# Patient Record
Sex: MALE | Race: OTHER | HISPANIC OR LATINO | Employment: OTHER | URBAN - METROPOLITAN AREA
[De-identification: names, ages, dates, MRNs, and addresses within clinical notes are randomized per-mention and may not be internally consistent; named-entity substitution may affect disease eponyms.]

---

## 2017-11-11 ENCOUNTER — APPOINTMENT (OUTPATIENT)
Dept: GENERAL RADIOLOGY | Age: 72
End: 2017-11-11
Attending: EMERGENCY MEDICINE
Payer: MEDICARE

## 2017-11-11 ENCOUNTER — HOSPITAL ENCOUNTER (EMERGENCY)
Age: 72
Discharge: HOME | End: 2017-11-11
Attending: EMERGENCY MEDICINE
Payer: MEDICARE

## 2017-11-11 VITALS
HEART RATE: 79 BPM | SYSTOLIC BLOOD PRESSURE: 180 MMHG | RESPIRATION RATE: 15 BRPM | TEMPERATURE: 97.9 F | OXYGEN SATURATION: 99 % | WEIGHT: 175 LBS | HEIGHT: 66 IN | BODY MASS INDEX: 28.12 KG/M2 | DIASTOLIC BLOOD PRESSURE: 79 MMHG

## 2017-11-11 DIAGNOSIS — L84 FOOT CALLUS: Primary | ICD-10-CM

## 2017-11-11 DIAGNOSIS — R73.9 HYPERGLYCEMIA: ICD-10-CM

## 2017-11-11 DIAGNOSIS — E11.00 TYPE 2 DIABETES MELLITUS WITH HYPEROSMOLARITY WITHOUT COMA, WITHOUT LONG-TERM CURRENT USE OF INSULIN (HCC): ICD-10-CM

## 2017-11-11 LAB — GLUCOSE BLD STRIP.AUTO-MCNC: 320 MG/DL (ref 70–110)

## 2017-11-11 PROCEDURE — 74011250636 HC RX REV CODE- 250/636: Performed by: EMERGENCY MEDICINE

## 2017-11-11 PROCEDURE — 82962 GLUCOSE BLOOD TEST: CPT

## 2017-11-11 PROCEDURE — 73630 X-RAY EXAM OF FOOT: CPT

## 2017-11-11 PROCEDURE — 99283 EMERGENCY DEPT VISIT LOW MDM: CPT

## 2017-11-11 PROCEDURE — 90715 TDAP VACCINE 7 YRS/> IM: CPT | Performed by: EMERGENCY MEDICINE

## 2017-11-11 PROCEDURE — 90471 IMMUNIZATION ADMIN: CPT

## 2017-11-11 RX ORDER — CEPHALEXIN 500 MG/1
500 CAPSULE ORAL 4 TIMES DAILY
Qty: 28 CAP | Refills: 0 | Status: SHIPPED | OUTPATIENT
Start: 2017-11-11 | End: 2017-11-18

## 2017-11-11 RX ADMIN — TETANUS TOXOID, REDUCED DIPHTHERIA TOXOID AND ACELLULAR PERTUSSIS VACCINE, ADSORBED 0.5 ML: 5; 2.5; 8; 8; 2.5 SUSPENSION INTRAMUSCULAR at 09:16
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
## 2017-11-11 NOTE — ED NOTES
Patient's blood sugar checked at 320. Patient medicated per MAR orders. Verified order, patient identification, and allergies prior to administration. Call bell within reach of patient. Will continue to monitor and assess.
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
## 2017-11-11 NOTE — ED NOTES
I have reviewed discharge instructions with the patient and adult child. The patient and adult child verbalized understanding. Patient discharged ambulatory with family. One prescription given to patient. Patient armband removed and shredded.

## 2017-11-11 NOTE — DISCHARGE INSTRUCTIONS
Dolor de pie: Instrucciones de cuidado - [ Foot Pain: Care Instructions ]  Instrucciones de cuidado  Las lesiones de pie que causan dolor e Ramesh Drones son bastante comunes. Apoorva todos los deportes o trabajos de reparación en el hogar pueden causar un paso en falso que termine con un dolor en el pie. El desgaste normal, sobre todo al [employer], también puede causar R[employer]. La mayoría de las lesiones menores del pie sanarán por sí solas, y el tratamiento en el hogar suele ser todo lo que necesita. Karlos si tiene marck lesión grave, quizás necesite exámenes y [place]. La atención de seguimiento es marck parte clave de foley tratamiento y seguridad. Asegúrese de hacer y acudir a todas las citas, y llame a foley médico si está teniendo problemas. También es marck buena idea saber los resultados de los exámenes y mantener marck lista de los medicamentos que steve. ¿Cómo puede cuidarse en el hogar? · [employer] analgésicos (medicamentos para el dolor) exactamente dieudonne le fueron indicados. ¨ Si el médico le recetó un analgésico, tómelo según las indicaciones. ¨ Si no está tomando un analgésico recetado, pregúntele a foley médico si puede patience un medicamento de [employer]. · Descanse y proteja foley pie. Deje de hacer cualquier actividad que pudiera causarle dolor. · Colóquese hielo o marck compresa fría en el pie emily 10 a 20 minutos cada vez. Póngase un paño carlos entre el hielo y la piel. · Eleve el pie adolorido sobre marck almohada cuando se aplique hielo o en cualquier momento que se siente o acueste emily los 3 días siguientes. Trate de mantenerlo por encima del nivel del corazón. Quinn ayudará a reducir la hinchazón. · Foley médico podría recomendar que se envuelva el pie con un vendaje elástico. Mantenga el pie envuelto tanto tiempo dieudonne foley médico lo recomiende. · Si foley médico le recomendó usar muletas, úselas según las indicaciones. · Use zapatos amplios.   · Tan pronto dieudonne el dolor y la hinchazón terminen, comience a hacer ejercicios suaves con el pie. Foley médico le puede decir qué ejercicios ayudarán. ¿Cuándo debe pedir ayuda? Llame al 911 en cualquier momento que considere que necesita atención de emergencia. Por ejemplo, llame si:  ? · Foley pie se pone pálido, blancuzco, azulado o frío. ?Llame a foley médico ahora mismo o busque atención médica inmediata si:  ? · No puede  o pararse en el pie. ? · Foley pie se ve torcido o fuera de foley posición normal.   ? · Foley pie no es estable cuando pisa. ? · Tiene señales de infección, tales dieudonne:  ¨ Aumento del dolor, la hinchazón, el enrojecimiento o la temperatura. ¨ Vetas rojizas que comienzan en la richie adolorida. ¨ Pus que supura de marck richie del pie. Leatha Lights. ? · Siente foley pie entumecido o con hormigueo. ?Preste especial atención a los cambios en foley franky y asegúrese de comunicarse con foley médico si:  ? · No mejora dieudonne se esperaba. ? · Tiene moretones por marck lesión que rodriguez más de 2 semanas. ¿Dónde puede encontrar más información en inglés? Franky Nicole a http://nancy-pan.info/. Mame Ortiz YYissel en la búsqueda para aprender más acerca de \"Dolor de pie: Instrucciones de cuidado - [ Foot Pain: Care Instructions ]. \"  Revisado: 21 marzo, 2017  Versión del contenido: 11.4  © 4946-7292 Healthwise, Incorporated. Las instrucciones de cuidado fueron adaptadas bajo licencia por Good Help Connections (which disclaims liability or warranty for this information). Si usted tiene [place] afección médica o sobre estas instrucciones, siempre pregunte a foley profesional de franky. [facility] niega toda garantía o responsabilidad por foley uso de esta información.

## 2019-12-10 ENCOUNTER — APPOINTMENT (OUTPATIENT)
Dept: CT IMAGING | Age: 74
End: 2019-12-10
Attending: EMERGENCY MEDICINE
Payer: MEDICARE

## 2019-12-10 ENCOUNTER — HOSPITAL ENCOUNTER (EMERGENCY)
Age: 74
Discharge: HOME | End: 2019-12-10
Attending: EMERGENCY MEDICINE
Payer: MEDICARE

## 2019-12-10 VITALS
HEART RATE: 96 BPM | WEIGHT: 160 LBS | BODY MASS INDEX: 25.71 KG/M2 | HEIGHT: 66 IN | RESPIRATION RATE: 16 BRPM | TEMPERATURE: 98.7 F | DIASTOLIC BLOOD PRESSURE: 72 MMHG | SYSTOLIC BLOOD PRESSURE: 175 MMHG | OXYGEN SATURATION: 100 %

## 2019-12-10 DIAGNOSIS — K59.00 CONSTIPATION, UNSPECIFIED CONSTIPATION TYPE: ICD-10-CM

## 2019-12-10 DIAGNOSIS — R39.198 DIFFICULTY IN URINATION: Primary | ICD-10-CM

## 2019-12-10 LAB
ALBUMIN SERPL-MCNC: 3.5 G/DL (ref 3.4–5)
ALBUMIN/GLOB SERPL: 0.9 {RATIO} (ref 0.8–1.7)
ALP SERPL-CCNC: 66 U/L (ref 45–117)
ALT SERPL-CCNC: 30 U/L (ref 16–61)
ANION GAP SERPL CALC-SCNC: 7 MMOL/L (ref 3–18)
APPEARANCE UR: CLEAR
AST SERPL-CCNC: 12 U/L (ref 10–38)
BACTERIA URNS QL MICRO: NEGATIVE /HPF
BASOPHILS # BLD: 0 K/UL (ref 0–0.1)
BASOPHILS NFR BLD: 1 % (ref 0–2)
BILIRUB SERPL-MCNC: 0.6 MG/DL (ref 0.2–1)
BILIRUB UR QL: NEGATIVE
BUN SERPL-MCNC: 30 MG/DL (ref 7–18)
BUN/CREAT SERPL: 21 (ref 12–20)
CALCIUM SERPL-MCNC: 9.3 MG/DL (ref 8.5–10.1)
CHLORIDE SERPL-SCNC: 105 MMOL/L (ref 100–111)
CO2 SERPL-SCNC: 27 MMOL/L (ref 21–32)
COLOR UR: YELLOW
CREAT SERPL-MCNC: 1.42 MG/DL (ref 0.6–1.3)
DIFFERENTIAL METHOD BLD: ABNORMAL
EOSINOPHIL # BLD: 0.6 K/UL (ref 0–0.4)
EOSINOPHIL NFR BLD: 9 % (ref 0–5)
EPITH CASTS URNS QL MICRO: NORMAL /LPF (ref 0–5)
ERYTHROCYTE [DISTWIDTH] IN BLOOD BY AUTOMATED COUNT: 13.1 % (ref 11.6–14.5)
GLOBULIN SER CALC-MCNC: 4 G/DL (ref 2–4)
GLUCOSE SERPL-MCNC: 250 MG/DL (ref 74–99)
GLUCOSE UR STRIP.AUTO-MCNC: 500 MG/DL
HCT VFR BLD AUTO: 31.7 % (ref 36–48)
HGB BLD-MCNC: 10.6 G/DL (ref 13–16)
HGB UR QL STRIP: NEGATIVE
KETONES UR QL STRIP.AUTO: NEGATIVE MG/DL
LEUKOCYTE ESTERASE UR QL STRIP.AUTO: NEGATIVE
LIPASE SERPL-CCNC: 275 U/L (ref 73–393)
LYMPHOCYTES # BLD: 1.6 K/UL (ref 0.9–3.6)
LYMPHOCYTES NFR BLD: 25 % (ref 21–52)
MCH RBC QN AUTO: 29.7 PG (ref 24–34)
MCHC RBC AUTO-ENTMCNC: 33.4 G/DL (ref 31–37)
MCV RBC AUTO: 88.8 FL (ref 74–97)
MONOCYTES # BLD: 0.6 K/UL (ref 0.05–1.2)
MONOCYTES NFR BLD: 10 % (ref 3–10)
NEUTS SEG # BLD: 3.6 K/UL (ref 1.8–8)
NEUTS SEG NFR BLD: 55 % (ref 40–73)
NITRITE UR QL STRIP.AUTO: NEGATIVE
PH UR STRIP: 5 [PH] (ref 5–8)
PLATELET # BLD AUTO: 208 K/UL (ref 135–420)
PMV BLD AUTO: 9.8 FL (ref 9.2–11.8)
POTASSIUM SERPL-SCNC: 4.1 MMOL/L (ref 3.5–5.5)
PROT SERPL-MCNC: 7.5 G/DL (ref 6.4–8.2)
PROT UR STRIP-MCNC: 30 MG/DL
RBC # BLD AUTO: 3.57 M/UL (ref 4.7–5.5)
RBC #/AREA URNS HPF: NEGATIVE /HPF (ref 0–5)
SODIUM SERPL-SCNC: 139 MMOL/L (ref 136–145)
SP GR UR REFRACTOMETRY: 1.02 (ref 1–1.03)
UROBILINOGEN UR QL STRIP.AUTO: 0.2 EU/DL (ref 0.2–1)
WBC # BLD AUTO: 6.5 K/UL (ref 4.6–13.2)
WBC URNS QL MICRO: NEGATIVE /HPF (ref 0–5)

## 2019-12-10 PROCEDURE — 80053 COMPREHEN METABOLIC PANEL: CPT

## 2019-12-10 PROCEDURE — 74177 CT ABD & PELVIS W/CONTRAST: CPT

## 2019-12-10 PROCEDURE — 83690 ASSAY OF LIPASE: CPT

## 2019-12-10 PROCEDURE — 99283 EMERGENCY DEPT VISIT LOW MDM: CPT

## 2019-12-10 PROCEDURE — 96374 THER/PROPH/DIAG INJ IV PUSH: CPT

## 2019-12-10 PROCEDURE — 74011250636 HC RX REV CODE- 250/636: Performed by: EMERGENCY MEDICINE

## 2019-12-10 PROCEDURE — 85025 COMPLETE CBC W/AUTO DIFF WBC: CPT

## 2019-12-10 PROCEDURE — 81001 URINALYSIS AUTO W/SCOPE: CPT

## 2019-12-10 PROCEDURE — 74011636320 HC RX REV CODE- 636/320: Performed by: EMERGENCY MEDICINE

## 2019-12-10 RX ORDER — POLYETHYLENE GLYCOL 3350 17 G/17G
17 POWDER, FOR SOLUTION ORAL DAILY
Qty: 507 G | Refills: 0 | Status: SHIPPED | OUTPATIENT
Start: 2019-12-10

## 2019-12-10 RX ORDER — METFORMIN HYDROCHLORIDE 750 MG/1
750 TABLET, EXTENDED RELEASE ORAL DAILY
COMMUNITY
End: 2020-01-29

## 2019-12-10 RX ORDER — GUAIFENESIN 100 MG/5ML
81 LIQUID (ML) ORAL DAILY
COMMUNITY
End: 2020-04-24

## 2019-12-10 RX ORDER — ATORVASTATIN CALCIUM 20 MG/1
20 TABLET, FILM COATED ORAL DAILY
COMMUNITY

## 2019-12-10 RX ORDER — ACETAMINOPHEN 10 MG/ML
1000 INJECTION, SOLUTION INTRAVENOUS ONCE
Status: COMPLETED | OUTPATIENT
Start: 2019-12-10 | End: 2019-12-10

## 2019-12-10 RX ADMIN — IOPAMIDOL 70 ML: 612 INJECTION, SOLUTION INTRAVENOUS at 04:43

## 2019-12-10 RX ADMIN — SODIUM CHLORIDE 500 ML: 900 INJECTION, SOLUTION INTRAVENOUS at 03:18

## 2019-12-10 RX ADMIN — ACETAMINOPHEN 1000 MG: 10 INJECTION, SOLUTION INTRAVENOUS at 03:18

## 2019-12-10 NOTE — DISCHARGE INSTRUCTIONS
Patient Education        Estreñimiento: Instrucciones de cuidado - [ Constipation: Care Instructions ]  Instrucciones de cuidado    Tener estreñimiento significa que usted tiene dificultades para eliminar las heces (evacuaciones del intestino). Las personas eliminan heces entre 3 veces al día y Ceil Faster vez cada 3 días. Lo que es normal para usted puede ser [employer]na Products. El estreñimiento puede ocurrir con dolor en el recto y cólicos. El dolor podría empeorar cuando trata de eliminar las heces. A veces hay pequeñas cantidades de amrit caitlyn viva en el papel higiénico o en la superficie de las heces. Brutus se debe a las venas dilatadas cerca del recto (hemorroides). Algunos cambios en santos Rutland Greener y estilo de fransisca podrían ayudarle a evitar el estreñimiento continuo. Es posible que el médico además le recete medicamentos para ayudar a aflojar las heces. Algunos medicamentos pueden causar estreñimiento. Brutus incluye los analgésicos (medicamentos para el dolor) y los antidepresivos. Infórmele a santos médico sobre Art Forrest que usted steve. Es posible que santos médico quiera cambiar un medicamento para aliviar morro síntomas. La atención de seguimiento es marck parte clave de santos tratamiento y seguridad. Asegúrese de hacer y acudir a todas las citas, y llame a santos médico si está teniendo problemas. También es marck buena idea saber los resultados de morro exámenes y mantener marck lista de los medicamentos que steve. ¿Cómo puede cuidarse en el hogar? · Mallorie abundantes líquidos, los suficientes dieudonne para que santos orina sea de color amarillo jerad o transparente dieudonne el agua. Si tiene [employer], del corazón o del hígado y tiene que T[employer]in's líquidos, hable con santos médico antes de aumentar santos consumo. · Incluya en santos dieta diaria alimentos ricos en fibra. Estos incluyen frutas, verduras, frijoles (habichuelas) y granos integrales. · Kolby por lo menos 30 minutos de ejercicio la mayoría de los días de la [place].  Caminar es marck buena opción. Es posible que también quiera hacer otras actividades, dieudonne correr, nadar, [employer], o jugar al tenis u otros deportes de equipo. · [place] un suplemento de [place], dieudonne Citrucel o Metamucil, todos los GRASSE. Faith y siga todas las indicaciones de la [place]. · Programe tiempo todos los días para evacuar el intestino. Isidor Kim rutina diaria podría ayudar. Tómese santos tiempo para evacuar el intestino. · Apoye los pies sobre un banco o taburete pequeño cuando se siente en el inodoro. [place] ayuda a flexionar las caderas y coloca la pelvis en posición de cuclillas. · Santos médico podría recomendarle un laxante de venta ann marie para aliviar el estreñimiento. Jock Naila son Huson Franco de Magnesia (Milk of Magnesia) y [place]. Faith y siga todas las instrucciones de la [place]. No use laxantes de Best Buy. ¿Cuándo debe pedir ayuda? Llame a santos médico ahora mismo o busque atención médica inmediata si:    · Tiene dolor abdominal nuevo o peor.     · Tiene náuseas o vómito nuevos o peores.     · Tiene amrit en las heces.    Preste especial atención a los cambios en santos franky y asegúrese de comunicarse con santos médico si:    · Santos estreñimiento empeora.     · No mejora dieudonne se esperaba. ¿Dónde puede encontrar más información en inglés? Jeanine Spurling a http://nancy-pan.info/. Escriba P343 en la búsqueda para aprender Betty Power de \"Estreñimiento: Instrucciones de cuidado - [ Constipation: Care Instructions ]. \"  Revisado: 26 eduardo, 2019  Versión del contenido: 12.2  © 2843-1475 Healthwise, Incorporated. Las instrucciones de cuidado fueron adaptadas bajo licencia por Good Help Connections (which disclaims liability or warranty for this information). Si usted tiene [place] afección médica o sobre estas instrucciones, siempre pregunte a santos profesional de franky. Healthwise, Incorporated niega toda garantía o responsabilidad por santos uso de esta información.          Patient Education Dolor al [place] (disuria): Instrucciones de cuidado - [ Painful Urination (Dysuria): Care Instructions ]  Instrucciones de cuidado  Sentir ardor al orinar (disuria) es un síntoma común de marck infección de las vías urinarias u otros problemas urinarios. La vejiga puede inflamarse. [place] puede causar dolor al llenarse o vaciarse la vejiga. Usted también puede sentir dolor si el conducto que transporta la orina desde la vejiga hacia afuera del organismo (uretra) se irrita o se infecta. Las infecciones de transmisión sexual (STI, por morro siglas en inglés) también pueden causar dolor al orinar. A veces, el dolor puede ser causado por otras cosas además de marck infección. La uretra puede irritarse a causa de jabones, perfumes u objetos extraños en la uretra. Los cálculos renales pueden causar dolor cuando pasan por la uretra. Puede ser difícil encontrar la causa. Es posible que usted deba hacerse pruebas. El tratamiento para el dolor al orinar depende de la causa. La atención de seguimiento es marck parte clave de santos tratamiento y seguridad. Asegúrese de hacer y acudir a todas las citas, y llame a santos médico si está teniendo problemas. También es marck buena idea saber los resultados de morro exámenes y mantener marck lista de los medicamentos que steve. ¿Cómo puede cuidarse en el hogar? · [employer] Materials agua emily los siguientes fitz o [address] [place] ayudará a que la orina sea menos concentrada. (Si tiene enfermedad de los riñones, el corazón o el hígado y tiene que [place]'s líquidos, hable con santos médico antes de aumentar la cantidad de líquidos que austyn). · Evite las bebidas gaseosas o con cafeína. Pueden irritar la vejiga. · Orine con frecuencia. Trate de vaciar la vejiga cada vez que orine. Para las mujeres:  · Orine inmediatamente después de lance tenido relaciones sexuales. · Después de ir al baño, límpiese de adelante hacia atrás.   · Evite el uso de duchas vaginales, los zachariah de burbujas y los [place] de higiene femenina. Y evite otros productos para la higiene femenina que contengan desodorantes. ¿Cuándo debe pedir ayuda? Llame a santos médico ahora mismo o busque atención médica inmediata si:    · Tiene síntomas nuevos dieudonne fiebre, náuseas o vómito.     · Tiene síntomas nuevos o peores de un problema urinario. Por ejemplo:  ? Tiene amrit o pus en la orina. ? Tiene escalofríos o le duele el cuerpo. ? Siente más dolor al [place]. ? Tiene dolor en la celine o el abdomen. ? Tiene dolor de espalda ana luisa debajo de la caja torácica (el flanco).    Vigile muy de cerca los cambios en santos franky, y asegúrese de comunicarse con santos médico si tiene algún problema. ¿Dónde puede encontrar más información en inglés? Amalia Cuellar a http://nancy-pan.info/. Silvia Aviles [id number] en la búsqueda para aprender más acerca de \"Dolor al [place] (disuria): Instrucciones de cuidado - [ Painful Urination (Dysuria): Care Instructions ]. \"  Revisado: 19 diciembre, 2018  Versión del contenido: 12.2  © 7203-4631 Healthwise, Incorporated. Las instrucciones de cuidado fueron adaptadas bajo licencia por Good Help Connections (which disclaims liability or warranty for this information). Si usted tiene [place] afección médica o sobre estas instrucciones, siempre pregunte a santos profesional de franky. Healthwise, Incorporated niega toda garantía o responsabilidad por santos uso de esta información.

## 2019-12-10 NOTE — ED NOTES
I have reviewed discharge instructions with the patient and pt daughter. The patient and pt daughter verbalized understanding. Pt discharged, ambulatory with daughter. NAD noted.

## 2019-12-10 NOTE — ED PROVIDER NOTES
EMERGENCY DEPARTMENT HISTORY AND PHYSICAL EXAM    Date: 12/10/2019  Patient Name: Abbi Vincent    History of Presenting Illness     Chief Complaint   Patient presents with    Constipation    Urinary Retention         History Provided By: Patient      Abbi Vincent is a 76 y.o. male with PMHX of hypertension and diabetes who presents to the emergency department C/O urinary retention x3 days and constipation x3 days. Patient history obtained through daughter. Offered family  they would prefer to translate themselves. Patient notes that over the last 3 days it has been harder and harder to him to urinate. It had slowed down to a trickle but now he is unable to urinate. He is also noticed increasing pelvic pain with this. During the same time. Patient also felt constipated. Having smaller hard stools. He denies headache, changes in vision, shortness of breath or chest pain, nausea or vomiting, fever, numbness or tingling in hands or feet. PCP: None    Current Outpatient Medications   Medication Sig Dispense Refill    metFORMIN ER (GLUCOPHAGE XR) 750 mg tablet Take 750 mg by mouth daily.  atorvastatin (LIPITOR) 20 mg tablet Take 20 mg by mouth daily.  aspirin 81 mg chewable tablet Take 81 mg by mouth daily.  polyethylene glycol (MIRALAX) 17 gram/dose powder Take 17 g by mouth daily. 1 tablespoon with 8 oz of water daily 507 g 0    LOSARTAN PO Take 100 mg by mouth. Past History     Past Medical History:  Past Medical History:   Diagnosis Date    Diabetes ([address]     Hypertension        Past Surgical History:  No past surgical history on file. Family History:  No family history on file.     Social History:  Social History     Tobacco Use    Smoking status: Never Smoker    Smokeless tobacco: Never Used   Substance Use Topics    Alcohol use: No    Drug use: No       Allergies:  No Known Allergies      Review of Systems   Review of Systems   All other systems reviewed and are negative. Physical Exam     Vitals:    12/10/19 0224 12/10/19 0250 12/10/19 0300 12/10/19 0600   BP:  143/69 156/64 175/72   Pulse:  96     Resp:  18 18 16   Temp:  98.7 °F (37.1 °C)     SpO2:  99% 98% 100%   Weight: 72.6 kg (160 lb)      Height: 5' 6\" (1.676 m)        Physical Exam    Nursing notes and vital signs reviewed    Constitutional: Non toxic appearing,mild acute distress  Head: Normocephalic, Atraumatic  Eyes: Pupils are equal, round, and reactive to light, EOMI  Neck: Supple  Cardiovascular: Regular rate and rhythm, no murmurs, rubs, or gallops  Chest: Normal work of breathing and chest excursion bilaterally  Lungs: Clear to ausculation bilaterally  Abdomen: Soft, left and right lower quadrants tender, non distended, normoactive bowel sounds  Back: No evidence of trauma or deformity  Extremities: No evidence of trauma or deformity, no LE edema  Skin: Warm and dry, normal cap refill  Neuro: Alert and appropriate,  Psychiatric: Normal mood and affect      Diagnostic Study Results     Labs -     Recent Results (from the past 12 hour(s))   CBC WITH AUTOMATED DIFF    Collection Time: 12/10/19  3:00 AM   Result Value Ref Range    WBC 6.5 4.6 - 13.2 K/uL    RBC 3.57 (L) 4.70 - 5.50 M/uL    HGB 10.6 (L) 13.0 - 16.0 g/dL    HCT 31.7 (L) 36.0 - 48.0 %    MCV 88.8 74.0 - 97.0 FL    MCH 29.7 24.0 - 34.0 PG    MCHC 33.4 31.0 - 37.0 g/dL    RDW 13.1 11.6 - 14.5 %    PLATELET 181 752 - 454 K/uL    MPV 9.8 9.2 - 11.8 FL    NEUTROPHILS 55 40 - 73 %    LYMPHOCYTES 25 21 - 52 %    MONOCYTES 10 3 - 10 %    EOSINOPHILS 9 (H) 0 - 5 %    BASOPHILS 1 0 - 2 %    ABS. NEUTROPHILS 3.6 1.8 - 8.0 K/UL    ABS. LYMPHOCYTES 1.6 0.9 - 3.6 K/UL    ABS. MONOCYTES 0.6 0.05 - 1.2 K/UL    ABS. EOSINOPHILS 0.6 (H) 0.0 - 0.4 K/UL    ABS.  BASOPHILS 0.0 0.0 - 0.1 K/UL    DF AUTOMATED     METABOLIC PANEL, COMPREHENSIVE    Collection Time: 12/10/19  3:00 AM   Result Value Ref Range    Sodium 139 136 - 145 mmol/L    Potassium 4.1 3.5 - 5.5 mmol/L    Chloride 105 100 - 111 mmol/L    CO2 27 21 - 32 mmol/L    Anion gap 7 3.0 - 18 mmol/L    Glucose 250 (H) 74 - 99 mg/dL    BUN 30 (H) 7.0 - 18 MG/DL    Creatinine 1.42 (H) 0.6 - 1.3 MG/DL    BUN/Creatinine ratio 21 (H) 12 - 20      GFR est AA 59 (L) >60 ml/min/1.73m2    GFR est non-AA 49 (L) >60 ml/min/1.73m2    Calcium 9.3 8.5 - 10.1 MG/DL    Bilirubin, total 0.6 0.2 - 1.0 MG/DL    ALT (SGPT) 30 16 - 61 U/L    AST (SGOT) 12 10 - 38 U/L    Alk. phosphatase 66 45 - 117 U/L    Protein, total 7.5 6.4 - 8.2 g/dL    Albumin 3.5 3.4 - 5.0 g/dL    Globulin 4.0 2.0 - 4.0 g/dL    A-G Ratio 0.9 0.8 - 1.7     LIPASE    Collection Time: 12/10/19  3:00 AM   Result Value Ref Range    Lipase 275 73 - 393 U/L   URINALYSIS W/ RFLX MICROSCOPIC    Collection Time: 12/10/19  3:23 AM   Result Value Ref Range    Color YELLOW      Appearance CLEAR      Specific gravity 1.018 1.005 - 1.030      pH (UA) 5.0 5.0 - 8.0      Protein 30 (A) NEG mg/dL    Glucose 500 (A) NEG mg/dL    Ketone NEGATIVE  NEG mg/dL    Bilirubin NEGATIVE  NEG      Blood NEGATIVE  NEG      Urobilinogen 0.2 0.2 - 1.0 EU/dL    Nitrites NEGATIVE  NEG      Leukocyte Esterase NEGATIVE  NEG     URINE MICROSCOPIC ONLY    Collection Time: 12/10/19  3:23 AM   Result Value Ref Range    WBC NEGATIVE  0 - 5 /hpf    RBC NEGATIVE  0 - 5 /hpf    Epithelial cells RARE 0 - 5 /lpf    Bacteria NEGATIVE  NEG /hpf       Radiologic Studies -   CT ABD PELV W CONT   Final Result   IMPRESSION:      The appendix is borderline in size but otherwise unremarkable. Overall low   suspicion for appendicitis. No definitive acute intra-abdominal process identified. CT Results  (Last 48 hours)               12/10/19 0455  CT ABD PELV W CONT Final result    Impression:  IMPRESSION:       The appendix is borderline in size but otherwise unremarkable. Overall low   suspicion for appendicitis. No definitive acute intra-abdominal process identified.        Narrative:  EXAM: CT of the abdomen and pelvis       INDICATION: Pain. COMPARISON: None. TECHNIQUE: Axial CT imaging of the abdomen and pelvis was performed with   intravenous contrast. Multiplanar reformats were generated. Dose reduction   techniques used: automated exposure control, adjustment of the mAs and/or kVp   according to patient size, and iterative reconstruction techniques. _______________       FINDINGS:       LOWER CHEST: Unremarkable. LIVER, BILIARY: Liver is normal. No biliary dilation. Gallbladder is   unremarkable. PANCREAS: Normal.       SPLEEN: Normal.       ADRENALS: Normal.       KIDNEYS: There is a small hypodensity lower pole left kidney likely a small   cyst. There is no hydronephrosis. LYMPH NODES: No enlarged lymph nodes. GASTROINTESTINAL TRACT: No bowel dilation or wall thickening. There are a few   diverticula of the descending colon without diverticulitis. The appendix is   prominent measuring up to 7 mm. There is no periappendiceal infiltrative change. PELVIC ORGANS: Unremarkable. VASCULATURE: Unremarkable. BONES: No acute or aggressive osseous abnormalities identified. OTHER: None.       _______________               CXR Results  (Last 48 hours)    None          Medications given in the ED-  Medications   sodium chloride 0.9 % bolus infusion 500 mL (500 mL IntraVENous New Bag 12/10/19 0318)   acetaminophen (OFIRMEV) infusion 1,000 mg (1,000 mg IntraVENous New Bag 12/10/19 0318)   iopamidol (ISOVUE 300) 61 % contrast injection  mL (70 mL IntraVENous Given 12/10/19 8047)         Medical Decision Making   I am the first provider for this patient. I reviewed the vital signs, available nursing notes, past medical history, past surgical history, family history and social history. Vital Signs-Reviewed the patient's vital signs.     Records Reviewed: Nursing Notes    Provider Notes (Medical Decision Making): Yeni Alvarez is a 76 y.o. male presented today with abdominal pain, hard bowel movements and decreased urination. Patient was brought back and evaluated after bladder scan patient was able to void with decrease in abdominal symptoms. He was also able to have a bowel movement which again improved his abdominal symptoms. Laboratory work as above UA notable for no evidence of infection. Patient underwent a CT scan of the abdomen pelvis which had no acute finding including no obstruction. I spoke to patient and his wife at length we will start him on MiraLAX in addition he will follow-up with both GI and urology as he has not had a colonoscopy at this point and he may be suffering from an enlarged prostate given his frequent urinations and incomplete emptying. Patient was discharged home hemodynamically stable. Procedures:  Procedures        Diagnosis and Disposition       DISCHARGE NOTE:    Momo uGardado  results have been reviewed with him. He has been counseled regarding his diagnosis, treatment, and plan. He verbally conveys understanding and agreement of the signs, symptoms, diagnosis, treatment and prognosis and additionally agrees to follow up as discussed. He also agrees with the care-plan and conveys that all of his questions have been answered. I have also provided discharge instructions for him that include: educational information regarding their diagnosis and treatment, and list of reasons why they would want to return to the ED prior to their follow-up appointment, should his condition change. He has been provided with education for proper emergency department utilization. CLINICAL IMPRESSION:    1. Difficulty in urination    2. Constipation, unspecified constipation type        PLAN:  1. D/C Home  2. Current Discharge Medication List      START taking these medications    Details   polyethylene glycol (MIRALAX) 17 gram/dose powder Take 17 g by mouth daily.  1 tablespoon with 8 oz of water daily  Qty: 507 g, Refills: 0           3.   Follow-up Information     Follow up With Specialties Details Why Contact Info    Melissa Dixon MD Gastroenterology Schedule an appointment as soon as possible for a visit in 1 week  [address] [address] [facility]      Sheila Jimenez MD Urology Schedule an appointment as soon as possible for a visit in 1 week  [address] [facility]  [phone]          _______________________________      Please note that this dictation was completed with [employer]ker, the computer voice recognition software. Quite often unanticipated grammatical, syntax, homophones, and other interpretive errors are inadvertently transcribed by the computer software. Please disregard these errors. Please excuse any errors that have escaped final proofreading.

## 2019-12-10 NOTE — ED NOTES
Pt has voided and had a bowel movement since arrival to ER. Pt reports he is feeling a little better but stomach still just don't feel right. >200ml noted on bladder scan. Dr. Kenn Villavicencio notified.

## 2020-01-01 ENCOUNTER — HOSPITAL ENCOUNTER (INPATIENT)
Age: 75
LOS: 12 days | Discharge: HOME | DRG: 854 | End: 2020-01-14
Attending: EMERGENCY MEDICINE | Admitting: FAMILY MEDICINE
Payer: MEDICARE

## 2020-01-01 ENCOUNTER — APPOINTMENT (OUTPATIENT)
Dept: GENERAL RADIOLOGY | Age: 75
DRG: 854 | End: 2020-01-01
Attending: EMERGENCY MEDICINE
Payer: MEDICARE

## 2020-01-01 DIAGNOSIS — A41.9 SEPSIS, DUE TO UNSPECIFIED ORGANISM, UNSPECIFIED WHETHER ACUTE ORGAN DYSFUNCTION PRESENT (HCC): ICD-10-CM

## 2020-01-01 DIAGNOSIS — R50.9 FEVER IN ADULT: Primary | ICD-10-CM

## 2020-01-01 DIAGNOSIS — E87.20 LACTIC ACIDOSIS: ICD-10-CM

## 2020-01-01 LAB
ALBUMIN SERPL-MCNC: 3.1 G/DL (ref 3.4–5)
ALBUMIN/GLOB SERPL: 0.8 {RATIO} (ref 0.8–1.7)
ALP SERPL-CCNC: 67 U/L (ref 45–117)
ALT SERPL-CCNC: 18 U/L (ref 16–61)
ANION GAP SERPL CALC-SCNC: 9 MMOL/L (ref 3–18)
APPEARANCE UR: CLEAR
AST SERPL-CCNC: 10 U/L (ref 10–38)
BACTERIA URNS QL MICRO: ABNORMAL /HPF
BASOPHILS # BLD: 0 K/UL (ref 0–0.1)
BASOPHILS NFR BLD: 1 % (ref 0–2)
BILIRUB SERPL-MCNC: 1.4 MG/DL (ref 0.2–1)
BILIRUB UR QL: NEGATIVE
BNP SERPL-MCNC: 472 PG/ML (ref 0–900)
BUN SERPL-MCNC: 24 MG/DL (ref 7–18)
BUN/CREAT SERPL: 18 (ref 12–20)
CALCIUM SERPL-MCNC: 8.5 MG/DL (ref 8.5–10.1)
CHLORIDE SERPL-SCNC: 104 MMOL/L (ref 100–111)
CK MB CFR SERPL CALC: ABNORMAL % (ref 0–4)
CK MB SERPL-MCNC: <1 NG/ML (ref 5–25)
CK SERPL-CCNC: 38 U/L (ref 39–308)
CO2 SERPL-SCNC: 26 MMOL/L (ref 21–32)
COLOR UR: ABNORMAL
CREAT SERPL-MCNC: 1.37 MG/DL (ref 0.6–1.3)
DIFFERENTIAL METHOD BLD: ABNORMAL
EOSINOPHIL # BLD: 0.1 K/UL (ref 0–0.4)
EOSINOPHIL NFR BLD: 2 % (ref 0–5)
EPITH CASTS URNS QL MICRO: ABNORMAL /LPF (ref 0–5)
ERYTHROCYTE [DISTWIDTH] IN BLOOD BY AUTOMATED COUNT: 12.8 % (ref 11.6–14.5)
FLUAV AG NPH QL IA: NEGATIVE
FLUBV AG NOSE QL IA: NEGATIVE
GLOBULIN SER CALC-MCNC: 3.9 G/DL (ref 2–4)
GLUCOSE BLD STRIP.AUTO-MCNC: 320 MG/DL (ref 70–110)
GLUCOSE SERPL-MCNC: 307 MG/DL (ref 74–99)
GLUCOSE UR STRIP.AUTO-MCNC: 500 MG/DL
GRAN CASTS URNS QL MICRO: ABNORMAL /LPF
HCT VFR BLD AUTO: 24.9 % (ref 36–48)
HGB BLD-MCNC: 8.4 G/DL (ref 13–16)
HGB UR QL STRIP: ABNORMAL
HYALINE CASTS URNS QL MICRO: ABNORMAL /LPF (ref 0–2)
KETONES UR QL STRIP.AUTO: ABNORMAL MG/DL
LACTATE BLD-SCNC: 2.48 MMOL/L (ref 0.4–2)
LEUKOCYTE ESTERASE UR QL STRIP.AUTO: NEGATIVE
LYMPHOCYTES # BLD: 0.5 K/UL (ref 0.9–3.6)
LYMPHOCYTES NFR BLD: 8 % (ref 21–52)
MCH RBC QN AUTO: 29.9 PG (ref 24–34)
MCHC RBC AUTO-ENTMCNC: 33.7 G/DL (ref 31–37)
MCV RBC AUTO: 88.6 FL (ref 74–97)
MONOCYTES # BLD: 0.2 K/UL (ref 0.05–1.2)
MONOCYTES NFR BLD: 3 % (ref 3–10)
MUCOUS THREADS URNS QL MICRO: ABNORMAL /LPF
NEUTS SEG # BLD: 4.9 K/UL (ref 1.8–8)
NEUTS SEG NFR BLD: 86 % (ref 40–73)
NITRITE UR QL STRIP.AUTO: NEGATIVE
PH UR STRIP: 5 [PH] (ref 5–8)
PLATELET # BLD AUTO: 177 K/UL (ref 135–420)
PMV BLD AUTO: 9.9 FL (ref 9.2–11.8)
POTASSIUM SERPL-SCNC: 3.6 MMOL/L (ref 3.5–5.5)
PROT SERPL-MCNC: 7 G/DL (ref 6.4–8.2)
PROT UR STRIP-MCNC: 300 MG/DL
RBC # BLD AUTO: 2.81 M/UL (ref 4.7–5.5)
RBC #/AREA URNS HPF: ABNORMAL /HPF (ref 0–5)
SODIUM SERPL-SCNC: 139 MMOL/L (ref 136–145)
SP GR UR REFRACTOMETRY: 1.02 (ref 1–1.03)
SPERM URNS QL MICRO: ABNORMAL
TROPONIN I SERPL-MCNC: <0.02 NG/ML (ref 0–0.04)
UROBILINOGEN UR QL STRIP.AUTO: 1 EU/DL (ref 0.2–1)
WBC # BLD AUTO: 5.7 K/UL (ref 4.6–13.2)
WBC URNS QL MICRO: ABNORMAL /HPF (ref 0–5)

## 2020-01-01 PROCEDURE — 93005 ELECTROCARDIOGRAM TRACING: CPT

## 2020-01-01 PROCEDURE — 96375 TX/PRO/DX INJ NEW DRUG ADDON: CPT

## 2020-01-01 PROCEDURE — 87040 BLOOD CULTURE FOR BACTERIA: CPT

## 2020-01-01 PROCEDURE — 83605 ASSAY OF LACTIC ACID: CPT

## 2020-01-01 PROCEDURE — 82550 ASSAY OF CK (CPK): CPT

## 2020-01-01 PROCEDURE — 87086 URINE CULTURE/COLONY COUNT: CPT

## 2020-01-01 PROCEDURE — 74011000258 HC RX REV CODE- 258: Performed by: EMERGENCY MEDICINE

## 2020-01-01 PROCEDURE — 82962 GLUCOSE BLOOD TEST: CPT

## 2020-01-01 PROCEDURE — 87077 CULTURE AEROBIC IDENTIFY: CPT

## 2020-01-01 PROCEDURE — 71045 X-RAY EXAM CHEST 1 VIEW: CPT

## 2020-01-01 PROCEDURE — 87186 SC STD MICRODIL/AGAR DIL: CPT

## 2020-01-01 PROCEDURE — 99285 EMERGENCY DEPT VISIT HI MDM: CPT

## 2020-01-01 PROCEDURE — 83880 ASSAY OF NATRIURETIC PEPTIDE: CPT

## 2020-01-01 PROCEDURE — 74011250636 HC RX REV CODE- 250/636: Performed by: EMERGENCY MEDICINE

## 2020-01-01 PROCEDURE — 80053 COMPREHEN METABOLIC PANEL: CPT

## 2020-01-01 PROCEDURE — 87804 INFLUENZA ASSAY W/OPTIC: CPT

## 2020-01-01 PROCEDURE — 81001 URINALYSIS AUTO W/SCOPE: CPT

## 2020-01-01 PROCEDURE — 85025 COMPLETE CBC W/AUTO DIFF WBC: CPT

## 2020-01-01 PROCEDURE — 74011250637 HC RX REV CODE- 250/637: Performed by: EMERGENCY MEDICINE

## 2020-01-01 PROCEDURE — 96365 THER/PROPH/DIAG IV INF INIT: CPT

## 2020-01-01 RX ORDER — SODIUM CHLORIDE 0.9 % (FLUSH) 0.9 %
5-10 SYRINGE (ML) INJECTION AS NEEDED
Status: DISCONTINUED | OUTPATIENT
Start: 2020-01-01 | End: 2020-01-14 | Stop reason: HOSPADM

## 2020-01-01 RX ORDER — LEVOFLOXACIN 5 MG/ML
750 INJECTION, SOLUTION INTRAVENOUS EVERY 24 HOURS
Status: DISCONTINUED | OUTPATIENT
Start: 2020-01-01 | End: 2020-01-02

## 2020-01-01 RX ORDER — VANCOMYCIN 2 GRAM/500 ML IN 0.9 % SODIUM CHLORIDE INTRAVENOUS
2000 ONCE
Status: COMPLETED | OUTPATIENT
Start: 2020-01-01 | End: 2020-01-02

## 2020-01-01 RX ORDER — ACETAMINOPHEN 325 MG/1
975 TABLET ORAL
Status: COMPLETED | OUTPATIENT
Start: 2020-01-01 | End: 2020-01-01

## 2020-01-01 RX ORDER — INSULIN GLARGINE 100 [IU]/ML
25 INJECTION, SOLUTION SUBCUTANEOUS
COMMUNITY
End: 2020-01-29

## 2020-01-01 RX ADMIN — SODIUM CHLORIDE 914 ML: 900 INJECTION, SOLUTION INTRAVENOUS at 22:42

## 2020-01-01 RX ADMIN — LEVOFLOXACIN 750 MG: 5 INJECTION, SOLUTION INTRAVENOUS at 23:36

## 2020-01-01 RX ADMIN — SODIUM CHLORIDE 1000 ML: 900 INJECTION, SOLUTION INTRAVENOUS at 22:41

## 2020-01-01 RX ADMIN — ACETAMINOPHEN 975 MG: 325 TABLET ORAL at 22:43

## 2020-01-01 RX ADMIN — Medication 10 ML: at 23:36

## 2020-01-01 RX ADMIN — PIPERACILLIN AND TAZOBACTAM 4.5 G: 4; .5 INJECTION, POWDER, FOR SOLUTION INTRAVENOUS at 22:42

## 2020-01-02 ENCOUNTER — APPOINTMENT (OUTPATIENT)
Dept: ULTRASOUND IMAGING | Age: 75
DRG: 854 | End: 2020-01-02
Attending: FAMILY MEDICINE
Payer: MEDICARE

## 2020-01-02 ENCOUNTER — APPOINTMENT (OUTPATIENT)
Dept: NUCLEAR MEDICINE | Age: 75
DRG: 854 | End: 2020-01-02
Attending: HOSPITALIST
Payer: MEDICARE

## 2020-01-02 ENCOUNTER — APPOINTMENT (OUTPATIENT)
Dept: CT IMAGING | Age: 75
DRG: 854 | End: 2020-01-02
Attending: EMERGENCY MEDICINE
Payer: MEDICARE

## 2020-01-02 PROBLEM — A41.9 SEPSIS (HCC): Status: ACTIVE | Noted: 2020-01-02

## 2020-01-02 PROBLEM — R50.9 FEVER IN ADULT: Status: ACTIVE | Noted: 2020-01-02

## 2020-01-02 PROBLEM — R10.9 ABDOMINAL PAIN: Status: ACTIVE | Noted: 2020-01-02

## 2020-01-02 PROBLEM — N18.9 CKD (CHRONIC KIDNEY DISEASE): Status: ACTIVE | Noted: 2020-01-02

## 2020-01-02 PROBLEM — E87.20 LACTIC ACIDOSIS: Status: ACTIVE | Noted: 2020-01-02

## 2020-01-02 LAB
ANION GAP SERPL CALC-SCNC: 7 MMOL/L (ref 3–18)
BUN SERPL-MCNC: 21 MG/DL (ref 7–18)
BUN/CREAT SERPL: 15 (ref 12–20)
CALCIUM SERPL-MCNC: 7.9 MG/DL (ref 8.5–10.1)
CHLORIDE SERPL-SCNC: 109 MMOL/L (ref 100–111)
CK MB CFR SERPL CALC: ABNORMAL % (ref 0–4)
CK MB CFR SERPL CALC: ABNORMAL % (ref 0–4)
CK MB CFR SERPL CALC: NORMAL % (ref 0–4)
CK MB SERPL-MCNC: <1 NG/ML (ref 5–25)
CK SERPL-CCNC: 40 U/L (ref 39–308)
CK SERPL-CCNC: 47 U/L (ref 39–308)
CK SERPL-CCNC: 53 U/L (ref 39–308)
CO2 SERPL-SCNC: 26 MMOL/L (ref 21–32)
CREAT SERPL-MCNC: 1.37 MG/DL (ref 0.6–1.3)
GLUCOSE BLD STRIP.AUTO-MCNC: 122 MG/DL (ref 70–110)
GLUCOSE BLD STRIP.AUTO-MCNC: 132 MG/DL (ref 70–110)
GLUCOSE BLD STRIP.AUTO-MCNC: 174 MG/DL (ref 70–110)
GLUCOSE BLD STRIP.AUTO-MCNC: 89 MG/DL (ref 70–110)
GLUCOSE SERPL-MCNC: 204 MG/DL (ref 74–99)
HBA1C MFR BLD: 9.7 % (ref 4.2–5.6)
LACTATE BLD-SCNC: 1.09 MMOL/L (ref 0.4–2)
LACTATE SERPL-SCNC: 1.3 MMOL/L (ref 0.4–2)
POTASSIUM SERPL-SCNC: 3.7 MMOL/L (ref 3.5–5.5)
SODIUM SERPL-SCNC: 142 MMOL/L (ref 136–145)
TROPONIN I SERPL-MCNC: 0.04 NG/ML (ref 0–0.04)
TROPONIN I SERPL-MCNC: 0.06 NG/ML (ref 0–0.04)
TROPONIN I SERPL-MCNC: 0.09 NG/ML (ref 0–0.04)

## 2020-01-02 PROCEDURE — 83605 ASSAY OF LACTIC ACID: CPT

## 2020-01-02 PROCEDURE — 74011250637 HC RX REV CODE- 250/637: Performed by: INTERNAL MEDICINE

## 2020-01-02 PROCEDURE — 82962 GLUCOSE BLOOD TEST: CPT

## 2020-01-02 PROCEDURE — 74011000250 HC RX REV CODE- 250: Performed by: FAMILY MEDICINE

## 2020-01-02 PROCEDURE — A9537 TC99M MEBROFENIN: HCPCS

## 2020-01-02 PROCEDURE — 36415 COLL VENOUS BLD VENIPUNCTURE: CPT

## 2020-01-02 PROCEDURE — 74011250636 HC RX REV CODE- 250/636: Performed by: FAMILY MEDICINE

## 2020-01-02 PROCEDURE — 74011000258 HC RX REV CODE- 258: Performed by: EMERGENCY MEDICINE

## 2020-01-02 PROCEDURE — 74011636320 HC RX REV CODE- 636/320: Performed by: FAMILY MEDICINE

## 2020-01-02 PROCEDURE — 74177 CT ABD & PELVIS W/CONTRAST: CPT

## 2020-01-02 PROCEDURE — 82550 ASSAY OF CK (CPK): CPT

## 2020-01-02 PROCEDURE — 65660000000 HC RM CCU STEPDOWN

## 2020-01-02 PROCEDURE — 74011250637 HC RX REV CODE- 250/637: Performed by: FAMILY MEDICINE

## 2020-01-02 PROCEDURE — 83036 HEMOGLOBIN GLYCOSYLATED A1C: CPT

## 2020-01-02 PROCEDURE — 76705 ECHO EXAM OF ABDOMEN: CPT

## 2020-01-02 PROCEDURE — 80048 BASIC METABOLIC PNL TOTAL CA: CPT

## 2020-01-02 PROCEDURE — 74011250636 HC RX REV CODE- 250/636: Performed by: EMERGENCY MEDICINE

## 2020-01-02 PROCEDURE — 87040 BLOOD CULTURE FOR BACTERIA: CPT

## 2020-01-02 RX ORDER — ONDANSETRON 2 MG/ML
4 INJECTION INTRAMUSCULAR; INTRAVENOUS
Status: DISCONTINUED | OUTPATIENT
Start: 2020-01-02 | End: 2020-01-14 | Stop reason: HOSPADM

## 2020-01-02 RX ORDER — ATORVASTATIN CALCIUM 20 MG/1
20 TABLET, FILM COATED ORAL
Status: DISCONTINUED | OUTPATIENT
Start: 2020-01-02 | End: 2020-01-14 | Stop reason: HOSPADM

## 2020-01-02 RX ORDER — INSULIN LISPRO 100 [IU]/ML
INJECTION, SOLUTION INTRAVENOUS; SUBCUTANEOUS
Status: DISCONTINUED | OUTPATIENT
Start: 2020-01-02 | End: 2020-01-05

## 2020-01-02 RX ORDER — DEXTROSE MONOHYDRATE 100 MG/ML
125-250 INJECTION, SOLUTION INTRAVENOUS AS NEEDED
Status: DISCONTINUED | OUTPATIENT
Start: 2020-01-02 | End: 2020-01-14 | Stop reason: HOSPADM

## 2020-01-02 RX ORDER — HEPARIN SODIUM 5000 [USP'U]/ML
5000 INJECTION, SOLUTION INTRAVENOUS; SUBCUTANEOUS EVERY 8 HOURS
Status: DISCONTINUED | OUTPATIENT
Start: 2020-01-02 | End: 2020-01-08

## 2020-01-02 RX ORDER — ACETAMINOPHEN 325 MG/1
650 TABLET ORAL
Status: DISCONTINUED | OUTPATIENT
Start: 2020-01-02 | End: 2020-01-14 | Stop reason: HOSPADM

## 2020-01-02 RX ORDER — WATER FOR INJECTION,STERILE
VIAL (ML) INJECTION
Status: COMPLETED | OUTPATIENT
Start: 2020-01-02 | End: 2020-01-02

## 2020-01-02 RX ORDER — GUAIFENESIN 100 MG/5ML
81 LIQUID (ML) ORAL DAILY
Status: DISCONTINUED | OUTPATIENT
Start: 2020-01-02 | End: 2020-01-14 | Stop reason: HOSPADM

## 2020-01-02 RX ORDER — LEVOFLOXACIN 5 MG/ML
750 INJECTION, SOLUTION INTRAVENOUS
Status: DISCONTINUED | OUTPATIENT
Start: 2020-01-03 | End: 2020-01-03

## 2020-01-02 RX ORDER — MAGNESIUM SULFATE 100 %
16 CRYSTALS MISCELLANEOUS AS NEEDED
Status: DISCONTINUED | OUTPATIENT
Start: 2020-01-02 | End: 2020-01-14 | Stop reason: HOSPADM

## 2020-01-02 RX ORDER — SODIUM CHLORIDE 9 MG/ML
125 INJECTION, SOLUTION INTRAVENOUS CONTINUOUS
Status: DISCONTINUED | OUTPATIENT
Start: 2020-01-02 | End: 2020-01-03

## 2020-01-02 RX ADMIN — PIPERACILLIN AND TAZOBACTAM 4.5 G: 4; .5 INJECTION, POWDER, FOR SOLUTION INTRAVENOUS at 05:17

## 2020-01-02 RX ADMIN — HEPARIN SODIUM 5000 UNITS: 5000 INJECTION INTRAVENOUS; SUBCUTANEOUS at 17:52

## 2020-01-02 RX ADMIN — ACETAMINOPHEN 650 MG: 325 TABLET ORAL at 20:28

## 2020-01-02 RX ADMIN — PIPERACILLIN AND TAZOBACTAM 4.5 G: 4; .5 INJECTION, POWDER, FOR SOLUTION INTRAVENOUS at 17:51

## 2020-01-02 RX ADMIN — IOPAMIDOL 100 ML: 612 INJECTION, SOLUTION INTRAVENOUS at 02:03

## 2020-01-02 RX ADMIN — SODIUM CHLORIDE 125 ML/HR: 900 INJECTION, SOLUTION INTRAVENOUS at 03:53

## 2020-01-02 RX ADMIN — SINCALIDE 1.45 MCG: 5 INJECTION, POWDER, LYOPHILIZED, FOR SOLUTION INTRAVENOUS at 13:45

## 2020-01-02 RX ADMIN — ATORVASTATIN CALCIUM 20 MG: 20 TABLET, FILM COATED ORAL at 03:53

## 2020-01-02 RX ADMIN — VANCOMYCIN HYDROCHLORIDE 2000 MG: 10 INJECTION, POWDER, LYOPHILIZED, FOR SOLUTION INTRAVENOUS at 01:07

## 2020-01-02 RX ADMIN — PIPERACILLIN AND TAZOBACTAM 4.5 G: 4; .5 INJECTION, POWDER, FOR SOLUTION INTRAVENOUS at 22:24

## 2020-01-02 RX ADMIN — WATER 5 ML: 1 INJECTION INTRAMUSCULAR; INTRAVENOUS; SUBCUTANEOUS at 13:45

## 2020-01-02 RX ADMIN — VANCOMYCIN HYDROCHLORIDE 1250 MG: 1.25 INJECTION, POWDER, LYOPHILIZED, FOR SOLUTION INTRAVENOUS at 23:57

## 2020-01-02 RX ADMIN — SODIUM CHLORIDE 1000 ML: 900 INJECTION, SOLUTION INTRAVENOUS at 06:41

## 2020-01-02 RX ADMIN — ATORVASTATIN CALCIUM 20 MG: 20 TABLET, FILM COATED ORAL at 22:24

## 2020-01-02 RX ADMIN — SODIUM CHLORIDE 125 ML/HR: 900 INJECTION, SOLUTION INTRAVENOUS at 09:45

## 2020-01-02 RX ADMIN — HEPARIN SODIUM 5000 UNITS: 5000 INJECTION INTRAVENOUS; SUBCUTANEOUS at 03:52

## 2020-01-02 RX ADMIN — HEPARIN SODIUM 5000 UNITS: 5000 INJECTION INTRAVENOUS; SUBCUTANEOUS at 09:40

## 2020-01-02 RX ADMIN — ASPIRIN 81 MG 81 MG: 81 TABLET ORAL at 09:36

## 2020-01-02 RX ADMIN — PIPERACILLIN AND TAZOBACTAM 4.5 G: 4; .5 INJECTION, POWDER, FOR SOLUTION INTRAVENOUS at 13:17

## 2020-01-02 NOTE — ED TRIAGE NOTES
Patient brought to ED via EMS with c/o hypertension, hyperglycemia and cough. Patient also endorses vomiting. Patient is febrile in triage. Patient does not speak [place] and declines . Daughter at bedside.

## 2020-01-02 NOTE — H&P
History & Physical    Patient: Anna Marie Faulkner MRN: [id number]  CSN: [id number]    YOB: 1945  Age: 76 y.o. Sex: male      DOA: 1/1/2020  Primary Care Provider:  Kim Garcia MD      Assessment/Plan     Patient Active Problem List   Diagnosis Code    Sepsis ([facility] A41.9    Abdominal pain R10.9    CKD (chronic kidney disease) N18.9     75 y/o male with history of DMT2 and HTN admitted for abdominal pain and sepsis. Based on his CT scan, he has inflammation in the bladder/kidneys although his UA is negative. There is mention of possible cholecystitis but he has no pain in his RUQ. He also has an diabetic foot wound that is not infected. -NPO  -monitor UOP closely  -liberal IVF  -RUQ U/S to eval for gallstones, consider surg consult depending on result  -f/u bld and ur cx  -trend lactate  -continue broad spectrum abx zosyn/vanc/levaquin until cx result  -wound care c/s for foot ulcer    Estimated length of stay : 2 nights    Peter Asif MD  Nocturnist  -------------------------------------------------------------------------------------------------------------------------------------------------------------------------------------------  CC: fever, abdominal pain, N/V       HPI:     Anna Marie Faulkner is a 76 y.o. male who has a hx of HTN and DMT2 who has had N/V, abdominal pain, and poor appetite for 2-3 days. He had high fever tonight and the chills for the past 2 days. No CP or SOB. No leg swelling. Denies difficulty urinating or pain with urination. *The patient is [place] speaking and I conducted his H&P in [facility] [place] Family also present at the bedside. Past Medical History:   Diagnosis Date    Diabetes ([facility]     Hypertension        History reviewed. No pertinent surgical history. History reviewed. No pertinent family history.     Social History     Socioeconomic History    Marital status: SINGLE     Spouse name: Not on file    Number of children: Not on file    Years of education: Not on file    Highest education level: Not on file   Tobacco Use    Smoking status: Never Smoker    Smokeless tobacco: Never Used   Substance and Sexual Activity    Alcohol use: No    Drug use: No       Prior to Admission medications    Medication Sig Start Date End Date Taking? Authorizing Provider   insulin glargine (LANTUS SOLOSTAR U-100 INSULIN) 100 unit/mL (3 mL) inpn 25 Units by SubCUTAneous route. Yes Cain, MD Mishel   metFORMIN ER (GLUCOPHAGE XR) 750 mg tablet Take 750 mg by mouth daily. Yes Cain, MD Mishel   atorvastatin (LIPITOR) 20 mg tablet Take 20 mg by mouth daily. Yes Other, MD Mishel   aspirin 81 mg chewable tablet Take 81 mg by mouth daily. Yes Mishel Barlow MD   LOSARTAN PO Take 100 mg by mouth. Yes Cain, MD Mishel   polyethylene glycol (MIRALAX) 17 gram/dose powder Take 17 g by mouth daily. 1 tablespoon with 8 oz of water daily 12/10/19   Tk Mayers MD       No Known Allergies    Review of Systems  Gen: +fever/chills, no malaise, weight loss/gain. Heent: No headache, rhinorrhea, epistaxis, ear pain, hearing loss, sinus pain, neck pain/stiffness, sore throat. Heart: No chest pain, palpitations, THOMPSON, pnd, or orthopnea. Resp: No cough, hemoptysis, wheezing and shortness of breath. GI: +nausea, vomiting, no diarrhea, constipation, melena or hematochezia. : No urinary obstruction, dysuria or hematuria. Derm: No rash, new skin lesion or pruritis. Musc/skeletal: no bone or joint complaints. Vasc: No edema, cyanosis or claudication. Endo: No heat/cold intolerance, no polyuria,polydipsia or polyphagia. Neuro: No unilateral weakness, numbness, tingling. No seizures. Heme: No easy bruising or bleeding.           Physical Exam:     Physical Exam:  Visit Vitals  /55 (BP 1 Location: Right arm, BP Patient Position: At rest;Sitting)   Pulse 93   Temp 99.2 °F (37.3 °C)   Resp 17   Ht 5' 6\" (1.676 m)   Wt 72.5 kg (159 lb 13.3 oz)   SpO2 100%   BMI 25.80 kg/m²      O2 Device: (P) Room air    Temp (24hrs), Av.6 °F (38.1 °C), Min:99.2 °F (37.3 °C), Max:104.2 °F (40.1 °C)     190 -  0700  In:  [I.V.:]  Out: -    No intake/output data recorded. General:  Awake, cooperative, ill appearing, no distress. Head:  Normocephalic, without obvious abnormality, atraumatic. Eyes:  Conjunctivae/corneas clear, sclera anicteric. Neck: Supple, symmetrical, trachea midline, no adenopathy. Lungs:   Clear to auscultation bilaterally. Heart:  Regular rate and rhythm, S1, S2 normal, no murmur, click, rub or gallop. Abdomen: Soft, ttp in the lower abdomen overlying the bladder. No ttp in the RUQ. Neg Mccarthy's sign. Bowel sounds normal. No masses,  No organomegaly. Extremities: Extremities normal, atraumatic, no cyanosis or edema. Capillary refill normal.   Pulses: 2+ and symmetric all extremities. Skin: Skin color pink, turgor increased. No rashes or lesions   Neurologic: No focal motor or sensory deficit. Labs Reviewed: All lab results for the last 24 hours reviewed. Recent Results (from the past 24 hour(s))   GLUCOSE, POC    Collection Time: 20  9:36 PM   Result Value Ref Range    Glucose (POC) 320 (H) 70 - 110 mg/dL   CBC WITH AUTOMATED DIFF    Collection Time: 20  9:40 PM   Result Value Ref Range    WBC 5.7 4.6 - 13.2 K/uL    RBC 2.81 (L) 4.70 - 5.50 M/uL    HGB 8.4 (L) 13.0 - 16.0 g/dL    HCT 24.9 (L) 36.0 - 48.0 %    MCV 88.6 74.0 - 97.0 FL    MCH 29.9 24.0 - 34.0 PG    MCHC 33.7 31.0 - 37.0 g/dL    RDW 12.8 11.6 - 14.5 %    PLATELET 462 816 - 694 K/uL    MPV 9.9 9.2 - 11.8 FL    NEUTROPHILS 86 (H) 40 - 73 %    LYMPHOCYTES 8 (L) 21 - 52 %    MONOCYTES 3 3 - 10 %    EOSINOPHILS 2 0 - 5 %    BASOPHILS 1 0 - 2 %    ABS. NEUTROPHILS 4.9 1.8 - 8.0 K/UL    ABS. LYMPHOCYTES 0.5 (L) 0.9 - 3.6 K/UL    ABS. MONOCYTES 0.2 0.05 - 1.2 K/UL    ABS. EOSINOPHILS 0.1 0.0 - 0.4 K/UL    ABS.  BASOPHILS 0.0 0.0 - 0.1 K/UL    DF AUTOMATED METABOLIC PANEL, COMPREHENSIVE    Collection Time: 01/01/20  9:40 PM   Result Value Ref Range    Sodium 139 136 - 145 mmol/L    Potassium 3.6 3.5 - 5.5 mmol/L    Chloride 104 100 - 111 mmol/L    CO2 26 21 - 32 mmol/L    Anion gap 9 3.0 - 18 mmol/L    Glucose 307 (H) 74 - 99 mg/dL    BUN 24 (H) 7.0 - 18 MG/DL    Creatinine 1.37 (H) 0.6 - 1.3 MG/DL    BUN/Creatinine ratio 18 12 - 20      GFR est AA >60 >60 ml/min/1.73m2    GFR est non-AA 51 (L) >60 ml/min/1.73m2    Calcium 8.5 8.5 - 10.1 MG/DL    Bilirubin, total 1.4 (H) 0.2 - 1.0 MG/DL    ALT (SGPT) 18 16 - 61 U/L    AST (SGOT) 10 10 - 38 U/L    Alk.  phosphatase 67 45 - 117 U/L    Protein, total 7.0 6.4 - 8.2 g/dL    Albumin 3.1 (L) 3.4 - 5.0 g/dL    Globulin 3.9 2.0 - 4.0 g/dL    A-G Ratio 0.8 0.8 - 1.7     CARDIAC PANEL,(CK, CKMB & TROPONIN)    Collection Time: 01/01/20  9:40 PM   Result Value Ref Range    CK 38 (L) 39 - 308 U/L    CK - MB <1.0 <3.6 ng/ml    CK-MB Index  0.0 - 4.0 %     CALCULATION NOT PERFORMED WHEN RESULT IS BELOW LINEAR LIMIT    Troponin-I, QT <0.02 0.0 - 0.045 NG/ML   NT-PRO BNP    Collection Time: 01/01/20  9:40 PM   Result Value Ref Range    NT pro- 0 - 900 PG/ML   POC LACTIC ACID    Collection Time: 01/01/20  9:45 PM   Result Value Ref Range    Lactic Acid (POC) 2.48 (HH) 0.40 - 2.00 mmol/L   EKG, 12 LEAD, INITIAL    Collection Time: 01/01/20  9:51 PM   Result Value Ref Range    Ventricular Rate 111 BPM    Atrial Rate 111 BPM    P-R Interval 154 ms    QRS Duration 90 ms    Q-T Interval 310 ms    QTC Calculation (Bezet) 421 ms    Calculated P Axis 53 degrees    Calculated R Axis 18 degrees    Calculated T Axis 56 degrees    Diagnosis       Sinus tachycardia  Possible Left atrial enlargement  Nonspecific T wave abnormality  Abnormal ECG  No previous ECGs available     INFLUENZA A & B AG (RAPID TEST)    Collection Time: 01/01/20  9:57 PM   Result Value Ref Range    Influenza A Antigen NEGATIVE  NEG      Influenza B Antigen NEGATIVE NEG     URINALYSIS W/ RFLX MICROSCOPIC    Collection Time: 01/01/20 10:30 PM   Result Value Ref Range    Color DARK YELLOW      Appearance CLEAR      Specific gravity 1.024 1.005 - 1.030      pH (UA) 5.0 5.0 - 8.0      Protein 300 (A) NEG mg/dL    Glucose 500 (A) NEG mg/dL    Ketone TRACE (A) NEG mg/dL    Bilirubin NEGATIVE  NEG      Blood MODERATE (A) NEG      Urobilinogen 1.0 0.2 - 1.0 EU/dL    Nitrites NEGATIVE  NEG      Leukocyte Esterase NEGATIVE  NEG     URINE MICROSCOPIC ONLY    Collection Time: 01/01/20 10:30 PM   Result Value Ref Range    WBC 0 to 1 0 - 5 /hpf    RBC 3 to 5 0 - 5 /hpf    Epithelial cells FEW 0 - 5 /lpf    Bacteria RARE NEG /hpf    Mucus 2+ (A) NEG /lpf    Hyaline cast 0 to 1 0 - 2 /lpf    Granular cast 0 to 1 NEG /lpf    Spermatozoa 2+      Results   CT ABD PELV W CONT (Accession [id number]) (Order [id number])   Allergies      No Known Allergies   Exam Information     Status Exam Begun  Exam Ended    Final [99] 1/02/2020 01:59 1/02/2020 02:18   Result Information     Status: Final result (Exam End: 1/2/2020 02:18) Provider Status: Open   Study Result     EXAM: CT ABD PELV W CONT     CLINICAL INDICATION/HISTORY: Abdominal pain and fever, sepsis     COMPARISON: 12/10/2019     TECHNIQUE:   CT of the abdomen and pelvis following intravenous contrast  administration. Coronal and sagittal reformats were generated and reviewed. One or more dose reduction techniques were used on this CT: automated exposure  control, adjustment of the mAs and/or kVp according to patient size, and  iterative reconstruction techniques. The specific techniques used on this CT  exam have been documented in the patient's electronic medical record.   Digital  Imaging and Communications in Medicine (DICOM) format image data are available  to nonaffiliated external healthcare facilities or entities on a secure, media  free, reciprocally searchable basis with patient authorization for at least a  12-month period after this study.        _______________     FINDINGS:     LOWER THORAX: Mild left and right basilar dependent atelectasis. No acute  pulmonary infiltrate. No pleural effusion. No global cardiomegaly or  pericardial effusion.       LIVER AND BILIARY:  There is hydropic distention of the gallbladder. Mild  periportal edema. Common bile duct appears normal in caliber. No suspicious  liver lesions.     SPLEEN:  Normal.     PANCREAS:  Peripancreatic fatty atrophy.     ADRENALS:  Normal.     KIDNEYS:  Normal.     LYMPH NODES:  No mesenteric or retroperitoneal lymphadenopathy.     GI TRACT:  Small hiatal hernia. Sigmoid colon diverticulosis without evidence of  acute inflammation. Normal caliber small and large bowel loops. No morphology  of bowel obstruction. No bowel wall thickening. Normal appendix.     PELVIC ORGANS:  Mild circumferential bladder wall thickening despite mild  bladder distention. .  No acute abnormality.     VASCULATURE: Normal caliber lower thoracic and abdominal aorta with mild  atherosclerotic vascular calcification.     OTHER:   No ascites or free intraperitoneal air.     OSSEOUS STRUCTURES:  No acute osseous abnormality. Mild multilevel degenerative  disk disease and facet arthropathy.     _______________     IMPRESSION  IMPRESSION:     1. Nonspecific hydropic gallbladder distention with no additional secondary CT  findings of cholecystitis. Right upper quadrant ultrasound could better evaluate  these findings if there is clinical concern for cholecystitis. 2. Mild circumferential bladder wall thickening, secondary findings of chronic  bladder outlet obstruction versus cystitis. 3. Mild left and right perinephric inflammatory stranding is nonspecific since  are no additional findings to suggest pyelonephritis. Although this frequently  can represent senescent changes and was not present on the prior CT of  12/10/2019.  Correlation with urinalysis could more accurately evaluate for  potential ascending urinary tract infection.           Results   XR CHEST PORT (Accession [id number]) (Order [id number])   Allergies      No Known Allergies   Exam Information     Status Exam Begun  Exam Ended    Final [99] 1/01/2020 22:31 1/01/2020 22:42   Result Information     Status: Final result (Exam End: 1/1/2020 22:42) Provider Status: Open   Study Result     EXAM: XR CHEST PORT.     CLINICAL INDICATION/HISTORY: sepsis. -Additional: None.     TECHNIQUE: A portable erect AP radiographic view of the chest is reviewed  without comparison. _______________     FINDINGS:     The lungs and pleural spaces are clear. The cardiac silhouette, naida, and  mediastinal contours are normal for age. No acute osseous abnormality is  revealed.   _______________     IMPRESSION  IMPRESSION:     No acute cardiopulmonary disease.

## 2020-01-02 NOTE — DIABETES MGMT
Diabetes Patient/Family Education Record  Factors That  May Influence Patients Ability  to Learn or  Comply with Recommendations   [x]   Language barrier    []   Cultural needs   [x]   Motivation    []   Cognitive limitation    []   Physical   []   Education    []   Physiological factors   []   Hearing/vision/speaking impairment   []   Sikhism beliefs    []   Financial factors   []  Other:   []  No factors identified at this time.      Person Instructed:   [x]   Patient   [x]   Family   []  Other     Preference for Learning:   [x]   Verbal   []   Written   []  Demonstration     Level of Comprehension & Competence:    [x]  Good      (family)                                [] Fair                                     []  Poor                             []  Needs Reinforcement   [x]  Teachback completed    Education Component:   [x]  Medication management, including how to administer insulin (if appropriate) and potential medication interactions    [x]  Nutritional management -obtain usual meal pattern   []  Exercise   []  Signs, symptoms, and treatment of hyperglycemia and hypoglycemia   [] Prevention, recognition and treatment of hyperglycemia and hypoglycemia   [x]  Importance of blood glucose monitoring   []  Instruction on use of the blood glucose meter   [x]  Discuss the importance of HbA1C monitoring    []  Sick day guidelines   []  Proper use and disposal of lancets, needles, syringes or insulin pens (if appropriate)   []  Potential long-term complications (retinopathy, kidney disease, neuropathy, foot care)   [] Information about whom to contact in case of emergency or for more information    [x]  Goal:  Patient/family will demonstrate understanding of Diabetes Self Management Skills by: (date) ___3/31____  Plan for post-discharge education or self-management support:    [] Outpatient class schedule provided            [] Patient Declined    [] Scheduled for outpatient classes (date) _______  Verify:  Does patient understand how diabetes medications work? ______yes (family)______________________  Does patient know what their most recent A1c is? _________yes___(family)_______________________  Does patient monitor glucose at home? ________________sporadic___________________________  Does patient have difficulty obtaining diabetes medications or testing supplies? _________________         Kumar Sheridan MS, RN, CDE  Glycemic Control Team  [phone]  Pager [phone] (- 8:00-4:30P)  *After Hours pager [phone]

## 2020-01-02 NOTE — PROGRESS NOTES
0900 Bedside and Verbal shift change report given to Dionisio Hurst RN  (oncoming nurse) by Any Schreiber RN (offgoing nurse). Report included the following information SBAR, Kardex, Intake/Output, MAR and Recent Results. Insulin held, pt NPO for procedure. 1141 Lab called with critical on blood cultures. [address] Dr. Lexi Espana gave verbal orders to draw 2 blood cultures stat. 1247 Pt returned from ultrasound, spoke to Dr. Lexi Espana regarding a diet order. Glucose 122, insulin held    1731 Paged Dr. Conrad Rudd to see if pt could have diet placed. [id number] [facility] Dr. Flores Linda verbal order for clear liquid diet. Glucose 89, insulin held. 1905 Bedside and Verbal shift change report given to NIR Stewart RN (oncoming nurse) by Kayla Matson. Tania Connell RN (offgoing nurse). Report included the following information SBAR, Kardex, Intake/Output, MAR, and Recent Results. Pt in bed, call light in reach.

## 2020-01-02 NOTE — ROUTINE PROCESS
TRANSFER - IN REPORT:    Verbal report received from Jay Ware RN(name) on Sang Vasquez  being received from ED(unit) for routine progression of care      Report consisted of patients Situation, Background, Assessment and   Recommendations(SBAR). Information from the following report(s) SBAR, ED Summary, [facility] and Recent Results was reviewed with the receiving nurse. Opportunity for questions and clarification was provided. Assessment completed upon patients arrival to unit and care assumed.

## 2020-01-02 NOTE — ED PROVIDER NOTES
EMERGENCY DEPARTMENT HISTORY AND PHYSICAL EXAM    Date: 1/1/2020  Patient Name: Sang Vasquez    History of Presenting Illness     Chief Complaint   Patient presents with    High Blood Sugar    Hypertension    Vomiting         History Provided By: Patient    Additional History (Context):     9:46 PM    Sang Vasquez is a 76 y.o. male with pertinent PMHx of DM and HTN presenting via EMS to the ED c/o productive cough without hemoptysis x 2-3 days. Pt notes associated symptoms of chest pain with cough, nausea/vomiting, suprapubic/epigastric abdominal pain, fever (here in the ED, but not at home), appetite decrease, and frontal headache; but denies sore throat, dysuria, or diarrhea. Pt has no allergies to medications. Pt is compliant with regular medications and he has not run out of any of them. Pt did receive the flu shot this year. PCP: Vu Ron MD  Pt does not smoke tobacco or do illicit drugs. There are no other complaints, changes, or physical findings at this time.      Current Facility-Administered Medications   Medication Dose Route Frequency Provider Last Rate Last Dose    0.9% sodium chloride infusion  125 mL/hr IntraVENous CONTINUOUS Jake Obrien MD        ondansetron [facility]) injection 4 mg  4 mg IntraVENous Q6H PRN Jake Obrien MD        heparin (porcine) injection 5,000 Units  5,000 Units SubCUTAneous Q8H Jake Obrien MD        aspirin chewable tablet 81 mg  81 mg Oral DAILY Jake Obrien MD        atorvastatin (LIPITOR) tablet 20 mg  20 mg Oral QHS Jake Obrien MD        insulin lispro (HUMALOG) injection   SubCUTAneous AC&HS Jake Obrien MD        glucose chewable tablet 16 g  16 g Oral PRN Jake Obrien MD        glucagon (GLUCAGEN) injection 1 mg  1 mg IntraMUSCular PRN Jake Obrien MD        dextrose 10% infusion 125-250 mL  125-250 mL IntraVENous PRN Jake Obrien MD        sodium chloride (NS) flush 5-10 mL  5-10 mL IntraVENous PRN Adalgisa Sampson MD   10 mL at 01/01/20 2336    piperacillin-tazobactam (ZOSYN) 4.5 g in 0.9% sodium chloride (MBP/ADV) 100 mL MBP  4.5 g IntraVENous Q6H Adalgisa Sampson MD   Stopped at 01/01/20 2337    levoFLOXacin (LEVAQUIN) 750 mg in D5W IVPB  750 mg IntraVENous Q24H Adalgisa Sampson  mL/hr at 01/01/20 2336 750 mg at 01/01/20 2336    vancomycin (VANCOCIN) 2000 mg in  ml infusion  2,000 mg IntraVENous Basilshahla Dumont  mL/hr at 01/02/20 0107 2,000 mg at 01/02/20 0107     Current Outpatient Medications   Medication Sig Dispense Refill    insulin glargine (LANTUS SOLOSTAR U-100 INSULIN) 100 unit/mL (3 mL) inpn 25 Units by SubCUTAneous route.  metFORMIN ER (GLUCOPHAGE XR) 750 mg tablet Take 750 mg by mouth daily.  atorvastatin (LIPITOR) 20 mg tablet Take 20 mg by mouth daily.  aspirin 81 mg chewable tablet Take 81 mg by mouth daily.  LOSARTAN PO Take 100 mg by mouth.  polyethylene glycol (MIRALAX) 17 gram/dose powder Take 17 g by mouth daily. 1 tablespoon with 8 oz of water daily 507 g 0       Past History     Past Medical History:  Past Medical History:   Diagnosis Date    Diabetes ([address]     Hypertension        Past Surgical History:  History reviewed. No pertinent surgical history. Family History:  History reviewed. No pertinent family history. Social History:  Social History     Tobacco Use    Smoking status: Never Smoker    Smokeless tobacco: Never Used   Substance Use Topics    Alcohol use: No    Drug use: No       Allergies:  No Known Allergies      Review of Systems   Review of Systems   Constitutional: Positive for appetite change (decrease) and fever. HENT: Negative for sore throat. Respiratory: Positive for cough. Cardiovascular: Positive for chest pain. Gastrointestinal: Positive for abdominal pain, nausea and vomiting. Negative for diarrhea. Genitourinary: Negative for dysuria. Neurological: Positive for headaches. All other systems reviewed and are negative. Physical Exam     Vitals:    01/02/20 0028 01/02/20 0100 01/02/20 0130 01/02/20 0156   BP: 111/52 113/52 111/52    Pulse: 100 96 90    Resp: 25 20 16    Temp: 99.9 °F (37.7 °C)   99.5 °F (37.5 °C)   SpO2: 100% 99% 99%    Weight:       Height:         Physical Exam  Vitals signs and nursing note reviewed. Constitutional:       General: He is not in acute distress. Appearance: He is well-developed. He is not diaphoretic. Comments: Elderly male, nontoxic  Well appearing   HENT:      Head: Normocephalic and atraumatic. Right Ear: External ear normal.      Left Ear: External ear normal.      Mouth/Throat:      Pharynx: No oropharyngeal exudate. Eyes:      General: No scleral icterus. Conjunctiva/sclera: Conjunctivae normal.      Pupils: Pupils are equal, round, and reactive to light. Comments: No pallor   Neck:      Musculoskeletal: Normal range of motion and neck supple. Thyroid: No thyromegaly. Vascular: No JVD. Trachea: No tracheal deviation. Cardiovascular:      Rate and Rhythm: Normal rate and regular rhythm. Heart sounds: Normal heart sounds. Pulmonary:      Effort: Pulmonary effort is normal. No respiratory distress. Breath sounds: Normal breath sounds. No stridor. Abdominal:      General: Bowel sounds are decreased. There is no distension. Palpations: Abdomen is soft. Tenderness: There is tenderness. There is no guarding or rebound. Comments: Diffuse TTP to his abdomen   Musculoskeletal: Normal range of motion. General: No tenderness. Comments: No soft tissue injuries   Lymphadenopathy:      Cervical: No cervical adenopathy. Skin:     General: Skin is warm. Comments: Decreased skin turgor   Neurological:      Mental Status: He is alert and oriented to person, place, and time. Cranial Nerves: No cranial nerve deficit. Coordination: Coordination normal.      Deep Tendon Reflexes: Reflexes are normal and symmetric. Reflexes normal.   Psychiatric:         Behavior: Behavior normal.         Thought Content: Thought content normal.         Judgment: Judgment normal.         Diagnostic Study Results     Labs -     Recent Results (from the past 12 hour(s))   GLUCOSE, POC    Collection Time: 01/01/20  9:36 PM   Result Value Ref Range    Glucose (POC) 320 (H) 70 - 110 mg/dL   CBC WITH AUTOMATED DIFF    Collection Time: 01/01/20  9:40 PM   Result Value Ref Range    WBC 5.7 4.6 - 13.2 K/uL    RBC 2.81 (L) 4.70 - 5.50 M/uL    HGB 8.4 (L) 13.0 - 16.0 g/dL    HCT 24.9 (L) 36.0 - 48.0 %    MCV 88.6 74.0 - 97.0 FL    MCH 29.9 24.0 - 34.0 PG    MCHC 33.7 31.0 - 37.0 g/dL    RDW 12.8 11.6 - 14.5 %    PLATELET 332 996 - 182 K/uL    MPV 9.9 9.2 - 11.8 FL    NEUTROPHILS 86 (H) 40 - 73 %    LYMPHOCYTES 8 (L) 21 - 52 %    MONOCYTES 3 3 - 10 %    EOSINOPHILS 2 0 - 5 %    BASOPHILS 1 0 - 2 %    ABS. NEUTROPHILS 4.9 1.8 - 8.0 K/UL    ABS. LYMPHOCYTES 0.5 (L) 0.9 - 3.6 K/UL    ABS. MONOCYTES 0.2 0.05 - 1.2 K/UL    ABS. EOSINOPHILS 0.1 0.0 - 0.4 K/UL    ABS. BASOPHILS 0.0 0.0 - 0.1 K/UL    DF AUTOMATED     METABOLIC PANEL, COMPREHENSIVE    Collection Time: 01/01/20  9:40 PM   Result Value Ref Range    Sodium 139 136 - 145 mmol/L    Potassium 3.6 3.5 - 5.5 mmol/L    Chloride 104 100 - 111 mmol/L    CO2 26 21 - 32 mmol/L    Anion gap 9 3.0 - 18 mmol/L    Glucose 307 (H) 74 - 99 mg/dL    BUN 24 (H) 7.0 - 18 MG/DL    Creatinine 1.37 (H) 0.6 - 1.3 MG/DL    BUN/Creatinine ratio 18 12 - 20      GFR est AA >60 >60 ml/min/1.73m2    GFR est non-AA 51 (L) >60 ml/min/1.73m2    Calcium 8.5 8.5 - 10.1 MG/DL    Bilirubin, total 1.4 (H) 0.2 - 1.0 MG/DL    ALT (SGPT) 18 16 - 61 U/L    AST (SGOT) 10 10 - 38 U/L    Alk.  phosphatase 67 45 - 117 U/L    Protein, total 7.0 6.4 - 8.2 g/dL    Albumin 3.1 (L) 3.4 - 5.0 g/dL    Globulin 3.9 2.0 - 4.0 g/dL    A-G Ratio 0.8 0.8 - 1.7 CARDIAC PANEL,(CK, CKMB & TROPONIN)    Collection Time: 01/01/20  9:40 PM   Result Value Ref Range    CK 38 (L) 39 - 308 U/L    CK - MB <1.0 <3.6 ng/ml    CK-MB Index  0.0 - 4.0 %     CALCULATION NOT PERFORMED WHEN RESULT IS BELOW LINEAR LIMIT    Troponin-I, QT <0.02 0.0 - 0.045 NG/ML   NT-PRO BNP    Collection Time: 01/01/20  9:40 PM   Result Value Ref Range    NT pro- 0 - 900 PG/ML   POC LACTIC ACID    Collection Time: 01/01/20  9:45 PM   Result Value Ref Range    Lactic Acid (POC) 2.48 (HH) 0.40 - 2.00 mmol/L   EKG, 12 LEAD, INITIAL    Collection Time: 01/01/20  9:51 PM   Result Value Ref Range    Ventricular Rate 111 BPM    Atrial Rate 111 BPM    P-R Interval 154 ms    QRS Duration 90 ms    Q-T Interval 310 ms    QTC Calculation (Bezet) 421 ms    Calculated P Axis 53 degrees    Calculated R Axis 18 degrees    Calculated T Axis 56 degrees    Diagnosis       Sinus tachycardia  Possible Left atrial enlargement  Nonspecific T wave abnormality  Abnormal ECG  No previous ECGs available     INFLUENZA A & B AG (RAPID TEST)    Collection Time: 01/01/20  9:57 PM   Result Value Ref Range    Influenza A Antigen NEGATIVE  NEG      Influenza B Antigen NEGATIVE  NEG     URINALYSIS W/ RFLX MICROSCOPIC    Collection Time: 01/01/20 10:30 PM   Result Value Ref Range    Color DARK YELLOW      Appearance CLEAR      Specific gravity 1.024 1.005 - 1.030      pH (UA) 5.0 5.0 - 8.0      Protein 300 (A) NEG mg/dL    Glucose 500 (A) NEG mg/dL    Ketone TRACE (A) NEG mg/dL    Bilirubin NEGATIVE  NEG      Blood MODERATE (A) NEG      Urobilinogen 1.0 0.2 - 1.0 EU/dL    Nitrites NEGATIVE  NEG      Leukocyte Esterase NEGATIVE  NEG     URINE MICROSCOPIC ONLY    Collection Time: 01/01/20 10:30 PM   Result Value Ref Range    WBC 0 to 1 0 - 5 /hpf    RBC 3 to 5 0 - 5 /hpf    Epithelial cells FEW 0 - 5 /lpf    Bacteria RARE NEG /hpf    Mucus 2+ (A) NEG /lpf    Hyaline cast 0 to 1 0 - 2 /lpf    Granular cast 0 to 1 NEG /lpf    Spermatozoa 2+        Radiologic Studies -   XR CHEST PORT   Final Result   IMPRESSION:      No acute cardiopulmonary disease.      _______________         CT ABD PELV W CONT    (Results Pending)     CT Results  (Last 48 hours)    None        CXR Results  (Last 48 hours)               01/01/20 6432  XR CHEST PORT Final result    Impression:  IMPRESSION:       No acute cardiopulmonary disease.       _______________           Narrative:  EXAM: XR CHEST PORT. CLINICAL INDICATION/HISTORY: sepsis. -Additional: None. TECHNIQUE: A portable erect AP radiographic view of the chest is reviewed   without comparison. _______________       FINDINGS:       The lungs and pleural spaces are clear. The cardiac silhouette, naida, and   mediastinal contours are normal for age. No acute osseous abnormality is   revealed. _______________                     Medical Decision Making   I am the first provider for this patient. I reviewed the vital signs, available nursing notes, past medical history, past surgical history, family history and social history. Vital Signs-Reviewed the patient's vital signs. Pulse Oximetry Analysis - 96% on RA     Cardiac Monitor:  Rate: 111 bpm  Rhythm: sinus tachycardia    EKG interpretation: (Preliminary)  Sinus tachycardia at 111 bpm. Nonspecific ST changes with mild motion artifact. EKG read by [employer]. Davin Adrian MD at 2152     Records Reviewed: Nursing Notes and Old Medical Records    Provider Notes (Medical Decision Making): Signs and sxs of sepsis. He is anemic, which is likely chronic. There is no h/o blood per rectum, but this will need to be further investigated. He will get broad spectrum abx, though WBC and exam would strongly suggest viral illness. If kidney function is tolerant, we will likely CT scan with contrast. Otherwise, will scan him dry for abdominal source.  Prior CT in December 2019 showed normal abdomen with borderline appendix. PROCEDURES:  Procedures    MEDICATIONS GIVEN IN THE ED:  Medications   sodium chloride (NS) flush 5-10 mL (10 mL IntraVENous Given 1/1/20 2336)   piperacillin-tazobactam (ZOSYN) 4.5 g in 0.9% sodium chloride (MBP/ADV) 100 mL MBP (0 g IntraVENous IV Completed 1/1/20 2337)   levoFLOXacin (LEVAQUIN) 750 mg in D5W IVPB (750 mg IntraVENous New Bag 1/1/20 2336)   vancomycin (VANCOCIN) 2000 mg in  ml infusion (2,000 mg IntraVENous New Bag 1/2/20 0107)   0.9% sodium chloride infusion (has no administration in time range)   ondansetron (ZOFRAN) injection 4 mg (has no administration in time range)   heparin (porcine) injection 5,000 Units (has no administration in time range)   aspirin chewable tablet 81 mg (has no administration in time range)   atorvastatin (LIPITOR) tablet 20 mg (has no administration in time range)   insulin lispro (HUMALOG) injection (has no administration in time range)   glucose chewable tablet 16 g (has no administration in time range)   glucagon (GLUCAGEN) injection 1 mg (has no administration in time range)   dextrose 10% infusion 125-250 mL (has no administration in time range)   sodium chloride 0.9 % bolus infusion 1,000 mL (0 mL IntraVENous IV Completed 1/1/20 2337)     Followed by   sodium chloride 0.9 % bolus infusion 914 mL (0 mL IntraVENous IV Completed 1/1/20 2337)   acetaminophen (TYLENOL) tablet 975 mg (975 mg Oral Given 1/1/20 2243)   iopamidol (ISOVUE 300) 61 % contrast injection  mL (100 mL IntraVENous Given 1/2/20 0203)        ED COURSE:   9:46 PM   Initial assessment performed. CONSULT NOTE:   1:36 AM  [employer]. Travis Deshpande MD spoke with Amy Hansen MD,   Specialty: Hospitalist  Discussed pt's hx, disposition, and available diagnostic and imaging results. Reviewed care plans. Consultant will admit the pt to [address] [place] [address]. Written by Matt Edwards ED Scribe, as dictated by Kristen Deshpande MD.      Diagnosis and Disposition     Critical Care Time: 1:36 AM  I have spent 75 minutes of critical care time involved in lab review, consultations with specialist, family decision-making, and documentation. During this entire length of time I was immediately available to the patient. Critical Care: The reason for providing this level of medical care for this critically ill patient was due a critical illness that impaired one or more vital organ systems such that there was a high probability of imminent or life threatening deterioration in the patients condition. This care involved high complexity decision making to assess, manipulate, and support vital system functions, to treat this degree vital organ system failure and to prevent further life threatening deterioration of the patients condition. Core Measures:  For Hospitalized Patients:    1. Hospitalization Decision Time:  The decision to hospitalize the patient was made by Tierney Oneil. Fabian Faustin MD at 1:37 AM on 1/1/2020    2. Aspirin: Aspirin was not given because the patient did not present with a stroke at the time of their Emergency Department evaluation    1:36 AM  Patient is being admitted to the hospital by Jorge A Harden MD. The results of their tests and reasons for their admission have been discussed with them and/or available family. They convey agreement and understanding for the need to be admitted and for their admission diagnosis. CONDITIONS ON ADMISSION:  Sepsis is present at the time of admission. Deep Vein Thrombosis is not present at the time of admission. Thrombosis is not present at the time of admission. Urinary Tract Infection is not present at the time of admission. Pneumonia is not present at the time of admission. MRSA is not present at the time of admission. Wound infection is not present at the time of admission. Pressure Ulcer is not present at the time of admission. CLINICAL IMPRESSION:  1. Fever in adult    2.  Sepsis, due to unspecified organism, unspecified whether acute organ dysfunction present ([facility]    3. Lactic acidosis    4. Insulin dependent diabetes mellitus ([facility]       PLAN:  1. PLAN TO ADMIT TO TELE  _______________________________    Attestations: This note is prepared by Dionte Royal, acting as Scribe for [employer]. Bennie Webb MD.    [employer]. Bennie Webb MD:  The scribe's documentation has been prepared under my direction and personally reviewed by me in its entirety.  I confirm that the note above accurately reflects all work, treatment, procedures, and medical decision making performed by me.  _______________________________

## 2020-01-02 NOTE — PROGRESS NOTES
Pharmacy Dosing Services: Vancomycin    Consult for Vancomycin Dosing by Pharmacy by Dr. Jose Hall provided for this 76y.o. year old male , for indication of Sepsis. Day of Therapy 1    Ht Readings from Last 1 Encounters:   01/01/20 167.6 cm (66\")        Wt Readings from Last 1 Encounters:   01/01/20 72.6 kg (160 lb)        Other Current Antibiotics Levaquin 750mg IV; Zosyn 4.5g IV   Significant Cultures pending   Serum Creatinine Lab Results   Component Value Date/Time    Creatinine 1.37 (H) 01/01/2020 09:40 PM      Creatinine Clearance Estimated Creatinine Clearance: 42.7 mL/min (A) (based on SCr of 1.37 mg/dL (H)). BUN Lab Results   Component Value Date/Time    BUN 24 (H) 01/01/2020 09:40 PM      WBC Lab Results   Component Value Date/Time    WBC 5.7 01/01/2020 09:40 PM      H/H Lab Results   Component Value Date/Time    HGB 8.4 (L) 01/01/2020 09:40 PM      Platelets Lab Results   Component Value Date/Time    PLATELET 234 84/31/6889 09:40 PM      Temp 99.2 °F (37.3 °C)     Start Vancomycin therapy, with loading dose of 2000mg IV for one dose   Follow with maintenance dose of vancomycin 1250mg IV q24h    Dose calculated to approximate a therapeutic trough of 15-20 mcg/mL. Pharmacy to follow daily and will make changes to dose and/or frequency based on clinical status.     Pharmacist Daija Renee, [phone]

## 2020-01-02 NOTE — CONSULTS
Consult Note    Patient: Savannah Slade MRN: [id number]  CSN: [id number]    YOB: 1945  Age: 76 y.o. Sex: male    DOA: 1/1/2020 LOS:  LOS: 0 days        Requesting Physician: Fermín Crandall  Reason for Consultation: gallstones               HPI:     Savannah Slade is a 76 y.o. male who has been seen for possible cholecystitis, with bacteremia. U/S shows sludge, HIDA normal.    Past Medical History:   Diagnosis Date    Diabetes ([address]     Hypertension        History reviewed. No pertinent surgical history. History reviewed. No pertinent family history. Social History     Socioeconomic History    Marital status: SINGLE     Spouse name: Not on file    Number of children: Not on file    Years of education: Not on file    Highest education level: Not on file   Tobacco Use    Smoking status: Never Smoker    Smokeless tobacco: Never Used   Substance and Sexual Activity    Alcohol use: No    Drug use: No       Prior to Admission medications    Medication Sig Start Date End Date Taking? Authorizing Provider   insulin glargine (LANTUS SOLOSTAR U-100 INSULIN) 100 unit/mL (3 mL) inpn 25 Units by SubCUTAneous route. Yes Other, MD Mishel   metFORMIN ER (GLUCOPHAGE XR) 750 mg tablet Take 750 mg by mouth daily. Yes Mishel Barlow MD   atorvastatin (LIPITOR) 20 mg tablet Take 20 mg by mouth daily. Yes Cain, MD Mishel   aspirin 81 mg chewable tablet Take 81 mg by mouth daily. Yes Cain, MD Mishel   LOSARTAN PO Take 100 mg by mouth. Yes Cain, MD Mishel   polyethylene glycol (MIRALAX) 17 gram/dose powder Take 17 g by mouth daily. 1 tablespoon with 8 oz of water daily 12/10/19   Sudheer Chávez MD       No Known Allergies    Review of Systems  A comprehensive review of systems was negative except for that written in the History of Present Illness.       Physical Exam:      Visit Vitals  /67   Pulse 88   Temp 98.9 °F (37.2 °C)   Resp 16   Ht 5' 6\" (1.676 m)   Wt 72.5 kg (159 lb 13.3 oz)   SpO2 100% BMI 25.80 kg/m²       Physical Exam:  Pertinent items are noted in HPI. Abd - benign with some pain lower abd. Labs Reviewed: All lab results for the last 24 hours reviewed. Assessment/Plan     Principal Problem:    Sepsis ([address] (1/2/2020)    Active Problems:    Abdominal pain (1/2/2020)      CKD (chronic kidney disease) (1/2/2020)        No evidence of acute cholecystitis on HIDA,  No surgery needed.

## 2020-01-02 NOTE — PROGRESS NOTES
[facility] Medic Code Sepsis  -     - Time of Code Sepsis: 4387  - Team:  Physician Lynette Mcdonough  Bedside Nurse [address]    - SIRS # 1 Temp  SIRS # 2 HR  - Possible source of infection:   - Organ dysfunction related to sepsis: latic > 2   Labs:    Recent Results (from the past 12 hour(s))   GLUCOSE, POC    Collection Time: 01/01/20  9:36 PM   Result Value Ref Range    Glucose (POC) 320 (H) 70 - 110 mg/dL   CBC WITH AUTOMATED DIFF    Collection Time: 01/01/20  9:40 PM   Result Value Ref Range    WBC 5.7 4.6 - 13.2 K/uL    RBC 2.81 (L) 4.70 - 5.50 M/uL    HGB 8.4 (L) 13.0 - 16.0 g/dL    HCT 24.9 (L) 36.0 - 48.0 %    MCV 88.6 74.0 - 97.0 FL    MCH 29.9 24.0 - 34.0 PG    MCHC 33.7 31.0 - 37.0 g/dL    RDW 12.8 11.6 - 14.5 %    PLATELET 925 286 - 088 K/uL    MPV 9.9 9.2 - 11.8 FL    NEUTROPHILS 86 (H) 40 - 73 %    LYMPHOCYTES 8 (L) 21 - 52 %    MONOCYTES 3 3 - 10 %    EOSINOPHILS 2 0 - 5 %    BASOPHILS 1 0 - 2 %    ABS. NEUTROPHILS 4.9 1.8 - 8.0 K/UL    ABS. LYMPHOCYTES 0.5 (L) 0.9 - 3.6 K/UL    ABS. MONOCYTES 0.2 0.05 - 1.2 K/UL    ABS. EOSINOPHILS 0.1 0.0 - 0.4 K/UL    ABS. BASOPHILS 0.0 0.0 - 0.1 K/UL    DF AUTOMATED     METABOLIC PANEL, COMPREHENSIVE    Collection Time: 01/01/20  9:40 PM   Result Value Ref Range    Sodium 139 136 - 145 mmol/L    Potassium 3.6 3.5 - 5.5 mmol/L    Chloride 104 100 - 111 mmol/L    CO2 26 21 - 32 mmol/L    Anion gap 9 3.0 - 18 mmol/L    Glucose 307 (H) 74 - 99 mg/dL    BUN 24 (H) 7.0 - 18 MG/DL    Creatinine 1.37 (H) 0.6 - 1.3 MG/DL    BUN/Creatinine ratio 18 12 - 20      GFR est AA >60 >60 ml/min/1.73m2    GFR est non-AA 51 (L) >60 ml/min/1.73m2    Calcium 8.5 8.5 - 10.1 MG/DL    Bilirubin, total 1.4 (H) 0.2 - 1.0 MG/DL    ALT (SGPT) 18 16 - 61 U/L    AST (SGOT) 10 10 - 38 U/L    Alk.  phosphatase 67 45 - 117 U/L    Protein, total 7.0 6.4 - 8.2 g/dL    Albumin 3.1 (L) 3.4 - 5.0 g/dL    Globulin 3.9 2.0 - 4.0 g/dL    A-G Ratio 0.8 0.8 - 1.7     CARDIAC PANEL,(CK, CKMB & TROPONIN) Collection Time: 01/01/20  9:40 PM   Result Value Ref Range    CK 38 (L) 39 - 308 U/L    CK - MB <1.0 <3.6 ng/ml    CK-MB Index  0.0 - 4.0 %     CALCULATION NOT PERFORMED WHEN RESULT IS BELOW LINEAR LIMIT    Troponin-I, QT <0.02 0.0 - 0.045 NG/ML   NT-PRO BNP    Collection Time: 01/01/20  9:40 PM   Result Value Ref Range    NT pro- 0 - 900 PG/ML   POC LACTIC ACID    Collection Time: 01/01/20  9:45 PM   Result Value Ref Range    Lactic Acid (POC) 2.48 (HH) 0.40 - 2.00 mmol/L   EKG, 12 LEAD, INITIAL    Collection Time: 01/01/20  9:51 PM   Result Value Ref Range    Ventricular Rate 111 BPM    Atrial Rate 111 BPM    P-R Interval 154 ms    QRS Duration 90 ms    Q-T Interval 310 ms    QTC Calculation (Bezet) 421 ms    Calculated P Axis 53 degrees    Calculated R Axis 18 degrees    Calculated T Axis 56 degrees    Diagnosis       Sinus tachycardia  Possible Left atrial enlargement  Nonspecific T wave abnormality  Abnormal ECG  No previous ECGs available     INFLUENZA A & B AG (RAPID TEST)    Collection Time: 01/01/20  9:57 PM   Result Value Ref Range    Influenza A Antigen NEGATIVE  NEG      Influenza B Antigen NEGATIVE  NEG     URINALYSIS W/ RFLX MICROSCOPIC    Collection Time: 01/01/20 10:30 PM   Result Value Ref Range    Color DARK YELLOW      Appearance CLEAR      Specific gravity 1.024 1.005 - 1.030      pH (UA) 5.0 5.0 - 8.0      Protein 300 (A) NEG mg/dL    Glucose 500 (A) NEG mg/dL    Ketone TRACE (A) NEG mg/dL    Bilirubin NEGATIVE  NEG      Blood MODERATE (A) NEG      Urobilinogen 1.0 0.2 - 1.0 EU/dL    Nitrites NEGATIVE  NEG      Leukocyte Esterase NEGATIVE  NEG     URINE MICROSCOPIC ONLY    Collection Time: 01/01/20 10:30 PM   Result Value Ref Range    WBC 0 to 1 0 - 5 /hpf    RBC 3 to 5 0 - 5 /hpf    Epithelial cells FEW 0 - 5 /lpf    Bacteria RARE NEG /hpf    Mucus 2+ (A) NEG /lpf    Hyaline cast 0 to 1 0 - 2 /lpf    Granular cast 0 to 1 NEG /lpf    Spermatozoa 2+    -   Initial Vitals:   Patient Vitals for the past 8 hrs:   Temp Pulse Resp BP SpO2   01/01/20 2132 (!) 104.2 °F (40.1 °C) (!) 120 29 153/65 96 %   -     - Blood Cultures collected times 2 OR collected within last 24 hours:  2144/ 2150  - Lactic Acid Collection time OR within last 6 hours: 2145  - Antibiotic Start time:   - Lactic Acid Result: 2.48  - Target Fluid Bolus Amount: 2160  - Time of Fluid Bolus initiated: 2242  - Reason for no target fluid bolus: n/a  - New PIV / or line: PIV x 2    -  Time of Fluid Bolus Completion: 2337  - Cardiopulmonary response after fluid completion: Improved  - Time of Repeat Lactic Acid: 0026  - Repeat Lactic Acid Result: 1.09  Repeat Vitals:   Patient Vitals for the past 4 hrs:   Temp Pulse Resp BP SpO2   01/02/20 0028 99.9 °F (37.7 °C) 100 25 111/52 100 %   01/02/20 0025  100 27 111/52 97 %   01/02/20 0022  (!) 102 26 122/56 99 %   01/02/20 0000    125/55    01/01/20 2355     100 %   01/01/20 2330 (!) 101.2 °F (38.4 °C) (!) 112 21 149/65 99 %   01/01/20 2300  (!) 119 18 153/66 100 %   01/01/20 2200     96 %   01/01/20 2132 (!) 104.2 °F (40.1 °C) (!) 120 29 153/65 96 %   -     - Vasopressors Needed? none  - Debriefed with RN: Debrief completed with Rachel Davenport    - Additional Notes:   - Transfer to higher level of care?  Plan to admit

## 2020-01-02 NOTE — PROGRESS NOTES
0330 -NPO. Patient arrives to unit at this time. Language barrier. Pt speaks Guamanian. Pt refused /blue phone in ED. Pt family to translate. Dual skin assessment completed with Brissa Mejia RN. Wound to bottom of R foot below pinky toe. Pt family member states pt being seen by Podiatrist for wound. Pt has and upcoming appointment w/ Podiatrist in February. Admission completed at this time. Patient is A/O x 4. IV to RAC intact and patent. IV to LAC removed d/t infiltration. SCDs applied to BLE. Pt complains of tenderness, upon palpation, to lower mid abdomen. Pulses positive, palpable. Pain 0/10. Patient and family was oriented to the room to include use of call bell, meal ordering. Bed in lowest position, wheels locked. Phone, personal items, and call bell left within reach. Patient and family educated on need to call for assistance before getting OOB. Plan of care for the day addressed with patient and family. Educated on pain medication availability and possible side effects. Will continue to monitor. Kwan stated pt will be transported by wheelchair in @ 1 hr. Guru Lopez RN updated. Bedside and Verbal shift change report given to Boyd Augustine RN by Freda Elias RN. Report included the following information SBAR, Kardex, OR Summary, Intake/Output and MAR.

## 2020-01-02 NOTE — DIABETES MGMT
GLYCEMIC CONTROL PROGRESS NOTE:    - known h/o T2DM, HbA1C not within recommended range for age + comorbids on basal/oral home regimen  - BG out of target range non-ICU: < 180 mg/dL  - NPO for procedure  - recommend monitor BG trends pt may benefit from scheduled insulin dose once PO intake resumes  - consistent carb diet in place, confirmed home regimen with daughter  - per daughter, pt refuses to make changes to diet and likes to eat sweets  - pt may benefit from adjustment to home regimen for improved blood glucose control    Recent Glucose Results:   Lab Results   Component Value Date/Time     (H) 01/02/2020 05:50 AM     (H) 01/01/2020 09:40 PM    GLUCPOC 122 (H) 01/02/2020 01:03 PM    GLUCPOC 174 (H) 01/02/2020 08:07 AM    GLUCPOC 320 (H) 01/01/2020 09:36 PM     Niya Garcia MS, RN, CDE  Glycemic Control Team  [phone]  Pager [phone] (M-TH 8:00-4:30P)  *After Hours pager [phone]

## 2020-01-02 NOTE — PROGRESS NOTES
Reason for Admission:   Shreya Orozco is a 76 y.o. male who has a hx of HTN and DMT2 who has had N/V, abdominal pain, and poor appetite for 2-3 days. He had high fever tonight and the chills for the past 2 days. No CP or SOB. No leg swelling. Denies difficulty urinating or pain with urination.     *The patient is Moh[place] speaking and I conducted his H&P [place] [facility] [place] Family also present at the bedside.                      RRAT Score:    13              Do you (patient/family) have any concerns for transition/discharge? no              Plan for utilizing home health:   tbd    Current Advanced Directive/Advance Care Plan:            Not on file please consider placing a consult to palliative care or pastoral care address acp  Transition of Care Plan:      Met with patient and daughter at bedside. Patient asked that daughter be his . Patient lives with his daughter. He has medicare for insurance. He has Dr. Magan Vega. For pcp. Dr. Justin Adams at bedside. Plans for d/c home when medically cleared. Daughter repiorts that he does not use any dme. Care Management Interventions  PCP Verified by CM:  Yes  Transition of Care Consult (CM Consult): Discharge Planning  Current Support Network: Relative's Home  Confirm Follow Up Transport: Family  The Plan for Transition of Care is Related to the Following Treatment Goals : home with daughter when medically cleared  Freedom of Choice List was Provided with Basic Dialogue that Supports the Patient's Individualized Plan of Care/Goals, Treatment Preferences and Shares the Quality Data Associated with the Providers?: Yes  Discharge Location  Discharge Placement: Home with family assistance

## 2020-01-02 NOTE — PROGRESS NOTES
Pharmacy Dosing Services: Renal  Pharmacist Renal Dosing Progress Note for Levaquin   Physician Dr. Karlo Odonnell    The following medication: Levaquin was automatically dose-adjusted per [facility] P&T Committee Protocol, with respect to renal function. Consult provided for this   76 y.o. , male , for the indication of sepsis. Pt Weight:   Wt Readings from Last 1 Encounters:   01/02/20 72.5 kg (159 lb 13.3 oz)         Previous Regimen Levaquin 750 mg IV Q 24 hr   Serum Creatinine Lab Results   Component Value Date/Time    Creatinine 1.37 (H) 01/02/2020 05:50 AM       Creatinine Clearance Estimated Creatinine Clearance: 42.7 mL/min (A) (based on SCr of 1.37 mg/dL (H)). BUN Lab Results   Component Value Date/Time    BUN 21 (H) 01/02/2020 05:50 AM       Dosage changed to:  Levaquin 750 mg IV Q 48 hr    Pharmacy to continue to monitor patient daily. Will make dosage adjustments based upon changing renal function.   Signed Nagi Walker information: [phone]

## 2020-01-02 NOTE — ED NOTES
Verbal shift change report given to Ama Kearney RN (oncoming nurse) by eYni Collins RN (offgoing nurse). Report included the following information SBAR, ED Summary and MAR.

## 2020-01-02 NOTE — PROGRESS NOTES
bcx positive, us :choleycystitis , repeat bcx, donte jensen consult -case discussed with Dr. Orville Mcgarry

## 2020-01-02 NOTE — ROUTINE PROCESS
TRANSFER - OUT REPORT:    Verbal report given to LINH Krause (name) on Raymond Merritt  being transferred to [address] [place] [address] (telemetry unit) for routine progression of care       Report consisted of patients Situation, Background, Assessment and   Recommendations(SBAR). Information from the following report(s) SBAR, ED Summary, [facility] and Recent Results was reviewed with the receiving nurse. Lines:   Peripheral IV 01/01/20 Left Antecubital (Active)   Site Assessment Clean, dry, & intact 1/1/2020  9:44 PM   Phlebitis Assessment 0 1/1/2020  9:44 PM   Infiltration Assessment 0 1/1/2020  9:44 PM   Dressing Status Clean, dry, & intact 1/1/2020  9:44 PM       Peripheral IV 01/01/20 Right Antecubital (Active)   Site Assessment Clean, dry, & intact 1/1/2020  9:56 PM   Phlebitis Assessment 0 1/1/2020  9:56 PM   Infiltration Assessment 0 1/1/2020  9:56 PM   Dressing Status Clean, dry, & intact; Occlusive 1/1/2020  9:56 PM   Dressing Type Tape;Transparent 1/1/2020  9:56 PM   Hub Color/Line Status Pink;Flushed 1/1/2020  9:56 PM   Action Taken Blood drawn 1/1/2020  9:56 PM        Opportunity for questions and clarification was provided.       Patient transported with:   Monitor  Registered Nurse

## 2020-01-03 PROBLEM — R78.81 POSITIVE BLOOD CULTURE: Status: ACTIVE | Noted: 2020-01-03

## 2020-01-03 PROBLEM — I10 HYPERTENSION: Status: ACTIVE | Noted: 2020-01-03

## 2020-01-03 PROBLEM — Z79.4 TYPE 2 DIABETES MELLITUS, WITH LONG-TERM CURRENT USE OF INSULIN (HCC): Status: ACTIVE | Noted: 2020-01-03

## 2020-01-03 PROBLEM — E87.6 HYPOKALEMIA: Status: ACTIVE | Noted: 2020-01-03

## 2020-01-03 PROBLEM — N39.0 UTI (URINARY TRACT INFECTION): Status: ACTIVE | Noted: 2020-01-03

## 2020-01-03 PROBLEM — E11.9 DIABETES (HCC): Status: ACTIVE | Noted: 2020-01-03

## 2020-01-03 LAB
ALBUMIN SERPL-MCNC: 2.5 G/DL (ref 3.4–5)
ALBUMIN/GLOB SERPL: 0.7 {RATIO} (ref 0.8–1.7)
ALP SERPL-CCNC: 50 U/L (ref 45–117)
ALT SERPL-CCNC: 16 U/L (ref 16–61)
ANION GAP SERPL CALC-SCNC: 8 MMOL/L (ref 3–18)
AST SERPL-CCNC: 13 U/L (ref 10–38)
BACTERIA SPEC CULT: ABNORMAL
BACTERIA SPEC CULT: ABNORMAL
BILIRUB SERPL-MCNC: 1.2 MG/DL (ref 0.2–1)
BUN SERPL-MCNC: 17 MG/DL (ref 7–18)
BUN/CREAT SERPL: 13 (ref 12–20)
CALCIUM SERPL-MCNC: 7.6 MG/DL (ref 8.5–10.1)
CHLORIDE SERPL-SCNC: 109 MMOL/L (ref 100–111)
CO2 SERPL-SCNC: 25 MMOL/L (ref 21–32)
CREAT SERPL-MCNC: 1.36 MG/DL (ref 0.6–1.3)
ERYTHROCYTE [DISTWIDTH] IN BLOOD BY AUTOMATED COUNT: 13.1 % (ref 11.6–14.5)
GLOBULIN SER CALC-MCNC: 3.4 G/DL (ref 2–4)
GLUCOSE BLD STRIP.AUTO-MCNC: 113 MG/DL (ref 70–110)
GLUCOSE BLD STRIP.AUTO-MCNC: 233 MG/DL (ref 70–110)
GLUCOSE BLD STRIP.AUTO-MCNC: 74 MG/DL (ref 70–110)
GLUCOSE BLD STRIP.AUTO-MCNC: 91 MG/DL (ref 70–110)
GLUCOSE SERPL-MCNC: 74 MG/DL (ref 74–99)
HCT VFR BLD AUTO: 24 % (ref 36–48)
HGB BLD-MCNC: 8.1 G/DL (ref 13–16)
MCH RBC QN AUTO: 30 PG (ref 24–34)
MCHC RBC AUTO-ENTMCNC: 33.8 G/DL (ref 31–37)
MCV RBC AUTO: 88.9 FL (ref 74–97)
PLATELET # BLD AUTO: 151 K/UL (ref 135–420)
PMV BLD AUTO: 10 FL (ref 9.2–11.8)
POTASSIUM SERPL-SCNC: 3.3 MMOL/L (ref 3.5–5.5)
PROT SERPL-MCNC: 5.9 G/DL (ref 6.4–8.2)
RBC # BLD AUTO: 2.7 M/UL (ref 4.7–5.5)
SERVICE CMNT-IMP: ABNORMAL
SODIUM SERPL-SCNC: 142 MMOL/L (ref 136–145)
WBC # BLD AUTO: 7.8 K/UL (ref 4.6–13.2)

## 2020-01-03 PROCEDURE — 74011250637 HC RX REV CODE- 250/637: Performed by: FAMILY MEDICINE

## 2020-01-03 PROCEDURE — 74011636637 HC RX REV CODE- 636/637: Performed by: FAMILY MEDICINE

## 2020-01-03 PROCEDURE — 74011250636 HC RX REV CODE- 250/636: Performed by: FAMILY MEDICINE

## 2020-01-03 PROCEDURE — 74011250637 HC RX REV CODE- 250/637: Performed by: INTERNAL MEDICINE

## 2020-01-03 PROCEDURE — 74011250636 HC RX REV CODE- 250/636: Performed by: EMERGENCY MEDICINE

## 2020-01-03 PROCEDURE — 36415 COLL VENOUS BLD VENIPUNCTURE: CPT

## 2020-01-03 PROCEDURE — 65660000000 HC RM CCU STEPDOWN

## 2020-01-03 PROCEDURE — 80053 COMPREHEN METABOLIC PANEL: CPT

## 2020-01-03 PROCEDURE — 85027 COMPLETE CBC AUTOMATED: CPT

## 2020-01-03 PROCEDURE — 74011000258 HC RX REV CODE- 258: Performed by: EMERGENCY MEDICINE

## 2020-01-03 PROCEDURE — 82962 GLUCOSE BLOOD TEST: CPT

## 2020-01-03 RX ORDER — AMLODIPINE BESYLATE 2.5 MG/1
2.5 TABLET ORAL DAILY
Status: DISCONTINUED | OUTPATIENT
Start: 2020-01-03 | End: 2020-01-14 | Stop reason: HOSPADM

## 2020-01-03 RX ADMIN — ACETAMINOPHEN 650 MG: 325 TABLET ORAL at 04:07

## 2020-01-03 RX ADMIN — PIPERACILLIN AND TAZOBACTAM 4.5 G: 4; .5 INJECTION, POWDER, FOR SOLUTION INTRAVENOUS at 05:55

## 2020-01-03 RX ADMIN — HEPARIN SODIUM 5000 UNITS: 5000 INJECTION INTRAVENOUS; SUBCUTANEOUS at 03:39

## 2020-01-03 RX ADMIN — ASPIRIN 81 MG 81 MG: 81 TABLET ORAL at 09:41

## 2020-01-03 RX ADMIN — PIPERACILLIN AND TAZOBACTAM 4.5 G: 4; .5 INJECTION, POWDER, FOR SOLUTION INTRAVENOUS at 22:52

## 2020-01-03 RX ADMIN — PIPERACILLIN AND TAZOBACTAM 4.5 G: 4; .5 INJECTION, POWDER, FOR SOLUTION INTRAVENOUS at 12:09

## 2020-01-03 RX ADMIN — PIPERACILLIN AND TAZOBACTAM 4.5 G: 4; .5 INJECTION, POWDER, FOR SOLUTION INTRAVENOUS at 16:39

## 2020-01-03 RX ADMIN — ATORVASTATIN CALCIUM 20 MG: 20 TABLET, FILM COATED ORAL at 22:53

## 2020-01-03 RX ADMIN — VANCOMYCIN HYDROCHLORIDE 1250 MG: 1.25 INJECTION, POWDER, LYOPHILIZED, FOR SOLUTION INTRAVENOUS at 22:52

## 2020-01-03 RX ADMIN — SODIUM CHLORIDE 125 ML/HR: 900 INJECTION, SOLUTION INTRAVENOUS at 12:08

## 2020-01-03 RX ADMIN — HEPARIN SODIUM 5000 UNITS: 5000 INJECTION INTRAVENOUS; SUBCUTANEOUS at 09:41

## 2020-01-03 RX ADMIN — INSULIN LISPRO 4 UNITS: 100 INJECTION, SOLUTION INTRAVENOUS; SUBCUTANEOUS at 12:08

## 2020-01-03 NOTE — PROGRESS NOTES
Hospitalist Progress Note-critical care note     Patient: Terry Zamora MRN: [id number]  CSN: [id number]    YOB: 1945  Age: 76 y.o. Sex: male    DOA: 1/1/2020 LOS:  LOS: 1 day            Chief complaint: DM, sepsis , positive bcx, uti m     Assessment/Plan         Hospital Problems  Date Reviewed: 1/2/2020          Codes Class Noted POA    Type 2 diabetes mellitus, with long-term current use of insulin ([facility] ICD-10-CM: E11.9, Z79.4  ICD-9-CM: 250.00, V58.67  1/3/2020         Hypertension ICD-10-CM: I10  ICD-9-CM: 401.9  1/3/2020 Unknown        Hypokalemia ICD-10-CM: E87.6  ICD-9-CM: 276.8  1/3/2020 Unknown        UTI (urinary tract infection) ICD-10-CM: N39.0  ICD-9-CM: 599.0  1/3/2020 Unknown        Positive blood culture ICD-10-CM: R78.81  ICD-9-CM: 790.7  1/3/2020 Unknown        * (Principal) Sepsis ([facility] ICD-10-CM: A41.9  ICD-9-CM: 038.9, 995.91  1/2/2020 Yes        Abdominal pain ICD-10-CM: R10.9  ICD-9-CM: 789.00  1/2/2020 Yes        CKD (chronic kidney disease) ICD-10-CM: N18.9  ICD-9-CM: 585.9  1/2/2020 Yes            Positive blood  Culture , uti   bcx positive cocci,   Urine cx: staph   Continue current ibx till final cx back     Sepsis   See above   Ct abdomen reviewed   Ultrasound abdomen-possible cholecystitis-HIDA negative-jem wells does not think cholecystitis   D/c levaquin      Abdomen pain   Improving     DM type II    ssi     Hypokalemia   K replacement, will check mg     ckd 2-3 stable       Subjective: abdomen pain better     Daughter was at the bedside. Will d,c fluid/levaquin     Disposition :tbd,   Review of systems:    General: No fevers or chills. Cardiovascular: No chest pain or pressure. No palpitations. Pulmonary: No shortness of breath. Gastrointestinal: No nausea, vomiting.  Abdomen pain better     Vital signs/Intake and Output:  Visit Vitals  /63   Pulse 100   Temp 99.8 °F (37.7 °C)   Resp 14   Ht 5' 6\" (1.676 m)   Wt 79.4 kg (175 lb 1.6 oz)   SpO2 100% BMI 28.26 kg/m²     Current Shift:  01/03 0701 - 01/03 1900  In: 0814 [I.V.:3461]  Out: 1050 [QRITC:5337]  Last three shifts:  01/01 1901 - 01/03 0700  In: 2814 [P.O.:800; I.V.:2014]  Out: 2120 [Urine:2120]    Physical Exam:  General: WD, WN. Alert, cooperative, no acute distress    HEENT: NC, Atraumatic. PERRLA, anicteric sclerae. Lungs: CTA Bilaterally. No Wheezing/Rhonchi/Rales. Heart:  Regular  rhythm,  No murmur, No Rubs, No Gallops  Abdomen: Soft, Non distended, mild tenderness of LLQ.  +Bowel sounds,   Extremities: No c/c/e  Psych:   Not anxious or agitated. Neurologic:  No acute neurological deficit. Labs: Results:       Chemistry Recent Labs     01/03/20 0215 01/02/20  0550 01/01/20  2140   GLU 74 204* 307*    142 139   K 3.3* 3.7 3.6    109 104   CO2 25 26 26   BUN 17 21* 24*   CREA 1.36* 1.37* 1.37*   CA 7.6* 7.9* 8.5   AGAP 8 7 9   BUCR 13 15 18   AP 50  --  67   TP 5.9*  --  7.0   ALB 2.5*  --  3.1*   GLOB 3.4  --  3.9   AGRAT 0.7*  --  0.8      CBC w/Diff Recent Labs     01/03/20 0215 01/01/20  2140   WBC 7.8 5.7   RBC 2.70* 2.81*   HGB 8.1* 8.4*   HCT 24.0* 24.9*    177   GRANS  --  86*   LYMPH  --  8*   EOS  --  2      Cardiac Enzymes Recent Labs     01/02/20  1830 01/02/20  1146   CPK 53 47   CKND1 CALCULATION NOT PERFORMED WHEN RESULT IS BELOW LINEAR LIMIT CALCULATION NOT PERFORMED WHEN RESULT IS BELOW LINEAR LIMIT      Coagulation No results for input(s): PTP, INR, APTT, INREXT, INREXT in the last 72 hours. Lipid Panel No results found for: CHOL, CHOLPOCT, CHOLX, CHLST, CHOLV, 544045, HDL, HDLP, LDL, LDLC, DLDLP, 801759, VLDLC, VLDL, TGLX, TRIGL, TRIGP, TGLPOCT, CHHD, CHHDX   BNP No results for input(s): BNPP in the last 72 hours.    Liver Enzymes Recent Labs     01/03/20  0215   TP 5.9*   ALB 2.5*   AP 50   SGOT 13      Thyroid Studies No results found for: T4, T3U, TSH, TSHEXT, TSHEXT     Procedures/imaging: see electronic medical records for all procedures/Xrays and details which were not copied into this note but were reviewed prior to creation of Anuj Doe MD

## 2020-01-03 NOTE — PROGRESS NOTES
Transition of care: home when medially cleared  Met with patient and Dr. Lauren Campos and pts daughter at bedside. Patient does not speak Kristal Cedrick he has asked cm to use his daughter to interpret does not want to use the [employer] phone. patient lives with his daughter. He has medicare for insurance. Daughter reports he is IADL's. He has Dr. Barrington Lockett for pcp. Patient waiting on blood cultures per Dr Lauren Campos. All questions and concerns addressed. Cm will continue to follow. He does not us DME  Care Management Interventions  PCP Verified by CM:  Yes  Transition of Care Consult (CM Consult): Discharge Planning  Current Support Network: Relative's Home  Confirm Follow Up Transport: Family  The Plan for Transition of Care is Related to the Following Treatment Goals : home with daughter when medically cleared  Freedom of Choice List was Provided with Basic Dialogue that Supports the Patient's Individualized Plan of Care/Goals, Treatment Preferences and Shares the Quality Data Associated with the Providers?: Yes  Discharge Location  Discharge Placement: Home with family assistance

## 2020-01-03 NOTE — PROGRESS NOTES
1600 Assumed care of patient from Gissel Paige RN    1841 Assessment completed, patient is alert and oriented x's 4, no c/o pain. NSR on the monitor with a HR in the 90's. Lung fields are clear, but diminished throughout on RA, 02 sat sustained at 100% with no cough noted. Patient is tolerating a clear diet without any N/V or gastric distention. Scheduled IV antx administered as ordered without any complications. Patient is currently resting quietly  In bed, no s/s of any pain or distress, VSS, call bell and personal belongings within reach, will continue to monitor. 1800 NAD, will continue to monitor. 1920 Bedside and Verbal shift change report given to LINH rothman (oncoming nurse) by Marivel Nicole RN (offgoing nurse). Report included the following information SBAR, MAR, Accordion and Cardiac Rhythm NSR.

## 2020-01-03 NOTE — PROGRESS NOTES
1910 Assumed patient care at this time. Report received from BODØ, 24[facility]. Patient in bed in lowest position with wheels locked. Family at bedside. Call light within reach. 2000 Paged on call hospitalist at this time regarding a Tylenol order for patient's fever of 102.5.  2010 Spoke with Dr. Ralph Valdez and her student at this time. Telephone order with read back for Tylenol. 2029 Shift assessment completed and documented in flow sheets at this time. 0407 PRN Tylenol administered for fever and MEWS score of 3.   0740 Bedside and verbal shift change report given to Thomas Kim RN (oncoming nurse) by Juvenal Devlin RN (offgoing nurse). Report included the following information: SBAR, Kardex, MAR, and recent results.

## 2020-01-03 NOTE — PROGRESS NOTES
0800  Pt resting in bed. No complaints. Temp this morning is 99.3  0946    Pt is awake, alert,oriented x4. Pt has minimal pain to right shoulder. Lungs are clear. AAbdomen soft with active BS. Pt has a brownish calloused  area on bottom of right foot near small toe. No drainage noted. As per daughter, pt has pain on  that area. 1025  Pt ambulated to BR. Gait unsteady and has to use walker. Pt had BM 2x, loose  And soft formed BMss of moderate amt.   1216   Pt resting in bed with family at bedside.

## 2020-01-03 NOTE — PROGRESS NOTES
Problem: Falls - Risk of  Goal: *Absence of Falls  Description  Document Easter Getting Fall Risk and appropriate interventions in the flowsheet.   1/3/2020 1545 by Tasha Schuler RN  Outcome: Progressing Towards Goal  Note: Fall Risk Interventions:  Mobility Interventions: Communicate number of staff needed for ambulation/transfer, PT Consult for mobility concerns, Patient to call before getting OOB, PT Consult for assist device competence, Strengthening exercises (ROM-active/passive), Utilize walker, cane, or other assistive device         Medication Interventions: Evaluate medications/consider consulting pharmacy, Teach patient to arise slowly, Patient to call before getting OOB    Elimination Interventions: Call light in reach, Urinal in reach           1/3/2020 1535 by Tasha Schuler RN  Outcome: Progressing Towards Goal  Note: Fall Risk Interventions:  Mobility Interventions: Communicate number of staff needed for ambulation/transfer, PT Consult for mobility concerns, Patient to call before getting OOB, PT Consult for assist device competence, Strengthening exercises (ROM-active/passive), Utilize walker, cane, or other assistive device         Medication Interventions: Evaluate medications/consider consulting pharmacy, Teach patient to arise slowly, Patient to call before getting OOB    Elimination Interventions: Call light in reach, Urinal in reach

## 2020-01-03 NOTE — ROUTINE PROCESS
1554  Bedside and Verbal shift change report given to Cuco Coronel RN (oncoming nurse) by Brannon England RN (offgoing nurse). Report included the following information SBAR, Kardex and MAR.

## 2020-01-04 ENCOUNTER — APPOINTMENT (OUTPATIENT)
Dept: CT IMAGING | Age: 75
DRG: 854 | End: 2020-01-04
Attending: HOSPITALIST
Payer: MEDICARE

## 2020-01-04 LAB
ALBUMIN SERPL-MCNC: 2.4 G/DL (ref 3.4–5)
ALBUMIN/GLOB SERPL: 0.7 {RATIO} (ref 0.8–1.7)
ALP SERPL-CCNC: 47 U/L (ref 45–117)
ALT SERPL-CCNC: 15 U/L (ref 16–61)
ANION GAP SERPL CALC-SCNC: 9 MMOL/L (ref 3–18)
AST SERPL-CCNC: 16 U/L (ref 10–38)
BACTERIA SPEC CULT: ABNORMAL
BILIRUB SERPL-MCNC: 1 MG/DL (ref 0.2–1)
BUN SERPL-MCNC: 12 MG/DL (ref 7–18)
BUN/CREAT SERPL: 9 (ref 12–20)
CALCIUM SERPL-MCNC: 7.6 MG/DL (ref 8.5–10.1)
CHLORIDE SERPL-SCNC: 109 MMOL/L (ref 100–111)
CO2 SERPL-SCNC: 23 MMOL/L (ref 21–32)
CREAT SERPL-MCNC: 1.27 MG/DL (ref 0.6–1.3)
DATE LAST DOSE: NORMAL
ERYTHROCYTE [DISTWIDTH] IN BLOOD BY AUTOMATED COUNT: 12.9 % (ref 11.6–14.5)
GLOBULIN SER CALC-MCNC: 3.3 G/DL (ref 2–4)
GLUCOSE BLD STRIP.AUTO-MCNC: 103 MG/DL (ref 70–110)
GLUCOSE BLD STRIP.AUTO-MCNC: 151 MG/DL (ref 70–110)
GLUCOSE BLD STRIP.AUTO-MCNC: 180 MG/DL (ref 70–110)
GLUCOSE BLD STRIP.AUTO-MCNC: 99 MG/DL (ref 70–110)
GLUCOSE SERPL-MCNC: 88 MG/DL (ref 74–99)
GRAM STN SPEC: ABNORMAL
HCT VFR BLD AUTO: 22.3 % (ref 36–48)
HGB BLD-MCNC: 7.7 G/DL (ref 13–16)
MAGNESIUM SERPL-MCNC: 1.4 MG/DL (ref 1.6–2.6)
MCH RBC QN AUTO: 30.3 PG (ref 24–34)
MCHC RBC AUTO-ENTMCNC: 34.5 G/DL (ref 31–37)
MCV RBC AUTO: 87.8 FL (ref 74–97)
PLATELET # BLD AUTO: 154 K/UL (ref 135–420)
PMV BLD AUTO: 9.6 FL (ref 9.2–11.8)
POTASSIUM SERPL-SCNC: 3.3 MMOL/L (ref 3.5–5.5)
PROT SERPL-MCNC: 5.7 G/DL (ref 6.4–8.2)
RBC # BLD AUTO: 2.54 M/UL (ref 4.7–5.5)
REPORTED DOSE,DOSE: NORMAL UNITS
REPORTED DOSE/TIME,TMG: 2252
SERVICE CMNT-IMP: ABNORMAL
SERVICE CMNT-IMP: ABNORMAL
SODIUM SERPL-SCNC: 141 MMOL/L (ref 136–145)
VANCOMYCIN TROUGH SERPL-MCNC: 11.8 UG/ML (ref 10–20)
WBC # BLD AUTO: 8.5 K/UL (ref 4.6–13.2)

## 2020-01-04 PROCEDURE — 74011250637 HC RX REV CODE- 250/637: Performed by: FAMILY MEDICINE

## 2020-01-04 PROCEDURE — 74011636320 HC RX REV CODE- 636/320: Performed by: FAMILY MEDICINE

## 2020-01-04 PROCEDURE — 83735 ASSAY OF MAGNESIUM: CPT

## 2020-01-04 PROCEDURE — 65660000000 HC RM CCU STEPDOWN

## 2020-01-04 PROCEDURE — 74011250637 HC RX REV CODE- 250/637: Performed by: INTERNAL MEDICINE

## 2020-01-04 PROCEDURE — 74011000258 HC RX REV CODE- 258: Performed by: EMERGENCY MEDICINE

## 2020-01-04 PROCEDURE — 80202 ASSAY OF VANCOMYCIN: CPT

## 2020-01-04 PROCEDURE — 74011250636 HC RX REV CODE- 250/636: Performed by: EMERGENCY MEDICINE

## 2020-01-04 PROCEDURE — 74177 CT ABD & PELVIS W/CONTRAST: CPT

## 2020-01-04 PROCEDURE — 36415 COLL VENOUS BLD VENIPUNCTURE: CPT

## 2020-01-04 PROCEDURE — 74011250637 HC RX REV CODE- 250/637: Performed by: HOSPITALIST

## 2020-01-04 PROCEDURE — 97165 OT EVAL LOW COMPLEX 30 MIN: CPT

## 2020-01-04 PROCEDURE — 74011636637 HC RX REV CODE- 636/637: Performed by: FAMILY MEDICINE

## 2020-01-04 PROCEDURE — 85027 COMPLETE CBC AUTOMATED: CPT

## 2020-01-04 PROCEDURE — 97116 GAIT TRAINING THERAPY: CPT

## 2020-01-04 PROCEDURE — 97161 PT EVAL LOW COMPLEX 20 MIN: CPT

## 2020-01-04 PROCEDURE — 97535 SELF CARE MNGMENT TRAINING: CPT

## 2020-01-04 PROCEDURE — 74011250636 HC RX REV CODE- 250/636: Performed by: FAMILY MEDICINE

## 2020-01-04 PROCEDURE — 74011250636 HC RX REV CODE- 250/636: Performed by: HOSPITALIST

## 2020-01-04 PROCEDURE — 80053 COMPREHEN METABOLIC PANEL: CPT

## 2020-01-04 PROCEDURE — 82962 GLUCOSE BLOOD TEST: CPT

## 2020-01-04 RX ORDER — LEVOFLOXACIN 5 MG/ML
500 INJECTION, SOLUTION INTRAVENOUS EVERY 24 HOURS
Status: DISCONTINUED | OUTPATIENT
Start: 2020-01-04 | End: 2020-01-06

## 2020-01-04 RX ORDER — SODIUM CHLORIDE 9 MG/ML
75 INJECTION, SOLUTION INTRAVENOUS CONTINUOUS
Status: DISCONTINUED | OUTPATIENT
Start: 2020-01-04 | End: 2020-01-04

## 2020-01-04 RX ORDER — POTASSIUM CHLORIDE 20 MEQ/1
40 TABLET, EXTENDED RELEASE ORAL 2 TIMES DAILY
Status: COMPLETED | OUTPATIENT
Start: 2020-01-04 | End: 2020-01-04

## 2020-01-04 RX ADMIN — INSULIN LISPRO 2 UNITS: 100 INJECTION, SOLUTION INTRAVENOUS; SUBCUTANEOUS at 17:44

## 2020-01-04 RX ADMIN — ASPIRIN 81 MG 81 MG: 81 TABLET ORAL at 09:21

## 2020-01-04 RX ADMIN — PIPERACILLIN AND TAZOBACTAM 4.5 G: 4; .5 INJECTION, POWDER, FOR SOLUTION INTRAVENOUS at 12:42

## 2020-01-04 RX ADMIN — IOPAMIDOL 100 ML: 612 INJECTION, SOLUTION INTRAVENOUS at 13:28

## 2020-01-04 RX ADMIN — HEPARIN SODIUM 5000 UNITS: 5000 INJECTION INTRAVENOUS; SUBCUTANEOUS at 17:44

## 2020-01-04 RX ADMIN — INSULIN LISPRO 2 UNITS: 100 INJECTION, SOLUTION INTRAVENOUS; SUBCUTANEOUS at 21:49

## 2020-01-04 RX ADMIN — POTASSIUM CHLORIDE 40 MEQ: 1500 TABLET, EXTENDED RELEASE ORAL at 20:26

## 2020-01-04 RX ADMIN — PIPERACILLIN AND TAZOBACTAM 4.5 G: 4; .5 INJECTION, POWDER, FOR SOLUTION INTRAVENOUS at 05:31

## 2020-01-04 RX ADMIN — HEPARIN SODIUM 5000 UNITS: 5000 INJECTION INTRAVENOUS; SUBCUTANEOUS at 09:22

## 2020-01-04 RX ADMIN — PIPERACILLIN AND TAZOBACTAM 4.5 G: 4; .5 INJECTION, POWDER, FOR SOLUTION INTRAVENOUS at 17:43

## 2020-01-04 RX ADMIN — HEPARIN SODIUM 5000 UNITS: 5000 INJECTION INTRAVENOUS; SUBCUTANEOUS at 02:33

## 2020-01-04 RX ADMIN — AMLODIPINE BESYLATE 2.5 MG: 2.5 TABLET ORAL at 09:21

## 2020-01-04 RX ADMIN — ACETAMINOPHEN 650 MG: 325 TABLET ORAL at 09:21

## 2020-01-04 RX ADMIN — ATORVASTATIN CALCIUM 20 MG: 20 TABLET, FILM COATED ORAL at 21:49

## 2020-01-04 RX ADMIN — PIPERACILLIN AND TAZOBACTAM 4.5 G: 4; .5 INJECTION, POWDER, FOR SOLUTION INTRAVENOUS at 23:25

## 2020-01-04 RX ADMIN — ACETAMINOPHEN 650 MG: 325 TABLET ORAL at 21:49

## 2020-01-04 RX ADMIN — POTASSIUM CHLORIDE 40 MEQ: 1500 TABLET, EXTENDED RELEASE ORAL at 12:43

## 2020-01-04 RX ADMIN — LEVOFLOXACIN 500 MG: 5 INJECTION, SOLUTION INTRAVENOUS at 18:36

## 2020-01-04 RX ADMIN — SODIUM CHLORIDE 75 ML/HR: 900 INJECTION, SOLUTION INTRAVENOUS at 12:42

## 2020-01-04 NOTE — PROGRESS NOTES
Bedside and Verbal shift change report received from OG Ocasio RN (offcoming nurse) by Matilda áSnchez RN (oncoming nurse).  Report included the following information SBAR, Kardex, MAR and Recent Results.

## 2020-01-04 NOTE — PROGRESS NOTES
Hospitalist Progress Note-critical care note     Patient: Ernestina Oconnell MRN: [id number]  CSN: [id number]    YOB: 1945  Age: 76 y.o. Sex: male    DOA: 1/1/2020 LOS:  LOS: 2 days            Chief complaint: DM, sepsis , positive bcx, uti m     Assessment/Plan         Hospital Problems  Date Reviewed: 1/2/2020          Codes Class Noted POA    Type 2 diabetes mellitus, with long-term current use of insulin ([facility] ICD-10-CM: E11.9, Z79.4  ICD-9-CM: 250.00, V58.67  1/3/2020         Hypertension ICD-10-CM: I10  ICD-9-CM: 401.9  1/3/2020 Unknown        Hypokalemia ICD-10-CM: E87.6  ICD-9-CM: 276.8  1/3/2020 Unknown        UTI (urinary tract infection) ICD-10-CM: N39.0  ICD-9-CM: 599.0  1/3/2020 Unknown        Positive blood culture ICD-10-CM: R78.81  ICD-9-CM: 790.7  1/3/2020 Unknown        * (Principal) Sepsis ([facility] ICD-10-CM: A41.9  ICD-9-CM: 038.9, 995.91  1/2/2020 Yes        Abdominal pain ICD-10-CM: R10.9  ICD-9-CM: 789.00  1/2/2020 Yes        CKD (chronic kidney disease) ICD-10-CM: N18.9  ICD-9-CM: 585.9  1/2/2020 Yes            Bacteremia   [facility] ,repeated no growth   Like from UTI   Still having fever   Repeated ct no new finding except possible fluid overloaded   Need ID on Monday   Will have echo     Sepsis   See above   Ct abdomen reviewed   Ultrasound abdomen-possible cholecystitis-HIDA negative-jem wells does not think cholecystitis   Will add levaquin back due to fever       Abdomen pain   Improving     DM type II    ssi     Hypokalemia   K replacement, will check mg     ckd 2-3 stable       Subjective: feel better     Interpretor was used during the visit   Disposition :tbd,   Review of systems:    General: +fevers, no chills. Cardiovascular: No chest pain or pressure. No palpitations. Pulmonary: No shortness of breath. Gastrointestinal: No nausea, vomiting.  Abdomen pain better     Vital signs/Intake and Output:  Visit Vitals  /71   Pulse 91   Temp 98.8 °F (37.1 °C)   Resp 14   Ht 5' 6\" (1.676 m)   Wt 79.7 kg (175 lb 9.6 oz)   SpO2 100%   BMI 28.34 kg/m²     Current Shift:  No intake/output data recorded. Last three shifts:  01/02 1901 - 01/04 0700  In: 1271 [P.O.:800; I.V.:3461]  Out: 2350 [Urine:2350]    Physical Exam:  General: WD, WN. Alert, cooperative, no acute distress    HEENT: NC, Atraumatic. PERRLA, anicteric sclerae. Lungs: CTA Bilaterally. No Wheezing/Rhonchi/Rales. Heart:  Regular  rhythm,  No murmur, No Rubs, No Gallops  Abdomen: Soft, Non distended, mild tenderness of LLQ.  +Bowel sounds,   Extremities: No c/c/e  Psych:   Not anxious or agitated. Neurologic:  No acute neurological deficit. Labs: Results:       Chemistry Recent Labs     01/04/20  0440 01/03/20  0215 01/02/20  0550 01/01/20  2140   GLU 88 74 204* 307*    142 142 139   K 3.3* 3.3* 3.7 3.6    109 109 104   CO2 23 25 26 26   BUN 12 17 21* 24*   CREA 1.27 1.36* 1.37* 1.37*   CA 7.6* 7.6* 7.9* 8.5   AGAP 9 8 7 9   BUCR 9* 13 15 18   AP 47 50  --  67   TP 5.7* 5.9*  --  7.0   ALB 2.4* 2.5*  --  3.1*   GLOB 3.3 3.4  --  3.9   AGRAT 0.7* 0.7*  --  0.8      CBC w/Diff Recent Labs     01/04/20  0440 01/03/20  0215 01/01/20  2140   WBC 8.5 7.8 5.7   RBC 2.54* 2.70* 2.81*   HGB 7.7* 8.1* 8.4*   HCT 22.3* 24.0* 24.9*    151 177   GRANS  --   --  86*   LYMPH  --   --  8*   EOS  --   --  2      Cardiac Enzymes Recent Labs     01/02/20  1830 01/02/20  1146   CPK 53 47   CKND1 CALCULATION NOT PERFORMED WHEN RESULT IS BELOW LINEAR LIMIT CALCULATION NOT PERFORMED WHEN RESULT IS BELOW LINEAR LIMIT      Coagulation No results for input(s): PTP, INR, APTT, INREXT, INREXT in the last 72 hours. Lipid Panel No results found for: CHOL, CHOLPOCT, CHOLX, CHLST, CHOLV, 781394, HDL, HDLP, LDL, LDLC, DLDLP, 961044, VLDLC, VLDL, TGLX, TRIGL, TRIGP, TGLPOCT, CHHD, CHHDX   BNP No results for input(s): BNPP in the last 72 hours.    Liver Enzymes Recent Labs     01/04/20  0440   TP 5.7* ALB 2.4*   AP 47   SGOT 16      Thyroid Studies No results found for: T4, T3U, TSH, TSHEXT, TSHEXT     Procedures/imaging: see electronic medical records for all procedures/Xrays and details which were not copied into this note but were reviewed prior to creation of Rell Mcclelland MD

## 2020-01-04 NOTE — PROGRESS NOTES
0734:Received verbal bedside report from off going nurse NUNO Jolley R.N. Patient care received. . Patient resting in bed denies pain. Patient stable. Call light with in reach bed in lowest position. 0930: Informed  that patient was administered 650 mg for fever. Made  aware that patient said he had diarrhea last night. Informed her that the patients potassium was 3.3 this morning. She said that the patient would have an abdominal CT today. 1045:Spoke with patient he said the last time he ate was at 9. Informed patient he is now NPO. 1050: Informed CT tech Kelley Mae that the patient last ate at 0900. She said the patient will come down for CT around 1300.

## 2020-01-04 NOTE — PROGRESS NOTES
OCCUPATIONAL THERAPY EVALUATION/DISCHARGE    Patient: Anna Marie Faulkner (10 y.o. male)  Date: 1/4/2020  Primary Diagnosis: Sepsis ([facility] [A41.9]  Fever in adult [R50.9]  Lactic acidosis [E87.2]        Precautions:   Fall  PLOF: independent with ADLs and transfers     ASSESSMENT AND RECOMMENDATIONS:  Based on the objective data described below, the patient presents with requiring S for ADLs and transfers following above mentioned medical diagnosis. Pt participated with bed mobility, LB dressing, STS transfers, toilet transfers, and toileting with S-I during this session. Pt was left seated at EOB at the end of session in NAD. Pt's primary language is English and family present at bedside was able to assist with interpretation. Skilled occupational therapy is not indicated at this time. Discharge Recommendations: None  Further Equipment Recommendations for Discharge: N/A      SUBJECTIVE:   Patient stated  thank you.     OBJECTIVE DATA SUMMARY:     Past Medical History:   Diagnosis Date    Diabetes ([facility]     Hypertension    History reviewed. No pertinent surgical history. Barriers to Learning/Limitations: None  Compensate with: visual, verbal, tactile, kinesthetic cues/model    Home Situation:   Home Situation  Home Environment: Apartment  # Steps to Enter: 15  One/Two Story Residence: One story  Living Alone: No  Support Systems: Child(shahla), Family member(s)  Patient Expects to be Discharged to[de-identified] Private residence  Current DME Used/Available at Home: None  Tub or Shower Type: Tub/Shower combination  []     Right hand dominant   []     Left hand dominant    Cognitive/Behavioral Status:  Neurologic State: Alert  Orientation Level: Oriented X4  Cognition: Appropriate for age attention/concentration; Follows commands  Safety/Judgement: Fall prevention    Skin: intact  Edema: none    Vision/Perceptual:    Tracking: Able to track stimulus in all quadrants w/o difficulty    Coordination: BUE  Coordination: Within functional limits  Fine Motor Skills-Upper: Left Intact; Right Intact    Gross Motor Skills-Upper: Left Intact; Right Intact  Balance:  Sitting: Intact  Standing: Intact; With support  Strength: BUE  Strength: Generally decreased, functional  Tone & Sensation: BUE  Tone: Normal  Sensation: Intact  Range of Motion: BUE  AROM: Generally decreased, functional  Functional Mobility and Transfers for ADLs:  Bed Mobility:  Supine to Sit: Supervision  Scooting: Supervision  Transfers:  Sit to Stand: Supervision  Stand to Sit: Supervision   Toilet Transfer : Supervision  ADL Assessment:  Feeding: Independent    Oral Facial Hygiene/Grooming: Supervision    Upper Body Dressing: Independent    Lower Body Dressing: Supervision    Toileting: Supervision  ADL Intervention:  Lower Body Dressing Assistance  Dressing Assistance: Independent  Socks: Independent  Leg Crossed Method Used: Yes  Position Performed: Seated edge of bed  Cues: Ba Mitchell  Toileting Assistance: Supervision  Bladder Hygiene: Supervision  Clothing Management: Supervision    Cognitive Retraining  Safety/Judgement: Fall prevention    Therapeutic Exercise:    Pain:  Pain level pre-treatment: 0/10   Pain level post-treatment: 0/10   Pain Intervention(s): Medication (see MAR); Rest, Ice, Repositioning   Response to intervention: Nurse notified, See doc flow    Activity Tolerance:   Good   Please refer to the flowsheet for vital signs taken during this treatment. After treatment:   []  Patient left in no apparent distress sitting up in chair  [x]  Patient left in no apparent distress in bed  [x]  Call bell left within reach  []  Nursing notified  [x]  Caregiver present  []  Bed alarm activated    COMMUNICATION/EDUCATION:   [x]      Role of Occupational Therapy in the acute care setting  [x]      Home safety education was provided and the patient/caregiver indicated understanding.   [x]      Patient/family have participated as able and agree with findings and recommendations. []      Patient is unable to participate in plan of care at this time. Thank you for this referral.  Marie Stewart, OTR/L  Time Calculation: 14 mins      Eval Complexity: History: LOW Complexity : Brief history review ; Examination: LOW Complexity : 1-3 performance deficits relating to physical, cognitive , or psychosocial skils that result in activity limitations and / or participation restrictions ;    Decision Making:LOW Complexity : No comorbidities that affect functional and no verbal or physical assistance needed to complete eval tasks

## 2020-01-04 NOTE — PROGRESS NOTES
Bedside and Verbal shift change report given to Rogelio Mitchell RN (oncoming nurse) by Halle Bradley (offgoing nurse). Report included the following information SBAR, Kardex, MAR and Recent Results. SITUATION:  Code Status: Full Code  Reason for Admission: Sepsis ([facility] [A41.9]  Fever in adult [R50.9]  Lactic acidosis [E87.2]  Hospital day: 2  Problem List:       Hospital Problems  Date Reviewed: 1/2/2020          Codes Class Noted POA    Type 2 diabetes mellitus, with long-term current use of insulin ([facility] ICD-10-CM: E11.9, Z79.4  ICD-9-CM: 250.00, V58.67  1/3/2020         Hypertension ICD-10-CM: I10  ICD-9-CM: 401.9  1/3/2020 Unknown        Hypokalemia ICD-10-CM: E87.6  ICD-9-CM: 276.8  1/3/2020 Unknown        UTI (urinary tract infection) ICD-10-CM: N39.0  ICD-9-CM: 599.0  1/3/2020 Unknown        Positive blood culture ICD-10-CM: R78.81  ICD-9-CM: 790.7  1/3/2020 Unknown        * (Principal) Sepsis ([facility] ICD-10-CM: A41.9  ICD-9-CM: 038.9, 995.91  1/2/2020 Yes        Abdominal pain ICD-10-CM: R10.9  ICD-9-CM: 789.00  1/2/2020 Yes        CKD (chronic kidney disease) ICD-10-CM: N18.9  ICD-9-CM: 585.9  1/2/2020 Yes              BACKGROUND:   Past Medical History:   Past Medical History:   Diagnosis Date    Diabetes ([facility]     Hypertension       Patient taking anticoagulants yes    Patient has a defibrillator: no    History of shots NO for example, flu, pneumonia, tetanus   Isolation History NO for example, MRSA, CDiff    ASSESSMENT:  Changes in Assessment Throughout Shift: pt states he was having chills throughout the night, pt currently on IV abx.   Significant Changes in 24 hours (for example, RR/code, fall)  Patient has Central Line: no   Patient has Bee Cath: no  Mobility Issues  PT  IV Patency  OR Checklist  Pending Tests    Last Vitals:  Vitals w/ MEWS Score (last day)     Date/Time MEWS Score Pulse Resp Temp BP Level of Consciousness SpO2    01/04/20 0400  1  (!) 104  14  98.7 °F (37.1 °C)  (!) 160/127  Alert 100 %    01/04/20 0135  0  98  14  99 °F (37.2 °C)  166/56  Alert  97 %    01/03/20 2216  0  97  14  99.4 °F (37.4 °C)  161/59  Alert  97 %    01/03/20 1650            Alert      01/03/20 1644              100 %    01/03/20 1545  0  100  14  99.8 °F (37.7 °C)  164/63  Alert  100 %    01/03/20 1450  0  93  14  100.4 °F (38 °C)  166/62  Alert  99 %    01/03/20 1041  0  88  14  99 °F (37.2 °C)  148/72  Alert  100 %    01/03/20 0707  0  81  14  99.3 °F (37.4 °C)  136/65  Alert  99 %    01/03/20 0555  1  94  15  99.7 °F (37.6 °C)  138/62  Alert  99 %    01/03/20 0401  3  95  16  (!) 102.6 °F (39.2 °C)  142/54  Alert  96 %            PAIN    Pain Assessment    Pain Intensity 1: 0 (01/03/20 1937)    Pain Location 1: Shoulder         Patient Stated Pain Goal: 0  Intervention effective: no  Time of last intervention: N/A Reassessment Completed: no   Other actions taken for pain: None voiced    Last 3 Weights:  Last 3 Recorded Weights in this Encounter    01/02/20 0319 01/03/20 0401 01/04/20 0135   Weight: 72.5 kg (159 lb 13.3 oz) 79.4 kg (175 lb 1.6 oz) 79.7 kg (175 lb 9.6 oz)   Weight change: 0.227 kg (8 oz)    INTAKE/OUPUT    Current Shift: No intake/output data recorded. Last three shifts: 01/02 1901 - 01/04 0700  In: 4705 [P.O.:800; I.V.:3461]  Out: 2350 [Urine:2350]    RECOMMENDATIONS AND DISCHARGE PLANNING  Patient needs and requests: to be called Gene    Pending tests/procedures: None    Discharge plan for patient: TBD    Discharge planning Needs or Barriers: none currently    Estimated Discharge Date: TBD Posted on Whiteboard in Patients Room: yes       \"HEALS\" SAFETY CHECK  A safety check occurred in the patient's room between off going nurse and oncoming nurse listed above.     The safety check included the below items:    H  High Alert Medications Verify all high alert medication drips (heparin, PCA, etc.)  E  Equipment Suction is set up for ALL patients (with robin)  Red plugs utilized for all equipment (IV pumps, etc.)  WOWs wiped down at end of shift. Room stocked with oxygen, suction, and other unit-specific supplies  A  Alarms Bed alarm is set for fall risk patients  Ensure chair alarm is in place and activated if patient is up in a chair  L  Lines Check IV for any infiltration  Bee bag is empty if patient has a Bee   Tubing and IV bags are labeled  S  Safety  Room is clean, patient is clean, and equipment is clean. Hallways are clear from equipment besides carts. Fall bracelet on for fall risk patients  Ensure room is clear and free of clutter  Suction is set up for ALL patients (with robin)  Hallways are clear from equipment besides carts.    Isolation precautions followed, supplies available outside room, sign posted    Johnson Thomson

## 2020-01-04 NOTE — PROGRESS NOTES
Problem: Mobility Impaired (Adult and Pediatric)  Goal: *Acute Goals and Plan of Care (Insert Text)  Description  In 1-7 days pt will be able to perform:  ST.  Bed mobility:  Rolling L to R to L modified independent for positioning. 2.  Supine to sit to supine S with HR for meals. 3.  Sit to stand to sit S with RW in prep for ambulation. LT.  Gait:  Ambulate >150ft S with LRD for home/community mobility. 2.  Stair Negotiation:  Ascend/descend >5 steps CGA with HR for home entry. 3.  Activity tolerance: Tolerate up in chair 1-2 hours for ADLs. 4.  Patient/Family Education:  Patient/family to be independent with HEP for follow-up care and safe discharge. Note:   PHYSICAL THERAPY EVALUATION    Patient: Bharath Nesbitt (89 y.o. male)  Date: 2020  Primary Diagnosis: Sepsis ([facility] [A41.9]  Fever in adult [R50.9]  Lactic acidosis [E87.2]        Precautions:   Fall    ASSESSMENT :  Based on the objective data described below, the patient presents with decreased functional mobility and independence in regard to bed mobility, transfers, gt quality and tolerance, strength, pain, balance, activity tolerance, stair negotiation and safety. Pt sitting EOB upon arrival.  Eritrean is pt first language and family present helping w/ translation. Pt c/o R ankle/foot swelling, noted small area on R lateral border near fifth metatarsal leaking blood and larger callous area w/ redness and erythema. Nurse Rhys Strange made aware and applied sterile 4x4 and kerlix. Pt unable to tell PT when sore presented but reports swelling was here in hospital.  Pt rating pain on numerical pain scale 0/10. Pt required S for supine<>sit and CGA/min A for sit<>stand. Pt required vc for safe techniques. RW improves pt stability for gt. Pt able to participate in gt training w/ RW, GB and CGA in hallway w/ antalgic pattern. Pt sat in w/c for transport tech as pt is going downstairs for test.  Nurse Eun jensen and family present. Recommend [facility] hospital d/c. Patient will benefit from skilled intervention to address the above impairments. Patients rehabilitation potential is considered to be Good  Factors which may influence rehabilitation potential include:   []         None noted  []         Mental ability/status  []         Medical condition  []         Home/family situation and support systems  []         Safety awareness  [x]         Pain tolerance/management  []         Other:      PLAN :  Recommendations and Planned Interventions:  [x]           Bed Mobility Training             []    Neuromuscular Re-Education  [x]           Transfer Training                   []    Orthotic/Prosthetic Training  [x]           Gait Training                          []    Modalities  [x]           Therapeutic Exercises          []    Edema Management/Control  [x]           Therapeutic Activities            [x]    Patient and Family Training/Education  []           Other (comment):    Frequency/Duration: Patient will be followed by physical therapy 1-2 times per day/4-7 days per week to address goals. Discharge Recommendations: Home Health  Further Equipment Recommendations for Discharge: rolling walker     SUBJECTIVE:   Patient stated this foot.     OBJECTIVE DATA SUMMARY:     Past Medical History:   Diagnosis Date    Diabetes ([facility]     Hypertension    History reviewed. No pertinent surgical history. Barriers to Learning/Limitations: None  Compensate with: visual, verbal, tactile, kinesthetic cues/model  Prior Level of Function/Home Situation:   Home Situation  Home Environment: Private residence  # Steps to Enter: 0  One/Two Story Residence: Two story  Living Alone: No  Support Systems: Family member(s)  Patient Expects to be Discharged to[de-identified] Private residence  Current DME Used/Available at Home: None  Critical Behavior:  Neurologic State: Alert; Appropriate for age  Orientation Level: Oriented X4  Cognition: Appropriate decision making; Appropriate for age attention/concentration; Appropriate safety awareness; Follows commands  Psychosocial  Patient Behaviors: Calm; Cooperative  Purposeful Interaction: Yes  Pt Identified Daily Priority: Clinical issues (comment); Communication issues (comment)  [facility] Process: Teaching/learning  Caring Interventions: Reassure; Therapeutic modalities  Reassure: Therapeutic listening; Informing  Therapeutic Modalities: Humor; Intentional therapeutic touch  Skin Condition/Temp: Dry;Warm  Skin Integrity: Cracked; Wound (add Wound LDA)  Skin Integumentary  Skin Color: Appropriate for ethnicity  Skin Condition/Temp: Dry;Warm  Skin Integrity: Cracked; Wound (add Wound LDA)  Turgor: Non-tenting  Hair Growth: Present  Varicosities: Absent  Strength:    Strength: Generally decreased, functional  Tone & Sensation:   Tone: Normal  Sensation: Intact  Range Of Motion:  AROM: Generally decreased, functional  Functional Mobility:  Bed Mobility:  Supine to Sit: Supervision  Scooting: Supervision  Transfers:  Sit to Stand: Minimum assistance;Contact guard assistance(vc)  Stand to Sit: Contact guard assistance(vc)  Balance:   Sitting: Intact  Standing: Intact; With support  Ambulation/Gait Training:  Distance (ft): 65 Feet (ft)  Assistive Device: Walker, rolling;Gait belt  Ambulation - Level of Assistance: Contact guard assistance(vc)  Gait Abnormalities: Antalgic;Decreased step clearance  Base of Support: Widened;Shift to left  Stance: Right decreased  Speed/Johnna: Slow  Step Length: Left shortened;Right shortened  Swing Pattern: Left asymmetrical;Right asymmetrical  Interventions: Safety awareness training; Tactile cues; Verbal cues; Visual/Demos  Therapeutic Exercises:   Pain:  Pain Scale 1: Numeric (0 - 10)  Pain Intensity 1: 0  Activity Tolerance:   Fair   Please refer to the flowsheet for vital signs taken during this treatment.   After treatment:   [x]         Patient left in no apparent distress sitting up in wheelchair-to be transferred for test downstairs  []         Patient left in no apparent distress in bed  [x]         Call bell left within reach  [x]         Nursing notified  []         Caregiver present  []         Bed alarm activated    COMMUNICATION/EDUCATION:   [x]         Fall prevention education was provided and the patient/caregiver indicated understanding. [x]         Patient/family have participated as able in goal setting and plan of care. [x]         Patient/family agree to work toward stated goals and plan of care. []         Patient understands intent and goals of therapy, but is neutral about his/her participation. []         Patient is unable to participate in goal setting and plan of care.     Thank you for this referral.  Mirna Colunga, PT   Time Calculation: 25 mins       Eval Complexity: History: HIGH Complexity :3+ comorbidities / personal factors will impact the outcome/ POC Exam:MEDIUM Complexity : 3 Standardized tests and measures addressing body structure, function, activity limitation and / or participation in recreation  Presentation: LOW Complexity : Stable, uncomplicated  Clinical Decision Making:Low Complexity    Overall Complexity:LOW

## 2020-01-04 NOTE — PROGRESS NOTES
1505- Received verbal report from [address]. Report included SBAR, Kardex and MAR. Assumed care of patient at this time. Patient lying comfortably in bed. Patient appears to be in no distress. White board updated. 1907-Bedside and Verbal shift change report provided to [employer] (oncoming nurse) by Zari Blake RN (oncoming nurse).  Report included the following information SBAR, Kardex, MAR.

## 2020-01-04 NOTE — PROGRESS NOTES
Pt continues to ambulate to bathroom using walker without incident. Pt educated to notify staff for any issues ambulating when getting out of bed. Pt had no complaints of pain throughout my shift and DM remains stable without need for coverage, will continue to monitor.

## 2020-01-05 ENCOUNTER — APPOINTMENT (OUTPATIENT)
Dept: CT IMAGING | Age: 75
DRG: 854 | End: 2020-01-05
Attending: HOSPITALIST
Payer: MEDICARE

## 2020-01-05 ENCOUNTER — APPOINTMENT (OUTPATIENT)
Dept: NON INVASIVE DIAGNOSTICS | Age: 75
DRG: 854 | End: 2020-01-05
Attending: HOSPITALIST
Payer: MEDICARE

## 2020-01-05 PROBLEM — L02.611 FOOT ABSCESS, RIGHT: Status: ACTIVE | Noted: 2020-01-05

## 2020-01-05 PROBLEM — L84 CORN OF FOOT: Status: ACTIVE | Noted: 2020-01-05

## 2020-01-05 LAB
ALBUMIN SERPL-MCNC: 2.4 G/DL (ref 3.4–5)
ALBUMIN/GLOB SERPL: 0.7 {RATIO} (ref 0.8–1.7)
ALP SERPL-CCNC: 56 U/L (ref 45–117)
ALT SERPL-CCNC: 17 U/L (ref 16–61)
ANION GAP SERPL CALC-SCNC: 7 MMOL/L (ref 3–18)
AST SERPL-CCNC: 19 U/L (ref 10–38)
AV VELOCITY RATIO: 0.63
AV VTI RATIO: 0.7
BILIRUB SERPL-MCNC: 1.2 MG/DL (ref 0.2–1)
BUN SERPL-MCNC: 12 MG/DL (ref 7–18)
BUN/CREAT SERPL: 9 (ref 12–20)
CALCIUM SERPL-MCNC: 8 MG/DL (ref 8.5–10.1)
CHLORIDE SERPL-SCNC: 111 MMOL/L (ref 100–111)
CO2 SERPL-SCNC: 23 MMOL/L (ref 21–32)
CREAT SERPL-MCNC: 1.3 MG/DL (ref 0.6–1.3)
ECHO AO ASC DIAM: 3.34 CM
ECHO AO ROOT DIAM: 3.25 CM
ECHO AV AREA PEAK VELOCITY: 2 CM2
ECHO AV AREA VTI: 2.2 CM2
ECHO AV AREA/BSA PEAK VELOCITY: 1 CM2/M2
ECHO AV AREA/BSA VTI: 1.2 CM2/M2
ECHO AV MEAN GRADIENT: 5.9 MMHG
ECHO AV MEAN VELOCITY: 1.14 M/S
ECHO AV PEAK GRADIENT: 10.8 MMHG
ECHO AV PEAK VELOCITY: 164.58 CM/S
ECHO AV VTI: 32.25 CM
ECHO IVC PROX: 2.4 CM
ECHO LA AREA 2C: 22.99 CM2
ECHO LA AREA 4C: 16.6 CM2
ECHO LA MAJOR AXIS: 3.12 CM
ECHO LA VOL 2C: 75.05 ML (ref 18–58)
ECHO LA VOL 4C: 37.65 ML (ref 18–58)
ECHO LA VOL BP: 59.55 ML (ref 18–58)
ECHO LA VOLUME INDEX A2C: 39.71 ML/M2 (ref 16–28)
ECHO LA VOLUME INDEX A4C: 19.92 ML/M2 (ref 16–28)
ECHO LV E' LATERAL VELOCITY: 9 CM/S
ECHO LV E' SEPTAL VELOCITY: 9 CM/S
ECHO LV EDV A2C: 73.6 ML
ECHO LV EDV A4C: 72.8 ML
ECHO LV EDV BP: 75 ML (ref 67–155)
ECHO LV EDV INDEX A4C: 38.5 ML/M2
ECHO LV EDV INDEX BP: 39.7 ML/M2
ECHO LV EDV NDEX A2C: 38.9 ML/M2
ECHO LV EDV TEICHHOLZ: 38.59 ML
ECHO LV EJECTION FRACTION A2C: 61 %
ECHO LV EJECTION FRACTION A4C: 51 %
ECHO LV EJECTION FRACTION BIPLANE: 56.3 % (ref 55–100)
ECHO LV ESV A2C: 29.1 ML
ECHO LV ESV A4C: 35.9 ML
ECHO LV ESV BP: 32.8 ML (ref 22–58)
ECHO LV ESV INDEX A2C: 15.4 ML/M2
ECHO LV ESV INDEX A4C: 19 ML/M2
ECHO LV ESV INDEX BP: 17.4 ML/M2
ECHO LV ESV TEICHHOLZ: 14.75 ML
ECHO LV INTERNAL DIMENSION DIASTOLIC: 4.1 CM (ref 4.2–5.9)
ECHO LV INTERNAL DIMENSION SYSTOLIC: 2.76 CM
ECHO LV IVSD: 1.46 CM (ref 0.6–1)
ECHO LV MASS 2D: 268.8 G (ref 88–224)
ECHO LV MASS INDEX 2D: 142.2 G/M2 (ref 49–115)
ECHO LV POSTERIOR WALL DIASTOLIC: 1.42 CM (ref 0.6–1)
ECHO LV POSTERIOR WALL SYSTOLIC: 0 CM
ECHO LVOT DIAM: 2.02 CM
ECHO LVOT PEAK GRADIENT: 4.3 MMHG
ECHO LVOT PEAK VELOCITY: 103.99 CM/S
ECHO LVOT VTI: 22.7 CM
ECHO MV A VELOCITY: 108.07 CM/S
ECHO MV AREA PHT: 5 CM2
ECHO MV E DECELERATION TIME (DT): 152.7 MS
ECHO MV E VELOCITY: 77.38 CM/S
ECHO MV E/A RATIO: 0.72
ECHO MV E/E' LATERAL: 8.6
ECHO MV E/E' RATIO (AVERAGED): 8.6
ECHO MV E/E' SEPTAL: 8.6
ECHO MV PRESSURE HALF TIME (PHT): 44.3 MS
ECHO PULMONARY ARTERY SYSTOLIC PRESSURE (PASP): 23 MMHG
ECHO PV MAX VELOCITY: 101.06 CM/S
ECHO PV PEAK GRADIENT: 4.1 MMHG
ECHO RA AREA 4C: 14.7 CM2
ECHO RA VOLUME: 37 ML
ECHO RV INTERNAL DIMENSION: 3.49 CM
ECHO RV TAPSE: 2.84 CM (ref 1.5–2)
ECHO RVOT PEAK VELOCITY: 43.8 CM/S
ECHO RVOT VTI: 0 CM
ECHO TV REGURGITANT MAX VELOCITY: 176.71 CM/S
ECHO TV REGURGITANT PEAK GRADIENT: 12.5 MMHG
ERYTHROCYTE [DISTWIDTH] IN BLOOD BY AUTOMATED COUNT: 13.3 % (ref 11.6–14.5)
GLOBULIN SER CALC-MCNC: 3.6 G/DL (ref 2–4)
GLUCOSE BLD STRIP.AUTO-MCNC: 129 MG/DL (ref 70–110)
GLUCOSE BLD STRIP.AUTO-MCNC: 205 MG/DL (ref 70–110)
GLUCOSE BLD STRIP.AUTO-MCNC: 304 MG/DL (ref 70–110)
GLUCOSE BLD STRIP.AUTO-MCNC: 325 MG/DL (ref 70–110)
GLUCOSE SERPL-MCNC: 118 MG/DL (ref 74–99)
HCT VFR BLD AUTO: 23.5 % (ref 36–48)
HGB BLD-MCNC: 8.1 G/DL (ref 13–16)
LVFS 2D: 32.83 %
LVOT MG: 2.16 MMHG
LVOT MV: 0.68 CM/S
LVSV (MOD BI): 21.95 ML
LVSV (MOD SINGLE 4C): 19.19 ML
LVSV (MOD SINGLE): 23.15 ML
LVSV (TEICH): 23.84 ML
MAGNESIUM SERPL-MCNC: 1.8 MG/DL (ref 1.6–2.6)
MCH RBC QN AUTO: 30.3 PG (ref 24–34)
MCHC RBC AUTO-ENTMCNC: 34.5 G/DL (ref 31–37)
MCV RBC AUTO: 88 FL (ref 74–97)
MV DEC SLOPE: 5.07
PLATELET # BLD AUTO: 178 K/UL (ref 135–420)
PMV BLD AUTO: 9.9 FL (ref 9.2–11.8)
POTASSIUM SERPL-SCNC: 4.1 MMOL/L (ref 3.5–5.5)
PROT SERPL-MCNC: 6 G/DL (ref 6.4–8.2)
PV END DIASTOLIC VELOCITY: 1.2 MMHG
RBC # BLD AUTO: 2.67 M/UL (ref 4.7–5.5)
SODIUM SERPL-SCNC: 141 MMOL/L (ref 136–145)
WBC # BLD AUTO: 9 K/UL (ref 4.6–13.2)

## 2020-01-05 PROCEDURE — 74011250637 HC RX REV CODE- 250/637: Performed by: INTERNAL MEDICINE

## 2020-01-05 PROCEDURE — 74011250637 HC RX REV CODE- 250/637: Performed by: HOSPITALIST

## 2020-01-05 PROCEDURE — 74011636637 HC RX REV CODE- 636/637: Performed by: FAMILY MEDICINE

## 2020-01-05 PROCEDURE — 93306 TTE W/DOPPLER COMPLETE: CPT

## 2020-01-05 PROCEDURE — 74011636637 HC RX REV CODE- 636/637: Performed by: HOSPITALIST

## 2020-01-05 PROCEDURE — 80053 COMPREHEN METABOLIC PANEL: CPT

## 2020-01-05 PROCEDURE — 82962 GLUCOSE BLOOD TEST: CPT

## 2020-01-05 PROCEDURE — 74011000258 HC RX REV CODE- 258: Performed by: EMERGENCY MEDICINE

## 2020-01-05 PROCEDURE — 74011250636 HC RX REV CODE- 250/636: Performed by: EMERGENCY MEDICINE

## 2020-01-05 PROCEDURE — 73700 CT LOWER EXTREMITY W/O DYE: CPT

## 2020-01-05 PROCEDURE — 36415 COLL VENOUS BLD VENIPUNCTURE: CPT

## 2020-01-05 PROCEDURE — 74011250636 HC RX REV CODE- 250/636: Performed by: FAMILY MEDICINE

## 2020-01-05 PROCEDURE — 74011250636 HC RX REV CODE- 250/636: Performed by: HOSPITALIST

## 2020-01-05 PROCEDURE — 65660000000 HC RM CCU STEPDOWN

## 2020-01-05 PROCEDURE — 85027 COMPLETE CBC AUTOMATED: CPT

## 2020-01-05 PROCEDURE — 83735 ASSAY OF MAGNESIUM: CPT

## 2020-01-05 PROCEDURE — 74011250637 HC RX REV CODE- 250/637: Performed by: FAMILY MEDICINE

## 2020-01-05 RX ORDER — INSULIN LISPRO 100 [IU]/ML
INJECTION, SOLUTION INTRAVENOUS; SUBCUTANEOUS
Status: DISCONTINUED | OUTPATIENT
Start: 2020-01-05 | End: 2020-01-14 | Stop reason: HOSPADM

## 2020-01-05 RX ADMIN — PIPERACILLIN AND TAZOBACTAM 4.5 G: 4; .5 INJECTION, POWDER, FOR SOLUTION INTRAVENOUS at 19:50

## 2020-01-05 RX ADMIN — INSULIN LISPRO 12 UNITS: 100 INJECTION, SOLUTION INTRAVENOUS; SUBCUTANEOUS at 17:39

## 2020-01-05 RX ADMIN — AMLODIPINE BESYLATE 2.5 MG: 2.5 TABLET ORAL at 08:48

## 2020-01-05 RX ADMIN — PIPERACILLIN AND TAZOBACTAM 4.5 G: 4; .5 INJECTION, POWDER, FOR SOLUTION INTRAVENOUS at 13:20

## 2020-01-05 RX ADMIN — VANCOMYCIN HYDROCHLORIDE 1500 MG: 1.5 INJECTION, POWDER, LYOPHILIZED, FOR SOLUTION INTRAVENOUS at 00:29

## 2020-01-05 RX ADMIN — HEPARIN SODIUM 5000 UNITS: 5000 INJECTION INTRAVENOUS; SUBCUTANEOUS at 13:20

## 2020-01-05 RX ADMIN — LEVOFLOXACIN 500 MG: 5 INJECTION, SOLUTION INTRAVENOUS at 17:28

## 2020-01-05 RX ADMIN — ASPIRIN 81 MG 81 MG: 81 TABLET ORAL at 08:48

## 2020-01-05 RX ADMIN — ATORVASTATIN CALCIUM 20 MG: 20 TABLET, FILM COATED ORAL at 22:48

## 2020-01-05 RX ADMIN — HEPARIN SODIUM 5000 UNITS: 5000 INJECTION INTRAVENOUS; SUBCUTANEOUS at 03:00

## 2020-01-05 RX ADMIN — PIPERACILLIN AND TAZOBACTAM 4.5 G: 4; .5 INJECTION, POWDER, FOR SOLUTION INTRAVENOUS at 06:07

## 2020-01-05 RX ADMIN — INSULIN LISPRO 8 UNITS: 100 INJECTION, SOLUTION INTRAVENOUS; SUBCUTANEOUS at 13:20

## 2020-01-05 RX ADMIN — HEPARIN SODIUM 5000 UNITS: 5000 INJECTION INTRAVENOUS; SUBCUTANEOUS at 22:48

## 2020-01-05 RX ADMIN — INSULIN LISPRO 6 UNITS: 100 INJECTION, SOLUTION INTRAVENOUS; SUBCUTANEOUS at 22:49

## 2020-01-05 RX ADMIN — ACETAMINOPHEN 650 MG: 325 TABLET ORAL at 08:48

## 2020-01-05 NOTE — PROGRESS NOTES
Problem: Mobility Impaired (Adult and Pediatric)  Goal: *Acute Goals and Plan of Care (Insert Text)  Description  In 1-7 days pt will be able to perform:  ST.  Bed mobility:  Rolling L to R to L modified independent for positioning. 2.  Supine to sit to supine S with HR for meals. 3.  Sit to stand to sit S with RW in prep for ambulation. LT.  Gait:  Ambulate >150ft S with LRD for home/community mobility. 2.  Stair Negotiation:  Ascend/descend >5 steps CGA with HR for home entry. 3.  Activity tolerance: Tolerate up in chair 1-2 hours for ADLs. 4.  Patient/Family Education:  Patient/family to be independent with HEP for follow-up care and safe discharge. Note:   Holding PT today due to order of CT for R foot abscess to possible r/o OM. Spoke w/ Nurse Sharon Cerda who reports pt is continuing to ambulate self to/from bathroom. Voiced concerns regarding ambulating pt further distances and status of R foot. In agreement to hold PT for today until results of CT obtained.

## 2020-01-05 NOTE — PROGRESS NOTES
Hospitalist Progress Note-critical care note     Patient: Raymon Naidu MRN: [id number]  CSN: [id number]    YOB: 1945  Age: 76 y.o. Sex: male    DOA: 1/1/2020 LOS:  LOS: 3 days            Chief complaint: DM, sepsis , positive bcx, uti m     Assessment/Plan         Hospital Problems  Date Reviewed: 1/2/2020          Codes Class Noted POA    Foot abscess, right ICD-10-CM: L02.611  ICD-9-CM: 682.7  1/5/2020 Unknown        Corn of foot ICD-10-CM: L84  ICD-9-CM: 700  1/5/2020 Unknown        Type 2 diabetes mellitus, with long-term current use of insulin ([facility] ICD-10-CM: E11.9, Z79.4  ICD-9-CM: 250.00, V58.67  1/3/2020         Hypertension ICD-10-CM: I10  ICD-9-CM: 401.9  1/3/2020 Unknown        Hypokalemia ICD-10-CM: E87.6  ICD-9-CM: 276.8  1/3/2020 Unknown        UTI (urinary tract infection) ICD-10-CM: N39.0  ICD-9-CM: 599.0  1/3/2020 Unknown        Positive blood culture ICD-10-CM: R78.81  ICD-9-CM: 790.7  1/3/2020 Unknown        * (Principal) Sepsis ([facility] ICD-10-CM: A41.9  ICD-9-CM: 038.9, 995.91  1/2/2020 Yes        Abdominal pain ICD-10-CM: R10.9  ICD-9-CM: 789.00  1/2/2020 Yes        CKD (chronic kidney disease) ICD-10-CM: N18.9  ICD-9-CM: 585.9  1/2/2020 Yes              Admitted for sepsis, bacteremia, cholecystitis r/o.  Working on foot abscess-no exist on admission       Rt foot abscess-\"corn\" on rt bottom of rt foot-turn into abscess now-like another source of infection-recent treated per Dr. Adry Kan   Case discussed with Dr. Viktoriya Sandy will see pt tomorrow   Will have ct foot -not a candidate for mri     Bacteremia   [facility] ,repeated no growth   Like from UTI and foot abscess   Fever overnight   Repeated ct no new finding except possible fluid overloaded   Need ID on Monday   Echo scheduled     Sepsis   See above   Ct abdomen reviewed   Ultrasound abdomen-possible cholecystitis-HIDA negative-jem wells does not think cholecystitis   Due to uti/foot infection Abdomen pain   Resolved     DM type II    ssi     Hypokalemia   K replacement, will check mg     ckd 2-3 stable       Subjective: feel better, no fever     Son in law was at the bedside   Disposition :tbd,   Review of systems:    General: +fevers, no chills. Cardiovascular: No chest pain or pressure. No palpitations. Pulmonary: No shortness of breath. Gastrointestinal: No nausea, vomiting. No abdomen pain     Vital signs/Intake and Output:  Visit Vitals  /50   Pulse 81   Temp 98.4 °F (36.9 °C)   Resp 16   Ht 5' 6\" (1.676 m)   Wt 79.4 kg (175 lb)   SpO2 99%   BMI 28.25 kg/m²     Current Shift:  No intake/output data recorded. Last three shifts:  01/03 1901 - 01/05 0700  In: 400 [P.O.:400]  Out: 200 [Urine:200]    Physical Exam:  General: WD, WN. Alert, cooperative, no acute distress    HEENT: NC, Atraumatic. PERRLA, anicteric sclerae. Lungs: CTA Bilaterally. No Wheezing/Rhonchi/Rales. Heart:  Regular  rhythm,  No murmur, No Rubs, No Gallops  Abdomen: Soft, Non distended, mild tenderness of LLQ.  +Bowel sounds,   Extremities: No c/c. Latera of Rt foot corn infected- some discharge noted , erythema noted tenderness on touch   Psych:   Not anxious or agitated. Neurologic:  No acute neurological deficit.              Labs: Results:       Chemistry Recent Labs     01/05/20 0411 01/04/20 0440 01/03/20  0215   * 88 74    141 142   K 4.1 3.3* 3.3*    109 109   CO2 23 23 25   BUN 12 12 17   CREA 1.30 1.27 1.36*   CA 8.0* 7.6* 7.6*   AGAP 7 9 8   BUCR 9* 9* 13   AP 56 47 50   TP 6.0* 5.7* 5.9*   ALB 2.4* 2.4* 2.5*   GLOB 3.6 3.3 3.4   AGRAT 0.7* 0.7* 0.7*      CBC w/Diff Recent Labs     01/05/20 0411 01/04/20  0440 01/03/20  0215   WBC 9.0 8.5 7.8   RBC 2.67* 2.54* 2.70*   HGB 8.1* 7.7* 8.1*   HCT 23.5* 22.3* 24.0*    154 151      Cardiac Enzymes Recent Labs     01/02/20  1830   CPK 53   CKND1 CALCULATION NOT PERFORMED WHEN RESULT IS BELOW LINEAR LIMIT      Coagulation No results for input(s): PTP, INR, APTT, INREXT, INREXT in the last 72 hours. Lipid Panel No results found for: CHOL, CHOLPOCT, CHOLX, CHLST, CHOLV, 250152, HDL, HDLP, LDL, LDLC, DLDLP, 642739, VLDLC, VLDL, TGLX, TRIGL, TRIGP, TGLPOCT, CHHD, CHHDX   BNP No results for input(s): BNPP in the last 72 hours.    Liver Enzymes Recent Labs     01/05/20  0411   TP 6.0*   ALB 2.4*   AP 56   SGOT 19      Thyroid Studies No results found for: T4, T3U, TSH, TSHEXT, TSHEXT     Procedures/imaging: see electronic medical records for all procedures/Xrays and details which were not copied into this note but were reviewed prior to creation of Jose Warner MD

## 2020-01-05 NOTE — PROGRESS NOTES
Problem: Falls - Risk of  Goal: *Absence of Falls  Description  Document Yamileth Quiroz Fall Risk and appropriate interventions in the flowsheet.   1/5/2020 1130 by Anali Crow  Outcome: Progressing Towards Goal  Note: Fall Risk Interventions:  Mobility Interventions: Patient to call before getting OOB, PT Consult for mobility concerns, PT Consult for assist device competence         Medication Interventions: Patient to call before getting OOB, Teach patient to arise slowly    Elimination Interventions: Call light in reach, Patient to call for help with toileting needs           1/4/2020 2146 by Anali Crow  Outcome: Progressing Towards Goal  Note: Fall Risk Interventions:  Mobility Interventions: Bed/chair exit alarm, Patient to call before getting OOB, PT Consult for mobility concerns, PT Consult for assist device competence, OT consult for ADLs         Medication Interventions: Patient to call before getting OOB, Teach patient to arise slowly    Elimination Interventions: Call light in reach, Patient to call for help with toileting needs              Problem: Patient Education: Go to Patient Education Activity  Goal: Patient/Family Education  1/5/2020 1130 by Jason RICO  Outcome: Progressing Towards Goal  1/4/2020 2146 by Jason RICO  Outcome: Progressing Towards Goal     Problem: Pain  Goal: *Control of Pain  Outcome: Progressing Towards Goal     Problem: Patient Education: Go to Patient Education Activity  Goal: Patient/Family Education  Outcome: Progressing Towards Goal

## 2020-01-05 NOTE — PROGRESS NOTES
0730:Received verbal bedside report from off going nurse NIR Stewart R.N. Patient care received. Patient alert and oriented x 4. Patient resting in bed denies pain. Patient stable. Call light with in reach bed in lowest position. 1814:Unable to obtain wound culture because wound is not open and no longer draining.

## 2020-01-05 NOTE — PROGRESS NOTES
Patient Name: Eugene Wei   Age: 76 y.o. Sex:  male  YOB: 1945   Medical Record Number: [id number]      Antimicrobial  Vancomycin   Indication Sepsis   Dose / Interval           Current Antimicrobial Therapy (168h ago, onward)      Ordered     Start Stop    01/04/20 2330  vancomycin (VANCOCIN) 1,500 mg in 0.9% sodium chloride 500 mL (VIAL-MATE)_  1,500 mg,   IntraVENous,   EVERY 24 HOURS      01/05/20 0000 --    01/04/20 1648  levoFLOXacin (LEVAQUIN) 500 mg in D5W IVPB  500 mg,   IntraVENous,   EVERY 24 HOURS      01/04/20 1700 --    01/01/20 2225  piperacillin-tazobactam (ZOSYN) 4.5 g in 0.9% sodium chloride (MBP/ADV) 100 mL MBP  4.5 g,   IntraVENous,   EVERY 6 HOURS      01/01/20 2226 --               Last Level (if applicable) Lab Results   Component Value Date/Time    Reported dose: 1250 MG 01/04/2020 10:30 PM    Reported dose time: 2252 01/04/2020 10:30 PM    Reported dose date: 20200103 01/04/2020 10:30 PM     Vancomycin   Lab Results   Component Value Date/Time    Vancomycin,trough 11.8 01/04/2020 10:30 PM      Gentamicin   No results found for: GENP, GENT, GENR]  Tobramycin   No results found for: TOBP, TOBT, TOBR   Amikacin   No results found for: AMIKP, DAMIKP, AMIKT, DAMIKT, DAMIKR     Cultures (7 most recent)   GRAM STAIN   Date Value Ref Range Status   01/01/2020 ANAEROBIC BOTTLE GRAM POSITIVE COCCI IN CHAINS   Final   01/01/2020    Final    SMEAR CALLED TO AND CORRECTLY REPEATED BY: WAYNE BABCOCK RN 3N ON 1/2/2020 AT 8390 TO TMB.      Culture result:   Date Value Ref Range Status   01/02/2020 NO GROWTH 2 DAYS   Preliminary   01/02/2020 NO GROWTH 2 DAYS   Preliminary   01/01/2020 (A)   Final    90238 COLONIES/mL STREPTOCOCCI, [facility] B   01/01/2020 <10,000 COLONIES/mL STAPHYLOCOCCUS SPECIES (A)   Final   01/01/2020 (A)   Final    ANAEROBIC BOTTLE STREPTOCOCCI, [facility] B   01/01/2020 (A)   Final    ANAEROBIC BOTTLE STREPTOCOCCI, [facility] B 2020 For Susceptibility Refer to Culture  [id number]     Final      Renal Overview (72 hr)         Recent Labs     20  0440 20  0215 20  0550   BUN 12 17 21*   CREA 1.27 1.36* 1.37*       CrCl (Current): Estimated Creatinine Clearance: 50.7 mL/min (based on SCr of 1.27 mg/dL). Temp (24-hr Max) Temp (24hrs), Av.7 °F (37.6 °C), Min:98.7 °F (37.1 °C), Max:102.8 °F (39.3 °C)       Hematology Recent Labs     20  0440 20  0215 20  0550   WBC 8.5 7.8  --    HGB 7.7* 8.1*  --    HCT 22.3* 24.0*  --     151  --    LAC  --   --  1.3        DOT  5     Notes  Vancomycin trough drawn on 20 at 22:30 = 11.8mcg/ml. Patient is subtherapeutic. Adjusted dose to 1500mg IV 24h. Will schedule new trough prior to 4th dose.            Anita Lao [id number] [phone]

## 2020-01-05 NOTE — ROUTINE PROCESS
Bedside and Verbal shift change report given to LAURO Muñoz R.N. (oncoming nurse) by LAURO Groves R.N. (offgoing nurse). Report included the following information SBAR, Kardex, Intake/Output, MAR, Accordion, Recent Results and Med Rec Status.

## 2020-01-05 NOTE — ROUTINE PROCESS
Bedside and Verbal shift change report given to NESTOR Huff (oncoming nurse) by LAURO Groves R.N. (offgoing nurse). Report included the following information SBAR, Kardex, Intake/Output, MAR, Accordion, Recent Results and Med Rec Status.

## 2020-01-05 NOTE — PROGRESS NOTES
1907 Assumed patient care at this time. Report received from [facility]. Patient in bed in lowest position with wheels locked. Call light within reach. 1935 Shift assessment completed and documented in flow sheets at this time. 2149 PRN Tylenol administered at this time for patient's fever. Bedside and verbal shift change report given to Deniz Adames RN (oncoming nurse) by Cesar Hair RN (offgoing nurse). Report included the following information: SBAR, Kardex, MAR, and recent results.

## 2020-01-05 NOTE — PROGRESS NOTES
Problem: Falls - Risk of  Goal: *Absence of Falls  Description  Document Maia Turpin Fall Risk and appropriate interventions in the flowsheet.   Outcome: Progressing Towards Goal  Note: Fall Risk Interventions:  Mobility Interventions: Bed/chair exit alarm, Patient to call before getting OOB, PT Consult for mobility concerns, PT Consult for assist device competence, OT consult for ADLs         Medication Interventions: Patient to call before getting OOB, Teach patient to arise slowly    Elimination Interventions: Call light in reach, Patient to call for help with toileting needs              Problem: Patient Education: Go to Patient Education Activity  Goal: Patient/Family Education  Outcome: Progressing Towards Goal

## 2020-01-06 ENCOUNTER — APPOINTMENT (OUTPATIENT)
Dept: VASCULAR SURGERY | Age: 75
DRG: 854 | End: 2020-01-06
Attending: PODIATRIST
Payer: MEDICARE

## 2020-01-06 PROBLEM — E11.621 DIABETIC ULCER OF RIGHT MIDFOOT ASSOCIATED WITH TYPE 2 DIABETES MELLITUS, WITH FAT LAYER EXPOSED (HCC): Status: ACTIVE | Noted: 2020-01-06

## 2020-01-06 PROBLEM — A40.1 SEPSIS DUE TO STREPTOCOCCUS AGALACTIAE (HCC): Status: ACTIVE | Noted: 2020-01-06

## 2020-01-06 PROBLEM — L97.412 DIABETIC ULCER OF RIGHT MIDFOOT ASSOCIATED WITH TYPE 2 DIABETES MELLITUS, WITH FAT LAYER EXPOSED (HCC): Status: ACTIVE | Noted: 2020-01-06

## 2020-01-06 PROBLEM — K81.9 CHOLECYSTITIS, UNSPECIFIED: Status: ACTIVE | Noted: 2020-01-06

## 2020-01-06 LAB
ANION GAP SERPL CALC-SCNC: 5 MMOL/L (ref 3–18)
ATRIAL RATE: 111 BPM
BUN SERPL-MCNC: 12 MG/DL (ref 7–18)
BUN/CREAT SERPL: 8 (ref 12–20)
CALCIUM SERPL-MCNC: 8.1 MG/DL (ref 8.5–10.1)
CALCULATED P AXIS, ECG09: 53 DEGREES
CALCULATED R AXIS, ECG10: 18 DEGREES
CALCULATED T AXIS, ECG11: 56 DEGREES
CHLORIDE SERPL-SCNC: 112 MMOL/L (ref 100–111)
CO2 SERPL-SCNC: 23 MMOL/L (ref 21–32)
CREAT SERPL-MCNC: 1.5 MG/DL (ref 0.6–1.3)
DIAGNOSIS, 93000: NORMAL
GLUCOSE BLD STRIP.AUTO-MCNC: 132 MG/DL (ref 70–110)
GLUCOSE BLD STRIP.AUTO-MCNC: 146 MG/DL (ref 70–110)
GLUCOSE BLD STRIP.AUTO-MCNC: 251 MG/DL (ref 70–110)
GLUCOSE BLD STRIP.AUTO-MCNC: 270 MG/DL (ref 70–110)
GLUCOSE SERPL-MCNC: 121 MG/DL (ref 74–99)
MAGNESIUM SERPL-MCNC: 1.8 MG/DL (ref 1.6–2.6)
P-R INTERVAL, ECG05: 154 MS
POTASSIUM SERPL-SCNC: 3.7 MMOL/L (ref 3.5–5.5)
Q-T INTERVAL, ECG07: 310 MS
QRS DURATION, ECG06: 90 MS
QTC CALCULATION (BEZET), ECG08: 421 MS
SODIUM SERPL-SCNC: 140 MMOL/L (ref 136–145)
VENTRICULAR RATE, ECG03: 111 BPM

## 2020-01-06 PROCEDURE — 87070 CULTURE OTHR SPECIMN AEROBIC: CPT

## 2020-01-06 PROCEDURE — 82962 GLUCOSE BLOOD TEST: CPT

## 2020-01-06 PROCEDURE — 74011250637 HC RX REV CODE- 250/637: Performed by: FAMILY MEDICINE

## 2020-01-06 PROCEDURE — 74011250636 HC RX REV CODE- 250/636: Performed by: EMERGENCY MEDICINE

## 2020-01-06 PROCEDURE — 80048 BASIC METABOLIC PNL TOTAL CA: CPT

## 2020-01-06 PROCEDURE — 83735 ASSAY OF MAGNESIUM: CPT

## 2020-01-06 PROCEDURE — 36415 COLL VENOUS BLD VENIPUNCTURE: CPT

## 2020-01-06 PROCEDURE — 74011636637 HC RX REV CODE- 636/637: Performed by: HOSPITALIST

## 2020-01-06 PROCEDURE — 65660000000 HC RM CCU STEPDOWN

## 2020-01-06 PROCEDURE — 74011000258 HC RX REV CODE- 258: Performed by: EMERGENCY MEDICINE

## 2020-01-06 PROCEDURE — 74011250637 HC RX REV CODE- 250/637: Performed by: INTERNAL MEDICINE

## 2020-01-06 PROCEDURE — 97530 THERAPEUTIC ACTIVITIES: CPT

## 2020-01-06 PROCEDURE — 74011250637 HC RX REV CODE- 250/637: Performed by: HOSPITALIST

## 2020-01-06 PROCEDURE — 97116 GAIT TRAINING THERAPY: CPT

## 2020-01-06 PROCEDURE — 93925 LOWER EXTREMITY STUDY: CPT

## 2020-01-06 PROCEDURE — 74011250636 HC RX REV CODE- 250/636: Performed by: FAMILY MEDICINE

## 2020-01-06 RX ADMIN — AMLODIPINE BESYLATE 2.5 MG: 2.5 TABLET ORAL at 10:53

## 2020-01-06 RX ADMIN — PIPERACILLIN AND TAZOBACTAM 4.5 G: 4; .5 INJECTION, POWDER, FOR SOLUTION INTRAVENOUS at 10:53

## 2020-01-06 RX ADMIN — PIPERACILLIN AND TAZOBACTAM 4.5 G: 4; .5 INJECTION, POWDER, FOR SOLUTION INTRAVENOUS at 03:09

## 2020-01-06 RX ADMIN — INSULIN LISPRO 9 UNITS: 100 INJECTION, SOLUTION INTRAVENOUS; SUBCUTANEOUS at 18:22

## 2020-01-06 RX ADMIN — HEPARIN SODIUM 5000 UNITS: 5000 INJECTION INTRAVENOUS; SUBCUTANEOUS at 06:10

## 2020-01-06 RX ADMIN — HEPARIN SODIUM 5000 UNITS: 5000 INJECTION INTRAVENOUS; SUBCUTANEOUS at 22:13

## 2020-01-06 RX ADMIN — VANCOMYCIN HYDROCHLORIDE 1500 MG: 1.5 INJECTION, POWDER, LYOPHILIZED, FOR SOLUTION INTRAVENOUS at 00:14

## 2020-01-06 RX ADMIN — ASPIRIN 81 MG 81 MG: 81 TABLET ORAL at 10:53

## 2020-01-06 RX ADMIN — INSULIN LISPRO 9 UNITS: 100 INJECTION, SOLUTION INTRAVENOUS; SUBCUTANEOUS at 12:50

## 2020-01-06 RX ADMIN — ATORVASTATIN CALCIUM 20 MG: 20 TABLET, FILM COATED ORAL at 22:13

## 2020-01-06 RX ADMIN — ACETAMINOPHEN 650 MG: 325 TABLET ORAL at 00:22

## 2020-01-06 RX ADMIN — HEPARIN SODIUM 5000 UNITS: 5000 INJECTION INTRAVENOUS; SUBCUTANEOUS at 18:22

## 2020-01-06 RX ADMIN — PIPERACILLIN AND TAZOBACTAM 4.5 G: 4; .5 INJECTION, POWDER, FOR SOLUTION INTRAVENOUS at 18:23

## 2020-01-06 NOTE — PROGRESS NOTES
Problem: Falls - Risk of  Goal: *Absence of Falls  Description  Document Tim Treviño Fall Risk and appropriate interventions in the flowsheet. Outcome: Progressing Towards Goal  Note: Fall Risk Interventions:  Mobility Interventions: Communicate number of staff needed for ambulation/transfer, Patient to call before getting OOB, Utilize walker, cane, or other assistive device         Medication Interventions: Teach patient to arise slowly, Patient to call before getting OOB    Elimination Interventions: Call light in reach, Patient to call for help with toileting needs              Problem: Diabetes Self-Management  Goal: *Disease process and treatment process  Description  Define diabetes and identify own type of diabetes; list 3 options for treating diabetes. Outcome: Progressing Towards Goal  Goal: *Incorporating nutritional management into lifestyle  Description  Describe effect of type, amount and timing of food on blood glucose; list 3 methods for planning meals. Outcome: Progressing Towards Goal  Goal: *Incorporating physical activity into lifestyle  Description  State effect of exercise on blood glucose levels. Outcome: Progressing Towards Goal  Goal: *Developing strategies to promote health/change behavior  Description  Define the ABC's of diabetes; identify appropriate screenings, schedule and personal plan for screenings. Outcome: Progressing Towards Goal  Goal: *Using medications safely  Description  State effect of diabetes medications on diabetes; name diabetes medication taking, action and side effects. Outcome: Progressing Towards Goal  Goal: *Monitoring blood glucose, interpreting and using results  Description  Identify recommended blood glucose targets  and personal targets.   Outcome: Progressing Towards Goal  Goal: *Prevention, detection, treatment of acute complications  Description  List symptoms of hyper- and hypoglycemia; describe how to treat low blood sugar and actions for lowering  high blood glucose level. Outcome: Progressing Towards Goal  Goal: *Prevention, detection and treatment of chronic complications  Description  Define the natural course of diabetes and describe the relationship of blood glucose levels to long term complications of diabetes.   Outcome: Progressing Towards Goal  Goal: *Developing strategies to address psychosocial issues  Description  Describe feelings about living with diabetes; identify support needed and support network  Outcome: Progressing Towards Goal  Goal: *Insulin pump training  Outcome: Progressing Towards Goal  Goal: *Sick day guidelines  Outcome: Progressing Towards Goal  Goal: *Patient Specific Goal (EDIT GOAL, INSERT TEXT)  Outcome: Progressing Towards Goal     Problem: Patient Education: Go to Patient Education Activity  Goal: Patient/Family Education  Outcome: Progressing Towards Goal

## 2020-01-06 NOTE — PROGRESS NOTES
1920: Assumed care of the patient from Chente Gar, RN.    0730: No acute change overnight. Wound on right is dry. Ordered wound culture not collected. 0730: Bedside and Verbal shift change report given to Chantel Cueva RN (oncoming nurse) by Robyn Russell RN (offgoing nurse). Report included the following information SBAR, Kardex, Intake/Output, MAR, Accordion and Recent Results.

## 2020-01-06 NOTE — PROGRESS NOTES
Problem: Mobility Impaired (Adult and Pediatric)  Goal: *Acute Goals and Plan of Care (Insert Text)  Description  In 1-7 days pt will be able to perform:  ST.  Bed mobility:  Rolling L to R to L modified independent for positioning. 2.  Supine to sit to supine S with HR for meals. 3.  Sit to stand to sit S with RW in prep for ambulation. LT.  Gait:  Ambulate >150ft S with LRD for home/community mobility. 2.  Stair Negotiation:  Ascend/descend >5 steps CGA with HR for home entry. 3.  Activity tolerance: Tolerate up in chair 1-2 hours for ADLs. 4.  Patient/Family Education:  Patient/family to be independent with HEP for follow-up care and safe discharge. Outcome: Progressing Towards Goal  PHYSICAL THERAPY TREATMENT    Patient: Silvina Josue (14 y.o. male)  Date: 2020  Diagnosis: Sepsis ([facility] [A41.9]  Fever in adult [R50.9]  Lactic acidosis [E87.2]   Sepsis due to Streptococcus agalactiae [facility])  Precautions: Fall   Chart, physical therapy assessment, plan of care and goals were reviewed. ASSESSMENT:  Pt found supine in bed on PT arrival and reporting no pain. Pt Syriac speaking, but able to communicate with some English during session. Pt R foot with dressing c/d/I, toes and heel exposed. Pt able to demonstrate transfers with supervision, and gt with CGA/S while pushing IV pole. Tending to WBAT on R foot with wt shift to L and decreased foot clearance/step length. Spoke with nurse and care mgr regarding wt bearing status per podiatry and nurse Cherie checking on same (as pt reports amb to/from bathroom on own w/o device). Pt returned to bed and left in NAD with all needs in reach. Will most likely benefit from AD at discharge, pending further surgical intervention. Care mgr aware.    Progression toward goals:  [x]      Improving appropriately and progressing toward goals  []      Improving slowly and progressing toward goals  []      Not making progress toward goals and plan of care will be adjusted     PLAN:  Patient continues to benefit from skilled intervention to address the above impairments. Continue treatment per established plan of care. Discharge Recommendations:  Home Health  Further Equipment Recommendations for Discharge:  straight cane vs rolling walker, pending further procedures     SUBJECTIVE:   Patient stated I been going to the bathroom.     OBJECTIVE DATA SUMMARY:   Critical Behavior:  Neurologic State: Alert  Orientation Level: Oriented X4  Cognition: Appropriate decision making, Appropriate for age attention/concentration, Appropriate safety awareness, Follows commands  Safety/Judgement: Fall prevention  Functional Mobility Training:  Bed Mobility:  Supine to Sit: Supervision  Sit to Supine: Supervision  Transfers:  Sit to Stand: Supervision  Stand to Sit: Supervision  Balance:  Sitting: Intact  Standing: Intact; With support(using IV pole)  Ambulation/Gait Training:  Distance (ft): 30 Feet (ft)  Assistive Device: Gait belt  Ambulation - Level of Assistance: Contact guard assistance;Supervision(pt pushing IV pole)  Gait Abnormalities: Decreased step clearance; Step to gait  Base of Support: Shift to left  Stance: Right decreased  Speed/Johnna: Pace decreased (<100 feet/min)  Step Length: Right shortened;Left shortened  Swing Pattern: Right asymmetrical  Interventions: Safety awareness training;Verbal cues; Tactile cues  Pain:  Pain Scale 1: Visual  Pain Intensity 1: 0  Activity Tolerance:   Good   Please refer to the flowsheet for vital signs taken during this treatment.   After treatment:   [] Patient left in no apparent distress sitting up in chair  [x] Patient left in no apparent distress in bed  [x] Call bell left within reach  [x] Nursing notified-Cherie  [] Caregiver present  [] Bed alarm activated      Rene Zuluaga, DAYANA   Time Calculation: 28 mins

## 2020-01-06 NOTE — CONSULTS
History & Physical      Patient: Roxana Conteh               Sex: male          DOA: 1/1/2020       YOB: 1945      Age:  76 y.o.        LOS:  LOS: 4 days               Subjective:   Roxana Conteh is a 76 y.o. male  who I was consulted to see for abscess of the R foot. Pt states he fell at some point which may have made this worse but he as always had a callus here. He is no pain at the moment. No FCNV. He tried to trim this on his own and saw a physician as an out patient.       Medications:     Current Facility-Administered Medications:     insulin lispro (HUMALOG) injection, , SubCUTAneous, AC&HS, Enrico Chen MD, Stopped at 01/06/20 0730    levoFLOXacin (LEVAQUIN) 500 mg in D5W IVPB, 500 mg, IntraVENous, Q24H, Enrico Chen MD, Last Rate: 100 mL/hr at 01/05/20 1728, 500 mg at 01/05/20 1728    vancomycin (VANCOCIN) 1,500 mg in 0.9% sodium chloride 500 mL (VIAL-MATE)_, 1,500 mg, IntraVENous, Q24H, Adalgisa Sampson MD, 1,500 mg at 01/06/20 0014    amLODIPine (NORVASC) tablet 2.5 mg, 2.5 mg, Oral, DAILY, Enrico Chen MD, 2.5 mg at 01/05/20 0848    ondansetron (ZOFRAN) injection 4 mg, 4 mg, IntraVENous, Q6H PRN, Amy Hansen MD    heparin (porcine) injection 5,000 Units, 5,000 Units, SubCUTAneous, Q8H, Amy Hansen MD, 5,000 Units at 01/06/20 0610    aspirin chewable tablet 81 mg, 81 mg, Oral, DAILY, Isidro RODRIGUEZ MD, 81 mg at 01/05/20 0848    atorvastatin (LIPITOR) tablet 20 mg, 20 mg, Oral, QHS, Isidro RODRIGUEZ MD, 20 mg at 01/05/20 2248    glucose chewable tablet 16 g, 16 g, Oral, PRN, Amy Hansen MD    glucagon (GLUCAGEN) injection 1 mg, 1 mg, IntraMUSCular, PRN, Amy Hansen MD    dextrose 10% infusion 125-250 mL, 125-250 mL, IntraVENous, PRN, Amy Hansen MD    Vancomycin-Pharmacy to dose, 1 Each, Other, Rx Dosing/Monitoring, Adalgisa Sampson MD    acetaminophen (TYLENOL) tablet 650 mg, 650 mg, Oral, Q6H PRN, Malick-Brown, Belksy Sepulveda MD, 650 mg at 01/06/20 0022    sodium chloride (NS) flush 5-10 mL, 5-10 mL, IntraVENous, PRN, Julina Damico MD, 10 mL at 01/01/20 2336    piperacillin-tazobactam (ZOSYN) 4.5 g in 0.9% sodium chloride (MBP/ADV) 100 mL MBP, 4.5 g, IntraVENous, Q6H, Julian Damico MD, Last Rate: 200 mL/hr at 01/06/20 0309, 4.5 g at 01/06/20 0309    Review of Systems  Negative for chest pain and shortness of breath  Review of all other systems is negative        Objective:      Visit Vitals  /51   Pulse 79   Temp 98.3 °F (36.8 °C)   Resp 18   Ht 5' 6\" (1.676 m)   Wt 78.2 kg (172 lb 6.4 oz)   SpO2 100%   BMI 27.83 kg/m²       Physical Exam:  Subfascial abscess noted of the plantar right foot. Foul odor and purulence noted. No pain. Exposed joint capsule but no exposed bone. Wound measures 2cm x 3cm.   Weak pedal pulses bilaterally 1/4 DP and PT  Diminished sensation to light touch and 2 pt discrimination  Good manual muscle testing and good ROM in all 3 cardinal planes of both the forefoot and hindfoot  R foot with splayed foot and prominent  5th MPJ    Labs:  Recent Results (from the past 24 hour(s))   ECHO ADULT COMPLETE    Collection Time: 01/05/20 10:15 AM   Result Value Ref Range    LA Volume 59.55 18 - 58 ml    Right Atrial Area 4C 14.70 cm2    AO ASC D 3.34 cm    Aortic Valve Systolic Peak Velocity 610.50 cm/s    AoV VTI 32.25 cm    Aortic Valve Area by Continuity of Peak Velocity 2.0 cm2    Aortic Valve Area by Continuity of VTI 2.2 cm2    AoV PG 10.8 mmHg    LVIDd 4.10 (A) 4.2 - 5.9 cm    LVPWd 1.42 (A) 0.6 - 1.0 cm    LVIDs 2.76 cm    IVSd 1.46 (A) 0.6 - 1.0 cm    LV ED Vol A2C 73.6 mL    LV ES Vol A4C 35.9 mL    LV ES Vol BP 32.8 22 - 58 mL    LVOT d 2.02 cm    LVOT Peak Velocity 103.99 cm/s    LVOT Peak Gradient 4.3 mmHg    LVOT VTI 22.70 cm    LV E' Septal Velocity 9.00 cm/s    LV E' Lateral Velocity 9.00 cm/s    MVA (PHT) 5.0 cm2    MV A Kevin 108.07 cm/s    MV E Kevin 77.38 cm/s    MV E/A 0.72 RVIDd 3.49 cm    Right Ventricular Outflow Track Peak Velocity 43.80 cm/s    Right Vent Outflow VTI 0.00 cm    Aortic Valve Systolic Mean Gradient 5.9 mmHg    BP EF 56.3 55 - 100 %    LV Ejection Fraction MOD 4C 51 %    LV Ejection Fraction MOD 2C 61 %    LA Vol 4C 37.65 18 - 58 mL    LA Vol 2C 75.05 (A) 18 - 58 mL    LA Area 2C 22.99 cm2    LA Area 4C 16.6 cm2    LV Mass .8 (A) 88 - 224 g    LV Mass AL Index 142.2 (A) 49 - 115 g/m2    LVPWs 0.00 cm    E/E' lateral 8.60     E/E' septal 8.60     IVC proximal 2.40 cm    E/E' ratio (averaged) 8.60     LV ES Vol A2C 29.1 mL    LVES Vol Index BP 17.4 mL/m2    LV ED Vol A4C 72.8 mL    LVED Vol Index BP 39.7 mL/m2    Mitral Valve E Wave Deceleration Time 152.7 ms    Mitral Valve Pressure Half-time 44.3 ms    Left Atrium Major Axis 3.12 cm    Tapse 2.84 1.5 - 2.0 cm    Triscuspid Valve Regurgitation Peak Gradient 12.5 mmHg    Pulmonic Valve Max Velocity 101.06 cm/s    LV ED Vol BP 75.0 67 - 155 ml    TR Max Velocity 176.71 cm/s    Right Atrial Volume 37.0 ml    LA Vol Index 39.71 16 - 28 ml/m2    LA Vol Index 19.92 16 - 28 ml/m2    LVED Vol Index A4C 38.5 mL/m2    LVED Vol Index A2C 38.9 mL/m2    LVES Vol Index A4C 19.0 mL/m2    LVES Vol Index A2C 15.4 mL/m2    Ao Root D 3.25 cm    CLEMENTINE/BSA Pk Kevin 1.0 cm2/m2    CLEMENTINE/BSA VTI 1.2 cm2/m2    Left Ventricular Fractional Shortening by 2D 29.232337244 %    Left Ventricular Outflow Tract Mean Gradient 6.00980705050785 mmHg    Left Ventricular Outflow Tract Mean Velocity 2.27877499897568 cm/s    PV End Diastolic Velocity 1.2 mmHg    Mitral Valve Deceleration [place] 4.45128434238760     AV Velocity Ratio 0.63     AV VTI Ratio 0.7     Aortic valve mean velocity 1.26452998843190 m/s    Left Ventricular End Diastolic Volume by Teichholz Method 74.995538908 mL    Left Ventricular End Systolic Volume by Teichholz Method 49.627642774 mL    Left Ventricular Stroke Volume by 2-D Biplane-MOD 19.056838246 mL    Left Ventricular Stroke Volume by 2-D Single Plane- MOD 64.812799722 mL    Left Ventricular Stroke Volume by Teichholz Method 98.333315376 mL    Left Ventricular Stroke Volume by 2-D Single Plane- MOD 15.688481998 mL    PV peak gradient 4.1 mmHg    PASP 23.0 mmHg   GLUCOSE, POC    Collection Time: 01/05/20 11:28 AM   Result Value Ref Range    Glucose (POC) 325 (H) 70 - 110 mg/dL   GLUCOSE, POC    Collection Time: 01/05/20  4:23 PM   Result Value Ref Range    Glucose (POC) 304 (H) 70 - 110 mg/dL   GLUCOSE, POC    Collection Time: 01/05/20  9:08 PM   Result Value Ref Range    Glucose (POC) 205 (H) 70 - 110 mg/dL   MAGNESIUM    Collection Time: 01/06/20  4:38 AM   Result Value Ref Range    Magnesium 1.8 1.6 - 2.6 mg/dL   METABOLIC PANEL, BASIC    Collection Time: 01/06/20  4:38 AM   Result Value Ref Range    Sodium 140 136 - 145 mmol/L    Potassium 3.7 3.5 - 5.5 mmol/L    Chloride 112 (H) 100 - 111 mmol/L    CO2 23 21 - 32 mmol/L    Anion gap 5 3.0 - 18 mmol/L    Glucose 121 (H) 74 - 99 mg/dL    BUN 12 7.0 - 18 MG/DL    Creatinine 1.50 (H) 0.6 - 1.3 MG/DL    BUN/Creatinine ratio 8 (L) 12 - 20      GFR est AA 55 (L) >60 ml/min/1.73m2    GFR est non-AA 46 (L) >60 ml/min/1.73m2    Calcium 8.1 (L) 8.5 - 10.1 MG/DL   GLUCOSE, POC    Collection Time: 01/06/20  6:19 AM   Result Value Ref Range    Glucose (POC) 132 (H) 70 - 110 mg/dL           Assessment/Plan     Principal Problem:    Sepsis ([address] (1/2/2020)    Active Problems:    Abdominal pain (1/2/2020)      CKD (chronic kidney disease) (1/2/2020)      Type 2 diabetes mellitus, with long-term current use of insulin ([address] (1/3/2020)      Hypertension (1/3/2020)      Hypokalemia (1/3/2020)      UTI (urinary tract infection) (1/3/2020)      Positive blood culture (1/3/2020)      Foot abscess, right (1/5/2020)      Corn of foot (1/5/2020)        Patient seen and evaluated today. Recommended arterial doppler with digits.   Risk, benefits, and alternatives have been discussed at length with the patient and family. I have performed incision and drainage subfascial to level of the capsule. I used a scissor to incise and drain the deep abscess. I cultured the wound. Xeroform dressing applied. Pt will likely require resection of the 5th Metatarsal head given the depth of the wound and the potential for recurrence. I will await vascular results before proceeding.     Richmond Busch, HANH  January 6, 2020

## 2020-01-06 NOTE — PROGRESS NOTES
Problem: Falls - Risk of  Goal: *Absence of Falls  Description  Document Aidee President Fall Risk and appropriate interventions in the flowsheet.   Outcome: Progressing Towards Goal  Note: Fall Risk Interventions:  Mobility Interventions: Patient to call before getting OOB, PT Consult for mobility concerns, PT Consult for assist device competence         Medication Interventions: Patient to call before getting OOB, Teach patient to arise slowly    Elimination Interventions: Call light in reach              Problem: Patient Education: Go to Patient Education Activity  Goal: Patient/Family Education  Outcome: Progressing Towards Goal     Problem: Nausea/Vomiting (Adult)  Goal: *Absence of nausea/vomiting  Outcome: Progressing Towards Goal  Goal: *Palliation of nausea/vomiting (Palliative Care)  Outcome: Progressing Towards Goal     Problem: Patient Education: Go to Patient Education Activity  Goal: Patient/Family Education  Outcome: Progressing Towards Goal     Problem: Pain  Goal: *Control of Pain  Outcome: Progressing Towards Goal     Problem: Patient Education: Go to Patient Education Activity  Goal: Patient/Family Education  Outcome: Progressing Towards Goal

## 2020-01-06 NOTE — PROGRESS NOTES
0730:Received verbal bedside report from off going nurse LAURO Muñoz R.N. Patient care received. Patient alert and oriented x 4. Patient resting in bed denies pain. Patient stable. Call light with in reach bed in lowest position.

## 2020-01-06 NOTE — PROGRESS NOTES
Transition of care: Met with patient at bedside. Patient was seen by Dr. Paul Givens. RN is aware need to address post op shoe and wb actiivity. Patient does not speak english and no family present. Patient used [employer] phone to communicate. He lives with his daughter. He has Dr. Cirilo Thorne for pcp. Cm will continue to follow. Care Management Interventions  PCP Verified by CM:  Yes  Transition of Care Consult (CM Consult): Discharge Planning  Current Support Network: Relative's Home  Confirm Follow Up Transport: Family  The Plan for Transition of Care is Related to the Following Treatment Goals : home with daughter when medically cleared  Freedom of Choice List was Provided with Basic Dialogue that Supports the Patient's Individualized Plan of Care/Goals, Treatment Preferences and Shares the Quality Data Associated with the Providers?: Yes  Discharge Location  Discharge Placement: Home with family assistance

## 2020-01-06 NOTE — H&P (VIEW-ONLY)
History & Physical      Patient: Raymon Naidu               Sex: male          DOA: 1/1/2020       YOB: 1945      Age:  76 y.o.        LOS:  LOS: 4 days               Subjective:   Raymon Naidu is a 76 y.o. male  who I was consulted to see for abscess of the R foot. Pt states he fell at some point which may have made this worse but he as always had a callus here. He is no pain at the moment. No FCNV. He tried to trim this on his own and saw a physician as an out patient.       Medications:     Current Facility-Administered Medications:     insulin lispro (HUMALOG) injection, , SubCUTAneous, AC&HS, Terry Lockett MD, Stopped at 01/06/20 0730    levoFLOXacin (LEVAQUIN) 500 mg in D5W IVPB, 500 mg, IntraVENous, Q24H, Terry Lockett MD, Last Rate: 100 mL/hr at 01/05/20 1728, 500 mg at 01/05/20 1728    vancomycin (VANCOCIN) 1,500 mg in 0.9% sodium chloride 500 mL (VIAL-MATE)_, 1,500 mg, IntraVENous, Q24H, Fercho Asif MD, 1,500 mg at 01/06/20 0014    amLODIPine (NORVASC) tablet 2.5 mg, 2.5 mg, Oral, DAILY, Terry Lockett MD, 2.5 mg at 01/05/20 0848    ondansetron (ZOFRAN) injection 4 mg, 4 mg, IntraVENous, Q6H PRN, Ivelisse Flores MD    heparin (porcine) injection 5,000 Units, 5,000 Units, SubCUTAneous, Q8H, Ivelisse Flores MD, 5,000 Units at 01/06/20 0610    aspirin chewable tablet 81 mg, 81 mg, Oral, DAILY, Daphne RODRIGUEZ MD, 81 mg at 01/05/20 0848    atorvastatin (LIPITOR) tablet 20 mg, 20 mg, Oral, QHS, Daphne RODRIGUEZ MD, 20 mg at 01/05/20 2248    glucose chewable tablet 16 g, 16 g, Oral, PRN, Ivelisse Flores MD    glucagon (GLUCAGEN) injection 1 mg, 1 mg, IntraMUSCular, PRN, Ivelisse Flores MD    dextrose 10% infusion 125-250 mL, 125-250 mL, IntraVENous, PRN, Ivelisse Flores MD    Vancomycin-Pharmacy to dose, 1 Each, Other, Rx Dosing/Monitoring, Fercho Asif MD    acetaminophen (TYLENOL) tablet 650 mg, 650 mg, Oral, Q6H PRN, Malick-Brown, Melida Degroot MD, 650 mg at 01/06/20 0022    sodium chloride (NS) flush 5-10 mL, 5-10 mL, IntraVENous, PRN, Tg Quan MD, 10 mL at 01/01/20 2336    piperacillin-tazobactam (ZOSYN) 4.5 g in 0.9% sodium chloride (MBP/ADV) 100 mL MBP, 4.5 g, IntraVENous, Q6H, Tg Quan MD, Last Rate: 200 mL/hr at 01/06/20 0309, 4.5 g at 01/06/20 0309    Review of Systems  Negative for chest pain and shortness of breath  Review of all other systems is negative        Objective:      Visit Vitals  /51   Pulse 79   Temp 98.3 °F (36.8 °C)   Resp 18   Ht 5' 6\" (1.676 m)   Wt 78.2 kg (172 lb 6.4 oz)   SpO2 100%   BMI 27.83 kg/m²       Physical Exam:  Subfascial abscess noted of the plantar right foot. Foul odor and purulence noted. No pain. Exposed joint capsule but no exposed bone. Wound measures 2cm x 3cm.   Weak pedal pulses bilaterally 1/4 DP and PT  Diminished sensation to light touch and 2 pt discrimination  Good manual muscle testing and good ROM in all 3 cardinal planes of both the forefoot and hindfoot  R foot with splayed foot and prominent  5th MPJ    Labs:  Recent Results (from the past 24 hour(s))   ECHO ADULT COMPLETE    Collection Time: 01/05/20 10:15 AM   Result Value Ref Range    LA Volume 59.55 18 - 58 ml    Right Atrial Area 4C 14.70 cm2    AO ASC D 3.34 cm    Aortic Valve Systolic Peak Velocity 785.15 cm/s    AoV VTI 32.25 cm    Aortic Valve Area by Continuity of Peak Velocity 2.0 cm2    Aortic Valve Area by Continuity of VTI 2.2 cm2    AoV PG 10.8 mmHg    LVIDd 4.10 (A) 4.2 - 5.9 cm    LVPWd 1.42 (A) 0.6 - 1.0 cm    LVIDs 2.76 cm    IVSd 1.46 (A) 0.6 - 1.0 cm    LV ED Vol A2C 73.6 mL    LV ES Vol A4C 35.9 mL    LV ES Vol BP 32.8 22 - 58 mL    LVOT d 2.02 cm    LVOT Peak Velocity 103.99 cm/s    LVOT Peak Gradient 4.3 mmHg    LVOT VTI 22.70 cm    LV E' Septal Velocity 9.00 cm/s    LV E' Lateral Velocity 9.00 cm/s    MVA (PHT) 5.0 cm2    MV A Kevin 108.07 cm/s    MV E Kevin 77.38 cm/s    MV E/A 0.72 RVIDd 3.49 cm    Right Ventricular Outflow Track Peak Velocity 43.80 cm/s    Right Vent Outflow VTI 0.00 cm    Aortic Valve Systolic Mean Gradient 5.9 mmHg    BP EF 56.3 55 - 100 %    LV Ejection Fraction MOD 4C 51 %    LV Ejection Fraction MOD 2C 61 %    LA Vol 4C 37.65 18 - 58 mL    LA Vol 2C 75.05 (A) 18 - 58 mL    LA Area 2C 22.99 cm2    LA Area 4C 16.6 cm2    LV Mass .8 (A) 88 - 224 g    LV Mass AL Index 142.2 (A) 49 - 115 g/m2    LVPWs 0.00 cm    E/E' lateral 8.60     E/E' septal 8.60     IVC proximal 2.40 cm    E/E' ratio (averaged) 8.60     LV ES Vol A2C 29.1 mL    LVES Vol Index BP 17.4 mL/m2    LV ED Vol A4C 72.8 mL    LVED Vol Index BP 39.7 mL/m2    Mitral Valve E Wave Deceleration Time 152.7 ms    Mitral Valve Pressure Half-time 44.3 ms    Left Atrium Major Axis 3.12 cm    Tapse 2.84 1.5 - 2.0 cm    Triscuspid Valve Regurgitation Peak Gradient 12.5 mmHg    Pulmonic Valve Max Velocity 101.06 cm/s    LV ED Vol BP 75.0 67 - 155 ml    TR Max Velocity 176.71 cm/s    Right Atrial Volume 37.0 ml    LA Vol Index 39.71 16 - 28 ml/m2    LA Vol Index 19.92 16 - 28 ml/m2    LVED Vol Index A4C 38.5 mL/m2    LVED Vol Index A2C 38.9 mL/m2    LVES Vol Index A4C 19.0 mL/m2    LVES Vol Index A2C 15.4 mL/m2    Ao Root D 3.25 cm    CLEMENTINE/BSA Pk Kevin 1.0 cm2/m2    CLEMENTINE/BSA VTI 1.2 cm2/m2    Left Ventricular Fractional Shortening by 2D 94.990228180 %    Left Ventricular Outflow Tract Mean Gradient 9.33323212487544 mmHg    Left Ventricular Outflow Tract Mean Velocity 2.58439977896232 cm/s    PV End Diastolic Velocity 1.2 mmHg    Mitral Valve Deceleration [place] 8.27231450101402     AV Velocity Ratio 0.63     AV VTI Ratio 0.7     Aortic valve mean velocity 1.66516718298732 m/s    Left Ventricular End Diastolic Volume by Teichholz Method 91.158313166 mL    Left Ventricular End Systolic Volume by Teichholz Method 51.082670801 mL    Left Ventricular Stroke Volume by 2-D Biplane-MOD 12.878103787 mL    Left Ventricular Stroke Volume by 2-D Single Plane- MOD 36.502523645 mL    Left Ventricular Stroke Volume by Teichholz Method 38.660745374 mL    Left Ventricular Stroke Volume by 2-D Single Plane- MOD 42.177116679 mL    PV peak gradient 4.1 mmHg    PASP 23.0 mmHg   GLUCOSE, POC    Collection Time: 01/05/20 11:28 AM   Result Value Ref Range    Glucose (POC) 325 (H) 70 - 110 mg/dL   GLUCOSE, POC    Collection Time: 01/05/20  4:23 PM   Result Value Ref Range    Glucose (POC) 304 (H) 70 - 110 mg/dL   GLUCOSE, POC    Collection Time: 01/05/20  9:08 PM   Result Value Ref Range    Glucose (POC) 205 (H) 70 - 110 mg/dL   MAGNESIUM    Collection Time: 01/06/20  4:38 AM   Result Value Ref Range    Magnesium 1.8 1.6 - 2.6 mg/dL   METABOLIC PANEL, BASIC    Collection Time: 01/06/20  4:38 AM   Result Value Ref Range    Sodium 140 136 - 145 mmol/L    Potassium 3.7 3.5 - 5.5 mmol/L    Chloride 112 (H) 100 - 111 mmol/L    CO2 23 21 - 32 mmol/L    Anion gap 5 3.0 - 18 mmol/L    Glucose 121 (H) 74 - 99 mg/dL    BUN 12 7.0 - 18 MG/DL    Creatinine 1.50 (H) 0.6 - 1.3 MG/DL    BUN/Creatinine ratio 8 (L) 12 - 20      GFR est AA 55 (L) >60 ml/min/1.73m2    GFR est non-AA 46 (L) >60 ml/min/1.73m2    Calcium 8.1 (L) 8.5 - 10.1 MG/DL   GLUCOSE, POC    Collection Time: 01/06/20  6:19 AM   Result Value Ref Range    Glucose (POC) 132 (H) 70 - 110 mg/dL           Assessment/Plan     Principal Problem:    Sepsis ([address] (1/2/2020)    Active Problems:    Abdominal pain (1/2/2020)      CKD (chronic kidney disease) (1/2/2020)      Type 2 diabetes mellitus, with long-term current use of insulin ([address] (1/3/2020)      Hypertension (1/3/2020)      Hypokalemia (1/3/2020)      UTI (urinary tract infection) (1/3/2020)      Positive blood culture (1/3/2020)      Foot abscess, right (1/5/2020)      Corn of foot (1/5/2020)        Patient seen and evaluated today. Recommended arterial doppler with digits.   Risk, benefits, and alternatives have been discussed at length with the patient and family. I have performed incision and drainage subfascial to level of the capsule. I used a scissor to incise and drain the deep abscess. I cultured the wound. Xeroform dressing applied. Pt will likely require resection of the 5th Metatarsal head given the depth of the wound and the potential for recurrence. I will await vascular results before proceeding.     Wendy Boyd, HANH  January 6, 2020

## 2020-01-06 NOTE — CONSULTS
[facility]                                            (A Division of [address] [place] [address])                                   Consultation Note      Date of Admission: 1/1/2020    Date of Consultation: 1/6/2020    Referred by: George Mccabe MD    Reason for Referral: GBS sepsis    Current Antimicrobials: Prior Antimicrobials   1. Pip/tzb IV (1/1-) #4  2. Vanco IV (1/2-) #3  3. Levo IV (1/1, and 1/4-) #3        Assessment: Plan:   Streptococcus agalactiae (GBS) sepsis, likely eminating from his R foot DFU    Big elephant in the room is the GBS sepsis (the kidneys are a fuel-filter for his bloodstream, I.e. the recovery of GBS from his urine is reflective of his primary bacteremia. No UTI. GBS sepsis can be life-threatening, but I believe his origin is the R foot. Should start breaking fever now that his foot has been whittled. I stopped the vanco and levofloxacin (No GBS activity with FQ); PCN is best drug, but with his malodor would keep it wide for now (pip/tzb). ->Pip/tzb #4    Let's see how he responds over next few days; keep the pip/tzb going for now. I read Dr Manju Campo note; not deep. Will get baseline CRP. IF all improving and not deep, can likely go home later this week on amox-clav for a few weeks. Will look in on Wednesday. Great consult. R DFU    DMT2 - poor control    CKD    HTN      Microbiology:  1/6 - Wound sent     1/2 - BCx (-)     1/1 - BCx 2/2 (+) Streptococcus agalactiae      UA (+) Streptococcus agalactiae      Lines / Catheters: peripheral      HPI:  CC:  Malaise  Mr Ousmane Webster is a elderly, diabetic 69y  who was admitted last Thursday with week-long malaise, f/s/c with a R DFU of long-standing, but worsening. Had ABD pain on admission that was worked up in the ED, resolved now.          Active Hospital Problems    Diagnosis Date Noted    Cholecystitis, unspecified 01/06/2020    Foot abscess, right 01/05/2020    Corn of foot 01/05/2020    Type 2 diabetes mellitus, with long-term current use of insulin ([facility] 01/03/2020    Hypertension 01/03/2020    Hypokalemia 01/03/2020    UTI (urinary tract infection) 01/03/2020    Positive blood culture 01/03/2020    Sepsis ([facility] 01/02/2020    Abdominal pain 01/02/2020    CKD (chronic kidney disease) 01/02/2020     Past Medical History:   Diagnosis Date    Diabetes ([facility]     Hypertension      History reviewed. No pertinent surgical history. History reviewed. No pertinent family history. Social History     Socioeconomic History    Marital status: SINGLE     Spouse name: Not on file    Number of children: Not on file    Years of education: Not on file    Highest education level: Not on file   Occupational History    Not on file   Social Needs    Financial resource strain: Not on file    Food insecurity:     Worry: Not on file     Inability: Not on file    Transportation needs:     Medical: Not on file     Non-medical: Not on file   Tobacco Use    Smoking status: Never Smoker    Smokeless tobacco: Never Used   Substance and Sexual Activity    Alcohol use: No    Drug use: No    Sexual activity: Not on file   Lifestyle    Physical activity:     Days per week: Not on file     Minutes per session: Not on file    Stress: Not on file   Relationships    Social connections:     Talks on phone: Not on file     Gets together: Not on file     Attends [employer] service: Not on file     Active member of club or organization: Not on file     Attends meetings of clubs or organizations: Not on file     Relationship status: Not on file    Intimate partner violence:     Fear of current or ex partner: Not on file     Emotionally abused: Not on file     Physically abused: Not on file     Forced sexual activity: Not on file   Other Topics Concern    Not on file   Social History Narrative    Not on file       Allergies:  Patient has no known allergies.  .     Medications:  Current Facility-Administered Medications   Medication Dose Route Frequency    insulin lispro (HUMALOG) injection   SubCUTAneous AC&HS    amLODIPine (NORVASC) tablet 2.5 mg  2.5 mg Oral DAILY    ondansetron (ZOFRAN) injection 4 mg  4 mg IntraVENous Q6H PRN    heparin (porcine) injection 5,000 Units  5,000 Units SubCUTAneous Q8H    aspirin chewable tablet 81 mg  81 mg Oral DAILY    atorvastatin (LIPITOR) tablet 20 mg  20 mg Oral QHS    glucose chewable tablet 16 g  16 g Oral PRN    glucagon (GLUCAGEN) injection 1 mg  1 mg IntraMUSCular PRN    dextrose 10% infusion 125-250 mL  125-250 mL IntraVENous PRN    acetaminophen (TYLENOL) tablet 650 mg  650 mg Oral Q6H PRN    sodium chloride (NS) flush 5-10 mL  5-10 mL IntraVENous PRN    piperacillin-tazobactam (ZOSYN) 4.5 g in 0.9% sodium chloride (MBP/ADV) 100 mL MBP  4.5 g IntraVENous Q6H        ROS:  Constitutional: positive for fevers, chills, sweats and malaise  Respiratory: negative for cough or sputum  Cardiovascular: negative for chest pain, dyspnea  Gastrointestinal: positive for abdominal pain  Genitourinary:negative for dysuria     Physical Exam:  Temp (24hrs), Av.2 °F (37.3 °C), Min:98.3 °F (36.8 °C), Max:100.3 °F (37.9 °C)    Visit Vitals  /75 (BP 1 Location: Left arm, BP Patient Position: At rest)   Pulse 85   Temp 99 °F (37.2 °C)   Resp 14   Ht 5' 6\" (1.676 m)   Wt 78.2 kg (172 lb 6.4 oz)   SpO2 100%   BMI 27.83 kg/m²       General: Well developed, well nourished 76 y.o.  male in no acute distress.   ENT: ENT exam normal, no neck nodes or sinus tenderness  Head: normocephalic, without obvious abnormality  Mouth:  mucous membranes moist, pharynx normal without lesions  Neck: supple, symmetrical, trachea midline   Cardio:  regular rate and rhythm, S1, S2 normal, no murmur, click, rub or gallop  Chest: inspection normal - no chest wall deformities or tenderness, respiratory effort normal  Lungs: clear to auscultation, no wheezes or rales and unlabored breathing  Abdomen: soft, non-tender. Bowel sounds normal. No masses, no organomegaly. Extremities:  no edema, R foot wrapped. Neuro: Grossly normal     Lab results:    Chemistry  Recent Labs     01/06/20  0438 01/05/20  0411 01/04/20  0440   * 118* 88    141 141   K 3.7 4.1 3.3*   * 111 109   CO2 23 23 23   BUN 12 12 12   CREA 1.50* 1.30 1.27   CA 8.1* 8.0* 7.6*   AGAP 5 7 9   BUCR 8* 9* 9*   AP  --  56 47   TP  --  6.0* 5.7*   ALB  --  2.4* 2.4*   GLOB  --  3.6 3.3   AGRAT  --  0.7* 0.7*       CBC w/ Diff  Recent Labs     01/05/20 0411 01/04/20  0440   WBC 9.0 8.5   RBC 2.67* 2.54*   HGB 8.1* 7.7*   HCT 23.5* 22.3*    154       Microbiology  All Micro Results     Procedure Component Value Units Date/Time    CULTURE, Anh Valentin STAIN [[id number]] Collected:  01/06/20 0809    Order Status:  Completed Specimen:  Wound from Foot Updated:  01/06/20 1047    CULTURE, BLOOD [[id number]] Collected:  01/02/20 1250    Order Status:  Completed Specimen:  Blood Updated:  01/06/20 0759     Special Requests: NO SPECIAL REQUESTS        Culture result: NO GROWTH 4 DAYS       CULTURE, BLOOD [[id number]] Collected:  01/02/20 1309    Order Status:  Completed Specimen:  Blood Updated:  01/06/20 0759     Special Requests: NO SPECIAL REQUESTS        Culture result: NO GROWTH 4 DAYS       CULTURE, WOUND Taylor Ragsdale STAIN [[id number]]     Order Status:  Sent Specimen:  Wound from Drainage     CULTURE, BLOOD [[id number]]  (Abnormal) Collected:  01/01/20 2144    Order Status:  Completed Specimen:  Blood Updated:  01/04/20 0754     Special Requests: NO SPECIAL REQUESTS        GRAM STAIN       ANAEROBIC BOTTLE GRAM POSITIVE COCCI IN CHAINS                  SMEAR CALLED TO AND CORRECTLY REPEATED BY: WAYNE BABCOCK RN 3N ON 1/2/2020 AT 6289 TO [facility].            Culture result:       ANAEROBIC BOTTLE STREPTOCOCCI, BETA HEMOLYTIC GROUP B                  For Susceptibility Refer to Culture  [id number]      CULTURE, BLOOD [[id number]]  (Abnormal)  (Susceptibility) Collected:  01/01/20 2150    Order Status:  Completed Specimen:  Blood Updated:  01/04/20 0726     Special Requests: NO SPECIAL REQUESTS        GRAM STAIN       ANAEROBIC BOTTLE GRAM POSITIVE COCCI IN CHAINS                  SMEAR CALLED TO AND CORRECTLY REPEATED BY: [facility] Martina BABCOCK RN 3N ON 1/2/2020 AT 1143 TO [facility]. Culture result:       ANAEROBIC BOTTLE STREPTOCOCCI, BETA HEMOLYTIC GROUP B          CULTURE, URINE [[id number]]  (Abnormal) Collected:  01/01/20 2230    Order Status:  Completed Specimen:  Urine from Clean catch Updated:  01/03/20 1042     Special Requests: NO SPECIAL REQUESTS        Culture result:       [id number] COLONIES/mL STREPTOCOCCI, BETA HEMOLYTIC GROUP B                  <10,000 COLONIES/mL STAPHYLOCOCCUS SPECIES          INFLUENZA A & B AG (RAPID TEST) [[id number]] Collected:  01/01/20 2157    Order Status:  Completed Specimen:  Nasopharyngeal from Nasal washing Updated:  01/01/20 2215     Influenza A Antigen NEGATIVE         Comment: A negative result does not exclude influenza virus infection, seasonal or H1N1 due to suboptimal sensitivity. If influenza is circulating in your community, a diagnosis of influenza should be considered based on a patients clinical presentation and empiric antiviral treatment should be considered, if indicated.         Influenza B Antigen NEGATIVE               Niko Vernon MD  Cell [phone]  [facility]   1/6/2020   2:08 PM

## 2020-01-06 NOTE — DIABETES MGMT
GLYCEMIC CONTROL PROGRESS NOTE:    - known h/o T2DM, HbA1 not within recommended range for age + comorbids on basal/oral home regimen  - BG out of target range non-ICU: < 180 mg/dL  - TDD = 26 units - Humalog Normal Insulin Sensitivity Corrective Coverage  - PPG out of target range recommend initiate basal/bolus IP regimen   *Lantus 5 units daily   *Humalog 6 units qac    Recent Glucose Results:   Lab Results   Component Value Date/Time     (H) 01/06/2020 04:38 AM    GLUCPOC 270 (H) 01/06/2020 11:32 AM    GLUCPOC 132 (H) 01/06/2020 06:19 AM    GLUCPOC 205 (H) 01/05/2020 09:08 PM     Manju Alcantara MS, RN, CDE  Glycemic Control Team  [phone]  Pager [phone] (M-TH 8:00-4:30P)  *After Hours pager [phone]

## 2020-01-06 NOTE — PROGRESS NOTES
Hospitalist Progress Note-critical care note     Patient: Raymond Merritt MRN: [id number]  CSN: [id number]    YOB: 1945  Age: 76 y.o. Sex: male    DOA: 1/1/2020 LOS:  LOS: 4 days          Chief complaint: DM, sepsis , positive bcx, uti m     Assessment/Plan     Hospital Problems  Date Reviewed: 1/2/2020          Codes Class Noted POA    Cholecystitis, unspecified ICD-10-CM: K81.9  ICD-9-CM: 575.10  1/6/2020 Unknown        Foot abscess, right ICD-10-CM: L02.611  ICD-9-CM: 682.7  1/5/2020 Unknown        Corn of foot ICD-10-CM: L84  ICD-9-CM: 700  1/5/2020 Unknown        Type 2 diabetes mellitus, with long-term current use of insulin ([facility] ICD-10-CM: E11.9, Z79.4  ICD-9-CM: 250.00, V58.67  1/3/2020         Hypertension ICD-10-CM: I10  ICD-9-CM: 401.9  1/3/2020 Unknown        Hypokalemia ICD-10-CM: E87.6  ICD-9-CM: 276.8  1/3/2020 Unknown        UTI (urinary tract infection) ICD-10-CM: N39.0  ICD-9-CM: 599.0  1/3/2020 Unknown        Positive blood culture ICD-10-CM: R78.81  ICD-9-CM: 790.7  1/3/2020 Unknown        * (Principal) Sepsis ([facility] ICD-10-CM: A41.9  ICD-9-CM: 038.9, 995.91  1/2/2020 Yes        Abdominal pain ICD-10-CM: R10.9  ICD-9-CM: 789.00  1/2/2020 Yes        CKD (chronic kidney disease) ICD-10-CM: N18.9  ICD-9-CM: 585.9  1/2/2020 Yes            Admitted for sepsis, bacteremia, cholecystitis r/o. Has foot absess.      Rt foot abscess-\"corn\" on rt bottom of rt foot-turn into abscess now-like another source of infection-recent treated per Dr. Henriquez Hidden   Dr. Elida Luna saw pt today: ordered study and wound culture, s/p bedside I&D, but thinks that he will likely need bone resection, but wants to evaluate results of vascular study first     Bacteremia   [facility] HEM[facility] on 1/1/20 culture, repeated cxs, no growth   Likely from UTI and foot abscess   Fever yesterday AM   ID consult >>Dr. Ana Paula Cervantes consulted  Echo done     Sepsis   See above   Ct abdomen reviewed   Ultrasound abdomen-possible cholecystitis-HIDA negative-jem wells does not think cholecystitis   Due to uti/foot infection      Abdomen pain   Resolved     DM type II   SSI     Hypokalemia   K replacement, will check mg     ckd 2-3 stable     Subjective:  No complaints  I&D at bedside rt foot earlier, no pain complaints at this time     Review of systems:    General: +fevers, no chills. Cardiovascular: No chest pain or pressure. No palpitations. Pulmonary: No shortness of breath. Gastrointestinal: No nausea, vomiting. No abdomen pain     Vital signs/Intake and Output:  Visit Vitals  /51   Pulse 79   Temp 98.3 °F (36.8 °C)   Resp 18   Ht 5' 6\" (1.676 m)   Wt 78.2 kg (172 lb 6.4 oz)   SpO2 100%   BMI 27.83 kg/m²     Current Shift:  01/06 0701 - 01/06 1900  In: 820 [P.O.:820]  Out: -   Last three shifts:  01/04 1901 - 01/06 0700  In: 1100 [P.O.:400; I.V.:700]  Out: -     Physical Exam:  General: WD, WN. Alert, cooperative, no acute distress    HEENT: NC, Atraumatic. PERRLA, anicteric sclerae. Lungs: CTA Bilaterally. No Wheezing/Rhonchi/Rales. Heart:  Regular  rhythm,  No murmur, No Rubs, No Gallops  Abdomen: Soft, Non distended, mild tenderness of LLQ.  +Bowel sounds,   Extremities: No c/c. Latera of Rt foot wrapped in gauze and ace wrap, just had I&D  Psych:   Not anxious or agitated. Neurologic:  No acute neurological deficit. Labs: Results:       Chemistry Recent Labs     01/06/20  0438 01/05/20  0411 01/04/20  0440   * 118* 88    141 141   K 3.7 4.1 3.3*   * 111 109   CO2 23 23 23   BUN 12 12 12   CREA 1.50* 1.30 1.27   CA 8.1* 8.0* 7.6*   AGAP 5 7 9   BUCR 8* 9* 9*   AP  --  56 47   TP  --  6.0* 5.7*   ALB  --  2.4* 2.4*   GLOB  --  3.6 3.3   AGRAT  --  0.7* 0.7*      CBC w/Diff Recent Labs     01/05/20  0411 01/04/20  0440   WBC 9.0 8.5   RBC 2.67* 2.54*   HGB 8.1* 7.7*   HCT 23.5* 22.3*    154      Cardiac Enzymes No results for input(s): CPK, CKND1, KAREN in the last 72 hours.     No lab exists for component: CKRMB, TROIP   Coagulation No results for input(s): PTP, INR, APTT, INREXT, INREXT in the last 72 hours. Lipid Panel No results found for: CHOL, CHOLPOCT, CHOLX, CHLST, CHOLV, 320157, HDL, HDLP, LDL, LDLC, DLDLP, 028930, VLDLC, VLDL, TGLX, TRIGL, TRIGP, TGLPOCT, CHHD, CHHDX   BNP No results for input(s): BNPP in the last 72 hours.    Liver Enzymes Recent Labs     01/05/20  0411   TP 6.0*   ALB 2.4*   AP 56   SGOT 19      Thyroid Studies No results found for: T4, T3U, TSH, TSHEXT, TSHEXT     Procedures/imaging: see electronic medical records for all procedures/Xrays and details which were not copied into this note but were reviewed prior to creation of Rell Mcclelland MD

## 2020-01-06 NOTE — PROGRESS NOTES
NUTRITION UPDATE    -  per glycemic control team pt has hx of DM and needs DM diet added  Will add DM diet       Tin Valenzuela, RD  PAGER:  [phone]

## 2020-01-07 ENCOUNTER — APPOINTMENT (OUTPATIENT)
Dept: VASCULAR SURGERY | Age: 75
DRG: 854 | End: 2020-01-07
Attending: SURGERY
Payer: MEDICARE

## 2020-01-07 PROBLEM — I73.9 PAD (PERIPHERAL ARTERY DISEASE) (HCC): Status: ACTIVE | Noted: 2020-01-07

## 2020-01-07 LAB
ANION GAP SERPL CALC-SCNC: 6 MMOL/L (ref 3–18)
BUN SERPL-MCNC: 9 MG/DL (ref 7–18)
BUN/CREAT SERPL: 7 (ref 12–20)
CALCIUM SERPL-MCNC: 8.1 MG/DL (ref 8.5–10.1)
CHLORIDE SERPL-SCNC: 111 MMOL/L (ref 100–111)
CO2 SERPL-SCNC: 25 MMOL/L (ref 21–32)
CREAT SERPL-MCNC: 1.25 MG/DL (ref 0.6–1.3)
CRP SERPL-MCNC: 11.4 MG/DL (ref 0–0.3)
GLUCOSE BLD STRIP.AUTO-MCNC: 126 MG/DL (ref 70–110)
GLUCOSE BLD STRIP.AUTO-MCNC: 201 MG/DL (ref 70–110)
GLUCOSE BLD STRIP.AUTO-MCNC: 279 MG/DL (ref 70–110)
GLUCOSE BLD STRIP.AUTO-MCNC: 311 MG/DL (ref 70–110)
GLUCOSE SERPL-MCNC: 124 MG/DL (ref 74–99)
LEFT ABI: 1.29
LEFT ANTERIOR TIBIAL: 213 MMHG
LEFT ARM BP: 164 MMHG
LEFT CFA DIST SYS PSV: 114.8 CM/S
LEFT DIST ATA VELOCITY: 14.2 CM/S
LEFT DIST PTA PSV: 27.3 CM/S
LEFT PERONEAL DIST VELOCITY: 165.2 CM/S
LEFT PERONEAL MID SYS PSV: 136.5 CM/S
LEFT PERONEAL PROX SYS PSV: 75 CM/S
LEFT POP A PROX VEL RATIO: 1
LEFT POPLITEAL DIST SYS PSV: 69.9 CM/S
LEFT POPLITEAL PROX SYS PSV: 81.3 CM/S
LEFT POSTERIOR TIBIAL: 211 MMHG
LEFT PROX PFA A PSV: 69.5 CM/S
LEFT PROX PTA PSV: 58.7 CM/S
LEFT PTA MID SYS PSV: 78.3 CM/S
LEFT SFA DIST VEL RATIO: 0.79
LEFT SFA MID VEL RATIO: 0.98
LEFT SFA PROX VEL RATIO: 0.91
LEFT SUPER FEMORAL DIST SYS PSV: 81.3 CM/S
LEFT SUPER FEMORAL MID SYS PSV: 103 CM/S
LEFT SUPER FEMORAL PROX SYS PSV: 105 CM/S
MAGNESIUM SERPL-MCNC: 1.7 MG/DL (ref 1.6–2.6)
POTASSIUM SERPL-SCNC: 3.7 MMOL/L (ref 3.5–5.5)
RIGHT ABI: 1.17
RIGHT ANTERIOR TIBIAL: 185 MMHG
RIGHT ARM BP: 165 MMHG
RIGHT CFA DIST SYS PSV: 111.9 CM/S
RIGHT DIST PTA PSV: 81.2 CM/S
RIGHT MID ATA VELOCITY: 51.9 CM/S
RIGHT PERONEAL DIST VELOCITY: 136.1 CM/S
RIGHT PERONEAL MID SYS PSV: 123.2 CM/S
RIGHT POP A PROX VEL RATIO: 0.94
RIGHT POPLITEAL DIST SYS PSV: 105.2 CM/S
RIGHT POPLITEAL PROX SYS PSV: 109.8 CM/S
RIGHT POSTERIOR TIBIAL: 193 MMHG
RIGHT PROX PFA A PSV: 84.4 CM/S
RIGHT PTA MID SYS PSV: 66.3 CM/S
RIGHT SFA DIST VEL RATIO: 0.83
RIGHT SFA MID VEL RATIO: 1.2
RIGHT SFA PROX VEL RATIO: 1.1
RIGHT SUPER FEMORAL DIST SYS PSV: 116.8 CM/S
RIGHT SUPER FEMORAL MID SYS PSV: 140 CM/S
RIGHT SUPER FEMORAL PROX SYS PSV: 121.6 CM/S
SODIUM SERPL-SCNC: 142 MMOL/L (ref 136–145)

## 2020-01-07 PROCEDURE — 80048 BASIC METABOLIC PNL TOTAL CA: CPT

## 2020-01-07 PROCEDURE — 82962 GLUCOSE BLOOD TEST: CPT

## 2020-01-07 PROCEDURE — 86140 C-REACTIVE PROTEIN: CPT

## 2020-01-07 PROCEDURE — 83735 ASSAY OF MAGNESIUM: CPT

## 2020-01-07 PROCEDURE — 74011250636 HC RX REV CODE- 250/636: Performed by: FAMILY MEDICINE

## 2020-01-07 PROCEDURE — 74011250637 HC RX REV CODE- 250/637: Performed by: HOSPITALIST

## 2020-01-07 PROCEDURE — 74011000258 HC RX REV CODE- 258: Performed by: EMERGENCY MEDICINE

## 2020-01-07 PROCEDURE — 74011636637 HC RX REV CODE- 636/637: Performed by: HOSPITALIST

## 2020-01-07 PROCEDURE — 93922 UPR/L XTREMITY ART 2 LEVELS: CPT

## 2020-01-07 PROCEDURE — 97116 GAIT TRAINING THERAPY: CPT

## 2020-01-07 PROCEDURE — 74011250636 HC RX REV CODE- 250/636: Performed by: EMERGENCY MEDICINE

## 2020-01-07 PROCEDURE — 74011250637 HC RX REV CODE- 250/637: Performed by: FAMILY MEDICINE

## 2020-01-07 PROCEDURE — 36415 COLL VENOUS BLD VENIPUNCTURE: CPT

## 2020-01-07 PROCEDURE — 65660000000 HC RM CCU STEPDOWN

## 2020-01-07 RX ORDER — SAME BUTANEDISULFONATE/BETAINE 400-600 MG
500 POWDER IN PACKET (EA) ORAL 2 TIMES DAILY
Status: DISCONTINUED | OUTPATIENT
Start: 2020-01-07 | End: 2020-01-14 | Stop reason: HOSPADM

## 2020-01-07 RX ADMIN — INSULIN LISPRO 6 UNITS: 100 INJECTION, SOLUTION INTRAVENOUS; SUBCUTANEOUS at 12:06

## 2020-01-07 RX ADMIN — PIPERACILLIN AND TAZOBACTAM 4.5 G: 4; .5 INJECTION, POWDER, FOR SOLUTION INTRAVENOUS at 06:46

## 2020-01-07 RX ADMIN — PIPERACILLIN AND TAZOBACTAM 4.5 G: 4; .5 INJECTION, POWDER, FOR SOLUTION INTRAVENOUS at 18:06

## 2020-01-07 RX ADMIN — AMLODIPINE BESYLATE 2.5 MG: 2.5 TABLET ORAL at 10:20

## 2020-01-07 RX ADMIN — PIPERACILLIN AND TAZOBACTAM 4.5 G: 4; .5 INJECTION, POWDER, FOR SOLUTION INTRAVENOUS at 12:06

## 2020-01-07 RX ADMIN — ATORVASTATIN CALCIUM 20 MG: 20 TABLET, FILM COATED ORAL at 22:29

## 2020-01-07 RX ADMIN — HEPARIN SODIUM 5000 UNITS: 5000 INJECTION INTRAVENOUS; SUBCUTANEOUS at 13:59

## 2020-01-07 RX ADMIN — Medication 500 MG: at 22:29

## 2020-01-07 RX ADMIN — HEPARIN SODIUM 5000 UNITS: 5000 INJECTION INTRAVENOUS; SUBCUTANEOUS at 06:46

## 2020-01-07 RX ADMIN — HEPARIN SODIUM 5000 UNITS: 5000 INJECTION INTRAVENOUS; SUBCUTANEOUS at 22:30

## 2020-01-07 RX ADMIN — Medication 500 MG: at 13:59

## 2020-01-07 RX ADMIN — ASPIRIN 81 MG 81 MG: 81 TABLET ORAL at 10:20

## 2020-01-07 RX ADMIN — INSULIN LISPRO 9 UNITS: 100 INJECTION, SOLUTION INTRAVENOUS; SUBCUTANEOUS at 16:30

## 2020-01-07 RX ADMIN — INSULIN LISPRO 12 UNITS: 100 INJECTION, SOLUTION INTRAVENOUS; SUBCUTANEOUS at 22:30

## 2020-01-07 RX ADMIN — PIPERACILLIN AND TAZOBACTAM 4.5 G: 4; .5 INJECTION, POWDER, FOR SOLUTION INTRAVENOUS at 01:35

## 2020-01-07 NOTE — ROUTINE PROCESS
Bedside and Verbal shift change report given to NESTOR Silva (oncoming nurse) by LAURO Groves R.N. (offgoing nurse). Report included the following information SBAR, Kardex, Intake/Output, MAR, Accordion, Recent Results and Med Rec Status.

## 2020-01-07 NOTE — CONSULTS
VASCULAR CONSULTATION NOTE  Consult performed by Jennifer Banda PA-C, under the supervision of Dr. Irwin Jerry    A vascular consultation has been requested on Jorge L Saldaña, who is a 76 y.o. male admitted to the hospital on 1/1/2020 with an admitting diagnosis of Sepsis ([facility] [A41.9]  Fever in adult [R50.9]  Lactic acidosis [E87.2]. He is being seen for evaluation and possible treatment of  Atherosclerosis of native arteries with ulcer. Attending Physician: Dr. Irwin Jerry             Referring Physician: Dr. Domitila Berry  HPI: 76y.o. year old male new inpatient consultation complains of right diabetic foot wound. Above noted, chart review. Pt is s/p incision and drainage of right lateral foot abscess. Pt denies lower extremity claudication and or rest pain. A right lower extremity arterial duplex shows what appears to be short segment occlusion of the posterior tibial arteries. With biphasic waveforms of the anterior tibial artery and posterior tibial artery. LILY on the right 1.17  ROS: Denies chest pain. EXAM:   Visit Vitals  /57 (BP 1 Location: Left arm, BP Patient Position: Supine)   Pulse 85   Temp 98.5 °F (36.9 °C)   Resp 16   Ht 5' 6\" (1.676 m)   Wt 77.6 kg (171 lb)   SpO2 100%   BMI 27.60 kg/m²     AAO, Conjunctiva pink, oral mucosa pink & moist, no JVD, no thyromegaly, Resiratory effort normal, card rrr. Abdomen with positive bowel sounds. Soft, non-tender, non-distended. No hsm or pulsatile masses. There are no carotid bruits. There are 2+ radial, femoral, popliteal pulses bilateral.    I believe I feel a faint left posterior tibial pulse. I do not appreciate a left dorsalis pedis pulse. There is a faint right dorsal pedis pulse and absent right posterior tibial artery pulse. There is an open wound on there right lateral foot. There is some eschar. No drainage, non-tender.         DATA  REVIEWED:   DX: (self limited or minor problem)  1. 75 yo male with diabetic right foot ulcer. 2.  Arterial duplex and LILY suggest no significant atherosclerotic disease. 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 At this time the feet are warm and viable. PLAN:   Continue current treatment. Will continue to follow with you. Will review duplex and LILY with Dr. Raissa Alvarez. With discuss with him the possibility of right lower extremity angiogram to give the best chance for healing this wound. Soraya Ramos PA-C  [phone]    As above. Seen and examined by me. 75 yo male with right foot ulcer and evidence of tibial disease on non-invasive study. He will require angiography and attempt at revascularization of the right foot for limb salvage/critical limb ischemia.     [address] [facility]  SHANNON Asst Prof Surgery  PG [phone]  Cell [phone]

## 2020-01-07 NOTE — PROGRESS NOTES
[facility]    9582 AM assessment completed, patient is alert and oriented x's 4, no c/o pain. NSR on the monitor with a HR in the 80's. Lung fields are clear throughout on RA, 02 sat sustained in the upper 90's with no cough noted. Patient is tolerating a diabetic diet without any N/V or gastric distention and ambulating to the BR voiding without any complications. Right foot dressing remain C/D/I. Scheduled medications administered as ordered without any complications. Patient is currently in the BR with daughter present in room, no s/s of any distress, VSS, call bell and personal belongings within reach, will continue to monitor    1210 Scheduled medications administered as ordered without any complications. Patient is currently sitting up on side of bed consuming lunch, no s/s of any pain or distress, will continue to monitor    1403 Scheduled medications administered as ordered without any complications. Patient is currently preparing to leave unit via wheelchair for scheduled vascular procedure. 1600 NAD, will continue to monitor    1705 Insulin administered per  protocol without any complications. Vascular PA in to assess patient's right foot  DM wound, no orders received. Patient is currently sitting up in bed consuming dinner, no s/s of any pain or distress, will continue to monitor. 1809 Scheduled medications administered as ordered  Without any complications. Patient is currently resting quietly in bed, no s/s of any pain or distress, will continue to monitor    2033 Bedside and Verbal shift change report given to Crystal Moss RN (oncoming nurse) by Jody Quinn RN (offgoing nurse). Report included the following information SBAR, MAR, Accordion and Cardiac Rhythm NSR.

## 2020-01-07 NOTE — PROGRESS NOTES
Progress Note      Patient: Radha Kimble               Sex: male          DOA: 1/1/2020       YOB: 1945      Age:  76 y.o.        LOS:  LOS: 5 days               Subjective:   Patient seen today for follow up. No new complaints.     Medications:     Current Facility-Administered Medications:     Saccharomyces boulardii (FLORASTOR) capsule 500 mg, 500 mg, Oral, BID, Haydee Bhat MD, 500 mg at 01/07/20 1359    insulin lispro (HUMALOG) injection, , SubCUTAneous, AC&HS, Haydee Bhat MD, 6 Units at 01/07/20 1206    amLODIPine (NORVASC) tablet 2.5 mg, 2.5 mg, Oral, DAILY, Haydee Bhat MD, 2.5 mg at 01/07/20 1020    ondansetron (ZOFRAN) injection 4 mg, 4 mg, IntraVENous, Q6H PRN, Shiloh Pimentel MD    heparin (porcine) injection 5,000 Units, 5,000 Units, SubCUTAneous, Q8H, Shiloh Pimentel MD, 5,000 Units at 01/07/20 1359    aspirin chewable tablet 81 mg, 81 mg, Oral, DAILY, Shiloh Piemntel MD, 81 mg at 01/07/20 1020    atorvastatin (LIPITOR) tablet 20 mg, 20 mg, Oral, QHS, Chasity RODRIGUEZ MD, 20 mg at 01/06/20 2213    glucose chewable tablet 16 g, 16 g, Oral, PRN, Shiloh Pimentel MD    glucagon (GLUCAGEN) injection 1 mg, 1 mg, IntraMUSCular, PRN, Shiloh Pimentel MD    dextrose 10% infusion 125-250 mL, 125-250 mL, IntraVENous, PRN, Shiloh Pimentel MD    acetaminophen (TYLENOL) tablet 650 mg, 650 mg, Oral, Q6H PRN, Kirby Butler MD, 650 mg at 01/06/20 0022    sodium chloride (NS) flush 5-10 mL, 5-10 mL, IntraVENous, PRN, Toma Hashimoto, MD, 10 mL at 01/01/20 2336    piperacillin-tazobactam (ZOSYN) 4.5 g in 0.9% sodium chloride (MBP/ADV) 100 mL MBP, 4.5 g, IntraVENous, Q6H, Toma Hashimoto, MD, Last Rate: 200 mL/hr at 01/07/20 1206, 4.5 g at 01/07/20 1206    Review of Systems        Objective:      Visit Vitals  /57 (BP 1 Location: Left arm, BP Patient Position: Supine)   Pulse 85   Temp 98.5 °F (36.9 °C)   Resp 16   Ht 5' 6\" (1.676 m)   Wt 77.6 kg (171 lb)   SpO2 100%   BMI 27.60 kg/m²       Physical Exam:  R foot with necrotic ulcer and exposed capsule.     No interval changes    Labs:  Recent Results (from the past 24 hour(s))   DUPLEX LOWER EXT ARTERY BILAT    Collection Time: 01/06/20  4:23 PM   Result Value Ref Range    Right CFA dist sys .9 cm/s    Right Prox PFA A PSV 84.4 cm/s    Right super femoral dist sys .8 cm/s    Right super femoral mid sys .0 cm/s    Right super femoral prox sys .6 cm/s    Right popliteal dist sys .2 cm/s    Right popliteal prox sys .8 cm/s    Right Dist PTA PSV 81.2 cm/s    Right mid PTA sys PSV 66.3 cm/s    Right Dist Peroneal Velocity 136.1 cm/s    Right mid peroneal sys .2 cm/s    Right Mid AIRAM Velocity 51.9 cm/s    Left CFA dist sys .8 cm/s    Left Prox PFA A PSV 69.5 cm/s    Left super femoral dist sys PSV 81.3 cm/s    Left super femoral mid sys .0 cm/s    Left super femoral prox sys .0 cm/s    Left popliteal dist sys PSV 69.9 cm/s    Left popliteal prox sys PSV 81.3 cm/s    Left Dist PTA PSV 27.3 cm/s    Left mid PTA sys PSV 78.3 cm/s    Left Prox PTA PSV 58.7 cm/s    Left Dist Peroneal Velocity 165.2 cm/s    Left mid peroneal sys .5 cm/s    Left prox peroneal sys PSV 75.0 cm/s    Left Dist AIRAM Velocity 14.2 cm/s    Right SFA Prox Kevin Ratio 1.1     Right SFA Mid Kevin Ratio 1.2     Right SFA Dist Kevin Ratio 0.83     Right Pop A Prox Kevin Ratio 0.94     Left SFA Prox Kevin Ratio 0.91     Left SFA Mid Kevin Ratio 0.98     Left SFA Dist Kevin Ratio 0.79     Left Pop A Prox Kevin Ratio 1.00    GLUCOSE, POC    Collection Time: 01/06/20  4:54 PM   Result Value Ref Range    Glucose (POC) 251 (H) 70 - 110 mg/dL   GLUCOSE, POC    Collection Time: 01/06/20  8:57 PM   Result Value Ref Range    Glucose (POC) 146 (H) 70 - 110 mg/dL   MAGNESIUM    Collection Time: 01/07/20  4:21 AM   Result Value Ref Range    Magnesium 1.7 1.6 - 2.6 mg/dL   METABOLIC PANEL, BASIC Collection Time: 01/07/20  4:21 AM   Result Value Ref Range    Sodium 142 136 - 145 mmol/L    Potassium 3.7 3.5 - 5.5 mmol/L    Chloride 111 100 - 111 mmol/L    CO2 25 21 - 32 mmol/L    Anion gap 6 3.0 - 18 mmol/L    Glucose 124 (H) 74 - 99 mg/dL    BUN 9 7.0 - 18 MG/DL    Creatinine 1.25 0.6 - 1.3 MG/DL    BUN/Creatinine ratio 7 (L) 12 - 20      GFR est AA >60 >60 ml/min/1.73m2    GFR est non-AA 56 (L) >60 ml/min/1.73m2    Calcium 8.1 (L) 8.5 - 10.1 MG/DL   C REACTIVE PROTEIN, QT    Collection Time: 01/07/20  4:21 AM   Result Value Ref Range    C-Reactive protein 11.4 (H) 0 - 0.3 mg/dL   GLUCOSE, POC    Collection Time: 01/07/20  6:45 AM   Result Value Ref Range    Glucose (POC) 126 (H) 70 - 110 mg/dL   GLUCOSE, POC    Collection Time: 01/07/20 11:19 AM   Result Value Ref Range    Glucose (POC) 201 (H) 70 - 110 mg/dL   ANKLE BRACHIAL INDEX    Collection Time: 01/07/20  2:25 PM   Result Value Ref Range    Left arm  mmHg    Right arm  mmHg    Left posterior tibial 211 mmHg    Right posterior tibial 193 mmHg    Left anterior tibial 213 mmHg    Right anterior tibial 185 mmHg    Left LILY 1.29     Right LILY 1.17            Assessment/Plan     Principal Problem:    Sepsis due to Streptococcus agalactiae ([facility]) (1/6/2020)    Active Problems:    Sepsis ([address] (1/2/2020)      Abdominal pain (1/2/2020)      CKD (chronic kidney disease) (1/2/2020)      Type 2 diabetes mellitus, with long-term current use of insulin ([address] (1/3/2020)      Hypertension (1/3/2020)      Hypokalemia (1/3/2020)      UTI (urinary tract infection) (1/3/2020)      Positive blood culture (1/3/2020)      Foot abscess, right (1/5/2020)      Corn of foot (1/5/2020)      Cholecystitis, unspecified (1/6/2020)      Diabetic ulcer of right midfoot associated with type 2 diabetes mellitus, with fat layer exposed ([address] (1/6/2020)      PAD (peripheral artery disease) ([address] (1/7/2020)        Patient seen and evaluated today.   Recommended surgical debridement and resection of the 5th MTH R foot.  For surgery tomorrow PM.

## 2020-01-07 NOTE — DIABETES MGMT
GLYCEMIC CONTROL PROGRESS NOTE:    - attempted to see pt for DM ed  - off unit for vascular procedure    Recent Glucose Results:   Lab Results   Component Value Date/Time     (H) 01/07/2020 04:21 AM    GLUCPOC 201 (H) 01/07/2020 11:19 AM    GLUCPOC 126 (H) 01/07/2020 06:45 AM    GLUCPOC 146 (H) 01/06/2020 08:57 PM     Gabi Park MS, RN, CDE  Glycemic Control Team  [phone]  Pager [phone] (M-TH 8:00-4:30P)  *After Hours pager [phone]

## 2020-01-07 NOTE — PROGRESS NOTES
Transition of care: anticiapte home when medically cleared  Met with patient and Dr. Daphne Aguilar at bedside along with daughter. Patient to have vascular see patient as for recommendations. Patient lives with his daughter. He has Dr. Sue Botello for pcp and medicare for insurance. Cm will continue to follow. May need HHC will continue to follow  Care Management Interventions  PCP Verified by CM:  Yes  Transition of Care Consult (CM Consult): Discharge Planning  Current Support Network: Relative's Home  Confirm Follow Up Transport: Family  The Plan for Transition of Care is Related to the Following Treatment Goals : home with daughter when medically cleared  Freedom of Choice List was Provided with Basic Dialogue that Supports the Patient's Individualized Plan of Care/Goals, Treatment Preferences and Shares the Quality Data Associated with the Providers?: Yes  Discharge Location  Discharge Placement: Home with family assistance

## 2020-01-07 NOTE — PROGRESS NOTES
Problem: Mobility Impaired (Adult and Pediatric)  Goal: *Acute Goals and Plan of Care (Insert Text)  Description  In 1-7 days pt will be able to perform:  ST.  Bed mobility:  Rolling L to R to L modified independent for positioning. 2.  Supine to sit to supine S with HR for meals. 3.  Sit to stand to sit S with RW in prep for ambulation. LT.  Gait:  Ambulate >150ft S with LRD for home/community mobility. 2.  Stair Negotiation:  Ascend/descend >5 steps CGA with HR for home entry. 3.  Activity tolerance: Tolerate up in chair 1-2 hours for ADLs. 4.  Patient/Family Education:  Patient/family to be independent with HEP for follow-up care and safe discharge. Outcome: Progressing Towards Goal   PHYSICAL THERAPY TREATMENT    Patient: Delia Puri (21 y.o. male)  Date: 2020  Diagnosis: Sepsis ([facility] [A41.9]  Fever in adult [R50.9]  Lactic acidosis [E87.2]   Sepsis due to Streptococcus agalactiae ([facility]    Precautions: Fall  PLOF: independent, ambulatory without AD    ASSESSMENT:  Pt sitting EOB upon entering room. No assistance or difficulty performing sit to stand. Ambulated 120ft pushing IV pole, mild unsteadiness 2x, may benefit from a cane. R ankle inversion noted, likely due to pain. Returned to room and left sitting EOB, family present. Progression toward goals:   [x]      Improving appropriately and progressing toward goals  []      Improving slowly and progressing toward goals  []      Not making progress toward goals and plan of care will be adjusted     PLAN: Stair nego needed before d/c  Patient continues to benefit from skilled intervention to address the above impairments. Continue treatment per established plan of care. Discharge Recommendations:  Home Health  Further Equipment Recommendations for Discharge:  possible cane     SUBJECTIVE:   Patient stated Fredrich Alert here.  (pt pointing to lateral aspect of R foot)    OBJECTIVE DATA SUMMARY:   Critical Behavior:  Neurologic State: Alert  Orientation Level: Oriented X4  Cognition: Follows commands  Safety/Judgement: Fall prevention  Functional Mobility Training:  Transfers:  Sit to Stand: Independent  Stand to Sit: Independent  Balance:  Sitting: Intact  Standing: Intact; Without support   Ambulation/Gait Training:  Distance (ft): 120 Feet (ft)  Assistive Device: Gait belt(IV pole)  Ambulation - Level of Assistance: Contact guard assistance    Gait Abnormalities: Ataxic;Decreased step clearance; Step to gait; Other(ankle inversion)    Pain:  Pain level pre-treatment: 4/10  Pain level post-treatment: 4/10   Pain Intervention(s): Medication (see MAR); Rest, Ice, Repositioning   Response to intervention: Nurse notified, See doc flow    Activity Tolerance:   Fair+  Please refer to the flowsheet for vital signs taken during this treatment. After treatment:   [] Patient left in no apparent distress sitting up in chair  [x] Patient left in no apparent distress in bed  [x] Call bell left within reach  [] Nursing notified  [x] Caregiver present  [] Bed alarm activated  [] SCDs applied      COMMUNICATION/EDUCATION:   [x]         Role of Physical Therapy in the acute care setting. [x]         Fall prevention education was provided and the patient/caregiver indicated understanding. [x]         Patient/family have participated as able in working toward goals and plan of care. [x]         Patient/family agree to work toward stated goals and plan of care. []         Patient understands intent and goals of therapy, but is neutral about his/her participation.   []         Patient is unable to participate in stated goals/plan of care: ongoing with therapy staff.  []         Other:        Car Martinez PTA   Time Calculation: 11 mins

## 2020-01-07 NOTE — PROGRESS NOTES
Hospitalist Progress Note-critical care note     Patient: Hector Mendez MRN: [id number]  [facility]: [id number]    YOB: 1945  Age: 76 y.o. Sex: male    DOA: 1/1/2020 LOS:  LOS: 5 days            Chief complaint: DM, sepsis , positive bcx, uti m     Assessment/Plan         Hospital Problems  Date Reviewed: 1/2/2020          Codes Class Noted POA    PAD (peripheral artery disease) ([facility] ICD-10-CM: I73.9  ICD-9-CM: 443.9  1/7/2020 Unknown        Cholecystitis, unspecified ICD-10-CM: K81.9  ICD-9-CM: 575.10  1/6/2020 Unknown        * (Principal) Sepsis due to Streptococcus agalactiae ([facility] ICD-10-CM: A40.1  ICD-9-CM: 038.0, 995.91  1/6/2020 Unknown        Diabetic ulcer of right midfoot associated with type 2 diabetes mellitus, with fat layer exposed ([facility] ICD-10-CM: E11.621, E99.183  ICD-9-CM: 250.80, 707.14  1/6/2020 Unknown        Foot abscess, right ICD-10-CM: L02.611  ICD-9-CM: 682.7  1/5/2020 Unknown        Corn of foot ICD-10-CM: L84  ICD-9-CM: 700  1/5/2020 Unknown        Type 2 diabetes mellitus, with long-term current use of insulin ([facility] ICD-10-CM: E11.9, Z79.4  ICD-9-CM: 250.00, V58.67  1/3/2020         Hypertension ICD-10-CM: I10  ICD-9-CM: 401.9  1/3/2020 Unknown        Hypokalemia ICD-10-CM: E87.6  ICD-9-CM: 276.8  1/3/2020 Unknown        UTI (urinary tract infection) ICD-10-CM: N39.0  ICD-9-CM: 599.0  1/3/2020 Unknown        Positive blood culture ICD-10-CM: R78.81  ICD-9-CM: 790.7  1/3/2020 Unknown        Sepsis ([facility] ICD-10-CM: A41.9  ICD-9-CM: 038.9, 995.91  1/2/2020 Yes        Abdominal pain ICD-10-CM: R10.9  ICD-9-CM: 789.00  1/2/2020 Yes        CKD (chronic kidney disease) ICD-10-CM: N18.9  ICD-9-CM: 585.9  1/2/2020 Yes              Admitted for sepsis, bacteremia, cholecystitis r/o.  Foot abscess       Rt foot abscess and PAD   I&D done per dr. Son Mccall done -left anterior tibial artery no blood flow-case discussed with vascular    Ct foot -fluid collection/abscess, no om noted Bacteremia   [facility] ,repeated no growth   Like from UTI and foot abscess   Afebrile overnight   Appreciated ID on board. Sepsis   See above   Ct abdomen reviewed   Ultrasound abdomen-possible cholecystitis-HIDA negative-jem wells does not think cholecystitis   Due to uti/foot infection        Abdomen pain   Resolved     DM type II    ssi     Hypokalemia   K replacement, will check mg     ckd 2-3 stable       Subjective: diarrhea     Daughter was at the bedside   Disposition :tbd,   Review of systems:    General: no fevers, no chills. Cardiovascular: No chest pain or pressure. No palpitations. Pulmonary: No shortness of breath. Gastrointestinal: No nausea, vomiting. No abdomen pain , diarrhea     Vital signs/Intake and Output:  Visit Vitals  /68 (BP 1 Location: Left arm, BP Patient Position: At rest;Sitting)   Pulse 83   Temp 98.3 °F (36.8 °C)   Resp 16   Ht 5' 6\" (1.676 m)   Wt 77.6 kg (171 lb)   SpO2 100%   BMI 27.60 kg/m²     Current Shift:  01/07 0701 - 01/07 1900  In: 240 [P.O.:240]  Out: -   Last three shifts:  01/05 1901 - 01/07 0700  In: 1880 [P.O.:1180; I.V.:700]  Out: -     Physical Exam:  General: WD, WN. Alert, cooperative, no acute distress    HEENT: NC, Atraumatic. PERRLA, anicteric sclerae. Lungs: CTA Bilaterally. No Wheezing/Rhonchi/Rales. Heart:  Regular  rhythm,  No murmur, No Rubs, No Gallops  Abdomen: Soft, Non distended, mild tenderness of LLQ.  +Bowel sounds,   Extremities: No c/c. Latera of Rt foot wrapped   Psych:   Not anxious or agitated. Neurologic:  No acute neurological deficit.              Labs: Results:       Chemistry Recent Labs     01/07/20  0421 01/06/20  0438 01/05/20  0411   * 121* 118*    140 141   K 3.7 3.7 4.1    112* 111   CO2 25 23 23   BUN 9 12 12   CREA 1.25 1.50* 1.30   CA 8.1* 8.1* 8.0*   AGAP 6 5 7   BUCR 7* 8* 9*   AP  --   --  56   TP  --   --  6.0*   ALB  --   --  2.4*   GLOB  --   --  3.6   AGRAT  --   -- 0.7*      CBC w/Diff Recent Labs     01/05/20 0411   WBC 9.0   RBC 2.67*   HGB 8.1*   HCT 23.5*         Cardiac Enzymes No results for input(s): CPK, CKND1, KAREN in the last 72 hours. No lab exists for component: CKRMB, TROIP   Coagulation No results for input(s): PTP, INR, APTT, INREXT, INREXT in the last 72 hours. Lipid Panel No results found for: CHOL, CHOLPOCT, CHOLX, CHLST, CHOLV, 316843, HDL, HDLP, LDL, LDLC, DLDLP, 982166, VLDLC, VLDL, TGLX, TRIGL, TRIGP, TGLPOCT, CHHD, CHHDX   BNP No results for input(s): BNPP in the last 72 hours.    Liver Enzymes Recent Labs     01/05/20 0411   TP 6.0*   ALB 2.4*   AP 56   SGOT 19      Thyroid Studies No results found for: T4, T3U, TSH, TSHEXT, TSHEXT     Procedures/imaging: see electronic medical records for all procedures/Xrays and details which were not copied into this note but were reviewed prior to creation of Paul Damico MD

## 2020-01-07 NOTE — DIABETES MGMT
GLYCEMIC CONTROL PROGRESS NOTE:    - known h/o T2DM, HbA1 not within recommended range for age + comorbids on basal/oral home regimen  - BG out of target range non-ICU: < 180 mg/dL  - TDD = 18 units - Humalog Very Insulin Resistant Corrective Coverage  - PPG out of target range recommend initiate mealtime   *Humalog 5 units qac    Recent Glucose Results:   Lab Results   Component Value Date/Time     (H) 01/07/2020 04:21 AM    GLUCPOC 126 (H) 01/07/2020 06:45 AM    GLUCPOC 146 (H) 01/06/2020 08:57 PM    GLUCPOC 251 (H) 01/06/2020 04:54 PM     Jr Vu MS, RN, CDE  Glycemic Control Team  [phone]  Pager [phone] (M-TH 8:00-4:30P)  *After Hours pager [phone]

## 2020-01-08 ENCOUNTER — APPOINTMENT (OUTPATIENT)
Dept: INTERVENTIONAL RADIOLOGY/VASCULAR | Age: 75
DRG: 854 | End: 2020-01-08
Attending: SURGERY
Payer: MEDICARE

## 2020-01-08 ENCOUNTER — ANESTHESIA EVENT (OUTPATIENT)
Dept: SURGERY | Age: 75
DRG: 854 | End: 2020-01-08
Payer: MEDICARE

## 2020-01-08 ENCOUNTER — ANESTHESIA (OUTPATIENT)
Dept: SURGERY | Age: 75
DRG: 854 | End: 2020-01-08
Payer: MEDICARE

## 2020-01-08 LAB
ANION GAP SERPL CALC-SCNC: 5 MMOL/L (ref 3–18)
BACTERIA SPEC CULT: ABNORMAL
BACTERIA SPEC CULT: ABNORMAL
BACTERIA SPEC CULT: NORMAL
BACTERIA SPEC CULT: NORMAL
BUN SERPL-MCNC: 10 MG/DL (ref 7–18)
BUN/CREAT SERPL: 7 (ref 12–20)
CALCIUM SERPL-MCNC: 7.9 MG/DL (ref 8.5–10.1)
CHLORIDE SERPL-SCNC: 111 MMOL/L (ref 100–111)
CO2 SERPL-SCNC: 27 MMOL/L (ref 21–32)
CREAT SERPL-MCNC: 1.38 MG/DL (ref 0.6–1.3)
GLUCOSE BLD STRIP.AUTO-MCNC: 145 MG/DL (ref 70–110)
GLUCOSE BLD STRIP.AUTO-MCNC: 255 MG/DL (ref 70–110)
GLUCOSE BLD STRIP.AUTO-MCNC: 333 MG/DL (ref 70–110)
GLUCOSE SERPL-MCNC: 130 MG/DL (ref 74–99)
GRAM STN SPEC: ABNORMAL
GRAM STN SPEC: ABNORMAL
MAGNESIUM SERPL-MCNC: 1.8 MG/DL (ref 1.6–2.6)
POTASSIUM SERPL-SCNC: 3.9 MMOL/L (ref 3.5–5.5)
SERVICE CMNT-IMP: ABNORMAL
SERVICE CMNT-IMP: NORMAL
SERVICE CMNT-IMP: NORMAL
SODIUM SERPL-SCNC: 143 MMOL/L (ref 136–145)

## 2020-01-08 PROCEDURE — 99153 MOD SED SAME PHYS/QHP EA: CPT

## 2020-01-08 PROCEDURE — 74011636320 HC RX REV CODE- 636/320: Performed by: FAMILY MEDICINE

## 2020-01-08 PROCEDURE — 74011250636 HC RX REV CODE- 250/636: Performed by: SURGERY

## 2020-01-08 PROCEDURE — 77030013687 IR ANGIO EXT LOWER RT

## 2020-01-08 PROCEDURE — 99152 MOD SED SAME PHYS/QHP 5/>YRS: CPT

## 2020-01-08 PROCEDURE — 74011636637 HC RX REV CODE- 636/637: Performed by: HOSPITALIST

## 2020-01-08 PROCEDURE — 74011250637 HC RX REV CODE- 250/637: Performed by: FAMILY MEDICINE

## 2020-01-08 PROCEDURE — 74011000250 HC RX REV CODE- 250: Performed by: FAMILY MEDICINE

## 2020-01-08 PROCEDURE — 80048 BASIC METABOLIC PNL TOTAL CA: CPT

## 2020-01-08 PROCEDURE — 36415 COLL VENOUS BLD VENIPUNCTURE: CPT

## 2020-01-08 PROCEDURE — 74011250636 HC RX REV CODE- 250/636: Performed by: FAMILY MEDICINE

## 2020-01-08 PROCEDURE — 99157 MOD SED OTHER PHYS/QHP EA: CPT

## 2020-01-08 PROCEDURE — 74011250637 HC RX REV CODE- 250/637: Performed by: HOSPITALIST

## 2020-01-08 PROCEDURE — 74011250636 HC RX REV CODE- 250/636: Performed by: EMERGENCY MEDICINE

## 2020-01-08 PROCEDURE — 82962 GLUCOSE BLOOD TEST: CPT

## 2020-01-08 PROCEDURE — C1725 CATH, TRANSLUMIN NON-LASER: HCPCS

## 2020-01-08 PROCEDURE — 65660000000 HC RM CCU STEPDOWN

## 2020-01-08 PROCEDURE — 83735 ASSAY OF MAGNESIUM: CPT

## 2020-01-08 PROCEDURE — 74011250637 HC RX REV CODE- 250/637: Performed by: INTERNAL MEDICINE

## 2020-01-08 PROCEDURE — 74011000258 HC RX REV CODE- 258: Performed by: EMERGENCY MEDICINE

## 2020-01-08 PROCEDURE — 74011250636 HC RX REV CODE- 250/636

## 2020-01-08 RX ORDER — HEPARIN SODIUM 1000 [USP'U]/ML
10000 INJECTION, SOLUTION INTRAVENOUS; SUBCUTANEOUS
Status: DISCONTINUED | OUTPATIENT
Start: 2020-01-08 | End: 2020-01-14 | Stop reason: HOSPADM

## 2020-01-08 RX ORDER — SODIUM CHLORIDE 9 MG/ML
25 INJECTION, SOLUTION INTRAVENOUS CONTINUOUS
Status: DISCONTINUED | OUTPATIENT
Start: 2020-01-08 | End: 2020-01-14 | Stop reason: HOSPADM

## 2020-01-08 RX ORDER — LIDOCAINE HYDROCHLORIDE 10 MG/ML
1-10 INJECTION, SOLUTION EPIDURAL; INFILTRATION; INTRACAUDAL; PERINEURAL
Status: COMPLETED | OUTPATIENT
Start: 2020-01-08 | End: 2020-01-08

## 2020-01-08 RX ORDER — MIDAZOLAM HYDROCHLORIDE 1 MG/ML
INJECTION, SOLUTION INTRAMUSCULAR; INTRAVENOUS
Status: COMPLETED
Start: 2020-01-08 | End: 2020-01-08

## 2020-01-08 RX ORDER — NALOXONE HYDROCHLORIDE 0.4 MG/ML
0.4 INJECTION, SOLUTION INTRAMUSCULAR; INTRAVENOUS; SUBCUTANEOUS AS NEEDED
Status: DISCONTINUED | OUTPATIENT
Start: 2020-01-08 | End: 2020-01-14 | Stop reason: HOSPADM

## 2020-01-08 RX ORDER — MIDAZOLAM HYDROCHLORIDE 1 MG/ML
.5-4 INJECTION, SOLUTION INTRAMUSCULAR; INTRAVENOUS
Status: DISCONTINUED | OUTPATIENT
Start: 2020-01-08 | End: 2020-01-14 | Stop reason: HOSPADM

## 2020-01-08 RX ORDER — HEPARIN SODIUM 1000 [USP'U]/ML
INJECTION, SOLUTION INTRAVENOUS; SUBCUTANEOUS
Status: COMPLETED
Start: 2020-01-08 | End: 2020-01-08

## 2020-01-08 RX ORDER — NALOXONE HYDROCHLORIDE 0.4 MG/ML
INJECTION, SOLUTION INTRAMUSCULAR; INTRAVENOUS; SUBCUTANEOUS
Status: DISCONTINUED
Start: 2020-01-08 | End: 2020-01-08 | Stop reason: WASHOUT

## 2020-01-08 RX ORDER — NITROGLYCERIN 10 MG/100ML
INJECTION INTRAVENOUS
Status: DISCONTINUED
Start: 2020-01-08 | End: 2020-01-08 | Stop reason: WASHOUT

## 2020-01-08 RX ORDER — PROTAMINE SULFATE 10 MG/ML
INJECTION, SOLUTION INTRAVENOUS
Status: COMPLETED
Start: 2020-01-08 | End: 2020-01-08

## 2020-01-08 RX ORDER — HEPARIN SODIUM 200 [USP'U]/100ML
1000 INJECTION, SOLUTION INTRAVENOUS
Status: ACTIVE | OUTPATIENT
Start: 2020-01-08 | End: 2020-01-09

## 2020-01-08 RX ORDER — WATER FOR INJECTION,STERILE
VIAL (ML) INJECTION
Status: DISCONTINUED
Start: 2020-01-08 | End: 2020-01-08 | Stop reason: WASHOUT

## 2020-01-08 RX ORDER — FLUMAZENIL 0.1 MG/ML
0.2 INJECTION INTRAVENOUS
Status: DISCONTINUED | OUTPATIENT
Start: 2020-01-08 | End: 2020-01-14 | Stop reason: HOSPADM

## 2020-01-08 RX ORDER — FENTANYL CITRATE 50 UG/ML
25-200 INJECTION, SOLUTION INTRAMUSCULAR; INTRAVENOUS
Status: DISCONTINUED | OUTPATIENT
Start: 2020-01-08 | End: 2020-01-14 | Stop reason: HOSPADM

## 2020-01-08 RX ORDER — PROTAMINE SULFATE 10 MG/ML
25 INJECTION, SOLUTION INTRAVENOUS
Status: DISPENSED | OUTPATIENT
Start: 2020-01-08 | End: 2020-01-09

## 2020-01-08 RX ORDER — FENTANYL CITRATE 50 UG/ML
INJECTION, SOLUTION INTRAMUSCULAR; INTRAVENOUS
Status: COMPLETED
Start: 2020-01-08 | End: 2020-01-08

## 2020-01-08 RX ORDER — FLUMAZENIL 0.1 MG/ML
INJECTION INTRAVENOUS
Status: DISCONTINUED
Start: 2020-01-08 | End: 2020-01-08 | Stop reason: WASHOUT

## 2020-01-08 RX ADMIN — LIDOCAINE HYDROCHLORIDE ANHYDROUS 6 ML: 10 INJECTION, SOLUTION INFILTRATION at 16:03

## 2020-01-08 RX ADMIN — ASPIRIN 81 MG 81 MG: 81 TABLET ORAL at 09:00

## 2020-01-08 RX ADMIN — FENTANYL CITRATE 50 MCG: 50 INJECTION, SOLUTION INTRAMUSCULAR; INTRAVENOUS at 16:04

## 2020-01-08 RX ADMIN — FENTANYL CITRATE 25 MCG: 50 INJECTION, SOLUTION INTRAMUSCULAR; INTRAVENOUS at 16:26

## 2020-01-08 RX ADMIN — INSULIN LISPRO 9 UNITS: 100 INJECTION, SOLUTION INTRAVENOUS; SUBCUTANEOUS at 12:30

## 2020-01-08 RX ADMIN — SODIUM CHLORIDE 25 ML/HR: 900 INJECTION, SOLUTION INTRAVENOUS at 15:30

## 2020-01-08 RX ADMIN — HEPARIN SODIUM 3000 UNITS: 1000 INJECTION, SOLUTION INTRAVENOUS; SUBCUTANEOUS at 16:25

## 2020-01-08 RX ADMIN — Medication 500 MG: at 09:01

## 2020-01-08 RX ADMIN — AMLODIPINE BESYLATE 2.5 MG: 2.5 TABLET ORAL at 09:01

## 2020-01-08 RX ADMIN — MIDAZOLAM HYDROCHLORIDE 1 MG: 1 INJECTION, SOLUTION INTRAMUSCULAR; INTRAVENOUS at 16:04

## 2020-01-08 RX ADMIN — PIPERACILLIN AND TAZOBACTAM 4.5 G: 4; .5 INJECTION, POWDER, FOR SOLUTION INTRAVENOUS at 00:18

## 2020-01-08 RX ADMIN — MIDAZOLAM HYDROCHLORIDE 1 MG: 1 INJECTION, SOLUTION INTRAMUSCULAR; INTRAVENOUS at 15:59

## 2020-01-08 RX ADMIN — MIDAZOLAM HYDROCHLORIDE 1 MG: 1 INJECTION, SOLUTION INTRAMUSCULAR; INTRAVENOUS at 16:26

## 2020-01-08 RX ADMIN — PIPERACILLIN AND TAZOBACTAM 4.5 G: 4; .5 INJECTION, POWDER, FOR SOLUTION INTRAVENOUS at 05:29

## 2020-01-08 RX ADMIN — PIPERACILLIN AND TAZOBACTAM 4.5 G: 4; .5 INJECTION, POWDER, FOR SOLUTION INTRAVENOUS at 12:28

## 2020-01-08 RX ADMIN — ACETAMINOPHEN 650 MG: 325 TABLET ORAL at 20:21

## 2020-01-08 RX ADMIN — PIPERACILLIN AND TAZOBACTAM 4.5 G: 4; .5 INJECTION, POWDER, FOR SOLUTION INTRAVENOUS at 19:01

## 2020-01-08 RX ADMIN — PIPERACILLIN AND TAZOBACTAM 4.5 G: 4; .5 INJECTION, POWDER, FOR SOLUTION INTRAVENOUS at 23:44

## 2020-01-08 RX ADMIN — HEPARIN SODIUM 5000 UNITS: 1000 INJECTION, SOLUTION INTRAVENOUS; SUBCUTANEOUS at 16:20

## 2020-01-08 RX ADMIN — Medication 500 MG: at 20:20

## 2020-01-08 RX ADMIN — FENTANYL CITRATE 25 MCG: 50 INJECTION, SOLUTION INTRAMUSCULAR; INTRAVENOUS at 16:45

## 2020-01-08 RX ADMIN — FENTANYL CITRATE 50 MCG: 50 INJECTION, SOLUTION INTRAMUSCULAR; INTRAVENOUS at 15:59

## 2020-01-08 RX ADMIN — ATORVASTATIN CALCIUM 20 MG: 20 TABLET, FILM COATED ORAL at 23:40

## 2020-01-08 RX ADMIN — IOPAMIDOL 40 ML: 510 INJECTION, SOLUTION INTRAVASCULAR at 16:15

## 2020-01-08 RX ADMIN — PROTAMINE SULFATE 25 MG: 10 INJECTION, SOLUTION INTRAVENOUS at 17:01

## 2020-01-08 RX ADMIN — INSULIN LISPRO 12 UNITS: 100 INJECTION, SOLUTION INTRAVENOUS; SUBCUTANEOUS at 23:40

## 2020-01-08 RX ADMIN — HEPARIN SODIUM 5000 UNITS: 5000 INJECTION INTRAVENOUS; SUBCUTANEOUS at 05:28

## 2020-01-08 NOTE — PROGRESS NOTES
2115. Assumed care of patient. Pt is resting in bed, No acute distress noted  White board updated, bed wheels locked, call bell in reach. 2300. Bedside and Verbal shift change report given to Eneida MELTON (oncoming nurse) by Lawrence Lawrence RN (offgoing nurse). Report included the following information SBAR, Intake/Output, MAR and Recent Results.

## 2020-01-08 NOTE — PROGRESS NOTES
Received pt post procedure from IR,  Pt resting, no complaints at this time. Dressing to left groin area clean, dry and intact.   Pedal pulses by doppler

## 2020-01-08 NOTE — PROGRESS NOTES
Pt arrived on unit; Pt Alert and Oriented; Consent signed; See MAC_lab for sedation report and/or vital signs. Pt transferred to treatment table for procedure. Daughter Oly Cooley present for consent. Right signal present pre procedure.

## 2020-01-08 NOTE — PROGRESS NOTES
0700: Ass[place] of [place] from [place].   0090: pt down in angio for angiogram. And pt will have I&D tomorrow morning.   1830 Pt back from angio gram tolerated procedure well

## 2020-01-08 NOTE — PROGRESS NOTES
Transition of care anticipate home when medically cleared  Patient does not speak english. He lives with his daughter and she translates for him  Patient has Dr Barrington Lockett for pcp and medicare for insurance. As noted per vascular will have angiogram later today. Cm will continue to follow antiicpate may need [facility] list has been given to daughter but not ready for d/c    Care Management Interventions  PCP Verified by CM:  Yes  Transition of Care Consult (CM Consult): Discharge Planning  Current Support Network: Relative's Home  Confirm Follow Up Transport: Family  The Plan for Transition of Care is Related to the Following Treatment Goals : home with daughter when medically cleared  Freedom of Choice List was Provided with Basic Dialogue that Supports the Patient's Individualized Plan of Care/Goals, Treatment Preferences and Shares the Quality Data Associated with the Providers?: Yes  Discharge Location  Discharge Placement: Home with family assistance

## 2020-01-08 NOTE — PROGRESS NOTES
TRANSFER - OUT REPORT:    Verbal report given to Miguelina/Fabiola RN/Connie/ RN on  on Sang Vasquez  being transferred to [facility]/ [address] [place] [address](unit) for ordered procedure       Report consisted of patients Situation, Background, Assessment and   Recommendations(SBAR). Information from the following report(s) SBAR, Kardex and MAR was reviewed with the receiving nurse. Lines:   Peripheral IV 01/01/20 Right Antecubital (Active)   Site Assessment Clean, dry, & intact 1/8/2020 10:53 AM   Phlebitis Assessment 0 1/8/2020 10:53 AM   Infiltration Assessment 0 1/8/2020 10:53 AM   Dressing Status Clean, dry, & intact 1/8/2020 10:53 AM   Dressing Type Tape;Transparent 1/8/2020 10:53 AM   Hub Color/Line Status Pink 1/8/2020 10:53 AM   Action Taken Open ports on tubing capped 1/8/2020 10:53 AM   Alcohol Cap Used Yes 1/8/2020 10:53 AM       Peripheral IV 01/04/20 Right Forearm (Active)   Site Assessment Clean, dry, & intact 1/8/2020 10:53 AM   Phlebitis Assessment 0 1/8/2020 10:53 AM   Infiltration Assessment 0 1/8/2020 10:53 AM   Dressing Status Clean, dry, & intact 1/8/2020 10:53 AM   Dressing Type Tape;Transparent 1/8/2020 10:53 AM   Hub Color/Line Status Blue 1/8/2020 10:53 AM   Action Taken Open ports on tubing capped 1/8/2020 10:53 AM   Alcohol Cap Used Yes 1/8/2020 10:53 AM        Opportunity for questions and clarification was provided. Pt to recover in Heart Care Unit. Wasted 50 mcq fentanyl and 1 mg versed with Anuradha Ruth. Signal remains strong post procedure on rt leg.      Patient transported with:   Registered Nurse

## 2020-01-08 NOTE — PROGRESS NOTES
18 Pt received from offgoing nurse without any signs or symptoms of distress. Pt vitals are stable and within normal limits. Pt bed in low position with wheels locked and call bell within reach. 0018 Scheduled medications administered as ordered. Purposeful rounding completed. Pt resting quietly. No further needs voiced at this time. 0023 Assessment completed and documented in flow sheet. Pt denies any further needs at this time. Pt in NAD with bed in low position, wheels locked and call bell within reach. 0330 Purposeful rounding completed. Pt resting quietly. No further needs voiced at this time. 2529 Reassessment completed with no changes noted. Bed locked, in lowest position, with call light within reach. Purposeful rounding completed. Pt resting quietly. No further needs voiced at this time. 1618 written report left for oncoming nurse with supervisor.

## 2020-01-08 NOTE — PROGRESS NOTES
Hospitalist Progress Note-critical care note     Patient: Carine Sanon MRN: [id number]  CSN: [id number]    YOB: 1945  Age: 76 y.o. Sex: male    DOA: 1/1/2020 LOS:  LOS: 6 days            Chief complaint: DM, sepsis , positive bcx, uti m     Assessment/Plan         Hospital Problems  Date Reviewed: 1/8/2020          Codes Class Noted POA    PAD (peripheral artery disease) ([facility] ICD-10-CM: I73.9  ICD-9-CM: 443.9  1/7/2020 Unknown        Cholecystitis, unspecified ICD-10-CM: K81.9  ICD-9-CM: 575.10  1/6/2020 Unknown        * (Principal) Sepsis due to Streptococcus agalactiae ([facility] ICD-10-CM: A40.1  ICD-9-CM: 038.0, 995.91  1/6/2020 Unknown        Diabetic ulcer of right midfoot associated with type 2 diabetes mellitus, with fat layer exposed ([facility] ICD-10-CM: E11.621, T68.041  ICD-9-CM: 250.80, 707.14  1/6/2020 Unknown        Foot abscess, right ICD-10-CM: L02.611  ICD-9-CM: 682.7  1/5/2020 Unknown        Corn of foot ICD-10-CM: L84  ICD-9-CM: 700  1/5/2020 Unknown        Type 2 diabetes mellitus, with long-term current use of insulin ([facility] ICD-10-CM: E11.9, Z79.4  ICD-9-CM: 250.00, V58.67  1/3/2020         Hypertension ICD-10-CM: I10  ICD-9-CM: 401.9  1/3/2020 Unknown        Hypokalemia ICD-10-CM: E87.6  ICD-9-CM: 276.8  1/3/2020 Unknown        UTI (urinary tract infection) ICD-10-CM: N39.0  ICD-9-CM: 599.0  1/3/2020 Unknown        Positive blood culture ICD-10-CM: R78.81  ICD-9-CM: 790.7  1/3/2020 Unknown        Sepsis ([facility] ICD-10-CM: A41.9  ICD-9-CM: 038.9, 995.91  1/2/2020 Yes        Abdominal pain ICD-10-CM: R10.9  ICD-9-CM: 789.00  1/2/2020 Yes        CKD (chronic kidney disease) ICD-10-CM: N18.9  ICD-9-CM: 585.9  1/2/2020 Yes              Admitted for sepsis, bacteremia, cholecystitis r/o.  Foot abscess       Rt foot abscess and PAD   I&D done per dr. Osorio Duncan and vascular both on board -planning surgery and angio today   Ct foot -fluid collection/abscess, no om noted     Bacteremia   [facility] ,repeated no growth   Like from UTI and foot abscess   ID on board -on zosyn now       Abdomen pain   Resolved     DM type II    ssi     Hypokalemia   K replacement, will check mg     ckd 2-3 stable       Subjective: I feel fine     Disposition :tbd,   Review of systems:    General: no fevers, no chills. Cardiovascular: No chest pain or pressure. No palpitations. Pulmonary: No shortness of breath. Gastrointestinal: No nausea, vomiting. No abdomen pain , diarrhea     Vital signs/Intake and Output:  Visit Vitals  /74   Pulse 77   Temp 98 °F (36.7 °C)   Resp 13   Ht 5' 6\" (1.676 m)   Wt 77.6 kg (171 lb 1.2 oz)   SpO2 97%   BMI 27.61 kg/m²     Current Shift:  No intake/output data recorded. Last three shifts:  01/06 1901 - 01/08 0700  In: 1020 [P.O.:720; I.V.:300]  Out: -     Physical Exam:  General: WD, WN. Alert, cooperative, no acute distress    HEENT: NC, Atraumatic. PERRLA, anicteric sclerae. Lungs: CTA Bilaterally. No Wheezing/Rhonchi/Rales. Heart:  Regular  rhythm,  No murmur, No Rubs, No Gallops  Abdomen: Soft, Non distended,.  +Bowel sounds,   Extremities: No c/c. Latera of Rt foot wrapped   Psych:   Not anxious or agitated. Neurologic:  No acute neurological deficit. Labs: Results:       Chemistry Recent Labs     01/08/20  0300 01/07/20  0421 01/06/20  0438   * 124* 121*    142 140   K 3.9 3.7 3.7    111 112*   CO2 27 25 23   BUN 10 9 12   CREA 1.38* 1.25 1.50*   CA 7.9* 8.1* 8.1*   AGAP 5 6 5   BUCR 7* 7* 8*      CBC w/Diff No results for input(s): WBC, RBC, HGB, HCT, PLT, GRANS, LYMPH, EOS, HGBEXT, HCTEXT, PLTEXT, HGBEXT, HCTEXT, PLTEXT in the last 72 hours. Cardiac Enzymes No results for input(s): CPK, CKND1, KAREN in the last 72 hours. No lab exists for component: CKRMB, TROIP   Coagulation No results for input(s): PTP, INR, APTT, INREXT, INREXT in the last 72 hours.     Lipid Panel No results found for: CHOL, CHOLPOCT, CHOLX, [address], [place], 430832, HDL, HDLP, LDL, LDLC, DLDLP, 001012, VLDLC, VLDL, TGLX, TRIGL, TRIGP, TGLPOCT, CHHD, CHHDX   BNP No results for input(s): BNPP in the last 72 hours. Liver Enzymes No results for input(s): TP, ALB, TBIL, AP, SGOT, GPT in the last 72 hours.     No lab exists for component: DBIL   Thyroid Studies No results found for: T4, T3U, TSH, TSHEXT, TSHEXT     Procedures/imaging: see electronic medical records for all procedures/Xrays and details which were not copied into this note but were reviewed prior to creation of Ascencion Whitlock MD

## 2020-01-08 NOTE — PROGRESS NOTES
Progress Note      Patient: Starla Krueger               Sex: male          DOA: 1/1/2020       YOB: 1945      Age:  76 y.o.        LOS:  LOS: 6 days               Subjective:   Patient seen today for follow up. No change.     Medications:     Current Facility-Administered Medications:     Saccharomyces boulardii (FLORASTOR) capsule 500 mg, 500 mg, Oral, BID, Gretchen Dale MD, 500 mg at 01/07/20 2229    insulin lispro (HUMALOG) injection, , SubCUTAneous, AC&HS, Gretchen Dale MD, 12 Units at 01/07/20 2230    amLODIPine (NORVASC) tablet 2.5 mg, 2.5 mg, Oral, DAILY, Gretchen Dale MD, 2.5 mg at 01/07/20 1020    ondansetron (ZOFRAN) injection 4 mg, 4 mg, IntraVENous, Q6H PRN, Rock Rendon MD    heparin (porcine) injection 5,000 Units, 5,000 Units, SubCUTAneous, Q8H, Rock Rendon MD, 5,000 Units at 01/08/20 4976    aspirin chewable tablet 81 mg, 81 mg, Oral, DAILY, Rock Rendon MD, 81 mg at 01/07/20 1020    atorvastatin (LIPITOR) tablet 20 mg, 20 mg, Oral, QHS, Nannette RODRIGUEZ MD, 20 mg at 01/07/20 2229    glucose chewable tablet 16 g, 16 g, Oral, PRN, Rock Rendon MD    glucagon (GLUCAGEN) injection 1 mg, 1 mg, IntraMUSCular, PRN, Rock Rendon MD    dextrose 10% infusion 125-250 mL, 125-250 mL, IntraVENous, PRN, Rock Rendon MD    acetaminophen (TYLENOL) tablet 650 mg, 650 mg, Oral, Q6H PRN, Melida Butler MD, 650 mg at 01/06/20 0022    sodium chloride (NS) flush 5-10 mL, 5-10 mL, IntraVENous, PRN, Tg Quan MD, 10 mL at 01/01/20 2336    piperacillin-tazobactam (ZOSYN) 4.5 g in 0.9% sodium chloride (MBP/ADV) 100 mL MBP, 4.5 g, IntraVENous, Q6H, Tg Quan MD, Last Rate: 200 mL/hr at 01/08/20 0529, 4.5 g at 01/08/20 6111    Review of Systems  Negative for chest pain and shortness of breath          Objective:      Visit Vitals  /64 (BP 1 Location: Right arm, BP Patient Position: At rest)   Pulse 81   Temp 99.7 °F (37.6 °C)   Resp 16   Ht 5' 6\" (1.676 m)   Wt 77.6 kg (171 lb 1.2 oz)   SpO2 100%   BMI 27.61 kg/m²       Physical Exam:  No change in the right foot  Continued ulcer R foot  Exposed capsule    Labs:  Recent Results (from the past 24 hour(s))   GLUCOSE, POC    Collection Time: 01/07/20 11:19 AM   Result Value Ref Range    Glucose (POC) 201 (H) 70 - 110 mg/dL   ANKLE BRACHIAL INDEX    Collection Time: 01/07/20  2:25 PM   Result Value Ref Range    Left arm  mmHg    Right arm  mmHg    Left posterior tibial 211 mmHg    Right posterior tibial 193 mmHg    Left anterior tibial 213 mmHg    Right anterior tibial 185 mmHg    Left LILY 1.29     Right LILY 1.17    GLUCOSE, POC    Collection Time: 01/07/20  4:32 PM   Result Value Ref Range    Glucose (POC) 279 (H) 70 - 110 mg/dL   GLUCOSE, POC    Collection Time: 01/07/20  9:19 PM   Result Value Ref Range    Glucose (POC) 311 (H) 70 - 110 mg/dL   MAGNESIUM    Collection Time: 01/08/20  3:00 AM   Result Value Ref Range    Magnesium 1.8 1.6 - 2.6 mg/dL   METABOLIC PANEL, BASIC    Collection Time: 01/08/20  3:00 AM   Result Value Ref Range    Sodium 143 136 - 145 mmol/L    Potassium 3.9 3.5 - 5.5 mmol/L    Chloride 111 100 - 111 mmol/L    CO2 27 21 - 32 mmol/L    Anion gap 5 3.0 - 18 mmol/L    Glucose 130 (H) 74 - 99 mg/dL    BUN 10 7.0 - 18 MG/DL    Creatinine 1.38 (H) 0.6 - 1.3 MG/DL    BUN/Creatinine ratio 7 (L) 12 - 20      GFR est AA >60 >60 ml/min/1.73m2    GFR est non-AA 50 (L) >60 ml/min/1.73m2    Calcium 7.9 (L) 8.5 - 10.1 MG/DL   GLUCOSE, POC    Collection Time: 01/08/20  6:34 AM   Result Value Ref Range    Glucose (POC) 145 (H) 70 - 110 mg/dL           Assessment/Plan     Principal Problem:    Sepsis due to Streptococcus agalactiae ([facility]) (1/6/2020)    Active Problems:    Sepsis ([address] (1/2/2020)      Abdominal pain (1/2/2020)      CKD (chronic kidney disease) (1/2/2020)      Type 2 diabetes mellitus, with long-term current use of insulin ([facility] (1/3/2020) Hypertension (1/3/2020)      Hypokalemia (1/3/2020)      UTI (urinary tract infection) (1/3/2020)      Positive blood culture (1/3/2020)      Foot abscess, right (1/5/2020)      Corn of foot (1/5/2020)      Cholecystitis, unspecified (1/6/2020)      Diabetic ulcer of right midfoot associated with type 2 diabetes mellitus, with fat layer exposed ([address] (1/6/2020)      PAD (peripheral artery disease) ([address] (1/7/2020)        Patient seen and evaluated today. Recommended resection of bone with roational flap following ulcer excision. I reviewed the procedure at length and potential for future surgery.  NPO after breakfast.

## 2020-01-08 NOTE — DIABETES MGMT
GLYCEMIC CONTROL PROGRESS NOTE:    - known h/o T2DM, HbA1 not within recommended range for age + comorbids on basal/oral home regimen  - BG out of target range non-ICU: < 180 mg/dL  - TDD = 27 units - Humalog Very Insulin Resistant Corrective Coverage  - PPG out of target range recommend initiate mealtime   *Humalog 7 units qac    Recent Glucose Results:   Lab Results   Component Value Date/Time     (H) 01/08/2020 03:00 AM    GLUCPOC 145 (H) 01/08/2020 06:34 AM    GLUCPOC 311 (H) 01/07/2020 09:19 PM    GLUCPOC 279 (H) 01/07/2020 04:32 PM     Kelly Miles MS, RN, CDE  Glycemic Control Team  [phone]  Pager [phone] (M-TH 8:00-4:30P)  *After Hours pager [phone]

## 2020-01-08 NOTE — PROGRESS NOTES
Problem: Falls - Risk of  Goal: *Absence of Falls  Description  Document Bishop Krueger Fall Risk and appropriate interventions in the flowsheet.   Outcome: Progressing Towards Goal  Note: Fall Risk Interventions:  Mobility Interventions: Assess mobility with egress test         Medication Interventions: Patient to call before getting OOB, Teach patient to arise slowly    Elimination Interventions: Call light in reach

## 2020-01-08 NOTE — PROGRESS NOTES
Lunch given, tolerated well.   Report called to Connie MELTON on \"Tele unit,  Dressing to left groin remains clean, dry and intact, pedul pulses present by doppler

## 2020-01-08 NOTE — PROGRESS NOTES
32 61 16: Pt currently off the floor for test/procedure. Will follow up as schedule permits. 1656: Pt still off the floor. Will follow up as schedule permits.

## 2020-01-09 PROBLEM — M86.9 OSTEOMYELITIS OF RIGHT FOOT (HCC): Status: ACTIVE | Noted: 2020-01-09

## 2020-01-09 LAB
GLUCOSE BLD STRIP.AUTO-MCNC: 115 MG/DL (ref 70–110)
GLUCOSE BLD STRIP.AUTO-MCNC: 120 MG/DL (ref 70–110)
GLUCOSE BLD STRIP.AUTO-MCNC: 121 MG/DL (ref 70–110)
GLUCOSE BLD STRIP.AUTO-MCNC: 247 MG/DL (ref 70–110)
GLUCOSE BLD STRIP.AUTO-MCNC: 253 MG/DL (ref 70–110)
MAGNESIUM SERPL-MCNC: 1.7 MG/DL (ref 1.6–2.6)

## 2020-01-09 PROCEDURE — 88304 TISSUE EXAM BY PATHOLOGIST: CPT

## 2020-01-09 PROCEDURE — 88305 TISSUE EXAM BY PATHOLOGIST: CPT

## 2020-01-09 PROCEDURE — 0QBN0ZZ EXCISION OF RIGHT METATARSAL, OPEN APPROACH: ICD-10-PCS | Performed by: PODIATRIST

## 2020-01-09 PROCEDURE — 74011250637 HC RX REV CODE- 250/637: Performed by: INTERNAL MEDICINE

## 2020-01-09 PROCEDURE — 74011250636 HC RX REV CODE- 250/636: Performed by: NURSE ANESTHETIST, CERTIFIED REGISTERED

## 2020-01-09 PROCEDURE — 36415 COLL VENOUS BLD VENIPUNCTURE: CPT

## 2020-01-09 PROCEDURE — 74011250636 HC RX REV CODE- 250/636: Performed by: PODIATRIST

## 2020-01-09 PROCEDURE — 65660000000 HC RM CCU STEPDOWN

## 2020-01-09 PROCEDURE — 74011000250 HC RX REV CODE- 250: Performed by: PODIATRIST

## 2020-01-09 PROCEDURE — 77030040361 HC SLV COMPR DVT MDII -B: Performed by: PODIATRIST

## 2020-01-09 PROCEDURE — 77030006822 HC BLD SAW SAG BRSM -B: Performed by: PODIATRIST

## 2020-01-09 PROCEDURE — 77030013708 HC HNDPC SUC IRR PULS STRY –B: Performed by: PODIATRIST

## 2020-01-09 PROCEDURE — 74011636637 HC RX REV CODE- 636/637: Performed by: HOSPITALIST

## 2020-01-09 PROCEDURE — 88307 TISSUE EXAM BY PATHOLOGIST: CPT

## 2020-01-09 PROCEDURE — 88311 DECALCIFY TISSUE: CPT

## 2020-01-09 PROCEDURE — 76010000138 HC OR TIME 0.5 TO 1 HR: Performed by: PODIATRIST

## 2020-01-09 PROCEDURE — 0JXQ0ZZ TRANSFER RIGHT FOOT SUBCUTANEOUS TISSUE AND FASCIA, OPEN APPROACH: ICD-10-PCS | Performed by: PODIATRIST

## 2020-01-09 PROCEDURE — 74011250636 HC RX REV CODE- 250/636: Performed by: SURGERY

## 2020-01-09 PROCEDURE — 77030018836 HC SOL IRR NACL ICUM -A: Performed by: PODIATRIST

## 2020-01-09 PROCEDURE — 77030002986 HC SUT PROL J&J -A: Performed by: PODIATRIST

## 2020-01-09 PROCEDURE — 74011000258 HC RX REV CODE- 258: Performed by: EMERGENCY MEDICINE

## 2020-01-09 PROCEDURE — 77030006754 HC BLD SAW GIGLI ZIMM -B: Performed by: PODIATRIST

## 2020-01-09 PROCEDURE — 77030012508 HC MSK AIRWY LMA AMBU -A: Performed by: ANESTHESIOLOGY

## 2020-01-09 PROCEDURE — 74011250637 HC RX REV CODE- 250/637: Performed by: HOSPITALIST

## 2020-01-09 PROCEDURE — 83735 ASSAY OF MAGNESIUM: CPT

## 2020-01-09 PROCEDURE — 0JBQ0ZZ EXCISION OF RIGHT FOOT SUBCUTANEOUS TISSUE AND FASCIA, OPEN APPROACH: ICD-10-PCS | Performed by: PODIATRIST

## 2020-01-09 PROCEDURE — 74011250637 HC RX REV CODE- 250/637: Performed by: FAMILY MEDICINE

## 2020-01-09 PROCEDURE — 77030020255 HC SOL INJ LR 1000ML BG: Performed by: PODIATRIST

## 2020-01-09 PROCEDURE — 82962 GLUCOSE BLOOD TEST: CPT

## 2020-01-09 PROCEDURE — 76060000032 HC ANESTHESIA 0.5 TO 1 HR: Performed by: PODIATRIST

## 2020-01-09 PROCEDURE — 74011250636 HC RX REV CODE- 250/636: Performed by: EMERGENCY MEDICINE

## 2020-01-09 PROCEDURE — 74011000250 HC RX REV CODE- 250: Performed by: NURSE ANESTHETIST, CERTIFIED REGISTERED

## 2020-01-09 PROCEDURE — 77030006788 HC BLD SAW OSC STRY -B: Performed by: PODIATRIST

## 2020-01-09 PROCEDURE — 76210000006 HC OR PH I REC 0.5 TO 1 HR: Performed by: PODIATRIST

## 2020-01-09 PROCEDURE — 0J9Q0ZZ DRAINAGE OF RIGHT FOOT SUBCUTANEOUS TISSUE AND FASCIA, OPEN APPROACH: ICD-10-PCS | Performed by: PODIATRIST

## 2020-01-09 RX ORDER — ONDANSETRON 2 MG/ML
INJECTION INTRAMUSCULAR; INTRAVENOUS AS NEEDED
Status: DISCONTINUED | OUTPATIENT
Start: 2020-01-09 | End: 2020-01-09 | Stop reason: HOSPADM

## 2020-01-09 RX ORDER — LIDOCAINE HYDROCHLORIDE 20 MG/ML
INJECTION, SOLUTION EPIDURAL; INFILTRATION; INTRACAUDAL; PERINEURAL AS NEEDED
Status: DISCONTINUED | OUTPATIENT
Start: 2020-01-09 | End: 2020-01-09 | Stop reason: HOSPADM

## 2020-01-09 RX ORDER — FLUMAZENIL 0.1 MG/ML
0.2 INJECTION INTRAVENOUS
Status: DISCONTINUED | OUTPATIENT
Start: 2020-01-09 | End: 2020-01-09 | Stop reason: HOSPADM

## 2020-01-09 RX ORDER — NALOXONE HYDROCHLORIDE 0.4 MG/ML
0.4 INJECTION, SOLUTION INTRAMUSCULAR; INTRAVENOUS; SUBCUTANEOUS AS NEEDED
Status: DISCONTINUED | OUTPATIENT
Start: 2020-01-09 | End: 2020-01-09 | Stop reason: HOSPADM

## 2020-01-09 RX ORDER — OXYCODONE AND ACETAMINOPHEN 5; 325 MG/1; MG/1
1 TABLET ORAL
Status: DISCONTINUED | OUTPATIENT
Start: 2020-01-09 | End: 2020-01-09 | Stop reason: HOSPADM

## 2020-01-09 RX ORDER — SODIUM CHLORIDE, SODIUM LACTATE, POTASSIUM CHLORIDE, CALCIUM CHLORIDE 600; 310; 30; 20 MG/100ML; MG/100ML; MG/100ML; MG/100ML
100 INJECTION, SOLUTION INTRAVENOUS CONTINUOUS
Status: DISCONTINUED | OUTPATIENT
Start: 2020-01-09 | End: 2020-01-09 | Stop reason: HOSPADM

## 2020-01-09 RX ORDER — FENTANYL CITRATE 50 UG/ML
50 INJECTION, SOLUTION INTRAMUSCULAR; INTRAVENOUS
Status: DISCONTINUED | OUTPATIENT
Start: 2020-01-09 | End: 2020-01-09 | Stop reason: HOSPADM

## 2020-01-09 RX ORDER — SODIUM CHLORIDE, SODIUM LACTATE, POTASSIUM CHLORIDE, CALCIUM CHLORIDE 600; 310; 30; 20 MG/100ML; MG/100ML; MG/100ML; MG/100ML
INJECTION, SOLUTION INTRAVENOUS
Status: DISCONTINUED | OUTPATIENT
Start: 2020-01-09 | End: 2020-01-09 | Stop reason: HOSPADM

## 2020-01-09 RX ORDER — MIDAZOLAM HYDROCHLORIDE 1 MG/ML
INJECTION, SOLUTION INTRAMUSCULAR; INTRAVENOUS AS NEEDED
Status: DISCONTINUED | OUTPATIENT
Start: 2020-01-09 | End: 2020-01-09 | Stop reason: HOSPADM

## 2020-01-09 RX ORDER — PROPOFOL 10 MG/ML
INJECTION, EMULSION INTRAVENOUS AS NEEDED
Status: DISCONTINUED | OUTPATIENT
Start: 2020-01-09 | End: 2020-01-09 | Stop reason: HOSPADM

## 2020-01-09 RX ORDER — ONDANSETRON 2 MG/ML
4 INJECTION INTRAMUSCULAR; INTRAVENOUS ONCE
Status: DISCONTINUED | OUTPATIENT
Start: 2020-01-09 | End: 2020-01-09 | Stop reason: HOSPADM

## 2020-01-09 RX ADMIN — AMLODIPINE BESYLATE 2.5 MG: 2.5 TABLET ORAL at 10:30

## 2020-01-09 RX ADMIN — PIPERACILLIN AND TAZOBACTAM 4.5 G: 4; .5 INJECTION, POWDER, FOR SOLUTION INTRAVENOUS at 11:46

## 2020-01-09 RX ADMIN — INSULIN LISPRO 9 UNITS: 100 INJECTION, SOLUTION INTRAVENOUS; SUBCUTANEOUS at 16:45

## 2020-01-09 RX ADMIN — ATORVASTATIN CALCIUM 20 MG: 20 TABLET, FILM COATED ORAL at 22:04

## 2020-01-09 RX ADMIN — PROPOFOL 100 MG: 10 INJECTION, EMULSION INTRAVENOUS at 07:45

## 2020-01-09 RX ADMIN — Medication 500 MG: at 22:04

## 2020-01-09 RX ADMIN — INSULIN LISPRO 6 UNITS: 100 INJECTION, SOLUTION INTRAVENOUS; SUBCUTANEOUS at 22:04

## 2020-01-09 RX ADMIN — ACETAMINOPHEN 650 MG: 325 TABLET ORAL at 23:16

## 2020-01-09 RX ADMIN — PIPERACILLIN AND TAZOBACTAM 4.5 G: 4; .5 INJECTION, POWDER, FOR SOLUTION INTRAVENOUS at 06:34

## 2020-01-09 RX ADMIN — Medication 500 MG: at 10:30

## 2020-01-09 RX ADMIN — ASPIRIN 81 MG 81 MG: 81 TABLET ORAL at 10:30

## 2020-01-09 RX ADMIN — MIDAZOLAM 2 MG: 1 INJECTION INTRAMUSCULAR; INTRAVENOUS at 07:36

## 2020-01-09 RX ADMIN — SODIUM CHLORIDE, SODIUM LACTATE, POTASSIUM CHLORIDE, AND CALCIUM CHLORIDE: 600; 310; 30; 20 INJECTION, SOLUTION INTRAVENOUS at 07:49

## 2020-01-09 RX ADMIN — PIPERACILLIN AND TAZOBACTAM 4.5 G: 4; .5 INJECTION, POWDER, FOR SOLUTION INTRAVENOUS at 18:29

## 2020-01-09 RX ADMIN — LIDOCAINE HYDROCHLORIDE 60 MG: 20 INJECTION, SOLUTION EPIDURAL; INFILTRATION; INTRACAUDAL; PERINEURAL at 07:45

## 2020-01-09 RX ADMIN — ONDANSETRON HYDROCHLORIDE 4 MG: 2 INJECTION INTRAMUSCULAR; INTRAVENOUS at 07:51

## 2020-01-09 RX ADMIN — ACETAMINOPHEN 650 MG: 325 TABLET ORAL at 10:30

## 2020-01-09 RX ADMIN — PIPERACILLIN AND TAZOBACTAM 4.5 G: 4; .5 INJECTION, POWDER, FOR SOLUTION INTRAVENOUS at 23:17

## 2020-01-09 RX ADMIN — SODIUM CHLORIDE 25 ML/HR: 900 INJECTION, SOLUTION INTRAVENOUS at 10:33

## 2020-01-09 NOTE — BRIEF OP NOTE
BRIEF OPERATIVE NOTE    Date of Procedure: 1/9/2020   Preoperative Diagnosis: STREPTOCOCCUS AGALATIAE  Postoperative Diagnosis: STREPTOCOCCUS AGALATIAE    Procedure(s):  RIGHT FOOT INSICION TO BONE CORTEX; EXCISION OF ULCER, POSSIBLE AMPUTATION OF 5TH TOE, SOFT TISSUE REARRAGE  Surgeon(s) and Role:     * Maggie Barba, HANH - Primary         Surgical Assistant: None    Surgical Staff:  Circ-1: Lenin Dumont RN  Circ-2: Ghazal Noel RN  Scrub Tech-1: Simran Antonio  Surg Asst-1: Linda Durant  Event Time In Time Out   Incision Start 01/09/2020 0750    Incision Close 01/09/2020 0810      Anesthesia: General   Estimated Blood Loss: None  Specimens:   ID Type Source Tests Collected by Time Destination   1 : Fifth Metatarsal Preservative Foot, Right  Marianne Andrade DPM 1/9/2020 4319 Pathology   2 : [address] [facility]  Marianne Andrade DPM 1/9/2020 2855 Pathology      Findings: Poor perfusion   Complications: None  Implants: * No implants in log *

## 2020-01-09 NOTE — PERIOP NOTES
Returned patient food, blanket , clothes and monitor back to [facility] floor and gave the items to his day shift nurse.

## 2020-01-09 NOTE — INTERVAL H&P NOTE
H&P Update:  Keyonna Duran was seen and examined. History and physical has been reviewed. The patient has been examined.  There have been no significant clinical changes since the completion of the originally dated History and Physical.

## 2020-01-09 NOTE — ANESTHESIA POSTPROCEDURE EVALUATION
Post-Anesthesia Evaluation & Assessment    Visit Vitals  /66   Pulse 79   Temp 37.2 °C (98.9 °F)   Resp 16   Ht 5' 6\" (1.676 m)   Wt 76.6 kg (168 lb 14.4 oz)   SpO2 100%   BMI 27.26 kg/m²       Nausea/Vomiting: Controlled. Post-operative hydration adequate. Pain Scale 1: Numeric (0 - 10) (01/09/20 0834)  Pain Intensity 1: 0 (01/09/20 0834)   Managed    Pain score at or below stated goal level. Mental status & Level of consciousness: alert and oriented x 3    Neurological status: moves all extremities, sensation grossly intact    Pulmonary status: airway patent, adequate oxygenation. Complications related to anesthesia: none    Patient has met all PACU discharge requirements.       Benjamín Morales DO

## 2020-01-09 NOTE — ROUTINE PROCESS
Bedside and Verbal shift change report given to [address] (oncoming nurse) by Silas Coyle RN (offgoing nurse). Report included the following information SBAR and Kardex.

## 2020-01-09 NOTE — ANESTHESIA PREPROCEDURE EVALUATION
Relevant Problems   No relevant active problems       Anesthetic History   No history of anesthetic complications            Review of Systems / Medical History  Patient summary reviewed, nursing notes reviewed and pertinent labs reviewed    Pulmonary  Within defined limits                 Neuro/Psych   Within defined limits           Cardiovascular    Hypertension: well controlled          PAD  Pertinent negatives: No complex congenital heart defect, past MI, CAD, dysrhythmias, angina and CHF  Exercise tolerance: <4 METS     GI/Hepatic/Renal         Renal disease: CRI    Pertinent negatives: No GERD, hepatitis and liver disease   Endo/Other    Diabetes      Pertinent negatives: No hypothyroidism, hyperthyroidism, obesity and blood dyscrasia   Other Findings              Physical Exam    Airway  Mallampati: III  TM Distance: 4 - 6 cm  Neck ROM: decreased range of motion   Mouth opening: Normal     Cardiovascular  Regular rate and rhythm,  S1 and S2 normal,  no murmur, click, rub, or gallop             Dental    Dentition: Poor dentition  Comments: Missing many upper/lower teeth, remaining teeth in poor condition   Pulmonary  Breath sounds clear to auscultation               Abdominal  GI exam deferred       Other Findings            Anesthetic Plan    ASA: 3  Anesthesia type: general          Induction: Intravenous  Anesthetic plan and risks discussed with: Patient      GA/LMA

## 2020-01-09 NOTE — OP NOTES
[facility]  OPERATIVE REPORT    Name:  Chantelle Russell  MR#:   [id number]  :  1945  ACCOUNT #:  [de-identified]  DATE OF SERVICE:  2020    PREOPERATIVE DIAGNOSIS:  Right foot ulcer. POSTOPERATIVE DIAGNOSIS:  Right foot ulcer. PROCEDURES PERFORMED:  Abdominal aortogram, right lower extremity arteriogram with fourth-order cannulation of both anterior tibial and posterior tibial arteries, percutaneous transluminal angioplasty of right posterior tibial artery, ultrasound guidance for cannulation of left common femoral artery. SURGEON:  Shameka Vo MD.    ANESTHESIA:  Moderate sedation administered by Fabiana Mejia RN. The patient received a total of 3 mg of Versed, 150 mcg of fentanyl. START TIME:  15:59. COMPLETION TIME:  17:08. The patient underwent continuous cardiovascular monitoring and I supervised the entire procedure. COMPLICATIONS:  None. IMPLANTS:  exoseal left CFA    ESTIMATED BLOOD LOSS:  Minimal.    INDICATIONS FOR PROCEDURE:  The patient is a 77-year-old male who presents with right foot ulceration. Arterial duplex was consistent with anterior tibial occlusion without distal reconstitution and distal posterior tibial artery occlusion with distal reconstitution. Ankle-brachial index was 1.17, in all likelihood, falsely elevated, and the patient presents now for an angiography. OPERATIVE FINDINGS:  The abdominal aorta is essentially free of disease. Bilateral renal arteries are widely patent as are both common iliac arteries, external iliac arteries, and common femoral arteries. A dedicated right lower extremity arteriogram was obtained. The common femoral artery, profunda femoris artery, superficial femoral artery, popliteal arteries were all widely patent. The tibioperoneal trunk and peroneal arteries were also widely patent. The posterior tibial artery occludes just beyond its origin and does not reconstitute distally.   Similarly, the anterior tibial artery occludes approximately 10 cm beyond its origin and does not reconstitute distally. There may be a short segment of 1 to 1.5 cm reconstitution of the dorsalis pedis artery in the foot which is then discontinuous. The peroneal artery basically ends just cephalad to the level of the malleoli and collateralizes widely at the ankle. There is no named blood vessel again seen with significant patency on the foot. No distal target for pedal bypass. PROCEDURE:  Having obtained prior consent, the patient was brought to the angiogram suite, was placed in a supine position. He was prepped and draped in a sterile fashion. Appropriate time-out with universal protocol was performed. Lidocaine 1% without epinephrine was used to anesthetize the skin and subcutaneous tissue overlying the left common femoral artery. This was documented to be patent with ultrasound and then with real-time ultrasound, it was cannulated with an 18-gauge needle. Visualizing the needle entering the artery, a permanent image for the patient's record was obtained. The Universal flush catheter was passed into the suprarenal abdominal aorta and fresh abdominal aortogram was obtained. This was then used to direct the floppy-angled Glidewire over the aortic bifurcation into the contralateral left-to-right common iliac artery, external iliac artery, common femoral artery, and then a dedicated right lower extremity arteriogram was obtained, first in an ROSENTHAL plane at the groin and then the Glidewire was advanced through the Universal flush catheter into the SFA. The catheter was advanced into the SFA and then a dedicated right lower extremity arteriogram was obtained, see details above. I did elect to attempt intervention. I advanced a stiff-angled Glidewire into below-the-knee popliteal artery and then placed a 90-cm 5-Saudi Arabian sheath over the bifurcation into the popliteal artery on the right side.     We first selected the posterior tibial artery with an angled Glidewire in Quick-Cross combination and/or CXI combination. I was able to get the wire to pass into the proximal and posterior tibial artery where it surely met significant calcification. We switched to a 0.018 Advantage Reader and got this to pass actually to the distal third of the lower leg, but could not get the catheter to retract over, that is the [facility] catheter track over. I then obtained a 2-mm angioplasty balloon, this was a Juan, 4-cm in length, and angioplastied the proximal PT in an attempt to get the wire to pass distally. I could get it to pass distally, again just to the level of the distal lower leg, but no further. I then exchanged the Advantage Glide 0.018 for an Advantage Glide 0.014 and obtained a coronary balloon. This was a 2-mm x 3 cm coronary balloon and advanced this into, actually the distal lower leg, just proximal to where I could not get the wire to advance any further. I Inflated the balloon at this level and attempted to pass the wire further and the wire just would not pass through this severely calcified vessel. I used both the hydrophilic tip and prolapsed the wire upon itself in an attempt to pass it distally, and this was not successful. No perforation was encountered either. I then selected the anterior tibial artery with the same 0.014 Advantage Glidewire. I was then able to advance through the mid AT where once again we met severe calcification, advanced the 0.018 Quick-Cross catheter to the level between the distal third and mid third of the artery where I could not get this to pass any further. I repeated the angiogram at this level and again there was no distal reconstitution in the anterior tibial artery, and I did not persist then with the anterior tibial artery.     I then withdrew the catheter and the wire, exchanged the 5-Guyanese long sheath for a short 5-Guyanese sheath and deployed an Exoseal in the left groin with excellent hemostasis. The patient did tolerate the procedure well.       MD RAMON Bang/[id number]  D:  01/08/2020 17:29  T:  01/09/2020 4:39  JOB #:  [id number]

## 2020-01-09 NOTE — OP NOTES
[facility]  OPERATIVE REPORT    Name:  Maryjane Valentine  MR#:   [id number]  :  1945  ACCOUNT #:  [de-identified]  DATE OF SERVICE:  2020    PREOPERATIVE DIAGNOSES:  1. Abscess, cellulitis, right foot. 2.  Nonhealing necrotic gangrenous ulcer, right foot. 3.  Osteomyelitis, right foot. 4.  Severe peripheral vascular disease, right lower extremity. POSTOPERATIVE DIAGNOSES:  1. Abscess, cellulitis, right foot. 2.  Nonhealing necrotic gangrenous ulcer, right foot. 3.  Osteomyelitis, right foot. 4.  Severe peripheral vascular disease, right lower extremity. PROCEDURES PERFORMED:  1. Incision and drainage, abscess, cellulitis, right foot. 2.  Ulcer excision 3 cm x 3 cm, right foot. 3.  An incision to bone cortex, right fifth toe. 4.  Amputation of fifth metatarsal, right foot. 5.  Soft tissue rearrangement with full-thickness flap, less than 20 sq cm, right foot. SURGEON:  Chandler Barba DPM    ASSISTANT:  None    ANESTHESIA:  General.    COMPLICATIONS:  None    SPECIMENS REMOVED:  Toe, Ulcer and bone    IMPLANTS:  None    ESTIMATED BLOOD LOSS:  None    INDICATIONS:  This is a very pleasant, but unfortunate 78-year-old male, who has had a chronic nonhealing wound associated with the right fifth metatarsophalangeal joint. After careful consideration of risks, benefits, and alternatives and consideration of the underlying disease process, we have decided to move forward with surgical intervention. Preoperative CT scan indicated no evidence of osteomyelitis, but he has had a necrotic open wound and an exposed joint and joint capsule of the fifth metatarsophalangeal joint. I am hopeful for limb salvage with today's intervention. PROCEDURE:  Preparation and Procedure: The patient was taken to the operating room, placed on the operative table in supine position.   After the induction of IV sedation and administration of a general anesthetic, the right foot was then prepped and draped in the usual sterile manner using a Betadine prep. 1.  Incision and drainage of abscess, cellulitis of right foot. Attention was now directed to the lateral aspect of the right foot, after which point, an incision was created in the fourth interspace and around the fifth metatarsophalangeal joint. I deepened the incision down to the level of the intermuscular compartments plantarly and laterally, and incised and drained any infection present. Some necrotic tissue was noted, which was excised at the same time. This wound was then copiously irrigated with normal sterile saline solution. There did not seem to be any spread of the infection to the adjacent compartments. 2.  Ulcer excision, right foot. Attention was now directed to the necrotic ulceration, where there was exposed bone and joint capsule. I used a 15-blade to excise the ulceration in its entirety, down to the level of the underlying joint capsule. Moderate bleeding was noted at the wound margins. The ulcerative area was very necrotic in nature and there was liquefaction necrosis of the adjacent soft tissue and fat. 3.  Incision to bone cortex, right fifth toe. At this point, I created a separate incision overlying the fifth digit. I deepened the incision down to the level of the underlying bone and incised the bone cortex of the fifth digit and sent to Pathology for gross and microscopic examination. The bone at the base of the fifth toe appeared to be somewhat necrotic in nature. 4.  Resection of the fifth metatarsal, right foot. At this point, I created a separate incision underlying the fifth metatarsal.  I deepened the incision down to the level of the subcutaneous tissue, taking care to appropriately retract or ligate all neurovascular structures as necessary. Blood flow in this area seemed to be more brisk than in the other areas of the foot.   I resected the distal one-third of the metatarsal so as to maintain the anatomic parabola of the fifth metatarsal.  The bone appeared to be healthy in nature without any significant sign of infection at the proximal aspect. I sent to Pathology for gross and microscopic examination. 5.  Soft tissue rearrangement, full-thickness flap, less than 20 sq cm, right foot. At this point, I re-adjusted my attention to the remaining portion of the fifth toe. I splayed the skin away from the fifth toe distally and laterally and created a full-thickness flap, which then I rotated proximally and inferiorly. After copious irrigation with pulse lavage, I re-approximated the flap around the open area of the deficit of the previously excised ulcer. There seemed to be overall good skin apposition, with very little tension on the skin and I excised any dog-ears present. I used 3-0 Prolene to reapproximate the wound. Surgical site was then irrigated once again and dressed with Xeroform, 4 x 4's, Dary, and an Ace bandage dressing. The possibility for limb salvage, I would consider it to be moderate at best.  There was blood flow to the area, but it was not brisk. If this does not work, she will likely require transmetatarsal amputation or possibly lower limb amputation. I have recommended immediate transfer to a SNF or outpatient situation where he can have hyperbaric oxygen treatment.       HANH Ingram/[id number]  D:  01/09/2020 8:26  T:  01/09/2020 12:14  JOB #:  [id number]

## 2020-01-09 NOTE — PROGRESS NOTES
INITIAL NUTRITION ASSESSMENT     RECOMMENDATIONS/PLAN:   Will order MVI+M to aid with healing and ensure pt is meeting 100% DRIs  Encourage PO intakes >75% throughout the next 5 days  Monitor labs/lytes, fluid status, skin integrity, bowel function    REASON FOR ASSESSMENT:     [x] LOS    NUTRITION ASSESSMENT:   Client History: 76 yrs old Female admit with c/o N/V, abdominal pain, poor appetite, fever. Pt with abscess, cellulitis of right foot, non healing gangrenous ulcer, osteomyelitis, severe peripheral vascular disease right lower extremity. Pt s/p amputation of darwin metatarsal on right foot.       PMHx: DM, HTN  Cultural/Anglican Food Preferences: None Identified    FOOD/NUTRITION HISTORY  Diet History: eating well throughout admission  Food Allergies:  [x] NKFA      Pertinent PTA Medications: lantus, miralax    NUTRITION INTAKE   Diet Order:  Consistent carb    Average PO Intake:       Patient Vitals for the past 100 hrs:   % Diet Eaten   01/07/20 1809 100 %   01/07/20 1403 100 %   01/07/20 1025 100 %   01/06/20 1444 50 %   01/06/20 0844 100 %      Pertinent Medications:  [x] Reviewed; lispro, florastor   Electrolyte Replacement Protocol: []K  []Mg  []PO4    Insulin:  [] SSI  [x] Pre-meal   []  Basal   [] Drip  [] None  Pt expected to meet estimated nutrient needs through next review:          [x]  Yes      ANTHROPOMETRICS  Height: 5' 6\" (167.6 cm)       Weight: 76.6 kg (168 lb 14.4 oz)    BMI: 27.3 kg/m^2  -  overweight (25.0%-29.9% BMI)        Weight change: per chart review no wt loss noted                                 Comparison to Reference Standards:  IBW: 142lbs      %IBW: 118%      AdjBW: n/a kg    NUTRITION-FOCUSED PHYSICAL ASSESSMENT  Skin: no pressure area per documentation  GI: BM 1/6/19    BIOCHEMICAL DATA & MEDICAL TESTS  Pertinent Labs:  [x] Reviewed;     NUTRITION PRESCRIPTION  Calories: 2068 kcal/day based on 27kcal/kg  Protein: 99 g/day based on 1.3 g/kg  CHO: 258 g/day based on 50% of total energy  Fluid: 2068 ml/day based on 1 kcal/ml      NUTRITION DIAGNOSES:   1. At risk for inadequate oral intake related to extended LOS as evidenced by LOS of 7 days    NUTRITION INTERVENTIONS:   INTERVENTIONS:        GOALS:  1. Vitamin/mineral supplement 1. Meet 100% DRIs throughout the next 7 days     LEARNING NEEDS (Diet, Supplementation, Food/Nutrient-Drug Interaction):   [x] None Identified  [] Inpatient education provided/documented    [] Identified and patient:  [] Declined     [] Was not appropriate/indicated  NUTRITION MONITORING /EVALUATION:   Follow PO intake  Monitor wt  Monitor renal labs, electrolytes, fluid status  Monitor for additional supplement needs    [] Participated in Interdisciplinary Rounds  [x] Interdisciplinary Care Plan Reviewed/Documented  DISCHARGE NUTRITION RECOMMENDATIONS ADDRESSED:     [x] Yes- recommend diabetic diet    [] To be determined closer to discharge    NUTRITION RISK:     [x]  At risk                     []  Not currently at risk     Will follow-up per policy.   Marcel Carver 1

## 2020-01-09 NOTE — PROGRESS NOTES
Hospitalist Progress Note-critical care note     Patient: Philly Vega MRN: [id number]  CSN: [id number]    YOB: 1945  Age: 76 y.o. Sex: male    DOA: 1/1/2020 LOS:  LOS: 7 days            Chief complaint: DM, sepsis , positive bcx, uti m     Assessment/Plan         Hospital Problems  Date Reviewed: 1/9/2020          Codes Class Noted POA    Osteomyelitis of right foot ([facility] ICD-10-CM: M86.9  ICD-9-CM: 730.27  1/9/2020 Unknown        PAD (peripheral artery disease) ([facility] ICD-10-CM: I73.9  ICD-9-CM: 443.9  1/7/2020 Unknown        Cholecystitis, unspecified ICD-10-CM: K81.9  ICD-9-CM: 575.10  1/6/2020 Unknown        * (Principal) Sepsis due to Streptococcus agalactiae ([facility] ICD-10-CM: A40.1  ICD-9-CM: 038.0, 995.91  1/6/2020 Unknown        Diabetic ulcer of right midfoot associated with type 2 diabetes mellitus, with fat layer exposed ([facility] ICD-10-CM: E11.621, U33.119  ICD-9-CM: 250.80, 707.14  1/6/2020 Unknown        Foot abscess, right ICD-10-CM: L02.611  ICD-9-CM: 682.7  1/5/2020 Unknown        Corn of foot ICD-10-CM: L84  ICD-9-CM: 700  1/5/2020 Unknown        Type 2 diabetes mellitus, with long-term current use of insulin ([facility] ICD-10-CM: E11.9, Z79.4  ICD-9-CM: 250.00, V58.67  1/3/2020         Hypertension ICD-10-CM: I10  ICD-9-CM: 401.9  1/3/2020 Unknown        Hypokalemia ICD-10-CM: E87.6  ICD-9-CM: 276.8  1/3/2020 Unknown        UTI (urinary tract infection) ICD-10-CM: N39.0  ICD-9-CM: 599.0  1/3/2020 Unknown        Positive blood culture ICD-10-CM: R78.81  ICD-9-CM: 790.7  1/3/2020 Unknown        Sepsis ([facility] ICD-10-CM: A41.9  ICD-9-CM: 038.9, 995.91  1/2/2020 Yes        Abdominal pain ICD-10-CM: R10.9  ICD-9-CM: 789.00  1/2/2020 Yes        CKD (chronic kidney disease) ICD-10-CM: N18.9  ICD-9-CM: 585.9  1/2/2020 Yes              Admitted for sepsis, bacteremia, cholecystitis r/o.  Foot abscess       Rt foot abscess and PAD   Angio gram done per vascular yesterday   I&D done per  [facility]-today amputation of the right 5th toe with flap closure and bone resection of the 5th MT.   Surgery done today   . Ct foot -fluid collection/abscess, no om noted     Bacteremia   [facility] ,repeated no growth   Like from UTI and foot abscess   On zosyn-f/u per ID       DM type II    ssi     Hypokalemia   Resolved     ckd 2-3 stable       Subjective: I am ok, no pain     Daughter was at the bedside     Disposition :2-3 days   Review of systems:    General: no fevers, no chills. Cardiovascular: No chest pain or pressure. No palpitations. Pulmonary: No shortness of breath. Gastrointestinal: No nausea, vomiting. No abdomen pain     Vital signs/Intake and Output:  Visit Vitals  /64   Pulse 77   Temp 98.4 °F (36.9 °C)   Resp 16   Ht 5' 6\" (1.676 m)   Wt 76.6 kg (168 lb 14.4 oz)   SpO2 97%   BMI 27.26 kg/m²     Current Shift:  01/09 0701 - 01/09 1900  In: -   Out: 550 [Urine:550]  Last three shifts:  01/07 1901 - 01/09 0700  In: 100 [I.V.:100]  Out: 300 [Urine:300]    Physical Exam:  General: WD, WN. Alert, cooperative, no acute distress    HEENT: NC, Atraumatic. PERRLA, anicteric sclerae. Lungs: CTA Bilaterally. No Wheezing/Rhonchi/Rales. Heart:  Regular  rhythm,  No murmur, No Rubs, No Gallops  Abdomen: Soft, Non distended,.  +Bowel sounds,   Extremities: No c/c. Rt foot wrapped with ace bandage   Psych:   Not anxious or agitated. Neurologic:  No acute neurological deficit. Labs: Results:       Chemistry Recent Labs     01/08/20  0300 01/07/20  0421   * 124*    142   K 3.9 3.7    111   CO2 27 25   BUN 10 9   CREA 1.38* 1.25   CA 7.9* 8.1*   AGAP 5 6   BUCR 7* 7*      CBC w/Diff No results for input(s): WBC, RBC, HGB, HCT, PLT, GRANS, LYMPH, EOS, HGBEXT, HCTEXT, PLTEXT, HGBEXT, HCTEXT, PLTEXT in the last 72 hours. Cardiac Enzymes No results for input(s): CPK, CKND1, KAREN in the last 72 hours.     No lab exists for component: CKRMB, TROIP   Coagulation No results for input(s): PTP, INR, APTT, INREXT, INREXT in the last 72 hours. Lipid Panel No results found for: CHOL, CHOLPOCT, CHOLX, CHLST, CHOLV, 083763, HDL, HDLP, LDL, LDLC, DLDLP, 000746, VLDLC, VLDL, TGLX, TRIGL, TRIGP, TGLPOCT, CHHD, CHHDX   BNP No results for input(s): BNPP in the last 72 hours. Liver Enzymes No results for input(s): TP, ALB, TBIL, AP, SGOT, GPT in the last 72 hours.     No lab exists for component: DBIL   Thyroid Studies No results found for: T4, T3U, TSH, TSHEXT, TSHEXT     Procedures/imaging: see electronic medical records for all procedures/Xrays and details which were not copied into this note but were reviewed prior to creation of Paul Damico MD

## 2020-01-09 NOTE — PROGRESS NOTES
Pt had amputation of the right 5th toe with flap closure and bone resection of the 5th MT. Perfusion was moderate. I am hopeful for limb salvage.   Highly recommend DC to SNF or home with follow up at [facility] for HBO treatment    Sarah Nguyen DPM, Kole Timmons

## 2020-01-09 NOTE — DIABETES MGMT
GLYCEMIC CONTROL PROGRESS NOTE:    - known h/o T2DM, HbA1 not within recommended range for age + comorbids on basal/oral home regimen  - BG out of target range non-ICU: < 180 mg/dL  -TDD = 21 units - Humalog Very Insulin Resistant Corrective Coverage  - PPG out of target range recommend initiate mealtime   *Humalog 5 units qac    Recent Glucose Results:   Lab Results   Component Value Date/Time    GLUCPOC 121 (H) 01/09/2020 11:20 AM    GLUCPOC 120 (H) 01/09/2020 08:25 AM    GLUCPOC 115 (H) 01/09/2020 06:34 AM     Sang Chase MS, RN, CDE  Glycemic Control Team  [phone]  Pager [phone] (M-TH 8:00-4:30P)  *After Hours pager [phone]

## 2020-01-09 NOTE — PROGRESS NOTES
Problem: Mobility Impaired (Adult and Pediatric)  Goal: *Acute Goals and Plan of Care (Insert Text)  Description  In 1-7 days pt will be able to perform:  ST.  Bed mobility:  Rolling L to R to L modified independent for positioning. 2.  Supine to sit to supine S with HR for meals. 3.  Sit to stand to sit S with RW in prep for ambulation. LT.  Gait:  Ambulate >150ft S with LRD for home/community mobility. 2.  Stair Negotiation:  Ascend/descend >5 steps CGA with HR for home entry. 3.  Activity tolerance: Tolerate up in chair 1-2 hours for ADLs. 4.  Patient/Family Education:  Patient/family to be independent with HEP for follow-up care and safe discharge. Outcome: Progressing Towards Goal     2020  PT treatment not completed due to:  [] Off Unit for testing/procedure  [] Pain  [] Eating  [] Patient too lethargic  [] Nausea/vomiting  [] Dialysis treatment in progress   [x] Other: new orders received post surgery R foot. Pt awaiting surgical shoe. Will f/up tomorrow.    Margretta Meigs, PT

## 2020-01-09 NOTE — PROGRESS NOTES
Occupational Therapy Evaluation Attempt    OT orders received. Chart review completed. Occupational Therapy Evaluation deferred this date due to pt being post R foot surgery and was waiting for a surgical shoe to be delivered to the unit. OT evaluation will be attempted at next scheduled date as able.     Thank you for this referral.  Mike Guzman OTR/CALI MOT

## 2020-01-09 NOTE — ROUTINE PROCESS
Bedside shift change report given to [address] [place] [address] (oncoming nurse) by Katherine Rios RN (offgoing nurse). Report included the following information SBAR, Kardex and MAR.

## 2020-01-09 NOTE — PROGRESS NOTES
Transition of care: anticipate home with [facility] or recommendations from PT. Met with patient and his daughter at bedside along with Dr. Kenan Hicks  Patient had surgery yesterday. Pt will work with patient but they will need WB status. Once PT has placed recommendations will follow up  Cm did discuss with daughter d/c plan she would like to take him home with her when he is ready for d/c but she is open to rehab.    Aldair has spoken to Brenton Galvin RN to let her know PT and OT has been ordreed but need WB orders from Dr. Morales Reach

## 2020-01-09 NOTE — ROUTINE PROCESS
0730: received SBAR shift report from Bertha Brambila RN (offgoing nurse) pt was en route to PACU for R foot surgery    0900: received telephone SBAR report from [facility], [facility] (PACU), pt had R pinky toe amputated. Awaiting his arrival back on the floor. 0930: pt back from PACU, vitals stable, pt resting quietly in bed, Dr. Madelaine Brandon has okayed a diabetic diet, will order a tray to the room so he may have something to eat with his morning medications. 1040: pt's family at bedside were able to translate for us, pt stated that he was in a small amount of discomfort at the amputation site, tylenol 650 mg administered. Still trying to get a hold of food services to order pt some food. 1300: discussed weight bearing status with Dr. Tiarra Walls, and placed the order. Spoke with PT they will ambulate as soon as we get the surgical shoe. Will continue to monitor. 1900: Shift summary, shift uneventful, no complaints of chest pain or shortness of breath.      Linda De Santiago RN

## 2020-01-09 NOTE — PROGRESS NOTES
Problem: Falls - Risk of  Goal: *Absence of Falls  Description  Document Carlos Lozada Fall Risk and appropriate interventions in the flowsheet. Outcome: Progressing Towards Goal  Note: Fall Risk Interventions:  Mobility Interventions: Assess mobility with egress test, Bed/chair exit alarm, Patient to call before getting OOB, PT Consult for mobility concerns, PT Consult for assist device competence, Strengthening exercises (ROM-active/passive)         Medication Interventions: Assess postural VS orthostatic hypotension, Bed/chair exit alarm, Patient to call before getting OOB, Teach patient to arise slowly    Elimination Interventions: Bed/chair exit alarm, Call light in reach, Patient to call for help with toileting needs, Stay With Me (per policy), Toilet paper/wipes in reach, Toileting schedule/hourly rounds, Urinal in reach              Problem: Patient Education: Go to Patient Education Activity  Goal: Patient/Family Education  Outcome: Progressing Towards Goal     Problem: Nausea/Vomiting (Adult)  Goal: *Absence of nausea/vomiting  Outcome: Progressing Towards Goal  Goal: *Palliation of nausea/vomiting (Palliative Care)  Outcome: Progressing Towards Goal     Problem: Patient Education: Go to Patient Education Activity  Goal: Patient/Family Education  Outcome: Progressing Towards Goal     Problem: Pain  Goal: *Control of Pain  Outcome: Progressing Towards Goal     Problem: Patient Education: Go to Patient Education Activity  Goal: Patient/Family Education  Outcome: Progressing Towards Goal     Problem: Diabetes Self-Management  Goal: *Disease process and treatment process  Description  Define diabetes and identify own type of diabetes; list 3 options for treating diabetes. Outcome: Progressing Towards Goal  Goal: *Incorporating nutritional management into lifestyle  Description  Describe effect of type, amount and timing of food on blood glucose; list 3 methods for planning meals.   Outcome: Progressing Towards Goal  Goal: *Incorporating physical activity into lifestyle  Description  State effect of exercise on blood glucose levels. Outcome: Progressing Towards Goal  Goal: *Developing strategies to promote health/change behavior  Description  Define the ABC's of diabetes; identify appropriate screenings, schedule and personal plan for screenings. Outcome: Progressing Towards Goal  Goal: *Using medications safely  Description  State effect of diabetes medications on diabetes; name diabetes medication taking, action and side effects. Outcome: Progressing Towards Goal  Goal: *Monitoring blood glucose, interpreting and using results  Description  Identify recommended blood glucose targets  and personal targets. Outcome: Progressing Towards Goal  Goal: *Prevention, detection, treatment of acute complications  Description  List symptoms of hyper- and hypoglycemia; describe how to treat low blood sugar and actions for lowering  high blood glucose level. Outcome: Progressing Towards Goal  Goal: *Prevention, detection and treatment of chronic complications  Description  Define the natural course of diabetes and describe the relationship of blood glucose levels to long term complications of diabetes.   Outcome: Progressing Towards Goal  Goal: *Developing strategies to address psychosocial issues  Description  Describe feelings about living with diabetes; identify support needed and support network  Outcome: Progressing Towards Goal  Goal: *Insulin pump training  Outcome: Progressing Towards Goal  Goal: *Sick day guidelines  Outcome: Progressing Towards Goal  Goal: *Patient Specific Goal (EDIT GOAL, INSERT TEXT)  Outcome: Progressing Towards Goal     Problem: Patient Education: Go to Patient Education Activity  Goal: Patient/Family Education  Outcome: Progressing Towards Goal     Problem: HYPERTENSION  Goal: *Hemodynamically stable  Outcome: Progressing Towards Goal  Goal: *Electrolytes within normal limits  Outcome: Progressing Towards Goal  Goal: *Labs within defined limits  Outcome: Progressing Towards Goal     Problem: Patient Education: Go to Patient Education Activity  Goal: Patient/Family Education  Outcome: Progressing Towards Goal     Problem: Patient Education: Go to Patient Education Activity  Goal: Patient/Family Education  Outcome: Progressing Towards Goal    True Luna RN

## 2020-01-09 NOTE — PROGRESS NOTES
Problem: Falls - Risk of  Goal: *Absence of Falls  Description  Document Fracisco Irwin Fall Risk and appropriate interventions in the flowsheet. Outcome: Progressing Towards Goal  Note: Fall Risk Interventions:  Mobility Interventions: Patient to call before getting OOB         Medication Interventions: Patient to call before getting OOB, Teach patient to arise slowly    Elimination Interventions: Call light in reach              Problem: Patient Education: Go to Patient Education Activity  Goal: Patient/Family Education  Outcome: Progressing Towards Goal     Problem: Nausea/Vomiting (Adult)  Goal: *Absence of nausea/vomiting  Outcome: Progressing Towards Goal  Goal: *Palliation of nausea/vomiting (Palliative Care)  Outcome: Progressing Towards Goal     Problem: Patient Education: Go to Patient Education Activity  Goal: Patient/Family Education  Outcome: Progressing Towards Goal     Problem: Pain  Goal: *Control of Pain  Outcome: Progressing Towards Goal     Problem: Patient Education: Go to Patient Education Activity  Goal: Patient/Family Education  Outcome: Progressing Towards Goal     Problem: Diabetes Self-Management  Goal: *Disease process and treatment process  Description  Define diabetes and identify own type of diabetes; list 3 options for treating diabetes. Outcome: Progressing Towards Goal  Goal: *Incorporating nutritional management into lifestyle  Description  Describe effect of type, amount and timing of food on blood glucose; list 3 methods for planning meals. Outcome: Progressing Towards Goal  Goal: *Incorporating physical activity into lifestyle  Description  State effect of exercise on blood glucose levels. Outcome: Progressing Towards Goal  Goal: *Developing strategies to promote health/change behavior  Description  Define the ABC's of diabetes; identify appropriate screenings, schedule and personal plan for screenings.   Outcome: Progressing Towards Goal  Goal: *Using medications safely  Description  State effect of diabetes medications on diabetes; name diabetes medication taking, action and side effects. Outcome: Progressing Towards Goal  Goal: *Monitoring blood glucose, interpreting and using results  Description  Identify recommended blood glucose targets  and personal targets. Outcome: Progressing Towards Goal  Goal: *Prevention, detection, treatment of acute complications  Description  List symptoms of hyper- and hypoglycemia; describe how to treat low blood sugar and actions for lowering  high blood glucose level. Outcome: Progressing Towards Goal  Goal: *Prevention, detection and treatment of chronic complications  Description  Define the natural course of diabetes and describe the relationship of blood glucose levels to long term complications of diabetes.   Outcome: Progressing Towards Goal  Goal: *Developing strategies to address psychosocial issues  Description  Describe feelings about living with diabetes; identify support needed and support network  Outcome: Progressing Towards Goal  Goal: *Insulin pump training  Outcome: Progressing Towards Goal  Goal: *Sick day guidelines  Outcome: Progressing Towards Goal  Goal: *Patient Specific Goal (EDIT GOAL, INSERT TEXT)  Outcome: Progressing Towards Goal     Problem: Patient Education: Go to Patient Education Activity  Goal: Patient/Family Education  Outcome: Progressing Towards Goal     Problem: HYPERTENSION  Goal: *Hemodynamically stable  Outcome: Progressing Towards Goal  Goal: *Electrolytes within normal limits  Outcome: Progressing Towards Goal  Goal: *Labs within defined limits  Outcome: Progressing Towards Goal     Problem: Patient Education: Go to Patient Education Activity  Goal: Patient/Family Education  Outcome: Progressing Towards Goal     Problem: Patient Education: Go to Patient Education Activity  Goal: Patient/Family Education  Outcome: Progressing Towards Goal

## 2020-01-10 LAB
GLUCOSE BLD STRIP.AUTO-MCNC: 186 MG/DL (ref 70–110)
GLUCOSE BLD STRIP.AUTO-MCNC: 194 MG/DL (ref 70–110)
GLUCOSE BLD STRIP.AUTO-MCNC: 250 MG/DL (ref 70–110)
GLUCOSE BLD STRIP.AUTO-MCNC: 338 MG/DL (ref 70–110)
MAGNESIUM SERPL-MCNC: 1.7 MG/DL (ref 1.6–2.6)

## 2020-01-10 PROCEDURE — 74011250637 HC RX REV CODE- 250/637: Performed by: HOSPITALIST

## 2020-01-10 PROCEDURE — 74011250637 HC RX REV CODE- 250/637: Performed by: INTERNAL MEDICINE

## 2020-01-10 PROCEDURE — 36415 COLL VENOUS BLD VENIPUNCTURE: CPT

## 2020-01-10 PROCEDURE — 74011250636 HC RX REV CODE- 250/636: Performed by: EMERGENCY MEDICINE

## 2020-01-10 PROCEDURE — 83735 ASSAY OF MAGNESIUM: CPT

## 2020-01-10 PROCEDURE — 65660000000 HC RM CCU STEPDOWN

## 2020-01-10 PROCEDURE — 97165 OT EVAL LOW COMPLEX 30 MIN: CPT

## 2020-01-10 PROCEDURE — 97535 SELF CARE MNGMENT TRAINING: CPT

## 2020-01-10 PROCEDURE — 74011250637 HC RX REV CODE- 250/637: Performed by: FAMILY MEDICINE

## 2020-01-10 PROCEDURE — 82962 GLUCOSE BLOOD TEST: CPT

## 2020-01-10 PROCEDURE — 74011000258 HC RX REV CODE- 258: Performed by: EMERGENCY MEDICINE

## 2020-01-10 PROCEDURE — 97162 PT EVAL MOD COMPLEX 30 MIN: CPT

## 2020-01-10 PROCEDURE — 97116 GAIT TRAINING THERAPY: CPT

## 2020-01-10 PROCEDURE — 74011636637 HC RX REV CODE- 636/637: Performed by: HOSPITALIST

## 2020-01-10 RX ADMIN — ATORVASTATIN CALCIUM 20 MG: 20 TABLET, FILM COATED ORAL at 21:56

## 2020-01-10 RX ADMIN — ASPIRIN 81 MG 81 MG: 81 TABLET ORAL at 09:18

## 2020-01-10 RX ADMIN — INSULIN LISPRO 9 UNITS: 100 INJECTION, SOLUTION INTRAVENOUS; SUBCUTANEOUS at 11:53

## 2020-01-10 RX ADMIN — INSULIN LISPRO 3 UNITS: 100 INJECTION, SOLUTION INTRAVENOUS; SUBCUTANEOUS at 21:57

## 2020-01-10 RX ADMIN — MULTIPLE VITAMINS W/ MINERALS TAB 1 TABLET: TAB at 12:19

## 2020-01-10 RX ADMIN — PIPERACILLIN AND TAZOBACTAM 4.5 G: 4; .5 INJECTION, POWDER, FOR SOLUTION INTRAVENOUS at 05:38

## 2020-01-10 RX ADMIN — PIPERACILLIN AND TAZOBACTAM 4.5 G: 4; .5 INJECTION, POWDER, FOR SOLUTION INTRAVENOUS at 12:19

## 2020-01-10 RX ADMIN — INSULIN LISPRO 12 UNITS: 100 INJECTION, SOLUTION INTRAVENOUS; SUBCUTANEOUS at 17:23

## 2020-01-10 RX ADMIN — PIPERACILLIN AND TAZOBACTAM 4.5 G: 4; .5 INJECTION, POWDER, FOR SOLUTION INTRAVENOUS at 17:23

## 2020-01-10 RX ADMIN — AMLODIPINE BESYLATE 2.5 MG: 2.5 TABLET ORAL at 09:18

## 2020-01-10 RX ADMIN — Medication 500 MG: at 09:18

## 2020-01-10 RX ADMIN — INSULIN LISPRO 3 UNITS: 100 INJECTION, SOLUTION INTRAVENOUS; SUBCUTANEOUS at 07:26

## 2020-01-10 RX ADMIN — PIPERACILLIN AND TAZOBACTAM 4.5 G: 4; .5 INJECTION, POWDER, FOR SOLUTION INTRAVENOUS at 23:39

## 2020-01-10 RX ADMIN — ACETAMINOPHEN 650 MG: 325 TABLET ORAL at 21:56

## 2020-01-10 RX ADMIN — Medication 500 MG: at 21:56

## 2020-01-10 NOTE — ROUTINE PROCESS
Bedside and Verbal shift change report given to Tessie Humphrey RN (oncoming nurse) by Kem Armstrong RN (offgoing nurse). Report included the following information SBAR and Kardex.

## 2020-01-10 NOTE — PROGRESS NOTES
Problem: Mobility Impaired (Adult and Pediatric)  Goal: *Acute Goals and Plan of Care (Insert Text)  Description  Physical Therapy Goals   Initiated 1/10/2020 and to be accomplished within 3-5 day(s)  1. Patient will move from supine <> sit with mod I in prep for out of bed activity and change of position. 2.  Patient will perform sit<> stand with S/mod I with RW partial heel wt bearing R in prep for transfers/ambulation. 3.  Patient will ambulate 100 feet with S/mod I with RW partial heel wt bearing R for increased functional mobility/safe discharge. 5.  Patient will ascend/descend 3-5 stairs with handrail(s) with CGA with RW partial heel wt bearing R for home re-entry as needed. Physical Therapy Goals: In 1-7 days pt will be able to perform:  ST.  Bed mobility:  Rolling L to R to L modified independent for positioning. 2.  Supine to sit to supine S with HR for meals. 3.  Sit to stand to sit S with RW in prep for ambulation. LT.  Gait:  Ambulate >150ft S with LRD for home/community mobility. 2.  Stair Negotiation:  Ascend/descend >5 steps CGA with HR for home entry. 3.  Activity tolerance: Tolerate up in chair 1-2 hours for ADL's.  4.  Patient/Family Education:  Patient/family to be independent with HEP for follow-up care and safe discharge. Outcome: Progressing Towards Goal    PHYSICAL THERAPY EVALUATION    Patient: Leyla Anna (29 y.o. male)  Date: 1/10/2020  Primary Diagnosis: Sepsis ([facility] [address] [A41.9]  Fever in adult [R50.9]  Lactic acidosis [E87.2]  Procedure(s) (LRB):  RIGHT FOOT INSICION TO BONE CORTEX; EXCISION OF ULCER, POSSIBLE AMPUTATION OF 5TH TOE, SOFT TISSUE REARRAGE (Right) 1 Day Post-Op   Precautions: Fall, PWB(rle)    ASSESSMENT :  Based on the objective data described below, the patient  is a 77 yo M seen on telemetry unit s/p surgery as noted above. Pt with dressing c/d/I R foot.  Pt presents with decreased function R foot/ankle, and decreased independence in overall functional mobility. Pt able to transition to sit EOB with S/CGA and don L shoe with SBA. R post op shoe donned by PT. Pt demonstrates transfers with CGA/vc/tc for proper wt bearing. Attempted use of blue phone, however, not working at this time and use of [employer] phone, but no answerer after 2-3 min hold. Progressed with gt 50ft, RW/CGA, PWB R heel and vc/tc. Demonstrated proper transfers and gt upon return to room; pt with accurate return demo. Pt left back supine in bed with all needs in reach. Language barrier more evident today. Recommend continued PT for goals as noted above and HHPT at discharge. Will need RW. Patient will benefit from skilled intervention to address the above impairments. Patients rehabilitation potential is considered to be Good  Factors which may influence rehabilitation potential include:   []         None noted  []         Mental ability/status  [x]         Medical condition  []         Home/family situation and support systems  []         Safety awareness  []         Pain tolerance/management  []         Other:      PLAN :  Recommendations and Planned Interventions:  [x]           Bed Mobility Training             []    Neuromuscular Re-Education  [x]           Transfer Training                   []    Orthotic/Prosthetic Training  [x]           Gait Training                          []    Modalities  [x]           Therapeutic Exercises          []    Edema Management/Control  [x]           Therapeutic Activities            [x]    Patient and Family Training/Education  []           Other (comment):    Frequency/Duration: Patient will be followed by physical therapy 1-2 times per day to address goals. Discharge Recommendations: Home Health  Further Equipment Recommendations for Discharge: rolling walker     SUBJECTIVE:   Patient stated I'm very good.     OBJECTIVE DATA SUMMARY:     Past Medical History:   Diagnosis Date    Diabetes ([facility] [address]     Hypertension    History reviewed. No pertinent surgical history. Barriers to Learning/Limitations: yes;  physical  Compensate with: Visual Cues, Verbal Cues, and Tactile Cues  Prior Level of Function/Home Situation: using RW PTA   Home Situation  Home Environment: Private residence  # Steps to Enter: 0  One/Two Story Residence: Two story  Living Alone: No  Support Systems: Child(shahla)  Patient Expects to be Discharged to[de-identified] Private residence  Current DME Used/Available at Home: None  Tub or Shower Type: Tub/Shower combination  Critical Behavior:  Neurologic State: Alert  Orientation Level: Oriented X4  Cognition: Appropriate for age attention/concentration; Follows commands  Safety/Judgement: Fall prevention  Psychosocial  Patient Behaviors: Calm; Cooperative  Purposeful Interaction: Yes  Pt Identified Daily Priority: Clinical issues (comment)  Caritas Process: Nurture loving kindness;Establish trust;Attend basic human needs  Caring Interventions: Reassure  Reassure: Therapeutic listening; Informing;Caring rounds  Skin Condition/Temp: Dry;Warm  Skin Integrity: Intact  Skin Integumentary  Skin Color: Appropriate for ethnicity  Skin Condition/Temp: Dry;Warm  Skin Integrity: Intact  Turgor: Non-tenting  Hair Growth: Present  Varicosities: Absent  Strength:    Strength: Generally decreased, functional  Tone & Sensation:   Tone: Normal  Sensation: Intact  Range Of Motion:  AROM: Generally decreased, functional  Functional Mobility:  Bed Mobility:  Rolling: Supervision  Supine to Sit: Contact guard assistance;Supervision  Sit to Supine: Contact guard assistance;Supervision  Scooting: Contact guard assistance  Transfers:  Sit to Stand: Contact guard assistance; Additional time; Other (comment)(vc/tc for proper wt bearing R LE)  Stand to Sit: Contact guard assistance; Additional time(as above)  Balance:   Sitting: Intact  Standing: Intact; With support(RW)  Ambulation/Gait Training:  Distance (ft): 50 Feet (ft)  Assistive Device: Gait belt;Walker, rolling(IV and surgical shoe R foot; tenis shoe L foot)  Ambulation - Level of Assistance: Contact guard assistance;Minimal assistance  Gait Description (WDL): Exceptions to WDL  Gait Abnormalities: Decreased step clearance; Step to gait  Right Side Weight Bearing: Partial (%)(heel)  Base of Support: Shift to left  Stance: Right decreased  Speed/Johnna: Pace decreased (<100 feet/min)  Step Length: Left shortened;Right shortened  Swing Pattern: Left asymmetrical;Right asymmetrical  Interventions: Safety awareness training; Tactile cues; Verbal cues; Visual/Demos  Pain:  Pain Scale 1: Numeric (0 - 10)  Pain Intensity 1: 0  Activity Tolerance:   Good   Please refer to the flowsheet for vital signs taken during this treatment. After treatment:   []         Patient left in no apparent distress sitting up in chair  [x]         Patient left in no apparent distress in bed  [x]         Call bell left within reach  [x]         Nursing notified  []         Caregiver present  []         Bed alarm activated    COMMUNICATION/EDUCATION:   [x]         Fall prevention education was provided and the patient/caregiver indicated understanding. [x]         Patient/family have participated as able in goal setting and plan of care. [x]         Patient/family agree to work toward stated goals and plan of care. []         Patient understands intent and goals of therapy, but is neutral about his/her participation. []         Patient is unable to participate in goal setting and plan of care.     Thank you for this referral.  Andrés Romero, PT   Time Calculation: 33 mins

## 2020-01-10 NOTE — ROUTINE PROCESS
Verbal shift change report given to Hollis Gonzalez RN (oncoming nurse) by Tessie Humphrey RN (offgoing nurse). Report included the following information SBAR and Kardex.

## 2020-01-10 NOTE — PROGRESS NOTES
Transition of care  awaiting PT recommendations  Met with patient and Dr. Lili Thompson at bedside. Dr Lili Thompson informed cm that patient is not ready for d/c he has a fever. Informed Dr. Lili Thompson that Dr. Tania Cole has asked for CM to set up for HBO (hyper baric oxygen therapy) the earliest appointment would be for    [facility]    Próximos pasos: Realice un seguimiento el 1/20/2020    [id number], [address] [place] [address]   [phone]    Follow-up appointment @ 9:00 a.m     Dr. Lili Thompson informed cm patient has a fever and is not ready for d/c. Used [employer] language line due to patient does not speak english  Patient lives with his daughter his pcp is dr Fallon Coto. He has medicare for insurance. Cm will continue to follow  Care Management Interventions  PCP Verified by CM:  Yes  Transition of Care Consult (CM Consult): Discharge Planning  Current Support Network: Relative's Home  Confirm Follow Up Transport: Family  The Plan for Transition of Care is Related to the Following Treatment Goals : home with daughter when medically cleared  Freedom of Choice List was Provided with Basic Dialogue that Supports the Patient's Individualized Plan of Care/Goals, Treatment Preferences and Shares the Quality Data Associated with the Providers?: Yes  Discharge Location  Discharge Placement: Home with family assistance

## 2020-01-10 NOTE — PROGRESS NOTES
1/10/2020  PT treatment not completed due to:  [] Off Unit for testing/procedure  [] Pain  [] Eating  [] Patient too lethargic  [] Nausea/vomiting  [] Dialysis treatment in progress   [x] Other: new order received post op for PWB R Heel with surgical shoe. Surgical shoe not arrived to room yet. Will f/u at later time once surgical shoe arrives. Thank you.    Charlotte Anaya, PT

## 2020-01-10 NOTE — ROUTINE PROCESS
Bedside shift change report given to Elva Borges RN (oncoming nurse) by Jayesh Mckeon RN (offgoing nurse). Report included the following information SBAR, Kardex, Intake/Output, MAR and Cardiac Rhythm SR.      Visit Vitals  /60   Pulse 81   Temp 98.2 °F (36.8 °C)   Resp 16   Ht 5' 6\" (1.676 m)   Wt 76.6 kg (168 lb 14.4 oz)   SpO2 100%   BMI 27.26 kg/m²     Jayesh Mckeon RN

## 2020-01-10 NOTE — ROUTINE PROCESS
Verbal shift change report given to Rehana Yoon RN (oncoming nurse) by Arben Brown RN (offgoing nurse). Report included the following information SBAR and Kardex. Verbalized Understanding

## 2020-01-10 NOTE — PROGRESS NOTES
Problem: Self Care Deficits Care Plan (Adult)  Goal: *Acute Goals and Plan of Care (Insert Text)  Description  Occupational Therapy Goals  Initiated 1/10/2020 within 7 day(s). 1.  Patient will perform grooming with supervision/set-up standing at sink while maintaining PWB at RLE. 2.  Patient will perform upper body dressing with independence. 3.  Patient will perform lower body dressing with supervision/set-up. 4.  Patient will perform toilet transfers with supervision/set-up while maintaining PWB at RLE. 5.  Patient will perform all aspects of toileting with supervision/set-up while maintaining PWB at RLE. 6.  Patient will participate in upper extremity therapeutic exercise/activities with independence for 10 minutes. 7.  Patient will utilize energy conservation techniques during functional activities with verbal, visual and tactile cues. OCCUPATIONAL THERAPY EVALUATION    Patient: Bharath Nesbitt (32 y.o. male)  Date: 1/10/2020  Primary Diagnosis: Sepsis ([place] [address] [A41.9]  Fever in adult [R50.9]  Lactic acidosis [E87.2]  Procedure(s) (LRB):  RIGHT FOOT INSICION TO BONE CORTEX; EXCISION OF ULCER, POSSIBLE AMPUTATION OF 5TH TOE, SOFT TISSUE REARRAGE (Right) 1 Day Post-Op   Precautions:   Fall, PWB(rle)  PLOF: independent with ADLs and transfers     ASSESSMENT :  Based on the objective data described below, the patient presents with decreased strength, endurance and balance for carryover of ADLs and transfers following above mentioned surgical procedure. Pt primarily Indian speaking and [employer] phone in room was not functioning at the time of assessment. Pt required min-CGA for bd mobility, STS transfers and toileting during this session. Pt able to maintain PWB at RLE during the transfers throughout the session. Pt was left supine in bed at the end of session in NAD, pain 0/10. Patient will benefit from skilled intervention to address the above impairments.   Patient's rehabilitation potential is considered to be Good  Factors which may influence rehabilitation potential include:   [x]             None noted  []             Mental ability/status  []             Medical condition  []             Home/family situation and support systems  []             Safety awareness  []             Pain tolerance/management  []             Other:      PLAN :  Recommendations and Planned Interventions:   [x]               Self Care Training                  [x]      Therapeutic Activities  [x]               Functional Mobility Training   []      Cognitive Retraining  [x]               Therapeutic Exercises           [x]      Endurance Activities  [x]               Balance Training                    []      Neuromuscular Re-Education  []               Visual/Perceptual Training     [x]      Home Safety Training  [x]               Patient Education                   []      Family Training/Education  []               Other (comment):    Frequency/Duration: Patient will be followed by occupational therapy 1-2 times per day/4-7 days per week to address goals. Discharge Recommendations: Home Health and To Be Determined  Further Equipment Recommendations for Discharge: rolling walker     SUBJECTIVE:   Patient stated  I am ok.     OBJECTIVE DATA SUMMARY:     Past Medical History:   Diagnosis Date    Diabetes ([facility] [address]     Hypertension    History reviewed. No pertinent surgical history.   Barriers to Learning/Limitations: yes;  language  Compensate with: visual, verbal, tactile, kinesthetic cues/model    Home Situation:   Home Situation  Home Environment: Private residence  # Steps to Enter: 0  One/Two Story Residence: Two story  Living Alone: No  Support Systems: Child(shahla)  Patient Expects to be Discharged to[de-identified] Private residence  Current DME Used/Available at Home: None  Tub or Shower Type: Tub/Shower combination  []  Right hand dominant   []  Left hand dominant    Cognitive/Behavioral Status:  Neurologic State: Alert  Orientation Level: Oriented X4  Cognition: Appropriate for age attention/concentration; Follows commands  Safety/Judgement: Fall prevention    Skin: intact  Edema: none    Vision/Perceptual:    Tracking: Able to track stimulus in all quadrants w/o difficulty    Coordination: BUE  Coordination: Within functional limits  Fine Motor Skills-Upper: Left Intact; Right Intact    Gross Motor Skills-Upper: Left Intact; Right Intact  Balance:  Sitting: Intact  Standing: Intact; With support  Strength: BUE  Strength: Generally decreased, functional  Tone & Sensation: BUE  Tone: Normal  Sensation: Intact  Range of Motion: BUE  AROM: Generally decreased, functional  Functional Mobility and Transfers for ADLs:  Bed Mobility:  Rolling: Supervision  Supine to Sit: Contact guard assistance  Sit to Supine: Contact guard assistance  Scooting: Contact guard assistance;Stand-by assistance  Transfers:  Sit to Stand: Contact guard assistance;Stand-by assistance  Stand to Sit: Stand-by assistance  ADL Assessment:   Feeding: Independent    Oral Facial Hygiene/Grooming: Supervision    Upper Body Dressing: Independent    Lower Body Dressing: Supervision    Toileting: Supervision  ADL Intervention:  Lower Body Dressing Assistance  Dressing Assistance: Minimum assistance  Shoes with Velcro: Minimum assistance(surgical shoe )  Position Performed: Supine  Cues: Don;Doff    Toileting  Toileting Assistance: Supervision(use urinal standing at EOB)  Bladder Hygiene: Supervision  Clothing Management: Supervision    Cognitive Retraining  Safety/Judgement: Fall prevention    Therapeutic Exercise:      Pain:  Pain level pre-treatment: 0/10   Pain level post-treatment: 0/10   Pain Intervention(s): Medication (see MAR); Rest, Ice, Repositioning   Response to intervention: Nurse notified, See doc flow    Activity Tolerance:   Good   Please refer to the flowsheet for vital signs taken during this treatment.   After treatment:   [] Patient left in no apparent distress sitting up in chair  [x] Patient left in no apparent distress in bed  [x] Call bell left within reach  [] Nursing notified  [] Caregiver present  [] Bed alarm activated    COMMUNICATION/EDUCATION:   [x] Role of Occupational Therapy in the acute care setting  [x] Home safety education was provided and the patient/caregiver indicated understanding. [x] Patient/family have participated as able in goal setting and plan of care. [x] Patient/family agree to work toward stated goals and plan of care. [] Patient understands intent and goals of therapy, but is neutral about his/her participation. [] Patient is unable to participate in goal setting and plan of care. Thank you for this referral.  Vear Sever, OTR/L  Time Calculation: 25 mins    Eval Complexity: History: MEDIUM Complexity : Expanded review of history including physical, cognitive and psychosocial  history ; Examination: MEDIUM Complexity : 3-5 performance deficits relating to physical, cognitive , or psychosocial skils that result in activity limitations and / or participation restrictions; Decision Making:MEDIUM Complexity : Patient may present with comorbidities that affect occupational performnce.  Miniml to moderate modification of tasks or assistance (eg, physical or verbal ) with assesment(s) is necessary to enable patient to complete evaluation

## 2020-01-10 NOTE — PROGRESS NOTES
2347-Resting in bed. Call light and personal items within reach. White board updated. No questions or concerns voiced at this time. 0030-Assessment complete. See shift assessment for details. Stable. Dressing to right foot intact. Some dried bloody drainage noted between 1st and 2nd toe of left foot; no open area noted. 7822-For better accuracy of communication translation phone utilized. Patient resting quietly. No complaints. Denies need for pain medication. Shift Summary:  Stable at shift change. No complaints voiced. Rested quietly throughout shift.

## 2020-01-10 NOTE — PROGRESS NOTES
2336-Resting in bed. White board updated. Stable. No complaints at this time. Call light within reach.

## 2020-01-10 NOTE — CONSULTS
[facility]                                               (A Division of [address] [place] [address])                           Follow-up Note      Date of Admission: 1/1/2020     Date of Note:  1/10/2020    Summary:      Mr Steve Rendon is a elderly, diabetic 69y  who was admitted last Thursday with week-long malaise, f/s/c with a R DFU of long-standing, but worsening. Had ABD pain on admission that was worked up in the ED, resolved now. Interval History:  CC:  Fauzia herrera  No daughter at bedside. He denies any pain. Current Antimicrobials: Prior Antimicrobials   1. Pip/tzb IV (1/1-) #9 1. Vanco IV (1/2-6) #3  2. Levo IV (1/1, and 1/4-6) #3       Assessment Plan:   Streptococcus agalactiae (GBS) sepsis, likely eminating from his R foot DFU - POD#3    Fever yesterday concerning, but still postop. IF he fevers again, grab me some BCx. ->POD#3    If no bacteremia, he can get PICC Monday and go home/HH/SNF.   Plan on 6wks IV Abx   DMT2    CKD    HTN      Microbiology:                1/6 - Wound (+) CoNS and bacillus (surface contaminants)                                         1/2 - BCx (-)                                         1/1 - BCx 2/2 (+) Streptococcus agalactiae                                                      UA (+) Streptococcus agalactiae        Lines / Catheters:         peripheral          Patient Active Problem List   Diagnosis Code    Sepsis ([facility] A41.9    Abdominal pain R10.9    CKD (chronic kidney disease) N18.9    Type 2 diabetes mellitus, with long-term current use of insulin ([address] E11.9, Z79.4    Hypertension I10    Hypokalemia E87.6    UTI (urinary tract infection) N39.0    Positive blood culture R78.81    Foot abscess, right L02.611    Corn of foot L84    Cholecystitis, unspecified K81.9    Sepsis due to Streptococcus agalactiae ([facility]) A40.1    Diabetic ulcer of right midfoot associated with type 2 diabetes mellitus, with fat layer exposed ([facility] E11.621, L97.412    PAD (peripheral artery disease) ([facility]) I73.9    Osteomyelitis of right foot ([facility] M86.9       Current Facility-Administered Medications   Medication Dose Route Frequency    multivitamin, tx-iron-ca-min (THERA-M w/ IRON) tablet 1 Tab  1 Tab Oral DAILY    0.9% sodium chloride infusion  25 mL/hr IntraVENous CONTINUOUS    fentaNYL citrate (PF) injection  mcg   mcg IntraVENous Rad Multiple    flumazenil (ROMAZICON) 0.1 mg/mL injection 0.2 mg  0.2 mg IntraVENous Rad Multiple    heparin (porcine) 1,000 unit/mL injection 10,000 Units  10,000 Units IntraVENous Rad Multiple    midazolam (PF) (VERSED) injection 0.5-4 mg  0.5-4 mg IntraVENous Rad Multiple    naloxone (NARCAN) injection 0.4 mg  0.4 mg IntraVENous PRN    Saccharomyces boulardii (FLORASTOR) capsule 500 mg  500 mg Oral BID    insulin lispro (HUMALOG) injection   SubCUTAneous AC&HS    amLODIPine (NORVASC) tablet 2.5 mg  2.5 mg Oral DAILY    ondansetron (ZOFRAN) injection 4 mg  4 mg IntraVENous Q6H PRN    aspirin chewable tablet 81 mg  81 mg Oral DAILY    atorvastatin (LIPITOR) tablet 20 mg  20 mg Oral QHS    glucose chewable tablet 16 g  16 g Oral PRN    glucagon (GLUCAGEN) injection 1 mg  1 mg IntraMUSCular PRN    dextrose 10% infusion 125-250 mL  125-250 mL IntraVENous PRN    acetaminophen (TYLENOL) tablet 650 mg  650 mg Oral Q6H PRN    sodium chloride (NS) flush 5-10 mL  5-10 mL IntraVENous PRN    piperacillin-tazobactam (ZOSYN) 4.5 g in 0.9% sodium chloride (MBP/ADV) 100 mL MBP  4.5 g IntraVENous Q6H         Review of Systems - General ROS: positive for  - fever  Respiratory ROS: no cough, shortness of breath, or wheezing  Cardiovascular ROS: no chest pain or dyspnea on exertion  Gastrointestinal ROS: no abdominal pain, change in bowel habits, or black or bloody stools       Objective:  Visit Vitals  /68   Pulse 79   Temp 99 °F (37.2 °C)   Resp 18   Ht 5' 6\" (1.676 m)   Wt 77 kg (169 lb 12.1 oz)   SpO2 97%   BMI 27.40 kg/m²       Temp (24hrs), Av.5 °F (37.5 °C), Min:98.2 °F (36.8 °C), Max:101.6 °F (38.7 °C)      GEN: WDWN elderly  in NAD  HEENT: anicteric  CHEST: CTA  CVS:RRR  R foot: Wrapped       Lab results:    Chemistry  Recent Labs     20  0300   *      K 3.9      CO2 27   BUN 10   CREA 1.38*   CA 7.9*   AGAP 5   BUCR 7*       CBC w/ Diff  No results for input(s): WBC, RBC, HGB, HCT, PLT, GRANS, LYMPH, EOS, HGBEXT, HCTEXT, PLTEXT in the last 72 hours. Microbiology  All Micro Results     Procedure Component Value Units Date/Time    CULTURE, Bernida Art STAIN [[id number]]  (Abnormal) Collected:  20 0809    Order Status:  Completed Specimen:  Wound from Foot Updated:  20 1053     Special Requests: NO SPECIAL REQUESTS        GRAM STAIN NO WBC'S SEEN         NO ORGANISMS SEEN        Culture result:       RARE STAPHYLOCOCCUS SPECIES, COAGULASE NEGATIVE                  BACILLUS SPECIES, NOT ANTHRACIS ISOLATED FROM BROTH ONLY          CULTURE, BLOOD [[id number]] Collected:  20 1309    Order Status:  Completed Specimen:  Blood Updated:  2049     Special Requests: NO SPECIAL REQUESTS        Culture result: NO GROWTH 6 DAYS       CULTURE, BLOOD [[id number]] Collected:  20 1250    Order Status:  Completed Specimen:  Blood Updated:  20 0549     Special Requests: NO SPECIAL REQUESTS        Culture result: NO GROWTH 6 DAYS       CULTURE, Bernida Art STAIN [[id number]] Collected:  20 1415    Order Status:  Canceled Specimen:  Wound from Drainage     CULTURE, BLOOD [[id number]]  (Abnormal) Collected:  20 2144    Order Status:  Completed Specimen:  Blood Updated:  20 2212     Special Requests: NO SPECIAL REQUESTS        GRAM STAIN       ANAEROBIC BOTTLE GRAM POSITIVE COCCI IN CHAINS                  SMEAR CALLED TO AND CORRECTLY REPEATED BY: WAYNE BABCOCK RN 3N ON 2020 AT 1143 TO [facility].            Culture result: ANAEROBIC BOTTLE STREPTOCOCCI, [employer] B                  For Susceptibility Refer to Culture  [id number]      CULTURE, BLOOD [[id number]]  (Abnormal)  (Susceptibility) Collected:  01/01/20 2150    Order Status:  Completed Specimen:  Blood Updated:  01/04/20 0726     Special Requests: NO SPECIAL REQUESTS        GRAM STAIN       ANAEROBIC BOTTLE GRAM POSITIVE COCCI IN CHAINS                  SMEAR CALLED TO AND CORRECTLY REPEATED BY: [facility] Martina BABCOCK RN 3N ON 1/2/2020 AT 1143 TO [facility]. Culture result:       ANAEROBIC BOTTLE STREPTOCOCCI, [employer] B          CULTURE, URINE [[id number]]  (Abnormal) Collected:  01/01/20 2230    Order Status:  Completed Specimen:  Urine from Clean catch Updated:  01/03/20 1042     Special Requests: NO SPECIAL REQUESTS        Culture result:       [id number] COLONIES/mL STREPTOCOCCI, [employer] B                  <10,000 COLONIES/mL STAPHYLOCOCCUS SPECIES          INFLUENZA A & B AG (RAPID TEST) [[id number]] Collected:  01/01/20 2157    Order Status:  Completed Specimen:  Nasopharyngeal from Nasal washing Updated:  01/01/20 2215     Influenza A Antigen NEGATIVE         Comment: A negative result does not exclude influenza virus infection, seasonal or H1N1 due to suboptimal sensitivity. If influenza is circulating in your community, a diagnosis of influenza should be considered based on a patients clinical presentation and empiric antiviral treatment should be considered, if indicated.         Influenza B Antigen NEGATIVE               Cheril Hodgkin, MD  Cell [phone]  [facility]   1/10/2020   2:43 PM

## 2020-01-10 NOTE — PROGRESS NOTES
Attempt for OT evalution at 1125. Pt unable to participate with OT due to:  []  Nausea/vomiting  []  Eating  []  Pain  []  Pt lethargic  [x]  Other: new order received post op for PWB R Heel with surgical shoe. Surgical shoe not arrived to room yet. Will f/u at later time once surgical shoe arrives. Thank you.   Sabiha Cole, OTR/L

## 2020-01-10 NOTE — PROGRESS NOTES
Problem: Falls - Risk of  Goal: *Absence of Falls  Description  Document Kristopher Winkler Fall Risk and appropriate interventions in the flowsheet.   Outcome: Progressing Towards Goal  Note: Fall Risk Interventions:  Mobility Interventions: Assess mobility with egress test, Communicate number of staff needed for ambulation/transfer, Patient to call before getting OOB, PT Consult for mobility concerns, PT Consult for assist device competence, Strengthening exercises (ROM-active/passive), Utilize walker, cane, or other assistive device         Medication Interventions: Evaluate medications/consider consulting pharmacy, Patient to call before getting OOB, Teach patient to arise slowly    Elimination Interventions: Call light in reach, Patient to call for help with toileting needs, Toileting schedule/hourly rounds, Urinal in reach

## 2020-01-10 NOTE — PROGRESS NOTES
Hospitalist Progress Note-critical care note     Patient: Franklin Anderson MRN: [id number]  CSN: [id number]    YOB: 1945  Age: 76 y.o. Sex: male    DOA: 1/1/2020 LOS:  LOS: 8 days            Chief complaint: DM, sepsis , positive bcx, uti m     Assessment/Plan         Hospital Problems  Date Reviewed: 1/9/2020          Codes Class Noted POA    Osteomyelitis of right foot ([facility] ICD-10-CM: M86.9  ICD-9-CM: 730.27  1/9/2020 Unknown        PAD (peripheral artery disease) ([facility] ICD-10-CM: I73.9  ICD-9-CM: 443.9  1/7/2020 Unknown        Cholecystitis, unspecified ICD-10-CM: K81.9  ICD-9-CM: 575.10  1/6/2020 Unknown        * (Principal) Sepsis due to Streptococcus agalactiae ([facility] ICD-10-CM: A40.1  ICD-9-CM: 038.0, 995.91  1/6/2020 Unknown        Diabetic ulcer of right midfoot associated with type 2 diabetes mellitus, with fat layer exposed ([facility] ICD-10-CM: E11.621, C66.533  ICD-9-CM: 250.80, 707.14  1/6/2020 Unknown        Foot abscess, right ICD-10-CM: L02.611  ICD-9-CM: 682.7  1/5/2020 Unknown        Corn of foot ICD-10-CM: L84  ICD-9-CM: 700  1/5/2020 Unknown        Type 2 diabetes mellitus, with long-term current use of insulin ([facility] ICD-10-CM: E11.9, Z79.4  ICD-9-CM: 250.00, V58.67  1/3/2020         Hypertension ICD-10-CM: I10  ICD-9-CM: 401.9  1/3/2020 Unknown        Hypokalemia ICD-10-CM: E87.6  ICD-9-CM: 276.8  1/3/2020 Unknown        UTI (urinary tract infection) ICD-10-CM: N39.0  ICD-9-CM: 599.0  1/3/2020 Unknown        Positive blood culture ICD-10-CM: R78.81  ICD-9-CM: 790.7  1/3/2020 Unknown        Sepsis ([facility] ICD-10-CM: A41.9  ICD-9-CM: 038.9, 995.91  1/2/2020 Yes        Abdominal pain ICD-10-CM: R10.9  ICD-9-CM: 789.00  1/2/2020 Yes        CKD (chronic kidney disease) ICD-10-CM: N18.9  ICD-9-CM: 585.9  1/2/2020 Yes              Admitted for sepsis, bacteremia, cholecystitis r/o.  Foot abscess       Rt foot abscess and PAD   Angio gram done per vascular   I&D done per dr. Michael Aggarwal of the right 5th toe with flap closure and bone resection of the 5th MT-need IV antibiotics for 6 weeks  Surgery done today   . Ct foot -fluid collection/abscess, no om noted     Bacteremia   [facility] ,  Like from UTI and foot abscess   On zosyn-f/u per ID   Will have PICC line on Monday       DM type II    ssi     Hypokalemia   Resolved     ckd 2-3 stable       Subjective: Fever, no diarrhea    Daughter was at the bedside     Disposition : Monday  Review of systems:    General: no fevers, no chills. Cardiovascular: No chest pain or pressure. No palpitations. Pulmonary: No shortness of breath. Gastrointestinal: No nausea, vomiting. No abdomen pain     Vital signs/Intake and Output:  Visit Vitals  /68   Pulse 79   Temp 99 °F (37.2 °C)   Resp 18   Ht 5' 6\" (1.676 m)   Wt 77 kg (169 lb 12.1 oz)   SpO2 97%   BMI 27.40 kg/m²     Current Shift:  No intake/output data recorded. Last three shifts:  01/08 1901 - 01/10 0700  In: 926.3 [I.V.:926.3]  Out: 950 [Urine:950]    Physical Exam:  General: WD, WN. Alert, cooperative, no acute distress    HEENT: NC, Atraumatic. PERRLA, anicteric sclerae. Lungs: CTA Bilaterally. No Wheezing/Rhonchi/Rales. Heart:  Regular  rhythm,  No murmur, No Rubs, No Gallops  Abdomen: Soft, Non distended,.  +Bowel sounds,   Extremities: No c/c. Rt foot wrapped with ace bandage   Psych:   Not anxious or agitated. Neurologic:  No acute neurological deficit. Labs: Results:       Chemistry Recent Labs     01/08/20  0300   *      K 3.9      CO2 27   BUN 10   CREA 1.38*   CA 7.9*   AGAP 5   BUCR 7*      CBC w/Diff No results for input(s): WBC, RBC, HGB, HCT, PLT, GRANS, LYMPH, EOS, HGBEXT, HCTEXT, PLTEXT, HGBEXT, HCTEXT, PLTEXT in the last 72 hours. Cardiac Enzymes No results for input(s): CPK, CKND1, KAREN in the last 72 hours.     No lab exists for component: CKRMB, TROIP   Coagulation No results for input(s): PTP, INR, APTT, INREXT, INREXT in the last 72 hours. Lipid Panel No results found for: CHOL, CHOLPOCT, CHOLX, CHLST, CHOLV, 472739, HDL, HDLP, LDL, LDLC, DLDLP, 618834, VLDLC, VLDL, TGLX, TRIGL, TRIGP, TGLPOCT, CHHD, CHHDX   BNP No results for input(s): BNPP in the last 72 hours. Liver Enzymes No results for input(s): TP, ALB, TBIL, AP, SGOT, GPT in the last 72 hours.     No lab exists for component: DBIL   Thyroid Studies No results found for: T4, T3U, TSH, TSHEXT, TSHEXT     Procedures/imaging: see electronic medical records for all procedures/Xrays and details which were not copied into this note but were reviewed prior to creation of Rell Mcclelland MD

## 2020-01-11 LAB
GLUCOSE BLD STRIP.AUTO-MCNC: 153 MG/DL (ref 70–110)
GLUCOSE BLD STRIP.AUTO-MCNC: 244 MG/DL (ref 70–110)
GLUCOSE BLD STRIP.AUTO-MCNC: 259 MG/DL (ref 70–110)
GLUCOSE BLD STRIP.AUTO-MCNC: 272 MG/DL (ref 70–110)
MAGNESIUM SERPL-MCNC: 1.8 MG/DL (ref 1.6–2.6)

## 2020-01-11 PROCEDURE — 74011000258 HC RX REV CODE- 258: Performed by: EMERGENCY MEDICINE

## 2020-01-11 PROCEDURE — 82962 GLUCOSE BLOOD TEST: CPT

## 2020-01-11 PROCEDURE — 74011250637 HC RX REV CODE- 250/637: Performed by: FAMILY MEDICINE

## 2020-01-11 PROCEDURE — 97116 GAIT TRAINING THERAPY: CPT

## 2020-01-11 PROCEDURE — 36415 COLL VENOUS BLD VENIPUNCTURE: CPT

## 2020-01-11 PROCEDURE — 74011636637 HC RX REV CODE- 636/637: Performed by: HOSPITALIST

## 2020-01-11 PROCEDURE — 83735 ASSAY OF MAGNESIUM: CPT

## 2020-01-11 PROCEDURE — 65660000000 HC RM CCU STEPDOWN

## 2020-01-11 PROCEDURE — 74011250637 HC RX REV CODE- 250/637: Performed by: HOSPITALIST

## 2020-01-11 PROCEDURE — 74011250636 HC RX REV CODE- 250/636: Performed by: EMERGENCY MEDICINE

## 2020-01-11 RX ORDER — LOSARTAN POTASSIUM 25 MG/1
TABLET ORAL
Status: DISPENSED
Start: 2020-01-11 | End: 2020-01-12

## 2020-01-11 RX ORDER — LOSARTAN POTASSIUM 25 MG/1
25 TABLET ORAL DAILY
Status: DISCONTINUED | OUTPATIENT
Start: 2020-01-11 | End: 2020-01-14 | Stop reason: HOSPADM

## 2020-01-11 RX ADMIN — INSULIN LISPRO 6 UNITS: 100 INJECTION, SOLUTION INTRAVENOUS; SUBCUTANEOUS at 12:49

## 2020-01-11 RX ADMIN — Medication 500 MG: at 08:22

## 2020-01-11 RX ADMIN — ATORVASTATIN CALCIUM 20 MG: 20 TABLET, FILM COATED ORAL at 22:34

## 2020-01-11 RX ADMIN — INSULIN LISPRO 9 UNITS: 100 INJECTION, SOLUTION INTRAVENOUS; SUBCUTANEOUS at 22:34

## 2020-01-11 RX ADMIN — AMLODIPINE BESYLATE 2.5 MG: 2.5 TABLET ORAL at 08:22

## 2020-01-11 RX ADMIN — MULTIPLE VITAMINS W/ MINERALS TAB 1 TABLET: TAB at 08:22

## 2020-01-11 RX ADMIN — Medication 500 MG: at 22:34

## 2020-01-11 RX ADMIN — ASPIRIN 81 MG 81 MG: 81 TABLET ORAL at 08:22

## 2020-01-11 RX ADMIN — LOSARTAN POTASSIUM 25 MG: 25 TABLET ORAL at 17:28

## 2020-01-11 RX ADMIN — INSULIN LISPRO 9 UNITS: 100 INJECTION, SOLUTION INTRAVENOUS; SUBCUTANEOUS at 17:28

## 2020-01-11 RX ADMIN — INSULIN LISPRO 3 UNITS: 100 INJECTION, SOLUTION INTRAVENOUS; SUBCUTANEOUS at 06:55

## 2020-01-11 RX ADMIN — PIPERACILLIN AND TAZOBACTAM 4.5 G: 4; .5 INJECTION, POWDER, FOR SOLUTION INTRAVENOUS at 06:54

## 2020-01-11 RX ADMIN — PIPERACILLIN AND TAZOBACTAM 4.5 G: 4; .5 INJECTION, POWDER, FOR SOLUTION INTRAVENOUS at 12:49

## 2020-01-11 RX ADMIN — PIPERACILLIN AND TAZOBACTAM 4.5 G: 4; .5 INJECTION, POWDER, FOR SOLUTION INTRAVENOUS at 17:28

## 2020-01-11 NOTE — PROGRESS NOTES
8739 Assumed care of the patient from [address] (offgoing Nurse). Patient is alert and oriented. Pt denies any pain or discomfort at this moment. bed in low position, call bell within reach.'    1900 Patient had an uneventful shift and remained stable. Purposeful hourly rounding completed throughout the shift, NAD noted at this time, and patient is resting quietly in bed. No concerns or requests voiced    1935 Bedside and Verbal shift change report given to [address] (oncoming nurse) by Brandee Rosenthal RN (offgoing nurse). Report included the following information SBAR, Kardex, MAR, Recent Results and Cardiac Rhythm .

## 2020-01-11 NOTE — PROGRESS NOTES
Hospitalist Progress Note    Patient: Raymon Naidu MRN: [id number]  CSN: [id number]    YOB: 1945  Age: 76 y.o. Sex: male    DOA: 1/1/2020 LOS:  LOS: 9 days            Assessment/Plan     Principal Problem:    Sepsis due to Streptococcus agalactiae ([address] (1/6/2020)    Active Problems:    Sepsis ([address] (1/2/2020)      Abdominal pain (1/2/2020)      CKD (chronic kidney disease) (1/2/2020)      Type 2 diabetes mellitus, with long-term current use of insulin ([address] (1/3/2020)      Hypertension (1/3/2020)      Hypokalemia (1/3/2020)      UTI (urinary tract infection) (1/3/2020)      Positive blood culture (1/3/2020)      Foot abscess, right (1/5/2020)      Corn of foot (1/5/2020)      Cholecystitis, unspecified (1/6/2020)      Diabetic ulcer of right midfoot associated with type 2 diabetes mellitus, with fat layer exposed ([address] (1/6/2020)      PAD (peripheral artery disease) ([address] (1/7/2020)      Osteomyelitis of right foot ([address] (1/9/2020)      Rt foot abscess and PAD: Angio gram done per vascular   I&D done per dr. Vaughan Ards of the right 5th toe with flap closure and bone resection of the 5th MT-need IV antibiotics for 6 weeks  Ct foot -fluid collection/abscess, no om noted      Bacteremia : [facility] [facility] ,  Like from UTI and foot abscess   On zosyn-f/u per ID   Will have PICC line on Monday     HTN: Add ARB to norvasc, will f/u BP     DM type II : SSI     Hypokalemia: Resolved      ckd 2-3 stable     Dispo: Home with home health in 2-3 days. CC:    Admitted for sepsis, bacteremia, cholecystitis r/o. Foot abscess         Subjective:     Pt was seen and examined with the nurse in the morning round. No fevers since yesterday. Feeling good. No special concerns. Review of systems  General: No fevers or chills. Cardiovascular: No chest pain or pressure. No palpitations. Pulmonary: No cough, SOB  Gastrointestinal: No nausea, vomiting.      Objective:      Visit Vitals  /69 (BP 1 Location: Right arm, BP Patient Position: At rest;Supine)   Pulse 81   Temp 98.6 °F (37 °C)   Resp 18   Ht 5' 6\" (1.676 m)   Wt 78.2 kg (172 lb 4.8 oz)   SpO2 95%   BMI 27.81 kg/m²       Physical Exam:    Gen: NAD, non-toxic. Heent:  MMM, NC, AT. Cor: s1s2 RRR. No MRG. PMI mid 5th intercostal space. Resp:  CTA b/l. No w/r/r. Nml effort and diaphragmatic excursion. Abd:  NT ND.  BS positive. No rebound or guarding. No masses. Ext: Dressing to R. foot. Intake and Output:  Current Shift:  No intake/output data recorded. Last three shifts:  01/09 1901 - 01/11 0700  In: 1603.8 [I.V.:1603.8]  Out: 1400 [Urine:1400]    Labs: Results:       Chemistry No results for input(s): GLU, NA, K, CL, CO2, BUN, CREA, CA, AGAP, BUCR, TBIL, GPT, AP, TP, ALB, GLOB, AGRAT in the last 72 hours. CBC w/Diff No results for input(s): WBC, RBC, HGB, HCT, PLT, GRANS, LYMPH, EOS, HGBEXT, HCTEXT, PLTEXT in the last 72 hours. Cardiac Enzymes No results for input(s): CPK, CKND1, KAREN in the last 72 hours. No lab exists for component: CKRMB, TROIP   Coagulation No results for input(s): PTP, INR, APTT, INREXT in the last 72 hours. Lipid Panel No results found for: CHOL, CHOLPOCT, CHOLX, CHLST, CHOLV, 024763, HDL, HDLP, LDL, LDLC, DLDLP, 884660, VLDLC, VLDL, TGLX, TRIGL, TRIGP, TGLPOCT, CHHD, CHHDX   BNP No results for input(s): BNPP in the last 72 hours. Liver Enzymes No results for input(s): TP, ALB, TBIL, AP, SGOT, GPT in the last 72 hours.     No lab exists for component: DBIL   Thyroid Studies No results found for: T4, T3U, TSH, TSHEXT     Procedures/imaging: see electronic medical records for all procedures/Xrays and details which were not copied into this note but were reviewed prior to creation of Plan      Medications Reviewed  Bob Samayoa MD

## 2020-01-11 NOTE — PROGRESS NOTES
1910 Pt received from offgoing nurse without any signs or symptoms of distress. Pt vitals are stable and within normal limits. Pt bed in low position with wheels locked and call bell within reach. 1932 Assessment completed and documented in flow sheet. Pt denies any further needs at this time. Pt in NAD with bed in low position, wheels locked and call bell within reach. Purposeful rounding completed. Pt resting quietly. No further needs voiced at this time. 2156 Scheduled medications administered as ordered. Purposeful rounding completed. Pt resting quietly. No further needs voiced at this time. 2339 Scheduled medications administered as ordered. Reassessment completed with no changes noted. Bed locked, in lowest position, with call light within reach. Purposeful rounding completed. Pt resting quietly. No further needs voiced at this time. 8610 Reassessment completed with no changes noted. Bed locked, in lowest position, with call light within reach. 0094 Scheduled medications administered as ordered. 0715 Bedside and Verbal shift change report given to [address] (oncoming nurse) by Suzi Nam RN (offgoing nurse). Report included the following information SBAR, Intake/Output, MAR and Recent Results.

## 2020-01-11 NOTE — PROGRESS NOTES
Problem: Falls - Risk of  Goal: *Absence of Falls  Description  Document Wilfrido Oleary Fall Risk and appropriate interventions in the flowsheet.   Outcome: Progressing Towards Goal  Note: Fall Risk Interventions:  Mobility Interventions: Bed/chair exit alarm, Communicate number of staff needed for ambulation/transfer, Patient to call before getting OOB, PT Consult for mobility concerns, PT Consult for assist device competence, Utilize walker, cane, or other assistive device, Strengthening exercises (ROM-active/passive)         Medication Interventions: Evaluate medications/consider consulting pharmacy, Patient to call before getting OOB, Teach patient to arise slowly    Elimination Interventions: Call light in reach, Patient to call for help with toileting needs, Toileting schedule/hourly rounds, Urinal in reach

## 2020-01-11 NOTE — PROGRESS NOTES
Problem: Falls - Risk of  Goal: *Absence of Falls  Description  Document Angi Alfredo Fall Risk and appropriate interventions in the flowsheet. Outcome: Progressing Towards Goal  Note: Fall Risk Interventions:  Mobility Interventions: Patient to call before getting OOB         Medication Interventions: Evaluate medications/consider consulting pharmacy    Elimination Interventions: Call light in reach              Problem: Patient Education: Go to Patient Education Activity  Goal: Patient/Family Education  Outcome: Progressing Towards Goal     Problem: Nausea/Vomiting (Adult)  Goal: *Absence of nausea/vomiting  Outcome: Progressing Towards Goal  Goal: *Palliation of nausea/vomiting (Palliative Care)  Outcome: Progressing Towards Goal     Problem: Patient Education: Go to Patient Education Activity  Goal: Patient/Family Education  Outcome: Progressing Towards Goal     Problem: Pain  Goal: *Control of Pain  Outcome: Progressing Towards Goal     Problem: Patient Education: Go to Patient Education Activity  Goal: Patient/Family Education  Outcome: Progressing Towards Goal     Problem: Diabetes Self-Management  Goal: *Disease process and treatment process  Description  Define diabetes and identify own type of diabetes; list 3 options for treating diabetes. Outcome: Progressing Towards Goal  Goal: *Incorporating nutritional management into lifestyle  Description  Describe effect of type, amount and timing of food on blood glucose; list 3 methods for planning meals. Outcome: Progressing Towards Goal  Goal: *Incorporating physical activity into lifestyle  Description  State effect of exercise on blood glucose levels. Outcome: Progressing Towards Goal  Goal: *Developing strategies to promote health/change behavior  Description  Define the ABC's of diabetes; identify appropriate screenings, schedule and personal plan for screenings.   Outcome: Progressing Towards Goal  Goal: *Using medications safely  Description  State effect of diabetes medications on diabetes; name diabetes medication taking, action and side effects. Outcome: Progressing Towards Goal  Goal: *Monitoring blood glucose, interpreting and using results  Description  Identify recommended blood glucose targets  and personal targets. Outcome: Progressing Towards Goal  Goal: *Prevention, detection, treatment of acute complications  Description  List symptoms of hyper- and hypoglycemia; describe how to treat low blood sugar and actions for lowering  high blood glucose level. Outcome: Progressing Towards Goal  Goal: *Prevention, detection and treatment of chronic complications  Description  Define the natural course of diabetes and describe the relationship of blood glucose levels to long term complications of diabetes.   Outcome: Progressing Towards Goal  Goal: *Developing strategies to address psychosocial issues  Description  Describe feelings about living with diabetes; identify support needed and support network  Outcome: Progressing Towards Goal  Goal: *Insulin pump training  Outcome: Progressing Towards Goal  Goal: *Sick day guidelines  Outcome: Progressing Towards Goal  Goal: *Patient Specific Goal (EDIT GOAL, INSERT TEXT)  Outcome: Progressing Towards Goal     Problem: Patient Education: Go to Patient Education Activity  Goal: Patient/Family Education  Outcome: Progressing Towards Goal     Problem: HYPERTENSION  Goal: *Hemodynamically stable  Outcome: Progressing Towards Goal  Goal: *Electrolytes within normal limits  Outcome: Progressing Towards Goal  Goal: *Labs within defined limits  Outcome: Progressing Towards Goal     Problem: Patient Education: Go to Patient Education Activity  Goal: Patient/Family Education  Outcome: Progressing Towards Goal     Problem: Patient Education: Go to Patient Education Activity  Goal: Patient/Family Education  Outcome: Progressing Towards Goal     Problem: Patient Education: Go to Patient Education Activity  Goal: Patient/Family Education  Outcome: Progressing Towards Goal

## 2020-01-11 NOTE — PROGRESS NOTES
Problem: Mobility Impaired (Adult and Pediatric)  Goal: *Acute Goals and Plan of Care (Insert Text)  Description  Physical Therapy Goals   Initiated 1/10/2020 and to be accomplished within 3-5 day(s)  1. Patient will move from supine <> sit with mod I in prep for out of bed activity and change of position. 2.  Patient will perform sit<> stand with S/mod I with RW partial heel wt bearing R in prep for transfers/ambulation. 3.  Patient will ambulate 100 feet with S/mod I with RW partial heel wt bearing R for increased functional mobility/safe discharge. 5.  Patient will ascend/descend 3-5 stairs with handrail(s) with CGA with RW partial heel wt bearing R for home re-entry as needed. Physical Therapy Goals: In 1-7 days pt will be able to perform:  ST.  Bed mobility:  Rolling L to R to L modified independent for positioning. 2.  Supine to sit to supine S with HR for meals. 3.  Sit to stand to sit S with RW in prep for ambulation. LT.  Gait:  Ambulate >150ft S with LRD for home/community mobility. 2.  Stair Negotiation:  Ascend/descend >5 steps CGA with HR for home entry. 3.  Activity tolerance: Tolerate up in chair 1-2 hours for ADL's.  4.  Patient/Family Education:  Patient/family to be independent with HEP for follow-up care and safe discharge. Note:   PHYSICAL THERAPY TREATMENT    Patient: Carine Sanon (20 y.o. male)  Date: 2020  Diagnosis: Sepsis ([facility] [address] [A41.9]  Fever in adult [R50.9]  Lactic acidosis [E87.2]   Sepsis due to Streptococcus agalactiae (HCC)  Procedure(s) (LRB):  RIGHT FOOT INSICION TO BONE CORTEX; EXCISION OF ULCER, POSSIBLE AMPUTATION OF 5TH TOE, SOFT TISSUE REARRAGE (Right) 2 Days Post-Op  Precautions: Fall, PWB(rle)   Chart, physical therapy assessment, plan of care and goals were reviewed. ASSESSMENT:  Pt progressing well with mobility. Pt increased ambulation distance and able to maintain weight bearing with initial review of WB restrictions.  Will continue to progress as pt tolerates. Progression toward goals:  [x]      Improving appropriately and progressing toward goals  []      Improving slowly and progressing toward goals  []      Not making progress toward goals and plan of care will be adjusted     PLAN:  Patient continues to benefit from skilled intervention to address the above impairments. Continue treatment per established plan of care. Discharge Recommendations:  Home Health  Further Equipment Recommendations for Discharge:  N/A     SUBJECTIVE:   Patient stated I am good.     OBJECTIVE DATA SUMMARY:   Critical Behavior:  Neurologic State: Alert  Orientation Level: Oriented X4  Cognition: Appropriate decision making, Appropriate for age attention/concentration, Appropriate safety awareness, Follows commands  Safety/Judgement: Fall prevention  Functional Mobility Training:  Transfers:  Sit to Stand: Stand-by assistance  Stand to Sit: Stand-by assistance  Balance:  Sitting: Intact  Standing: Intact; With support  Ambulation/Gait Training:  Distance (ft): 75 Feet (ft)  Assistive Device: Gait belt;Walker, rolling  Ambulation - Level of Assistance: Stand-by assistance;Contact guard assistance  Gait Abnormalities: Decreased step clearance  Right Side Weight Bearing: Partial (%)  Base of Support: Shift to left  Stance: Weight shift;Right decreased; Left increased  Speed/Johnna: Slow  Step Length: Right shortened;Left shortened  Pain:  Pain Scale 1: Numeric (0 - 10)  Pain Intensity 1: 0  Activity Tolerance:   Good  Please refer to the flowsheet for vital signs taken during this treatment.   After treatment:   [] Patient left in no apparent distress sitting up in chair  [x] Patient left in no apparent distress in bed  [x] Call bell left within reach  [x] Nursing notified  [] Caregiver present  [] Bed alarm activated      Armaan Claudio   Time Calculation: 20 mins

## 2020-01-12 LAB
ANION GAP SERPL CALC-SCNC: 6 MMOL/L (ref 3–18)
BASOPHILS # BLD: 0 K/UL (ref 0–0.1)
BASOPHILS NFR BLD: 0 % (ref 0–2)
BUN SERPL-MCNC: 9 MG/DL (ref 7–18)
BUN/CREAT SERPL: 7 (ref 12–20)
CALCIUM SERPL-MCNC: 7.7 MG/DL (ref 8.5–10.1)
CHLORIDE SERPL-SCNC: 109 MMOL/L (ref 100–111)
CO2 SERPL-SCNC: 27 MMOL/L (ref 21–32)
CREAT SERPL-MCNC: 1.33 MG/DL (ref 0.6–1.3)
DIFFERENTIAL METHOD BLD: ABNORMAL
EOSINOPHIL # BLD: 0.2 K/UL (ref 0–0.4)
EOSINOPHIL NFR BLD: 3 % (ref 0–5)
ERYTHROCYTE [DISTWIDTH] IN BLOOD BY AUTOMATED COUNT: 13.7 % (ref 11.6–14.5)
GLUCOSE BLD STRIP.AUTO-MCNC: 155 MG/DL (ref 70–110)
GLUCOSE BLD STRIP.AUTO-MCNC: 172 MG/DL (ref 70–110)
GLUCOSE BLD STRIP.AUTO-MCNC: 197 MG/DL (ref 70–110)
GLUCOSE BLD STRIP.AUTO-MCNC: 267 MG/DL (ref 70–110)
GLUCOSE SERPL-MCNC: 175 MG/DL (ref 74–99)
HCT VFR BLD AUTO: 19.7 % (ref 36–48)
HCT VFR BLD AUTO: 24.4 % (ref 36–48)
HGB BLD-MCNC: 6.6 G/DL (ref 13–16)
HGB BLD-MCNC: 8.1 G/DL (ref 13–16)
LYMPHOCYTES # BLD: 1.5 K/UL (ref 0.9–3.6)
LYMPHOCYTES NFR BLD: 23 % (ref 21–52)
MAGNESIUM SERPL-MCNC: 1.8 MG/DL (ref 1.6–2.6)
MCH RBC QN AUTO: 29.9 PG (ref 24–34)
MCHC RBC AUTO-ENTMCNC: 33.5 G/DL (ref 31–37)
MCV RBC AUTO: 89.1 FL (ref 74–97)
MONOCYTES # BLD: 0.8 K/UL (ref 0.05–1.2)
MONOCYTES NFR BLD: 11 % (ref 3–10)
NEUTS SEG # BLD: 4.2 K/UL (ref 1.8–8)
NEUTS SEG NFR BLD: 63 % (ref 40–73)
PLATELET # BLD AUTO: 322 K/UL (ref 135–420)
PMV BLD AUTO: 8.7 FL (ref 9.2–11.8)
POTASSIUM SERPL-SCNC: 3.3 MMOL/L (ref 3.5–5.5)
RBC # BLD AUTO: 2.21 M/UL (ref 4.7–5.5)
SODIUM SERPL-SCNC: 142 MMOL/L (ref 136–145)
WBC # BLD AUTO: 6.7 K/UL (ref 4.6–13.2)

## 2020-01-12 PROCEDURE — 74011000258 HC RX REV CODE- 258: Performed by: EMERGENCY MEDICINE

## 2020-01-12 PROCEDURE — 85018 HEMOGLOBIN: CPT

## 2020-01-12 PROCEDURE — P9016 RBC LEUKOCYTES REDUCED: HCPCS

## 2020-01-12 PROCEDURE — 97116 GAIT TRAINING THERAPY: CPT

## 2020-01-12 PROCEDURE — 82962 GLUCOSE BLOOD TEST: CPT

## 2020-01-12 PROCEDURE — 74011250637 HC RX REV CODE- 250/637: Performed by: HOSPITALIST

## 2020-01-12 PROCEDURE — 74011636637 HC RX REV CODE- 636/637: Performed by: HOSPITALIST

## 2020-01-12 PROCEDURE — 74011250637 HC RX REV CODE- 250/637: Performed by: INTERNAL MEDICINE

## 2020-01-12 PROCEDURE — 74011250637 HC RX REV CODE- 250/637: Performed by: FAMILY MEDICINE

## 2020-01-12 PROCEDURE — 86900 BLOOD TYPING SEROLOGIC ABO: CPT

## 2020-01-12 PROCEDURE — 86923 COMPATIBILITY TEST ELECTRIC: CPT

## 2020-01-12 PROCEDURE — 87040 BLOOD CULTURE FOR BACTERIA: CPT

## 2020-01-12 PROCEDURE — 74011250636 HC RX REV CODE- 250/636: Performed by: FAMILY MEDICINE

## 2020-01-12 PROCEDURE — 80048 BASIC METABOLIC PNL TOTAL CA: CPT

## 2020-01-12 PROCEDURE — 85025 COMPLETE CBC W/AUTO DIFF WBC: CPT

## 2020-01-12 PROCEDURE — 74011250636 HC RX REV CODE- 250/636: Performed by: EMERGENCY MEDICINE

## 2020-01-12 PROCEDURE — 83735 ASSAY OF MAGNESIUM: CPT

## 2020-01-12 PROCEDURE — 36430 TRANSFUSION BLD/BLD COMPNT: CPT

## 2020-01-12 PROCEDURE — 65660000000 HC RM CCU STEPDOWN

## 2020-01-12 PROCEDURE — 36415 COLL VENOUS BLD VENIPUNCTURE: CPT

## 2020-01-12 RX ORDER — VANCOMYCIN 2 GRAM/500 ML IN 0.9 % SODIUM CHLORIDE INTRAVENOUS
2000 ONCE
Status: COMPLETED | OUTPATIENT
Start: 2020-01-12 | End: 2020-01-12

## 2020-01-12 RX ORDER — SODIUM CHLORIDE 9 MG/ML
250 INJECTION, SOLUTION INTRAVENOUS AS NEEDED
Status: DISCONTINUED | OUTPATIENT
Start: 2020-01-12 | End: 2020-01-14 | Stop reason: HOSPADM

## 2020-01-12 RX ADMIN — MULTIPLE VITAMINS W/ MINERALS TAB 1 TABLET: TAB at 08:20

## 2020-01-12 RX ADMIN — PIPERACILLIN AND TAZOBACTAM 4.5 G: 4; .5 INJECTION, POWDER, FOR SOLUTION INTRAVENOUS at 17:26

## 2020-01-12 RX ADMIN — INSULIN LISPRO 3 UNITS: 100 INJECTION, SOLUTION INTRAVENOUS; SUBCUTANEOUS at 22:56

## 2020-01-12 RX ADMIN — PIPERACILLIN AND TAZOBACTAM 4.5 G: 4; .5 INJECTION, POWDER, FOR SOLUTION INTRAVENOUS at 12:30

## 2020-01-12 RX ADMIN — VANCOMYCIN HYDROCHLORIDE 2000 MG: 10 INJECTION, POWDER, LYOPHILIZED, FOR SOLUTION INTRAVENOUS at 02:42

## 2020-01-12 RX ADMIN — ASPIRIN 81 MG 81 MG: 81 TABLET ORAL at 08:20

## 2020-01-12 RX ADMIN — ATORVASTATIN CALCIUM 20 MG: 20 TABLET, FILM COATED ORAL at 22:56

## 2020-01-12 RX ADMIN — PIPERACILLIN AND TAZOBACTAM 4.5 G: 4; .5 INJECTION, POWDER, FOR SOLUTION INTRAVENOUS at 06:51

## 2020-01-12 RX ADMIN — AMLODIPINE BESYLATE 2.5 MG: 2.5 TABLET ORAL at 08:20

## 2020-01-12 RX ADMIN — ACETAMINOPHEN 650 MG: 325 TABLET ORAL at 23:06

## 2020-01-12 RX ADMIN — INSULIN LISPRO 3 UNITS: 100 INJECTION, SOLUTION INTRAVENOUS; SUBCUTANEOUS at 12:30

## 2020-01-12 RX ADMIN — PIPERACILLIN AND TAZOBACTAM 4.5 G: 4; .5 INJECTION, POWDER, FOR SOLUTION INTRAVENOUS at 00:29

## 2020-01-12 RX ADMIN — LOSARTAN POTASSIUM 25 MG: 25 TABLET ORAL at 08:20

## 2020-01-12 RX ADMIN — POTASSIUM BICARBONATE 40 MEQ: 782 TABLET, EFFERVESCENT ORAL at 06:51

## 2020-01-12 RX ADMIN — ACETAMINOPHEN 650 MG: 325 TABLET ORAL at 02:41

## 2020-01-12 RX ADMIN — INSULIN LISPRO 3 UNITS: 100 INJECTION, SOLUTION INTRAVENOUS; SUBCUTANEOUS at 06:51

## 2020-01-12 RX ADMIN — PIPERACILLIN AND TAZOBACTAM 4.5 G: 4; .5 INJECTION, POWDER, FOR SOLUTION INTRAVENOUS at 23:08

## 2020-01-12 RX ADMIN — Medication 500 MG: at 08:20

## 2020-01-12 RX ADMIN — INSULIN LISPRO 9 UNITS: 100 INJECTION, SOLUTION INTRAVENOUS; SUBCUTANEOUS at 17:26

## 2020-01-12 NOTE — PROGRESS NOTES
[id number]. Assumed care of patient. Patient alert and oriented x 4. Ambulated to BR, No acute distress noted. Dsg to R foot CDI. White board updated, bed wheels locked, call bell in reach. 0030. Notified Dr Willean Libman about patient's elevated temperature. Patient denies complaints, No acute distress noted    [id number]. Patient resting quietly in bed with eyes closed. Respirations regular, no acute distress noted. Call bell within reach. 0700. Bedside and Verbal shift change report given to [address] (oncoming nurse) by Irena Hernandez RN (offgoing nurse). Report included the following information SBAR, Intake/Output, MAR and Recent Results.

## 2020-01-12 NOTE — PROGRESS NOTES
Problem: Falls - Risk of  Goal: *Absence of Falls  Description  Document Fracisco Irwin Fall Risk and appropriate interventions in the flowsheet. Outcome: Progressing Towards Goal  Note: Fall Risk Interventions:  Mobility Interventions: Patient to call before getting OOB         Medication Interventions: Patient to call before getting OOB    Elimination Interventions: Call light in reach              Problem: Patient Education: Go to Patient Education Activity  Goal: Patient/Family Education  Outcome: Progressing Towards Goal     Problem: Nausea/Vomiting (Adult)  Goal: *Absence of nausea/vomiting  Outcome: Progressing Towards Goal  Goal: *Palliation of nausea/vomiting (Palliative Care)  Outcome: Progressing Towards Goal     Problem: Patient Education: Go to Patient Education Activity  Goal: Patient/Family Education  Outcome: Progressing Towards Goal     Problem: Pain  Goal: *Control of Pain  Outcome: Progressing Towards Goal     Problem: Patient Education: Go to Patient Education Activity  Goal: Patient/Family Education  Outcome: Progressing Towards Goal     Problem: Diabetes Self-Management  Goal: *Disease process and treatment process  Description  Define diabetes and identify own type of diabetes; list 3 options for treating diabetes. Outcome: Progressing Towards Goal  Goal: *Incorporating nutritional management into lifestyle  Description  Describe effect of type, amount and timing of food on blood glucose; list 3 methods for planning meals. Outcome: Progressing Towards Goal  Goal: *Incorporating physical activity into lifestyle  Description  State effect of exercise on blood glucose levels. Outcome: Progressing Towards Goal  Goal: *Developing strategies to promote health/change behavior  Description  Define the ABC's of diabetes; identify appropriate screenings, schedule and personal plan for screenings.   Outcome: Progressing Towards Goal  Goal: *Using medications safely  Description  State effect of diabetes medications on diabetes; name diabetes medication taking, action and side effects. Outcome: Progressing Towards Goal  Goal: *Monitoring blood glucose, interpreting and using results  Description  Identify recommended blood glucose targets  and personal targets. Outcome: Progressing Towards Goal  Goal: *Prevention, detection, treatment of acute complications  Description  List symptoms of hyper- and hypoglycemia; describe how to treat low blood sugar and actions for lowering  high blood glucose level. Outcome: Progressing Towards Goal  Goal: *Prevention, detection and treatment of chronic complications  Description  Define the natural course of diabetes and describe the relationship of blood glucose levels to long term complications of diabetes.   Outcome: Progressing Towards Goal  Goal: *Developing strategies to address psychosocial issues  Description  Describe feelings about living with diabetes; identify support needed and support network  Outcome: Progressing Towards Goal  Goal: *Insulin pump training  Outcome: Progressing Towards Goal  Goal: *Sick day guidelines  Outcome: Progressing Towards Goal  Goal: *Patient Specific Goal (EDIT GOAL, INSERT TEXT)  Outcome: Progressing Towards Goal     Problem: Patient Education: Go to Patient Education Activity  Goal: Patient/Family Education  Outcome: Progressing Towards Goal     Problem: HYPERTENSION  Goal: *Hemodynamically stable  Outcome: Progressing Towards Goal  Goal: *Electrolytes within normal limits  Outcome: Progressing Towards Goal  Goal: *Labs within defined limits  Outcome: Progressing Towards Goal     Problem: Patient Education: Go to Patient Education Activity  Goal: Patient/Family Education  Outcome: Progressing Towards Goal     Problem: Patient Education: Go to Patient Education Activity  Goal: Patient/Family Education  Outcome: Progressing Towards Goal     Problem: Patient Education: Go to Patient Education Activity  Goal: Patient/Family Education  Outcome: Progressing Towards Goal

## 2020-01-12 NOTE — PROGRESS NOTES
Antimicrobial Stewardship Team Note    Broad Spectrum Antimicrobial Recommendation    Patient: Raymon Naidu  MRN#: [id number]  Attending: No name on file. Admission Date: 010120    Current Antimicrobial Medications  Current Antimicrobial Therapy (168h ago, onward)      Ordered     Start Stop    01/12/20 0051  vancomycin (VANCOCIN) 1,250 mg in 0.9% sodium chloride 250 mL (VIAL-MATE)  1,250 mg,   IntraVENous,   EVERY 24 HOURS      01/13/20 0100 01/20/20 0059    01/12/20 0049  vancomycin (VANCOCIN) 2000 mg in  ml infusion  2,000 mg,   IntraVENous,   ONCE      01/12/20 0100 01/12/20 1259    01/01/20 2225  piperacillin-tazobactam (ZOSYN) 4.5 g in 0.9% sodium chloride (MBP/ADV) 100 mL MBP  4.5 g,   IntraVENous,   EVERY 6 HOURS      01/01/20 2226 --              Current Indication/Therapy  Vancomycin LD = 2 gm  IV X1   MD = 1250 mg IV q 24 hr    Assessment/Recommendation  Estimated Pharmacokinetic Parameters (based on population kinetics)  Vd: 55 L (0.7 L/kg)  [place]: 0.038 hr-1 (T1/2 = 18.2 hrs)    Dosing Recommendations  Vancomycin dose: 1250 mg IV Q24hrs (infused over 1 hr)  Estimated peak: 37.5 mcg/mL  Estimated trough: 15.6 mcg/mL  Estimated AUC:ADALID: 601 mcg*hr/mL (assumed ADALID 1 mcg/mL)    A/P:  1. Recommend vancomycin 1250 mg IV Q24hrs (16 mg/kg)  2. Consider a vancomycin trough level prior to the 4th dose. 3. Please monitor renal function (urine output, BUN/SCr). Dose adjustments may be necessary with a significant change in renal function. 4.Vancomycin loading dose of 2000 mg to facilitate rapid attainment of target trough serum vancomycin levels.     Submitted by: Darling Lorenzo [facility]

## 2020-01-12 NOTE — PROGRESS NOTES
Problem: Falls - Risk of  Goal: *Absence of Falls  Description  Document Jay Jay Youssef Fall Risk and appropriate interventions in the flowsheet. Outcome: Progressing Towards Goal  Note: Fall Risk Interventions:  Mobility Interventions: Patient to call before getting OOB         Medication Interventions: Patient to call before getting OOB, Teach patient to arise slowly    Elimination Interventions: Call light in reach, Patient to call for help with toileting needs              Problem: Patient Education: Go to Patient Education Activity  Goal: Patient/Family Education  Outcome: Progressing Towards Goal     Problem: Nausea/Vomiting (Adult)  Goal: *Absence of nausea/vomiting  Outcome: Progressing Towards Goal  Goal: *Palliation of nausea/vomiting (Palliative Care)  Outcome: Progressing Towards Goal     Problem: Patient Education: Go to Patient Education Activity  Goal: Patient/Family Education  Outcome: Progressing Towards Goal     Problem: Pain  Goal: *Control of Pain  Outcome: Progressing Towards Goal     Problem: Patient Education: Go to Patient Education Activity  Goal: Patient/Family Education  Outcome: Progressing Towards Goal     Problem: Diabetes Self-Management  Goal: *Disease process and treatment process  Description  Define diabetes and identify own type of diabetes; list 3 options for treating diabetes. Outcome: Progressing Towards Goal  Goal: *Incorporating nutritional management into lifestyle  Description  Describe effect of type, amount and timing of food on blood glucose; list 3 methods for planning meals. Outcome: Progressing Towards Goal  Goal: *Incorporating physical activity into lifestyle  Description  State effect of exercise on blood glucose levels. Outcome: Progressing Towards Goal  Goal: *Developing strategies to promote health/change behavior  Description  Define the ABC's of diabetes; identify appropriate screenings, schedule and personal plan for screenings.   Outcome: Progressing Towards Goal  Goal: *Using medications safely  Description  State effect of diabetes medications on diabetes; name diabetes medication taking, action and side effects. Outcome: Progressing Towards Goal  Goal: *Monitoring blood glucose, interpreting and using results  Description  Identify recommended blood glucose targets  and personal targets. Outcome: Progressing Towards Goal  Goal: *Prevention, detection, treatment of acute complications  Description  List symptoms of hyper- and hypoglycemia; describe how to treat low blood sugar and actions for lowering  high blood glucose level. Outcome: Progressing Towards Goal  Goal: *Prevention, detection and treatment of chronic complications  Description  Define the natural course of diabetes and describe the relationship of blood glucose levels to long term complications of diabetes.   Outcome: Progressing Towards Goal  Goal: *Developing strategies to address psychosocial issues  Description  Describe feelings about living with diabetes; identify support needed and support network  Outcome: Progressing Towards Goal  Goal: *Insulin pump training  Outcome: Progressing Towards Goal  Goal: *Sick day guidelines  Outcome: Progressing Towards Goal  Goal: *Patient Specific Goal (EDIT GOAL, INSERT TEXT)  Outcome: Progressing Towards Goal     Problem: Patient Education: Go to Patient Education Activity  Goal: Patient/Family Education  Outcome: Progressing Towards Goal     Problem: HYPERTENSION  Goal: *Hemodynamically stable  Outcome: Progressing Towards Goal  Goal: *Electrolytes within normal limits  Outcome: Progressing Towards Goal  Goal: *Labs within defined limits  Outcome: Progressing Towards Goal     Problem: Patient Education: Go to Patient Education Activity  Goal: Patient/Family Education  Outcome: Progressing Towards Goal     Problem: Patient Education: Go to Patient Education Activity  Goal: Patient/Family Education  Outcome: Progressing Towards Goal     Problem: Patient Education: Go to Patient Education Activity  Goal: Patient/Family Education  Outcome: Progressing Towards Goal

## 2020-01-12 NOTE — PROGRESS NOTES
Problem: Mobility Impaired (Adult and Pediatric)  Goal: *Acute Goals and Plan of Care (Insert Text)  Description  Physical Therapy Goals   Initiated 1/10/2020 and to be accomplished within 3-5 day(s)  1. Patient will move from supine <> sit with mod I in prep for out of bed activity and change of position. 2.  Patient will perform sit<> stand with S/mod I with RW partial heel wt bearing R in prep for transfers/ambulation. 3.  Patient will ambulate 100 feet with S/mod I with RW partial heel wt bearing R for increased functional mobility/safe discharge. 5.  Patient will ascend/descend 3-5 stairs with handrail(s) with CGA with RW partial heel wt bearing R for home re-entry as needed. Physical Therapy Goals: In 1-7 days pt will be able to perform:  ST.  Bed mobility:  Rolling L to R to L modified independent for positioning. 2.  Supine to sit to supine S with HR for meals. 3.  Sit to stand to sit S with RW in prep for ambulation. LT.  Gait:  Ambulate >150ft S with LRD for home/community mobility. 2.  Stair Negotiation:  Ascend/descend >5 steps CGA with HR for home entry. 3.  Activity tolerance: Tolerate up in chair 1-2 hours for ADL's.  4.  Patient/Family Education:  Patient/family to be independent with HEP for follow-up care and safe discharge. Outcome: Progressing Towards Goal       PHYSICAL THERAPY TREATMENT    Patient: Savannah Slade (15 y.o. male)  Date: 2020  Diagnosis: Sepsis ([facility] [address] [A41.9]  Fever in adult [R50.9]  Lactic acidosis [E87.2]   Sepsis due to Streptococcus agalactiae (HCC)  Procedure(s) (LRB):  RIGHT FOOT INSICION TO BONE CORTEX; EXCISION OF ULCER, POSSIBLE AMPUTATION OF 5TH TOE, SOFT TISSUE REARRAGE (Right) 3 Days Post-Op  Precautions: Fall, PWB(rle)   Chart, physical therapy assessment, plan of care and goals were reviewed. ASSESSMENT:  Pt showing continued progress with mobility and slightly increasing ambulation distance. No LOB. Cont POC.      Progression toward goals:  []      Improving appropriately and progressing toward goals  [x]      Improving slowly and progressing toward goals  []      Not making progress toward goals and plan of care will be adjusted     PLAN:  Patient continues to benefit from skilled intervention to address the above impairments. Continue treatment per established plan of care. Discharge Recommendations:  Home Health  Further Equipment Recommendations for Discharge:  rolling walker     SUBJECTIVE:   Patient stated  Can you get me water     OBJECTIVE DATA SUMMARY:   Critical Behavior:  Neurologic State: Alert  Orientation Level: Oriented X4  Cognition: Appropriate decision making, Appropriate for age attention/concentration, Follows commands, Appropriate safety awareness  Safety/Judgement: Fall prevention  Functional Mobility Training:  Bed Mobility:  Rolling: Supervision  Supine to Sit: Supervision  Scooting: Supervision  Transfers:  Sit to Stand: Stand-by assistance  Stand to Sit: Stand-by assistance    Balance:  Sitting: Intact  Standing: Intact; With support  Ambulation/Gait Training:  Distance (ft): 100 Feet (ft)  Assistive Device: Walker, rolling;Gait belt  Ambulation - Level of Assistance: Stand-by assistance;Contact guard assistance  Gait Abnormalities: Decreased step clearance  Right Side Weight Bearing: Partial (%)  Stance: Weight shift;Right decreased  Speed/Johnna: Slow  Step Length: Right shortened;Left shortened      Pain:  Pain Scale 1: Numeric (0 - 10)  Pain Intensity 1: 0    Activity Tolerance:   Fair     After treatment:   [] Patient left in no apparent distress sitting up in chair  [x] Patient left in no apparent distress in bed  [x] Call bell left within reach  [] Nursing notified  [] Caregiver present  [] Bed alarm activated      Daniel Guerra PTA   Time Calculation: 17 mins

## 2020-01-12 NOTE — PROGRESS NOTES
Hospitalist Progress Note    Patient: Sonia Becerril MRN: [id number]  [facility]: [id number]    YOB: 1945  Age: 76 y.o. Sex: male    DOA: 1/1/2020 LOS:  LOS: 10 days            Assessment/Plan     Principal Problem:    Sepsis due to Streptococcus agalactiae ([address] (1/6/2020)    Active Problems:    Sepsis ([address] (1/2/2020)      Abdominal pain (1/2/2020)      CKD (chronic kidney disease) (1/2/2020)      Type 2 diabetes mellitus, with long-term current use of insulin ([address] (1/3/2020)      Hypertension (1/3/2020)      Hypokalemia (1/3/2020)      UTI (urinary tract infection) (1/3/2020)      Positive blood culture (1/3/2020)      Foot abscess, right (1/5/2020)      Corn of foot (1/5/2020)      Cholecystitis, unspecified (1/6/2020)      Diabetic ulcer of right midfoot associated with type 2 diabetes mellitus, with fat layer exposed ([address] (1/6/2020)      PAD (peripheral artery disease) ([address] (1/7/2020)      Osteomyelitis of right foot ([address] (1/9/2020)      Rt foot abscess and PAD: Angio gram done per vascular   I&D done per dr. Meghan Drummond of the right 5th toe with flap closure and bone resection of the 5th MT-need IV antibiotics for 6 weeks  Ct foot -fluid collection/abscess, no om noted      Bacteremia : [facility] ,  Like from UTI and foot abscess   On zosyn-f/u per ID   Will have PICC line on Monday     Anemia: H/H drop 2nd to post op. PRBC transfusion.     HTN: Add ARB to norvasc, will f/u BP     DM type II : SSI     Hypokalemia: Resolved      ckd 2-3 stable      Dispo: Home with home health in 2-3 days.     CC:    Admitted for sepsis, bacteremia, cholecystitis r/o. Foot abscess         Subjective:      Pt was seen and examined with the nurse in the morning round.      \" I am doing good\" No special concerns. Review of systems  General: No fevers or chills. Cardiovascular: No chest pain or pressure. No palpitations. Pulmonary: No cough, SOB  Gastrointestinal: No nausea, vomiting. Objective:      Visit Vitals  /64 (BP 1 Location: Left arm, BP Patient Position: At rest;Sitting)   Pulse 85   Temp 98.7 °F (37.1 °C)   Resp 18   Ht 5' 6\" (1.676 m)   Wt 79.8 kg (176 lb)   SpO2 99%   BMI 28.41 kg/m²       Physical Exam:    Gen: NAD, non-toxic. Heent:  MMM, NC, AT. Cor: s1s2 RRR. No MRG. PMI mid 5th intercostal space. Resp:  CTA b/l. No w/r/r. Nml effort and diaphragmatic excursion. Abd:  NT ND.  BS positive. No rebound or guarding. No masses. Ext: Dressing to R. Foot. Intake and Output:  Current Shift:  No intake/output data recorded. Last three shifts:  01/10 1901 - 01/12 0700  In: 777.5 [I.V.:777.5]  Out: 1300 [Urine:1300]    Labs: Results:       Chemistry Recent Labs     01/12/20 0220   *      K 3.3*      CO2 27   BUN 9   CREA 1.33*   CA 7.7*   AGAP 6   BUCR 7*      CBC w/Diff Recent Labs     01/12/20 0220   WBC 6.7   RBC 2.21*   HGB 6.6*   HCT 19.7*      GRANS 63   LYMPH 23   EOS 3      Cardiac Enzymes No results for input(s): CPK, CKND1, KAREN in the last 72 hours. No lab exists for component: CKRMB, TROIP   Coagulation No results for input(s): PTP, INR, APTT, INREXT in the last 72 hours. Lipid Panel No results found for: CHOL, CHOLPOCT, CHOLX, CHLST, CHOLV, 287804, HDL, HDLP, LDL, LDLC, DLDLP, 428223, VLDLC, VLDL, TGLX, TRIGL, TRIGP, TGLPOCT, CHHD, CHHDX   BNP No results for input(s): BNPP in the last 72 hours. Liver Enzymes No results for input(s): TP, ALB, TBIL, AP, SGOT, GPT in the last 72 hours.     No lab exists for component: DBIL   Thyroid Studies No results found for: T4, T3U, TSH, TSHEXT     Procedures/imaging: see electronic medical records for all procedures/Xrays and details which were not copied into this note but were reviewed prior to creation of Plan      Medications Reviewed  Margarita Clay MD

## 2020-01-12 NOTE — PROGRESS NOTES
3781 [facility] care of the patient from [address] [place] [address] (offgoing Nurse). Patient is alert and oriented. Pt denies any pain or discomfort at this moment. bed in low position, call bell within reach. 5957 Blood Transfusion started at this moment. 1227 Blood transfusion completed. Tolerated well. 1900 Patient had an uneventful shift and remained stable. Purposeful hourly rounding completed throughout the shift, NAD noted at this time, and patient is resting quietly in bed. No concerns or requests voiced    Bedside and Verbal shift change report given to [facility]  RN(oncoming nurse) by Jerrica Anderson RN (offgoing nurse). Report included the following information SBAR, Kardex, Intake/Output, MAR and Cardiac Rhythm .

## 2020-01-13 ENCOUNTER — APPOINTMENT (OUTPATIENT)
Dept: INTERVENTIONAL RADIOLOGY/VASCULAR | Age: 75
DRG: 854 | End: 2020-01-13
Attending: FAMILY MEDICINE
Payer: MEDICARE

## 2020-01-13 ENCOUNTER — APPOINTMENT (OUTPATIENT)
Dept: VASCULAR SURGERY | Age: 75
DRG: 854 | End: 2020-01-13
Attending: HOSPITALIST
Payer: MEDICARE

## 2020-01-13 PROBLEM — I82.409 ACUTE DEEP VEIN THROMBOSIS (DVT) (HCC): Status: ACTIVE | Noted: 2020-01-13

## 2020-01-13 LAB
ABO + RH BLD: NORMAL
ANION GAP SERPL CALC-SCNC: 7 MMOL/L (ref 3–18)
BASOPHILS # BLD: 0 K/UL (ref 0–0.1)
BASOPHILS NFR BLD: 1 % (ref 0–2)
BLD PROD TYP BPU: NORMAL
BLOOD GROUP ANTIBODIES SERPL: NORMAL
BPU ID: NORMAL
BUN SERPL-MCNC: 11 MG/DL (ref 7–18)
BUN/CREAT SERPL: 8 (ref 12–20)
CALCIUM SERPL-MCNC: 7.8 MG/DL (ref 8.5–10.1)
CALLED TO:,BCALL1: NORMAL
CHLORIDE SERPL-SCNC: 109 MMOL/L (ref 100–111)
CO2 SERPL-SCNC: 27 MMOL/L (ref 21–32)
CREAT SERPL-MCNC: 1.36 MG/DL (ref 0.6–1.3)
CROSSMATCH RESULT,%XM: NORMAL
DIFFERENTIAL METHOD BLD: ABNORMAL
EOSINOPHIL # BLD: 0.2 K/UL (ref 0–0.4)
EOSINOPHIL NFR BLD: 3 % (ref 0–5)
ERYTHROCYTE [DISTWIDTH] IN BLOOD BY AUTOMATED COUNT: 14.4 % (ref 11.6–14.5)
GLUCOSE BLD STRIP.AUTO-MCNC: 117 MG/DL (ref 70–110)
GLUCOSE BLD STRIP.AUTO-MCNC: 237 MG/DL (ref 70–110)
GLUCOSE BLD STRIP.AUTO-MCNC: 245 MG/DL (ref 70–110)
GLUCOSE BLD STRIP.AUTO-MCNC: 266 MG/DL (ref 70–110)
GLUCOSE SERPL-MCNC: 100 MG/DL (ref 74–99)
HCT VFR BLD AUTO: 23.4 % (ref 36–48)
HGB BLD-MCNC: 7.9 G/DL (ref 13–16)
INR PPP: 1.3 (ref 0.8–1.2)
LYMPHOCYTES # BLD: 1.7 K/UL (ref 0.9–3.6)
LYMPHOCYTES NFR BLD: 23 % (ref 21–52)
MAGNESIUM SERPL-MCNC: 1.9 MG/DL (ref 1.6–2.6)
MCH RBC QN AUTO: 29.8 PG (ref 24–34)
MCHC RBC AUTO-ENTMCNC: 33.8 G/DL (ref 31–37)
MCV RBC AUTO: 88.3 FL (ref 74–97)
MONOCYTES # BLD: 0.8 K/UL (ref 0.05–1.2)
MONOCYTES NFR BLD: 10 % (ref 3–10)
NEUTS SEG # BLD: 4.5 K/UL (ref 1.8–8)
NEUTS SEG NFR BLD: 63 % (ref 40–73)
PLATELET # BLD AUTO: 341 K/UL (ref 135–420)
PMV BLD AUTO: 8.8 FL (ref 9.2–11.8)
POTASSIUM SERPL-SCNC: 3.7 MMOL/L (ref 3.5–5.5)
PROTHROMBIN TIME: 16 SEC (ref 11.5–15.2)
RBC # BLD AUTO: 2.65 M/UL (ref 4.7–5.5)
SODIUM SERPL-SCNC: 143 MMOL/L (ref 136–145)
SPECIMEN EXP DATE BLD: NORMAL
STATUS OF UNIT,%ST: NORMAL
UNIT DIVISION, %UDIV: 0
WBC # BLD AUTO: 7.2 K/UL (ref 4.6–13.2)

## 2020-01-13 PROCEDURE — 36415 COLL VENOUS BLD VENIPUNCTURE: CPT

## 2020-01-13 PROCEDURE — 02HV33Z INSERTION OF INFUSION DEVICE INTO SUPERIOR VENA CAVA, PERCUTANEOUS APPROACH: ICD-10-PCS | Performed by: RADIOLOGY

## 2020-01-13 PROCEDURE — 047R3ZZ DILATION OF RIGHT POSTERIOR TIBIAL ARTERY, PERCUTANEOUS APPROACH: ICD-10-PCS | Performed by: SURGERY

## 2020-01-13 PROCEDURE — 83735 ASSAY OF MAGNESIUM: CPT

## 2020-01-13 PROCEDURE — 97116 GAIT TRAINING THERAPY: CPT

## 2020-01-13 PROCEDURE — 74011000250 HC RX REV CODE- 250: Performed by: FAMILY MEDICINE

## 2020-01-13 PROCEDURE — 74011250637 HC RX REV CODE- 250/637: Performed by: FAMILY MEDICINE

## 2020-01-13 PROCEDURE — 74011250637 HC RX REV CODE- 250/637: Performed by: HOSPITALIST

## 2020-01-13 PROCEDURE — 74011250636 HC RX REV CODE- 250/636: Performed by: INTERNAL MEDICINE

## 2020-01-13 PROCEDURE — 74011000250 HC RX REV CODE- 250: Performed by: INTERNAL MEDICINE

## 2020-01-13 PROCEDURE — 74011636637 HC RX REV CODE- 636/637: Performed by: HOSPITALIST

## 2020-01-13 PROCEDURE — 93970 EXTREMITY STUDY: CPT

## 2020-01-13 PROCEDURE — 74011250637 HC RX REV CODE- 250/637: Performed by: INTERNAL MEDICINE

## 2020-01-13 PROCEDURE — 85025 COMPLETE CBC W/AUTO DIFF WBC: CPT

## 2020-01-13 PROCEDURE — 74011250636 HC RX REV CODE- 250/636: Performed by: FAMILY MEDICINE

## 2020-01-13 PROCEDURE — 85610 PROTHROMBIN TIME: CPT

## 2020-01-13 PROCEDURE — B4101ZZ FLUOROSCOPY OF ABDOMINAL AORTA USING LOW OSMOLAR CONTRAST: ICD-10-PCS | Performed by: SURGERY

## 2020-01-13 PROCEDURE — 74011000258 HC RX REV CODE- 258: Performed by: EMERGENCY MEDICINE

## 2020-01-13 PROCEDURE — C1751 CATH, INF, PER/CENT/MIDLINE: HCPCS

## 2020-01-13 PROCEDURE — 74011250636 HC RX REV CODE- 250/636

## 2020-01-13 PROCEDURE — 80048 BASIC METABOLIC PNL TOTAL CA: CPT

## 2020-01-13 PROCEDURE — 65660000000 HC RM CCU STEPDOWN

## 2020-01-13 PROCEDURE — 047P3ZZ DILATION OF RIGHT ANTERIOR TIBIAL ARTERY, PERCUTANEOUS APPROACH: ICD-10-PCS | Performed by: SURGERY

## 2020-01-13 PROCEDURE — B41F1ZZ FLUOROSCOPY OF RIGHT LOWER EXTREMITY ARTERIES USING LOW OSMOLAR CONTRAST: ICD-10-PCS | Performed by: SURGERY

## 2020-01-13 PROCEDURE — 74011250636 HC RX REV CODE- 250/636: Performed by: HOSPITALIST

## 2020-01-13 PROCEDURE — 74011250636 HC RX REV CODE- 250/636: Performed by: EMERGENCY MEDICINE

## 2020-01-13 PROCEDURE — 82962 GLUCOSE BLOOD TEST: CPT

## 2020-01-13 RX ORDER — HEPARIN SODIUM (PORCINE) LOCK FLUSH IV SOLN 100 UNIT/ML 100 UNIT/ML
500 SOLUTION INTRAVENOUS
Status: DISCONTINUED | OUTPATIENT
Start: 2020-01-13 | End: 2020-01-14 | Stop reason: HOSPADM

## 2020-01-13 RX ORDER — ENOXAPARIN SODIUM 100 MG/ML
40 INJECTION SUBCUTANEOUS EVERY 24 HOURS
Status: DISCONTINUED | OUTPATIENT
Start: 2020-01-13 | End: 2020-01-13

## 2020-01-13 RX ORDER — LIDOCAINE HYDROCHLORIDE 10 MG/ML
1-20 INJECTION INFILTRATION; PERINEURAL
Status: COMPLETED | OUTPATIENT
Start: 2020-01-13 | End: 2020-01-13

## 2020-01-13 RX ORDER — HEPARIN SODIUM 200 [USP'U]/100ML
500 INJECTION, SOLUTION INTRAVENOUS
Status: ACTIVE | OUTPATIENT
Start: 2020-01-13 | End: 2020-01-13

## 2020-01-13 RX ORDER — ENOXAPARIN SODIUM 100 MG/ML
1 INJECTION SUBCUTANEOUS EVERY 12 HOURS
Status: DISCONTINUED | OUTPATIENT
Start: 2020-01-13 | End: 2020-01-14

## 2020-01-13 RX ORDER — HEPARIN SODIUM (PORCINE) LOCK FLUSH IV SOLN 100 UNIT/ML 100 UNIT/ML
SOLUTION INTRAVENOUS
Status: COMPLETED
Start: 2020-01-13 | End: 2020-01-13

## 2020-01-13 RX ORDER — HEPARIN SODIUM 200 [USP'U]/100ML
INJECTION, SOLUTION INTRAVENOUS
Status: DISPENSED
Start: 2020-01-13 | End: 2020-01-14

## 2020-01-13 RX ADMIN — AMLODIPINE BESYLATE 2.5 MG: 2.5 TABLET ORAL at 08:57

## 2020-01-13 RX ADMIN — MEROPENEM 1 G: 1 INJECTION, POWDER, FOR SOLUTION INTRAVENOUS at 13:20

## 2020-01-13 RX ADMIN — ASPIRIN 81 MG 81 MG: 81 TABLET ORAL at 08:57

## 2020-01-13 RX ADMIN — ACETAMINOPHEN 650 MG: 325 TABLET ORAL at 21:19

## 2020-01-13 RX ADMIN — MULTIPLE VITAMINS W/ MINERALS TAB 1 TABLET: TAB at 08:57

## 2020-01-13 RX ADMIN — INSULIN LISPRO 6 UNITS: 100 INJECTION, SOLUTION INTRAVENOUS; SUBCUTANEOUS at 13:20

## 2020-01-13 RX ADMIN — LIDOCAINE HYDROCHLORIDE 5 ML: 10 INJECTION, SOLUTION INFILTRATION; PERINEURAL at 12:46

## 2020-01-13 RX ADMIN — MEROPENEM 1 G: 1 INJECTION, POWDER, FOR SOLUTION INTRAVENOUS at 21:19

## 2020-01-13 RX ADMIN — ATORVASTATIN CALCIUM 20 MG: 20 TABLET, FILM COATED ORAL at 22:04

## 2020-01-13 RX ADMIN — ENOXAPARIN SODIUM 80 MG: 80 INJECTION SUBCUTANEOUS at 16:30

## 2020-01-13 RX ADMIN — LOSARTAN POTASSIUM 25 MG: 25 TABLET ORAL at 08:57

## 2020-01-13 RX ADMIN — VANCOMYCIN HYDROCHLORIDE 1250 MG: 1.25 INJECTION, POWDER, LYOPHILIZED, FOR SOLUTION INTRAVENOUS at 05:06

## 2020-01-13 RX ADMIN — INSULIN LISPRO 6 UNITS: 100 INJECTION, SOLUTION INTRAVENOUS; SUBCUTANEOUS at 22:04

## 2020-01-13 RX ADMIN — INSULIN LISPRO 9 UNITS: 100 INJECTION, SOLUTION INTRAVENOUS; SUBCUTANEOUS at 16:30

## 2020-01-13 RX ADMIN — HEPARIN SODIUM (PORCINE) LOCK FLUSH IV SOLN 100 UNIT/ML 500 UNITS: 100 SOLUTION at 12:46

## 2020-01-13 RX ADMIN — PIPERACILLIN AND TAZOBACTAM 4.5 G: 4; .5 INJECTION, POWDER, FOR SOLUTION INTRAVENOUS at 06:28

## 2020-01-13 NOTE — PROGRESS NOTES
Transition of care: d/c home today with patient to follow up with [facility] for IV antibiotics. Candis is going to page Dr. Leyla Mehta for clarification for dressing changes. When, where, who will be doing dressing changes, will he be going to outpatient for PT. When does he want to see him in the office    Per Dr. Garland Ahr he can be d/c home today after he has his IV antibiotics  Meropenem dose this afternoon and then home on ertapenem IV (for both anaerobes/smell and GBS).    Termination date - [id number]  Weekly labs (qMondays) - CBC, BMP and quantitative CRP; fax to me [phone].     I can see in [facility] care clinic on [id number]    DMT2        Dr. Leyla Mehta has placed note patient can be d/c however he will need orders as above    Cm spoke with Dr. Michelle Valdes she will d/c home today. Realice un seguimiento con Aliza Crews DPM    Especialidad: Podiatry    [address] [place] [address] 145      Realice un seguimiento con [facility] el 1/14/2020    Especialidad: Infusion Therapy    [address], [address]   [phone]    Follow-up appointment @ 11:00 a.m. [facility]    Próximos pasos: Realice un seguimiento el 1/20/2020    [address] [address]   [phone]    Follow-up appointment @ 9:00 a.m. Realice un seguimiento con Candie Brennan MD el 1/22/2020    Especialidad: Internal Medicine    [facility]   [phone]    Follow-up appointment @ 2:00 p.m. Realice un seguimiento con [facility] WOUND CARE el 2/5/2020    Especialidad: Wound Care    [address] [address] [phone]    Follow-up appointment @ 9:30 a.m. Per Candis Mehta does not feel patient will need PT once he leaves [facility]. He wants patient to see him in his office this week.  Cms will make follow up appointment for Dr. Leyla Mehta and cms informed Dr. Laci Hodge office that  Jameson wants him to see the wound care clinic at [facility] for follow up appointment. CMS has notified Dr Lazaro Goldman office of his request.   Care Management Interventions  PCP Verified by CM:  Yes  Mode of Transport at Discharge: BLS  Transition of Care Consult (CM Consult): [address]: Yes  Physical Therapy Consult: Yes  Occupational Therapy Consult: Yes  Current Support Network: Relative's Home  Confirm Follow Up Transport: Family  The Plan for Transition of Care is Related to the Following Treatment Goals : home with [facility] and [facility] [place] at 11[facility] for IV antibiotics and daily 02/17/2020  The Patient and/or Patient Representative was Provided with a Choice of Provider and Agrees with the Discharge Plan?: Yes  Freedom of Choice List was Provided with Basic Dialogue that Supports the Patient's Individualized Plan of Care/Goals, Treatment Preferences and Shares the Quality Data Associated with the Providers?: Yes  Discharge Location   Discharge Placement: Home with home health(opic daily for IV antibioitics)

## 2020-01-13 NOTE — PROGRESS NOTES
Bedside and Verbal shift change report given to NIR Hwang RN (oncoming nurse) by LAURA Matta RN (offgoing nurse). Report included the following information SBAR, Kardex, Intake/Output, MAR and Recent Results.

## 2020-01-13 NOTE — PROGRESS NOTES
0700: Assumed are of pt from [address]   0830: pt to go have PICC placed for abx treatment. 1338: Changed pt dressing used zero form, 4 by 4 krylex and ace bandage per Prodo note. 1423: Paged Dr. Janneth Villafuerte concerning some with questions about wound care and dressing changes. And need to have PT whether at home or outpatient PT.  1600: pt had duplex of BLE done pt found to have DVT right postibial nonocclusive. Dr. Anna Uriarte was notified. Pt started on Lovenox. Discharged delayed to possible tomorrow. 1630: First dose of lovenox given.

## 2020-01-13 NOTE — PROGRESS NOTES
Problem: Mobility Impaired (Adult and Pediatric)  Goal: *Acute Goals and Plan of Care (Insert Text)  Description  Physical Therapy Goals   Initiated 1/10/2020 and to be accomplished within 3-5 day(s)  1. Patient will move from supine <> sit with mod I in prep for out of bed activity and change of position. 2.  Patient will perform sit<> stand with S/mod I with RW partial heel wt bearing R in prep for transfers/ambulation. 3.  Patient will ambulate 100 feet with S/mod I with RW partial heel wt bearing R for increased functional mobility/safe discharge. 5.  Patient will ascend/descend 3-5 stairs with handrail(s) with CGA with RW partial heel wt bearing R for home re-entry as needed. Physical Therapy Goals: In 1-7 days pt will be able to perform:  ST.  Bed mobility:  Rolling L to R to L modified independent for positioning. 2.  Supine to sit to supine S with HR for meals. 3.  Sit to stand to sit S with RW in prep for ambulation. LT.  Gait:  Ambulate >150ft S with LRD for home/community mobility. 2.  Stair Negotiation:  Ascend/descend >5 steps CGA with HR for home entry. 3.  Activity tolerance: Tolerate up in chair 1-2 hours for ADL's.  4.  Patient/Family Education:  Patient/family to be independent with HEP for follow-up care and safe discharge. 2020 1508 by Brenton Krishna PT  Outcome: Resolved/Met  PHYSICAL THERAPY TREATMENT/DISCHARGE    Patient: Sherin Kim (89 y.o. male)  Date: 2020  Diagnosis: Sepsis ([address] [A41.9]  Fever in adult [R50.9]  Lactic acidosis [E87.2]   Sepsis due to Streptococcus agalactiae (HCC)  Procedure(s) (LRB):  RIGHT FOOT INSICION TO BONE CORTEX; EXCISION OF ULCER, POSSIBLE AMPUTATION OF 5TH TOE, SOFT TISSUE REARRAGE (Right) 4 Days Post-Op  Precautions: Fall, PWB(rle)  Chart, physical therapy assessment, plan of care and goals were reviewed. ASSESSMENT:  Pt found supine in bed in NAD and reports pain R foot 0.5/10.  At completion of session pt demonstrating bed mobility with mod I, transfers with mod I and gt with RW/S/mod I PWB R LE on heel with surgical shoe in place. Pt instructed in stair nego and returned demo of same using box step and RW accurately with CGA. Pt reports he will be sleeping downstairs upon return home. No further skilled PT indicated at this time. Will need RW for home; same provided from care mgr. And given to pt. Progression toward goals:  [x]      Goals met  []      Improving appropriately and progressing toward goals  []      Improving slowly and progressing toward goals  []      Not making progress toward goals and plan of care will be adjusted     PLAN:  Patient will be discharged from physical therapy at this time. Rationale for discharge:  [x] Goals Achieved  [] [address]  [] Patient not participating in therapy  [] Other:  Discharge Recommendations:  Home Health  Further Equipment Recommendations for Discharge:  N/A     SUBJECTIVE:   Patient stated \"Going home.     OBJECTIVE DATA SUMMARY:   Critical Behavior:  Neurologic State: Alert  Orientation Level: Oriented X4  Cognition: Appropriate decision making, Appropriate for age attention/concentration, Appropriate safety awareness, Follows commands  Safety/Judgement: Fall prevention  Functional Mobility Training:  Bed Mobility:  Rolling: Modified independent  Supine to Sit: Modified independent  Sit to Supine: Modified independent  Scooting: Modified independent  Transfers:  Sit to Stand: Modified independent  Stand to Sit: Modified independent  Balance:  Sitting: Intact  Standing: Intact; With support  Ambulation/Gait Training:  Distance (ft): 110 Feet (ft)  Assistive Device: Gait belt;Walker, rolling  Ambulation - Level of Assistance: Supervision, modified independent  Gait Abnormalities: Decreased step clearance; Step to gait  Right Side Weight Bearing: Partial (%)(heel)  Base of Support: Shift to left  Stance: Right decreased  Speed/Johnna: Slow;Pace decreased (<100 feet/min)  Step Length: Right shortened;Left shortened  Swing Pattern: Right asymmetrical;Left asymmetrical  Interventions: Safety awareness training; Tactile cues; Verbal cues; Visual/Demos  Stairs:  Number of Stairs Trained: 3  Stairs - Level of Assistance: Contact guard assistance;Supervision   Rail Use: Both  Pain:  Pain Scale 1: Numeric (0 - 10)  Pain Intensity 1: 0  Activity Tolerance:   Good   Please refer to the flowsheet for vital signs taken during this treatment.   After treatment:   [x] Patient left in no apparent distress sitting up EOB  [] Patient left in no apparent distress in bed  [x] Call bell left within reach  [x] Nursing notified  [] Caregiver present  [] Bed alarm activated  Seabron Silence, PT   Time Calculation: 22 mins

## 2020-01-13 NOTE — PROGRESS NOTES
Problem: Falls - Risk of  Goal: *Absence of Falls  Description  Document Easter Getting Fall Risk and appropriate interventions in the flowsheet.   Outcome: Progressing Towards Goal  Note: Fall Risk Interventions:  Mobility Interventions: Assess mobility with egress test, PT Consult for mobility concerns         Medication Interventions: Evaluate medications/consider consulting pharmacy, Patient to call before getting OOB    Elimination Interventions: Call light in reach

## 2020-01-13 NOTE — CONSULTS
[facility]                                               (A Division of [address] [place] [address])                           Follow-up Note      Date of Admission: 1/1/2020     Date of Note:  1/13/2020    Summary:      Mr Nomi Taylor is a elderly, diabetic 69y  who was admitted last Thursday with week-long malaise, f/s/c with a R DFU of long-standing, but worsening.  Had ABD pain on admission that was worked up in the ED, resolved now.          Interval History:  CC:  Fauzia herrera  Just back from PICC placement. Weekend events reviewed. Fever broke. Current Antimicrobials: Prior Antimicrobials   1. Pip/tzb IV (1/1-) #12 1. Vanco IV (1/2-6, and 1/12)  2. Levofloxacin IV (1/1 and 1/4-6)       Assessment Plan:   Streptococcus agalactiae (GBS) sepsis, likely eminating from his R foot DFU - POD#6  11/7:  CRP - 114mg/L    Postoperative fevers resolved. Weekend BCx sterile. I think he can go home now that he has PICC and fevers resolved. Will use CRP 114mg/L for baseline. ->POD#6    Meropenem dose this afternoon and then home on ertapenem IV (for both anaerobes/smell and GBS). Termination date - [id number]  Weekly labs (qMondays) - CBC, BMP and quantitative CRP; fax to me [phone].     I can see in [facility] on [id number]   DMT2    CKD    HTN      Microbiology:                1/6 - Wound (+) CoNS and bacillus (surface contaminants)                                         1/2 - BCx (-)                                         1/1 - BCx 2/2 (+) Streptococcus agalactiae                                                      UA (+) Streptococcus agalactiae        Lines / Catheters:         peripheral          Patient Active Problem List   Diagnosis Code    Sepsis ([address] A41.9    Abdominal pain R10.9    CKD (chronic kidney disease) N18.9    Type 2 diabetes mellitus, with long-term current use of insulin ([address] E11.9, Z79.4    Hypertension I10    Hypokalemia E87.6    UTI (urinary tract infection) N39.0    Positive blood culture R78.81    Foot abscess, right L02.611    Corn of foot L84    Cholecystitis, unspecified K81.9    Sepsis due to Streptococcus agalactiae ([facility]) A40.1    Diabetic ulcer of right midfoot associated with type 2 diabetes mellitus, with fat layer exposed ([facility] E11.621, L97.412    PAD (peripheral artery disease) ([facility]) I73.9    Osteomyelitis of right foot ([facility]) M86.9       Current Facility-Administered Medications   Medication Dose Route Frequency    heparin (PF) 2 units/ml in NS 2,000 unit/1,000 mL infusion        heparin (porcine) 100 unit/mL injection 500 Units  500 Units InterCATHeter Q8H PRN    heparinized saline 2 units/mL infusion 1,000 Units  500 mL Irrigation RAD ONCE    meropenem (MERREM) 1 g in sterile water (preservative free) 20 mL IV syringe  1 g IntraVENous Q8H    0.9% sodium chloride infusion 250 mL  250 mL IntraVENous PRN    losartan (COZAAR) tablet 25 mg  25 mg Oral DAILY    multivitamin, tx-iron-ca-min (THERA-M w/ IRON) tablet 1 Tab  1 Tab Oral DAILY    0.9% sodium chloride infusion  25 mL/hr IntraVENous CONTINUOUS    fentaNYL citrate (PF) injection  mcg   mcg IntraVENous Rad Multiple    flumazenil (ROMAZICON) 0.1 mg/mL injection 0.2 mg  0.2 mg IntraVENous Rad Multiple    heparin (porcine) 1,000 unit/mL injection 10,000 Units  10,000 Units IntraVENous Rad Multiple    midazolam (PF) (VERSED) injection 0.5-4 mg  0.5-4 mg IntraVENous Rad Multiple    naloxone (NARCAN) injection 0.4 mg  0.4 mg IntraVENous PRN    Saccharomyces boulardii (FLORASTOR) capsule 500 mg  500 mg Oral BID    insulin lispro (HUMALOG) injection   SubCUTAneous AC&HS    amLODIPine (NORVASC) tablet 2.5 mg  2.5 mg Oral DAILY    ondansetron (ZOFRAN) injection 4 mg  4 mg IntraVENous Q6H PRN    aspirin chewable tablet 81 mg  81 mg Oral DAILY    atorvastatin (LIPITOR) tablet 20 mg  20 mg Oral QHS    glucose chewable tablet 16 g  16 g Oral PRN    glucagon (GLUCAGEN) injection 1 mg  1 mg IntraMUSCular PRN    dextrose 10% infusion 125-250 mL  125-250 mL IntraVENous PRN    acetaminophen (TYLENOL) tablet 650 mg  650 mg Oral Q6H PRN    sodium chloride (NS) flush 5-10 mL  5-10 mL IntraVENous PRN         Review of Systems - General ROS: negative for - chills or fever  Respiratory ROS: no cough, shortness of breath, or wheezing  Cardiovascular ROS: no chest pain or dyspnea on exertion       Objective:  Visit Vitals  /61   Pulse 82   Temp 98.9 °F (37.2 °C)   Resp 18   Ht 5' 6\" (1.676 m)   Wt 80.1 kg (176 lb 9.6 oz)   SpO2 97%   BMI 28.50 kg/m²       Temp (24hrs), Av °F (37.2 °C), Min:98.8 °F (37.1 °C), Max:99.1 °F (37.3 °C)      GEN: elderly  in NAD  HEENT: anicteric  CHEST: CTA  CVS:RRR  R arm:  PICC placed  EXT: R foot unwrapped - lateral incision nonsuppurative       Lab results:    Chemistry  Recent Labs     20   * 175*    142   K 3.7 3.3*    109   CO2 27 27   BUN 11 9   CREA 1.36* 1.33*   CA 7.8* 7.7*   AGAP 7 6   BUCR 8* 7*       CBC w/ Diff  Recent Labs     20  0220 20  1831 20   WBC 7.2  --  6.7   RBC 2.65*  --  2.21*   HGB 7.9* 8.1* 6.6*   HCT 23.4* 24.4* 19.7*     --  322   GRANS 63  --  63   LYMPH 23  --  23   EOS 3  --  3       Microbiology  All Micro Results     Procedure Component Value Units Date/Time    CULTURE, BLOOD [[id number]] Collected:  20    Order Status:  Completed Specimen:  Blood Updated:  20 1234     Special Requests: NO SPECIAL REQUESTS        Culture result: NO GROWTH 1 DAY       CULTURE, BLOOD [[id number]] Collected:  20    Order Status:  Completed Specimen:  Blood Updated:  20 1234     Special Requests: NO SPECIAL REQUESTS        Culture result: NO GROWTH 1 DAY       CULTURE, WOUND Apple Hutchinson STAIN [[id number]]  (Abnormal) Collected:  20 0809    Order Status:  Completed Specimen:  Wound from Foot Updated: 01/08/20 1053     Special Requests: NO SPECIAL REQUESTS        GRAM STAIN NO WBC'S SEEN         NO ORGANISMS SEEN        Culture result:       RARE STAPHYLOCOCCUS SPECIES, COAGULASE NEGATIVE                  BACILLUS SPECIES, NOT ANTHRACIS ISOLATED FROM BROTH ONLY          CULTURE, BLOOD [[id number]] Collected:  01/02/20 1309    Order Status:  Completed Specimen:  Blood Updated:  01/08/20 0549     Special Requests: NO SPECIAL REQUESTS        Culture result: NO GROWTH 6 DAYS       CULTURE, BLOOD [[id number]] Collected:  01/02/20 1250    Order Status:  Completed Specimen:  Blood Updated:  01/08/20 0549     Special Requests: NO SPECIAL REQUESTS        Culture result: NO GROWTH 6 DAYS       CULTURE, [place] Aisha STAIN [[id number]] Collected:  01/05/20 1415    Order Status:  Canceled Specimen:  Wound from Drainage     CULTURE, BLOOD [[id number]]  (Abnormal) Collected:  01/01/20 2144    Order Status:  Completed Specimen:  Blood Updated:  01/04/20 0727     Special Requests: NO SPECIAL REQUESTS        GRAM STAIN       ANAEROBIC BOTTLE GRAM POSITIVE COCCI IN CHAINS                  SMEAR CALLED TO AND CORRECTLY REPEATED BY: WAYNE BABCOCK RN 3N ON 1/2/2020 AT 1143 TO [facility]. Culture result:       ANAEROBIC BOTTLE STREPTOCOCCI, [employer] B                  For Susceptibility Refer to Culture  [id number]      CULTURE, BLOOD [[id number]]  (Abnormal)  (Susceptibility) Collected:  01/01/20 2150    Order Status:  Completed Specimen:  Blood Updated:  01/04/20 0726     Special Requests: NO SPECIAL REQUESTS        GRAM STAIN       ANAEROBIC BOTTLE GRAM POSITIVE COCCI IN CHAINS                  SMEAR CALLED TO AND CORRECTLY REPEATED BY: [address] [place] [address] Martina BABCOCK RN 3N ON 1/2/2020 AT 1143 TO [facility].            Culture result:       ANAEROBIC BOTTLE STREPTOCOCCI, [employer] B          CULTURE, URINE [[id number]]  (Abnormal) Collected:  01/01/20 2230    Order Status:  Completed Specimen:  Urine from Clean catch Updated:  01/03/20 1042 Special Requests: NO SPECIAL REQUESTS        Culture result:       63001 COLONIES/mL STREPTOCOCCI, [employer] B                  <10,000 COLONIES/mL STAPHYLOCOCCUS SPECIES          INFLUENZA A & B AG (RAPID TEST) [[id number]] Collected:  01/01/20 2157    Order Status:  Completed Specimen:  Nasopharyngeal from Nasal washing Updated:  01/01/20 2215     Influenza A Antigen NEGATIVE         Comment: A negative result does not exclude influenza virus infection, seasonal or H1N1 due to suboptimal sensitivity. If influenza is circulating in your community, a diagnosis of influenza should be considered based on a patients clinical presentation and empiric antiviral treatment should be considered, if indicated.         Influenza B Antigen NEGATIVE               Pauline Hankins MD  Cell [phone]  [facility]   1/13/2020   1:25 PM

## 2020-01-13 NOTE — PROGRESS NOTES
Attempted to see patient today. He was getting picc. I spoke with Dr Kimberly Pacheco to recommend fu in my office after dc.

## 2020-01-13 NOTE — PROGRESS NOTES
Hospitalist Progress Note-critical care note     Patient: Mateo Stephens MRN: [id number]  CSN: [id number]    YOB: 1945  Age: 76 y.o. Sex: male    DOA: 1/1/2020 LOS:  LOS: 11 days            Chief complaint: DM, sepsis , positive bcx, uti m     Assessment/Plan         Hospital Problems  Date Reviewed: 1/9/2020          Codes Class Noted POA    Acute deep vein thrombosis (DVT) ([facility] ICD-10-CM: I82.409  ICD-9-CM: 453.40  1/13/2020         Osteomyelitis of right foot ([facility] ICD-10-CM: M86.9  ICD-9-CM: 730.27  1/9/2020 Unknown        PAD (peripheral artery disease) ([facility] ICD-10-CM: I73.9  ICD-9-CM: 443.9  1/7/2020 Unknown        Cholecystitis, unspecified ICD-10-CM: K81.9  ICD-9-CM: 575.10  1/6/2020 Unknown        * (Principal) Sepsis due to Streptococcus agalactiae ([facility] ICD-10-CM: A40.1  ICD-9-CM: 038.0, 995.91  1/6/2020 Unknown        Diabetic ulcer of right midfoot associated with type 2 diabetes mellitus, with fat layer exposed ([facility] ICD-10-CM: E11.621, L19.226  ICD-9-CM: 250.80, 707.14  1/6/2020 Unknown        Foot abscess, right ICD-10-CM: L02.611  ICD-9-CM: 682.7  1/5/2020 Unknown        Corn of foot ICD-10-CM: L84  ICD-9-CM: 700  1/5/2020 Unknown        Type 2 diabetes mellitus, with long-term current use of insulin ([facility] ICD-10-CM: E11.9, Z79.4  ICD-9-CM: 250.00, V58.67  1/3/2020         Hypertension ICD-10-CM: I10  ICD-9-CM: 401.9  1/3/2020 Unknown        Hypokalemia ICD-10-CM: E87.6  ICD-9-CM: 276.8  1/3/2020 Unknown        UTI (urinary tract infection) ICD-10-CM: N39.0  ICD-9-CM: 599.0  1/3/2020 Unknown        Positive blood culture ICD-10-CM: R78.81  ICD-9-CM: 790.7  1/3/2020 Unknown        Sepsis ([address] ICD-10-CM: A41.9  ICD-9-CM: 038.9, 995.91  1/2/2020 Yes        Abdominal pain ICD-10-CM: R10.9  ICD-9-CM: 789.00  1/2/2020 Yes        CKD (chronic kidney disease) ICD-10-CM: N18.9  ICD-9-CM: 585.9  1/2/2020 Yes              Admitted for sepsis, bacteremia, cholecystitis r/o.  Foot abscess Rt foot abscess and PAD   Angio gram done per vascular   I&D done per dr. Fran Palomares of the right 5th toe with flap closure and bone resection of the 5th MT-need IV antibiotics for 6 weeks-will go home with iv abx   Surgery done today   . Ct foot -fluid collection/abscess, no om noted     Bacteremia   [facility] ,  Like from UTI and foot abscess   On zosyn-f/u per ID   picc line today     dvt -rt  -not sure poa or post surgery at this point   posterior tibial veins, has been on scd for dvt prohy since admission  lovenox for dvt treatment       DM type II    ssi     Hypokalemia   Resolved     ckd 2-3 stable       Subjective: feel fine, no diarrhea     Daughter was at the bedside. Discussed with daughter about pvl findings and option for treatment -she indicated a verbal understanding. Appreciated the care  All questions have been answered. 35 total min's spent on patient care including >50% on counseling/coordinating care. Discussed the above assessments. also discussed labs, medications and hospital course      Disposition : 1-2 days according to [facility] drug   Review of systems:    General: no fevers, no chills. Cardiovascular: No chest pain or pressure. No palpitations. Pulmonary: No shortness of breath. Gastrointestinal: No nausea, vomiting. No abdomen pain     Vital signs/Intake and Output:  Visit Vitals  /61   Pulse 82   Temp 98.9 °F (37.2 °C)   Resp 18   Ht 5' 6\" (1.676 m)   Wt 80.1 kg (176 lb 9.6 oz)   SpO2 97%   BMI 28.50 kg/m²     Current Shift:  No intake/output data recorded. Last three shifts:  01/11 1901 - 01/13 0700  In: 1257.9 [P.O.:360; I.V.:575]  Out: 300 [Urine:300]    Physical Exam:  General: WD, WN. Alert, cooperative, no acute distress    HEENT: NC, Atraumatic. PERRLA, anicteric sclerae. Lungs: CTA Bilaterally. No Wheezing/Rhonchi/Rales.   Heart:  Regular  rhythm,  No murmur, No Rubs, No Gallops  Abdomen: Soft, Non distended,.  +Bowel sounds, Extremities: No c/c. Rt foot wrapped with ace bandage , rt leg mild swelling  Psych:   Not anxious or agitated. Neurologic:  No acute neurological deficit. Labs: Results:       Chemistry Recent Labs     01/13/20 0220 01/12/20 0220   * 175*    142   K 3.7 3.3*    109   CO2 27 27   BUN 11 9   CREA 1.36* 1.33*   CA 7.8* 7.7*   AGAP 7 6   BUCR 8* 7*      CBC w/Diff Recent Labs     01/13/20 0220 01/12/20  1831 01/12/20 0220   WBC 7.2  --  6.7   RBC 2.65*  --  2.21*   HGB 7.9* 8.1* 6.6*   HCT 23.4* 24.4* 19.7*     --  322   GRANS 63  --  63   LYMPH 23  --  23   EOS 3  --  3      Cardiac Enzymes No results for input(s): CPK, CKND1, KAREN in the last 72 hours. No lab exists for component: CKRMB, TROIP   Coagulation Recent Labs     01/13/20 0220   PTP 16.0*   INR 1.3*       Lipid Panel No results found for: CHOL, CHOLPOCT, CHOLX, CHLST, CHOLV, 816061, HDL, HDLP, LDL, LDLC, DLDLP, 593408, VLDLC, VLDL, TGLX, TRIGL, TRIGP, TGLPOCT, CHHD, CHHDX   BNP No results for input(s): BNPP in the last 72 hours. Liver Enzymes No results for input(s): TP, ALB, TBIL, AP, SGOT, GPT in the last 72 hours.     No lab exists for component: DBIL   Thyroid Studies No results found for: T4, T3U, TSH, TSHEXT, TSHEXT     Procedures/imaging: see electronic medical records for all procedures/Xrays and details which were not copied into this note but were reviewed prior to creation of Gloria Lima MD

## 2020-01-13 NOTE — DISCHARGE INSTRUCTIONS
Patient Education        Absceso cutáneo: Instrucciones de cuidado - [ Skin Abscess: Care Instructions ]  Instrucciones de cuidado    Un absceso de la piel es marck infección bacteriana que forma marck bolsa de pus. Un forúnculo es marck especie de absceso de la piel. Puede que el médico haya hecho marck incisión en el absceso para que pueda drenar el pus. Manuel vez tenga marck gasa en el barrington para que el absceso se mantenga abierto y siga drenando. Cornelio Brim necesite antibióticos. Deberá hacer un seguimiento con santos médico para asegurarse de que la infección haya desaparecido. El médico lo grey examinado minuciosamente, leny pueden desarrollarse problemas más tarde. Si nota algún problema o nuevos síntomas, busque tratamiento médico de inmediato. La atención de seguimiento es marck parte clave de santos tratamiento y seguridad. Asegúrese de hacer y acudir a todas las citas, y llame a santos médico si está teniendo problemas. También es marck buena idea saber los resultados de morro exámenes y mantener marck lista de los medicamentos que steve. ¿Cómo puede cuidarse en el hogar? · Aplíquese compresas calientes y secas, marck almohadilla térmica ajustada a baja temperatura o marck bolsa de [place] 3 o 4 veces al día para el dolor. Mantenga un paño entre la preeti de calor y la piel. · Si santos médico le recetó antibióticos, tómelos según las indicaciones. No deje de tomarlos solo porque se sienta mejor. Debe patience todos los antibióticos hasta terminarlos. · [employer] analgésicos (medicamentos para el dolor) exactamente según las indicaciones. ? Si el médico le recetó analgésicos, tómelos según las indicaciones. ? Si no está tomando un analgésico recetado, pregúntele a santos médico si puede patience un medicamento de [place]. · Mantenga la venda limpia y seca. Cambie la venda cuando se moje o se ensucie, o por lo menos marck vez al día. · Si se taponó el absceso con gasa:  ? Taliben [place] a las citas de seguimiento para que le quiten o le cambien la gasa. Si el médico le indicó que se quitara usted la gasa, siga las instrucciones que le dieron acerca de cómo [place]. ? Después de quitar la gasa, empape la richie con agua tibia de 15 a 20 minutos 2 veces al día, hasta que la herida se cierre. ¿Cuándo debe pedir ayuda? Llame a santos médico ahora mismo o busque atención médica inmediata si:    · Tiene señales de marck infección que está empeorando, tales dieudonne:  ? Aumento del dolor, la hinchazón, la temperatura o el enrojecimiento. ? Vetas rojizas que emanan de la piel infectada. ? Pus que supura de la herida. ? Emmalene Race de cerca los cambios en santos franky y asegúrese de comunicarse con santos médico si:    · No mejora dieudonne se esperaba. ¿Dónde puede encontrar más información en inglés? Rafael Glasgow a http://nancy-pan.info/. Glorya Sacks O811 en la búsqueda para aprender más acerca de \"Absceso cutáneo: Instrucciones de cuidado - [ Skin Abscess: Care Instructions ]. \"  Revisado: 1 abril, 2019  Versión del contenido: 12.2  © 9032-6107 [employer]wise, Incorporated. Las instrucciones de cuidado fueron adaptadas bajo licencia por [employer] (which disclaims liability or warranty for this information). Si usted tiene [place] afección médica o sobre estas instrucciones, siempre pregunte a santos profesional de franky. Healthwise, Incorporated niega toda garantía o responsabilidad por santos uso de esta información.

## 2020-01-13 NOTE — PROCEDURES
Interventional Radiology Brief Procedure Note    Performed By:  Remigio Car PA-C    Attending Radiologist: Tejal Crowe MD    Pre-operative Diagnosis:  Need for long term IV antibiotics    Post-operative Diagnosis: Same as pre-op diagnosis    Procedure(s) Performed:  Ultrasound & fluoroscopic guided PICC line placement    Anesthesia:  Local      Findings:  Successful right arm dual lumen 5F PICC line placement. Please see dictated report for details. Complications: None immediate    Estimated Blood Loss:  Minimal    Specimens: None    Condition: Stable    Disposition:  Return to nursing unit. PICC line is ready for immediate use.        Remigio Car PA-C  [facility]  Vascular & Interventional Radiology  1/13/2020

## 2020-01-13 NOTE — DIABETES MGMT
GLYCEMIC CONTROL PROGRESS NOTE:    - known h/o T2DM, HbA1 not within recommended range for age + comorbids on basal/oral home regimen  - BG out of target range non-ICU: < 180 mg/dL  -TDD = 18 units - Humalog Very Insulin Resistant Corrective Coverage  - PPG out of target range recommend initiate weight based mealtime insulin   *Humalog 4 units qac    Recent Glucose Results:   Lab Results   Component Value Date/Time     (H) 01/13/2020 02:20 AM    GLUCPOC 245 (H) 01/13/2020 12:00 PM    GLUCPOC 117 (H) 01/13/2020 06:13 AM    GLUCPOC 155 (H) 01/12/2020 09:20 PM     Jere Munoz MS, RN, CDE  Glycemic Control Team  [phone]  Pager [phone] (M-TH 8:00-4:30P)  *After Hours pager [phone]

## 2020-01-14 ENCOUNTER — HOSPITAL ENCOUNTER (OUTPATIENT)
Dept: INFUSION THERAPY | Age: 75
End: 2020-01-14

## 2020-01-14 VITALS
HEIGHT: 66 IN | SYSTOLIC BLOOD PRESSURE: 166 MMHG | HEART RATE: 83 BPM | BODY MASS INDEX: 27.67 KG/M2 | OXYGEN SATURATION: 98 % | RESPIRATION RATE: 20 BRPM | DIASTOLIC BLOOD PRESSURE: 62 MMHG | WEIGHT: 172.18 LBS | TEMPERATURE: 98.5 F

## 2020-01-14 LAB
ANION GAP SERPL CALC-SCNC: 7 MMOL/L (ref 3–18)
BASOPHILS # BLD: 0 K/UL (ref 0–0.1)
BASOPHILS NFR BLD: 1 % (ref 0–2)
BUN SERPL-MCNC: 10 MG/DL (ref 7–18)
BUN/CREAT SERPL: 9 (ref 12–20)
CALCIUM SERPL-MCNC: 8.1 MG/DL (ref 8.5–10.1)
CHLORIDE SERPL-SCNC: 109 MMOL/L (ref 100–111)
CO2 SERPL-SCNC: 27 MMOL/L (ref 21–32)
CREAT SERPL-MCNC: 1.16 MG/DL (ref 0.6–1.3)
DIFFERENTIAL METHOD BLD: ABNORMAL
EOSINOPHIL # BLD: 0.2 K/UL (ref 0–0.4)
EOSINOPHIL NFR BLD: 3 % (ref 0–5)
ERYTHROCYTE [DISTWIDTH] IN BLOOD BY AUTOMATED COUNT: 14.4 % (ref 11.6–14.5)
GLUCOSE BLD STRIP.AUTO-MCNC: 119 MG/DL (ref 70–110)
GLUCOSE BLD STRIP.AUTO-MCNC: 207 MG/DL (ref 70–110)
GLUCOSE SERPL-MCNC: 115 MG/DL (ref 74–99)
HCT VFR BLD AUTO: 23.2 % (ref 36–48)
HGB BLD-MCNC: 7.7 G/DL (ref 13–16)
LYMPHOCYTES # BLD: 1.6 K/UL (ref 0.9–3.6)
LYMPHOCYTES NFR BLD: 28 % (ref 21–52)
MAGNESIUM SERPL-MCNC: 1.8 MG/DL (ref 1.6–2.6)
MCH RBC QN AUTO: 29.4 PG (ref 24–34)
MCHC RBC AUTO-ENTMCNC: 33.2 G/DL (ref 31–37)
MCV RBC AUTO: 88.5 FL (ref 74–97)
MONOCYTES # BLD: 0.7 K/UL (ref 0.05–1.2)
MONOCYTES NFR BLD: 13 % (ref 3–10)
NEUTS SEG # BLD: 3.2 K/UL (ref 1.8–8)
NEUTS SEG NFR BLD: 55 % (ref 40–73)
PLATELET # BLD AUTO: 363 K/UL (ref 135–420)
PMV BLD AUTO: 8.7 FL (ref 9.2–11.8)
POTASSIUM SERPL-SCNC: 3.8 MMOL/L (ref 3.5–5.5)
RBC # BLD AUTO: 2.62 M/UL (ref 4.7–5.5)
SODIUM SERPL-SCNC: 143 MMOL/L (ref 136–145)
WBC # BLD AUTO: 5.8 K/UL (ref 4.6–13.2)

## 2020-01-14 PROCEDURE — 74011000250 HC RX REV CODE- 250: Performed by: INTERNAL MEDICINE

## 2020-01-14 PROCEDURE — 74011636637 HC RX REV CODE- 636/637: Performed by: HOSPITALIST

## 2020-01-14 PROCEDURE — 74011250636 HC RX REV CODE- 250/636: Performed by: INTERNAL MEDICINE

## 2020-01-14 PROCEDURE — 82962 GLUCOSE BLOOD TEST: CPT

## 2020-01-14 PROCEDURE — 74011250637 HC RX REV CODE- 250/637: Performed by: FAMILY MEDICINE

## 2020-01-14 PROCEDURE — 83735 ASSAY OF MAGNESIUM: CPT

## 2020-01-14 PROCEDURE — 74011250636 HC RX REV CODE- 250/636: Performed by: HOSPITALIST

## 2020-01-14 PROCEDURE — 80048 BASIC METABOLIC PNL TOTAL CA: CPT

## 2020-01-14 PROCEDURE — 74011000258 HC RX REV CODE- 258: Performed by: HOSPITALIST

## 2020-01-14 PROCEDURE — 74011250637 HC RX REV CODE- 250/637: Performed by: HOSPITALIST

## 2020-01-14 PROCEDURE — 85025 COMPLETE CBC W/AUTO DIFF WBC: CPT

## 2020-01-14 RX ADMIN — AMLODIPINE BESYLATE 2.5 MG: 2.5 TABLET ORAL at 08:36

## 2020-01-14 RX ADMIN — Medication 500 MG: at 08:36

## 2020-01-14 RX ADMIN — INSULIN LISPRO 6 UNITS: 100 INJECTION, SOLUTION INTRAVENOUS; SUBCUTANEOUS at 12:25

## 2020-01-14 RX ADMIN — ASPIRIN 81 MG 81 MG: 81 TABLET ORAL at 08:36

## 2020-01-14 RX ADMIN — LOSARTAN POTASSIUM 25 MG: 25 TABLET ORAL at 08:36

## 2020-01-14 RX ADMIN — ERTAPENEM SODIUM 1 G: 1 INJECTION, POWDER, LYOPHILIZED, FOR SOLUTION INTRAMUSCULAR; INTRAVENOUS at 12:06

## 2020-01-14 RX ADMIN — ENOXAPARIN SODIUM 80 MG: 80 INJECTION SUBCUTANEOUS at 04:23

## 2020-01-14 RX ADMIN — MEROPENEM 1 G: 1 INJECTION, POWDER, FOR SOLUTION INTRAVENOUS at 04:23

## 2020-01-14 RX ADMIN — MULTIPLE VITAMINS W/ MINERALS TAB 1 TABLET: TAB at 08:36

## 2020-01-14 RX ADMIN — APIXABAN 10 MG: 5 TABLET, FILM COATED ORAL at 12:06

## 2020-01-14 NOTE — PROGRESS NOTES
Transition of care: anticipate d/c home today with daughter  Met with patient and Dr. El Woodward at bedside. patient is to get his IV atb here today before he leaves   See below for follow up     Realice un seguimiento con [facility]FUS[facility] el 1/15/2020    Especialidad: Infusion Therapy    [address] [place] [address]   [phone]    Follow-up appointment @ 2:00 p.m. [facility]    Próximos pasos: Realice un seguimiento el 1/15/2020    [address] [facility] [place] [address], [place] [address]   [phone]    Follow-up appointment @ 7:45 a.m. [facility]    Próximos pasos: Realice un seguimiento el 1/20/2020    [address], [address] [place] [address]   [phone]    Follow-up appointment @ 9:00 a.m. Realice un seguimiento con Miguel Melvin DPM el 1/20/2020    Especialidad: Podiatry    [address] [facility] [address] [phone]    Follow-up appointment @ 3:20 p.m. Realice un seguimiento con Jaleesa Terrell MD el 1/22/2020    Especialidad: Internal Medicine    Psychiatric   [phone]    Follow-up appointment @ 2:00 p.m. Realice un seguimiento con [facility] OP WOUND CARE el 2/5/2020    Especialidad: Wound Care    [address] [place] [id number] [phone]    Follow-up appointment @ 9:30 a.m. with Dr. Jeniffer Mathews   He will d/c home with picc for follow up at [facility] see above for appointments. He will go to wound care as above for wound care and [facility] as appointment requested by Dr. Akin Rehman   See above for other appointments. Daughter will drive him home  Care Management Interventions  PCP Verified by CM:  Yes  Mode of Transport at Discharge: BLS  Transition of Care Consult (CM Consult): [address]: Yes  Physical Therapy Consult: Yes  Occupational Therapy Consult: Yes  Current Support Network: Relative's Home  Confirm Follow Up Transport: Family  The Plan for Transition of Care is Related to the Following Treatment Goals : d/c home with daughter   The Patient and/or Patient Representative was Provided with a Choice of Provider and Agrees with the Discharge Plan?: Yes  Freedom of Choice List was Provided with Basic Dialogue that Supports the Patient's Individualized Plan of Care/Goals, Treatment Preferences and Shares the Quality Data Associated with the Providers?: Yes  Discharge Location   Discharge Placement: Home with family assistance

## 2020-01-14 NOTE — PROGRESS NOTES
Discharge instructions reviewed with daughter in room. Pt requests via daughter interpreting that dc Adt nurse review information on discharge and medications with daughter. Reviewed medications, side effects, times due, future appointments and any complications to look for, daughter verbalized understanding.

## 2020-01-14 NOTE — ROUTINE PROCESS
Bedside and Verbal shift change report given to Aaliyah Yarbrough RN (oncoming nurse) by Vianney Garcia RN (offgoing nurse). Report included the following information SBAR and Kardex.

## 2020-01-14 NOTE — PROGRESS NOTES
0730 hrs SBAR report received from Abi Beebe Rn. Assume care . PT alert, oriented x4 [place] speeking. Denies any pain at this time. Nurses care continues. 0800 hrs Shift assessment completed. 1200 hrs Reassessed pt without change. 1325 hrs All scheduled med given. Daughter at bedside.

## 2020-01-14 NOTE — PROGRESS NOTES
2354-Resting quietly in bed. Stable. Call light and personal items within reach. White board updated. 0140-Stable. Resting quietly in bed. Denies need for pain medication at this time. Dressing, right foot, intact with open toe footwear in place. Positive pulse with doppler. Denies need for pain medication at this time. Tele box in place. 0424-Resting in bed. No complaints. Stable. 0645-Resting quietly in bed. Call light in reach. Shift Summary:  Resting quietly in bed at shift change. No complaints voiced.

## 2020-01-14 NOTE — PROGRESS NOTES
Bedside and Verbal shift change report given to JAMIE Keller RN (oncoming nurse) by LAURA Matta RN (offgoing nurse). Report included the following information SBAR, Kardex, Intake/Output, MAR and Recent Results.

## 2020-01-14 NOTE — ROUTINE PROCESS
Bedside and Verbal shift change report given to LAURA Matta RN (oncoming nurse) by Milo Pan RN (offgoing nurse). Report included the following information SBAR, Kardex, Intake/Output, MAR and Recent Results.

## 2020-01-14 NOTE — PROGRESS NOTES
Bedside and Verbal shift change report given to Trevor Salas RN (oncoming nurse) by Santy Marina RN (offgoing nurse). Report included the following information SBAR and Kardex.

## 2020-01-14 NOTE — PROGRESS NOTES
Transition of care: anticipate home today  Met with patient and Dr. Cleveland Ryder at bedside. She used the [employer] phone and also called his daughter Rj Miller. Updated her on the d/c plan. CM also informed patients nurse Jared Lockett RN that d/c plan after he has dressing changes and IV anabiotics. Daughter will pick him up.  RN please call daughter Rjjorje Miller when

## 2020-01-14 NOTE — DISCHARGE SUMMARY
Discharge Summary    Patient: Steven Malone MRN: [id number]  CSN: [id number]    YOB: 1945  Age: 76 y.o.   Sex: male    DOA: 1/1/2020 LOS:  LOS: 12 days   Discharge Date:      Primary Care Provider:  Rachel Hair MD    Admission Diagnoses: Sepsis ([facility] [A41.9]  Fever in adult [R50.9]  Lactic acidosis [E87.2]    Discharge Diagnoses:    Hospital Problems  Date Reviewed: 1/9/2020          Codes Class Noted POA    Acute deep vein thrombosis (DVT) ([facility] ICD-10-CM: I82.409  ICD-9-CM: 453.40  1/13/2020         Osteomyelitis of right foot ([facility] ICD-10-CM: M86.9  ICD-9-CM: 730.27  1/9/2020 Unknown        PAD (peripheral artery disease) ([place] [address].) ICD-10-CM: I73.9  ICD-9-CM: 443.9  1/7/2020 Unknown        * (Principal) Sepsis due to Streptococcus agalactiae ([facility] ICD-10-CM: A40.1  ICD-9-CM: 038.0, 995.91  1/6/2020 Unknown        Diabetic ulcer of right midfoot associated with type 2 diabetes mellitus, with fat layer exposed ([facility] ICD-10-CM: E11.621, T44.322  ICD-9-CM: 250.80, 707.14  1/6/2020 Unknown        Foot abscess, right ICD-10-CM: L02.611  ICD-9-CM: 682.7  1/5/2020 Unknown        Corn of foot ICD-10-CM: L84  ICD-9-CM: 700  1/5/2020 Unknown        Type 2 diabetes mellitus, with long-term current use of insulin ([facility] ICD-10-CM: E11.9, Z79.4  ICD-9-CM: 250.00, V58.67  1/3/2020         Hypertension ICD-10-CM: I10  ICD-9-CM: 401.9  1/3/2020 Unknown        Hypokalemia ICD-10-CM: E87.6  ICD-9-CM: 276.8  1/3/2020 Unknown        UTI (urinary tract infection) ICD-10-CM: N39.0  ICD-9-CM: 599.0  1/3/2020 Unknown        Positive blood culture ICD-10-CM: R78.81  ICD-9-CM: 790.7  1/3/2020 Unknown        Sepsis ([address] ICD-10-CM: A41.9  ICD-9-CM: 038.9, 995.91  1/2/2020 Yes        Abdominal pain ICD-10-CM: R10.9  ICD-9-CM: 789.00  1/2/2020 Yes        CKD (chronic kidney disease) ICD-10-CM: N18.9  ICD-9-CM: 585.9  1/2/2020 Yes              Discharge Condition: stable     Discharge Medications:     Current Discharge Medication List      START taking these medications    Details   apixaban (ELIQUIS) 5 mg (74 tabs) starter pack Take 10 mg (two 5 mg tablets) by mouth twice a day for 7 days   Followed by 5 mg (one 5 mg tablet) by mouth twice a day  Qty: 1 Dose Pack, Refills: 0      ertapenem 1 gram 1 g IVPB 1 g by IntraVENous route every twenty-four (24) hours. Qty: 1 Dose, Refills: 0         CONTINUE these medications which have NOT CHANGED    Details   insulin glargine (LANTUS SOLOSTAR U-100 INSULIN) 100 unit/mL (3 mL) inpn 25 Units by SubCUTAneous route. metFORMIN ER (GLUCOPHAGE XR) 750 mg tablet Take 750 mg by mouth daily. atorvastatin (LIPITOR) 20 mg tablet Take 20 mg by mouth daily. aspirin 81 mg chewable tablet Take 81 mg by mouth daily. LOSARTAN PO Take 100 mg by mouth.      polyethylene glycol (MIRALAX) 17 gram/dose powder Take 17 g by mouth daily. 1 tablespoon with 8 oz of water daily  Qty: 507 g, Refills: 0             Procedures :   1. Incision and drainage, abscess, cellulitis, right foot. 2.  Ulcer excision 3 cm x 3 cm, right foot. 3.  An incision to bone cortex, right fifth toe. 4.  Amputation of fifth metatarsal, right foot. 5.  Soft tissue rearrangement with full-thickness flap, less than 20 sq cm, right foot      Consults: podiatry ,general surgeon       PHYSICAL EXAM   Visit Vitals  /56 (BP 1 Location: Left arm, BP Patient Position: At rest)   Pulse 87   Temp 99 °F (37.2 °C)   Resp 19   Ht 5' 6\" (1.676 m)   Wt 78.1 kg (172 lb 2.9 oz)   SpO2 97%   BMI 27.79 kg/m²     General: Awake, cooperative, no acute distress    HEENT: NC, Atraumatic. PERRLA, EOMI. Anicteric sclerae. Lungs:  CTA Bilaterally. No Wheezing/Rhonchi/Rales. Heart:  Regular  rhythm,  No murmur, No Rubs, No Gallops  Abdomen: Soft, Non distended, Non tender. +Bowel sounds,   Extremities: No c/c/rt foot covered with gauze, mild swelling of rt leg-improving from yesterday.    Psych:   Not anxious or agitated. Neurologic:  No acute neurological deficits. Admission HPI :   77 y/o male with history of DMT2 and HTN admitted for abdominal pain and sepsis. Based on his CT scan, he has inflammation in the bladder/kidneys although his UA is negative. There is mention of possible cholecystitis but he has no pain in his RUQ. He also has an diabetic foot wound that is not infected    Hospital Course :   77 y/o male with history of DMT2 and HTN admitted for abdominal pain and sepsis. On admission, cholecystitis was suspected and was ruled out. Broad coverage iv abx were started. Foot abscess noted. Podiatry and vascular surgery were on board. He has pad and needs to f/u with vascular surgeon as out-pt. Dr. Martha Jang :   1 Incision and drainage, abscess, cellulitis, right foot. 2.  Ulcer excision 3 cm x 3 cm, right foot. 3.  An incision to bone cortex, right fifth toe. 4.  Amputation of fifth metatarsal, right foot. 5.  Soft tissue rearrangement with full-thickness flap, less than 20 sq cm, right foot    ID on board:                                          1/6 - Wound (+) CoNS and bacillus (surface contaminants)                                         1/2 - BCx (-)                                         1/1 - BCx 2/2 (+) Streptococcus agalactiae                                                      UA (+) Streptococcus agalactiae    Dr. Patricia Bray recommended ertepenem 1 g till Feb 20, 2020. PICC line was placed for long term iv abx   dvt was found out per pvl, he was discharged with eliquis for dvt treatment. We also controlled his DM during his stay. Discharge planning discussed with patient and family, agree with the plan and no questions and concerns at this point.        Activity: Activity as tolerated    Diet: Diabetic Diet    Follow-up: PCP and Dr. Martha Jang, Dr. Patricia Bray at wound clinic, hyperbaric therapy     Disposition: home     Minutes spent on discharge: 45 min       Labs: Results:       Chemistry Recent Labs     01/14/20  0703 01/13/20 0220 01/12/20 0220   * 100* 175*    143 142   K 3.8 3.7 3.3*    109 109   CO2 27 27 27   BUN 10 11 9   CREA 1.16 1.36* 1.33*   CA 8.1* 7.8* 7.7*   AGAP 7 7 6   BUCR 9* 8* 7*      CBC w/Diff Recent Labs     01/14/20  0703 01/13/20 0220 01/12/20  1831 01/12/20 0220   WBC 5.8 7.2  --  6.7   RBC 2.62* 2.65*  --  2.21*   HGB 7.7* 7.9* 8.1* 6.6*   HCT 23.2* 23.4* 24.4* 19.7*    341  --  322   GRANS 55 63  --  63   LYMPH 28 23  --  23   EOS 3 3  --  3      Cardiac Enzymes No results for input(s): CPK, CKND1, KAREN in the last 72 hours. No lab exists for component: CKRMB, TROIP   Coagulation Recent Labs     01/13/20 0220   PTP 16.0*   INR 1.3*       Lipid Panel No results found for: CHOL, CHOLPOCT, CHOLX, CHLST, CHOLV, 557875, HDL, HDLP, LDL, LDLC, DLDLP, 994420, VLDLC, VLDL, TGLX, TRIGL, TRIGP, TGLPOCT, CHHD, CHHDX   BNP No results for input(s): BNPP in the last 72 hours. Liver Enzymes No results for input(s): TP, ALB, TBIL, AP, SGOT, GPT in the last 72 hours. No lab exists for component: DBIL   Thyroid Studies No results found for: T4, T3U, TSH, TSHEXT         Significant Diagnostic Studies: Nm Hepatobiliary Duct Scan    Result Date: 1/2/2020  EXAM: Hepatobiliary Scintigraphy  INDICATION: Abnormal finding on right upper quadrant ultrasound with equivocal evidence for cholecystitis COMPARISON: Right upper quadrant ultrasound and CT of the abdomen and pelvis from same day RADIOPHARMACEUTICAL: 6.3 mCi Tc-99m mebrofenin IV INJECTION SITE: Right antecubital fossa _______________ FINDINGS: There is normal uptake of radiopharmaceutical by the liver. The liver is of normal size and morphology. There is prompt excretion of tracer by the liver with normal visualization of the intra-hepatic biliary ducts, the common hepatic duct, the gallbladder, the common bile duct, and the small bowel. _______________     IMPRESSION: 1.  Normal filling of the gallbladder. No evidence of cystic duct obstruction. Ct Foot Rt Wo Cont    Result Date: 1/5/2020  EXAM: CT of the Right Foot History: Admitting history of sepsis and bacteremia, right foot abscess-bottom aspect of right foot, evaluate for abscess Comparison: none Technique: Noncontrast axial CT scan of the right was performed with coronal and sagittal reformations. One or more dose reduction techniques were used on this CT: automated exposure control, adjustment of the mAs and/or kVp according to patient size, and iterative reconstruction techniques. The specific techniques used on this CT exam have been documented in the patient's electronic medical record. Digital Imaging and Communications in Medicine (DICOM) format image data are available to nonaffiliated external healthcare facilities or entities on a secure, media free, reciprocally searchable basis with patient authorization for at least a 12-month period after this study. ___________________ FINDINGS: -The absence of intravenous contrast, degrades evaluation of the soft tissue structures. OSSEOUS: No acute or destructive abnormality. Specifically no CT evidence of osteomyelitis. Mild enthesopathy of the distal Achilles at the posterior calcaneus. SOFT TISSUE:   > At the plantar lateral aspect of the fifth MTP joint, there is a small skin defect with localized dermal thickening/edema and subcutaneous emphysema measuring approximately 1.5 cm (series 2/image 80,/sagittal soft tissue series image 298 and coronal soft tissue series image 172). > Nonspecific bimalleolar dermal thickening with diffuse distal ankle and soft tissue stranding of the foot. Extensive atherosclerotic vascular calcification from the distal leg to the intertriginous vessels. ___________________    IMPRESSION: 1.  Localized dermal defect with edema/stranding and subcutaneous emphysema at the plantar lateral aspect of the fifth MTP joint, suboptimally characterized on this noncontrast study. Diagnostic considerations would include cellulitis, with or without gas forming infection in the setting of open skin defect. No discrete localized fluid collection/abscess. No associated osteomyelitis. 2. Extensive atherosclerotic vascular calcification and nonspecific mild diffuse soft tissue stranding/induration/edema. Ct Abd Pelv W Cont    Result Date: 1/4/2020  EXAM: CT of the abdomen and pelvis INDICATION: Fever, interval change; sepsis, fever and adults, lactic acidosis COMPARISON: 01/02/2020 and 12/10/2019 TECHNIQUE: Axial CT imaging of the abdomen and pelvis was performed intravenous contrast. Multiplanar reformats were generated. One or more dose reduction techniques were used on this CT: automated exposure control, adjustment of the mAs and/or kVp according to patient size, and iterative reconstruction techniques. The specific techniques used on this CT exam have been documented in the patient's electronic medical record. Digital Imaging and Communications in Medicine (DICOM) format image data are available to nonaffiliated external healthcare facilities or entities on a secure, media free, reciprocally searchable basis with patient authorization for at least a 12-month period after this study. _______________ FINDINGS: LOWER CHEST: Interval developing small bilateral lower thoracic effusions, and asymmetric right septal thickening LIVER, BILIARY: Left lobe of liver ill-defined hypodensity adjacent to the falciform ligament, a CT finding that is commonly associated with focal fatty replacement. Otherwise, no acute hepatic parenchymal process. Mild periportal edema. No intrahepatic or extra hepatic biliary duct dilatation. Diffuse circumferential gallbladder wall thickening with small volume pericholecystic fluid, similar appearance to preceding right upper quadrant ultrasound from 1/2/2020.  PANCREAS: Diffuse parenchymal fat replacement SPLEEN: Normal. ADRENALS: Normal. KIDNEYS, URETERS, BLADDER: Symmetric enhancing bilateral kidneys without hydronephrosis or perinephric fluid. Intervally improved mild perinephric stranding appearance. No italo hydroureter. Unremarkable CT appearance of the bladder. GASTROINTESTINAL TRACT: No bowel dilation or wall thickening. Small amount of liquid stool in the right and transverse colon. LYMPH NODES: No enlarged lymph nodes. PELVIC ORGANS: Unremarkable. VASCULATURE: Normal caliber aorta with mild atherosclerotic changes. OSSEOUS: No acute or aggressive osseous abnormalities identified. OTHER: Nonspecific trace pelvic ascites. _______________     IMPRESSION: 1. Persistent prominent gallbladder without italo hydrops. CT findings of nonspecific gallbladder wall thickening and trace pericholecystic fluid, not significantly changed since preceding right upper quadrant ultrasound from 1/2/2020. In the absence of gallstones and in the setting of a normal HIDA scan, gallbladder wall thickening may be seen in the setting of hypoproteinemia, anemia, ascites, right heart failure and hepatic dysfunction. 2. Nonspecific small volume of liquid stool in the right and transverse colon without italo bowel wall thickening. 3. Nonspecific tiny amount of pelvic ascites. 4. CT findings suggestive of developing interstitial edema and new small bilateral effusions. Potentially fluid overload component. Ct Abd Pelv W Cont    Result Date: 1/2/2020  EXAM: CT ABD PELV W CONT CLINICAL INDICATION/HISTORY: Abdominal pain and fever, sepsis COMPARISON: 12/10/2019 TECHNIQUE:   CT of the abdomen and pelvis following intravenous contrast administration. Coronal and sagittal reformats were generated and reviewed. One or more dose reduction techniques were used on this CT: automated exposure control, adjustment of the mAs and/or kVp according to patient size, and iterative reconstruction techniques.   The specific techniques used on this CT exam have been documented in the patient's electronic medical record. Digital Imaging and Communications in Medicine (DICOM) format image data are available to nonaffiliated external healthcare facilities or entities on a secure, media free, reciprocally searchable basis with patient authorization for at least a 12-month period after this study. _______________ FINDINGS: LOWER THORAX: Mild left and right basilar dependent atelectasis. No acute pulmonary infiltrate. No pleural effusion. No global cardiomegaly or pericardial effusion. LIVER AND BILIARY:  There is hydropic distention of the gallbladder. Mild periportal edema. Common bile duct appears normal in caliber. No suspicious liver lesions. SPLEEN:  Normal. PANCREAS:  Peripancreatic fatty atrophy. ADRENALS:  Normal. KIDNEYS:  Normal. LYMPH NODES:  No mesenteric or retroperitoneal lymphadenopathy. GI TRACT:  Small hiatal hernia. Sigmoid colon diverticulosis without evidence of acute inflammation. Normal caliber small and large bowel loops. No morphology of bowel obstruction. No bowel wall thickening. Normal appendix. PELVIC ORGANS:  Mild circumferential bladder wall thickening despite mild bladder distention. .  No acute abnormality. VASCULATURE: Normal caliber lower thoracic and abdominal aorta with mild atherosclerotic vascular calcification. OTHER:   No ascites or free intraperitoneal air. OSSEOUS STRUCTURES:  No acute osseous abnormality. Mild multilevel degenerative disk disease and facet arthropathy. _______________     IMPRESSION: 1. Nonspecific hydropic gallbladder distention with no additional secondary CT findings of cholecystitis. Right upper quadrant ultrasound could better evaluate these findings if there is clinical concern for cholecystitis. 2. Mild circumferential bladder wall thickening, secondary findings of chronic bladder outlet obstruction versus cystitis.  3. Mild left and right perinephric inflammatory stranding is nonspecific since are no additional findings to suggest pyelonephritis. Although this frequently can represent senescent changes and was not present on the prior CT of 12/10/2019. Correlation with urinalysis could more accurately evaluate for potential ascending urinary tract infection. [id number]    Result Date: 1/2/2020  EXAM: Limited right upper quadrant abdominal ultrasound INDICATION: Right upper quadrant pain. COMPARISON: CT earlier the same day. TECHNIQUE: Real-time abdomen/right upper quadrant sonography in multiple planes was performed with image documentation. Grayscale, color flow Doppler imaging, and velocity spectral waveform analysis of the portal vein was performed (duplex imaging). _______________ FINDINGS: LIVER: Normal in echotexture. No focal mass Color flow Doppler and velocity spectral waveform analysis of the portal vein shows normal direction of flow. Carlene Kirkland BILIARY SYSTEM: No intrahepatic biliary dilatation. Common bile duct is normal in caliber measuring 0.5 cm. GALLBLADDER: Distended gallbladder with mildly thickened, edematous wall measuring 4 mm. No shadowing calculus. Layering sludge. Sonographic Genevieve Gubler sign is negative as per technologist. RIGHT KIDNEY: 10.3 cm in length. No hydronephrosis or renal mass. No visible calculi. PANCREAS: Head and body are unremarkable in appearance though the tail is obscured by overlying bowel gas. IVC: Visualized portions are unremarkable in appearance. OTHER: No free intraperitoneal fluid. _______________     IMPRESSION: Gallbladder findings are equivocal for cholecystitis. No shadowing calculus. Further evaluation with nuclear hepatobiliary scan is recommended. Xr Chest Port    Result Date: 1/1/2020  EXAM: XR CHEST PORT. CLINICAL INDICATION/HISTORY: sepsis. -Additional: None. TECHNIQUE: A portable erect AP radiographic view of the chest is reviewed without comparison. _______________ FINDINGS: The lungs and pleural spaces are clear. The cardiac silhouette, naida, and mediastinal contours are normal for age. No acute osseous abnormality is revealed. _______________     IMPRESSION: No acute cardiopulmonary disease. _______________     Ir Guide Insert Picc Over 5 Yrs    Result Date: 1/13/2020  PROCEDURE:  Ultrasound Guided Right Arm PICC Placement CLINICAL INDICATION/HISTORY: Need for long-term IV antibiotics PERFORMED BY: James Mccabe PA-C ATTENDING RADIOLOGIST: Garett Roth MD TECHNIQUE: The procedure was performed following standard maximal barrier technique which includes, cap, mask, sterile gown, sterile gloves, sterile full body drape, hand hygiene with alcohol foam, and 2% chlorhexidine for cutaneous antisepsis. Sterile ultrasound gel and a sterile cover for the US probe was used. Ultrasound evaluation for potential access sites was performed. The right basilic vein is patent and normally compresses. A permanent recording was created for the patient record. The patient's upper arm was prepped and draped in sterile fashion and lidocaine was used for local anesthesia. The vein was accessed in the upper arm with a 21 gauge needle under ultrasound guidance. A guide wire was advanced under fluoroscopic control to the superior vena cava. The distance between the superior vena cava and skin puncture site was measured and a 5 [employer] double lumen power PICC was cut to the appropriate length. Overall catheter length was 37 cm. The PICC was advanced to the SVC under fluoroscopic control. The line was flushed with heparinized saline and adhered to the skin with a Statlock device. Final coned AP view of the chest was obtained to document catheter position. The patient tolerated the procedure well and there were no immediate complications. __________________________________________ FINDINGS: Final coned AP view of the chest shows good position of the PICC with its tip in the SVC. Both ports flushed easily and there was good blood return. Timeout:  1245 hours. Radiation dose (reference air kerma):  1 mGy.  Fluoroscopy Time: 0.1 minutes. GUIDANCE: Ultrasound and fluoroscopic guidance was used to position (and confirm the position of) the needle and catheter. Image(s) saved in PACS: Ultrasound and fluoroscopy. ____________________________________________     IMPRESSION: Successful placement of a double lumen power PICC via the right arm.               [facility]     CC: Contreras Solomon MD

## 2020-01-14 NOTE — DIABETES MGMT
GLYCEMIC CONTROL PROGRESS NOTE:    - known h/o T2DM, HbA1 not within recommended range for age + comorbids on basal/oral home regimen  - BG out of target range non-ICU: < 180 mg/dL  -TDD = 21 units - Humalog Very Insulin Resistant Corrective Coverage  - 24 hour BG trending up, PPG out of target range recommend initiate weight based mealtime insulin   *Humalog 4 units qac    Recent Glucose Results:   Lab Results   Component Value Date/Time     (H) 01/14/2020 07:03 AM    GLUCPOC 119 (H) 01/14/2020 05:59 AM    GLUCPOC 237 (H) 01/13/2020 09:30 PM    GLUCPOC 266 (H) 01/13/2020 04:19 PM     Talon Cardoza MS, RN, CDE  Glycemic Control Team  [phone]  Pager [phone] (M-TH 8:00-4:30P)  *After Hours pager [phone]

## 2020-01-15 ENCOUNTER — HOSPITAL ENCOUNTER (EMERGENCY)
Age: 75
Discharge: HOME | DRG: 314 | End: 2020-01-15
Attending: EMERGENCY MEDICINE
Payer: MEDICARE

## 2020-01-15 ENCOUNTER — HOSPITAL ENCOUNTER (EMERGENCY)
Dept: CT IMAGING | Age: 75
Discharge: HOME | DRG: 314 | End: 2020-01-15
Attending: PHYSICIAN ASSISTANT
Payer: MEDICARE

## 2020-01-15 ENCOUNTER — APPOINTMENT (OUTPATIENT)
Dept: GENERAL RADIOLOGY | Age: 75
DRG: 314 | End: 2020-01-15
Attending: PHYSICIAN ASSISTANT
Payer: MEDICARE

## 2020-01-15 ENCOUNTER — HOSPITAL ENCOUNTER (OUTPATIENT)
Dept: INFUSION THERAPY | Age: 75
Discharge: HOME | End: 2020-01-15
Payer: MEDICARE

## 2020-01-15 VITALS
WEIGHT: 160 LBS | HEART RATE: 78 BPM | TEMPERATURE: 99.1 F | RESPIRATION RATE: 20 BRPM | HEIGHT: 66 IN | SYSTOLIC BLOOD PRESSURE: 170 MMHG | OXYGEN SATURATION: 96 % | DIASTOLIC BLOOD PRESSURE: 67 MMHG | BODY MASS INDEX: 25.71 KG/M2

## 2020-01-15 VITALS — TEMPERATURE: 100.4 F | SYSTOLIC BLOOD PRESSURE: 190 MMHG | HEART RATE: 94 BPM | DIASTOLIC BLOOD PRESSURE: 80 MMHG

## 2020-01-15 DIAGNOSIS — J18.9 PNEUMONIA OF RIGHT UPPER LOBE DUE TO INFECTIOUS ORGANISM: Primary | ICD-10-CM

## 2020-01-15 DIAGNOSIS — R91.8 LUNG MASS: ICD-10-CM

## 2020-01-15 DIAGNOSIS — R50.9 FEVER, UNSPECIFIED FEVER CAUSE: ICD-10-CM

## 2020-01-15 DIAGNOSIS — Z89.421 STATUS POST AMPUTATION OF LESSER TOE OF RIGHT FOOT (HCC): ICD-10-CM

## 2020-01-15 DIAGNOSIS — Z79.2 LONG TERM CURRENT USE OF ANTIBIOTICS: ICD-10-CM

## 2020-01-15 LAB
ALBUMIN SERPL-MCNC: 2.2 G/DL (ref 3.4–5)
ALBUMIN/GLOB SERPL: 0.4 {RATIO} (ref 0.8–1.7)
ALP SERPL-CCNC: 69 U/L (ref 45–117)
ALT SERPL-CCNC: 22 U/L (ref 16–61)
ANION GAP SERPL CALC-SCNC: 6 MMOL/L (ref 3–18)
APPEARANCE UR: CLEAR
AST SERPL-CCNC: 12 U/L (ref 10–38)
ATRIAL RATE: 96 BPM
BACTERIA URNS QL MICRO: NORMAL /HPF
BASOPHILS # BLD: 0 K/UL (ref 0–0.1)
BASOPHILS NFR BLD: 0 % (ref 0–2)
BILIRUB SERPL-MCNC: 0.5 MG/DL (ref 0.2–1)
BILIRUB UR QL: NEGATIVE
BUN SERPL-MCNC: 10 MG/DL (ref 7–18)
BUN/CREAT SERPL: 8 (ref 12–20)
CALCIUM SERPL-MCNC: 8.3 MG/DL (ref 8.5–10.1)
CALCULATED P AXIS, ECG09: 64 DEGREES
CALCULATED R AXIS, ECG10: 15 DEGREES
CALCULATED T AXIS, ECG11: 36 DEGREES
CHLORIDE SERPL-SCNC: 104 MMOL/L (ref 100–111)
CK MB CFR SERPL CALC: ABNORMAL % (ref 0–4)
CK MB SERPL-MCNC: <1 NG/ML (ref 5–25)
CK SERPL-CCNC: 36 U/L (ref 39–308)
CO2 SERPL-SCNC: 29 MMOL/L (ref 21–32)
COLOR UR: YELLOW
CREAT SERPL-MCNC: 1.24 MG/DL (ref 0.6–1.3)
CRP SERPL-MCNC: 5.4 MG/DL (ref 0–0.3)
DIAGNOSIS, 93000: NORMAL
DIFFERENTIAL METHOD BLD: ABNORMAL
EOSINOPHIL # BLD: 0 K/UL (ref 0–0.4)
EOSINOPHIL NFR BLD: 1 % (ref 0–5)
EPITH CASTS URNS QL MICRO: NORMAL /LPF (ref 0–5)
ERYTHROCYTE [DISTWIDTH] IN BLOOD BY AUTOMATED COUNT: 14.2 % (ref 11.6–14.5)
FLUAV AG NPH QL IA: NEGATIVE
FLUBV AG NOSE QL IA: NEGATIVE
GLOBULIN SER CALC-MCNC: 5 G/DL (ref 2–4)
GLUCOSE SERPL-MCNC: 241 MG/DL (ref 74–99)
GLUCOSE UR STRIP.AUTO-MCNC: 250 MG/DL
HCT VFR BLD AUTO: 24.7 % (ref 36–48)
HGB BLD-MCNC: 8.2 G/DL (ref 13–16)
HGB UR QL STRIP: ABNORMAL
KETONES UR QL STRIP.AUTO: NEGATIVE MG/DL
LACTATE BLD-SCNC: 0.93 MMOL/L (ref 0.4–2)
LEUKOCYTE ESTERASE UR QL STRIP.AUTO: NEGATIVE
LIPASE SERPL-CCNC: 124 U/L (ref 73–393)
LYMPHOCYTES # BLD: 1 K/UL (ref 0.9–3.6)
LYMPHOCYTES NFR BLD: 15 % (ref 21–52)
MCH RBC QN AUTO: 29.6 PG (ref 24–34)
MCHC RBC AUTO-ENTMCNC: 33.2 G/DL (ref 31–37)
MCV RBC AUTO: 89.2 FL (ref 74–97)
MONOCYTES # BLD: 0.8 K/UL (ref 0.05–1.2)
MONOCYTES NFR BLD: 12 % (ref 3–10)
NEUTS SEG # BLD: 4.9 K/UL (ref 1.8–8)
NEUTS SEG NFR BLD: 72 % (ref 40–73)
NITRITE UR QL STRIP.AUTO: NEGATIVE
P-R INTERVAL, ECG05: 154 MS
PH UR STRIP: 5.5 [PH] (ref 5–8)
PLATELET # BLD AUTO: 340 K/UL (ref 135–420)
PMV BLD AUTO: 8.4 FL (ref 9.2–11.8)
POTASSIUM SERPL-SCNC: 3.9 MMOL/L (ref 3.5–5.5)
PROT SERPL-MCNC: 7.2 G/DL (ref 6.4–8.2)
PROT UR STRIP-MCNC: 300 MG/DL
Q-T INTERVAL, ECG07: 342 MS
QRS DURATION, ECG06: 86 MS
QTC CALCULATION (BEZET), ECG08: 432 MS
RBC # BLD AUTO: 2.77 M/UL (ref 4.7–5.5)
RBC #/AREA URNS HPF: NORMAL /HPF (ref 0–5)
SODIUM SERPL-SCNC: 139 MMOL/L (ref 136–145)
SP GR UR REFRACTOMETRY: 1.01 (ref 1–1.03)
SPERM URNS QL MICRO: NORMAL
TROPONIN I SERPL-MCNC: 0.02 NG/ML (ref 0–0.04)
UROBILINOGEN UR QL STRIP.AUTO: 0.2 EU/DL (ref 0.2–1)
VENTRICULAR RATE, ECG03: 96 BPM
WBC # BLD AUTO: 6.8 K/UL (ref 4.6–13.2)
WBC URNS QL MICRO: NORMAL /HPF (ref 0–5)

## 2020-01-15 PROCEDURE — 74011000258 HC RX REV CODE- 258: Performed by: INTERNAL MEDICINE

## 2020-01-15 PROCEDURE — 87804 INFLUENZA ASSAY W/OPTIC: CPT

## 2020-01-15 PROCEDURE — 74011636320 HC RX REV CODE- 636/320: Performed by: PHYSICIAN ASSISTANT

## 2020-01-15 PROCEDURE — 71045 X-RAY EXAM CHEST 1 VIEW: CPT

## 2020-01-15 PROCEDURE — 87040 BLOOD CULTURE FOR BACTERIA: CPT

## 2020-01-15 PROCEDURE — 74177 CT ABD & PELVIS W/CONTRAST: CPT

## 2020-01-15 PROCEDURE — 85025 COMPLETE CBC W/AUTO DIFF WBC: CPT

## 2020-01-15 PROCEDURE — 93005 ELECTROCARDIOGRAM TRACING: CPT

## 2020-01-15 PROCEDURE — 74011250636 HC RX REV CODE- 250/636: Performed by: INTERNAL MEDICINE

## 2020-01-15 PROCEDURE — 82550 ASSAY OF CK (CPK): CPT

## 2020-01-15 PROCEDURE — 96374 THER/PROPH/DIAG INJ IV PUSH: CPT

## 2020-01-15 PROCEDURE — 71275 CT ANGIOGRAPHY CHEST: CPT

## 2020-01-15 PROCEDURE — 81001 URINALYSIS AUTO W/SCOPE: CPT

## 2020-01-15 PROCEDURE — 99285 EMERGENCY DEPT VISIT HI MDM: CPT

## 2020-01-15 PROCEDURE — 83605 ASSAY OF LACTIC ACID: CPT

## 2020-01-15 PROCEDURE — 96375 TX/PRO/DX INJ NEW DRUG ADDON: CPT

## 2020-01-15 PROCEDURE — 83690 ASSAY OF LIPASE: CPT

## 2020-01-15 PROCEDURE — 80053 COMPREHEN METABOLIC PANEL: CPT

## 2020-01-15 PROCEDURE — 84145 PROCALCITONIN (PCT): CPT

## 2020-01-15 PROCEDURE — 96365 THER/PROPH/DIAG IV INF INIT: CPT

## 2020-01-15 PROCEDURE — 86140 C-REACTIVE PROTEIN: CPT

## 2020-01-15 PROCEDURE — 74011250636 HC RX REV CODE- 250/636: Performed by: PHYSICIAN ASSISTANT

## 2020-01-15 RX ORDER — FAMOTIDINE 10 MG/ML
20 INJECTION INTRAVENOUS
Status: COMPLETED | OUTPATIENT
Start: 2020-01-15 | End: 2020-01-15

## 2020-01-15 RX ORDER — SODIUM CHLORIDE 0.9 % (FLUSH) 0.9 %
5-10 SYRINGE (ML) INJECTION AS NEEDED
Status: DISCONTINUED | OUTPATIENT
Start: 2020-01-15 | End: 2020-01-19 | Stop reason: HOSPADM

## 2020-01-15 RX ORDER — HEPARIN 100 UNIT/ML
500 SYRINGE INTRAVENOUS
Status: COMPLETED | OUTPATIENT
Start: 2020-01-15 | End: 2020-01-15

## 2020-01-15 RX ORDER — ACETAMINOPHEN 10 MG/ML
1000 INJECTION, SOLUTION INTRAVENOUS ONCE
Status: COMPLETED | OUTPATIENT
Start: 2020-01-15 | End: 2020-01-15

## 2020-01-15 RX ORDER — SODIUM CHLORIDE 0.9 % (FLUSH) 0.9 %
5-10 SYRINGE (ML) INJECTION AS NEEDED
Status: DISCONTINUED | OUTPATIENT
Start: 2020-01-15 | End: 2020-01-15 | Stop reason: HOSPADM

## 2020-01-15 RX ORDER — HEPARIN 100 UNIT/ML
500 SYRINGE INTRAVENOUS ONCE
Status: COMPLETED | OUTPATIENT
Start: 2020-01-15 | End: 2020-01-15

## 2020-01-15 RX ORDER — LABETALOL HCL 20 MG/4 ML
10 SYRINGE (ML) INTRAVENOUS
Status: COMPLETED | OUTPATIENT
Start: 2020-01-15 | End: 2020-01-15

## 2020-01-15 RX ORDER — HEPARIN 100 UNIT/ML
SYRINGE INTRAVENOUS
Status: DISCONTINUED
Start: 2020-01-15 | End: 2020-01-15 | Stop reason: HOSPADM

## 2020-01-15 RX ADMIN — Medication 500 UNITS: at 20:09

## 2020-01-15 RX ADMIN — ACETAMINOPHEN 1000 MG: 10 INJECTION, SOLUTION INTRAVENOUS at 17:16

## 2020-01-15 RX ADMIN — Medication 500 UNITS: at 15:40

## 2020-01-15 RX ADMIN — Medication 10 ML: at 15:40

## 2020-01-15 RX ADMIN — LABETALOL 20 MG/4 ML (5 MG/ML) INTRAVENOUS SYRINGE 10 MG: at 18:03

## 2020-01-15 RX ADMIN — FAMOTIDINE 20 MG: 10 INJECTION, SOLUTION INTRAVENOUS at 17:16

## 2020-01-15 RX ADMIN — IOPAMIDOL 100 ML: 755 INJECTION, SOLUTION INTRAVENOUS at 17:44

## 2020-01-15 RX ADMIN — SODIUM CHLORIDE 1 G: 900 INJECTION INTRAVENOUS at 14:53

## 2020-01-15 NOTE — PROGRESS NOTES
[facility] Progress Note    Date: January 15, 2020    Name: Amirah Perez    MRN: [id number]         : 1945     IV Lesle Katerin    Mr. Luis Torres was assessed and education was provided. Mr. Luis Torres arrived ambulatory using walker with his daughter for Invanz infusion. Mr. Luis Torres speaks \"a little\" [place] and requesting his daughter translate. Mr. Yaz Loomis vitals were reviewed. Visit Vitals  /80 (BP 1 Location: Left arm, BP Patient Position: Sitting)   Pulse 94   Temp 100.4 °F (38 °C)           []  Vancomycin     [x]  Invanz 1 grams IV infusion     []  Cubicin     []  Rocephin    was infused at  100 ml/hr. Mr. Luis Torres tolerated infusion, and had no complaints at this time. Patient armband removed and shredded. At end of infusion patient's daughter expressed concerns regarding patients blood pressure and temperature. Patient's daughter stated \"I don't think he's been running a fever when he was in the hospital.    Patient's daughter stated she will take patient to ED immediately after today's appointment. Mr. Luis Torres was discharged from [place] [address] in stable condition at [phone]. He is to return on 20 for his next appointment. Armband removed and shredded.      Frank Aranda RN  January 15, 2020  3:22 PM

## 2020-01-15 NOTE — ED PROVIDER NOTES
EMERGENCY DEPARTMENT HISTORY AND PHYSICAL EXAM    Date: 1/15/2020  Patient Name: Terry Zamora    History of Presenting Illness     Chief Complaint   Patient presents with    Fever         History Provided By: Patient and Patient's Daughter    4:16 PM  Terry Zamora is a 76 y.o. male with PMHX of HTN, DM, who presents to the emergency department with daughter as  c/O fever and elevated blood pressure, which was noted at [facility] just prior to arrival.  Patient was just discharged from this hospital yesterday. He was initially admitted on 1/2/2020 for sepsis and abdominal pain, cholecystitis was suspected on CT and ultrasound but ultimately ruled out with HIDA scan and cleared by general surgery. He was also noted to have a right foot infected wound that required amputation of right fifth toe by Dr. Estrada Nurse, and he was receiving IV antibiotics. He was also noted to have a DVT and placed on Eliquis. Patient states he continues to have abdominal pain, which was present throughout his hospitalization and mild nausea but no new complaints. Upon visit to the outpatient infusion center for his IV antibiotics (Ertapenam) today, his blood pressure was elevated and was noted to have a fever of 100.6F, prompting them to come to ED. Pt denies chest pain, shortness of breath, vomiting, and any other sxs or complaints. PCP: Shilpi Hicks MD    Current Facility-Administered Medications   Medication Dose Route Frequency Provider Last Rate Last Dose    sodium chloride (NS) flush 5-10 mL  5-10 mL IntraVENous PRN GISELA Bhat         Current Outpatient Medications   Medication Sig Dispense Refill    apixaban (ELIQUIS) 5 mg (74 tabs) starter pack Take 10 mg (two 5 mg tablets) by mouth twice a day for 7 days   Followed by 5 mg (one 5 mg tablet) by mouth twice a day 1 Dose Pack 0    ertapenem 1 gram 1 g IVPB 1 g by IntraVENous route every twenty-four (24) hours.  1 Dose 0    insulin glargine (LANTUS SOLOSTAR U-100 INSULIN) 100 unit/mL (3 mL) inpn 25 Units by SubCUTAneous route.  metFORMIN ER (GLUCOPHAGE XR) 750 mg tablet Take 750 mg by mouth daily.  atorvastatin (LIPITOR) 20 mg tablet Take 20 mg by mouth daily.  aspirin 81 mg chewable tablet Take 81 mg by mouth daily.  polyethylene glycol (MIRALAX) 17 gram/dose powder Take 17 g by mouth daily. 1 tablespoon with 8 oz of water daily 507 g 0    LOSARTAN PO Take 100 mg by mouth. Facility-Administered Medications Ordered in Other Encounters   Medication Dose Route Frequency Provider Last Rate Last Dose    sodium chloride (NS) flush 5-10 mL  5-10 mL IntraVENous PRN Barby Lombardo MD   10 mL at 01/15/20 1540    [START ON 1/18/2020] ertapenem (INVANZ) 1 g in 0.9% sodium chloride (MBP/ADV) 50 mL MBP  1 g IntraVENous Cassandra Baker MD        [facility] [START ON 1/19/2020] ertapenem (INVANZ) 1 g in 0.9% sodium chloride (MBP/ADV) 50 mL MBP  1 g IntraVENous Cassandra Baker MD           Past History     Past Medical History:  Past Medical History:   Diagnosis Date    Diabetes ([facility] [address]     Hypertension        Past Surgical History:  History reviewed. No pertinent surgical history. Family History:  History reviewed. No pertinent family history. Social History:  Social History     Tobacco Use    Smoking status: Never Smoker    Smokeless tobacco: Never Used   Substance Use Topics    Alcohol use: No    Drug use: No       Allergies:  No Known Allergies      Review of Systems   Review of Systems   Constitutional: Positive for fever. Respiratory: Negative for shortness of breath. Cardiovascular: Negative for chest pain. Gastrointestinal: Positive for abdominal pain and nausea. Negative for vomiting. All other systems reviewed and are negative.         Physical Exam     Vitals:    01/15/20 1557 01/15/20 1800 01/15/20 1803 01/15/20 1830   BP: (!) 208/76 182/65 182/65 159/58   Pulse: 97 88 87 82   Resp: 20 13  15   Temp: 100 °F (37.8 °C) 99.3 °F (37.4 °C)     SpO2: 100% 100%  94%   Weight: 72.6 kg (160 lb)      Height: 5' 6\" (1.676 m)        Physical Exam    Vital signs and nursing notes reviewed. CONSTITUTIONAL: Alert. Well-appearing; well-nourished; in no apparent distress. HEAD: Normocephalic; atraumatic. EYES: PERRL; Conjunctiva clear. ENT: TM's normal. External ear normal. Normal nose; no rhinorrhea. Normal pharynx. Moist mucus membranes. NECK: Supple; FROM without difficulty, non-tender; no cervical lymphadenopathy. CV: Normal S1, S2; no murmurs, rubs, or gallops. No chest wall tenderness. RESPIRATORY: Normal chest excursion with respiration; breath sounds clear and equal bilaterally; no wheezes, rhonchi, or rales. GI: Normal bowel sounds; non-distended; non-tender; no guarding or rigidity; no palpable organomegaly. No CVA tenderness. BACK:  No evidence of trauma or deformity. Non-tender to palpation. FROM without difficulty. EXT: RLE: Status post right fifth toe amputation with sutures in place, mildly swollen and tender. 2+ DP pulse. Calf is slightly swollen but nontender to palpation  SKIN: Normal for age and race; warm; dry; good turgor; no apparent lesions or exudate. NEURO: A & O x3. PSYCH:  Mood and affect appropriate.          Diagnostic Study Results     Labs -     Recent Results (from the past 12 hour(s))   POC LACTIC ACID    Collection Time: 01/15/20  4:32 PM   Result Value Ref Range    Lactic Acid (POC) 0.93 0.40 - 9.13 mmol/L   METABOLIC PANEL, COMPREHENSIVE    Collection Time: 01/15/20  4:35 PM   Result Value Ref Range    Sodium 139 136 - 145 mmol/L    Potassium 3.9 3.5 - 5.5 mmol/L    Chloride 104 100 - 111 mmol/L    CO2 29 21 - 32 mmol/L    Anion gap 6 3.0 - 18 mmol/L    Glucose 241 (H) 74 - 99 mg/dL    BUN 10 7.0 - 18 MG/DL    Creatinine 1.24 0.6 - 1.3 MG/DL    BUN/Creatinine ratio 8 (L) 12 - 20      GFR est AA >60 >60 ml/min/1.73m2    GFR est non-AA 57 (L) >60 ml/min/1.73m2    Calcium 8.3 (L) 8.5 - 10.1 MG/DL    Bilirubin, total 0.5 0.2 - 1.0 MG/DL    ALT (SGPT) 22 16 - 61 U/L    AST (SGOT) 12 10 - 38 U/L    Alk. phosphatase 69 45 - 117 U/L    Protein, total 7.2 6.4 - 8.2 g/dL    Albumin 2.2 (L) 3.4 - 5.0 g/dL    Globulin 5.0 (H) 2.0 - 4.0 g/dL    A-G Ratio 0.4 (L) 0.8 - 1.7     CBC WITH AUTOMATED DIFF    Collection Time: 01/15/20  4:35 PM   Result Value Ref Range    WBC 6.8 4.6 - 13.2 K/uL    RBC 2.77 (L) 4.70 - 5.50 M/uL    HGB 8.2 (L) 13.0 - 16.0 g/dL    HCT 24.7 (L) 36.0 - 48.0 %    MCV 89.2 74.0 - 97.0 FL    MCH 29.6 24.0 - 34.0 PG    MCHC 33.2 31.0 - 37.0 g/dL    RDW 14.2 11.6 - 14.5 %    PLATELET 027 124 - 512 K/uL    MPV 8.4 (L) 9.2 - 11.8 FL    NEUTROPHILS 72 40 - 73 %    LYMPHOCYTES 15 (L) 21 - 52 %    MONOCYTES 12 (H) 3 - 10 %    EOSINOPHILS 1 0 - 5 %    BASOPHILS 0 0 - 2 %    ABS. NEUTROPHILS 4.9 1.8 - 8.0 K/UL    ABS. LYMPHOCYTES 1.0 0.9 - 3.6 K/UL    ABS. MONOCYTES 0.8 0.05 - 1.2 K/UL    ABS. EOSINOPHILS 0.0 0.0 - 0.4 K/UL    ABS.  BASOPHILS 0.0 0.0 - 0.1 K/UL    DF AUTOMATED     CARDIAC PANEL,(CK, CKMB & TROPONIN)    Collection Time: 01/15/20  4:35 PM   Result Value Ref Range    CK 36 (L) 39 - 308 U/L    CK - MB <1.0 <3.6 ng/ml    CK-MB Index  0.0 - 4.0 %     CALCULATION NOT PERFORMED WHEN RESULT IS BELOW LINEAR LIMIT    Troponin-I, QT 0.02 0.0 - 0.045 NG/ML   LIPASE    Collection Time: 01/15/20  4:35 PM   Result Value Ref Range    Lipase 124 73 - 393 U/L   EKG, 12 LEAD, INITIAL    Collection Time: 01/15/20  4:52 PM   Result Value Ref Range    Ventricular Rate 96 BPM    Atrial Rate 96 BPM    P-R Interval 154 ms    QRS Duration 86 ms    Q-T Interval 342 ms    QTC Calculation (Bezet) 432 ms    Calculated P Axis 64 degrees    Calculated R Axis 15 degrees    Calculated T Axis 36 degrees    Diagnosis       Normal sinus rhythm  Normal ECG  When compared with ECG of 01-JAN-2020 21:51,  Nonspecific T wave abnormality no longer evident in Anterior leads     URINALYSIS W/ RFLX MICROSCOPIC Collection Time: 01/15/20  5:20 PM   Result Value Ref Range    Color YELLOW      Appearance CLEAR      Specific gravity 1.014 1.005 - 1.030      pH (UA) 5.5 5.0 - 8.0      Protein 300 (A) NEG mg/dL    Glucose 250 (A) NEG mg/dL    Ketone NEGATIVE  NEG mg/dL    Bilirubin NEGATIVE  NEG      Blood MODERATE (A) NEG      Urobilinogen 0.2 0.2 - 1.0 EU/dL    Nitrites NEGATIVE  NEG      Leukocyte Esterase NEGATIVE  NEG     INFLUENZA A & B AG (RAPID TEST)    Collection Time: 01/15/20  5:20 PM   Result Value Ref Range    Influenza A Antigen NEGATIVE  NEG      Influenza B Antigen NEGATIVE  NEG     URINE MICROSCOPIC ONLY    Collection Time: 01/15/20  5:20 PM   Result Value Ref Range    WBC 1 to 3 0 - 5 /hpf    RBC 3 to 6 0 - 5 /hpf    Epithelial cells FEW 0 - 5 /lpf    Bacteria NONE SEEN NEG /hpf    Spermatozoa FEW         Radiologic Studies -   CTA CHEST W OR W WO CONT   Final Result   IMPRESSION:      1. No evidence for pulmonary embolus. 2. Bilateral pleural effusions with adjacent atelectasis. 3. Masslike opacity in the right upper lobe likely represents atelectasis or   pneumonia. Recommend follow-up to exclude neoplasm. CT ABD PELV W CONT   Final Result   IMPRESSION:         1. No significant change of perinephric stranding which could represent   pyelonephritis. 2. No evidence for bowel obstruction. 3. Bilateral pleural effusions with adjacent atelectasis. XR CHEST PORT   Final Result   IMPRESSION:         1. Bilateral lower lobe atelectasis versus infiltrates. 2. Right sided PICC line as detailed above. CT Results  (Last 48 hours)               01/15/20 1757  CTA CHEST W OR W WO CONT Final result    Impression:  IMPRESSION:       1. No evidence for pulmonary embolus. 2. Bilateral pleural effusions with adjacent atelectasis. 3. Masslike opacity in the right upper lobe likely represents atelectasis or   pneumonia. Recommend follow-up to exclude neoplasm. Narrative:  EXAM: CTA chest       INDICATION: Pain. COMPARISON: None       TECHNIQUE: Axial CT imaging from the thoracic inlet through the diaphragm with   intravenous contrast. Coronal and sagittal MIP reformats were generated. One or more dose reduction techniques were used on this CT: automated exposure   control, adjustment of the mAs and/or kVp according to patient size, and   iterative reconstruction techniques. The specific techniques used on this CT   exam have been documented in the patient's electronic medical record. Digital   Imaging and Communications in Medicine (DICOM) format image data are available   to nonaffiliated external healthcare facilities or entities on a secure, media   free, reciprocally searchable basis with patient authorization for at least a   12-month period after this study. _______________       FINDINGS:       EXAM QUALITY: Adequate. PULMONARY ARTERIES:   There is good opacification of the pulmonary arteries with no filling defect to   suggest pulmonary embolus. MEDIASTINUM:    Cardiac size is within normal limits. No evidence of right heart strain. No evidence of aneurysm or dissection. Right-sided PICC line is in place with distal distal tip in the SVC. Stranding   is noted surrounding the right brachiocephalic vein likely secondary to recent   PICC line insertion. LUNGS:    Bilateral pleural effusions with adjacent atelectasis. Right upper lobe   posterior masslike opacity measures 1.5 cm on image 19 with questionable small   cavitary area. Left apical pleural thickening. No evidence for pneumothorax. PLEURA: As detailed above. AIRWAY: Normal.       LYMPH NODES: No enlarged nodes. UPPER ABDOMEN: Small hiatal hernia. OTHER:    No acute or aggressive osseous abnormalities identified. The thyroid is grossly within normal limits.        _______________           01/15/20 1757  CT ABD PELV W CONT Final result    Impression:  IMPRESSION:           1. No significant change of perinephric stranding which could represent   pyelonephritis. 2. No evidence for bowel obstruction. 3. Bilateral pleural effusions with adjacent atelectasis. Narrative:  EXAM: CT of the abdomen and pelvis       INDICATION: Pain. COMPARISON: 1/4/2020. TECHNIQUE: Axial CT imaging of the abdomen and pelvis was performed with   intravenous contrast. Multiplanar reformats were generated. One or more dose   reduction techniques were used on this CT: automated exposure control,   adjustment of the mAs and/or kVp according to patient size, and iterative   reconstruction techniques. The specific techniques used on this CT exam have   been documented in the patient's electronic medical record. Digital Imaging and   Communications in Medicine (DICOM) format image data are available to   nonaffiliated external healthcare facilities or entities on a secure, media   free, reciprocally searchable basis with patient authorization for at least a   12-month period after this study. _______________       FINDINGS:           LOWER CHEST: Bilateral pleural effusions with adjacent atelectasis. Small hiatal   hernia. LIVER, BILIARY: Liver is normal.  No biliary dilation. Gallbladder is   unremarkable. PANCREAS: Fatty replaced. SPLEEN: Normal.       ADRENALS: Normal.       KIDNEYS/GENITOURINARY SYSTEM: Unchanged perinephric stranding. LYMPH NODES: No enlarged lymph nodes. GASTROINTESTINAL TRACT: No bowel dilation or wall thickening. Uncomplicated   sigmoid diverticula. PELVIC ORGANS: Stable enlarged prostate. VASCULATURE: Aorta has a normal caliber. No periaortic collections. Moderate   atherosclerotic disease. BONES: No acute or aggressive osseous abnormalities identified. OTHER:    No intraperitoneal free air. No free or loculated fluid.    No evidence for abdominal wall hernia. Stable left gluteal subcutaneous   calcified mass. _______________               CXR Results  (Last 48 hours)               01/15/20 1700  XR CHEST PORT Final result    Impression:  IMPRESSION:           1. Bilateral lower lobe atelectasis versus infiltrates. 2. Right sided PICC line as detailed above. Narrative:  EXAM: CHEST RADIOGRAPH       CLINICAL INDICATION/HISTORY: Sepsis     > Additional: None       COMPARISON: 1/1/2020. TECHNIQUE: Portable frontal view of the chest       _______________       FINDINGS:       SUPPORT DEVICES: Right-sided PICC line distal tip projects at the SVC. HEART AND MEDIASTINUM: No appreciable cardiomegaly. Remaining mediastinal   contours within normal limits. LUNGS AND PLEURAL SPACES: Interstitial prominence. Patchy densities in the lung   bases. No evidence for pneumothorax probable small bilateral pleural effusions. BONY THORAX AND SOFT TISSUES: Unremarkable.       _______________                 Medications given in the ED-  Medications   sodium chloride (NS) flush 5-10 mL (has no administration in time range)   famotidine (PF) (PEPCID) injection 20 mg (20 mg IntraVENous Given 1/15/20 1716)   acetaminophen (OFIRMEV) infusion 1,000 mg (0 mg IntraVENous IV Completed 1/15/20 1731)   labetaloL (NORMODYNE;TRANDATE) 20 mg/4 mL (5 mg/mL) injection 10 mg (10 mg IntraVENous Given 1/15/20 1803)         Medical Decision Making   I am the first provider for this patient. I reviewed the vital signs, available nursing notes, past medical history, past surgical history, family history and social history. Vital Signs-Reviewed the patient's vital signs. Pulse Oximetry Analysis - 100% on RA     EKG interpretation: (Preliminary)  Normal sinus rhythm, rate 96, no acute changes, no STEMI    Records Reviewed: Nursing Notes and Old Medical Records   Discharge Note from yesterday:   \"Hospital Course :   75 y/o male with history of DMT2 and HTN admitted for abdominal pain and sepsis. On admission, cholecystitis was suspected and was ruled out. Broad coverage iv abx were started. Foot abscess noted. Podiatry and vascular surgery were on board. He has pad and needs to f/u with vascular surgeon as out-pt. Dr. Aleta Richardson :   1 Incision and drainage, abscess, cellulitis, right foot. 2.  Ulcer excision 3 cm x 3 cm, right foot. 3.  An incision to bone cortex, right fifth toe. 4.  Amputation of fifth metatarsal, right foot. 5.  Soft tissue rearrangement with full-thickness flap, less than 20 sq cm, right foot     ID on board:                                          1/6 - Wound (+) CoNS and bacillus (surface contaminants)                                         1/2 - BCx (-)                                         1/1 - BCx 2/2 (+) Streptococcus agalactiae                                                      UA (+) Streptococcus agalactiae     Dr. Michael Juan recommended ertepenem 1 g till Feb 20, 2020. PICC line was placed for long term iv abx   dvt was found out per pvl, he was discharged with eliquis for dvt treatment.      We also controlled his DM during his stay.      Discharge planning discussed with patient and family, agree with the plan and no questions and concerns at this point. \"        Procedures:  Procedures    ED Course:  4:16 PM   Initial assessment performed. The patients presenting problems have been discussed, and they are in agreement with the care plan formulated and outlined with them. I have encouraged them to ask questions as they arise throughout their visit. 6:50 PM consult note  Case discussed with ED attending Dr. Isaias Brown who agrees with work-up and consulting hospitalist for further management/possible admission. 7:20 PM consult note  Case discussed with hospitalist Dr. Aníbal Jacobo who recommends speaking to patient's infectious disease doctor prior to determining admission status.     7:25 PM consult note  Case discussed with infectious disease Dr. Viji Cornejo, who states that it may not be unusual for patient to have low-grade fever with his foot infection, but if he has new pneumonia, ertapenem should be appropriate antibiotic choice. He states that since patient is not exhibiting any signs of hemodynamic instability, sepsis or lactic acidosis then would be appropriate for discharge, outpatient management and continuing his ertapenem through [facility]. He does not recommend additional antibiotics at this time, but asks that I send a procalcitonin, which he will follow-up on. Also advised patient to follow-up with PCP as he will likely need repeat CT to exclude neoplasm. Diagnosis and Disposition       DISCHARGE NOTE:    Harjeet Owens  results have been reviewed with him. He has been counseled regarding his diagnosis, treatment, and plan. He verbally conveys understanding and agreement of the signs, symptoms, diagnosis, treatment and prognosis and additionally agrees to follow up as discussed. He also agrees with the care-plan and conveys that all of his questions have been answered. I have also provided discharge instructions for him that include: educational information regarding their diagnosis and treatment, and list of reasons why they would want to return to the ED prior to their follow-up appointment, should his condition change. He has been provided with education for proper emergency department utilization. CLINICAL IMPRESSION:    1. Pneumonia of right upper lobe due to infectious organism ([address]    2. Fever, unspecified fever cause    3. 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 Long term current use of antibiotics    5. Lung mass        PLAN:  1. D/C Home  2. Current Discharge Medication List        3.    Follow-up Information     Follow up With Specialties Details Why Contact Info    Katerin Ledezma MD Internal Medicine Schedule an appointment as soon as possible for a visit [address] [facility]  [phone]      Zuleyma Will MD Infectious Diseases Schedule an appointment as soon as possible for a visit  [address] [place] [address] [phone]      [facility] EMERGENCY DEPT Emergency Medicine  As needed, If symptoms worsen [address] [place] [address]  [phone]        _______________________________      Please note that this dictation was completed with [employer], the computer voice recognition software. Quite often unanticipated grammatical, syntax, homophones, and other interpretive errors are inadvertently transcribed by the computer software. Please disregard these errors. Please excuse any errors that have escaped final proofreading.

## 2020-01-15 NOTE — ED TRIAGE NOTES
Patient had right great toe amputated last week and was receiving antibiotics at infusion center.  Patient noted to be febrile and hypertensive

## 2020-01-16 ENCOUNTER — HOSPITAL ENCOUNTER (OUTPATIENT)
Dept: INFUSION THERAPY | Age: 75
Discharge: HOME | End: 2020-01-16
Payer: MEDICARE

## 2020-01-16 VITALS
RESPIRATION RATE: 18 BRPM | DIASTOLIC BLOOD PRESSURE: 69 MMHG | TEMPERATURE: 100 F | OXYGEN SATURATION: 97 % | SYSTOLIC BLOOD PRESSURE: 145 MMHG | HEART RATE: 94 BPM

## 2020-01-16 LAB — PROCALCITONIN SERPL-MCNC: 0.12 NG/ML

## 2020-01-16 PROCEDURE — 74011250636 HC RX REV CODE- 250/636: Performed by: INTERNAL MEDICINE

## 2020-01-16 PROCEDURE — 96365 THER/PROPH/DIAG IV INF INIT: CPT

## 2020-01-16 PROCEDURE — 74011000258 HC RX REV CODE- 258: Performed by: INTERNAL MEDICINE

## 2020-01-16 RX ORDER — SODIUM CHLORIDE 0.9 % (FLUSH) 0.9 %
5-10 SYRINGE (ML) INJECTION AS NEEDED
Status: DISCONTINUED | OUTPATIENT
Start: 2020-01-16 | End: 2020-01-20 | Stop reason: HOSPADM

## 2020-01-16 RX ORDER — HEPARIN 100 UNIT/ML
500 SYRINGE INTRAVENOUS ONCE
Status: COMPLETED | OUTPATIENT
Start: 2020-01-16 | End: 2020-01-16

## 2020-01-16 RX ADMIN — Medication 500 UNITS: at 14:40

## 2020-01-16 RX ADMIN — SODIUM CHLORIDE 1 G: 900 INJECTION INTRAVENOUS at 14:07

## 2020-01-16 RX ADMIN — Medication 10 ML: at 14:40

## 2020-01-16 NOTE — PROGRESS NOTES
[facility] Progress Note    Date: 2020    Name: Memo Luna    MRN: [id number]         : 1945     SUE Major    Mr. Junior Rivas was assessed and education was provided. Mr. Junior Rivas stated he feels better today. Per daughter stated patient was seen in ED yesterday and was treated with IV meds for BP. BP much better today. Mr. Junior Rivas denies any abdominal pain today. Mr. Oneida Ordonez vitals were reviewed. Visit Vitals  /69 (BP 1 Location: Left arm, BP Patient Position: Sitting)   Pulse 94   Temp 100 °F (37.8 °C)   Resp 18   SpO2 97%       Lab results were obtained and reviewed. No results found for this or any previous visit (from the past 12 hour(s)). []  Vancomycin     [x]  Invanz 1 gram IV infusion     []  Cubicin     []  Rocephin    was infused at  100 ml/hr. Mr. Junior Rivas tolerated infusion, and had no complaints at this time. Patient armband removed and shredded. Mr. Junior Rivas was discharged from [place] [address] in stable condition at [phone]. He is to return on 20 for his next appointment.     Raftia Rosas RN  2020  4:27 PM

## 2020-01-16 NOTE — DISCHARGE INSTRUCTIONS
Recommend close PCP follow up to address right upper lobe opacity on CTA  Patient Education        Aprenda sobre la fiebre - [ Learning About Fever ]  ¿Qué es la fiebre? La fiebre es marck temperatura corporal santos. Es marck de las [employer] que el cuerpo combate enfermedades. La fiebre indica que el cuerpo está reaccionando a marck infección o a otras enfermedades, tanto leves dieudonne graves. La fiebre es un síntoma, no marck enfermedad en sí misma. La fiebre puede ser marck señal de que usted está enfermo, karlos la mayoría de las fiebres no están causadas por un problema grave. Es posible que usted tenga fiebre con marck enfermedad de daniel importancia, dieudonne un resfriado. Karlos, en ocasiones, marck infección muy grave puede causar poca o nada de fiebre. Es importante considerar otros síntomas, otras afecciones que usted tenga y cómo se siente usted en general. En niños, preste atención a santos comportamiento y a los síntomas de los que se [place]. ¿Cuál es la temperatura normal del cuerpo? Marck temperatura corporal normal es de 98.6°F (37°C), aproximadamente. Algunas personas tienen marck temperatura normal que es un poco más santos o algo más baja que esta. Santos temperatura puede ser un poco más baja en la mañana que más tarde en el día. Podría elevarse cuando hace ejercicio, lleva ropa gruesa, steve un baño caliente o cuando hace calor. Santos temperatura también puede ser diferente dependiendo de cómo la mida. Si mide la temperatura en la boca (oral) o en la axila, puede ser algo más baja que la temperatura central (rectal). ¿Qué es marck temperatura de fiebre? Marck temperatura central de 100.4°F (38°C) o superior se considera fiebre. ¿Qué puede causar la fiebre? La fiebre puede ser causada por:  · Infecciones. Esta es la causa más común de la [place]. Los ejemplos de infecciones que pueden provocar fiebre incluyen la gripe, marck infección de los riñones o la neumonía. · Algunos medicamentos.   · Traumatismos o lesiones graves, dieudonne un ataque al corazón, un ataque cerebral, insolación o quemaduras. · Otras afecciones médicas, dieudonne artritis y algunos tipos de cáncer. ¿Cómo puede tratar la fiebre en el hogar? · Pregúntele a santos médico si puede patience un analgésico (medicamento para el dolor) de venta ann marie, dieudonne acetaminofén (Tylenol), ibuprofeno (Advil, Motrin) o naproxeno (Aleve). Sea raul con los medicamentos. Faith y siga todas las instrucciones de la [place]. · Mallorie abundantes líquidos para prevenir la deshidratación. Opte por beber agua y otros líquidos maria de jesus sin cafeína hasta que se sienta mejor. Si tiene marck enfermedad renal, cardíaca o hepática y tiene que restringir los líquidos, hable con santos médico antes de aumentar la cantidad de líquido que aust[place]. La atención de seguimiento es marck parte clave de santos tratamiento y seguridad. Asegúrese de hacer y acudir a todas las citas, y llame a santos médico si está teniendo problemas. También es marck buena idea saber los resultados de morro exámenes y mantener marck lista de los medicamentos que steve. ¿Dónde puede encontrar más información en inglés? Isi Lovett a http://nancy-pan.info/. Jaspal Chang V370 en la búsqueda para aprender más acerca de \"Aprenda sobre la [place] - [ Learning About Fever ]. \"  Revisado: 26 junio, 2019  Versión del contenido: 12.2  © 4057-6736 Healthwise, Incorporated. Las instrucciones de cuidado fueron adaptadas bajo licencia por [employer] (which disclaims liability or warranty for this information). Si usted tiene [place] afección médica o sobre estas instrucciones, siempre pregunte a santos profesional de franky. [employer] niega toda garantía o responsabilidad por santos uso de esta información. Patient Education        Neumonía: Instrucciones de cuidado - [ Pneumonia: Care Instructions ]  Instrucciones de cuidado    La neumonía es marck infección de los pulmones.  C[place]yl [place] de los casos son causados por infecciones debidas a bacterias o virus. La neumonía puede ser leve o muy grave. Si es causada por bacterias, será tratado con antibióticos. La recuperación completa de la neumonía podría patience [employer] o algunos meses, dependiendo de qué tan enfermo estuvo y si santos estado general de franky es mc. La atención de seguimiento es marck parte clave de santos tratamiento y seguridad. Asegúrese de hacer y acudir a todas las citas, y llame a santos médico si está teniendo problemas. También es marck buena idea saber los resultados de morro exámenes y mantener marck lista de los medicamentos que steve. ¿Cómo puede cuidarse en el hogar? · [address] indicaciones. No deje de tomarlos por el hecho de sentirse mejor. Debe patience todos los antibióticos hasta terminarlos. · [employer] medicamentos exactamente dieudonne le fueron recetados. Llame a santos médico si adis estar teniendo problemas con santos medicamento. · Descanse y duerma lo suficiente. Podría sentirse cansado y débil emily un [address], karlos santos nivel de energía mejorará con [place]. · Para prevenir la deshidratación, emy abundantes líquidos, los suficientes para que santos orina sea de color amarillo jerad o bulmaro dieudonne el agua. Elija agua y otros líquidos maria de jesus sin cafeína hasta que se sienta mejor. Si tiene marck enfermedad del riñón, del corazón o del hígado y tiene que [place] líquidos, hable con santos médico antes de aumentar santos consumo. · Trate la tos para que pueda descansar. La tos con mucosidad (flema) de los pulmones es común con la neumonía. Es marck de las [employer] que santos organismo [address]. Karlos si la tos le impide descansar o le causa gran fatiga y dolor en la pared torácica, hable con santos médico. Podría sugerirle que tome un medicamento para aliviar la tos. · Utilice un vaporizador o humidificador para añadir humedad en santos habitación. Siga las indicaciones para limpiar el aparato.   · No fume ni permita que otras personas fumen cerca de usted. Fumar hará que la tos dure [place]. Si necesita ayuda para dejar de fumar, hable con santos médico [employer] y medicamentos para dejar de fumar. Éstos pueden aumentar morro probabilidades de dejar el hábito para siempre. · [place] un analgésico (medicamento para el dolor) de venta ann marie, dieudonne acetaminofén (Tylenol), ibuprofeno (Advil, Motrin) o naproxeno (Aleve). Faith y siga todas las instrucciones de la [place]. · No tome dos o más analgésicos al mismo tiempo a menos que el médico se lo haya indicado. Muchos analgésicos contienen acetaminofén, es decir, Tylenol. El exceso de acetaminofén (Tylenol) puede ser dañino. · Si le dieron un espirómetro para medir qué tan sharon están funcionando morro pulmones, utilícelo dieudonne se lo indicaron. [place] puede ayudar a santos médico a saber cómo va santos recuperación. · Para prevenir la neumonía en el futuro, hable con santos médico acerca de vacunarse contra la gripe (marck vez al año) y Anahi Cocking antineumocócica (sólo marck vez para la [address] [place] [address] Road). ¿Cuándo debe pedir ayuda? Llame al 911 en cualquier momento que considere que necesita atención de emergencia. Por ejemplo, llame si:    · Tiene serias dificultades para respirar.    Llame a santos médico ahora mismo o busque atención médica inmediata si:    · Tose mucosidad (esputo) de color café oscuro o con amrit.     · Tiene nueva o peor dificultad para respirar.     · Siente mareos o aturdimiento, o que está a punto de desmayarse.    Preste especial atención a los cambios en santos franky y asegúrese de comunicarse con santos médico si:    · Presenta fiebre o la fiebre es más santos.     · Santos tos es más profunda o más frecuente que antes.     · No está mejorando después de 2 días (48 horas).     · No mejora dieudonne se esperaba. ¿Dónde puede encontrar más información en inglés? Clint Christian a http://nancy-pan.info/. Godwin Rihc T609 en la búsqueda para aprender más acerca de \"Neumonía:  Instrucciones de cuidado - [ Pneumonia: Care Instructions ]. \"  Revisado: 9 junio, 2019  Versión del contenido: 12.2  © 2770-9831 Healthwise, Incorporated. Las instrucciones de cuidado fueron adaptadas bajo licencia por [employer] (which disclaims liability or warranty for this information). Si usted tiene [place] afección médica o sobre estas instrucciones, siempre pregunte a santos profesional de franky. Healthwise, Incorporated niega toda garantía o responsabilidad por santos uso de esta información. of the chest, and likely will need repeat CT scan to rule out neoplasm.

## 2020-01-16 NOTE — ED NOTES
Discharge instructions reviewed with pt, pt verbalizes understanding, discharged in stable condition.  Pts picc line hep locked before leaving ed

## 2020-01-17 ENCOUNTER — HOSPITAL ENCOUNTER (OUTPATIENT)
Dept: INFUSION THERAPY | Age: 75
Discharge: HOME | End: 2020-01-17
Payer: MEDICARE

## 2020-01-17 VITALS
HEART RATE: 110 BPM | DIASTOLIC BLOOD PRESSURE: 80 MMHG | OXYGEN SATURATION: 97 % | TEMPERATURE: 100.2 F | SYSTOLIC BLOOD PRESSURE: 165 MMHG | RESPIRATION RATE: 18 BRPM

## 2020-01-17 PROCEDURE — 96365 THER/PROPH/DIAG IV INF INIT: CPT

## 2020-01-17 PROCEDURE — 74011250636 HC RX REV CODE- 250/636: Performed by: INTERNAL MEDICINE

## 2020-01-17 PROCEDURE — 74011000258 HC RX REV CODE- 258: Performed by: INTERNAL MEDICINE

## 2020-01-17 RX ORDER — SODIUM CHLORIDE 0.9 % (FLUSH) 0.9 %
10-40 SYRINGE (ML) INJECTION AS NEEDED
Status: DISCONTINUED | OUTPATIENT
Start: 2020-01-18 | End: 2020-01-22 | Stop reason: HOSPADM

## 2020-01-17 RX ORDER — SODIUM CHLORIDE 0.9 % (FLUSH) 0.9 %
5-10 SYRINGE (ML) INJECTION AS NEEDED
Status: DISCONTINUED | OUTPATIENT
Start: 2020-01-17 | End: 2020-01-21 | Stop reason: HOSPADM

## 2020-01-17 RX ORDER — HEPARIN 100 UNIT/ML
500 SYRINGE INTRAVENOUS ONCE
Status: DISCONTINUED | OUTPATIENT
Start: 2020-01-17 | End: 2020-01-18 | Stop reason: HOSPADM

## 2020-01-17 RX ORDER — LORAZEPAM 1 MG/1
1 TABLET ORAL
COMMUNITY
End: 2020-01-29

## 2020-01-17 RX ORDER — HEPARIN 100 UNIT/ML
500 SYRINGE INTRAVENOUS AS NEEDED
Status: DISCONTINUED | OUTPATIENT
Start: 2020-01-18 | End: 2020-01-22 | Stop reason: HOSPADM

## 2020-01-17 RX ADMIN — SODIUM CHLORIDE 1 G: 900 INJECTION INTRAVENOUS at 15:04

## 2020-01-18 ENCOUNTER — APPOINTMENT (OUTPATIENT)
Dept: CT IMAGING | Age: 75
DRG: 314 | End: 2020-01-18
Attending: EMERGENCY MEDICINE
Payer: MEDICARE

## 2020-01-18 ENCOUNTER — HOSPITAL ENCOUNTER (OUTPATIENT)
Dept: INFUSION THERAPY | Age: 75
Discharge: HOME | End: 2020-01-18
Payer: MEDICARE

## 2020-01-18 ENCOUNTER — APPOINTMENT (OUTPATIENT)
Dept: VASCULAR SURGERY | Age: 75
DRG: 314 | End: 2020-01-18
Attending: EMERGENCY MEDICINE
Payer: MEDICARE

## 2020-01-18 ENCOUNTER — APPOINTMENT (OUTPATIENT)
Dept: GENERAL RADIOLOGY | Age: 75
DRG: 314 | End: 2020-01-18
Attending: EMERGENCY MEDICINE
Payer: MEDICARE

## 2020-01-18 ENCOUNTER — HOSPITAL ENCOUNTER (INPATIENT)
Age: 75
LOS: 11 days | Discharge: NURSING, REHAB OR LONG-TERM CARE | DRG: 314 | End: 2020-01-29
Attending: EMERGENCY MEDICINE | Admitting: INTERNAL MEDICINE
Payer: MEDICARE

## 2020-01-18 DIAGNOSIS — D64.9 NORMOCYTIC ANEMIA: ICD-10-CM

## 2020-01-18 DIAGNOSIS — M86.071 ACUTE HEMATOGENOUS OSTEOMYELITIS OF RIGHT FOOT (HCC): ICD-10-CM

## 2020-01-18 DIAGNOSIS — I82.90 THROMBOSIS: Primary | ICD-10-CM

## 2020-01-18 DIAGNOSIS — T80.92XA BLOOD TRANSFUSION REACTION, INITIAL ENCOUNTER: ICD-10-CM

## 2020-01-18 DIAGNOSIS — M86.271 SUBACUTE OSTEOMYELITIS OF RIGHT FOOT (HCC): ICD-10-CM

## 2020-01-18 PROBLEM — I82.409 DVT (DEEP VENOUS THROMBOSIS) (HCC): Status: ACTIVE | Noted: 2020-01-18

## 2020-01-18 LAB
ALBUMIN SERPL-MCNC: 1.9 G/DL (ref 3.4–5)
ALBUMIN/GLOB SERPL: 0.4 {RATIO} (ref 0.8–1.7)
ALP SERPL-CCNC: 73 U/L (ref 45–117)
ALT SERPL-CCNC: 17 U/L (ref 16–61)
AMORPH CRY URNS QL MICRO: ABNORMAL
ANION GAP SERPL CALC-SCNC: 6 MMOL/L (ref 3–18)
APPEARANCE UR: ABNORMAL
APTT PPP: 57.4 SEC (ref 23–36.4)
AST SERPL-CCNC: 13 U/L (ref 10–38)
ATRIAL RATE: 120 BPM
ATRIAL RATE: 120 BPM
BACTERIA SPEC CULT: NORMAL
BACTERIA SPEC CULT: NORMAL
BACTERIA URNS QL MICRO: ABNORMAL /HPF
BASOPHILS # BLD: 0 K/UL (ref 0–0.1)
BASOPHILS NFR BLD: 0 % (ref 0–2)
BILIRUB SERPL-MCNC: 0.7 MG/DL (ref 0.2–1)
BILIRUB UR QL: NEGATIVE
BUN SERPL-MCNC: 19 MG/DL (ref 7–18)
BUN/CREAT SERPL: 13 (ref 12–20)
CALCIUM SERPL-MCNC: 8.1 MG/DL (ref 8.5–10.1)
CALCULATED P AXIS, ECG09: 52 DEGREES
CALCULATED P AXIS, ECG09: 54 DEGREES
CALCULATED R AXIS, ECG10: 20 DEGREES
CALCULATED R AXIS, ECG10: 34 DEGREES
CALCULATED T AXIS, ECG11: 39 DEGREES
CALCULATED T AXIS, ECG11: 41 DEGREES
CHLORIDE SERPL-SCNC: 99 MMOL/L (ref 100–111)
CK MB CFR SERPL CALC: ABNORMAL % (ref 0–4)
CK MB CFR SERPL CALC: ABNORMAL % (ref 0–4)
CK MB SERPL-MCNC: <1 NG/ML (ref 5–25)
CK MB SERPL-MCNC: <1 NG/ML (ref 5–25)
CK SERPL-CCNC: 29 U/L (ref 39–308)
CK SERPL-CCNC: 30 U/L (ref 39–308)
CO2 SERPL-SCNC: 28 MMOL/L (ref 21–32)
COLOR UR: ABNORMAL
CREAT SERPL-MCNC: 1.46 MG/DL (ref 0.6–1.3)
DIAGNOSIS, 93000: NORMAL
DIAGNOSIS, 93000: NORMAL
DIFFERENTIAL METHOD BLD: ABNORMAL
EOSINOPHIL # BLD: 0 K/UL (ref 0–0.4)
EOSINOPHIL NFR BLD: 0 % (ref 0–5)
EPITH CASTS URNS QL MICRO: ABNORMAL /LPF (ref 0–5)
ERYTHROCYTE [DISTWIDTH] IN BLOOD BY AUTOMATED COUNT: 13.7 % (ref 11.6–14.5)
FLUAV AG NPH QL IA: NEGATIVE
FLUBV AG NOSE QL IA: NEGATIVE
GLOBULIN SER CALC-MCNC: 4.8 G/DL (ref 2–4)
GLUCOSE SERPL-MCNC: 320 MG/DL (ref 74–99)
GLUCOSE UR STRIP.AUTO-MCNC: >1000 MG/DL
GRAN CASTS URNS QL MICRO: ABNORMAL /LPF
HCT VFR BLD AUTO: 20.5 % (ref 36–48)
HEMOCCULT STL QL: NEGATIVE
HGB BLD-MCNC: 6.8 G/DL (ref 13–16)
HGB UR QL STRIP: ABNORMAL
HYALINE CASTS URNS QL MICRO: ABNORMAL /LPF (ref 0–2)
INR PPP: 3.1 (ref 0.8–1.2)
KETONES UR QL STRIP.AUTO: NEGATIVE MG/DL
LACTATE SERPL-SCNC: 1.5 MMOL/L (ref 0.4–2)
LEUKOCYTE ESTERASE UR QL STRIP.AUTO: NEGATIVE
LYMPHOCYTES # BLD: 0.9 K/UL (ref 0.9–3.6)
LYMPHOCYTES NFR BLD: 8 % (ref 21–52)
MCH RBC QN AUTO: 29.6 PG (ref 24–34)
MCHC RBC AUTO-ENTMCNC: 33.2 G/DL (ref 31–37)
MCV RBC AUTO: 89.1 FL (ref 74–97)
MONOCYTES # BLD: 1.3 K/UL (ref 0.05–1.2)
MONOCYTES NFR BLD: 12 % (ref 3–10)
MUCOUS THREADS URNS QL MICRO: ABNORMAL /LPF
NEUTS SEG # BLD: 8.9 K/UL (ref 1.8–8)
NEUTS SEG NFR BLD: 80 % (ref 40–73)
NITRITE UR QL STRIP.AUTO: NEGATIVE
P-R INTERVAL, ECG05: 156 MS
P-R INTERVAL, ECG05: 160 MS
PH UR STRIP: 5 [PH] (ref 5–8)
PLATELET # BLD AUTO: 337 K/UL (ref 135–420)
PMV BLD AUTO: 8.9 FL (ref 9.2–11.8)
POTASSIUM SERPL-SCNC: 3.7 MMOL/L (ref 3.5–5.5)
PROT SERPL-MCNC: 6.7 G/DL (ref 6.4–8.2)
PROT UR STRIP-MCNC: 300 MG/DL
PROTHROMBIN TIME: 31.3 SEC (ref 11.5–15.2)
Q-T INTERVAL, ECG07: 302 MS
Q-T INTERVAL, ECG07: 316 MS
QRS DURATION, ECG06: 82 MS
QRS DURATION, ECG06: 88 MS
QTC CALCULATION (BEZET), ECG08: 426 MS
QTC CALCULATION (BEZET), ECG08: 446 MS
RBC # BLD AUTO: 2.3 M/UL (ref 4.7–5.5)
RBC #/AREA URNS HPF: ABNORMAL /HPF (ref 0–5)
RBC MORPH BLD: ABNORMAL
SERVICE CMNT-IMP: NORMAL
SERVICE CMNT-IMP: NORMAL
SODIUM SERPL-SCNC: 133 MMOL/L (ref 136–145)
SP GR UR REFRACTOMETRY: 1.02 (ref 1–1.03)
TROPONIN I SERPL-MCNC: 0.03 NG/ML (ref 0–0.04)
TROPONIN I SERPL-MCNC: <0.02 NG/ML (ref 0–0.04)
UROBILINOGEN UR QL STRIP.AUTO: 0.2 EU/DL (ref 0.2–1)
VENTRICULAR RATE, ECG03: 120 BPM
VENTRICULAR RATE, ECG03: 120 BPM
WBC # BLD AUTO: 11.1 K/UL (ref 4.6–13.2)
WBC URNS QL MICRO: ABNORMAL /HPF (ref 0–5)

## 2020-01-18 PROCEDURE — 74011636320 HC RX REV CODE- 636/320: Performed by: EMERGENCY MEDICINE

## 2020-01-18 PROCEDURE — 80053 COMPREHEN METABOLIC PANEL: CPT

## 2020-01-18 PROCEDURE — 86923 COMPATIBILITY TEST ELECTRIC: CPT

## 2020-01-18 PROCEDURE — 87070 CULTURE OTHR SPECIMN AEROBIC: CPT

## 2020-01-18 PROCEDURE — 65610000006 HC RM INTENSIVE CARE

## 2020-01-18 PROCEDURE — 87450 LEGIONELLA PNEUMOPHILA AG, URINE: CPT

## 2020-01-18 PROCEDURE — 74011000258 HC RX REV CODE- 258: Performed by: INTERNAL MEDICINE

## 2020-01-18 PROCEDURE — 86900 BLOOD TYPING SEROLOGIC ABO: CPT

## 2020-01-18 PROCEDURE — 74011000258 HC RX REV CODE- 258: Performed by: EMERGENCY MEDICINE

## 2020-01-18 PROCEDURE — 85025 COMPLETE CBC W/AUTO DIFF WBC: CPT

## 2020-01-18 PROCEDURE — 87449 NOS EACH ORGANISM AG IA: CPT

## 2020-01-18 PROCEDURE — 86078 PHYS BLOOD BANK SERV REACTJ: CPT

## 2020-01-18 PROCEDURE — 81001 URINALYSIS AUTO W/SCOPE: CPT

## 2020-01-18 PROCEDURE — 82272 OCCULT BLD FECES 1-3 TESTS: CPT

## 2020-01-18 PROCEDURE — 85610 PROTHROMBIN TIME: CPT

## 2020-01-18 PROCEDURE — 71045 X-RAY EXAM CHEST 1 VIEW: CPT

## 2020-01-18 PROCEDURE — 87040 BLOOD CULTURE FOR BACTERIA: CPT

## 2020-01-18 PROCEDURE — 87804 INFLUENZA ASSAY W/OPTIC: CPT

## 2020-01-18 PROCEDURE — 74011250636 HC RX REV CODE- 250/636: Performed by: EMERGENCY MEDICINE

## 2020-01-18 PROCEDURE — 83605 ASSAY OF LACTIC ACID: CPT

## 2020-01-18 PROCEDURE — 93971 EXTREMITY STUDY: CPT

## 2020-01-18 PROCEDURE — 36430 TRANSFUSION BLD/BLD COMPNT: CPT

## 2020-01-18 PROCEDURE — 36415 COLL VENOUS BLD VENIPUNCTURE: CPT

## 2020-01-18 PROCEDURE — 82550 ASSAY OF CK (CPK): CPT

## 2020-01-18 PROCEDURE — 99285 EMERGENCY DEPT VISIT HI MDM: CPT

## 2020-01-18 PROCEDURE — 93005 ELECTROCARDIOGRAM TRACING: CPT

## 2020-01-18 PROCEDURE — P9016 RBC LEUKOCYTES REDUCED: HCPCS

## 2020-01-18 PROCEDURE — C9113 INJ PANTOPRAZOLE SODIUM, VIA: HCPCS | Performed by: INTERNAL MEDICINE

## 2020-01-18 PROCEDURE — 71275 CT ANGIOGRAPHY CHEST: CPT

## 2020-01-18 PROCEDURE — 74011250636 HC RX REV CODE- 250/636: Performed by: INTERNAL MEDICINE

## 2020-01-18 PROCEDURE — 85730 THROMBOPLASTIN TIME PARTIAL: CPT

## 2020-01-18 PROCEDURE — 74011250637 HC RX REV CODE- 250/637: Performed by: INTERNAL MEDICINE

## 2020-01-18 RX ORDER — ACETAMINOPHEN 325 MG/1
650 TABLET ORAL
Status: DISCONTINUED | OUTPATIENT
Start: 2020-01-18 | End: 2020-01-29 | Stop reason: HOSPADM

## 2020-01-18 RX ORDER — HEPARIN SODIUM 5000 [USP'U]/ML
5000 INJECTION, SOLUTION INTRAVENOUS; SUBCUTANEOUS EVERY 8 HOURS
Status: DISCONTINUED | OUTPATIENT
Start: 2020-01-18 | End: 2020-01-18 | Stop reason: ALTCHOICE

## 2020-01-18 RX ORDER — LOSARTAN POTASSIUM 50 MG/1
100 TABLET ORAL DAILY
Status: DISCONTINUED | OUTPATIENT
Start: 2020-01-19 | End: 2020-01-29 | Stop reason: HOSPADM

## 2020-01-18 RX ORDER — LABETALOL HCL 20 MG/4 ML
20 SYRINGE (ML) INTRAVENOUS
Status: DISCONTINUED | OUTPATIENT
Start: 2020-01-18 | End: 2020-01-27

## 2020-01-18 RX ORDER — INSULIN GLARGINE 100 [IU]/ML
25 INJECTION, SOLUTION SUBCUTANEOUS DAILY
Status: DISCONTINUED | OUTPATIENT
Start: 2020-01-19 | End: 2020-01-24

## 2020-01-18 RX ORDER — MORPHINE SULFATE 2 MG/ML
1 INJECTION, SOLUTION INTRAMUSCULAR; INTRAVENOUS
Status: DISCONTINUED | OUTPATIENT
Start: 2020-01-18 | End: 2020-01-29 | Stop reason: HOSPADM

## 2020-01-18 RX ORDER — POLYETHYLENE GLYCOL 3350 17 G/17G
17 POWDER, FOR SOLUTION ORAL DAILY
Status: DISCONTINUED | OUTPATIENT
Start: 2020-01-19 | End: 2020-01-29 | Stop reason: HOSPADM

## 2020-01-18 RX ORDER — ATORVASTATIN CALCIUM 20 MG/1
20 TABLET, FILM COATED ORAL DAILY
Status: DISCONTINUED | OUTPATIENT
Start: 2020-01-19 | End: 2020-01-29 | Stop reason: HOSPADM

## 2020-01-18 RX ORDER — SODIUM CHLORIDE 9 MG/ML
250 INJECTION, SOLUTION INTRAVENOUS AS NEEDED
Status: DISCONTINUED | OUTPATIENT
Start: 2020-01-18 | End: 2020-01-29 | Stop reason: HOSPADM

## 2020-01-18 RX ORDER — LEVOFLOXACIN 5 MG/ML
750 INJECTION, SOLUTION INTRAVENOUS EVERY 24 HOURS
Status: DISCONTINUED | OUTPATIENT
Start: 2020-01-18 | End: 2020-01-19 | Stop reason: DRUGHIGH

## 2020-01-18 RX ORDER — HEPARIN SODIUM 1000 [USP'U]/ML
80 INJECTION, SOLUTION INTRAVENOUS; SUBCUTANEOUS ONCE
Status: COMPLETED | OUTPATIENT
Start: 2020-01-18 | End: 2020-01-18

## 2020-01-18 RX ORDER — HYDRALAZINE HYDROCHLORIDE 20 MG/ML
10 INJECTION INTRAMUSCULAR; INTRAVENOUS
Status: DISCONTINUED | OUTPATIENT
Start: 2020-01-18 | End: 2020-01-29 | Stop reason: HOSPADM

## 2020-01-18 RX ORDER — PANTOPRAZOLE SODIUM 40 MG/10ML
40 INJECTION, POWDER, LYOPHILIZED, FOR SOLUTION INTRAVENOUS EVERY 24 HOURS
Status: DISCONTINUED | OUTPATIENT
Start: 2020-01-18 | End: 2020-01-23

## 2020-01-18 RX ORDER — HEPARIN SODIUM 10000 [USP'U]/100ML
18-36 INJECTION, SOLUTION INTRAVENOUS
Status: DISCONTINUED | OUTPATIENT
Start: 2020-01-18 | End: 2020-01-22

## 2020-01-18 RX ORDER — VANCOMYCIN 2 GRAM/500 ML IN 0.9 % SODIUM CHLORIDE INTRAVENOUS
2000 ONCE
Status: COMPLETED | OUTPATIENT
Start: 2020-01-18 | End: 2020-01-19

## 2020-01-18 RX ADMIN — HEPARIN SODIUM 5810 UNITS: 1000 INJECTION INTRAVENOUS; SUBCUTANEOUS at 19:21

## 2020-01-18 RX ADMIN — HEPARIN SODIUM 18 UNITS/KG/HR: 10000 INJECTION, SOLUTION INTRAVENOUS at 19:21

## 2020-01-18 RX ADMIN — HYDRALAZINE HYDROCHLORIDE 10 MG: 20 INJECTION INTRAMUSCULAR; INTRAVENOUS at 23:17

## 2020-01-18 RX ADMIN — SODIUM CHLORIDE 1000 ML: 900 INJECTION, SOLUTION INTRAVENOUS at 18:07

## 2020-01-18 RX ADMIN — PANTOPRAZOLE SODIUM 40 MG: 40 INJECTION, POWDER, FOR SOLUTION INTRAVENOUS at 23:17

## 2020-01-18 RX ADMIN — ACETAMINOPHEN 650 MG: 325 TABLET ORAL at 23:17

## 2020-01-18 RX ADMIN — LEVOFLOXACIN 750 MG: 750 INJECTION, SOLUTION INTRAVENOUS at 23:18

## 2020-01-18 RX ADMIN — IOPAMIDOL 100 ML: 755 INJECTION, SOLUTION INTRAVENOUS at 17:23

## 2020-01-18 RX ADMIN — PIPERACILLIN AND TAZOBACTAM 4.5 G: 4; .5 INJECTION, POWDER, FOR SOLUTION INTRAVENOUS at 23:17

## 2020-01-18 RX ADMIN — ERTAPENEM SODIUM 1 G: 1 INJECTION, POWDER, LYOPHILIZED, FOR SOLUTION INTRAMUSCULAR; INTRAVENOUS at 18:49

## 2020-01-18 RX ADMIN — MORPHINE SULFATE 1 MG: 2 INJECTION, SOLUTION INTRAMUSCULAR; INTRAVENOUS at 23:17

## 2020-01-18 RX ADMIN — SODIUM CHLORIDE 1000 ML: 900 INJECTION, SOLUTION INTRAVENOUS at 15:20

## 2020-01-18 NOTE — ED NOTES
Patient noted to have fever within initial start of blood transfusion. Recorded fever 101f. STOPPED infusion 1756. Provider, ED Charge RN, and Blood Bank made aware. Followed Transfusion Reaction Protocol. Collected TWO type and screen lab tubes and ONE lavender lab tube per protocol. Paperwork filled per protocol. NS IV fluids infusing to another IV access at this time. Family at bedside and made aware of fever.

## 2020-01-18 NOTE — ED TRIAGE NOTES
Patient was in hospital had foot surgery and has to have IV atb as out patient went to have them today and they sent him here because his right arm is swollen

## 2020-01-18 NOTE — ED PROVIDER NOTES
EMERGENCY DEPARTMENT HISTORY AND PHYSICAL EXAM 
 
Date: 1/18/2020 Patient Name: Haydee Mae History of Presenting Illness Chief Complaint Patient presents with  Post-Op Problem History Provided By: Patient, Patient's Son and Patient's Daughter Haydee Mae is a 76 y.o. male with PMHX of diabetes, hypertension who presents to the emergency department C/O arm swelling. Patient had a recent admission for sepsis found to have an abscess and had incision and drainage with Dr. Diane Mejia. Grew Streptococcus and patient has been on ertapenem 1 g daily IV until February 20, 2020 via PICC Patient comes to ER today because right arm was noticed to be swollen by his son. This has not been swollen before. PICC line is still working however patient did not get antibiotics today PCP: Rj Hidalgo MD 
 
Current Facility-Administered Medications Medication Dose Route Frequency Provider Last Rate Last Dose  sodium chloride 0.9 % bolus infusion 1,000 mL  1,000 mL IntraVENous ONCE Beth Ramirez MD      
 0.9% sodium chloride infusion 250 mL  250 mL IntraVENous PRN Sunil Al MD      
 
Current Outpatient Medications Medication Sig Dispense Refill  LORazepam (ATIVAN) 1 mg tablet Take 1 mg by mouth every four (4) hours as needed for Anxiety (one tab prior to hyperbaric oxygen treatment).  apixaban (ELIQUIS) 5 mg (74 tabs) starter pack Take 10 mg (two 5 mg tablets) by mouth twice a day for 7 days Followed by 5 mg (one 5 mg tablet) by mouth twice a day 1 Dose Pack 0  
 ertapenem 1 gram 1 g IVPB 1 g by IntraVENous route every twenty-four (24) hours. 1 Dose 0  
 insulin glargine (LANTUS SOLOSTAR U-100 INSULIN) 100 unit/mL (3 mL) inpn 25 Units by SubCUTAneous route.  metFORMIN ER (GLUCOPHAGE XR) 750 mg tablet Take 750 mg by mouth daily.  atorvastatin (LIPITOR) 20 mg tablet Take 20 mg by mouth daily.  aspirin 81 mg chewable tablet Take 81 mg by mouth daily.  polyethylene glycol (MIRALAX) 17 gram/dose powder Take 17 g by mouth daily. 1 tablespoon with 8 oz of water daily 507 g 0  
 LOSARTAN PO Take 100 mg by mouth. Facility-Administered Medications Ordered in Other Encounters Medication Dose Route Frequency Provider Last Rate Last Dose  sodium chloride (NS) flush 5-10 mL  5-10 mL IntraVENous PRN Ruth Hoffman MD      
 sodium chloride (NS) flush 10-40 mL  10-40 mL IntraVENous PRN Ruth Hoffman MD      
 heparin (porcine) pf 500 Units  500 Units InterCATHeter PRN Ruth Hoffman MD      
 [START ON 1/19/2020] sodium chloride (NS) flush 10-40 mL  10-40 mL IntraVENous PRN Ruth Hoffman MD      
 [START ON 1/19/2020] heparin (porcine) pf 500 Units  500 Units InterCATHeter PRCARL Hoffman MD      
 sodium chloride (NS) flush 5-10 mL  5-10 mL IntraVENous PRN Ruth Hoffman MD   10 mL at 01/16/20 1440  sodium chloride (NS) flush 5-10 mL  5-10 mL IntraVENous PRN Ruth Hoffman MD   10 mL at 01/15/20 1540  ertapenem (INVANZ) 1 g in 0.9% sodium chloride (MBP/ADV) 50 mL MBP  1 g IntraVENous Clydie Osler, MD      
[place] [START ON 1/19/2020] ertapenem [facility]) 1 g in 0.9% sodium chloride (MBP/ADV) 50 mL MBP  1 g IntraVENous Clydie Osler, MD      
 
 
Past History Past Medical History: 
Past Medical History:  
Diagnosis Date  Diabetes ([address]  Hypertension Past Surgical History: 
Past Surgical History:  
Procedure Laterality Date  HX ORTHOPAEDIC    
 toe amputation Family History: No family history on file. Social History: 
Social History Tobacco Use  Smoking status: Never Smoker  Smokeless tobacco: Never Used Substance Use Topics  Alcohol use: No  
 Drug use: No  
 
 
Allergies: 
No Known Allergies Review of Systems Review of Systems Skin: Positive for wound. Physical Exam  
 
Vitals: 01/18/20 1400 01/18/20 1500 01/18/20 1600 01/18/20 1628 BP: 165/69 161/80 175/75 Pulse: (!) 120 (!) 117 (!) 120 Resp: 18 20 20 Temp:  98.7 °F (37.1 °C)  (!) 100.8 °F (38.2 °C) SpO2: 97% 99% 97% Weight:      
Height:      
 
Physical Exam 
 
Nursing notes and vital signs reviewed Constitutional: Non toxic appearing, no acute distress, resting comfortable Head: Normocephalic, Atraumatic Eyes: Pupils are equal, round, and reactive to light, EOMI Neck: Supple Cardiovascular: Tachycardia no murmurs, rubs, or gallops Chest: Normal work of breathing and chest excursion bilaterally Lungs: Clear to ausculation bilaterally Abdomen: Soft, non tender, non distended, normoactive bowel sounds Back: No evidence of trauma or deformity Extremities: Right upper extremity with pitting edema, nontender, no erythema, cellulitis, or streaking Skin: Warm and dry, normal cap refill Neuro: Alert and appropriate, CN intact, normal speech, strength and sensation full and symmetric bilaterally, normal gait, normal coordination Psychiatric: Normal mood and affect Diagnostic Study Results Labs - Recent Results (from the past 12 hour(s)) EKG, 12 LEAD, INITIAL Collection Time: 01/18/20  1:50 PM  
Result Value Ref Range Ventricular Rate 120 BPM  
 Atrial Rate 120 BPM  
 P-R Interval 160 ms QRS Duration 82 ms Q-T Interval 302 ms QTC Calculation (Bezet) 426 ms Calculated P Axis 52 degrees Calculated R Axis 34 degrees Calculated T Axis 41 degrees Diagnosis Sinus tachycardia Otherwise normal ECG When compared with ECG of 15-SUE-2020 16:52, No significant change was found CBC WITH AUTOMATED DIFF Collection Time: 01/18/20  1:55 PM  
Result Value Ref Range WBC 11.1 4.6 - 13.2 K/uL  
 RBC 2.30 (L) 4.70 - 5.50 M/uL HGB 6.8 (L) 13.0 - 16.0 g/dL HCT 20.5 (L) 36.0 - 48.0 % MCV 89.1 74.0 - 97.0 FL  
 MCH 29.6 24.0 - 34.0 PG  
 MCHC 33.2 31.0 - 37.0 g/dL RDW 13.7 11.6 - 14.5 % PLATELET 291 355 - 609 K/uL MPV 8.9 (L) 9.2 - 11.8 FL  
 NEUTROPHILS 80 (H) 40 - 73 % LYMPHOCYTES 8 (L) 21 - 52 % MONOCYTES 12 (H) 3 - 10 % EOSINOPHILS 0 0 - 5 % BASOPHILS 0 0 - 2 %  
 ABS. NEUTROPHILS 8.9 (H) 1.8 - 8.0 K/UL  
 ABS. LYMPHOCYTES 0.9 0.9 - 3.6 K/UL  
 ABS. MONOCYTES 1.3 (H) 0.05 - 1.2 K/UL  
 ABS. EOSINOPHILS 0.0 0.0 - 0.4 K/UL  
 ABS. BASOPHILS 0.0 0.0 - 0.1 K/UL  
 RBC COMMENTS NORMOCYTIC, NORMOCHROMIC    
 DF AUTOMATED METABOLIC PANEL, COMPREHENSIVE Collection Time: 01/18/20  1:55 PM  
Result Value Ref Range Sodium 133 (L) 136 - 145 mmol/L Potassium 3.7 3.5 - 5.5 mmol/L Chloride 99 (L) 100 - 111 mmol/L  
 CO2 28 21 - 32 mmol/L Anion gap 6 3.0 - 18 mmol/L Glucose 320 (H) 74 - 99 mg/dL BUN 19 (H) 7.0 - 18 MG/DL Creatinine 1.46 (H) 0.6 - 1.3 MG/DL  
 BUN/Creatinine ratio 13 12 - 20 GFR est AA 57 (L) >60 ml/min/1.73m2 GFR est non-AA 47 (L) >60 ml/min/1.73m2 Calcium 8.1 (L) 8.5 - 10.1 MG/DL Bilirubin, total 0.7 0.2 - 1.0 MG/DL  
 ALT (SGPT) 17 16 - 61 U/L  
 AST (SGOT) 13 10 - 38 U/L Alk. phosphatase 73 45 - 117 U/L Protein, total 6.7 6.4 - 8.2 g/dL Albumin 1.9 (L) 3.4 - 5.0 g/dL Globulin 4.8 (H) 2.0 - 4.0 g/dL A-G Ratio 0.4 (L) 0.8 - 1.7 CARDIAC PANEL,(CK, CKMB & TROPONIN) Collection Time: 01/18/20  1:55 PM  
Result Value Ref Range CK 30 (L) 39 - 308 U/L  
 CK - MB <1.0 <3.6 ng/ml CK-MB Index  0.0 - 4.0 % CALCULATION NOT PERFORMED WHEN RESULT IS BELOW LINEAR LIMIT Troponin-I, QT <0.02 0.0 - 0.045 NG/ML  
LACTIC ACID Collection Time: 01/18/20  3:18 PM  
Result Value Ref Range Lactic acid 1.5 0.4 - 2.0 MMOL/L  
OCCULT BLOOD, STOOL Collection Time: 01/18/20  4:25 PM  
Result Value Ref Range Occult blood, stool NEGATIVE  NEG Radiologic Studies -  
XR CHEST PORT Final Result IMPRESSION:  
  
No active cardiopulmonary disease. CT Results  (Last 48 hours) None CXR Results  (Last 48 hours) 01/18/20 1601  XR CHEST PORT Final result Impression:  IMPRESSION:  
   
No active cardiopulmonary disease. Narrative:  EXAM: CHEST RADIOGRAPH  
   
CLINICAL INDICATION/HISTORY: tachy  
-Additional: None COMPARISON: January 15, 2020 TECHNIQUE: Portable frontal view of the chest  
   
_______________ FINDINGS:  
   
SUPPORT DEVICES: A right upper extremity PICC is present. HEART AND MEDIASTINUM: No appreciable cardiomegaly. Remaining mediastinal  
contours within normal limits. LUNGS AND PLEURAL SPACES: No consolidation, mass, or pleural effusion. BONY THORAX AND SOFT TISSUES: Unremarkable.  
   
_______________ Medications given in the ED- Medications  
sodium chloride 0.9 % bolus infusion 1,000 mL (has no administration in time range) 0.9% sodium chloride infusion 250 mL (has no administration in time range)  
sodium chloride 0.9 % bolus infusion 1,000 mL (1,000 mL IntraVENous New Bag 1/18/20 1520) Medical Decision Making I am the first provider for this patient. I reviewed the vital signs, available nursing notes, past medical history, past surgical history, family history and social history. Vital Signs-Reviewed the patient's vital signs. Pulse Oximetry Analysis - 100% on RA Cardiac Monitor: 
Rate: 118 bpm 
Rhythm: Sinus Tachycardia EKG interpretation: (Preliminary) EKG read by Dr. Alan Cerda at [facility]  
*** Records Reviewed: Nursing Notes and Old Medical Records Provider Notes (Medical Decision Making): Mateo Stephens is a 76 y.o. male presents with right upper extremity swelling and tachycardia Concern for sepsis. Initial lactate negative. Patient afebrile orally however subsequent rectal temperature demonstrates temperature of 100.8. Right upper quadrant arm swelling is concerning for DVT.   Right upper extremity ultrasound has been ordered however has not been performed yet we will plan on CTA chest to evaluate for PE 
 
PICC line flushing okay and no overlying cellulitis. Will plan on giving dose of antibiotics today for osteomyelitis and chronic foot wound. Procedures: 
Procedures ED Course:  
6:00 PM 
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 I was also notified by nursing staff that patient is having febrile reaction to blood transfusion and blood transfusion discontinued. He is all received acetaminophen. Heparin protocol ordered for thrombosis. CONSULT NOTE:  
6:10 PM  
I discussed care with Dr Jimmey Gowers, Vascular Surgery. It was a standard discussion, including history of patients chief complaint, available diagnostic results, and treatment course. Discussed case. Recommends standard anticoagulation therapy with eventual transition to long-term treatment interaction therapy. No role of thrombolytics at this point. CONSULT NOTE:  
6:35 PM  
I discussed care with Dr Raoul Marsh It was a standard discussion, including history of patients chief complaint, available diagnostic results, and treatment course. Discussed case. Agrees with ICU admission Diagnosis and Disposition 6:35 PM 
I have spent *** minutes of critical care time involved in lab review, consultations with specialist, family decision-making, and documentation. During this entire length of time I was immediately available to the patient. Critical Care: The reason for providing this level of medical care for this critically ill patient was due a critical illness that impaired one or more vital organ systems such that there was a high probability of imminent or life threatening deterioration in the patients condition.  This care involved high complexity decision making to assess, manipulate, and support vital system functions, to treat this degreee vital organ system failure and to prevent further life threatening deterioration of the patients condition. Core Measures: 
For Hospitalized Patients: 
 
1. Hospitalization Decision Time: The decision to hospitalize the patient was made by Dr Asya Carrera at 3191 on 1/18/2020 2. Aspirin: Aspirin was not given because the patient did not present with a stroke at the time of their Emergency Department evaluation 6:35 PM  Patient is being admitted to the hospital by Dr Eulalia العلي. The results of their tests and reasons for their admission have been discussed with them and/or available family. They convey agreement and understanding for the need to be admitted and for their admission diagnosis. CONDITIONS ON ADMISSION: 
Sepsis is not present at the time of admission. Deep Vein Thrombosis is present at the time of admission. Thrombosis is present at the time of admission. Urinary Tract Infection is not present at the time of admission. Pneumonia is not present at the time of admission. MRSA is not present at the time of admission. Wound infection is present at the time of admission. Pressure Ulcer is not present at the time of admission. CLINICAL IMPRESSION: 
 
No diagnosis found. _______________________________ Please note that this dictation was completed with [employer], the computer voice recognition software. Quite often unanticipated grammatical, syntax, homophones, and other interpretive errors are inadvertently transcribed by the computer software. Please disregard these errors. Please excuse any errors that have escaped final proofreading.

## 2020-01-18 NOTE — PROGRESS NOTES
Patient presented with right arm edema around area of PICC line extending down to his hand. Patient's family asked patient to point to areas that are painful, patient pointed to areas around PICC line. NP Ricky Hernández in room to evaluate patient, she stated to patient and family that he needs to go to the ER for further evaluation, family understands to take patient to ER immediately. Abx not given today per verbal order by ANA Hernández.

## 2020-01-18 NOTE — ED PROVIDER NOTES
`EMERGENCY DEPARTMENT HISTORY AND PHYSICAL EXAM    Date: 1/18/2020  Patient Name: Philly Vega    History of Presenting Illness     Chief Complaint   Patient presents with    Post-Op Problem         History Provided By: Patient, Patient's Son and Patient's Daughter      Philly Vega is a 76 y.o. male with PMHX of diabetes, hypertension who presents to the emergency department C/O arm swelling. Patient had a recent admission for sepsis found to have an abscess and had incision and drainage with Dr. Tk Jordan. Grew Streptococcus and patient has been on ertapenem 1 g daily IV until February 20, 2020 via PICC    Patient comes to ER today because right arm was noticed to be swollen by his son. This has not been swollen before. PICC line is still working however patient did not get antibiotics today     PCP: Saundra Huddleston MD    Current Facility-Administered Medications   Medication Dose Route Frequency Provider Last Rate Last Dose    0.9% sodium chloride infusion 250 mL  250 mL IntraVENous PRN Katarzyna Zayas MD        ertapenem [facility]) 1 g in 0.9% sodium chloride (MBP/ADV) 50 mL MBP  1 g IntraVENous ONCE Katarzyna Zayas  mL/hr at 01/18/20 1849 1 g at 01/18/20 1849    heparin (porcine) 1,000 unit/mL injection 5,810 Units  80 Units/kg IntraVENous ONCE Katarzyna Zayas MD        heparin 25,000 units in D5W 250 ml infusion  18-36 Units/kg/hr IntraVENous TITRATE Katarzyna Zayas MD         Current Outpatient Medications   Medication Sig Dispense Refill    LORazepam (ATIVAN) 1 mg tablet Take 1 mg by mouth every four (4) hours as needed for Anxiety (one tab prior to hyperbaric oxygen treatment).  apixaban (ELIQUIS) 5 mg (74 tabs) starter pack Take 10 mg (two 5 mg tablets) by mouth twice a day for 7 days   Followed by 5 mg (one 5 mg tablet) by mouth twice a day 1 Dose Pack 0    ertapenem 1 gram 1 g IVPB 1 g by IntraVENous route every twenty-four (24) hours.  1 Dose 0    insulin glargine (LANTUS SOLOSTAR U-100 INSULIN) 100 unit/mL (3 mL) inpn 25 Units by SubCUTAneous route.  metFORMIN ER (GLUCOPHAGE XR) 750 mg tablet Take 750 mg by mouth daily.  atorvastatin (LIPITOR) 20 mg tablet Take 20 mg by mouth daily.  aspirin 81 mg chewable tablet Take 81 mg by mouth daily.  polyethylene glycol (MIRALAX) 17 gram/dose powder Take 17 g by mouth daily. 1 tablespoon with 8 oz of water daily 507 g 0    LOSARTAN PO Take 100 mg by mouth. Facility-Administered Medications Ordered in Other Encounters   Medication Dose Route Frequency Provider Last Rate Last Dose    sodium chloride (NS) flush 5-10 mL  5-10 mL IntraVENous PRCARL Alvarez MD        sodium chloride (NS) flush 10-40 mL  10-40 mL IntraVENous PRCARL Alvarez MD        heparin (porcine) pf 500 Units  500 Units InterCATHeter PRCARL Alvarez MD        [START ON 1/19/2020] sodium chloride (NS) flush 10-40 mL  10-40 mL IntraVENous PRCARL Alvarez MD        [START ON 1/19/2020] heparin (porcine) pf 500 Units  500 Units InterCATHeter PRCARL Alvarez MD        sodium chloride (NS) flush 5-10 mL  5-10 mL IntraVENous PRCARL Alvarez MD   10 mL at 01/16/20 1440    sodium chloride (NS) flush 5-10 mL  5-10 mL IntraVENous PRCARL Alvarez MD   10 mL at 01/15/20 1540    [START ON 1/19/2020] ertapenem (INVANZ) 1 g in 0.9% sodium chloride (MBP/ADV) 50 mL MBP  1 g IntraVENous Marychuy Solorio MD           Past History     Past Medical History:  Past Medical History:   Diagnosis Date    Diabetes ([address]     Hypertension        Past Surgical History:  Past Surgical History:   Procedure Laterality Date    HX ORTHOPAEDIC      toe amputation       Family History:  No family history on file.     Social History:  Social History     Tobacco Use    Smoking status: Never Smoker    Smokeless tobacco: Never Used   Substance Use Topics    Alcohol use: No    Drug use: No       Allergies:  No Known Allergies      Review of Systems   Review of Systems   Unable to perform ROS: Dementia   Constitutional: Positive for fever. Respiratory: Negative for shortness of breath and wheezing. Cardiovascular: Negative for chest pain. Musculoskeletal: Positive for myalgias. Skin: Positive for wound.          Physical Exam     Vitals:    01/18/20 1628 01/18/20 1742 01/18/20 1756 01/18/20 1815   BP:  188/74 187/62    Pulse:  (!) 118     Resp:  18 20    Temp: (!) 100.8 °F (38.2 °C) 98.9 °F (37.2 °C) (!) 101.1 °F (38.4 °C) (!) 100.9 °F (38.3 °C)   SpO2:  98% 99%    Weight:       Height:         Physical Exam    Nursing notes and vital signs reviewed    Constitutional: Non toxic appearing, no acute distress, resting comfortable  Head: Normocephalic, Atraumatic  Eyes: Pupils are equal, round, and reactive to light, EOMI  Neck: Supple  Cardiovascular: Tachycardia no murmurs, rubs, or gallops  Chest: Normal work of breathing and chest excursion bilaterally  Lungs: Clear to ausculation bilaterally  Abdomen: Soft, non tender, non distended, normoactive bowel sounds  Back: No evidence of trauma or deformity  Extremities: Right upper extremity with 2+ pitting edema, nontender, no erythema, cellulitis, or streaking  Skin: Warm and dry, normal cap refill  Neuro: Alert and appropriate, CN intact, normal speech, strength and sensation full and symmetric bilaterally, normal gait, normal coordination  Psychiatric: Normal mood and affect      Diagnostic Study Results     Labs -     Recent Results (from the past 12 hour(s))   EKG, 12 LEAD, INITIAL    Collection Time: 01/18/20  1:50 PM   Result Value Ref Range    Ventricular Rate 120 BPM    Atrial Rate 120 BPM    P-R Interval 160 ms    QRS Duration 82 ms    Q-T Interval 302 ms    QTC Calculation (Bezet) 426 ms    Calculated P Axis 52 degrees    Calculated R Axis 34 degrees    Calculated T Axis 41 degrees    Diagnosis       Sinus tachycardia  Otherwise normal ECG  When compared with ECG of 15-SUE-2020 16:52,  No significant change was found     CBC WITH AUTOMATED DIFF    Collection Time: 01/18/20  1:55 PM   Result Value Ref Range    WBC 11.1 4.6 - 13.2 K/uL    RBC 2.30 (L) 4.70 - 5.50 M/uL    HGB 6.8 (L) 13.0 - 16.0 g/dL    HCT 20.5 (L) 36.0 - 48.0 %    MCV 89.1 74.0 - 97.0 FL    MCH 29.6 24.0 - 34.0 PG    MCHC 33.2 31.0 - 37.0 g/dL    RDW 13.7 11.6 - 14.5 %    PLATELET 032 945 - 038 K/uL    MPV 8.9 (L) 9.2 - 11.8 FL    NEUTROPHILS 80 (H) 40 - 73 %    LYMPHOCYTES 8 (L) 21 - 52 %    MONOCYTES 12 (H) 3 - 10 %    EOSINOPHILS 0 0 - 5 %    BASOPHILS 0 0 - 2 %    ABS. NEUTROPHILS 8.9 (H) 1.8 - 8.0 K/UL    ABS. LYMPHOCYTES 0.9 0.9 - 3.6 K/UL    ABS. MONOCYTES 1.3 (H) 0.05 - 1.2 K/UL    ABS. EOSINOPHILS 0.0 0.0 - 0.4 K/UL    ABS. BASOPHILS 0.0 0.0 - 0.1 K/UL    RBC COMMENTS NORMOCYTIC, NORMOCHROMIC      DF AUTOMATED     METABOLIC PANEL, COMPREHENSIVE    Collection Time: 01/18/20  1:55 PM   Result Value Ref Range    Sodium 133 (L) 136 - 145 mmol/L    Potassium 3.7 3.5 - 5.5 mmol/L    Chloride 99 (L) 100 - 111 mmol/L    CO2 28 21 - 32 mmol/L    Anion gap 6 3.0 - 18 mmol/L    Glucose 320 (H) 74 - 99 mg/dL    BUN 19 (H) 7.0 - 18 MG/DL    Creatinine 1.46 (H) 0.6 - 1.3 MG/DL    BUN/Creatinine ratio 13 12 - 20      GFR est AA 57 (L) >60 ml/min/1.73m2    GFR est non-AA 47 (L) >60 ml/min/1.73m2    Calcium 8.1 (L) 8.5 - 10.1 MG/DL    Bilirubin, total 0.7 0.2 - 1.0 MG/DL    ALT (SGPT) 17 16 - 61 U/L    AST (SGOT) 13 10 - 38 U/L    Alk.  phosphatase 73 45 - 117 U/L    Protein, total 6.7 6.4 - 8.2 g/dL    Albumin 1.9 (L) 3.4 - 5.0 g/dL    Globulin 4.8 (H) 2.0 - 4.0 g/dL    A-G Ratio 0.4 (L) 0.8 - 1.7     CARDIAC PANEL,(CK, CKMB & TROPONIN)    Collection Time: 01/18/20  1:55 PM   Result Value Ref Range    CK 30 (L) 39 - 308 U/L    CK - MB <1.0 <3.6 ng/ml    CK-MB Index  0.0 - 4.0 %     CALCULATION NOT PERFORMED WHEN RESULT IS BELOW LINEAR LIMIT    Troponin-I, QT <0.02 0.0 - 0.045 NG/ML   LACTIC ACID    Collection Time: 01/18/20  3:18 PM   Result Value Ref Range    Lactic acid 1.5 0.4 - 2.0 MMOL/L   TYPE + CROSSMATCH    Collection Time: 01/18/20  4:15 PM   Result Value Ref Range    Crossmatch Expiration 01/21/2020     ABO/Rh(D) A POSITIVE     Antibody screen NEG     Unit number [id number]     Blood component type RC LR     Unit division 00     Status of unit ISSUED     Crossmatch result Compatible    OCCULT BLOOD, STOOL    Collection Time: 01/18/20  4:25 PM   Result Value Ref Range    Occult blood, stool NEGATIVE  NEG     INFLUENZA A & B AG (RAPID TEST)    Collection Time: 01/18/20  4:30 PM   Result Value Ref Range    Influenza A Antigen NEGATIVE  NEG      Influenza B Antigen NEGATIVE  NEG     URINALYSIS W/ RFLX MICROSCOPIC    Collection Time: 01/18/20  5:25 PM   Result Value Ref Range    Color BECCA      Appearance TURBID      Specific gravity 1.022 1.005 - 1.030      pH (UA) 5.0 5.0 - 8.0      Protein 300 (A) NEG mg/dL    Glucose >1,000 (A) NEG mg/dL    Ketone NEGATIVE  NEG mg/dL    Bilirubin NEGATIVE  NEG      Blood LARGE (A) NEG      Urobilinogen 0.2 0.2 - 1.0 EU/dL    Nitrites NEGATIVE  NEG      Leukocyte Esterase NEGATIVE  NEG     URINE MICROSCOPIC ONLY    Collection Time: 01/18/20  5:25 PM   Result Value Ref Range    WBC 1 to 3 0 - 5 /hpf    RBC TOO NUMEROUS TO COUNT 0 - 5 /hpf    Epithelial cells FEW 0 - 5 /lpf    Bacteria FEW (A) NEG /hpf    Mucus FEW (A) NEG /lpf    Amorphous Crystals 2+ (A) NEG    Hyaline cast 0 to 1 0 - 2 /lpf    Granular cast 0 to 1 NEG /lpf   PTT    Collection Time: 01/18/20  6:10 PM   Result Value Ref Range    aPTT 57.4 (H) 23.0 - 36.4 SEC   PROTHROMBIN TIME + INR    Collection Time: 01/18/20  6:10 PM   Result Value Ref Range    Prothrombin time 31.3 (H) 11.5 - 15.2 sec    INR 3.1 (H) 0.8 - 1.2     TRANSFUSION REACTION    Collection Time: 01/18/20  6:10 PM   Result Value Ref Range    Unit Number [id number]     Clerical Errors NO     Pre-txfusion hemolysis: NO     Post-txfusion hemolysis: NO Blood bag hemolysis: NO     Direct [place],pre-txfusion NEG     Direct [place],post-txfusion NEG     PATHOLOGIST INTERP: PENDING     Component Type RED CELLS     Blood type,post-txn A  POS       ABO/Rh(D),Pre-txfsn A  POS      EKG, 12 LEAD, INITIAL    Collection Time: 01/18/20  6:53 PM   Result Value Ref Range    Ventricular Rate 120 BPM    Atrial Rate 120 BPM    P-R Interval 156 ms    QRS Duration 88 ms    Q-T Interval 316 ms    QTC Calculation (Bezet) 446 ms    Calculated P Axis 54 degrees    Calculated R Axis 20 degrees    Calculated T Axis 39 degrees    Diagnosis       Sinus tachycardia  Otherwise normal ECG  When compared with ECG of 18-JAN-2020 13:50,  No significant change was found         Radiologic Studies -   CTA CHEST W OR W WO CONT   Final Result   IMPRESSION:      1. 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 Thrombophlebitis related to prior PICC line placement suspected. 2. No evidence of pulmonary embolism. 3. Bilateral pleural effusions, right greater than left with associated   bilateral subsegmental atelectasis in the dependent lungs. 4. Nonspecific rounded opacity in the right upper lobe, potentially rounded   atelectasis given underlying pleural disease. This area can be followed on   subsequent imaging to ensure resolution. Critical results discussed with Dr. Lisa Jimenez at 6:00 PM on 01/18/2020. XR CHEST PORT   Final Result   IMPRESSION:      No active cardiopulmonary disease. CT Results  (Last 48 hours)               01/18/20 1736  CTA CHEST W OR W WO CONT Final result    Impression:  IMPRESSION:       1. 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 Thrombophlebitis related to prior PICC line placement suspected. 2. No evidence of pulmonary embolism. 3. Bilateral pleural effusions, right greater than left with associated   bilateral subsegmental atelectasis in the dependent lungs.    4. Nonspecific rounded opacity in the right upper lobe, potentially rounded   atelectasis given underlying pleural disease. This area can be followed on   subsequent imaging to ensure resolution. Critical results discussed with Dr. Danna Felton at 6:00 PM on 01/18/2020. Narrative:  EXAM: CTA chest       CLINICAL INDICATION/HISTORY:  Pain. COMPARISON: None       TECHNIQUE: Axial CT imaging from the thoracic inlet through the diaphragm with   intravenous contrast. Coronal and sagittal MIP reformats were generated. One or more dose reduction techniques were used on this CT: automated exposure   control, adjustment of the mAs and/or kVp according to patient size, and   iterative reconstruction techniques. The specific techniques used on this CT   exam have been documented in the patient's electronic medical record. Digital   Imaging and Communications in Medicine (DICOM) format image data are available   to nonaffiliated external healthcare facilities or entities on a secure, media   free, reciprocally searchable basis with patient authorization for at least a   12-month period after this study. _______________       FINDINGS:       EXAM QUALITY: Adequate opacification of the pulmonary arteries. PULMONARY ARTERIES: No evidence of pulmonary embolism. MEDIASTINUM: Normal heart size. No evidence of right heart strain. Small   pericardial effusion. VASCULATURE: Right PICC in situ. There is perivascular inflammation of the right   subclavian vein. 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 LUNGS: Bilateral subsegmental atelectasis. At the right upper lobe, there is   persistent rounded opacity, unchanged from the previous exam measuring   approximately 1.5 cm in diameter. PLEURA: There are bilateral pleural effusions, small on the left moderate on the   right. AIRWAY: Normal.       LYMPH NODES: No enlarged nodes. UPPER ABDOMEN: Unremarkable.        OTHER: No acute or aggressive osseous abnormalities identified. SUPERFICIAL SOFT TISSUES: Unremarkable.       _______________               CXR Results  (Last 48 hours)               01/18/20 1601  XR CHEST PORT Final result    Impression:  IMPRESSION:       No active cardiopulmonary disease. Narrative:  EXAM: CHEST RADIOGRAPH       CLINICAL INDICATION/HISTORY: tachy   -Additional: None       COMPARISON: January 15, 2020       TECHNIQUE: Portable frontal view of the chest       _______________       FINDINGS:       SUPPORT DEVICES: A right upper extremity PICC is present. HEART AND MEDIASTINUM: No appreciable cardiomegaly. Remaining mediastinal   contours within normal limits. LUNGS AND PLEURAL SPACES: No consolidation, mass, or pleural effusion. BONY THORAX AND SOFT TISSUES: Unremarkable.       _______________                 Medications given in the ED-  Medications   0.9% sodium chloride infusion 250 mL (has no administration in time range)   ertapenem (INVANZ) 1 g in 0.9% sodium chloride (MBP/ADV) 50 mL MBP (1 g IntraVENous New Bag 1/18/20 1849)   heparin (porcine) 1,000 unit/mL injection 5,810 Units (has no administration in time range)   heparin 25,000 units in D5W 250 ml infusion (has no administration in time range)   sodium chloride 0.9 % bolus infusion 1,000 mL (0 mL IntraVENous IV Completed 1/18/20 1727)   sodium chloride 0.9 % bolus infusion 1,000 mL (0 mL IntraVENous IV Completed 1/18/20 1852)   iopamidoL (ISOVUE-370) 76 % injection 100 mL (100 mL IntraVENous Given 1/18/20 1723)         Medical Decision Making   I am the first provider for this patient. I reviewed the vital signs, available nursing notes, past medical history, past surgical history, family history and social history. Vital Signs-Reviewed the patient's vital signs.     Pulse Oximetry Analysis - 100% on RA     Cardiac Monitor:  Rate: 118 bpm  Rhythm: Sinus Tachycardia    EKG interpretation: (Preliminary)  EKG read by Dr. Venancio Jeans at 8947   Sinus tachycardia 120, normal intervals normal axis, unremarkable EKG    Records Reviewed: Nursing Notes and Old Medical Records    Provider Notes (Medical Decision Making): Saji Barrera is a 76 y.o. male presents with right upper extremity swelling and tachycardia    Concern for sepsis. Initial lactate negative. Patient afebrile orally however subsequent rectal temperature demonstrates temperature of 100.8. Right upper quadrant arm swelling is concerning for DVT. Right upper extremity ultrasound has been ordered however has not been performed yet we will plan on CTA chest to evaluate for PE. He is on eliquis and daughter says he has been receiving it. PICC line flushing okay and no overlying cellulitis. Will plan on giving dose of antibiotics today for osteomyelitis and chronic foot wound. Procedures:  Procedures    ED Course:   6:00 PM  Notified by radiology that patient has significant thrombosis of right subclavian vein and partial supra vena cava with patency of PICC. I was also notified by nursing staff that patient is having febrile reaction to blood transfusion and blood transfusion discontinued. He is all received acetaminophen. Heparin protocol ordered for thrombosis. CONSULT NOTE:   6:10 PM   I discussed care with Dr Patricia Levine, Vascular Surgery. It was a standard discussion, including history of patients chief complaint, available diagnostic results, and treatment course. Discussed case. Recommends standard anticoagulation therapy with eventual transition to long-term treatment interaction therapy. No role of thrombolytics at this point. 6:34 PM  Discussed with Dr. Maria Eugenia Lozada, agrees with ICU admission    CONSULT NOTE:   6:54 PM   I discussed care with Dr Oneil Romano, ICU It was a standard discussion, including history of patients chief complaint, available diagnostic results, and treatment course. Discussed case at length.            Diagnosis and Disposition 7:16 PM  I have spent 45 minutes of critical care time involved in lab review, consultations with specialist, family decision-making, and documentation. During this entire length of time I was immediately available to the patient. Critical Care: The reason for providing this level of medical care for this critically ill patient was due a critical illness that impaired one or more vital organ systems such that there was a high probability of imminent or life threatening deterioration in the patients condition. This care involved high complexity decision making to assess, manipulate, and support vital system functions, to treat this degreee vital organ system failure and to prevent further life threatening deterioration of the patients condition. Core Measures:  For Hospitalized Patients:    1. Hospitalization Decision Time:  The decision to hospitalize the patient was made by Dr Paula Hameed  on 1/18/2020    2. Aspirin: Aspirin was not given because the patient did not present with a stroke at the time of their Emergency Department evaluation    7:16 PM  Patient is being admitted to the hospital by Dr Mike Gagnon. The results of their tests and reasons for their admission have been discussed with them and/or available family. They convey agreement and understanding for the need to be admitted and for their admission diagnosis. CONDITIONS ON ADMISSION:  Sepsis is not present at the time of admission. Deep Vein Thrombosis is present at the time of admission. Thrombosis is present at the time of admission. Urinary Tract Infection is not present at the time of admission. Pneumonia is not present at the time of admission. MRSA is not present at the time of admission. Wound infection is present at the time of admission. Pressure Ulcer is not present at the time of admission. CLINICAL IMPRESSION:    1. Thrombosis    2.  Subacute osteomyelitis of right foot (HCC)    3. Blood transfusion reaction, initial encounter    4. Normocytic anemia      _______________________________      Please note that this dictation was completed with [facility], the computer voice recognition software. Quite often unanticipated grammatical, syntax, homophones, and other interpretive errors are inadvertently transcribed by the computer software. Please disregard these errors. Please excuse any errors that have escaped final proofreading.

## 2020-01-19 LAB
ALBUMIN SERPL-MCNC: 1.7 G/DL (ref 3.4–5)
ALBUMIN/GLOB SERPL: 0.4 {RATIO} (ref 0.8–1.7)
ALP SERPL-CCNC: 62 U/L (ref 45–117)
ALT SERPL-CCNC: 15 U/L (ref 16–61)
ANION GAP SERPL CALC-SCNC: 6 MMOL/L (ref 3–18)
APTT PPP: 105.4 SEC (ref 23–36.4)
APTT PPP: 149.7 SEC (ref 23–36.4)
APTT PPP: 174.5 SEC (ref 23–36.4)
AST SERPL-CCNC: 17 U/L (ref 10–38)
BASOPHILS # BLD: 0 K/UL (ref 0–0.1)
BASOPHILS # BLD: 0 K/UL (ref 0–0.1)
BASOPHILS NFR BLD: 0 % (ref 0–2)
BASOPHILS NFR BLD: 1 % (ref 0–2)
BILIRUB SERPL-MCNC: 0.9 MG/DL (ref 0.2–1)
BUN SERPL-MCNC: 18 MG/DL (ref 7–18)
BUN/CREAT SERPL: 12 (ref 12–20)
CA-I SERPL-SCNC: 1.1 MMOL/L (ref 1.12–1.32)
CALCIUM SERPL-MCNC: 8 MG/DL (ref 8.5–10.1)
CHLORIDE SERPL-SCNC: 107 MMOL/L (ref 100–111)
CK MB CFR SERPL CALC: ABNORMAL % (ref 0–4)
CK MB CFR SERPL CALC: ABNORMAL % (ref 0–4)
CK MB SERPL-MCNC: <1 NG/ML (ref 5–25)
CK MB SERPL-MCNC: <1 NG/ML (ref 5–25)
CK SERPL-CCNC: 27 U/L (ref 39–308)
CK SERPL-CCNC: 27 U/L (ref 39–308)
CO2 SERPL-SCNC: 26 MMOL/L (ref 21–32)
CREAT SERPL-MCNC: 1.5 MG/DL (ref 0.6–1.3)
DIFFERENTIAL METHOD BLD: ABNORMAL
DIFFERENTIAL METHOD BLD: ABNORMAL
EOSINOPHIL # BLD: 0 K/UL (ref 0–0.4)
EOSINOPHIL # BLD: 0 K/UL (ref 0–0.4)
EOSINOPHIL NFR BLD: 0 % (ref 0–5)
EOSINOPHIL NFR BLD: 1 % (ref 0–5)
ERYTHROCYTE [DISTWIDTH] IN BLOOD BY AUTOMATED COUNT: 13.7 % (ref 11.6–14.5)
ERYTHROCYTE [DISTWIDTH] IN BLOOD BY AUTOMATED COUNT: 13.7 % (ref 11.6–14.5)
GLOBULIN SER CALC-MCNC: 4.5 G/DL (ref 2–4)
GLUCOSE BLD STRIP.AUTO-MCNC: 118 MG/DL (ref 70–110)
GLUCOSE BLD STRIP.AUTO-MCNC: 124 MG/DL (ref 70–110)
GLUCOSE BLD STRIP.AUTO-MCNC: 178 MG/DL (ref 70–110)
GLUCOSE BLD STRIP.AUTO-MCNC: 263 MG/DL (ref 70–110)
GLUCOSE BLD STRIP.AUTO-MCNC: 83 MG/DL (ref 70–110)
GLUCOSE SERPL-MCNC: 114 MG/DL (ref 74–99)
HCT VFR BLD AUTO: 20.5 % (ref 36–48)
HCT VFR BLD AUTO: 21.3 % (ref 36–48)
HCT VFR BLD AUTO: 21.7 % (ref 36–48)
HCT VFR BLD AUTO: 22.7 % (ref 36–48)
HGB BLD-MCNC: 6.8 G/DL (ref 13–16)
HGB BLD-MCNC: 7 G/DL (ref 13–16)
HGB BLD-MCNC: 7.2 G/DL (ref 13–16)
HGB BLD-MCNC: 7.6 G/DL (ref 13–16)
L PNEUMO AG UR QL IA: NEGATIVE
LYMPHOCYTES # BLD: 1 K/UL (ref 0.9–3.6)
LYMPHOCYTES # BLD: 1.3 K/UL (ref 0.9–3.6)
LYMPHOCYTES NFR BLD: 11 % (ref 21–52)
LYMPHOCYTES NFR BLD: 15 % (ref 21–52)
MAGNESIUM SERPL-MCNC: 1.8 MG/DL (ref 1.6–2.6)
MAGNESIUM SERPL-MCNC: 2.1 MG/DL (ref 1.6–2.6)
MCH RBC QN AUTO: 29.2 PG (ref 24–34)
MCH RBC QN AUTO: 29.4 PG (ref 24–34)
MCHC RBC AUTO-ENTMCNC: 32.9 G/DL (ref 31–37)
MCHC RBC AUTO-ENTMCNC: 33.2 G/DL (ref 31–37)
MCV RBC AUTO: 88.6 FL (ref 74–97)
MCV RBC AUTO: 88.8 FL (ref 74–97)
MONOCYTES # BLD: 1.2 K/UL (ref 0.05–1.2)
MONOCYTES # BLD: 1.2 K/UL (ref 0.05–1.2)
MONOCYTES NFR BLD: 14 % (ref 3–10)
MONOCYTES NFR BLD: 15 % (ref 3–10)
NEUTS SEG # BLD: 5.9 K/UL (ref 1.8–8)
NEUTS SEG # BLD: 6.6 K/UL (ref 1.8–8)
NEUTS SEG NFR BLD: 70 % (ref 40–73)
NEUTS SEG NFR BLD: 73 % (ref 40–73)
PHOSPHATE SERPL-MCNC: 2 MG/DL (ref 2.5–4.9)
PHOSPHATE SERPL-MCNC: 2.6 MG/DL (ref 2.5–4.9)
PLATELET # BLD AUTO: 271 K/UL (ref 135–420)
PLATELET # BLD AUTO: 302 K/UL (ref 135–420)
PMV BLD AUTO: 8.7 FL (ref 9.2–11.8)
PMV BLD AUTO: 8.9 FL (ref 9.2–11.8)
POTASSIUM SERPL-SCNC: 3.4 MMOL/L (ref 3.5–5.5)
POTASSIUM SERPL-SCNC: 3.9 MMOL/L (ref 3.5–5.5)
PROT SERPL-MCNC: 6.2 G/DL (ref 6.4–8.2)
RBC # BLD AUTO: 2.4 M/UL (ref 4.7–5.5)
RBC # BLD AUTO: 2.45 M/UL (ref 4.7–5.5)
S PNEUM AG UR QL: NEGATIVE
SODIUM SERPL-SCNC: 139 MMOL/L (ref 136–145)
TROPONIN I SERPL-MCNC: 0.02 NG/ML (ref 0–0.04)
TROPONIN I SERPL-MCNC: 0.02 NG/ML (ref 0–0.04)
TSH SERPL DL<=0.05 MIU/L-ACNC: 1.86 UIU/ML (ref 0.36–3.74)
WBC # BLD AUTO: 8.4 K/UL (ref 4.6–13.2)
WBC # BLD AUTO: 8.9 K/UL (ref 4.6–13.2)

## 2020-01-19 PROCEDURE — 74011250637 HC RX REV CODE- 250/637: Performed by: INTERNAL MEDICINE

## 2020-01-19 PROCEDURE — 83735 ASSAY OF MAGNESIUM: CPT

## 2020-01-19 PROCEDURE — 84132 ASSAY OF SERUM POTASSIUM: CPT

## 2020-01-19 PROCEDURE — 74011000258 HC RX REV CODE- 258: Performed by: INTERNAL MEDICINE

## 2020-01-19 PROCEDURE — 74011250636 HC RX REV CODE- 250/636: Performed by: EMERGENCY MEDICINE

## 2020-01-19 PROCEDURE — 82962 GLUCOSE BLOOD TEST: CPT

## 2020-01-19 PROCEDURE — P9040 RBC LEUKOREDUCED IRRADIATED: HCPCS

## 2020-01-19 PROCEDURE — 74011250636 HC RX REV CODE- 250/636: Performed by: INTERNAL MEDICINE

## 2020-01-19 PROCEDURE — 36415 COLL VENOUS BLD VENIPUNCTURE: CPT

## 2020-01-19 PROCEDURE — 82550 ASSAY OF CK (CPK): CPT

## 2020-01-19 PROCEDURE — 65610000006 HC RM INTENSIVE CARE

## 2020-01-19 PROCEDURE — 82330 ASSAY OF CALCIUM: CPT

## 2020-01-19 PROCEDURE — 84100 ASSAY OF PHOSPHORUS: CPT

## 2020-01-19 PROCEDURE — C9113 INJ PANTOPRAZOLE SODIUM, VIA: HCPCS | Performed by: INTERNAL MEDICINE

## 2020-01-19 PROCEDURE — 85730 THROMBOPLASTIN TIME PARTIAL: CPT

## 2020-01-19 PROCEDURE — P9016 RBC LEUKOCYTES REDUCED: HCPCS

## 2020-01-19 PROCEDURE — 84443 ASSAY THYROID STIM HORMONE: CPT

## 2020-01-19 PROCEDURE — 36430 TRANSFUSION BLD/BLD COMPNT: CPT

## 2020-01-19 PROCEDURE — 74011636637 HC RX REV CODE- 636/637: Performed by: INTERNAL MEDICINE

## 2020-01-19 PROCEDURE — 85025 COMPLETE CBC W/AUTO DIFF WBC: CPT

## 2020-01-19 PROCEDURE — 85018 HEMOGLOBIN: CPT

## 2020-01-19 PROCEDURE — 30233N1 TRANSFUSION OF NONAUTOLOGOUS RED BLOOD CELLS INTO PERIPHERAL VEIN, PERCUTANEOUS APPROACH: ICD-10-PCS | Performed by: INTERNAL MEDICINE

## 2020-01-19 RX ORDER — SODIUM,POTASSIUM PHOSPHATES 280-250MG
2 POWDER IN PACKET (EA) ORAL
Status: COMPLETED | OUTPATIENT
Start: 2020-01-19 | End: 2020-01-19

## 2020-01-19 RX ORDER — OXYCODONE HYDROCHLORIDE 5 MG/1
5 TABLET ORAL
Status: DISCONTINUED | OUTPATIENT
Start: 2020-01-19 | End: 2020-01-29 | Stop reason: HOSPADM

## 2020-01-19 RX ORDER — INSULIN LISPRO 100 [IU]/ML
INJECTION, SOLUTION INTRAVENOUS; SUBCUTANEOUS
Status: DISCONTINUED | OUTPATIENT
Start: 2020-01-19 | End: 2020-01-29 | Stop reason: HOSPADM

## 2020-01-19 RX ORDER — SODIUM CHLORIDE 9 MG/ML
250 INJECTION, SOLUTION INTRAVENOUS AS NEEDED
Status: DISCONTINUED | OUTPATIENT
Start: 2020-01-19 | End: 2020-01-29 | Stop reason: HOSPADM

## 2020-01-19 RX ORDER — MAGNESIUM SULFATE 1 G/100ML
1 INJECTION INTRAVENOUS ONCE
Status: COMPLETED | OUTPATIENT
Start: 2020-01-19 | End: 2020-01-19

## 2020-01-19 RX ORDER — LEVOFLOXACIN 5 MG/ML
750 INJECTION, SOLUTION INTRAVENOUS
Status: DISCONTINUED | OUTPATIENT
Start: 2020-01-20 | End: 2020-01-20

## 2020-01-19 RX ADMIN — MAGNESIUM SULFATE HEPTAHYDRATE 1 G: 1 INJECTION, SOLUTION INTRAVENOUS at 08:28

## 2020-01-19 RX ADMIN — PANTOPRAZOLE SODIUM 40 MG: 40 INJECTION, POWDER, FOR SOLUTION INTRAVENOUS at 19:51

## 2020-01-19 RX ADMIN — MORPHINE SULFATE 1 MG: 2 INJECTION, SOLUTION INTRAMUSCULAR; INTRAVENOUS at 20:59

## 2020-01-19 RX ADMIN — INSULIN LISPRO 2 UNITS: 100 INJECTION, SOLUTION INTRAVENOUS; SUBCUTANEOUS at 12:09

## 2020-01-19 RX ADMIN — VANCOMYCIN HYDROCHLORIDE 2000 MG: 10 INJECTION, POWDER, LYOPHILIZED, FOR SOLUTION INTRAVENOUS at 01:20

## 2020-01-19 RX ADMIN — INSULIN GLARGINE 25 UNITS: 100 INJECTION, SOLUTION SUBCUTANEOUS at 08:28

## 2020-01-19 RX ADMIN — POTASSIUM CHLORIDE 30 MEQ: 10 TABLET, EXTENDED RELEASE ORAL at 08:28

## 2020-01-19 RX ADMIN — HEPARIN SODIUM 12 UNITS/KG/HR: 10000 INJECTION, SOLUTION INTRAVENOUS at 23:36

## 2020-01-19 RX ADMIN — HEPARIN SODIUM 12 UNITS/KG/HR: 10000 INJECTION, SOLUTION INTRAVENOUS at 11:00

## 2020-01-19 RX ADMIN — POTASSIUM & SODIUM PHOSPHATES POWDER PACK 280-160-250 MG 2 PACKET: 280-160-250 PACK at 08:28

## 2020-01-19 RX ADMIN — POTASSIUM & SODIUM PHOSPHATES POWDER PACK 280-160-250 MG 2 PACKET: 280-160-250 PACK at 11:16

## 2020-01-19 RX ADMIN — MORPHINE SULFATE 1 MG: 2 INJECTION, SOLUTION INTRAMUSCULAR; INTRAVENOUS at 08:43

## 2020-01-19 RX ADMIN — LABETALOL 20 MG/4 ML (5 MG/ML) INTRAVENOUS SYRINGE 20 MG: at 06:28

## 2020-01-19 RX ADMIN — LOSARTAN POTASSIUM 100 MG: 50 TABLET, FILM COATED ORAL at 08:28

## 2020-01-19 RX ADMIN — PIPERACILLIN AND TAZOBACTAM 4.5 G: 4; .5 INJECTION, POWDER, FOR SOLUTION INTRAVENOUS at 14:45

## 2020-01-19 RX ADMIN — LABETALOL 20 MG/4 ML (5 MG/ML) INTRAVENOUS SYRINGE 20 MG: at 19:50

## 2020-01-19 RX ADMIN — PIPERACILLIN AND TAZOBACTAM 4.5 G: 4; .5 INJECTION, POWDER, FOR SOLUTION INTRAVENOUS at 06:22

## 2020-01-19 RX ADMIN — PIPERACILLIN AND TAZOBACTAM 4.5 G: 4; .5 INJECTION, POWDER, FOR SOLUTION INTRAVENOUS at 23:36

## 2020-01-19 RX ADMIN — HYDRALAZINE HYDROCHLORIDE 10 MG: 20 INJECTION INTRAMUSCULAR; INTRAVENOUS at 17:05

## 2020-01-19 RX ADMIN — ACETAMINOPHEN 650 MG: 325 TABLET ORAL at 20:30

## 2020-01-19 RX ADMIN — INSULIN LISPRO 6 UNITS: 100 INJECTION, SOLUTION INTRAVENOUS; SUBCUTANEOUS at 02:35

## 2020-01-19 RX ADMIN — ATORVASTATIN CALCIUM 20 MG: 20 TABLET, FILM COATED ORAL at 08:29

## 2020-01-19 RX ADMIN — OXYCODONE HYDROCHLORIDE 5 MG: 5 TABLET ORAL at 15:05

## 2020-01-19 RX ADMIN — ERTAPENEM SODIUM 1 G: 1 INJECTION, POWDER, LYOPHILIZED, FOR SOLUTION INTRAMUSCULAR; INTRAVENOUS at 09:42

## 2020-01-19 NOTE — PROGRESS NOTES
Zosyn (Piperacillin/Tazobactam) Extended Infusion    Franklin Anderson, a 76 y.o. yo male, has been converted to an extended infusion of Zosyn while in the intensive care unit. Extended infusions will run over 4 hours (240 minutes).   Dose adjusted to: Zosyn 4.5 grams IV q8h     (prior dose: Zosyn 4.5 grams IV q6h)      Recent Labs     01/18/20  1355   CREA 1.46*     Ht Readings from Last 1 Encounters:   01/18/20 170.2 cm (67\")     Wt Readings from Last 1 Encounters:   01/18/20 75.2 kg (165 lb 12.6 oz)       CrCl : Serum creatinine: 1.46 mg/dL (H) 01/18/20 1355  Estimated creatinine clearance: 41.5 mL/min (A)    Renal adjustment of extended infusion of Zosyn  3.375 or 4.5 gm every 8 hours for CrCl >/= 20 ml/min  3.375 or 4.5 gm every 12 hours for CrCl < 20 ml/min, intermittent HD or PD    Rosa Escobar, PharmD  [phone]

## 2020-01-19 NOTE — PROGRESS NOTES
0710 - Bedside and Verbal shift change report received from Maggie Pan RN. Report included the following information SBAR, Kardex, ED Summary, Procedure Summary, Intake/Output, MAR, Recent Results, Med Rec Status and Cardiac Rhythm NSR. Heparin drip rate verified at this time.

## 2020-01-19 NOTE — CONSULTS
[facility]  Pulmonary, Critical Care, and Sleep Medicine    Name: Candace Church MRN: [id number]   : 1945 Hospital: [facility] [place]   Date: 2020        Pulmonary Critical Care Note    IMPRESSION:   Patient Active Problem List   Diagnosis Code    Sepsis ([facility] [address] A41.9    Sepsis due to Streptococcus Grp B ([facility] [address] recently 2' to # 3 A40.1    Osteomyelitis of right foot ([address] M86.9    Acute deep vein thrombosis (DVT) ([address] I82.409    Acute on chronic anemia D64.9    CKD (chronic kidney disease) N18.9    Type 2 diabetes mellitus, with long-term current use of insulin ([facility] [address] E11.9, Z79.4    Hypertension I10    Hypokalemia E87.6    Diabetic ulcer of right midfoot associated with type 2 diabetes mellitus, with fat layer exposed ([facility] [address] E11.621, L97.412    PAD (peripheral artery disease) ([facility]) I73.9    Moderate-severe protein calorie malnutrition E44.0    Hypoalbuminemia E88.09    Hard of hearing H91.90 ·      RECOMMENDATIONS:   Compensated respiratory status; on room air. Monitor for goal SPO2> 91%  Aspiration prevention bundle, head of the bed at 30' all times, pulmonary hygiene care  Antibiotic choice:  Ertapenem, Zosyn, Vancomycin - may get ID evaluation when services are available tomorrow to narrow spectrum. If cultures negative then may stop Zosyn  Cultures drawn and will be followed. Lactic acid normal  Heparin drip per hospital protocol. Discussed extensively with the patient regarding indications for treatment, options of treatment, side effects of anticoagulation treatment including bleeding, medications-food interactions, duration of anti-coagulation, availability of any reversal agents and other.  Answered all questions to his satisfaction  Patient was recently prescribed Eliquis and appears to have tolerated it  Monitor CBC, PTT, any signs of bleeding/hematoma per protocol  Patient advised to monitor for any signs of bleeding- he verbalized understanding  Received PRBC tx and tolerated well. Likely fever related to sepsis rather than tx reaction   Monitor hemodynamics- stable  Check follow up duplex UE  Wound care with wound team consult  Glycemic control  AM labs. Diet as tolerated  PICC Line Bundle Followed     Will defer respective systems problem management to primary and other consultant and follow patient in ICU with primary and other medical team  Further recommendations will be based on the patient's response to recommended treatment and results of the investigation ordered. Quality Care: PPI, DVT prophylaxis, HOB elevated, Infection control all reviewed and addressed. PAIN AND SEDATION: RT foot, RT arm  · Skin/Wound: RT foot ulcer  · Nutrition: diet  · Prophylaxis: DVT and GI Prophylaxis reviewed. · PT/OT eval and treat: as needed   · Lines/Tubes: lines PICC line  ADVANCE DIRECTIVE: full code  DISCUSSION: discussed with patient, RN, ICU staff  Events and notes from last 24 hours reviewed. Care plan discussed with nursing      Subjective/History:   Mr. Philly Vega has been seen and evaluated as Dr. Mara Richter requested for assisting with ICU care with multiple medical problems. Patient is a poor historian, hard of hearing. [employer] interpretation service was also used [Jerry [id number]. Patient is a 76 y.o. male with following PMHx who came back to hospital via ER with c/o ongoing chills, swelling an pain of RT shoulder and arm started at home. Patient was recently admitted for RT foot abscess and osteomyelitis and was discharged with a PICC line and IV antibiotics [last dose February 20]. In ER further evaluation with CTA and ultrasound of the arm showed extensive clot extending into the subclavian and superior vena cava. Patient was started on Eliquis starter pack on discharge and was taking per direction with last Eliquis dose early morning on 1/18/20. He was noted to have anemia with Hgb drop more than his baseline, stool occult blood negative.  In the ER patient developed fever while receiving PRBC tx andit was not clear wether her had tx reaction or not and admitted to ICU for observation. He has been started on a heparin drip. Later received PRBC tx w/o issue. The patient reports some poor appetite, denies cough, chest pain, dyspnea, wheezing, hemoptysis, palpitations, syncope, orthopnea, or paroxysmal nocturnal dyspnea. No family at bedside. Other information is limited. Review of Systems:  Review of systems not obtained due to patient factors. Available information noted in HPI              Latest lactic acid:   Lactic acid   Date Value Ref Range Status   01/18/2020 1.5 0.4 - 2.0 MMOL/L Final   01/02/2020 1.3 0.4 - 2.0 MMOL/L Final       Past Medical History:  Past Medical History:   Diagnosis Date    Diabetes [facility] [address]     DVT of deep femoral vein ([facility] [address]     Foot abscess     Hypertension         Past Surgical History:  Past Surgical History:   Procedure Laterality Date    HX ORTHOPAEDIC      toe amputation        Medications:  Prior to Admission medications    Medication Sig Start Date End Date Taking? Authorizing Provider   LORazepam (ATIVAN) 1 mg tablet Take 1 mg by mouth every four (4) hours as needed for Anxiety (one tab prior to hyperbaric oxygen treatment). Provider, Historical   apixaban (ELIQUIS) 5 mg (74 tabs) starter pack Take 10 mg (two 5 mg tablets) by mouth twice a day for 7 days   Followed by 5 mg (one 5 mg tablet) by mouth twice a day 1/14/20   Erin Patel MD   ertapenem 1 gram 1 g IVPB 1 g by IntraVENous route every twenty-four (24) hours. 1/13/20   Erin Patel MD   insulin glargine (LANTUS SOLOSTAR U-100 INSULIN) 100 unit/mL (3 mL) inpn 25 Units by SubCUTAneous route. Mishel Barlow MD   metFORMIN ER (GLUCOPHAGE XR) 750 mg tablet Take 750 mg by mouth daily. Mishel Barlow MD   atorvastatin (LIPITOR) 20 mg tablet Take 20 mg by mouth daily. Mishel Barlow MD   aspirin 81 mg chewable tablet Take 81 mg by mouth daily.     Mishel Barlow MD   polyethylene glycol (MIRALAX) 17 gram/dose powder Take 17 g by mouth daily. 1 tablespoon with 8 oz of water daily 12/10/19   Joanne Gomez MD   LOSARTAN PO Take 100 mg by mouth. Other, MD Mishel       Current Facility-Administered Medications   Medication Dose Route Frequency    insulin lispro (HUMALOG) injection   SubCUTAneous AC&HS    [START ON 2020] levoFLOXacin (LEVAQUIN) 750 mg in D5W IVPB  750 mg IntraVENous Q48H    Vancomycin-Pharmacy to dose  1 Each Other Rx Dosing/Monitoring    [START ON 2020] vancomycin (VANCOCIN) 1,000 mg in 0.9% sodium chloride 250 mL (VIAL-MATE)  1,000 mg IntraVENous Q24H    potassium, sodium phosphates (NEUTRA-PHOS) packet 2 Packet  2 Packet Oral Q2H    magnesium sulfate 1 g/100 ml IVPB (premix or compounded)  1 g IntraVENous ONCE    heparin 25,000 units in D5W 250 ml infusion  18-36 Units/kg/hr IntraVENous TITRATE    atorvastatin (LIPITOR) tablet 20 mg  20 mg Oral DAILY    insulin glargine (LANTUS) injection 25 Units  25 Units SubCUTAneous DAILY    losartan (COZAAR) tablet 100 mg  100 mg Oral DAILY    polyethylene glycol (MIRALAX) packet 17 g  17 g Oral DAILY    pantoprazole (PROTONIX) injection 40 mg  40 mg IntraVENous Q24H    piperacillin-tazobactam (ZOSYN) 4.5 g in 0.9% sodium chloride (MBP/ADV) 100 mL MBP  4.5 g IntraVENous Q8H    ertapenem (INVANZ) 1 g in 0.9% sodium chloride (MBP/ADV) 50 mL MBP  1 g IntraVENous DAILY       Allergy:  No Known Allergies     Social History:  Social History     Tobacco Use    Smoking status: Never Smoker    Smokeless tobacco: Never Used   Substance Use Topics    Alcohol use: No    Drug use: No        Family History:  History reviewed. No pertinent family history. Objective:   Vital Signs:    Blood pressure 133/49, pulse 86, temperature 98.7 °F (37.1 °C), resp. rate 26, height 5' 7\" (1.702 m), weight 75.2 kg (165 lb 12.6 oz), SpO2 95 %. Body mass index is 25.97 kg/m².    O2 Device: Room air       Temp (24hrs), Av.6 °F (37.6 °C), Min:98.5 °F (36.9 °C), Max:101.1 °F (38.4 °C)         Intake/Output:   Last shift:      No intake/output data recorded. Last 3 shifts: 01/17 1901 - 01/19 0700  In: 2333.7 [P.O.:100; I.V.:1922.4]  Out: 375 [Urine:375]    Intake/Output Summary (Last 24 hours) at 1/19/2020 0856  Last data filed at 1/19/2020 2967  Gross per 24 hour   Intake 2333.66 ml   Output 375 ml   Net 1958.66 ml       Physical Exam:  General: Alert and oriented to all spheres, in no respiratory distress and acyanotic, appears stated age, on RA  HEENT: PERRLA, EOMI, fundi benign, throat normal without erythema or exudate  Neck: No abnormally enlarged lymph nodes or thyroid, supple  Chest: normal  Lungs: moderate air entry, breathing normal, clear to auscultation bilaterally, normal percussion bilaterally, no tenderness/ rash  Heart: Regular rate and rhythm, S1S2 present or without murmur or extra heart sounds  Abdomen: protuberant, bowel sounds normoactive, tympanic, abdomen is soft without significant tenderness, masses, organomegaly or guarding, rigidity, rebound  Extremity:  RT UE edema extending up to midclavicular area; negative for cyanosis, clubbing. RT foot ulcer in dressing without significant warm, no drainage or bleeding  Capillary refill: normal in all 4 extremities  Neuro: alert, oriented x 3, no defects noted in general exam. Sensations intact in RT UE  Skin: Skin color, texture, turgor fair.  Skin dry, warm, diaphoretic    Data:     Recent Results (from the past 24 hour(s))   EKG, 12 LEAD, INITIAL    Collection Time: 01/18/20  1:50 PM   Result Value Ref Range    Ventricular Rate 120 BPM    Atrial Rate 120 BPM    P-R Interval 160 ms    QRS Duration 82 ms    Q-T Interval 302 ms    QTC Calculation (Bezet) 426 ms    Calculated P Axis 52 degrees    Calculated R Axis 34 degrees    Calculated T Axis 41 degrees    Diagnosis       Sinus tachycardia  Otherwise normal ECG  When compared with ECG of 15-SUE-2020 16:52,  No significant change was found  Confirmed by Susannah Pina MD. ([id number]) on 1/18/2020 10:36:19 PM     CBC WITH AUTOMATED DIFF    Collection Time: 01/18/20  1:55 PM   Result Value Ref Range    WBC 11.1 4.6 - 13.2 K/uL    RBC 2.30 (L) 4.70 - 5.50 M/uL    HGB 6.8 (L) 13.0 - 16.0 g/dL    HCT 20.5 (L) 36.0 - 48.0 %    MCV 89.1 74.0 - 97.0 FL    MCH 29.6 24.0 - 34.0 PG    MCHC 33.2 31.0 - 37.0 g/dL    RDW 13.7 11.6 - 14.5 %    PLATELET 169 637 - 028 K/uL    MPV 8.9 (L) 9.2 - 11.8 FL    NEUTROPHILS 80 (H) 40 - 73 %    LYMPHOCYTES 8 (L) 21 - 52 %    MONOCYTES 12 (H) 3 - 10 %    EOSINOPHILS 0 0 - 5 %    BASOPHILS 0 0 - 2 %    ABS. NEUTROPHILS 8.9 (H) 1.8 - 8.0 K/UL    ABS. LYMPHOCYTES 0.9 0.9 - 3.6 K/UL    ABS. MONOCYTES 1.3 (H) 0.05 - 1.2 K/UL    ABS. EOSINOPHILS 0.0 0.0 - 0.4 K/UL    ABS. BASOPHILS 0.0 0.0 - 0.1 K/UL    RBC COMMENTS NORMOCYTIC, NORMOCHROMIC      DF AUTOMATED     METABOLIC PANEL, COMPREHENSIVE    Collection Time: 01/18/20  1:55 PM   Result Value Ref Range    Sodium 133 (L) 136 - 145 mmol/L    Potassium 3.7 3.5 - 5.5 mmol/L    Chloride 99 (L) 100 - 111 mmol/L    CO2 28 21 - 32 mmol/L    Anion gap 6 3.0 - 18 mmol/L    Glucose 320 (H) 74 - 99 mg/dL    BUN 19 (H) 7.0 - 18 MG/DL    Creatinine 1.46 (H) 0.6 - 1.3 MG/DL    BUN/Creatinine ratio 13 12 - 20      GFR est AA 57 (L) >60 ml/min/1.73m2    GFR est non-AA 47 (L) >60 ml/min/1.73m2    Calcium 8.1 (L) 8.5 - 10.1 MG/DL    Bilirubin, total 0.7 0.2 - 1.0 MG/DL    ALT (SGPT) 17 16 - 61 U/L    AST (SGOT) 13 10 - 38 U/L    Alk.  phosphatase 73 45 - 117 U/L    Protein, total 6.7 6.4 - 8.2 g/dL    Albumin 1.9 (L) 3.4 - 5.0 g/dL    Globulin 4.8 (H) 2.0 - 4.0 g/dL    A-G Ratio 0.4 (L) 0.8 - 1.7     CULTURE, BLOOD    Collection Time: 01/18/20  1:55 PM   Result Value Ref Range    Special Requests: NO SPECIAL REQUESTS      Culture result: NO GROWTH AFTER 15 HOURS     CARDIAC PANEL,(CK, CKMB & TROPONIN)    Collection Time: 01/18/20  1:55 PM   Result Value Ref Range    CK 30 (L) 39 - 308 U/L    CK - MB <1.0 <3.6 ng/ml    CK-MB Index  0.0 - 4.0 %     CALCULATION NOT PERFORMED WHEN RESULT IS BELOW LINEAR LIMIT    Troponin-I, QT <0.02 0.0 - 0.045 NG/ML   CULTURE, BLOOD    Collection Time: 01/18/20  3:18 PM   Result Value Ref Range    Special Requests: NO SPECIAL REQUESTS      Culture result: NO GROWTH AFTER 15 HOURS     LACTIC ACID    Collection Time: 01/18/20  3:18 PM   Result Value Ref Range    Lactic acid 1.5 0.4 - 2.0 MMOL/L   TYPE + CROSSMATCH    Collection Time: 01/18/20  4:15 PM   Result Value Ref Range    Crossmatch Expiration 01/21/2020     ABO/Rh(D) A POSITIVE     Antibody screen NEG     CALLED TO:       Kevan Herrera RN ICU BY SARA AT 0250, BLOOD IS READY    Unit number [id number]     Blood component type  LR     Unit division 00     Status of unit ISSUED     Crossmatch result Compatible     Unit number [id number]     Blood component type [facility]     Unit division 00     Status of unit ISSUED     Crossmatch result Compatible    OCCULT BLOOD, STOOL    Collection Time: 01/18/20  4:25 PM   Result Value Ref Range    Occult blood, stool NEGATIVE  NEG     INFLUENZA A & B AG (RAPID TEST)    Collection Time: 01/18/20  4:30 PM   Result Value Ref Range    Influenza A Antigen NEGATIVE  NEG      Influenza B Antigen NEGATIVE  NEG     URINALYSIS W/ RFLX MICROSCOPIC    Collection Time: 01/18/20  5:25 PM   Result Value Ref Range    Color BECCA      Appearance TURBID      Specific gravity 1.022 1.005 - 1.030      pH (UA) 5.0 5.0 - 8.0      Protein 300 (A) NEG mg/dL    Glucose >1,000 (A) NEG mg/dL    Ketone NEGATIVE  NEG mg/dL    Bilirubin NEGATIVE  NEG      Blood LARGE (A) NEG      Urobilinogen 0.2 0.2 - 1.0 EU/dL    Nitrites NEGATIVE  NEG      Leukocyte Esterase NEGATIVE  NEG     URINE MICROSCOPIC ONLY    Collection Time: 01/18/20  5:25 PM   Result Value Ref Range    WBC 1 to 3 0 - 5 /hpf    RBC TOO NUMEROUS TO COUNT 0 - 5 /hpf    Epithelial cells FEW 0 - 5 /lpf    Bacteria FEW (A) NEG /hpf    Mucus FEW (A) NEG /lpf    Amorphous Crystals 2+ (A) NEG    Hyaline cast 0 to 1 0 - 2 /lpf    Granular cast 0 to 1 NEG /lpf   PTT    Collection Time: 01/18/20  6:10 PM   Result Value Ref Range    aPTT 57.4 (H) 23.0 - 36.4 SEC   PROTHROMBIN TIME + INR    Collection Time: 01/18/20  6:10 PM   Result Value Ref Range    Prothrombin time 31.3 (H) 11.5 - 15.2 sec    INR 3.1 (H) 0.8 - 1.2     TRANSFUSION REACTION    Collection Time: 01/18/20  6:10 PM   Result Value Ref Range    Unit Number [id number]     Clerical Errors NO     Pre-txfusion hemolysis: NO     Post-txfusion hemolysis: NO     Blood bag hemolysis: NO     Direct [place],pre-txfusion NEG     Direct G[place],post-txfusion NEG     PATHOLOGIST INTERP: PENDING     Component Type RED CELLS     Blood type,post-txn A  POS       ABO/Rh(D),Pre-txfsn A  POS      EKG, 12 LEAD, INITIAL    Collection Time: 01/18/20  6:53 PM   Result Value Ref Range    Ventricular Rate 120 BPM    Atrial Rate 120 BPM    P-R Interval 156 ms    QRS Duration 88 ms    Q-T Interval 316 ms    QTC Calculation (Bezet) 446 ms    Calculated P Axis 54 degrees    Calculated R Axis 20 degrees    Calculated T Axis 39 degrees    Diagnosis       Sinus tachycardia  Otherwise normal ECG  When compared with ECG of 18-JAN-2020 13:50,  No significant change was found  Confirmed by Poncho Kearney MD. ([id number]) on 1/18/2020 10:41:30 PM     CARDIAC PANEL,(CK, CKMB & TROPONIN)    Collection Time: 01/18/20  9:25 PM   Result Value Ref Range    CK 29 (L) 39 - 308 U/L    CK - MB <1.0 <3.6 ng/ml    CK-MB Index  0.0 - 4.0 %     CALCULATION NOT PERFORMED WHEN RESULT IS BELOW LINEAR LIMIT    Troponin-I, QT 0.03 0.0 - 0.045 NG/ML   PTT    Collection Time: 01/19/20  1:07 AM   Result Value Ref Range    aPTT 149.7 (HH) 23.0 - 36.4 SEC   GLUCOSE, POC    Collection Time: 01/19/20  2:11 AM   Result Value Ref Range    Glucose (POC) 263 (H) 70 - 487 mg/dL   METABOLIC PANEL, COMPREHENSIVE    Collection Time: 01/19/20  6:14 AM   Result Value Ref Range Sodium 139 136 - 145 mmol/L    Potassium 3.4 (L) 3.5 - 5.5 mmol/L    Chloride 107 100 - 111 mmol/L    CO2 26 21 - 32 mmol/L    Anion gap 6 3.0 - 18 mmol/L    Glucose 114 (H) 74 - 99 mg/dL    BUN 18 7.0 - 18 MG/DL    Creatinine 1.50 (H) 0.6 - 1.3 MG/DL    BUN/Creatinine ratio 12 12 - 20      GFR est AA 55 (L) >60 ml/min/1.73m2    GFR est non-AA 46 (L) >60 ml/min/1.73m2    Calcium 8.0 (L) 8.5 - 10.1 MG/DL    Bilirubin, total 0.9 0.2 - 1.0 MG/DL    ALT (SGPT) 15 (L) 16 - 61 U/L    AST (SGOT) 17 10 - 38 U/L    Alk. phosphatase 62 45 - 117 U/L    Protein, total 6.2 (L) 6.4 - 8.2 g/dL    Albumin 1.7 (L) 3.4 - 5.0 g/dL    Globulin 4.5 (H) 2.0 - 4.0 g/dL    A-G Ratio 0.4 (L) 0.8 - 1.7     MAGNESIUM    Collection Time: 01/19/20  6:14 AM   Result Value Ref Range    Magnesium 1.8 1.6 - 2.6 mg/dL   CBC WITH AUTOMATED DIFF    Collection Time: 01/19/20  6:14 AM   Result Value Ref Range    WBC 8.4 4.6 - 13.2 K/uL    RBC 2.45 (L) 4.70 - 5.50 M/uL    HGB 7.2 (L) 13.0 - 16.0 g/dL    HCT 21.7 (L) 36.0 - 48.0 %    MCV 88.6 74.0 - 97.0 FL    MCH 29.4 24.0 - 34.0 PG    MCHC 33.2 31.0 - 37.0 g/dL    RDW 13.7 11.6 - 14.5 %    PLATELET 363 433 - 247 K/uL    MPV 8.9 (L) 9.2 - 11.8 FL    NEUTROPHILS 70 40 - 73 %    LYMPHOCYTES 15 (L) 21 - 52 %    MONOCYTES 15 (H) 3 - 10 %    EOSINOPHILS 0 0 - 5 %    BASOPHILS 0 0 - 2 %    ABS. NEUTROPHILS 5.9 1.8 - 8.0 K/UL    ABS. LYMPHOCYTES 1.3 0.9 - 3.6 K/UL    ABS. MONOCYTES 1.2 0.05 - 1.2 K/UL    ABS. EOSINOPHILS 0.0 0.0 - 0.4 K/UL    ABS.  BASOPHILS 0.0 0.0 - 0.1 K/UL    DF AUTOMATED     CARDIAC PANEL,(CK, CKMB & TROPONIN)    Collection Time: 01/19/20  6:14 AM   Result Value Ref Range    CK 27 (L) 39 - 308 U/L    CK - MB <1.0 <3.6 ng/ml    CK-MB Index  0.0 - 4.0 %     CALCULATION NOT PERFORMED WHEN RESULT IS BELOW LINEAR LIMIT    Troponin-I, QT 0.02 0.0 - 0.045 NG/ML   PHOSPHORUS    Collection Time: 01/19/20  6:14 AM   Result Value Ref Range    Phosphorus 2.0 (L) 2.5 - 4.9 MG/DL   CALCIUM, IONIZED Collection Time: 01/19/20  6:14 AM   Result Value Ref Range    Ionized Calcium 1.10 (L) 1.12 - 1.32 MMOL/L   GLUCOSE, POC    Collection Time: 01/19/20  7:17 AM   Result Value Ref Range    Glucose (POC) 124 (H) 70 - 110 mg/dL           No results for input(s): FIO2I, IFO2, HCO3I, IHCO3, HCOPOC, PCO2I, PCOPOC, IPHI, PHI, PHPOC, PO2I, PO2POC in the last 72 hours. No lab exists for component: IPOC2    All Micro Results     Procedure Component Value Units Date/Time    CULTURE, BLOOD [[id number]] Collected:  01/18/20 1518    Order Status:  Completed Specimen:  Blood Updated:  01/19/20 0701     Special Requests: NO SPECIAL REQUESTS        Culture result: NO GROWTH AFTER 15 HOURS       CULTURE, BLOOD [[id number]] Collected:  01/18/20 1355    Order Status:  Completed Specimen:  Blood Updated:  01/19/20 0701     Special Requests: NO SPECIAL REQUESTS        Culture result: NO GROWTH AFTER 15 HOURS       LEGIONELLA PNEUMOPHILA AG, URINE [[id number]] Collected:  01/18/20 0000    Order Status:  Completed Specimen:  Urine Updated:  01/19/20 0030    STREP PNEUMO Philly Moons [[id number]] Collected:  01/18/20 0000    Order Status:  Completed Specimen:  Urine Updated:  01/19/20 0030    CULTURE, Elvia Walsh STAIN [[id number]] Collected:  01/18/20 2015    Order Status:  Completed Specimen:  Wound from Foot Updated:  01/18/20 2104    INFLUENZA A & B AG (RAPID TEST) [[id number]] Collected:  01/18/20 1630    Order Status:  Completed Specimen:  Nasopharyngeal from Nasal washing Updated:  01/18/20 1653     Influenza A Antigen NEGATIVE         Comment: A negative result does not exclude influenza virus infection, seasonal or H1N1 due to suboptimal sensitivity. If influenza is circulating in your community, a diagnosis of influenza should be considered based on a patients clinical presentation and empiric antiviral treatment should be considered, if indicated.         Influenza B Antigen NEGATIVE        INFLUENZA A & B AG (RAPID TEST) [[id number]] Order Status:  Canceled Specimen:  Nasopharyngeal           Telemetry: normal sinus rhythm    1/5/20 ECHO  · Left Ventricle: Normal cavity size and systolic function (ejection fraction normal). Mild concentric hypertrophy . The estimated ejection fraction is 55 - 60%. There is mild (grade 1) left ventricular diastolic dysfunction U/Y'[id number]=1.39.  · Tricuspid Valve: Normal valve structure and no stenosis. Trace regurgitation. Pulmonary arterial systolic pressure is 36.8 mmHg. Imaging:  [x]I have personally reviewed the patients chest radiographs images and report     Results from Hospital Encounter encounter on 01/18/20   XR CHEST PORT    Narrative EXAM: CHEST RADIOGRAPH    CLINICAL INDICATION/HISTORY: tachy  -Additional: None    COMPARISON: January 15, 2020    TECHNIQUE: Portable frontal view of the chest    _______________    FINDINGS:    SUPPORT DEVICES: A right upper extremity PICC is present. HEART AND MEDIASTINUM: No appreciable cardiomegaly. Remaining mediastinal  contours within normal limits. LUNGS AND PLEURAL SPACES: No consolidation, mass, or pleural effusion. BONY THORAX AND SOFT TISSUES: Unremarkable.    _______________      Impression IMPRESSION:    No active cardiopulmonary disease. Results from [place] encounter on 01/18/20   CTA CHEST W OR W WO CONT    Narrative EXAM: CTA chest    CLINICAL INDICATION/HISTORY:  Pain. COMPARISON: None    TECHNIQUE: Axial CT imaging from the thoracic inlet through the diaphragm with  intravenous contrast. Coronal and sagittal MIP reformats were generated. One or more dose reduction techniques were used on this CT: automated exposure  control, adjustment of the mAs and/or kVp according to patient size, and  iterative reconstruction techniques. The specific techniques used on this CT  exam have been documented in the patient's electronic medical record.   Digital  Imaging and Communications in Medicine (DICOM) format image data are available  to nonaffiliated external healthcare facilities or entities on a secure, media  free, reciprocally searchable basis with patient authorization for at least a  12-month period after this study. _______________    FINDINGS:    EXAM QUALITY: Adequate opacification of the pulmonary arteries. PULMONARY ARTERIES: No evidence of pulmonary embolism. MEDIASTINUM: Normal heart size. No evidence of right heart strain. Small  pericardial effusion. VASCULATURE: Right PICC in situ. There is perivascular inflammation of the right  subclavian vein. 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 LUNGS: Bilateral subsegmental atelectasis. At the right upper lobe, there is  persistent rounded opacity, unchanged from the previous exam measuring  approximately 1.5 cm in diameter. PLEURA: There are bilateral pleural effusions, small on the left moderate on the  right. AIRWAY: Normal.    LYMPH NODES: No enlarged nodes. UPPER ABDOMEN: Unremarkable. OTHER: No acute or aggressive osseous abnormalities identified. SUPERFICIAL SOFT TISSUES: Unremarkable.    _______________      Impression IMPRESSION:    1. 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 Thrombophlebitis related to prior PICC line placement suspected. 2. No evidence of pulmonary embolism. 3. Bilateral pleural effusions, right greater than left with associated  bilateral subsegmental atelectasis in the dependent lungs. 4. Nonspecific rounded opacity in the right upper lobe, potentially rounded  atelectasis given underlying pleural disease. This area can be followed on  subsequent imaging to ensure resolution. Critical results discussed with Dr. Rafael Garzon at 6:00 PM on 01/18/2020.        [x]See my orders for details    My assessment, plan of care, findings, medications, side effects etc were discussed with:  [x]nursing []PT/OT    []respiratory therapy []Dr. Yuli Solares [x]Patient     [x]Total critical care time exclusive of procedures 50 minutes with complex decision making performed and > 50% time spent in face to face evaluation.     Nils Riddle MD

## 2020-01-19 NOTE — PROGRESS NOTES
Heparin Monitoring  DVT / PE / AFIB    Patient Name: Sherin Kim   YOB: 1945   Medical Record Number: [id number]   Date of Admission: 1/18/2020    Progress Note: 1/19/2020 1:54 PM     Assessment This Admission:   Active Problems:    DVT (deep venous thrombosis) ([facility] [address] (1/18/2020)        Labs:   Latest aPTT:  Lab Results   Component Value Date/Time    aPTT 174.5 () 01/19/2020 09:05 AM      72-hr Hx Hematology:  Recent Labs     01/19/20  0905 01/19/20  0614 01/18/20  1355   HGB 7.0* 7.2* 6.8*   HCT 21.3* 21.7* 20.5*    302 337       Pharmacist hereby verifies that the following action has been completed by nursing  according to the DVT/PE/AFib Heparin Protocol:     PTT greater than 141 seconds  ==>  RN *HELD* infusion for 1 hour.   Resumed Heparin Drip and decreased rate by 3 units/kg/hr.    --------------------------------------  Notes:  New rate 12 units/kg/hr  Will call nurse for friendly reminder to order PTT lab      [address] Pharmacist  [phone]

## 2020-01-19 NOTE — PROGRESS NOTES
Hospitalist Progress Note    Patient: Yaw Hedrick MRN: [id number]  CSN: [id number]    YOB: 1945  Age: 76 y.o. Sex: male    DOA: 1/18/2020 LOS:  LOS: 1 day            Assessment/Plan   1. UE DVT w occlusion of SVC  2. Pneumonia  3. Recent GBstrep sepsis on ivanz  4. OM foot  5. DM2  6. hyponatremia    Plan:  - continue broad spectrum abx  - heparin gtt  - vascular & pulmonary consulted  - replete electrolytes per protocol  - full code    Patient Active Problem List   Diagnosis Code    Sepsis ([facility] A41.9    Abdominal pain R10.9    CKD (chronic kidney disease) N18.9    Type 2 diabetes mellitus, with long-term current use of insulin ([facility] [address] E11.9, Z79.4    Hypertension I10    Hypokalemia E87.6    UTI (urinary tract infection) N39.0    Positive blood culture R78.81    Foot abscess, right L02.611    Corn of foot L84    Sepsis due to Streptococcus agalactiae ([facility]) A40.1    Diabetic ulcer of right midfoot associated with type 2 diabetes mellitus, with fat layer exposed ([facility] [address] E11.621, L97.412    PAD (peripheral artery disease) ([facility]) I73.9    Osteomyelitis of right foot ([facility]) M86.9    Acute deep vein thrombosis (DVT) ([facility]) I82.409    DVT (deep venous thrombosis) ([facility]) I82.409               Subjective:    cc: arm pain  Pt complains of pain in his arm w movement  Denies dyspena, chest pain, fever or chills      REVIEW OF SYSTEMS:  General: No fevers or chills. Cardiovascular: No chest pain or pressure. No palpitations. Pulmonary: No shortness of breath. Gastrointestinal: No nausea, vomiting. Objective:        Vital signs/Intake and Output:  Visit Vitals  /63   Pulse 93   Temp 98.9 °F (37.2 °C)   Resp 17   Ht 5' 7\" (1.702 m)   Wt 75.2 kg (165 lb 12.6 oz)   SpO2 93%   BMI 25.97 kg/m²     Current Shift:  No intake/output data recorded. Last three shifts:  01/17 1901 - 01/19 0700  In: 2333.7 [P.O.:100;  I.V.:1922.4]  Out: 375 [Urine:375]    Body mass index is 25.97 kg/m².     Physical Exam:  GEN: Alert and oriented times three NAD  EYES: conjunctiva normal, lids with out lesions  HEENT: MMM, No thyromegaly, no lymphadenopathy  HEART: RRR +S1 +S2, no JVD, pulses 2+ distally  LUNGS: CTA B/L no wheezes, rales or rhonchi  ABDOMEN: + BS, soft NT/ND no organomegaly,  No rebound  EXTREMITIES: +RUE edema no cyanosis, cap refill normal   SKIN: no rashes or skin breakdown, no nodules, normal turgor  Current Facility-Administered Medications   Medication Dose Route Frequency    insulin lispro (HUMALOG) injection   SubCUTAneous AC&HS    [START ON 1/20/2020] levoFLOXacin (LEVAQUIN) 750 mg in D5W IVPB  750 mg IntraVENous Q48H    Vancomycin-Pharmacy to dose  1 Each Other Rx Dosing/Monitoring    [START ON 1/20/2020] vancomycin (VANCOCIN) 1,000 mg in 0.9% sodium chloride 250 mL (VIAL-MATE)  1,000 mg IntraVENous Q24H    oxyCODONE IR (ROXICODONE) tablet 5 mg  5 mg Oral Q4H PRN    0.9% sodium chloride infusion 250 mL  250 mL IntraVENous PRN    heparin 25,000 units in D5W 250 ml infusion  18-36 Units/kg/hr IntraVENous TITRATE    atorvastatin (LIPITOR) tablet 20 mg  20 mg Oral DAILY    insulin glargine (LANTUS) injection 25 Units  25 Units SubCUTAneous DAILY    losartan (COZAAR) tablet 100 mg  100 mg Oral DAILY    polyethylene glycol (MIRALAX) packet 17 g  17 g Oral DAILY    morphine injection 1 mg  1 mg IntraVENous Q3H PRN    labetaloL (NORMODYNE;TRANDATE) 20 mg/4 mL (5 mg/mL) injection 20 mg  20 mg IntraVENous QID PRN    hydrALAZINE (APRESOLINE) 20 mg/mL injection 10 mg  10 mg IntraVENous Q6H PRN    pantoprazole (PROTONIX) injection 40 mg  40 mg IntraVENous Q24H    acetaminophen (TYLENOL) tablet 650 mg  650 mg Oral Q4H PRN    piperacillin-tazobactam (ZOSYN) 4.5 g in 0.9% sodium chloride (MBP/ADV) 100 mL MBP  4.5 g IntraVENous Q8H    ELECTROLYTE REPLACEMENT PROTOCOL - Potassium Renal Dosing  1 Each Other PRN    ELECTROLYTE REPLACEMENT PROTOCOL - Magnesium   1 Each Other PRN    ELECTROLYTE REPLACEMENT PROTOCOL  - Phosphorus Renal Dosing  1 Each Other PRN    ELECTROLYTE REPLACEMENT PROTOCOL - Calcium   1 Each Other PRN    ertapenem (INVANZ) 1 g in 0.9% sodium chloride (MBP/ADV) 50 mL MBP  1 g IntraVENous DAILY     Facility-Administered Medications Ordered in Other Encounters   Medication Dose Route Frequency    sodium chloride (NS) flush 5-10 mL  5-10 mL IntraVENous PRN    sodium chloride (NS) flush 10-40 mL  10-40 mL IntraVENous PRN    heparin (porcine) pf 500 Units  500 Units InterCATHeter PRN    sodium chloride (NS) flush 10-40 mL  10-40 mL IntraVENous PRN    heparin (porcine) pf 500 Units  500 Units InterCATHeter PRN    sodium chloride (NS) flush 5-10 mL  5-10 mL IntraVENous PRN         All the patient's labs over the past 24 hours were reviewed both during my initial daily workflow process and at the time notated as \"note time\" in [employer]. (It is not time stamped separately in this workflow.)  Select labs are listed below.         Labs: Results:       Chemistry Recent Labs     01/19/20  0614 01/18/20  1355   * 320*    133*   K 3.4* 3.7    99*   CO2 26 28   BUN 18 19*   CREA 1.50* 1.46*   CA 8.0* 8.1*   AGAP 6 6   BUCR 12 13   AP 62 73   TP 6.2* 6.7   ALB 1.7* 1.9*   GLOB 4.5* 4.8*   AGRAT 0.4* 0.4*      CBC w/Diff Recent Labs     01/19/20  0905 01/19/20  0614 01/18/20  1355   WBC 8.9 8.4 11.1   RBC 2.40* 2.45* 2.30*   HGB 7.0* 7.2* 6.8*   HCT 21.3* 21.7* 20.5*    302 337   GRANS 73 70 80*   LYMPH 11* 15* 8*   EOS 1 0 0      Cardiac Enzymes Recent Labs     01/19/20  1137 01/19/20  0614   CPK 27* 27*   CKND1 CALCULATION NOT PERFORMED WHEN RESULT IS BELOW LINEAR LIMIT CALCULATION NOT PERFORMED WHEN RESULT IS BELOW LINEAR LIMIT      Coagulation Recent Labs     01/19/20  0905 01/19/20  0107 01/18/20  1810   PTP  --   --  31.3*   INR  --   --  3.1*   APTT 174.5* 149.7* 57.4*               Liver Enzymes Recent Labs     01/19/20  0614   TP 6.2* ALB 1.7*   AP 62   SGOT 17      Thyroid Studies Lab Results   Component Value Date/Time    TSH 1.86 01/19/2020 06:14 AM        Procedures/imaging: see electronic medical records for all procedures/Xrays and details which were not copied into this note but were reviewed prior to creation of Luis Nascimento DO  Internal Medicine/Geriatrics

## 2020-01-19 NOTE — PROGRESS NOTES
Pharmacy Dosing Services: Vancomycin    Consult for Vancomycin Dosing by Pharmacy by Dr. Tammy Brown provided for this 76y.o. year old male , for indication of HCAP. Day of Therapy 1    Ht Readings from Last 1 Encounters:   01/18/20 170.2 cm (67\")        Wt Readings from Last 1 Encounters:   01/18/20 75.2 kg (165 lb 12.6 oz)        Other Current Antibiotics Ertapenem 1g IV; Levofloxacin 750mg IV;  Zosyn 4.5g IV   Significant Cultures pending   Serum Creatinine Lab Results   Component Value Date/Time    Creatinine 1.46 (H) 01/18/2020 01:55 PM      Creatinine Clearance Estimated Creatinine Clearance: 41.5 mL/min (A) (based on SCr of 1.46 mg/dL (H)). BUN Lab Results   Component Value Date/Time    BUN 19 (H) 01/18/2020 01:55 PM      WBC Lab Results   Component Value Date/Time    WBC 11.1 01/18/2020 01:55 PM      H/H Lab Results   Component Value Date/Time    HGB 6.8 (L) 01/18/2020 01:55 PM      Platelets Lab Results   Component Value Date/Time    PLATELET 832 08/60/2361 01:55 PM      Temp 99.1 °F (37.3 °C)     Start Vancomycin therapy, with loading dose of 2000mg IV for one dose on 1/18/20   Follow with maintenance dose of vancomycin 1000mg IV q24h    Dose calculated to approximate a therapeutic trough of 15-20mcg/mL. Pharmacy to follow daily and will make changes to dose and/or frequency based on clinical status.     Pharmacist Rogelio Luna [phone]

## 2020-01-19 NOTE — PROGRESS NOTES
Zosyn (Piperacillin/Tazobactam) Extended Infusion Franklin Anderson, a 76 y.o. yo male, has been converted to an extended infusion of Zosyn while in the intensive care unit. Extended infusions will run over 4 hours (240 minutes). Dose adjusted to: Zosyn 4.5 grams IV q8h 
  
(prior dose: Zosyn 4.5 grams IV q6h) Recent Labs  
  01/18/20 
1355 CREA 1.46* Ht Readings from Last 1 Encounters:  
01/18/20 170.2 cm (67\") Wt Readings from Last 1 Encounters:  
01/18/20 75.2 kg (165 lb 12.6 oz) CrCl : Serum creatinine: 1.46 mg/dL (H) 01/18/20 1355 Estimated creatinine clearance: 41.5 mL/min (A) Renal adjustment of extended infusion of Zosyn 3.375 or 4.5 gm every 8 hours for CrCl >/= 20 ml/min 3.375 or 4.5 gm every 12 hours for CrCl < 20 ml/min, intermittent HD or PD Rosa Escobar, PharmD 
[phone]

## 2020-01-19 NOTE — PROGRESS NOTES
Pharmacy Dosing Services: Levofloxacin    Pharmacist Renal Dosing Progress Note for Levofloxacin 750mg IV   Physician: Dr. Gely Hernandez    Levofloxacin 750mg IV q24h was automatically dose-adjusted per THE [facility] P&T Committee Protocol, with respect to renal function. New Dose: 750mg IV q48h    Consult provided for this  76 y.o. ,male , for the indication of HCAP    Pt Weight:   Wt Readings from Last 1 Encounters:   01/18/20 75.2 kg (165 lb 12.6 oz)         Serum Creatinine Lab Results   Component Value Date/Time    Creatinine 1.46 (H) 01/18/2020 01:55 PM       Creatinine Clearance Estimated Creatinine Clearance: 41.5 mL/min (A) (based on SCr of 1.46 mg/dL (H)). BUN Lab Results   Component Value Date/Time    BUN 19 (H) 01/18/2020 01:55 PM           Pharmacy to continue to monitor patient daily. Will make dosage adjustments based upon changing renal function.   Signed Nagi Forbes information: [phone]

## 2020-01-19 NOTE — ED NOTES
TRANSFER - OUT REPORT:    Verbal report given to Fernanda RN(name) on Leyla Anna  being transferred to ICU(unit) for routine progression of care       Report consisted of patients Situation, Background, Assessment and   Recommendations(SBAR). Information from the following report(s) SBAR, Kardex, ED Summary, MAR, Recent Results and Cardiac Rhythm sinus tach was reviewed with the receiving nurse. Lines:   PICC Double Lumen 03/69/67 Right;Basilic (Active)       Peripheral IV 01/18/20 Left Antecubital (Active)   Site Assessment Clean, dry, & intact 1/18/2020  5:05 PM   Phlebitis Assessment 0 1/18/2020  5:05 PM   Infiltration Assessment 0 1/18/2020  5:05 PM   Dressing Status Clean, dry, & intact 1/18/2020  5:05 PM   Dressing Type Transparent 1/18/2020  5:05 PM   Hub Color/Line Status Green 1/18/2020  5:05 PM   Action Taken Wrapped 1/18/2020  5:05 PM   Alcohol Cap Used No 1/18/2020  5:05 PM        Opportunity for questions and clarification was provided.       Patient transported with:   Monitor  Registered Nurse  Tech

## 2020-01-19 NOTE — PROGRESS NOTES
200 - Telephone report received at this time from [facility] 115 - Admission assessment completed at this time. Patient alert and oriented. 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 RUE (DVT site) hot to touch, red, edematous, and painful. Heparin drip rate verified with Carlee Julian RN (ED RN). 2000 - Dr Shannan Decker in to round on patient. Instructed to hold blood transfusion until pt is afebrile. 2200 - Patient assisted to [facility] for small BM.     0000 - Reassessment completed at this time. 0145 - Heparin drip stopped at this time per protocol due to critical ptt of 149.7.     0211 - .     0215 - Telephone orders received to place patient on sliding scale/corrective insulin. Current temp: 99.3, orders also received to transfuse 1 unit of PRBCs at this time. 0247 - Heparin drip restarted at this time; verified with Danny Montana RN.     6945 - Notified by blood bank that unit of PRBCs is available for pickup.     0305 - Transfusion of PRBCs initiated; verified with Gurinder Lopez RN.     0320 - No signs of transfusion reaction. 0330 - Patient remains asymptomatic of transfusion reaction; will continue to monitor. 0400 - Reassessment completed, no changes to previous assessment. 0545 - Unit of PRBCs completed at this time, no signs/symptoms of transfusion reaction noted. 0710 - Bedside and Verbal shift change report given to Sabiha Domignuez RN (oncoming nurse) by Jeff Allen RN (offgoing nurse). Report included the following information SBAR, Kardex, ED Summary, Procedure Summary, Intake/Output, MAR, Recent Results, Med Rec Status and Cardiac Rhythm NSR. Heparin drip rate verified at this time.

## 2020-01-19 NOTE — PROGRESS NOTES
Pt has been recently seen by me and our practice secondary to lower extremity atherosclerotic vascular disease. He now presents with right arm swelling. He had a right arm PICC line place on 1/13/2020 for outpatient iv antibiotics. Patient had been on Eliquis since his recent discharge. · A right arm venous duplex shows Acute appearing non occlusive thrombus noted in the right internal jugular, subclavian, axillary and brachial vein(s). · Non occlusive superficial thrombophlebitis noted within the right basilic vein. No evidence of DVT in the contralateral internal jugular and proximal subclavian vein. And a ct scan of his chest shows: 1. 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 Thrombophlebitis related to prior PICC line placement suspected. 2. No evidence of pulmonary embolism. 3. Bilateral pleural effusions, right greater than left with associated  bilateral subsegmental atelectasis in the dependent lungs. 4. Nonspecific rounded opacity in the right upper lobe, potentially rounded  atelectasis given underlying pleural disease. This area can be followed on  subsequent imaging to ensure resolution. By exam the right arm is swollen. The right arm is soft. There is no facial or neck swelling. There is a palpable right radial pulse. At this time recommend continuation of heparin qtt, anticoagulation. elevate right arm. Will discuss with attending.

## 2020-01-19 NOTE — H&P
History & Physical    Patient: Shreya Orozco MRN: [id number]  CSN: [id number]    YOB: 1945  Age: 76 y.o. Sex: male      DOA: 1/18/2020    Chief Complaint:   Chief Complaint   Patient presents with    Post-Op Problem          HPI:     Shreya Orozco is a 76 y.o.  male who has history of diabetes, hypertension, and lower extremity DVT. He was admitted for foot abscess and osteomyelitis and was discharged with a PICC line and IVAnd IV intraperitoneum last dose is scheduled for February 20 today prior to getting his infusion his children noticed that he was having increased swelling in the right arm as well as increased pain and erythema he was brought to the emergency room for further evaluation. In the emergency room CTA and ultrasound of the arm showed extensive clot extending into the subclavian and superior vena cava. Patient was started on Eliquis starter pack on discharge and is currently taking 10 mg twice daily his last Eliquis dose was early this morning verified by his daughter. In the emergency room patient was started on a heparin drip  Also found to have new onset anemia with Hemoccult negative stools    Past Medical History:   Diagnosis Date    Diabetes ([facility] [address]     Hypertension        Past Surgical History:   Procedure Laterality Date    HX ORTHOPAEDIC      toe amputation       History reviewed. No pertinent family history. Social History     Socioeconomic History    Marital status: SINGLE     Spouse name: Not on file    Number of children: Not on file    Years of education: Not on file    Highest education level: Not on file   Tobacco Use    Smoking status: Never Smoker    Smokeless tobacco: Never Used   Substance and Sexual Activity    Alcohol use: No    Drug use: No       Prior to Admission medications    Medication Sig Start Date End Date Taking?  Authorizing Provider   LORazepam (ATIVAN) 1 mg tablet Take 1 mg by mouth every four (4) hours as needed for Anxiety (one tab prior to hyperbaric oxygen treatment). Provider, Historical   apixaban (ELIQUIS) 5 mg (74 tabs) starter pack Take 10 mg (two 5 mg tablets) by mouth twice a day for 7 days   Followed by 5 mg (one 5 mg tablet) by mouth twice a day 1/14/20   Erin Patel MD   ertapenem 1 gram 1 g IVPB 1 g by IntraVENous route every twenty-four (24) hours. 1/13/20   Erin Patel MD   insulin glargine (LANTUS SOLOSTAR U-100 INSULIN) 100 unit/mL (3 mL) inpn 25 Units by SubCUTAneous route. Mishel Barlow MD   metFORMIN ER (GLUCOPHAGE XR) 750 mg tablet Take 750 mg by mouth daily. Mishel Barlow MD   atorvastatin (LIPITOR) 20 mg tablet Take 20 mg by mouth daily. Mishel Barlow MD   aspirin 81 mg chewable tablet Take 81 mg by mouth daily. Mishel Barlow MD   polyethylene glycol (MIRALAX) 17 gram/dose powder Take 17 g by mouth daily. 1 tablespoon with 8 oz of water daily 12/10/19   Sudheer Chávez MD   LOSARTAN PO Take 100 mg by mouth. Mishel Barlow MD       No Known Allergies      Review of Systems  GENERAL: Patient alert, awake and oriented times 3, able to communicate brief sentences and not in distress he speaks American. HEENT: No change in vision, no earache, tinnitus, sore throat or sinus congestion. NECK: No pain or stiffness. PULMONARY: No shortness of breath, +cough +wheeze. Cardiovascular: no pnd or orthopnea, no CP  GASTROINTESTINAL: No abdominal pain, nausea, vomiting or diarrhea, melena or bright red blood per rectum. GENITOURINARY: No urinary frequency, urgency, hesitancy or dysuria. MUSCULOSKELETAL: + joint or muscle pain, no back pain, no recent trauma. DERMATOLOGIC: +rash, no itching, +lesions. Fifth toe amputation  ENDOCRINE: No polyuria, polydipsia, no heat or cold intolerance. No recent change in weight. HEMATOLOGICAL: +anemia or easy bruising or bleeding. NEUROLOGIC: No headache, seizures, numbness, tingling or +weakness.        Physical Exam:     Physical Exam:  Visit Vitals  BP 187/62   Pulse (!) 118   Temp (!) 100.9 °F (38.3 °C)   Resp 20   Ht 5' 7\" (1.702 m)   Wt 72.6 kg (160 lb)   SpO2 99%   BMI 25.06 kg/m²      O2 Device: Room air    Temp (24hrs), Av °F (37.8 °C), Min:98.7 °F (37.1 °C), Max:101.1 °F (38.4 °C)    No intake/output data recorded.  07 -  1900  In: 1000 [I.V.:1000]  Out: -     General:  Alert, cooperative, no distress, appears stated age. Head: Normocephalic, without obvious abnormality, atraumatic. Eyes:  Conjunctivae/corneas clear. PERRL, EOMs intact. Nose: Nares normal. No drainage or sinus tenderness. Neck: Supple, symmetrical, trachea midline, no adenopathy, thyroid: no enlargement, no carotid bruit and no JVD. Lungs:   Clear to auscultation bilaterally. Diminished breath sounds    Heart:  Regular rate and rhythm, S1, S2 normal. Tachycardia      Abdomen: Soft, non-tender. Bowel sounds normal.    Extremities: Extremities normal, atraumatic, no cyanosis or edema. Pulses: 2+ and symmetric all extremities. Skin:  right arm warm with swelling and erythema  Right foot warm bandage c/d/i    Neurologic: AAOx3, No focal motor or sensory deficit. Labs Reviewed: All lab results for the last 24 hours reviewed.   Recent Results (from the past 24 hour(s))   EKG, 12 LEAD, INITIAL    Collection Time: 20  1:50 PM   Result Value Ref Range    Ventricular Rate 120 BPM    Atrial Rate 120 BPM    P-R Interval 160 ms    QRS Duration 82 ms    Q-T Interval 302 ms    QTC Calculation (Bezet) 426 ms    Calculated P Axis 52 degrees    Calculated R Axis 34 degrees    Calculated T Axis 41 degrees    Diagnosis       Sinus tachycardia  Otherwise normal ECG  When compared with ECG of 15-SUE-2020 16:52,  No significant change was found     CBC WITH AUTOMATED DIFF    Collection Time: 20  1:55 PM   Result Value Ref Range    WBC 11.1 4.6 - 13.2 K/uL    RBC 2.30 (L) 4.70 - 5.50 M/uL    HGB 6.8 (L) 13.0 - 16.0 g/dL    HCT 20.5 (L) 36.0 - 48.0 % MCV 89.1 74.0 - 97.0 FL    MCH 29.6 24.0 - 34.0 PG    MCHC 33.2 31.0 - 37.0 g/dL    RDW 13.7 11.6 - 14.5 %    PLATELET 714 111 - 305 K/uL    MPV 8.9 (L) 9.2 - 11.8 FL    NEUTROPHILS 80 (H) 40 - 73 %    LYMPHOCYTES 8 (L) 21 - 52 %    MONOCYTES 12 (H) 3 - 10 %    EOSINOPHILS 0 0 - 5 %    BASOPHILS 0 0 - 2 %    ABS. NEUTROPHILS 8.9 (H) 1.8 - 8.0 K/UL    ABS. LYMPHOCYTES 0.9 0.9 - 3.6 K/UL    ABS. MONOCYTES 1.3 (H) 0.05 - 1.2 K/UL    ABS. EOSINOPHILS 0.0 0.0 - 0.4 K/UL    ABS. BASOPHILS 0.0 0.0 - 0.1 K/UL    RBC COMMENTS NORMOCYTIC, NORMOCHROMIC      DF AUTOMATED     METABOLIC PANEL, COMPREHENSIVE    Collection Time: 01/18/20  1:55 PM   Result Value Ref Range    Sodium 133 (L) 136 - 145 mmol/L    Potassium 3.7 3.5 - 5.5 mmol/L    Chloride 99 (L) 100 - 111 mmol/L    CO2 28 21 - 32 mmol/L    Anion gap 6 3.0 - 18 mmol/L    Glucose 320 (H) 74 - 99 mg/dL    BUN 19 (H) 7.0 - 18 MG/DL    Creatinine 1.46 (H) 0.6 - 1.3 MG/DL    BUN/Creatinine ratio 13 12 - 20      GFR est AA 57 (L) >60 ml/min/1.73m2    GFR est non-AA 47 (L) >60 ml/min/1.73m2    Calcium 8.1 (L) 8.5 - 10.1 MG/DL    Bilirubin, total 0.7 0.2 - 1.0 MG/DL    ALT (SGPT) 17 16 - 61 U/L    AST (SGOT) 13 10 - 38 U/L    Alk.  phosphatase 73 45 - 117 U/L    Protein, total 6.7 6.4 - 8.2 g/dL    Albumin 1.9 (L) 3.4 - 5.0 g/dL    Globulin 4.8 (H) 2.0 - 4.0 g/dL    A-G Ratio 0.4 (L) 0.8 - 1.7     CARDIAC PANEL,(CK, CKMB & TROPONIN)    Collection Time: 01/18/20  1:55 PM   Result Value Ref Range    CK 30 (L) 39 - 308 U/L    CK - MB <1.0 <3.6 ng/ml    CK-MB Index  0.0 - 4.0 %     CALCULATION NOT PERFORMED WHEN RESULT IS BELOW LINEAR LIMIT    Troponin-I, QT <0.02 0.0 - 0.045 NG/ML   LACTIC ACID    Collection Time: 01/18/20  3:18 PM   Result Value Ref Range    Lactic acid 1.5 0.4 - 2.0 MMOL/L   TYPE + CROSSMATCH    Collection Time: 01/18/20  4:15 PM   Result Value Ref Range    Crossmatch Expiration 01/21/2020     ABO/Rh(D) A POSITIVE     Antibody screen NEG     Unit number [id number]     Blood component type [facility]     Unit division 00     Status of unit ISSUED     Crossmatch result Compatible    OCCULT BLOOD, STOOL    Collection Time: 01/18/20  4:25 PM   Result Value Ref Range    Occult blood, stool NEGATIVE  NEG     INFLUENZA A & B AG (RAPID TEST)    Collection Time: 01/18/20  4:30 PM   Result Value Ref Range    Influenza A Antigen NEGATIVE  NEG      Influenza B Antigen NEGATIVE  NEG     URINALYSIS W/ RFLX MICROSCOPIC    Collection Time: 01/18/20  5:25 PM   Result Value Ref Range    Color BECCA      Appearance TURBID      Specific gravity 1.022 1.005 - 1.030      pH (UA) 5.0 5.0 - 8.0      Protein 300 (A) NEG mg/dL    Glucose >1,000 (A) NEG mg/dL    Ketone NEGATIVE  NEG mg/dL    Bilirubin NEGATIVE  NEG      Blood LARGE (A) NEG      Urobilinogen 0.2 0.2 - 1.0 EU/dL    Nitrites NEGATIVE  NEG      Leukocyte Esterase NEGATIVE  NEG     URINE MICROSCOPIC ONLY    Collection Time: 01/18/20  5:25 PM   Result Value Ref Range    WBC 1 to 3 0 - 5 /hpf    RBC TOO NUMEROUS TO COUNT 0 - 5 /hpf    Epithelial cells FEW 0 - 5 /lpf    Bacteria FEW (A) NEG /hpf    Mucus FEW (A) NEG /lpf    Amorphous Crystals 2+ (A) NEG    Hyaline cast 0 to 1 0 - 2 /lpf    Granular cast 0 to 1 NEG /lpf   PTT    Collection Time: 01/18/20  6:10 PM   Result Value Ref Range    aPTT 57.4 (H) 23.0 - 36.4 SEC   PROTHROMBIN TIME + INR    Collection Time: 01/18/20  6:10 PM   Result Value Ref Range    Prothrombin time 31.3 (H) 11.5 - 15.2 sec    INR 3.1 (H) 0.8 - 1.2     TRANSFUSION REACTION    Collection Time: 01/18/20  6:10 PM   Result Value Ref Range    Unit Number [id number]     Clerical Errors NO     Pre-txfusion hemolysis: NO     Post-txfusion hemolysis: NO     Blood bag hemolysis: NO     Direct Grec[place],pre-txfusion NEG     Direct Grecia,post-txfusion NEG     PATHOLOGIST INTERP: PENDING     Component Type RED CELLS     Blood type,post-txn A  POS       ABO/Rh(D),Pre-txfsn A  POS      EKG, 12 LEAD, INITIAL    Collection Time: 01/18/20  6:53 PM   Result Value Ref Range    Ventricular Rate 120 BPM    Atrial Rate 120 BPM    P-R Interval 156 ms    QRS Duration 88 ms    Q-T Interval 316 ms    QTC Calculation (Bezet) 446 ms    Calculated P Axis 54 degrees    Calculated R Axis 20 degrees    Calculated T Axis 39 degrees    Diagnosis       Sinus tachycardia  Otherwise normal ECG  When compared with ECG of 18-JAN-2020 13:50,  No significant change was found      and EKG    Procedures/imaging: see electronic medical records for all procedures/Xrays and details which were not copied into this note but were reviewed prior to creation of Plan      Assessment/Plan     Active Problems:   1. DVT (deep venous thrombosis) ([facility] [address] (1/18/2020)   2. Right Upper Extremity    3. Right Upper Infiltrate /Ateletesis   4. Sinus Tachycardia   5.. Lower Extremity DVT   6. Foot Abscess /Osteosmyelitis   7. Hyponatremia             CV:   Sinus tach  Hypertensive  Will start  home losartan   labetalol and hydralazine PRN    Pulmonary:  Right upper lobe infiltrate versus atelectasis encourage pulmonary toilet add Zosyn to current regimen as well as Vancomycin  Check urine legionella streptococcal antigen     Heme:  Upper extremity and lower extremity DVT on Eliquis now on heparin drip last dose 10 mg this morning verified by daughtervascular following  Anemia transfuse one unit unclear etiology consider ct abdomen for rpb    GI:  Protonix IV    Endo:   DM  Lantus regular dose   sliding scale insulin hold metformin  TSH    ID:  Blood cultures urine cultures on broader antibiotics due to persistent fever  Wound cultures obtained   Adding Vanc Zosyn, and Levauin    FEN:   Hypo natremia   secondary to high glucose   electrolyte protocol     DERM:  Consult Dr cantrell for foot   Wound care consult          DVT/GI Prophylaxis: Hep SQ    Discussed with patient at bedside about hospital admission and my plan care, who understood and agree with my plan care.     Debbie Inman MD [place]  1/18/2020 7:22 PM

## 2020-01-20 ENCOUNTER — APPOINTMENT (OUTPATIENT)
Dept: MRI IMAGING | Age: 75
DRG: 314 | End: 2020-01-20
Attending: HOSPITALIST
Payer: MEDICARE

## 2020-01-20 ENCOUNTER — HOSPITAL ENCOUNTER (OUTPATIENT)
Dept: INFUSION THERAPY | Age: 75
Discharge: HOME | End: 2020-01-20
Payer: MEDICARE

## 2020-01-20 ENCOUNTER — HOSPITAL ENCOUNTER (OUTPATIENT)
Dept: CT IMAGING | Age: 75
Discharge: HOME | DRG: 314 | End: 2020-01-20
Attending: HOSPITALIST
Payer: MEDICARE

## 2020-01-20 PROBLEM — R91.8 LUNG INFILTRATE: Status: ACTIVE | Noted: 2020-01-20

## 2020-01-20 PROBLEM — D64.9 ANEMIA: Status: ACTIVE | Noted: 2020-01-20

## 2020-01-20 PROBLEM — N18.30 STAGE 3 CHRONIC KIDNEY DISEASE (HCC): Status: ACTIVE | Noted: 2020-01-02

## 2020-01-20 LAB
ABO + RH BLD: NORMAL
ABO + RH BLD: NORMAL
ABO/RH(D) PRE-TXN, PRETT: NORMAL
ALBUMIN SERPL-MCNC: 1.7 G/DL (ref 3.4–5)
ALBUMIN/GLOB SERPL: 0.4 {RATIO} (ref 0.8–1.7)
ALP SERPL-CCNC: 77 U/L (ref 45–117)
ALT SERPL-CCNC: 19 U/L (ref 16–61)
ANION GAP SERPL CALC-SCNC: 5 MMOL/L (ref 3–18)
APTT PPP: 96.7 SEC (ref 23–36.4)
AST SERPL-CCNC: 29 U/L (ref 10–38)
BASOPHILS # BLD: 0.1 K/UL (ref 0–0.1)
BASOPHILS NFR BLD: 1 % (ref 0–2)
BILIRUB SERPL-MCNC: 1.3 MG/DL (ref 0.2–1)
BLD PROD TYP BPU: NORMAL
BLOOD BAG HEMOLYSIS,BLBAG: NO
BLOOD GROUP ANTIBODIES SERPL: NORMAL
BPU ID: NORMAL
BUN SERPL-MCNC: 21 MG/DL (ref 7–18)
BUN/CREAT SERPL: 10 (ref 12–20)
CA-I SERPL-SCNC: 1.1 MMOL/L (ref 1.12–1.32)
CALCIUM SERPL-MCNC: 8 MG/DL (ref 8.5–10.1)
CALLED TO:,BCALL1: NORMAL
CHLORIDE SERPL-SCNC: 107 MMOL/L (ref 100–111)
CLERICAL ERRORS,CLERR: NO
CO2 SERPL-SCNC: 28 MMOL/L (ref 21–32)
CREAT SERPL-MCNC: 2.02 MG/DL (ref 0.6–1.3)
CROSSMATCH RESULT,%XM: NORMAL
DAT P TRANSF RBC QL: NORMAL
DAT RBC QL: NORMAL
DIFFERENTIAL METHOD BLD: ABNORMAL
EOSINOPHIL # BLD: 0.1 K/UL (ref 0–0.4)
EOSINOPHIL NFR BLD: 2 % (ref 0–5)
ERYTHROCYTE [DISTWIDTH] IN BLOOD BY AUTOMATED COUNT: 14.3 % (ref 11.6–14.5)
GLOBULIN SER CALC-MCNC: 4.7 G/DL (ref 2–4)
GLUCOSE BLD STRIP.AUTO-MCNC: 106 MG/DL (ref 70–110)
GLUCOSE BLD STRIP.AUTO-MCNC: 123 MG/DL (ref 70–110)
GLUCOSE BLD STRIP.AUTO-MCNC: 143 MG/DL (ref 70–110)
GLUCOSE BLD STRIP.AUTO-MCNC: 154 MG/DL (ref 70–110)
GLUCOSE BLD STRIP.AUTO-MCNC: 68 MG/DL (ref 70–110)
GLUCOSE SERPL-MCNC: 88 MG/DL (ref 74–99)
HCT VFR BLD AUTO: 23.4 % (ref 36–48)
HEMOLYSIS, POST TXN,PSTHE: NO
HEMOLYSIS,PRE TXN,PREHE: NO
HGB BLD-MCNC: 7.8 G/DL (ref 13–16)
INR PPP: 1.9 (ref 0.8–1.2)
LYMPHOCYTES # BLD: 1 K/UL (ref 0.9–3.6)
LYMPHOCYTES NFR BLD: 14 % (ref 21–52)
MAGNESIUM SERPL-MCNC: 2.2 MG/DL (ref 1.6–2.6)
MCH RBC QN AUTO: 29.4 PG (ref 24–34)
MCHC RBC AUTO-ENTMCNC: 33.3 G/DL (ref 31–37)
MCV RBC AUTO: 88.3 FL (ref 74–97)
MONOCYTES # BLD: 1.1 K/UL (ref 0.05–1.2)
MONOCYTES NFR BLD: 14 % (ref 3–10)
NEUTS SEG # BLD: 5.1 K/UL (ref 1.8–8)
NEUTS SEG NFR BLD: 69 % (ref 40–73)
PATH REV BLD -IMP: NORMAL
PHOSPHATE SERPL-MCNC: 3.5 MG/DL (ref 2.5–4.9)
PLATELET # BLD AUTO: 300 K/UL (ref 135–420)
PMV BLD AUTO: 8.9 FL (ref 9.2–11.8)
POTASSIUM SERPL-SCNC: 4 MMOL/L (ref 3.5–5.5)
PROT SERPL-MCNC: 6.4 G/DL (ref 6.4–8.2)
PROTHROMBIN TIME: 21.8 SEC (ref 11.5–15.2)
RBC # BLD AUTO: 2.65 M/UL (ref 4.7–5.5)
SODIUM SERPL-SCNC: 140 MMOL/L (ref 136–145)
SPECIMEN EXP DATE BLD: NORMAL
STATUS OF UNIT,%ST: NORMAL
UNIT DIVISION, %UDIV: 0
UNIT NUMBER,UN: NORMAL
WBC # BLD AUTO: 7.4 K/UL (ref 4.6–13.2)

## 2020-01-20 PROCEDURE — 65660000000 HC RM CCU STEPDOWN

## 2020-01-20 PROCEDURE — 82962 GLUCOSE BLOOD TEST: CPT

## 2020-01-20 PROCEDURE — 74011250636 HC RX REV CODE- 250/636: Performed by: EMERGENCY MEDICINE

## 2020-01-20 PROCEDURE — 74011250636 HC RX REV CODE- 250/636: Performed by: INTERNAL MEDICINE

## 2020-01-20 PROCEDURE — 74011000258 HC RX REV CODE- 258: Performed by: INTERNAL MEDICINE

## 2020-01-20 PROCEDURE — 85610 PROTHROMBIN TIME: CPT

## 2020-01-20 PROCEDURE — 74176 CT ABD & PELVIS W/O CONTRAST: CPT

## 2020-01-20 PROCEDURE — 82330 ASSAY OF CALCIUM: CPT

## 2020-01-20 PROCEDURE — C9113 INJ PANTOPRAZOLE SODIUM, VIA: HCPCS | Performed by: INTERNAL MEDICINE

## 2020-01-20 PROCEDURE — 85730 THROMBOPLASTIN TIME PARTIAL: CPT

## 2020-01-20 PROCEDURE — 74011250637 HC RX REV CODE- 250/637: Performed by: INTERNAL MEDICINE

## 2020-01-20 PROCEDURE — 73718 MRI LOWER EXTREMITY W/O DYE: CPT

## 2020-01-20 PROCEDURE — 74011000250 HC RX REV CODE- 250: Performed by: INTERNAL MEDICINE

## 2020-01-20 PROCEDURE — 83735 ASSAY OF MAGNESIUM: CPT

## 2020-01-20 PROCEDURE — 73721 MRI JNT OF LWR EXTRE W/O DYE: CPT

## 2020-01-20 PROCEDURE — 84100 ASSAY OF PHOSPHORUS: CPT

## 2020-01-20 PROCEDURE — 85025 COMPLETE CBC W/AUTO DIFF WBC: CPT

## 2020-01-20 PROCEDURE — 74011636637 HC RX REV CODE- 636/637: Performed by: INTERNAL MEDICINE

## 2020-01-20 PROCEDURE — 80053 COMPREHEN METABOLIC PANEL: CPT

## 2020-01-20 PROCEDURE — 36415 COLL VENOUS BLD VENIPUNCTURE: CPT

## 2020-01-20 RX ADMIN — LOSARTAN POTASSIUM 100 MG: 50 TABLET, FILM COATED ORAL at 09:39

## 2020-01-20 RX ADMIN — LABETALOL 20 MG/4 ML (5 MG/ML) INTRAVENOUS SYRINGE 20 MG: at 22:39

## 2020-01-20 RX ADMIN — ACETAMINOPHEN 650 MG: 325 TABLET ORAL at 04:46

## 2020-01-20 RX ADMIN — MORPHINE SULFATE 1 MG: 2 INJECTION, SOLUTION INTRAMUSCULAR; INTRAVENOUS at 04:46

## 2020-01-20 RX ADMIN — INSULIN LISPRO 2 UNITS: 100 INJECTION, SOLUTION INTRAVENOUS; SUBCUTANEOUS at 11:30

## 2020-01-20 RX ADMIN — HEPARIN SODIUM 12 UNITS/KG/HR: 10000 INJECTION, SOLUTION INTRAVENOUS at 19:17

## 2020-01-20 RX ADMIN — INSULIN GLARGINE 25 UNITS: 100 INJECTION, SOLUTION SUBCUTANEOUS at 09:39

## 2020-01-20 RX ADMIN — VANCOMYCIN HYDROCHLORIDE 1000 MG: 1 INJECTION, POWDER, LYOPHILIZED, FOR SOLUTION INTRAVENOUS at 00:32

## 2020-01-20 RX ADMIN — ATORVASTATIN CALCIUM 20 MG: 20 TABLET, FILM COATED ORAL at 09:39

## 2020-01-20 RX ADMIN — MEROPENEM 1 G: 1 INJECTION, POWDER, FOR SOLUTION INTRAVENOUS at 12:12

## 2020-01-20 RX ADMIN — MEROPENEM 1 G: 1 INJECTION, POWDER, FOR SOLUTION INTRAVENOUS at 22:23

## 2020-01-20 RX ADMIN — ERTAPENEM SODIUM 1 G: 1 INJECTION, POWDER, LYOPHILIZED, FOR SOLUTION INTRAMUSCULAR; INTRAVENOUS at 09:00

## 2020-01-20 RX ADMIN — ACETAMINOPHEN 650 MG: 325 TABLET ORAL at 22:30

## 2020-01-20 RX ADMIN — HYDRALAZINE HYDROCHLORIDE 10 MG: 20 INJECTION INTRAMUSCULAR; INTRAVENOUS at 16:06

## 2020-01-20 RX ADMIN — LABETALOL 20 MG/4 ML (5 MG/ML) INTRAVENOUS SYRINGE 20 MG: at 10:25

## 2020-01-20 RX ADMIN — LABETALOL 20 MG/4 ML (5 MG/ML) INTRAVENOUS SYRINGE 20 MG: at 03:24

## 2020-01-20 RX ADMIN — PIPERACILLIN AND TAZOBACTAM 4.5 G: 4; .5 INJECTION, POWDER, FOR SOLUTION INTRAVENOUS at 06:48

## 2020-01-20 RX ADMIN — MORPHINE SULFATE 1 MG: 2 INJECTION, SOLUTION INTRAMUSCULAR; INTRAVENOUS at 12:12

## 2020-01-20 RX ADMIN — MORPHINE SULFATE 1 MG: 2 INJECTION, SOLUTION INTRAMUSCULAR; INTRAVENOUS at 17:24

## 2020-01-20 RX ADMIN — PANTOPRAZOLE SODIUM 40 MG: 40 INJECTION, POWDER, FOR SOLUTION INTRAVENOUS at 22:23

## 2020-01-20 NOTE — WOUND CARE
Attempted to see pt for wound consult, pt off floor for MRI. Noted podiatry consulted/following pt. Will follow up as schedule allows.

## 2020-01-20 NOTE — PROGRESS NOTES
Pharmacy Renal Dosing Services    Meropenem was automatically dose-adjusted per THE [facility] P&T Committee Protocol, with respect to renal function. Consult provided for this   76 y.o. , male , for the indication of Bone and Joint Infection. Scr = 2.02  (increased from 1.5 on 1/19/20)  Dose adjusted to:  Meropenem 1 gram IV q12h    Pt Weight:   Wt Readings from Last 1 Encounters:   01/19/20 77.2 kg (170 lb 3.1 oz)     Previous Regimen   Meropenem 1 gram IV q8h   Serum Creatinine Lab Results   Component Value Date/Time    Creatinine 2.02 (H) 01/20/2020 05:10 AM       Creatinine Clearance Estimated Creatinine Clearance: 30 mL/min (A) (based on SCr of 2.02 mg/dL (H)). BUN Lab Results   Component Value Date/Time    BUN 21 (H) 01/20/2020 05:10 AM           Pharmacy to continue to monitor patient daily. Will make dosage adjustments based upon changing renal function.   Signed [facility] information:  [phone]

## 2020-01-20 NOTE — PROGRESS NOTES
Ct  Moderate right and small left pleural effusions similar to prior  examination.     2. No evidence of intra-abdominal or pelvic hematoma. Specifically, no evidence  of retroperitoneal hematoma.   Mri foot and ankle reviewed

## 2020-01-20 NOTE — PROGRESS NOTES
Occupational Therapy Evaluation/Treatment Attempt    Chart reviewed. Attempted Occupational Therapy Evaluation/Treatment, however, patient unable to be seen due to:  []  Nausea/vomiting  []  Eating  [x]  Pain/2nd attempt; pt back from imaging and RN/[facility] reporting R shoulder pain & fatigue from today  []  Patient too lethargic  [x]  Off Unit for testing/procedure/CT/MRI  []  Dialysis treatment in progress   []  Telemetry Results  []  Other:     Will follow up later as patient's schedule allows.    Thank you for this referral.  Delmi Wagoner, OTR/L, CSRS

## 2020-01-20 NOTE — PROGRESS NOTES
Attempted to see pt for PT eval. Spoke with RN who reported pt having increased pain in UE after CT/MRI and was administered morphine. Will follow up tomorrow as pt schedule allows.

## 2020-01-20 NOTE — PROGRESS NOTES
[facility]  Pulmonary, Critical Care, and Sleep Medicine    Name: Nicole Smyth MRN: [id number]   : 1945 Hospital: [facility] [place]   Date: 2020        Pulmonary Critical Care Note    IMPRESSION:   Patient Active Problem List   Diagnosis Code    Sepsis ([facility] [address] A41.9    Sepsis due to Streptococcus Grp B ([address] recently 2' to # 3 A40.1    Osteomyelitis of right foot ([address] M86.9    Acute deep vein thrombosis (DVT) ([address] I82.409    Acute on chronic anemia D64.9    CKD (chronic kidney disease) N18.9    Type 2 diabetes mellitus, with long-term current use of insulin ([address] E11.9, Z79.4    Hypertension I10    Hypokalemia E87.6    Diabetic ulcer of right midfoot associated with type 2 diabetes mellitus, with fat layer exposed ([facility] [address] E11.621, L97.412    PAD (peripheral artery disease) ([facility]) I73.9    Moderate-severe protein calorie malnutrition E44.0    Hypoalbuminemia E88.09    Hard of hearing H91.90 ·      RECOMMENDATIONS:   Respiratory:  Compensated respiratory status; on room air. Monitor for goal SPO2> 91%  Aspiration prevention bundle, head of the bed at 30' all times, pulmonary hygiene care    Infectious disease:  On 2020, he was discharged home on ertapenem IV with RUE PICC line for right foot abscess and osteomyelitis and bacteremia with Streptococcus agalactia. Currently on Zosyn and Vanco and Levaquin. No leukocytosis. Temperature max 100.4. Hemodynamic stable. Blood culture NGTD. Recommend ID consult and narrow down antibiotic back to ertapenem. Discussed with Dr. Shaina Martniez.  Follow blood cultures. Cardiovascular:  On 2020, he was discharged home on Eliquis for DVT. No PE but extensive RUE DVT secondary to PICC line, on CTA on admission. Tolerating heparin drip without any external bleeding. Follow heparin drip protocol. Vascular surgeon on board who recommended to leave PICC line in, but NOT being used.  He has good LUE PIVs.  Defer anticoagulation, duration etc. to vascular surgeon. Blood pressure controlled, on Cozaar. Monitor closely. Hematologic: Received total 2 PRBCs since admission due to anemia. No active external bleeding noted. Continue to monitor H&H closely while on heparin/anticoagulation. Renal: Mild elevation in creatinine, monitor closely. Making good urine output, monitor. Avoid nephrotoxic medications. Endocrine: Continue Lantus and Humalog sliding scale. Maintain glycemic control. GI: Stool occult -1/18/2020. No active external bleeding. CNS: No acute issues. Nutrition: Tolerating p.o. Will defer respective systems problem management to primary and other consultant and follow patient in ICU with primary and other medical team  Further recommendations will be based on the patient's response to recommended treatment and results of the investigation ordered. Quality Care: PPI, DVT prophylaxis, HOB elevated, Infection control all reviewed and addressed. PAIN AND SEDATION: RT foot, RT arm  · Skin/Wound: RT foot ulcer  · Nutrition: diet  · Prophylaxis: DVT and GI Prophylaxis reviewed. On DVT Rx with heparin drip. · PT/OT eval and treat: as needed   · Lines/Tubes: lines RUE PICC line NOT being used (vascular recommended to leave in)  ADVANCE DIRECTIVE: full code  DISCUSSION: discussed with patient, RN, ICU staff, MDR. Dr. Lili Thompson.   Events and notes from last 24 hours reviewed. Care plan discussed with nursing     Transfer to tele. Once transferred, PCCM will sign off. Call us with any questions. Subjective/History:   Mr. Saji Barrera has been seen and evaluated as Dr. Eros Barfield requested for assisting with ICU care with multiple medical problems. Patient is a poor historian, hard of hearing. [facility] [employer] interpretation service was also used [Jerry [id number]. Patient is a 76 y.o. male with following PMHx who came back to hospital via ER with c/o ongoing chills, swelling an pain of RT shoulder and arm started at home.  Patient was recently admitted for RT foot abscess and osteomyelitis and was discharged with a PICC line and IV antibiotics [last dose February 20]. In ER further evaluation with CTA and ultrasound of the arm showed extensive clot extending into the subclavian and superior vena cava. Patient was started on Eliquis starter pack on discharge and was taking per direction with last Eliquis dose early morning on 1/18/20. He was noted to have anemia with Hgb drop more than his baseline, stool occult blood negative. In the ER patient developed fever while receiving PRBC tx andit was not clear wether her had tx reaction or not and admitted to ICU for observation. He has been started on a heparin drip. Later received PRBC tx w/o issue. The patient reports some poor appetite, denies cough, chest pain, dyspnea, wheezing, hemoptysis, palpitations, syncope, orthopnea, or paroxysmal nocturnal dyspnea. No family at bedside. Other information is limited. 1/20/2020  Remained in ICU. Syriac-speaking individual.  Received total of 2 PRBC, last PRBC transfusion last night. Since then hemoglobin stable. No active external bleeding. Tolerating heparin well. Temperature max 100.4. No leukocytosis. Blood culture has been negative so far. Hemodynamic stable. No other issues overnight. Review of Systems:  Review of systems not obtained due to patient factors. Available information noted in HPI              Latest lactic acid:   Lactic acid   Date Value Ref Range Status   01/18/2020 1.5 0.4 - 2.0 MMOL/L Final   01/02/2020 1.3 0.4 - 2.0 MMOL/L Final       Past Medical History:  Past Medical History:   Diagnosis Date    Diabetes ([facility] [address]     DVT of deep femoral vein ([facility] [address]     Foot abscess     Hypertension         Past Surgical History:  Past Surgical History:   Procedure Laterality Date    HX ORTHOPAEDIC      toe amputation        Medications:  Prior to Admission medications    Medication Sig Start Date End Date Taking?  Authorizing Provider   LORazepam (ATIVAN) 1 mg tablet Take 1 mg by mouth every four (4) hours as needed for Anxiety (one tab prior to hyperbaric oxygen treatment). Provider, Ashley   apixaban (ELIQUIS) 5 mg (74 tabs) starter pack Take 10 mg (two 5 mg tablets) by mouth twice a day for 7 days   Followed by 5 mg (one 5 mg tablet) by mouth twice a day 1/14/20   Haydee Bhat MD   ertapenem 1 gram 1 g IVPB 1 g by IntraVENous route every twenty-four (24) hours. 1/13/20   Haydee Bhat MD   insulin glargine (LANTUS SOLOSTAR U-100 INSULIN) 100 unit/mL (3 mL) inpn 25 Units by SubCUTAneous route. Mishel Barlow MD   metFORMIN ER (GLUCOPHAGE XR) 750 mg tablet Take 750 mg by mouth daily. Mishel Barlow MD   atorvastatin (LIPITOR) 20 mg tablet Take 20 mg by mouth daily. Mishel Barlow MD   aspirin 81 mg chewable tablet Take 81 mg by mouth daily. Mishel Barlow MD   polyethylene glycol (MIRALAX) 17 gram/dose powder Take 17 g by mouth daily. 1 tablespoon with 8 oz of water daily 12/10/19   Cecy Zamarripa MD   LOSARTAN PO Take 100 mg by mouth.     Mishel Barlow MD       Current Facility-Administered Medications   Medication Dose Route Frequency    insulin lispro (HUMALOG) injection   SubCUTAneous AC&HS    levoFLOXacin (LEVAQUIN) 750 mg in D5W IVPB  750 mg IntraVENous Q48H    Vancomycin-Pharmacy to dose  1 Each Other Rx Dosing/Monitoring    vancomycin (VANCOCIN) 1,000 mg in 0.9% sodium chloride 250 mL (VIAL-MATE)  1,000 mg IntraVENous Q24H    heparin 25,000 units in D5W 250 ml infusion  18-36 Units/kg/hr IntraVENous TITRATE    atorvastatin (LIPITOR) tablet 20 mg  20 mg Oral DAILY    insulin glargine (LANTUS) injection 25 Units  25 Units SubCUTAneous DAILY    losartan (COZAAR) tablet 100 mg  100 mg Oral DAILY    polyethylene glycol (MIRALAX) packet 17 g  17 g Oral DAILY    pantoprazole (PROTONIX) injection 40 mg  40 mg IntraVENous Q24H    piperacillin-tazobactam (ZOSYN) 4.5 g in 0.9% sodium chloride (MBP/ADV) 100 mL MBP  4.5 g IntraVENous Q8H    ertapenem (INVANZ) 1 g in 0.9% sodium chloride (MBP/ADV) 50 mL MBP  1 g IntraVENous DAILY       Allergy:  No Known Allergies     Social History:  Social History     Tobacco Use    Smoking status: Never Smoker    Smokeless tobacco: Never Used   Substance Use Topics    Alcohol use: No    Drug use: No        Family History:  History reviewed. No pertinent family history. Objective:   Vital Signs:    Blood pressure 155/51, pulse 90, temperature 98.5 °F (36.9 °C), resp. rate 14, height 5' 7\" (1.702 m), weight 77.2 kg (170 lb 3.1 oz), SpO2 93 %. Body mass index is 26.66 kg/m². O2 Device: Room air       Temp (24hrs), Av.2 °F (37.3 °C), Min:98.5 °F (36.9 °C), Max:100.4 °F (38 °C)         Intake/Output:   Last shift:      No intake/output data recorded. Last 3 shifts:  1901 -  0700  In: 2237.5 [P.O.:100; I.V.:1477.2]  Out: 375 [Urine:375]    Intake/Output Summary (Last 24 hours) at 2020 0207  Last data filed at 2020 0558  Gross per 24 hour   Intake 903.8 ml   Output    Net 903.8 ml       Physical Exam:  General: Alert and oriented to all spheres, in no respiratory distress and acyanotic, appears stated age, on RA  HEENT: EOMI, fundi benign, throat normal without erythema or exudate  Neck: No abnormally enlarged lymph nodes or thyroid, supple  Lungs: moderate air entry, breathing normal, clear to auscultation bilaterally, normal percussion bilaterally, no tenderness/ rash  Heart: Regular rate and rhythm, S1S2 present or without murmur or extra heart sounds  Abdomen: protuberant, bowel sounds normoactive, tympanic, abdomen is soft without significant tenderness, masses, organomegaly or guarding, rigidity, rebound  Extremity:  RT UE edema extending up to arm. negative for cyanosis, clubbing.  RT foot ulcer in dressing without significant warm, no drainage or bleeding  Capillary refill: normal in all 4 extremities  Neuro: alert, oriented , no defects noted in general exam. Sensations intact in RT UE   Skin: Skin color, texture, turgor fair. Skin dry, warm, diaphoretic    Data:     Recent Results (from the past 24 hour(s))   PTT    Collection Time: 01/19/20  9:05 AM   Result Value Ref Range    aPTT 174.5 (HH) 23.0 - 36.4 SEC   CBC WITH AUTOMATED DIFF    Collection Time: 01/19/20  9:05 AM   Result Value Ref Range    WBC 8.9 4.6 - 13.2 K/uL    RBC 2.40 (L) 4.70 - 5.50 M/uL    HGB 7.0 (L) 13.0 - 16.0 g/dL    HCT 21.3 (L) 36.0 - 48.0 %    MCV 88.8 74.0 - 97.0 FL    MCH 29.2 24.0 - 34.0 PG    MCHC 32.9 31.0 - 37.0 g/dL    RDW 13.7 11.6 - 14.5 %    PLATELET 677 704 - 674 K/uL    MPV 8.7 (L) 9.2 - 11.8 FL    NEUTROPHILS 73 40 - 73 %    LYMPHOCYTES 11 (L) 21 - 52 %    MONOCYTES 14 (H) 3 - 10 %    EOSINOPHILS 1 0 - 5 %    BASOPHILS 1 0 - 2 %    ABS. NEUTROPHILS 6.6 1.8 - 8.0 K/UL    ABS. LYMPHOCYTES 1.0 0.9 - 3.6 K/UL    ABS. MONOCYTES 1.2 0.05 - 1.2 K/UL    ABS. EOSINOPHILS 0.0 0.0 - 0.4 K/UL    ABS.  BASOPHILS 0.0 0.0 - 0.1 K/UL    DF AUTOMATED     GLUCOSE, POC    Collection Time: 01/19/20 11:21 AM   Result Value Ref Range    Glucose (POC) 178 (H) 70 - 110 mg/dL   CARDIAC PANEL,(CK, CKMB & TROPONIN)    Collection Time: 01/19/20 11:37 AM   Result Value Ref Range    CK 27 (L) 39 - 308 U/L    CK - MB <1.0 <3.6 ng/ml    CK-MB Index  0.0 - 4.0 %     CALCULATION NOT PERFORMED WHEN RESULT IS BELOW LINEAR LIMIT    Troponin-I, QT 0.02 0.0 - 0.045 NG/ML   GLUCOSE, POC    Collection Time: 01/19/20  3:45 PM   Result Value Ref Range    Glucose (POC) 118 (H) 70 - 110 mg/dL   HGB & HCT    Collection Time: 01/19/20  5:00 PM   Result Value Ref Range    HGB 6.8 (L) 13.0 - 16.0 g/dL    HCT 20.5 (L) 36.0 - 48.0 %   PTT    Collection Time: 01/19/20  5:00 PM   Result Value Ref Range    aPTT 105.4 (H) 23.0 - 36.4 SEC   MAGNESIUM    Collection Time: 01/19/20  5:00 PM   Result Value Ref Range    Magnesium 2.1 1.6 - 2.6 mg/dL   PHOSPHORUS    Collection Time: 01/19/20  5:00 PM   Result Value Ref Range    Phosphorus 2.6 2.5 - 4.9 MG/DL   POTASSIUM    Collection Time: 01/19/20  5:00 PM   Result Value Ref Range    Potassium 3.9 3.5 - 5.5 mmol/L   GLUCOSE, POC    Collection Time: 01/19/20  9:16 PM   Result Value Ref Range    Glucose (POC) 83 70 - 110 mg/dL   HGB & HCT    Collection Time: 01/19/20 10:05 PM   Result Value Ref Range    HGB 7.6 (L) 13.0 - 16.0 g/dL    HCT 22.7 (L) 36.0 - 48.0 %   GLUCOSE, POC    Collection Time: 01/20/20  3:31 AM   Result Value Ref Range    Glucose (POC) 68 (L) 70 - 604 mg/dL   METABOLIC PANEL, COMPREHENSIVE    Collection Time: 01/20/20  5:10 AM   Result Value Ref Range    Sodium 140 136 - 145 mmol/L    Potassium 4.0 3.5 - 5.5 mmol/L    Chloride 107 100 - 111 mmol/L    CO2 28 21 - 32 mmol/L    Anion gap 5 3.0 - 18 mmol/L    Glucose 88 74 - 99 mg/dL    BUN 21 (H) 7.0 - 18 MG/DL    Creatinine 2.02 (H) 0.6 - 1.3 MG/DL    BUN/Creatinine ratio 10 (L) 12 - 20      GFR est AA 39 (L) >60 ml/min/1.73m2    GFR est non-AA 32 (L) >60 ml/min/1.73m2    Calcium 8.0 (L) 8.5 - 10.1 MG/DL    Bilirubin, total 1.3 (H) 0.2 - 1.0 MG/DL    ALT (SGPT) 19 16 - 61 U/L    AST (SGOT) 29 10 - 38 U/L    Alk. phosphatase 77 45 - 117 U/L    Protein, total 6.4 6.4 - 8.2 g/dL    Albumin 1.7 (L) 3.4 - 5.0 g/dL    Globulin 4.7 (H) 2.0 - 4.0 g/dL    A-G Ratio 0.4 (L) 0.8 - 1.7     MAGNESIUM    Collection Time: 01/20/20  5:10 AM   Result Value Ref Range    Magnesium 2.2 1.6 - 2.6 mg/dL   CBC WITH AUTOMATED DIFF    Collection Time: 01/20/20  5:10 AM   Result Value Ref Range    WBC 7.4 4.6 - 13.2 K/uL    RBC 2.65 (L) 4.70 - 5.50 M/uL    HGB 7.8 (L) 13.0 - 16.0 g/dL    HCT 23.4 (L) 36.0 - 48.0 %    MCV 88.3 74.0 - 97.0 FL    MCH 29.4 24.0 - 34.0 PG    MCHC 33.3 31.0 - 37.0 g/dL    RDW 14.3 11.6 - 14.5 %    PLATELET 894 834 - 607 K/uL    MPV 8.9 (L) 9.2 - 11.8 FL    NEUTROPHILS 69 40 - 73 %    LYMPHOCYTES 14 (L) 21 - 52 %    MONOCYTES 14 (H) 3 - 10 %    EOSINOPHILS 2 0 - 5 %    BASOPHILS 1 0 - 2 %    ABS. NEUTROPHILS 5.1 1.8 - 8.0 K/UL    ABS. LYMPHOCYTES 1.0 0.9 - 3.6 K/UL    ABS. MONOCYTES 1.1 0.05 - 1.2 K/UL    ABS. EOSINOPHILS 0.1 0.0 - 0.4 K/UL    ABS. BASOPHILS 0.1 0.0 - 0.1 K/UL    DF AUTOMATED     PHOSPHORUS    Collection Time: 01/20/20  5:10 AM   Result Value Ref Range    Phosphorus 3.5 2.5 - 4.9 MG/DL   CALCIUM, IONIZED    Collection Time: 01/20/20  5:10 AM   Result Value Ref Range    Ionized Calcium 1.10 (L) 1.12 - 1.32 MMOL/L   PTT    Collection Time: 01/20/20  5:10 AM   Result Value Ref Range    aPTT 96.7 (H) 23.0 - 36.4 SEC   GLUCOSE, POC    Collection Time: 01/20/20  7:17 AM   Result Value Ref Range    Glucose (POC) 106 70 - 110 mg/dL           No results for input(s): FIO2I, IFO2, HCO3I, IHCO3, HCOPOC, PCO2I, PCOPOC, IPHI, PHI, PHPOC, PO2I, PO2POC in the last 72 hours.     No lab exists for component: IPOC2    All Micro Results     Procedure Component Value Units Date/Time    Mayra Escamilla, URINE [[id number]] Collected:  01/18/20 0000    Order Status:  Completed Specimen:  Urine, random Updated:  01/19/20 1629     Strep pneumo Ag, urine NEGATIVE        LEGIONELLA PNEUMOPHILA AG, URINE [[id number]] Collected:  01/18/20 0000    Order Status:  Completed Specimen:  Urine, random Updated:  01/19/20 1629     Legionella Ag, urine NEGATIVE        CULTURE, Jetty Scarlet STAIN [[id number]] Collected:  01/18/20 2015    Order Status:  Completed Specimen:  Wound from Foot Updated:  01/19/20 1413     Special Requests: NO SPECIAL REQUESTS        GRAM STAIN NO WBC'S SEEN         NO ORGANISMS SEEN        Culture result: PENDING    CULTURE, BLOOD [[id number]] Collected:  01/18/20 1518    Order Status:  Completed Specimen:  Blood Updated:  01/19/20 0701     Special Requests: NO SPECIAL REQUESTS        Culture result: NO GROWTH AFTER 15 HOURS       CULTURE, BLOOD [[id number]] Collected:  01/18/20 1355    Order Status:  Completed Specimen:  Blood Updated:  01/19/20 0701     Special Requests: NO SPECIAL REQUESTS        Culture result: NO GROWTH AFTER 15 HOURS INFLUENZA A & B AG (RAPID TEST) [[id number]] Collected:  01/18/20 1630    Order Status:  Completed Specimen:  Nasopharyngeal from Nasal washing Updated:  01/18/20 1653     Influenza A Antigen NEGATIVE         Comment: A negative result does not exclude influenza virus infection, seasonal or H1N1 due to suboptimal sensitivity. If influenza is circulating in your community, a diagnosis of influenza should be considered based on a patients clinical presentation and empiric antiviral treatment should be considered, if indicated. Influenza B Antigen NEGATIVE        INFLUENZA A & B AG (RAPID TEST) [[id number]]     Order Status:  Canceled Specimen:  Nasopharyngeal           Telemetry: normal sinus rhythm    1/5/20 ECHO  · Left Ventricle: Normal cavity size and systolic function (ejection fraction normal). Mild concentric hypertrophy . The estimated ejection fraction is 55 - 60%. There is mild (grade 1) left ventricular diastolic dysfunction I/V'UDRYD=0.68.  · Tricuspid Valve: Normal valve structure and no stenosis. Trace regurgitation. Pulmonary arterial systolic pressure is 33.0 mmHg. PVL RUE 1/18/20:  · Acute appearing non occlusive thrombus noted in the right internal jugular, subclavian, axillary and brachial vein(s). · Non occlusive superficial thrombophlebitis noted within the right basilic vein. · No evidence of DVT in the contralateral internal jugular and proximal subclavian vein. Imaging:  [x]I have personally reviewed the patients chest radiographs images and report     Results from Hospital Encounter encounter on 01/18/20   XR CHEST PORT    Narrative EXAM: CHEST RADIOGRAPH    CLINICAL INDICATION/HISTORY: tachy  -Additional: None    COMPARISON: January 15, 2020    TECHNIQUE: Portable frontal view of the chest    _______________    FINDINGS:    SUPPORT DEVICES: A right upper extremity PICC is present. HEART AND MEDIASTINUM: No appreciable cardiomegaly.  Remaining mediastinal  contours within normal limits. LUNGS AND PLEURAL SPACES: No consolidation, mass, or pleural effusion. BONY THORAX AND SOFT TISSUES: Unremarkable.    _______________      Impression IMPRESSION:    No active cardiopulmonary disease. Results from [place] encounter on 01/18/20   CTA CHEST W OR W WO CONT    Narrative EXAM: CTA chest    CLINICAL INDICATION/HISTORY:  Pain. COMPARISON: None    TECHNIQUE: Axial CT imaging from the thoracic inlet through the diaphragm with  intravenous contrast. Coronal and sagittal MIP reformats were generated. One or more dose reduction techniques were used on this CT: automated exposure  control, adjustment of the mAs and/or kVp according to patient size, and  iterative reconstruction techniques. The specific techniques used on this CT  exam have been documented in the patient's electronic medical record. Digital  Imaging and Communications in Medicine (DICOM) format image data are available  to nonaffiliated external healthcare facilities or entities on a secure, media  free, reciprocally searchable basis with patient authorization for at least a  12-month period after this study. _______________    FINDINGS:    EXAM QUALITY: Adequate opacification of the pulmonary arteries. PULMONARY ARTERIES: No evidence of pulmonary embolism. MEDIASTINUM: Normal heart size. No evidence of right heart strain. Small  pericardial effusion. VASCULATURE: Right PICC in situ. There is perivascular inflammation of the right  subclavian vein. 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 LUNGS: Bilateral subsegmental atelectasis. At the right upper lobe, there is  persistent rounded opacity, unchanged from the previous exam measuring  approximately 1.5 cm in diameter. PLEURA: There are bilateral pleural effusions, small on the left moderate on the  right. AIRWAY: Normal.    LYMPH NODES: No enlarged nodes. UPPER ABDOMEN: Unremarkable.     OTHER: No acute or aggressive osseous abnormalities identified. SUPERFICIAL SOFT TISSUES: Unremarkable.    _______________      Impression IMPRESSION:    1. 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 Thrombophlebitis related to prior PICC line placement suspected. 2. No evidence of pulmonary embolism. 3. Bilateral pleural effusions, right greater than left with associated  bilateral subsegmental atelectasis in the dependent lungs. 4. Nonspecific rounded opacity in the right upper lobe, potentially rounded  atelectasis given underlying pleural disease. This area can be followed on  subsequent imaging to ensure resolution. Critical results discussed with Dr. Vipul Richardson at 6:00 PM on 01/18/2020. [x]See my orders for details    My assessment, plan of care, findings, medications, side effects etc were discussed with:  [x]nursing []PT/OT    []respiratory therapy [x]Dr. Alex Burrell   []family [x]Patient     Moderate complex decision making performed and > 50% time spent in face to face evaluation.     Serenity Jacobson MD   1/20/2020

## 2020-01-20 NOTE — ROUTINE PROCESS
1630: telephone SBAR report received from Connee Najjar, [facility] (ICU) off going nurse, and assumed care of the pt. Updated white board, got a full set of vitals and a blood sugar, assessed pain and spoke briefly with the family. Pt reports 7/10 pain, described as \"all over\" interpreted by pt's daughter. 1800: changed pt's dressing on R foot, as it began to bleed slightly in the move up from ICU. All current needs assessed, pt wished me a \"good night\" bed in lowest position and call light within reach. 1900: Shift summary, shift uneventful, no complaints of chest pain or shortness of breath.   
 
Cleveland Joiner RN

## 2020-01-20 NOTE — CDMP QUERY
Pt admitted with DVT. Pt noted to have sepsis on previous admission with continued use of prescribed antibiotics. ED provider note states \"Sepsis is not present at the time of admission\", however \"sepsis Due to streptococcus is noted 1/20 in Dr. Litzy Heard If possible, please document in the progress notes and d/c summary if you are currently evaluating and / or treating any of the following: 
 
? Sepsis, present on admission, suspected or probable causative organism (please specify) ? Sepsis present on admission, now resolved, suspected or probable causative organism (please specify) ? Sepsis, not present on admission, suspected or probable causative organism (please specify) ? No Sepsis, suspected or probable localized infection (please specify) ? Sepsis was ruled out (include corresponding diagnosis for patients clinical picture and treatment) ? Other, please specify ? Clinically unable to determine The medical record reflects the following: 
 
   Risk Factors: PMH sepsis due to Streptococcus, DM ucler, fever Clinical Indicators: 
 >  Blood culture preliminary no growth 2 days 
 > Temp 100.4, 100.8,100.9, 101.1 
 > , 120, 128, 130 
 > WBC 11.1, 8.4, 8.9 Treatment: Receiving: 
 > ID consult 
 > Blood culture 
 > IV Levaquin, Zosyn, Vancomycin Thank you, Adams Wang RN, BSN, [address] [place] [address] 
[phone]

## 2020-01-20 NOTE — PROGRESS NOTES
1900 - Bedside and Verbal shift change report received from Tonie Sebastian RN (offgoing nurse). Report included the following information SBAR, Kardex, ED Summary, Procedure Summary, Intake/Output, MAR, Recent Results, Med Rec Status and Cardiac Rhythm Sinus Tach. Heparin drip rate verified. Unit of PRBCs currently infusing. 2000 - Shift assessment completed at this time. Patient alert and oriented, no needs/complaints. Dressing to right foot: CDI. Dressing over PICC line site remains intact. PICC line site remains painful, swollen, hot to touch. PRN Labetalol administered for BP of 171/60    2048 - Unit of PRBCs transfusion complete at this time. No signs/symptoms of transfusion reaction noted. 2115 - BS 83. Patient experiencing decreased appetite; did not eat dinner. HS snack provided. 2205 - HGB: 7.6, will continue to monitor. 0000 - Reassessment completed, no changes to previous assessment. 0400 - Reassessment completed, no changes to previous assessment. 0700 - Bedside and Verbal shift change report given to Tonie Sebastian RN (oncoming nurse) by Noah Kimbrough RN (offgoing nurse). Report included the following information SBAR, Kardex, ED Summary, Procedure Summary, Intake/Output, MAR, Recent Results, Med Rec Status and Cardiac Rhythm NSR.

## 2020-01-20 NOTE — DIABETES MGMT
GLYCEMIC CONTROL PROGRESS NOTE:    - known h/o T2DM, HbA1C not within recommended range for age + comorbids on basal/oral home regimen  - pt recently discharged from [facility]  - TDD = 33 (25 Lantus + 8 units - Humalog Normal Insulin Sensitivity Corrective Coverage)  - 24 BG trending down  Noted:  - mild hypoglycemic episode on AM labs 68 mg/dL  - recommend decrease basal insulin dose    *Lantus 18 units daily  - per RN pt with poor PO intake yesterday, improved today  Addendum:  - previous admission pt refused use of blue  phone, prefers family interpret  - this writer was able to confirm home regimen   - daughter reported high BG r/t po intake as pt refuses to follow a DM meal plan    Recent Glucose Results:   Lab Results   Component Value Date/Time    GLU 88 01/20/2020 05:10 AM    GLUCPOC 106 01/20/2020 07:17 AM    GLUCPOC 68 (L) 01/20/2020 03:31 AM    GLUCPOC 83 01/19/2020 09:16 PM     Honorio Roman MS, RN, CDE  Glycemic Control Team  [phone]  Pager [phone] (M-TH 8:00-4:30P)  *After Hours pager [phone]

## 2020-01-20 NOTE — PROGRESS NOTES
Chart reviewed and order acknowledged. Pt off the floor for CT then MRI. Will follow up later as pt schedule.

## 2020-01-20 NOTE — PROGRESS NOTES
Hospitalist Progress Note-critical care note     Patient: Yaw Hedrick MRN: [id number]  [facility]: [id number]    YOB: 1945  Age: 76 y.o. Sex: male    DOA: 1/18/2020 LOS:  LOS: 2 days            Chief complaint:om, dvt.  Anemia , dm ckd     Assessment/Plan         Hospital Problems  Date Reviewed: 1/18/2020          Codes Class Noted POA    Anemia ICD-10-CM: D64.9  ICD-9-CM: 285.9  1/20/2020 Unknown        Lung infiltrate ICD-10-CM: R91.8  ICD-9-CM: 793.19  1/20/2020 Unknown        * (Principal) DVT (deep venous thrombosis) ([facility] ICD-10-CM: I82.409  ICD-9-CM: 453.40  1/18/2020 Unknown        Osteomyelitis of right foot ([facility] ICD-10-CM: M86.9  ICD-9-CM: 730.27  1/9/2020 Yes        Sepsis due to Streptococcus agalactiae [facility]) ICD-10-CM: A40.1  ICD-9-CM: 038.0, 995.91  1/6/2020 Yes        Type 2 diabetes mellitus, with long-term current use of insulin (HCC) ICD-10-CM: E11.9, Z79.4  ICD-9-CM: 250.00, V58.67  1/3/2020 Yes        Hypertension ICD-10-CM: I10  ICD-9-CM: 401.9  1/3/2020 Yes        Stage 3 chronic kidney disease ([facility] ICD-10-CM: N18.3  ICD-9-CM: 585.3  1/2/2020 Yes             sepsis  Due to streptococcus   Will have id on board , continue current iv abx     Right upper lobe infiltrate versus atelectasis   On Zosyn and  Vancomycin, breathing treatment as needed   Check urine legionella streptococcal antigen      Upper extremity and lower extremity DVT   Continue iv heparin gtt   Vascular on board   Will f/u with their recommendation     Anemia acute on chronic    transfuse 2 unit prbc,   Occult negative   Will have ct abdomen for possible hematoma     HTN   Well controlled now     DM  Lantus regular dose   sliding scale insulin hold metformin     OM and rt foot surgery recently   on ertapenem 1 gram 1 g IVPB on d.c-will have id and podiatry on board   Will repeat susan foot and ankle   Continue current iv abx .     Subjective : take medication at home, not sure when the ankle swelling   Disposition :tbd,   Review of systems:    General: No fevers or chills. Cardiovascular: No chest pain or pressure. No palpitations. Pulmonary: No shortness of breath. Gastrointestinal: No nausea, vomiting. Vital signs/Intake and Output:  Visit Vitals  /87   Pulse 90   Temp 98.5 °F (36.9 °C)   Resp 22   Ht 5' 7\" (1.702 m)   Wt 77.2 kg (170 lb 3.1 oz)   SpO2 97%   BMI 26.66 kg/m²     Current Shift:  No intake/output data recorded. Last three shifts:  01/18 1901 - 01/20 0700  In: 2237.5 [P.O.:100; I.V.:1477.2]  Out: 375 [Urine:375]    Physical Exam:  General: WD, WN. Alert, cooperative, no acute distress    HEENT: NC, Atraumatic. PERRLA, anicteric sclerae. Lungs: CTA Bilaterally. No Wheezing/Rhonchi/Rales. Heart:  Regular  rhythm,  No murmur, No Rubs, No Gallops  Abdomen: Soft, Non distended, Non tender.  +Bowel sounds,   Extremities: No c/c. Rt arm swelling/ rt ankle swelling and rt 5th toe amputated covered with gauze. picc line wrapped per gauze   Psych:   Not anxious or agitated. Neurologic:  No acute neurological deficit.              Labs: Results:       Chemistry Recent Labs     01/20/20  0510 01/19/20  1700 01/19/20  0614 01/18/20  1355   GLU 88  --  114* 320*     --  139 133*   K 4.0 3.9 3.4* 3.7     --  107 99*   CO2 28  --  26 28   BUN 21*  --  18 19*   CREA 2.02*  --  1.50* 1.46*   CA 8.0*  --  8.0* 8.1*   AGAP 5  --  6 6   BUCR 10*  --  12 13   AP 77  --  62 73   TP 6.4  --  6.2* 6.7   ALB 1.7*  --  1.7* 1.9*   GLOB 4.7*  --  4.5* 4.8*   AGRAT 0.4*  --  0.4* 0.4*      CBC w/Diff Recent Labs     01/20/20  0510 01/19/20  2205 01/19/20  1700 01/19/20  0905 01/19/20  0614   WBC 7.4  --   --  8.9 8.4   RBC 2.65*  --   --  2.40* 2.45*   HGB 7.8* 7.6* 6.8* 7.0* 7.2*   HCT 23.4* 22.7* 20.5* 21.3* 21.7*     --   --  271 302   GRANS 69  --   --  73 70   LYMPH 14*  --   --  11* 15*   EOS 2  --   --  1 0      Cardiac Enzymes Recent Labs     01/19/20  1137 01/19/20  0614   CPK 27* 27* CKND1 CALCULATION NOT PERFORMED WHEN RESULT IS BELOW LINEAR LIMIT CALCULATION NOT PERFORMED WHEN RESULT IS BELOW LINEAR LIMIT      Coagulation Recent Labs     01/20/20  0510 01/19/20  1700  01/18/20  1810   PTP  --   --   --  31.3*   INR  --   --   --  3.1*   APTT 96.7* 105.4*   < > 57.4*    < > = values in this interval not displayed. Lipid Panel No results found for: CHOL, CHOLPOCT, CHOLX, CHLST, CHOLV, 335460, HDL, HDLP, LDL, LDLC, DLDLP, 010951, VLDLC, VLDL, TGLX, TRIGL, TRIGP, TGLPOCT, CHHD, CHHDX   BNP No results for input(s): BNPP in the last 72 hours.    Liver Enzymes Recent Labs     01/20/20  0510   TP 6.4   ALB 1.7*   AP 77   SGOT 29      Thyroid Studies Lab Results   Component Value Date/Time    TSH 1.86 01/19/2020 06:14 AM        Procedures/imaging: see electronic medical records for all procedures/Xrays and details which were not copied into this note but were reviewed prior to creation of To Kirk MD

## 2020-01-20 NOTE — PROGRESS NOTES
0700 - Bedside and Verbal shift change report received from Xiomara Ortiz RN. Report included the following information SBAR, Kardex, ED Summary, Procedure Summary, Intake/Output, MAR, Recent Results, Med Rec Status and Cardiac Rhythm NSR.    1025 Off floor to MRI.

## 2020-01-20 NOTE — CONSULTS
[facility]                                               (A Division of [address] [place] [address])                           Follow-up Note      Date of Admission: 1/18/2020     Date of Note:  1/20/2020    Summary:      Mr Rox Dennison is an elderly 69y diabetic  whom I saw last admission for GBS sepsis emanating from a malodorous R DFU/OM (initial CRP 114mg/L) for which he had OR debridement on 1/7 followed by IV ertapenem since going home on 1/14. Went home and returned to ER on 1/15 where he was re-evaluated for febrile episode and feeling generalized malaise. Had repeat CRP done, which returned much better at 54mg/L and negative PCT. Noted then to have a R apical mass off the pleura. Returned/admitted 1/18 for fever and swollen arm where PICC had been placed. Now noted to have extensive R upper arm/subclavian DVT. Had been on PO novel anticoagulant, which has been replaced by IV heparin. Now on 4 abx. Interval History:  CC: Hungry  Since admission, feels better overall. Fever down. Arm hurts. Cough. Daughter and son-in-law at bedside. Current Antimicrobials: Prior Antimicrobials   1. Ertapenem IV (1/14-) #5  2. Pip/tzb IV (1/18-) #1  3. Vanco IV (1/18-) #1  4. Levo IV (1/18-) #2        Assessment Plan: Thromboembolic disease  8/41:  PCT (-)    Extensive R upper arm/chest DVT coming from my PICC; I think the R apical \"mass\" is a consequence of this as well (behaving as embolus); has another one that I can see at L apex as well. ->Defer treatment to ICU/Primary/Vascular   R Foot DFU/OM  1/6:  CRP - 114mg/L  1/15:  CRP - 54mg/L    His initial infection/disease is better, but now we are seeing the very real/known sequala of his treatment course (Thromboembolic disease) that is life-threatening.    ->POD/abx#13/42    I have stopped all the other abx and have continued the hospital-preference carbapenem (meropenem) while hospitalized.   Can go back to ertapenem upon DC. Will recheck another CRP tomorrow. The big issue is which is worse - the disease or the cure? I had a very italo discussion with daughter/son-in-law that amputation might be the better way to go to obviate the need for continued long-term venous access (and all its issues) and abx. Spent 50m reviewing data and talking to family at bedside.      JC    DMT2      Microbiology:                 1/18 - BCx x2(-)      Wd - NOS (-) so far      Flu(-)      Spneumo Ag(-)      Legionella Ag(-)        1/15 - BCx x2(-)        1/6 - Wound (+) CoNS and bacillus (surface contaminants)                                         1/2 - BCx (-)                                         1/1 - BCx 2/2 (+) Streptococcus agalactiae                                                      UA (+) Streptococcus agalactiae        Lines / Catheters:         peripheral         Patient Active Problem List   Diagnosis Code    Sepsis ([facility] A41.9    Abdominal pain R10.9    Stage 3 chronic kidney disease ([facility]) N18.3    Type 2 diabetes mellitus, with long-term current use of insulin ([facility]) E11.9, Z79.4    Hypertension I10    Hypokalemia E87.6    UTI (urinary tract infection) N39.0    Positive blood culture R78.81    Foot abscess, right L02.611    Corn of foot L84    Sepsis due to Streptococcus agalactiae ([facility]) A40.1    Diabetic ulcer of right midfoot associated with type 2 diabetes mellitus, with fat layer exposed ([facility] Z01.409, L97.412    PAD (peripheral artery disease) ([facility]) I73.9    Osteomyelitis of right foot ([facility]) M86.9    Acute deep vein thrombosis (DVT) ([facility]) I82.409    DVT (deep venous thrombosis) ([facility]) I82.409    Anemia D64.9    Lung infiltrate R91.8       Current Facility-Administered Medications   Medication Dose Route Frequency    meropenem (MERREM) 1 g in sterile water (preservative free) 20 mL IV syringe  1 g IntraVENous Q12H    insulin lispro (HUMALOG) injection   SubCUTAneous AC&HS    oxyCODONE IR (ROXICODONE) tablet 5 mg  5 mg Oral Q4H PRN    0.9% sodium chloride infusion 250 mL  250 mL IntraVENous PRN    0.9% sodium chloride infusion 250 mL  250 mL IntraVENous PRN    heparin 25,000 units in D5W 250 ml infusion  18-36 Units/kg/hr IntraVENous TITRATE    atorvastatin (LIPITOR) tablet 20 mg  20 mg Oral DAILY    insulin glargine (LANTUS) injection 25 Units  25 Units SubCUTAneous DAILY    losartan (COZAAR) tablet 100 mg  100 mg Oral DAILY    polyethylene glycol (MIRALAX) packet 17 g  17 g Oral DAILY    morphine injection 1 mg  1 mg IntraVENous Q3H PRN    labetaloL (NORMODYNE;TRANDATE) 20 mg/4 mL (5 mg/mL) injection 20 mg  20 mg IntraVENous QID PRN    hydrALAZINE (APRESOLINE) 20 mg/mL injection 10 mg  10 mg IntraVENous Q6H PRN    pantoprazole (PROTONIX) injection 40 mg  40 mg IntraVENous Q24H    acetaminophen (TYLENOL) tablet 650 mg  650 mg Oral Q4H PRN    ELECTROLYTE REPLACEMENT PROTOCOL - Potassium Renal Dosing  1 Each Other PRN    ELECTROLYTE REPLACEMENT PROTOCOL - Magnesium   1 Each Other PRN    ELECTROLYTE REPLACEMENT PROTOCOL  - Phosphorus Renal Dosing  1 Each Other PRN    ELECTROLYTE REPLACEMENT PROTOCOL - Calcium   1 Each Other PRN     Facility-Administered Medications Ordered in Other Encounters   Medication Dose Route Frequency    sodium chloride (NS) flush 5-10 mL  5-10 mL IntraVENous PRN    sodium chloride (NS) flush 10-40 mL  10-40 mL IntraVENous PRN    heparin (porcine) pf 500 Units  500 Units InterCATHeter PRN    sodium chloride (NS) flush 10-40 mL  10-40 mL IntraVENous PRN    heparin (porcine) pf 500 Units  500 Units InterCATHeter PRN         Review of Systems - General ROS: positive for  - fever and malaise  Respiratory ROS: positive for - cough, pleuritic pain and shortness of breath  Cardiovascular ROS: positive for - chest pain  Gastrointestinal ROS: no abdominal pain, change in bowel habits, or black or bloody stools       Objective:  Visit Vitals  /79   Pulse 97 Temp 98.5 °F (36.9 °C)   Resp 17   Ht 5' 7\" (1.702 m)   Wt 77.2 kg (170 lb 3.1 oz)   SpO2 96%   BMI 26.66 kg/m²       Temp (24hrs), Av.2 °F (37.3 °C), Min:98.5 °F (36.9 °C), Max:100.4 °F (38 °C)      GEN: elderly  in NAD  HEENT: anicteric  CHEST: CTA  CVS:RRR  ABD: NT NABS  R arm: 3+ edematous with PICC  R foot:  Lateral edge non-suppurative/dry       Lab results:    Chemistry  Recent Labs     20  0510 01/19/20  17020  0614 20  1355   GLU 88  --  114* 320*     --  139 133*   K 4.0 3.9 3.4* 3.7     --  107 99*   CO2 28  --  26 28   BUN 21*  --  18 19*   CREA 2.02*  --  1.50* 1.46*   CA 8.0*  --  8.0* 8.1*   AGAP 5  --  6 6   BUCR 10*  --  12 13   AP 77  --  62 73   TP 6.4  --  6.2* 6.7   ALB 1.7*  --  1.7* 1.9*   GLOB 4.7*  --  4.5* 4.8*   AGRAT 0.4*  --  0.4* 0.4*       CBC w/ Diff  Recent Labs     20  0510 20  22020  17020  0905 20  0614   WBC 7.4  --   --  8.9 8.4   RBC 2.65*  --   --  2.40* 2.45*   HGB 7.8* 7.6* 6.8* 7.0* 7.2*   HCT 23.4* 22.7* 20.5* 21.3* 21.7*     --   --  271 302   GRANS 69  --   --  73 70   LYMPH 14*  --   --  11* 15*   EOS 2  --   --  1 0       Microbiology  All Micro Results     Procedure Component Value Units Date/Time    STREP PNEUMO AG, URINE [[id number]] Collected:  20 0000    Order Status:  Completed Specimen:  Urine, random Updated:  20 1629     Strep pneumo Ag, urine NEGATIVE        LEGIONELLA PNEUMOPHILA AG, URINE [[id number]] Collected:  20 0000    Order Status:  Completed Specimen:  Urine, random Updated:  20 1629     Legionella Ag, urine NEGATIVE        CULTURE, Severiano Bridegroom STAIN [[id number]] Collected:  20 2015    Order Status:  Completed Specimen:  Wound from Foot Updated:  20 1413     Special Requests: NO SPECIAL REQUESTS        GRAM STAIN NO WBC'S SEEN         NO ORGANISMS SEEN        Culture result: PENDING    CULTURE, BLOOD [[id number]] Collected:  20 1518    Order Status:  Completed Specimen:  Blood Updated:  01/19/20 0701     Special Requests: NO SPECIAL REQUESTS        Culture result: NO GROWTH AFTER 15 HOURS       CULTURE, BLOOD [[id number]] Collected:  01/18/20 1355    Order Status:  Completed Specimen:  Blood Updated:  01/19/20 0701     Special Requests: NO SPECIAL REQUESTS        Culture result: NO GROWTH AFTER 15 HOURS       INFLUENZA A & B AG (RAPID TEST) [[id number]] Collected:  01/18/20 1630    Order Status:  Completed Specimen:  Nasopharyngeal from Nasal washing Updated:  01/18/20 1653     Influenza A Antigen NEGATIVE         Comment: A negative result does not exclude influenza virus infection, seasonal or H1N1 due to suboptimal sensitivity. If influenza is circulating in your community, a diagnosis of influenza should be considered based on a patients clinical presentation and empiric antiviral treatment should be considered, if indicated.         Influenza B Antigen NEGATIVE        INFLUENZA A & B AG (RAPID TEST) [[id number]]     Order Status:  Canceled Specimen:  Nasopharyngeal            Tonny Cooney MD  Cell [phone]  [facility]   1/20/2020   10:27 AM

## 2020-01-21 ENCOUNTER — APPOINTMENT (OUTPATIENT)
Dept: MRI IMAGING | Age: 75
DRG: 314 | End: 2020-01-21
Attending: HOSPITALIST
Payer: MEDICARE

## 2020-01-21 ENCOUNTER — APPOINTMENT (OUTPATIENT)
Dept: CT IMAGING | Age: 75
DRG: 314 | End: 2020-01-21
Attending: HOSPITALIST
Payer: MEDICARE

## 2020-01-21 ENCOUNTER — APPOINTMENT (OUTPATIENT)
Dept: VASCULAR SURGERY | Age: 75
DRG: 314 | End: 2020-01-21
Attending: HOSPITALIST
Payer: MEDICARE

## 2020-01-21 ENCOUNTER — HOSPITAL ENCOUNTER (OUTPATIENT)
Dept: INFUSION THERAPY | Age: 75
Discharge: HOME | End: 2020-01-21
Payer: MEDICARE

## 2020-01-21 LAB
ALBUMIN SERPL-MCNC: 1.7 G/DL (ref 3.4–5)
ALBUMIN/GLOB SERPL: 0.4 {RATIO} (ref 0.8–1.7)
ALP SERPL-CCNC: 91 U/L (ref 45–117)
ALT SERPL-CCNC: 18 U/L (ref 16–61)
ANION GAP SERPL CALC-SCNC: 9 MMOL/L (ref 3–18)
APTT PPP: 61.4 SEC (ref 23–36.4)
APTT PPP: 83.3 SEC (ref 23–36.4)
AST SERPL-CCNC: 23 U/L (ref 10–38)
BACTERIA SPEC CULT: NORMAL
BASOPHILS # BLD: 0 K/UL (ref 0–0.1)
BASOPHILS NFR BLD: 1 % (ref 0–2)
BILIRUB SERPL-MCNC: 1.1 MG/DL (ref 0.2–1)
BUN SERPL-MCNC: 22 MG/DL (ref 7–18)
BUN/CREAT SERPL: 12 (ref 12–20)
CA-I SERPL-SCNC: 1.06 MMOL/L (ref 1.12–1.32)
CALCIUM SERPL-MCNC: 7.9 MG/DL (ref 8.5–10.1)
CHLORIDE SERPL-SCNC: 106 MMOL/L (ref 100–111)
CO2 SERPL-SCNC: 24 MMOL/L (ref 21–32)
CREAT SERPL-MCNC: 1.9 MG/DL (ref 0.6–1.3)
DIFFERENTIAL METHOD BLD: ABNORMAL
EOSINOPHIL # BLD: 0.1 K/UL (ref 0–0.4)
EOSINOPHIL NFR BLD: 1 % (ref 0–5)
ERYTHROCYTE [DISTWIDTH] IN BLOOD BY AUTOMATED COUNT: 14.3 % (ref 11.6–14.5)
GLOBULIN SER CALC-MCNC: 4.6 G/DL (ref 2–4)
GLUCOSE BLD STRIP.AUTO-MCNC: 126 MG/DL (ref 70–110)
GLUCOSE BLD STRIP.AUTO-MCNC: 128 MG/DL (ref 70–110)
GLUCOSE BLD STRIP.AUTO-MCNC: 175 MG/DL (ref 70–110)
GLUCOSE BLD STRIP.AUTO-MCNC: 82 MG/DL (ref 70–110)
GLUCOSE SERPL-MCNC: 135 MG/DL (ref 74–99)
HCT VFR BLD AUTO: 22.3 % (ref 36–48)
HCT VFR BLD AUTO: 22.7 % (ref 36–48)
HCT VFR BLD AUTO: 24.2 % (ref 36–48)
HGB BLD-MCNC: 7.5 G/DL (ref 13–16)
HGB BLD-MCNC: 7.5 G/DL (ref 13–16)
HGB BLD-MCNC: 8.1 G/DL (ref 13–16)
LYMPHOCYTES # BLD: 1.2 K/UL (ref 0.9–3.6)
LYMPHOCYTES NFR BLD: 15 % (ref 21–52)
MAGNESIUM SERPL-MCNC: 2 MG/DL (ref 1.6–2.6)
MCH RBC QN AUTO: 29.1 PG (ref 24–34)
MCHC RBC AUTO-ENTMCNC: 33 G/DL (ref 31–37)
MCV RBC AUTO: 88 FL (ref 74–97)
MONOCYTES # BLD: 1 K/UL (ref 0.05–1.2)
MONOCYTES NFR BLD: 14 % (ref 3–10)
NEUTS SEG # BLD: 5.3 K/UL (ref 1.8–8)
NEUTS SEG NFR BLD: 69 % (ref 40–73)
PHOSPHATE SERPL-MCNC: 3 MG/DL (ref 2.5–4.9)
PLATELET # BLD AUTO: 328 K/UL (ref 135–420)
PMV BLD AUTO: 9.1 FL (ref 9.2–11.8)
POTASSIUM SERPL-SCNC: 3.9 MMOL/L (ref 3.5–5.5)
PROT SERPL-MCNC: 6.3 G/DL (ref 6.4–8.2)
RBC # BLD AUTO: 2.58 M/UL (ref 4.7–5.5)
SERVICE CMNT-IMP: NORMAL
SODIUM SERPL-SCNC: 139 MMOL/L (ref 136–145)
WBC # BLD AUTO: 7.7 K/UL (ref 4.6–13.2)

## 2020-01-21 PROCEDURE — 80053 COMPREHEN METABOLIC PANEL: CPT

## 2020-01-21 PROCEDURE — 74011250636 HC RX REV CODE- 250/636: Performed by: HOSPITALIST

## 2020-01-21 PROCEDURE — 74011250636 HC RX REV CODE- 250/636: Performed by: INTERNAL MEDICINE

## 2020-01-21 PROCEDURE — 85025 COMPLETE CBC W/AUTO DIFF WBC: CPT

## 2020-01-21 PROCEDURE — C9113 INJ PANTOPRAZOLE SODIUM, VIA: HCPCS | Performed by: INTERNAL MEDICINE

## 2020-01-21 PROCEDURE — 74011250637 HC RX REV CODE- 250/637: Performed by: INTERNAL MEDICINE

## 2020-01-21 PROCEDURE — 85730 THROMBOPLASTIN TIME PARTIAL: CPT

## 2020-01-21 PROCEDURE — 70450 CT HEAD/BRAIN W/O DYE: CPT

## 2020-01-21 PROCEDURE — 84100 ASSAY OF PHOSPHORUS: CPT

## 2020-01-21 PROCEDURE — 83735 ASSAY OF MAGNESIUM: CPT

## 2020-01-21 PROCEDURE — 93971 EXTREMITY STUDY: CPT

## 2020-01-21 PROCEDURE — 74011000250 HC RX REV CODE- 250: Performed by: INTERNAL MEDICINE

## 2020-01-21 PROCEDURE — 82330 ASSAY OF CALCIUM: CPT

## 2020-01-21 PROCEDURE — 74011636637 HC RX REV CODE- 636/637: Performed by: INTERNAL MEDICINE

## 2020-01-21 PROCEDURE — 70551 MRI BRAIN STEM W/O DYE: CPT

## 2020-01-21 PROCEDURE — 82962 GLUCOSE BLOOD TEST: CPT

## 2020-01-21 PROCEDURE — 85018 HEMOGLOBIN: CPT

## 2020-01-21 PROCEDURE — 74011250636 HC RX REV CODE- 250/636: Performed by: EMERGENCY MEDICINE

## 2020-01-21 PROCEDURE — 36415 COLL VENOUS BLD VENIPUNCTURE: CPT

## 2020-01-21 PROCEDURE — 65660000000 HC RM CCU STEPDOWN

## 2020-01-21 RX ORDER — HEPARIN SODIUM 1000 [USP'U]/ML
40 INJECTION, SOLUTION INTRAVENOUS; SUBCUTANEOUS ONCE
Status: COMPLETED | OUTPATIENT
Start: 2020-01-21 | End: 2020-01-21

## 2020-01-21 RX ADMIN — INSULIN GLARGINE 25 UNITS: 100 INJECTION, SOLUTION SUBCUTANEOUS at 09:13

## 2020-01-21 RX ADMIN — HEPARIN SODIUM 14 UNITS/KG/HR: 10000 INJECTION, SOLUTION INTRAVENOUS at 07:10

## 2020-01-21 RX ADMIN — OXYCODONE HYDROCHLORIDE 5 MG: 5 TABLET ORAL at 09:14

## 2020-01-21 RX ADMIN — MEROPENEM 1 G: 1 INJECTION, POWDER, FOR SOLUTION INTRAVENOUS at 22:11

## 2020-01-21 RX ADMIN — VANCOMYCIN HYDROCHLORIDE 1000 MG: 1 INJECTION, POWDER, LYOPHILIZED, FOR SOLUTION INTRAVENOUS at 15:00

## 2020-01-21 RX ADMIN — ACETAMINOPHEN 650 MG: 325 TABLET ORAL at 21:51

## 2020-01-21 RX ADMIN — INSULIN LISPRO 2 UNITS: 100 INJECTION, SOLUTION INTRAVENOUS; SUBCUTANEOUS at 13:33

## 2020-01-21 RX ADMIN — ATORVASTATIN CALCIUM 20 MG: 20 TABLET, FILM COATED ORAL at 09:14

## 2020-01-21 RX ADMIN — HEPARIN SODIUM 3040 UNITS: 1000 INJECTION INTRAVENOUS; SUBCUTANEOUS at 09:22

## 2020-01-21 RX ADMIN — LOSARTAN POTASSIUM 100 MG: 50 TABLET, FILM COATED ORAL at 09:14

## 2020-01-21 RX ADMIN — HEPARIN SODIUM 14 UNITS/KG/HR: 10000 INJECTION, SOLUTION INTRAVENOUS at 15:16

## 2020-01-21 RX ADMIN — PANTOPRAZOLE SODIUM 40 MG: 40 INJECTION, POWDER, FOR SOLUTION INTRAVENOUS at 21:54

## 2020-01-21 RX ADMIN — MEROPENEM 1 G: 1 INJECTION, POWDER, FOR SOLUTION INTRAVENOUS at 13:34

## 2020-01-21 NOTE — PROGRESS NOTES
1/21/2020 PT note: Results of vascular studies noted, including acute appearing thrombus R internal jugular and subclavian and L subclavian veins. Will continue to hold PT evaluation today due to medical status and f/u tomorrow as appropriate. Thank you.    Melisa Gutiérrez, PT

## 2020-01-21 NOTE — PROGRESS NOTES
Vancomycin - Pharmacy to Dose    Consult provided for this 76y.o. year old ,male for indication of Empiric. Therapy day 1        Wt Readings from Last 1 Encounters:   01/21/20 76.1 kg (167 lb 11.2 oz)       Ht Readings from Last 1 Encounters:   01/18/20 170.2 cm (67\")     Dosing Weight:  76.1 kg    Additional Antibiotics:  Meropenem    Date:  1/21/20   Lab Results   Component Value Date/Time    Creatinine 1.90 (H) 01/21/2020 03:45 AM     creatinine clearance:  31.9 ml/min    white blood cell count:  7.7    Patient previously on Vancomycin. Received Vancomycin 2000 mg IV at 01:20 1/19/20 and Vancomycin 1000 mg IV at 00:32 1/20/20. Will continue Vancomycin 1000 mg IV q24hrs ( 12:00 ) and draw a Trough Level at 11:30 1/22/20. Dose calculated to approximate a therapeutic trough of 15-20 mcg/mL. Pharmacy to follow daily and will make changes to dose and/or frequency based on clinical status.     [address] [facility], [address] [place] [address]

## 2020-01-21 NOTE — PROGRESS NOTES
0700 Assumed patient care from off-going nurse Lexus Evans RN. Whiteboard updated, bed wheels locked, bed alarm set, bed in lowest position, and call bell within reach. 0800 Assessment complete. Patient resting comfortably in bed. Patient's right arm is new onset swelling with redness and warmth. Patient experiencing some confusion. Spoke with Dr. Lexi Espana. Ordered CT and Duplex. Patient states arm and leg pain 8 out of 10. Gave PRN Roxicodone. See MAR. Family at bedside. NAD. No SOB at this time. Call bell within reach. 1200 Reassessment complete. Patient resting comfortably in bed. No complaint of pain or SOB at this time. Call bell within reach. 1600 Reassessment complete. Patient resting comfortably in bed. No complaint of pain or SOB at this time. Call bell within reach. Vascular surgery stated that PICC can be used for antibiotics.

## 2020-01-21 NOTE — PROGRESS NOTES
Problem: Diabetes Self-Management  Goal: *Disease process and treatment process  Description  Define diabetes and identify own type of diabetes; list 3 options for treating diabetes. Outcome: Progressing Towards Goal  Goal: *Incorporating nutritional management into lifestyle  Description  Describe effect of type, amount and timing of food on blood glucose; list 3 methods for planning meals. Outcome: Progressing Towards Goal  Goal: *Incorporating physical activity into lifestyle  Description  State effect of exercise on blood glucose levels. Outcome: Progressing Towards Goal  Goal: *Developing strategies to promote health/change behavior  Description  Define the ABC's of diabetes; identify appropriate screenings, schedule and personal plan for screenings. Outcome: Progressing Towards Goal  Goal: *Using medications safely  Description  State effect of diabetes medications on diabetes; name diabetes medication taking, action and side effects. Outcome: Progressing Towards Goal  Goal: *Monitoring blood glucose, interpreting and using results  Description  Identify recommended blood glucose targets  and personal targets. Outcome: Progressing Towards Goal  Goal: *Prevention, detection, treatment of acute complications  Description  List symptoms of hyper- and hypoglycemia; describe how to treat low blood sugar and actions for lowering  high blood glucose level. Outcome: Progressing Towards Goal  Goal: *Prevention, detection and treatment of chronic complications  Description  Define the natural course of diabetes and describe the relationship of blood glucose levels to long term complications of diabetes.   Outcome: Progressing Towards Goal  Goal: *Developing strategies to address psychosocial issues  Description  Describe feelings about living with diabetes; identify support needed and support network  Outcome: Progressing Towards Goal  Goal: *Insulin pump training  Outcome: Progressing Towards Goal  Goal: *Sick day guidelines  Outcome: Progressing Towards Goal  Goal: *Patient Specific Goal (EDIT GOAL, INSERT TEXT)  Outcome: Progressing Towards Goal     Problem: Patient Education: Go to Patient Education Activity  Goal: Patient/Family Education  Outcome: Progressing Towards Goal     Problem: Falls - Risk of  Goal: *Absence of Falls  Description  Document Angi Alfredo Fall Risk and appropriate interventions in the flowsheet. Outcome: Progressing Towards Goal  Note: Fall Risk Interventions:  Mobility Interventions: Assess mobility with egress test, Bed/chair exit alarm, Patient to call before getting OOB, PT Consult for mobility concerns, PT Consult for assist device competence, OT consult for ADLs, Communicate number of staff needed for ambulation/transfer, Strengthening exercises (ROM-active/passive), Utilize walker, cane, or other assistive device    Mentation Interventions: Adequate sleep, hydration, pain control, Bed/chair exit alarm, Door open when patient unattended, Evaluate medications/consider consulting pharmacy, Room close to nurse's station, More frequent rounding, Toileting rounds    Medication Interventions: Teach patient to arise slowly, Patient to call before getting OOB, Bed/chair exit alarm, Evaluate medications/consider consulting pharmacy    Elimination Interventions: Bed/chair exit alarm, Call light in reach, Patient to call for help with toileting needs, Stay With Me (per policy), Toileting schedule/hourly rounds, Urinal in reach, Toilet paper/wipes in reach              Problem: Patient Education: Go to Patient Education Activity  Goal: Patient/Family Education  Outcome: Progressing Towards Goal     Problem: Pressure Injury - Risk of  Goal: *Prevention of pressure injury  Description  Document Thomas Scale and appropriate interventions in the flowsheet. Outcome: Progressing Towards Goal  Note: Pressure Injury Interventions:             Activity Interventions: PT/OT evaluation, Pressure redistribution bed/mattress(bed type), Increase time out of bed, Assess need for specialty bed    Mobility Interventions: HOB 30 degrees or less, Pressure redistribution bed/mattress (bed type), PT/OT evaluation    Nutrition Interventions: Document food/fluid/supplement intake    Friction and Shear Interventions: Apply protective barrier, creams and emollients, HOB 30 degrees or less, Minimize layers                Problem: Patient Education: Go to Patient Education Activity  Goal: Patient/Family Education  Outcome: Progressing Towards Goal

## 2020-01-21 NOTE — PROGRESS NOTES
1910 Bedside and Verbal shift change report given to RUBEN Ackerman RN (oncoming nurse) by Lois Sharp RN (offgoing nurse). Report included the following information SBAR, Kardex, MAR, Accordion and Recent Results     2233 Pt requested tylenol for headache. MEWS is a 3 for BP of  >160 and a heart rate of 125. . Nurse treated with labetalol per mar. Will continue to monitor. Pt attempting to get up without help. Bed alarm is on and in the lowest position and call light in reach. Pt encouraged to ask for help. Nurse attempted to use the blue phone  however the patient at times does not allow the  to speak and the pt pushes the phone away. 0710 Bedside and Verbal shift change report given to CHAYO Harrison RN (oncoming nurse) by Chele Loyd. Yoav Ackerman RN (offgoing nurse). Report included the following information SBAR, Kardex, MAR, Accordion and Recent Results.

## 2020-01-21 NOTE — PROGRESS NOTES
1/21/2020 PT note: consult received and chart reviewed. Spoke with nurse Evangelista French who reports pt has been confused this am, climbed out of bed over bedrail and fell. Awaiting CT head currently. Will hold PT evaluation at this time and f/up after test results known as appropriate. Nurse aware of same. Thank you.    Joan Sellers, PT

## 2020-01-21 NOTE — PROGRESS NOTES
Notified nursing supervisor, Arsenio Paul RN, of vascular surgeon's requirement for ICU bed post angio procedure to be performed this evening.

## 2020-01-21 NOTE — PROGRESS NOTES
Transition of care  Me marek patient along with his daughter Davian Ford and dr. Sd Melton at bedside. Reports that patient is confused. patinet to have vascular see him  Patient lives with his daughter Traci Segura. ptien has medicare for insurance. Patient to have a ultrasound of left arm now that due to there is swelling there also. Cm will continue to follow this patient.  Not ready for d/c per dr. Sd Melton

## 2020-01-21 NOTE — PERIOP NOTES
Pt seen and examined with Dr. Jeannine Caraballo. The patient continues to be on heparin qtt. Daughter states that the patient is complaining of intermittent chest discomfort. This appears to be substernal.   The left arm is beginning to swell as well. This is soft. Right arm is unchanged from my exam 2 days ago. There is no facial swelling but there appears to be right sided jvd today. The left arm venous duplex today shows acute subclavian vein deep vein thrombosis. Patient's anemia has improved with transfusions. At this time Dr. Jeannine Caraballo will defer thrombolysis today. Will repeat ct scam of neck and chest tomorrow morning. Will make further recommendations based on results. Family is in agreement with plan.

## 2020-01-21 NOTE — PROGRESS NOTES
Hospitalist Progress Note-critical care note     Patient: Delia Puri MRN: [id number]  CSN: [id number]    YOB: 1945  Age: 76 y.o. Sex: male    DOA: 1/18/2020 LOS:  LOS: 3 days            Chief complaint:om, dvt. Anemia , dm ckd     Assessment/Plan         Hospital Problems  Date Reviewed: 1/18/2020          Codes Class Noted POA    Anemia ICD-10-CM: D64.9  ICD-9-CM: 285.9  1/20/2020 Unknown        Lung infiltrate ICD-10-CM: R91.8  ICD-9-CM: 793.19  1/20/2020 Unknown        * (Principal) DVT (deep venous thrombosis) ([facility] ICD-10-CM: I82.409  ICD-9-CM: 453.40  1/18/2020 Unknown        Osteomyelitis of right foot ([facility] ICD-10-CM: M86.9  ICD-9-CM: 730.27  1/9/2020 Yes        Sepsis due to Streptococcus agalactiae [facility]) ICD-10-CM: A40.1  ICD-9-CM: 038.0, 995.91  1/6/2020 Yes        Type 2 diabetes mellitus, with long-term current use of insulin (HCC) ICD-10-CM: E11.9, Z79.4  ICD-9-CM: 250.00, V58.67  1/3/2020 Yes        Hypertension ICD-10-CM: I10  ICD-9-CM: 401.9  1/3/2020 Yes        Stage 3 chronic kidney disease ([facility] ICD-10-CM: N18.3  ICD-9-CM: 585.3  1/2/2020 Yes            Metabolic encephalopathy   Ct.susan done no acute issue     sepsis  Due to streptococcus   Will have id on board , continue current iv abx add vanc due to metals status change    Right upper lobe infiltrate versus atelectasis   On merrem and vanc now      Bilateral Upper extremities  and lower extremity DVT   Continue iv heparin gtt   Vascular and  Heme on board   Will f/u with their recommendation     Anemia acute on chronic    transfuse 2 unit prbc,   Occult negative   No hematoma     HTN   Well controlled now     DM  Lantus regular dose   sliding scale insulin hold metformin     OM and rt foot surgery recently    Continue current iv abx .     Subjective : fine   Rn: left arm swelling, fall    Daughter was at the bedside-pt some confusion today, fell . No fever/chills   Ct head.  Mri brain done, case discussed with vascular and  Heme-dr. Pierre-continue heparin gtt . Add vanc back, pan cx if fever   . 35 total min's spent on patient care including >50% on counseling/coordinating care. Discussed the above assessments. also discussed labs, medications and hospital course    Disposition :tbd,   Review of systems:    General: No fevers or chills. Tired   Cardiovascular: No chest pain or pressure. No palpitations. Pulmonary: No shortness of breath. Gastrointestinal: No nausea, vomiting. Vital signs/Intake and Output:  Visit Vitals  /59 (BP 1 Location: Left leg, BP Patient Position: At rest;Head of bed elevated (Comment degrees))   Pulse (!) 103   Temp 98.5 °F (36.9 °C)   Resp 18   Ht 5' 7\" (1.702 m)   Wt 76.1 kg (167 lb 11.2 oz)   SpO2 94%   BMI 26.27 kg/m²     Current Shift:  No intake/output data recorded. Last three shifts:  01/19 1901 - 01/21 0700  In: 1143.8 [P.O.:240; I.V.:554.8]  Out: 525 [Urine:525]    Physical Exam:  General: WD, WN. Alert, some cooperative, no acute distress    HEENT: NC, Atraumatic. PERRLA, anicteric sclerae. Lungs: CTA Bilaterally. No Wheezing/Rhonchi/Rales. Heart:  Regular  rhythm,  No murmur, No Rubs, No Gallops  Abdomen: Soft, Non distended, Non tender.  +Bowel sounds,   Extremities: No c/c. Bilateral arm swelling/ rt ankle swelling-improving and rt 5th toe amputated covered with gauze. Psych:   Not anxious or agitated. Neurologic:  No acute neurological deficit.   Sone confused           Labs: Results:       Chemistry Recent Labs     01/21/20  0345 01/20/20  0510 01/19/20  1700 01/19/20  0614   * 88  --  114*    140  --  139   K 3.9 4.0 3.9 3.4*    107  --  107   CO2 24 28  --  26   BUN 22* 21*  --  18   CREA 1.90* 2.02*  --  1.50*   CA 7.9* 8.0*  --  8.0*   AGAP 9 5  --  6   BUCR 12 10*  --  12   AP 91 77  --  62   TP 6.3* 6.4  --  6.2*   ALB 1.7* 1.7*  --  1.7*   GLOB 4.6* 4.7*  --  4.5*   AGRAT 0.4* 0.4*  --  0.4*      CBC w/Diff Recent Labs     01/21/20  2506 01/21/20  0345 01/20/20  0510  01/19/20  0905   WBC  --  7.7 7.4  --  8.9   RBC  --  2.58* 2.65*  --  2.40*   HGB 8.1* 7.5* 7.8*   < > 7.0*   HCT 24.2* 22.7* 23.4*   < > 21.3*   PLT  --  328 300  --  271   GRANS  --  69 69  --  73   LYMPH  --  15* 14*  --  11*   EOS  --  1 2  --  1    < > = values in this interval not displayed. Cardiac Enzymes Recent Labs     01/19/20  1137 01/19/20  0614   CPK 27* 27*   CKND1 CALCULATION NOT PERFORMED WHEN RESULT IS BELOW LINEAR LIMIT CALCULATION NOT PERFORMED WHEN RESULT IS BELOW LINEAR LIMIT      Coagulation Recent Labs     01/21/20  1445 01/21/20  0345 01/20/20  0510  01/18/20  1810   PTP  --   --  21.8*  --  31.3*   INR  --   --  1.9*  --  3.1*   APTT 83.3* 61.4* 96.7*   < > 57.4*    < > = values in this interval not displayed. Lipid Panel No results found for: CHOL, CHOLPOCT, CHOLX, CHLST, CHOLV, 586606, HDL, HDLP, LDL, LDLC, DLDLP, 775504, VLDLC, VLDL, TGLX, TRIGL, TRIGP, TGLPOCT, CHHD, CHHDX   BNP No results for input(s): BNPP in the last 72 hours.    Liver Enzymes Recent Labs     01/21/20  0345   TP 6.3*   ALB 1.7*   AP 91   SGOT 23      Thyroid Studies Lab Results   Component Value Date/Time    TSH 1.86 01/19/2020 06:14 AM        Procedures/imaging: see electronic medical records for all procedures/Xrays and details which were not copied into this note but were reviewed prior to creation of [place] MD

## 2020-01-21 NOTE — ROUTINE PROCESS
Bedside shift change report given to Areli Josue RN (oncoming nurse) by Krystian Ortiz RN (offgoing nurse). Report included the following information SBAR, Kardex, Intake/Output, MAR and Cardiac Rhythm SR/ST. Visit Vitals /58 (BP 1 Location: Left arm, BP Patient Position: At rest;Supine) Pulse 92 Temp 98.7 °F (37.1 °C) Resp 18 Ht 5' 7\" (1.702 m) Wt 77.2 kg (170 lb 3.1 oz) SpO2 94% BMI 26.66 kg/m² Krystian Ortiz RN

## 2020-01-21 NOTE — PROGRESS NOTES
Occupational Therapy Evaluation/Treatment Attempt    Chart reviewed. Attempted Occupational Therapy Evaluation/Treatment, however, patient unable to be seen due to:  []  Nausea/vomiting  []  Eating  []  Pain  []  Patient too lethargic  []  Off Unit for testing/procedure  []  Dialysis treatment in progress   []  Telemetry Results  [x]  Other: Pt was confused and climbed out of bed w/ fall. Having CT of head. Will follow up later as patient's schedule allows.    Thank you for this referral.  Delmi Wagoner, OTR/L, CSRS

## 2020-01-21 NOTE — PROGRESS NOTES
Patient transferred out of ICU. No acute pulmonary issues. PCCM will sign off. Please call us with any questions.      Rena Bermudez MD 1/21/2020 8:36 AM

## 2020-01-21 NOTE — CONSULTS
History & Physical      Patient: Shukri Hill               Sex: male          DOA: 1/18/2020       YOB: 1945      Age:  76 y.o.        LOS:  LOS: 3 days               Subjective:   Shukri Hill is a 76 y.o. male  who I was consulted to see for follow up of amputation right foot with flap.       Medications:     Current Facility-Administered Medications:     meropenem (MERREM) 1 g in sterile water (preservative free) 20 mL IV syringe, 1 g, IntraVENous, Q12H, Shilo Joshi MD, 1 g at 01/21/20 1334    insulin lispro (HUMALOG) injection, , SubCUTAneous, AC&HS, Omaira Butler MD, 2 Units at 01/21/20 1333    oxyCODONE IR (ROXICODONE) tablet 5 mg, 5 mg, Oral, Q4H PRN, Ulises Police, DO, 5 mg at 01/21/20 0914    0.9% sodium chloride infusion 250 mL, 250 mL, IntraVENous, PRN, Ulises Police, DO    0.9% sodium chloride infusion 250 mL, 250 mL, IntraVENous, PRN, Escobar Aragon MD    heparin 25,000 units in D5W 250 ml infusion, 18-36 Units/kg/hr, IntraVENous, TITRATE, Nevin Downs MD, Last Rate: 10.2 mL/hr at 01/21/20 1516, 14 Units/kg/hr at 01/21/20 1516    atorvastatin (LIPITOR) tablet 20 mg, 20 mg, Oral, DAILY, Omaira Butler MD, 20 mg at 01/21/20 0914    insulin glargine (LANTUS) injection 25 Units, 25 Units, SubCUTAneous, DAILY, Omaira Butler MD, 25 Units at 01/21/20 0913    losartan (COZAAR) tablet 100 mg, 100 mg, Oral, DAILY, Omaira Butler MD, 100 mg at 01/21/20 0914    polyethylene glycol (MIRALAX) packet 17 g, 17 g, Oral, DAILY, Omaira Butler MD    morphine injection 1 mg, 1 mg, IntraVENous, Q3H PRN, Omaira Butler MD, 1 mg at 01/20/20 1724    labetaloL (NORMODYNE;TRANDATE) 20 mg/4 mL (5 mg/mL) injection 20 mg, 20 mg, IntraVENous, QID PRN, Omaira Butler MD, 20 mg at 01/20/20 2239    hydrALAZINE (APRESOLINE) 20 mg/mL injection 10 mg, 10 mg, IntraVENous, Q6H PRN, Omaira Butler MD, 10 mg at 01/20/20 1606    pantoprazole (PROTONIX) injection 40 mg, 40 mg, IntraVENous, Q24H, Ja Butler MD, 40 mg at 01/20/20 2223    acetaminophen (TYLENOL) tablet 650 mg, 650 mg, Oral, Q4H PRN, Ja Butler MD, 650 mg at 01/20/20 2230    Facility-Administered Medications Ordered in Other Encounters:     sodium chloride (NS) flush 10-40 mL, 10-40 mL, IntraVENous, PRN, Melissa Guillen MD    heparin (porcine) pf 500 Units, 500 Units, InterCATHeter, PRN, Melissa Guillen MD    sodium chloride (NS) flush 10-40 mL, 10-40 mL, IntraVENous, PRN, Melissa Guillen MD    heparin (porcine) pf 500 Units, 500 Units, InterCATHeter, PRN, Melissa Guillen MD            Objective:      Visit Vitals  /59 (BP 1 Location: Left leg, BP Patient Position: At rest;Head of bed elevated (Comment degrees))   Pulse (!) 103   Temp 98.5 °F (36.9 °C)   Resp 18   Ht 5' 7\" (1.702 m)   Wt 76.1 kg (167 lb 11.2 oz)   SpO2 94%   BMI 26.27 kg/m²       Physical Exam:  R foot healing well.  No SOI    Labs:  Recent Results (from the past 24 hour(s))   GLUCOSE, POC    Collection Time: 01/20/20  9:53 PM   Result Value Ref Range    Glucose (POC) 143 (H) 70 - 110 mg/dL   MAGNESIUM    Collection Time: 01/21/20  3:45 AM   Result Value Ref Range    Magnesium 2.0 1.6 - 2.6 mg/dL   PHOSPHORUS    Collection Time: 01/21/20  3:45 AM   Result Value Ref Range    Phosphorus 3.0 2.5 - 4.9 MG/DL   CALCIUM, IONIZED    Collection Time: 01/21/20  3:45 AM   Result Value Ref Range    Ionized Calcium 1.06 (L) 1.12 - 1.32 MMOL/L   PTT    Collection Time: 01/21/20  3:45 AM   Result Value Ref Range    aPTT 61.4 (H) 23.0 - 36.4 SEC   CBC WITH AUTOMATED DIFF    Collection Time: 01/21/20  3:45 AM   Result Value Ref Range    WBC 7.7 4.6 - 13.2 K/uL    RBC 2.58 (L) 4.70 - 5.50 M/uL    HGB 7.5 (L) 13.0 - 16.0 g/dL    HCT 22.7 (L) 36.0 - 48.0 %    MCV 88.0 74.0 - 97.0 FL    MCH 29.1 24.0 - 34.0 PG    MCHC 33.0 31.0 - 37.0 g/dL    RDW 14.3 11.6 - 14.5 %    PLATELET 797 135 - 420 K/uL    MPV 9.1 (L) 9.2 - 11.8 FL    NEUTROPHILS 69 40 - 73 %    LYMPHOCYTES 15 (L) 21 - 52 %    MONOCYTES 14 (H) 3 - 10 %    EOSINOPHILS 1 0 - 5 %    BASOPHILS 1 0 - 2 %    ABS. NEUTROPHILS 5.3 1.8 - 8.0 K/UL    ABS. LYMPHOCYTES 1.2 0.9 - 3.6 K/UL    ABS. MONOCYTES 1.0 0.05 - 1.2 K/UL    ABS. EOSINOPHILS 0.1 0.0 - 0.4 K/UL    ABS. BASOPHILS 0.0 0.0 - 0.1 K/UL    DF AUTOMATED     METABOLIC PANEL, COMPREHENSIVE    Collection Time: 01/21/20  3:45 AM   Result Value Ref Range    Sodium 139 136 - 145 mmol/L    Potassium 3.9 3.5 - 5.5 mmol/L    Chloride 106 100 - 111 mmol/L    CO2 24 21 - 32 mmol/L    Anion gap 9 3.0 - 18 mmol/L    Glucose 135 (H) 74 - 99 mg/dL    BUN 22 (H) 7.0 - 18 MG/DL    Creatinine 1.90 (H) 0.6 - 1.3 MG/DL    BUN/Creatinine ratio 12 12 - 20      GFR est AA 42 (L) >60 ml/min/1.73m2    GFR est non-AA 35 (L) >60 ml/min/1.73m2    Calcium 7.9 (L) 8.5 - 10.1 MG/DL    Bilirubin, total 1.1 (H) 0.2 - 1.0 MG/DL    ALT (SGPT) 18 16 - 61 U/L    AST (SGOT) 23 10 - 38 U/L    Alk.  phosphatase 91 45 - 117 U/L    Protein, total 6.3 (L) 6.4 - 8.2 g/dL    Albumin 1.7 (L) 3.4 - 5.0 g/dL    Globulin 4.6 (H) 2.0 - 4.0 g/dL    A-G Ratio 0.4 (L) 0.8 - 1.7     GLUCOSE, POC    Collection Time: 01/21/20  6:03 AM   Result Value Ref Range    Glucose (POC) 126 (H) 70 - 110 mg/dL   GLUCOSE, POC    Collection Time: 01/21/20 11:19 AM   Result Value Ref Range    Glucose (POC) 175 (H) 70 - 110 mg/dL   HGB & HCT    Collection Time: 01/21/20  2:45 PM   Result Value Ref Range    HGB 8.1 (L) 13.0 - 16.0 g/dL    HCT 24.2 (L) 36.0 - 48.0 %   PTT    Collection Time: 01/21/20  2:45 PM   Result Value Ref Range    aPTT 83.3 (H) 23.0 - 36.4 SEC   GLUCOSE, POC    Collection Time: 01/21/20  4:10 PM   Result Value Ref Range    Glucose (POC) 128 (H) 70 - 110 mg/dL           Assessment/Plan     Principal Problem:    DVT (deep venous thrombosis) ([facility] (1/18/2020)    Active Problems:    Stage 3 chronic kidney disease ([facility] (1/2/2020) Type 2 diabetes mellitus, with long-term current use of insulin ([address] (1/3/2020)      Hypertension (1/3/2020)      Sepsis due to Streptococcus agalactiae ([address] (1/6/2020)      Osteomyelitis of right foot ([address] (1/9/2020)      Anemia (1/20/2020)      Lung infiltrate (1/20/2020)        Patient seen and evaluated today. Recommended continued xeroform dressing every 3 days.   I will follow weekly while in hospital    Christine Morrow DPM  January 21, 2020

## 2020-01-21 NOTE — PROGRESS NOTES
Problem: Diabetes Self-Management  Goal: *Disease process and treatment process  Description  Define diabetes and identify own type of diabetes; list 3 options for treating diabetes. Outcome: Progressing Towards Goal  Goal: *Incorporating nutritional management into lifestyle  Description  Describe effect of type, amount and timing of food on blood glucose; list 3 methods for planning meals. Outcome: Progressing Towards Goal  Goal: *Incorporating physical activity into lifestyle  Description  State effect of exercise on blood glucose levels. Outcome: Progressing Towards Goal  Goal: *Developing strategies to promote health/change behavior  Description  Define the ABC's of diabetes; identify appropriate screenings, schedule and personal plan for screenings. Outcome: Progressing Towards Goal  Goal: *Using medications safely  Description  State effect of diabetes medications on diabetes; name diabetes medication taking, action and side effects. Outcome: Progressing Towards Goal  Goal: *Monitoring blood glucose, interpreting and using results  Description  Identify recommended blood glucose targets  and personal targets. Outcome: Progressing Towards Goal  Goal: *Prevention, detection, treatment of acute complications  Description  List symptoms of hyper- and hypoglycemia; describe how to treat low blood sugar and actions for lowering  high blood glucose level. Outcome: Progressing Towards Goal  Goal: *Prevention, detection and treatment of chronic complications  Description  Define the natural course of diabetes and describe the relationship of blood glucose levels to long term complications of diabetes.   Outcome: Progressing Towards Goal  Goal: *Developing strategies to address psychosocial issues  Description  Describe feelings about living with diabetes; identify support needed and support network  Outcome: Progressing Towards Goal  Goal: *Insulin pump training  Outcome: Progressing Towards Goal  Goal: *Sick day guidelines  Outcome: Progressing Towards Goal  Goal: *Patient Specific Goal (EDIT GOAL, INSERT TEXT)  Outcome: Progressing Towards Goal     Problem: Falls - Risk of  Goal: *Absence of Falls  Description  Document Kaylynn Cates Fall Risk and appropriate interventions in the flowsheet.   Outcome: Progressing Towards Goal  Note: Fall Risk Interventions:  Mobility Interventions: Patient to call before getting OOB, PT Consult for assist device competence, Utilize walker, cane, or other assistive device    Mentation Interventions: Adequate sleep, hydration, pain control, Reorient patient    Medication Interventions: Patient to call before getting OOB, Teach patient to arise slowly    Elimination Interventions: Call light in reach, Patient to call for help with toileting needs, Bed/chair exit alarm    History of Falls Interventions: Bed/chair exit alarm, Investigate reason for fall, Room close to nurse's station

## 2020-01-22 ENCOUNTER — APPOINTMENT (OUTPATIENT)
Dept: CT IMAGING | Age: 75
DRG: 314 | End: 2020-01-22
Attending: SURGERY
Payer: MEDICARE

## 2020-01-22 PROBLEM — A41.9 SEPSIS (HCC): Status: RESOLVED | Noted: 2020-01-02 | Resolved: 2020-01-22

## 2020-01-22 PROBLEM — N39.0 UTI (URINARY TRACT INFECTION): Status: RESOLVED | Noted: 2020-01-03 | Resolved: 2020-01-22

## 2020-01-22 PROBLEM — R78.81 POSITIVE BLOOD CULTURE: Status: RESOLVED | Noted: 2020-01-03 | Resolved: 2020-01-22

## 2020-01-22 PROBLEM — A40.1 SEPSIS DUE TO STREPTOCOCCUS AGALACTIAE (HCC): Status: RESOLVED | Noted: 2020-01-06 | Resolved: 2020-01-22

## 2020-01-22 PROBLEM — L84 CORN OF FOOT: Status: RESOLVED | Noted: 2020-01-05 | Resolved: 2020-01-22

## 2020-01-22 PROBLEM — R10.9 ABDOMINAL PAIN: Status: RESOLVED | Noted: 2020-01-02 | Resolved: 2020-01-22

## 2020-01-22 LAB
ALBUMIN SERPL-MCNC: 1.8 G/DL (ref 3.4–5)
ALBUMIN/GLOB SERPL: 0.4 {RATIO} (ref 0.8–1.7)
ALP SERPL-CCNC: 85 U/L (ref 45–117)
ALT SERPL-CCNC: 17 U/L (ref 16–61)
ANION GAP SERPL CALC-SCNC: 7 MMOL/L (ref 3–18)
APTT PPP: 113.9 SEC (ref 23–36.4)
AST SERPL-CCNC: 25 U/L (ref 10–38)
BACTERIA SPEC CULT: NORMAL
BASOPHILS # BLD: 0 K/UL (ref 0–0.1)
BASOPHILS NFR BLD: 0 % (ref 0–2)
BILIRUB SERPL-MCNC: 1.4 MG/DL (ref 0.2–1)
BUN SERPL-MCNC: 19 MG/DL (ref 7–18)
BUN/CREAT SERPL: 12 (ref 12–20)
CA-I SERPL-SCNC: 1.1 MMOL/L (ref 1.12–1.32)
CALCIUM SERPL-MCNC: 7.9 MG/DL (ref 8.5–10.1)
CHLORIDE SERPL-SCNC: 106 MMOL/L (ref 100–111)
CO2 SERPL-SCNC: 26 MMOL/L (ref 21–32)
CREAT SERPL-MCNC: 1.55 MG/DL (ref 0.6–1.3)
CRP SERPL-MCNC: 22.4 MG/DL (ref 0–0.3)
DIFFERENTIAL METHOD BLD: ABNORMAL
EOSINOPHIL # BLD: 0 K/UL (ref 0–0.4)
EOSINOPHIL NFR BLD: 0 % (ref 0–5)
ERYTHROCYTE [DISTWIDTH] IN BLOOD BY AUTOMATED COUNT: 14.1 % (ref 11.6–14.5)
FERRITIN SERPL-MCNC: 894 NG/ML (ref 8–388)
FOLATE SERPL-MCNC: 5.7 NG/ML (ref 3.1–17.5)
GLOBULIN SER CALC-MCNC: 4.8 G/DL (ref 2–4)
GLUCOSE BLD STRIP.AUTO-MCNC: 135 MG/DL (ref 70–110)
GLUCOSE BLD STRIP.AUTO-MCNC: 139 MG/DL (ref 70–110)
GLUCOSE BLD STRIP.AUTO-MCNC: 160 MG/DL (ref 70–110)
GLUCOSE BLD STRIP.AUTO-MCNC: 238 MG/DL (ref 70–110)
GLUCOSE BLD STRIP.AUTO-MCNC: 54 MG/DL (ref 70–110)
GLUCOSE BLD STRIP.AUTO-MCNC: 55 MG/DL (ref 70–110)
GLUCOSE SERPL-MCNC: 56 MG/DL (ref 74–99)
GRAM STN SPEC: NORMAL
GRAM STN SPEC: NORMAL
HAPTOGLOB SERPL-MCNC: 341 MG/DL (ref 30–200)
HCT VFR BLD AUTO: 23.5 % (ref 36–48)
HCT VFR BLD AUTO: 24.1 % (ref 36–48)
HGB BLD-MCNC: 7.9 G/DL (ref 13–16)
HGB BLD-MCNC: 8 G/DL (ref 13–16)
IRON SATN MFR SERPL: 10 %
IRON SERPL-MCNC: 13 UG/DL (ref 50–175)
LDH SERPL L TO P-CCNC: 191 U/L (ref 81–234)
LYMPHOCYTES # BLD: 1.4 K/UL (ref 0.9–3.6)
LYMPHOCYTES NFR BLD: 14 % (ref 21–52)
MAGNESIUM SERPL-MCNC: 1.8 MG/DL (ref 1.6–2.6)
MCH RBC QN AUTO: 29.7 PG (ref 24–34)
MCHC RBC AUTO-ENTMCNC: 33.6 G/DL (ref 31–37)
MCV RBC AUTO: 88.3 FL (ref 74–97)
MONOCYTES # BLD: 1.5 K/UL (ref 0.05–1.2)
MONOCYTES NFR BLD: 15 % (ref 3–10)
NEUTS SEG # BLD: 7.4 K/UL (ref 1.8–8)
NEUTS SEG NFR BLD: 71 % (ref 40–73)
PHOSPHATE SERPL-MCNC: 2.4 MG/DL (ref 2.5–4.9)
PLATELET # BLD AUTO: 326 K/UL (ref 135–420)
PMV BLD AUTO: 8.7 FL (ref 9.2–11.8)
POTASSIUM SERPL-SCNC: 3.3 MMOL/L (ref 3.5–5.5)
PROT SERPL-MCNC: 6.6 G/DL (ref 6.4–8.2)
RBC # BLD AUTO: 2.66 M/UL (ref 4.7–5.5)
RBC MORPH BLD: ABNORMAL
RETICS/RBC NFR AUTO: 1.7 % (ref 0.5–2.3)
SERVICE CMNT-IMP: NORMAL
SODIUM SERPL-SCNC: 139 MMOL/L (ref 136–145)
TIBC SERPL-MCNC: 133 UG/DL (ref 250–450)
VIT B12 SERPL-MCNC: 240 PG/ML (ref 211–911)
WBC # BLD AUTO: 10.3 K/UL (ref 4.6–13.2)

## 2020-01-22 PROCEDURE — 74011250636 HC RX REV CODE- 250/636: Performed by: INTERNAL MEDICINE

## 2020-01-22 PROCEDURE — 85045 AUTOMATED RETICULOCYTE COUNT: CPT

## 2020-01-22 PROCEDURE — 74011250636 HC RX REV CODE- 250/636: Performed by: HOSPITALIST

## 2020-01-22 PROCEDURE — 36415 COLL VENOUS BLD VENIPUNCTURE: CPT

## 2020-01-22 PROCEDURE — 65660000000 HC RM CCU STEPDOWN

## 2020-01-22 PROCEDURE — 74011250637 HC RX REV CODE- 250/637: Performed by: HOSPITALIST

## 2020-01-22 PROCEDURE — 83615 LACTATE (LD) (LDH) ENZYME: CPT

## 2020-01-22 PROCEDURE — 85025 COMPLETE CBC W/AUTO DIFF WBC: CPT

## 2020-01-22 PROCEDURE — 71275 CT ANGIOGRAPHY CHEST: CPT

## 2020-01-22 PROCEDURE — 74011250636 HC RX REV CODE- 250/636: Performed by: SURGERY

## 2020-01-22 PROCEDURE — 74011250636 HC RX REV CODE- 250/636: Performed by: FAMILY MEDICINE

## 2020-01-22 PROCEDURE — 82784 ASSAY IGA/IGD/IGG/IGM EACH: CPT

## 2020-01-22 PROCEDURE — 82962 GLUCOSE BLOOD TEST: CPT

## 2020-01-22 PROCEDURE — 82728 ASSAY OF FERRITIN: CPT

## 2020-01-22 PROCEDURE — 83735 ASSAY OF MAGNESIUM: CPT

## 2020-01-22 PROCEDURE — 84100 ASSAY OF PHOSPHORUS: CPT

## 2020-01-22 PROCEDURE — 83540 ASSAY OF IRON: CPT

## 2020-01-22 PROCEDURE — 82607 VITAMIN B-12: CPT

## 2020-01-22 PROCEDURE — C9113 INJ PANTOPRAZOLE SODIUM, VIA: HCPCS | Performed by: INTERNAL MEDICINE

## 2020-01-22 PROCEDURE — 74011636637 HC RX REV CODE- 636/637: Performed by: INTERNAL MEDICINE

## 2020-01-22 PROCEDURE — 83010 ASSAY OF HAPTOGLOBIN QUANT: CPT

## 2020-01-22 PROCEDURE — 82330 ASSAY OF CALCIUM: CPT

## 2020-01-22 PROCEDURE — 74011636320 HC RX REV CODE- 636/320: Performed by: INTERNAL MEDICINE

## 2020-01-22 PROCEDURE — 74011250637 HC RX REV CODE- 250/637: Performed by: INTERNAL MEDICINE

## 2020-01-22 PROCEDURE — 86140 C-REACTIVE PROTEIN: CPT

## 2020-01-22 PROCEDURE — 85018 HEMOGLOBIN: CPT

## 2020-01-22 PROCEDURE — 74011000250 HC RX REV CODE- 250: Performed by: INTERNAL MEDICINE

## 2020-01-22 PROCEDURE — 80053 COMPREHEN METABOLIC PANEL: CPT

## 2020-01-22 PROCEDURE — 74011000258 HC RX REV CODE- 258

## 2020-01-22 PROCEDURE — 84145 PROCALCITONIN (PCT): CPT

## 2020-01-22 PROCEDURE — 85730 THROMBOPLASTIN TIME PARTIAL: CPT

## 2020-01-22 RX ORDER — MAGNESIUM SULFATE 100 %
4 CRYSTALS MISCELLANEOUS AS NEEDED
Status: DISCONTINUED | OUTPATIENT
Start: 2020-01-22 | End: 2020-01-29 | Stop reason: HOSPADM

## 2020-01-22 RX ORDER — SODIUM CHLORIDE 9 MG/ML
100 INJECTION, SOLUTION INTRAVENOUS CONTINUOUS
Status: DISCONTINUED | OUTPATIENT
Start: 2020-01-22 | End: 2020-01-22

## 2020-01-22 RX ORDER — DEXTROSE MONOHYDRATE 100 MG/ML
INJECTION, SOLUTION INTRAVENOUS
Status: COMPLETED
Start: 2020-01-22 | End: 2020-01-22

## 2020-01-22 RX ORDER — FUROSEMIDE 10 MG/ML
40 INJECTION INTRAMUSCULAR; INTRAVENOUS DAILY
Status: DISCONTINUED | OUTPATIENT
Start: 2020-01-22 | End: 2020-01-23

## 2020-01-22 RX ORDER — SAME BUTANEDISULFONATE/BETAINE 400-600 MG
250 POWDER IN PACKET (EA) ORAL DAILY
Status: DISCONTINUED | OUTPATIENT
Start: 2020-01-22 | End: 2020-01-29 | Stop reason: HOSPADM

## 2020-01-22 RX ORDER — ENOXAPARIN SODIUM 100 MG/ML
80 INJECTION SUBCUTANEOUS EVERY 12 HOURS
Status: DISCONTINUED | OUTPATIENT
Start: 2020-01-22 | End: 2020-01-29 | Stop reason: HOSPADM

## 2020-01-22 RX ORDER — DEXTROSE MONOHYDRATE 100 MG/ML
125-250 INJECTION, SOLUTION INTRAVENOUS AS NEEDED
Status: DISCONTINUED | OUTPATIENT
Start: 2020-01-22 | End: 2020-01-29 | Stop reason: HOSPADM

## 2020-01-22 RX ORDER — DEXTROSE MONOHYDRATE 50 MG/ML
50 INJECTION, SOLUTION INTRAVENOUS CONTINUOUS
Status: DISCONTINUED | OUTPATIENT
Start: 2020-01-22 | End: 2020-01-22

## 2020-01-22 RX ORDER — POTASSIUM CHLORIDE 20 MEQ/1
20 TABLET, EXTENDED RELEASE ORAL DAILY
Status: DISCONTINUED | OUTPATIENT
Start: 2020-01-22 | End: 2020-01-24

## 2020-01-22 RX ADMIN — LOSARTAN POTASSIUM 100 MG: 50 TABLET, FILM COATED ORAL at 08:55

## 2020-01-22 RX ADMIN — ENOXAPARIN SODIUM 80 MG: 40 INJECTION, SOLUTION INTRAVENOUS; SUBCUTANEOUS at 15:59

## 2020-01-22 RX ADMIN — DEXTROSE MONOHYDRATE 250 ML: 10 INJECTION, SOLUTION INTRAVENOUS at 07:17

## 2020-01-22 RX ADMIN — POTASSIUM CHLORIDE 20 MEQ: 1500 TABLET, EXTENDED RELEASE ORAL at 15:59

## 2020-01-22 RX ADMIN — Medication 250 MG: at 15:59

## 2020-01-22 RX ADMIN — SODIUM CHLORIDE 100 ML/HR: 900 INJECTION, SOLUTION INTRAVENOUS at 01:00

## 2020-01-22 RX ADMIN — PANTOPRAZOLE SODIUM 40 MG: 40 INJECTION, POWDER, FOR SOLUTION INTRAVENOUS at 23:10

## 2020-01-22 RX ADMIN — INSULIN LISPRO 4 UNITS: 100 INJECTION, SOLUTION INTRAVENOUS; SUBCUTANEOUS at 23:10

## 2020-01-22 RX ADMIN — MEROPENEM 1 G: 1 INJECTION, POWDER, FOR SOLUTION INTRAVENOUS at 15:59

## 2020-01-22 RX ADMIN — ATORVASTATIN CALCIUM 20 MG: 20 TABLET, FILM COATED ORAL at 08:55

## 2020-01-22 RX ADMIN — IOPAMIDOL 60 ML: 755 INJECTION, SOLUTION INTRAVENOUS at 06:11

## 2020-01-22 RX ADMIN — MEROPENEM 1 G: 1 INJECTION, POWDER, FOR SOLUTION INTRAVENOUS at 23:09

## 2020-01-22 RX ADMIN — FUROSEMIDE 40 MG: 10 INJECTION, SOLUTION INTRAMUSCULAR; INTRAVENOUS at 16:00

## 2020-01-22 NOTE — PROGRESS NOTES
CM attempted to see patient he was off unit will contine to follow  Met with Dr. Ade Velazquez patent is not ready for d/c  Cm will continue to follow

## 2020-01-22 NOTE — PROGRESS NOTES
0017:  Notified by [facility] in [place] that patient scheduled for CTA of chest in am, will be unable to do r/t renal function labs. Dr. Aidan Carl paged for further clarification. 0020:  Orders received for NS @ 100 ml/hr.   Will recheck labs in AM.

## 2020-01-22 NOTE — PROGRESS NOTES
Hospitalist Progress Note    Patient: Roney Coronel MRN: [id number]  CSN: [id number]    YOB: 1945  Age: 76 y.o. Sex: male    DOA: 1/18/2020 LOS:  LOS: 4 days          Chief Complaint:    DVT upper extremities      Assessment/Plan   77 yo male with recent surgery for osteo in foot and on course of IV abx came back with arm swelling, extensive DVT despite eliquis therapy, and will need further vascular procedure    Metabolic encephalopathy -improved per daughter who translates at bedside  MRI brain done -no acute issue     Hypoglycemia-add dextrose IV-as he is NPO-he is malnourished also  Hold lantus this am     CTA ruled out PE  Chest pain seems pleuritic with inspiration-resolved today    BL pleural effusions-start daily lasix, EF nl on echo, repelte K also-also consider thora and send for cytology to rule out cancer-may do tomorrow if stable, can hold am dose of lovenox    On merrem , this is for continued treatment of foot osteo      Bilateral Upper extremities-new,  and lower extremity DVT present on admission  NOW-START LOVENOX PER HEMATOLOGY  Vascular and  Hematology on case  For thrombolysis today-ICU after procedure-NOW CANCELLED PER VASC     Anemia acute on chronic   transfused 2 unit prbc, following H&H  Occult negative     HTN -stable    Severe prot olivia malnutrition-ask nutrition to see, add glucerna when not NPO    IDDM with vasc complications-low BG as NPO this am, hold lantus, start dextrose IV    Recent right foot surgery for infection, osteo  Continue current iv abx . change back to ertapenem on d/c as still completing course of therapy    CKD stage 3    Guarded prognosis    UPDATE-no thrombolysis, procedure cancelled  Start PO diet  Add glucerna  Stop IVF  Consider tapping effusion for cytology (lung)          Disposition :  Patient Active Problem List   Diagnosis Code    Stage 3 chronic kidney disease ([facility] N18.3    Type 2 diabetes mellitus, with long-term current use of insulin [facility] E11.9, Z79.4    Hypertension I10    Hypokalemia E87.6    Foot abscess, right L02.611    Diabetic ulcer of right midfoot associated with type 2 diabetes mellitus, with fat layer exposed [facility] E11.621, L97.412    PAD (peripheral artery disease) ([facility]) I73.9    Osteomyelitis of right foot ([facility]) M86.9    Acute deep vein thrombosis (DVT) ([facility]) I82.409    DVT (deep venous thrombosis) ([facility]) I82.409    Anemia D64.9    Lung infiltrate R91.8       Subjective:    Feeling ok but no appetitie per daughter  He denies inspiratory chest pain this am  Denies SOB  Arm swelling is about the same, still painful    Review of systems:    Constitutional: denies fevers, chills  Respiratory: denies SOB, cough  Cardiovascular: denies chest pain, palpitations  Gastrointestinal: denies nausea      Vital signs/Intake and Output:  Visit Vitals  /63 (BP 1 Location: Left leg, BP Patient Position: At rest;Supine)   Pulse (!) 106   Temp 98.5 °F (36.9 °C)   Resp 18   Ht 5' 7\" (1.702 m)   Wt 76.1 kg (167 lb 11.2 oz)   SpO2 92%   BMI 26.27 kg/m²     Current Shift:  No intake/output data recorded.   Last three shifts:  01/20 1901 - 01/22 0700  In: 688.3 [I.V.:688.3]  Out: 1000 [Urine:1000]    Exam:    General: Well developed, alert, NAD, OX3  Head/Neck: NCAT, supple, No masses, No lymphadenopathy  CVS:Regular rate and rhythm, no M/R/G, S1/S2 heard, no thrill  Lungs:Clear to auscultation bilaterally, no wheezes, rhonchi, or rales  Abdomen: Soft, Nontender, No distention, Normal Bowel sounds, No hepatomegaly  Extremities: right foot dressed, UE swelling BL, tender arms with phlegmasia changes  Neuro:grossly normal , follows commands  Psych:appropriate                Labs: Results:       Chemistry Recent Labs     01/22/20  0418 01/21/20  0345 01/20/20  0510   GLU 56* 135* 88    139 140   K 3.3* 3.9 4.0    106 107   CO2 26 24 28   BUN 19* 22* 21*   CREA 1.55* 1.90* 2.02*   CA 7.9* 7.9* 8.0*   AGAP 7 9 5   BUCR 12 12 10*   AP 85 91 77   TP 6.6 6.3* 6.4   ALB 1.8* 1.7* 1.7*   GLOB 4.8* 4.6* 4.7*   AGRAT 0.4* 0.4* 0.4*      CBC w/Diff Recent Labs     01/22/20 0418 01/21/20  2250 01/21/20  1445 01/21/20  0345 01/20/20  0510   WBC 10.3  --   --  7.7 7.4   RBC 2.66*  --   --  2.58* 2.65*   HGB 7.9* 7.5* 8.1* 7.5* 7.8*   HCT 23.5* 22.3* 24.2* 22.7* 23.4*     --   --  328 300   GRANS 71  --   --  69 69   LYMPH 14*  --   --  15* 14*   EOS 0  --   --  1 2      Cardiac Enzymes Recent Labs     01/19/20  1137   CPK 27*   CKND1 CALCULATION NOT PERFORMED WHEN RESULT IS BELOW LINEAR LIMIT      Coagulation Recent Labs     01/22/20 0418 01/21/20  1445  01/20/20  0510   PTP  --   --   --  21.8*   INR  --   --   --  1.9*   APTT 113.9* 83.3*   < > 96.7*    < > = values in this interval not displayed. Lipid Panel No results found for: CHOL, CHOLPOCT, CHOLX, CHLST, CHOLV, 304304, HDL, HDLP, LDL, LDLC, DLDLP, 938426, VLDLC, VLDL, TGLX, TRIGL, TRIGP, TGLPOCT, CHHD, CHHDX   BNP No results for input(s): BNPP in the last 72 hours.    Liver Enzymes Recent Labs     01/22/20 0418   TP 6.6   ALB 1.8*   AP 85   SGOT 25      Thyroid Studies Lab Results   Component Value Date/Time    TSH 1.86 01/19/2020 06:14 AM        Procedures/imaging: see electronic medical records for all procedures/Xrays and details which were not copied into this note but were reviewed prior to creation of Chanell Loo MD

## 2020-01-22 NOTE — PROGRESS NOTES
Spoke w Bishop Robledo RN per Dr. Luis E Mills, procedure will not be done today. MD will come speak with patient this afternoon. Advised that he does not need to be NPO anymore barring any other procedures.

## 2020-01-22 NOTE — PROGRESS NOTES
Spoke with Santy Marina, RN 3 [facility]. Patient hasGFr of 35* and 32*. Labs have gotten worse while inpatient. Patient for IR in the AM. More contrast will be administered. Spoke with Radiologist pertaining to diagnosis code.  Alternate testing should be performed--00:20

## 2020-01-22 NOTE — PROGRESS NOTES
Problem: Falls - Risk of  Goal: *Absence of Falls  Description  Document Lalo Adamson Fall Risk and appropriate interventions in the flowsheet.   Outcome: Not Progressing Towards Goal  Note: Fall Risk Interventions:  Mobility Interventions: Assess mobility with egress test, Communicate number of staff needed for ambulation/transfer, OT consult for ADLs, Patient to call before getting OOB, PT Consult for mobility concerns, PT Consult for assist device competence, Strengthening exercises (ROM-active/passive), Utilize walker, cane, or other assistive device, Bed/chair exit alarm    Mentation Interventions: Adequate sleep, hydration, pain control, Door open when patient unattended, Eyeglasses and hearing aids, Evaluate medications/consider consulting pharmacy, Increase mobility, More frequent rounding, Reorient patient, Toileting rounds, Update white board, Bed/chair exit alarm    Medication Interventions: Bed/chair exit alarm, Evaluate medications/consider consulting pharmacy, Patient to call before getting OOB, Teach patient to arise slowly    Elimination Interventions: Call light in reach, Bed/chair exit alarm, Patient to call for help with toileting needs, Stay With Me (per policy), Toilet paper/wipes in reach, Toileting schedule/hourly rounds    History of Falls Interventions: Bed/chair exit alarm, Consult care management for discharge planning, Door open when patient unattended, Evaluate medications/consider consulting pharmacy, Investigate reason for fall, Room close to nurse's station, Utilize gait belt for transfer/ambulation, Assess for delayed presentation/identification of injury for 48 hrs (comment for end date)         Problem: Patient Education: Go to Patient Education Activity  Goal: Patient/Family Education  Outcome: Progressing Towards Goal     Problem: Deep Venous Thrombosis - Risk of  Goal: *Absence of bleeding  Outcome: Progressing Towards Goal

## 2020-01-22 NOTE — PROGRESS NOTES
0730: Bedside and Verbal shift change report given to Pippa Preston RN (oncoming nurse) by Vera Luciano RN (offgoing nurse). Report included the following information Kardex and Cardiac Rhythm ST.    0945: n/o noted by MD Dante Goldstein, continue to monitor pt for any change  Pippa Preston RN    6376: IR makes this nurse aware pt procedure will not be completed today and pt can start a regular diet  DREW Birch RN    Slow progression noted    5665: dtr request information on procedure from MD, MD Dante Goldstein paged  Pippa Preston RN    [phone]: MD Dante Goldstein made aware dtr request information on pt, MD makes this nurse aware procedure cancelled and vascular dept will speak with pt this afternoon with more information  Pippa Preston RN    [phone]: this nurse makes family aware doctor from vascular dept will be at the bedside this afternoon, no further questions at this time  DREW Birch RN    3911: Bedside and Verbal shift change report given to Vera (oncoming nurse) by Pippa Preston RN (offgoing nurse).  Report included the following information Kardex and Cardiac Rhythm NSR/ ST.

## 2020-01-22 NOTE — CONSULTS
[facility]                                               (A Division of [address] [place] [address])                           Follow-up Note      Date of Admission: 1/18/2020     Date of Note:  1/22/2020    Summary:      Mr Shanon Berumen is an elderly 69y diabetic  whom I saw last admission for GBS sepsis emanating from a malodorous R DFU/OM (initial CRP 114mg/L) for which he had OR debridement on 1/7 followed by IV ertapenem since going home on 1/14.     Went home and returned to ER on 1/15 where he was re-evaluated for febrile episode and feeling generalized malaise. Had repeat CRP done, which returned much better at 54mg/L and negative PCT. Noted then to have a R apical mass off the pleura.     Returned/admitted 1/18 for fever and swollen arm where PICC had been placed. Now noted to have extensive R upper arm/subclavian DVT. Had been on PO novel anticoagulant, which has been replaced by IV heparin. Back on single carbapenem (house flavor - meropenem)       Interval History:  CC:  I\"m fine. Events of last few days reviewed. Talked to one daughter at bedside and other one on the phone (who is driving into hospital). Current Antimicrobials: Prior Antimicrobials   1. Meropenem IV (1/14-) #7 1. Pip/tzb IV  2. Vanco IV  3. Levo IV       Assessment Plan:   R Foot DFU/OM  1/6:  CRP - 114mg/L  1/15:  CRP - 54mg/L  1/22:  CRP - 224mg/L    Inflammatory marker took a big jump, but nonspecific to infection, likely consequential to his ongoing vascular clots. CBC stable. ->Kirill #7/42    No change in Abx for his R foot infection at this time. Will need 5 more weeks IV therapy. .or none if BKA.    Thromboembolic disease  0/71:  PCT (-)    JC    DMT2      Microbiology:                 1/18 - BCx x2(-)                                                      Wd - NOS (-) so far                                                      Flu(-) Spneumo Ag(-)                                                      Legionella Ag(-)                                            1/15 - BCx x2(-)                                            1/6 - Wound (+) CoNS and bacillus (surface contaminants)                                         1/2 - BCx (-)                                         1/1 - BCx 2/2 (+) Streptococcus agalactiae                                                      UA (+) Streptococcus agalactiae        Lines / Catheters:         R PICC    Patient Active Problem List   Diagnosis Code    Stage 3 chronic kidney disease ([facility]) N18.3    Type 2 diabetes mellitus, with long-term current use of insulin ([facility]) E11.9, Z79.4    Hypertension I10    Hypokalemia E87.6    Foot abscess, right L02.611    Diabetic ulcer of right midfoot associated with type 2 diabetes mellitus, with fat layer exposed ([facility] D96.721, L97.412    PAD (peripheral artery disease) ([facility]) I73.9    Osteomyelitis of right foot ([facility]) M86.9    Acute deep vein thrombosis (DVT) ([facility]) I82.409    DVT (deep venous thrombosis) ([facility]) I82.409    Anemia D64.9    Lung infiltrate R91.8       Current Facility-Administered Medications   Medication Dose Route Frequency    glucose chewable tablet 16 g  4 Tab Oral PRN    glucagon (GLUCAGEN) injection 1 mg  1 mg IntraMUSCular PRN    dextrose 10% infusion 125-250 mL  125-250 mL IntraVENous PRN    Saccharomyces boulardii (FLORASTOR) capsule 250 mg  250 mg Oral DAILY    furosemide (LASIX) injection 40 mg  40 mg IntraVENous DAILY    potassium chloride (K-DUR, KLOR-CON) SR tablet 20 mEq  20 mEq Oral DAILY    enoxaparin (LOVENOX) injection 80 mg  80 mg SubCUTAneous Q12H    meropenem (MERREM) 1 g in sterile water (preservative free) 20 mL IV syringe  1 g IntraVENous Q12H    insulin lispro (HUMALOG) injection   SubCUTAneous AC&HS    oxyCODONE IR (ROXICODONE) tablet 5 mg  5 mg Oral Q4H PRN    0.9% sodium chloride infusion 250 mL  250 mL IntraVENous PRN    0.9% sodium chloride infusion 250 mL  250 mL IntraVENous PRN    atorvastatin (LIPITOR) tablet 20 mg  20 mg Oral DAILY    [Held by provider] insulin glargine (LANTUS) injection 25 Units  25 Units SubCUTAneous DAILY    losartan (COZAAR) tablet 100 mg  100 mg Oral DAILY    polyethylene glycol (MIRALAX) packet 17 g  17 g Oral DAILY    morphine injection 1 mg  1 mg IntraVENous Q3H PRN    labetaloL (NORMODYNE;TRANDATE) 20 mg/4 mL (5 mg/mL) injection 20 mg  20 mg IntraVENous QID PRN    hydrALAZINE (APRESOLINE) 20 mg/mL injection 10 mg  10 mg IntraVENous Q6H PRN    pantoprazole (PROTONIX) injection 40 mg  40 mg IntraVENous Q24H    acetaminophen (TYLENOL) tablet 650 mg  650 mg Oral Q4H PRN     Facility-Administered Medications Ordered in Other Encounters   Medication Dose Route Frequency    sodium chloride (NS) flush 10-40 mL  10-40 mL IntraVENous PRN    heparin (porcine) pf 500 Units  500 Units InterCATHeter PRN         Review of Systems - General ROS: negative for - chills or night sweats  Respiratory ROS: no cough, shortness of breath, or wheezing  Cardiovascular ROS: no chest pain or dyspnea on exertion       Objective:  Visit Vitals  /59 (BP 1 Location: Left leg, BP Patient Position: At rest;Supine)   Pulse (!) 110 Comment: LINH HAYES MADE AWARE   Temp 100.2 °F (37.9 °C) Comment: LINH HAYES MADE AWARE   Resp 18   Ht 5' 7\" (1.702 m)   Wt 76.1 kg (167 lb 11.2 oz)   SpO2 94%   BMI 26.27 kg/m²       Temp (24hrs), Av.9 °F (37.2 °C), Min:98 °F (36.7 °C), Max:100.2 °F (37.9 °C)      GEN: elderly  in NAD  HEENT: anicteric         Lab results:    Chemistry  Recent Labs     20  0418 20  0345 20  0510   GLU 56* 135* 88    139 140   K 3.3* 3.9 4.0    106 107   CO2 26 24 28   BUN 19* 22* 21*   CREA 1.55* 1.90* 2.02*   CA 7.9* 7.9* 8.0*   AGAP 7 9 5   BUCR 12 12 10*   AP 85 91 77   TP 6.6 6.3* 6.4   ALB 1.8* 1.7* 1.7*   GLOB 4.8* 4.6* 4.7*   AGRAT 0.4* 0.4* 0.4*       CBC w/ Diff  Recent Labs     01/22/20  0418 01/21/20  2250 01/21/20  1445 01/21/20  0345 01/20/20  0510   WBC 10.3  --   --  7.7 7.4   RBC 2.66*  --   --  2.58* 2.65*   HGB 7.9* 7.5* 8.1* 7.5* 7.8*   HCT 23.5* 22.3* 24.2* 22.7* 23.4*     --   --  328 300   GRANS 71  --   --  69 69   LYMPH 14*  --   --  15* 14*   EOS 0  --   --  1 2       Microbiology  All Micro Results     Procedure Component Value Units Date/Time    CULTURE, Alber Sea STAIN [[id number]] Collected:  01/18/20 2015    Order Status:  Completed Specimen:  Wound from Foot Updated:  01/22/20 0836     Special Requests: NO SPECIAL REQUESTS        GRAM STAIN NO WBC'S SEEN         NO ORGANISMS SEEN        Culture result: NO GROWTH 3 DAYS       CULTURE, BLOOD [[id number]] Collected:  01/18/20 1518    Order Status:  Completed Specimen:  Blood Updated:  01/22/20 0147     Special Requests: NO SPECIAL REQUESTS        Culture result: NO GROWTH 4 DAYS       CULTURE, BLOOD [[id number]] Collected:  01/18/20 1355    Order Status:  Completed Specimen:  Blood Updated:  01/22/20 0147     Special Requests: NO SPECIAL REQUESTS        Culture result: NO GROWTH 4 DAYS       STREP PNEUMO AG, URINE [[id number]] Collected:  01/18/20 0000    Order Status:  Completed Specimen:  Urine, random Updated:  01/19/20 1629     Strep pneumo Ag, urine NEGATIVE        LEGIONELLA PNEUMOPHILA AG, URINE [[id number]] Collected:  01/18/20 0000    Order Status:  Completed Specimen:  Urine, random Updated:  01/19/20 1629     Legionella Ag, urine NEGATIVE        INFLUENZA A & B AG (RAPID TEST) [[id number]] Collected:  01/18/20 1630    Order Status:  Completed Specimen:  Nasopharyngeal from Nasal washing Updated:  01/18/20 1653     Influenza A Antigen NEGATIVE         Comment: A negative result does not exclude influenza virus infection, seasonal or H1N1 due to suboptimal sensitivity.  If influenza is circulating in your community, a diagnosis of influenza should be considered based on a patients clinical presentation and empiric antiviral treatment should be considered, if indicated.         Influenza B Antigen NEGATIVE        INFLUENZA A & B AG (RAPID TEST) [[id number]]     Order Status:  Canceled Specimen:  Nasopharyngeal            Fercho Garcia MD  Cell [phone]  [facility]   1/22/2020   3:57 PM

## 2020-01-22 NOTE — WOUND CARE
Wound care consulted to see patient. Orders provided for wound care by Podiatry. Xeroform dressings to be done by bedside nurses. Wound care will be available if needed as a resource.

## 2020-01-22 NOTE — PROGRESS NOTES
Dr. Joi Beard has discussed case with Dr. Homa Todd of heme/onc. At this time he would like to start lovenox 1mg/kg twice daily and discontinue heparin qtt. Dr. Joi Beard will see patient later this afternoon.

## 2020-01-22 NOTE — PROGRESS NOTES
Labs have improved to GFr 44*--3801  Davidson Conway, RN 3 [facility] sending patient down for CTA of Chest

## 2020-01-22 NOTE — CONSULTS
[facility]  Hematology / Oncology Consult Note      Impression:   Principal Problem:  Iatrogenic DVT (deep venous thrombosis) (HCC) (1/18/2020) RUE and LUE extending into SVC with partial SVCO,  associated with PICC line. Failure of eliquis after short term use. Prior calf DVT on right in posterior tibial vein  Reduced blood flow by  Doppler R posterior tibial artery    Active Problems:    Stage 3 chronic kidney disease ([address] (1/2/2020)      Type 2 diabetes mellitus, with long-term current use of insulin ([address] (1/3/2020)      Hypertension (1/3/2020)      Sepsis due to Streptococcus agalactiae ([address] (1/6/2020)      Osteomyelitis of right foot ([address] (1/9/2020)      Anemia (1/20/2020) normocytic normochromic. Likely multifactorial:  Renal insufficiency, chronic inflammation with stimulation of hepcidin,    Direct Grecia is negative   No proof of bleeding      Lung infiltrate (1/20/2020)    Can sometimes clear line associated clot with anticoagulation but this seems fairly refractory so far. Vascular has seen and thrombolytics not in current plan but re evaluation pending. I agree he poses some increase risk. Is the line still functional? Still needed? Malignancy always in the differential with refractory clot. He has 1.5 cm density in RUL which is stable over short term. Hypercoagulable state can occur with even microscopic tumor. Effusions present can consider tapping one or both and send cytology. Can consider Pet/CT when outpatient  Very low albumin, increased globulins  Plan:   Continue UFH  After initial phase with UFH, would consider either lovenox 1mg/kg sq bid or Arixtra 7.5 mg daily sq. For long term use.   Follow platelets  Measure iron studies, B12, folate, haptoglobin and LDH  Vascular planning re imaging for clot propagation/response  SPEP, TERE  Thrombophilia workup after acute phase has passed in 6-8 weeks    Reason for Consultation:  Evelyn Ortega is a 76 y.o. male who I've been asked to consult for clot and anemia. HPI:    Josh Estrella is a 76 y.o.  male who has history of diabetes, hypertension, and lower extremity DVT. He was admitted for foot abscess and osteomyelitis and was discharged with a PICC line and ALBAN and IV antibiotics last dose is scheduled for February 20. On admission day,  prior to getting his infusion his children noticed that he was having increased swelling in the right arm as well as increased pain and erythema he was brought to the emergency room for further evaluation. In the emergency room CTA and ultrasound of the arm showed extensive clot extending into the subclavian and superior vena cava. Patient was started on Eliquis starter pack on discharge and is currently taking 10 mg twice daily his last Eliquis dose was early admission morning verified by his daughter. In the emergency room patient was started on a heparin drip. He also has evidence of clot in left subclavian likely related to SVC clot and reduced blood flow. Clot likely related to PICC. Also found to have anemia with Hemoccult negative stools. Anemia present on 1/1/20. He has CKI and inflammation. Past Medical History:   Diagnosis Date    Diabetes ([address]     DVT of deep femoral vein (HCC)     Foot abscess     Hypertension      Past Surgical History:   Procedure Laterality Date    HX ORTHOPAEDIC      toe amputation       History reviewed. No pertinent family history.   No Known Allergies    Home Medications:       Hospital Medications:     Current Facility-Administered Medications   Medication Dose Route Frequency Provider Last Rate Last Dose    0.9% sodium chloride infusion  100 mL/hr IntraVENous CONTINUOUS Delma Colbert  mL/hr at 01/22/20 0100 100 mL/hr at 01/22/20 0100    glucose chewable tablet 16 g  4 Tab Oral PRN Donna Butler MD        glucagon (GLUCAGEN) injection 1 mg  1 mg IntraMUSCular PRN Donna Butler MD        dextrose 10% infusion 125-250 mL  125-250 mL IntraVENous PRN Silvano Butler  mL/hr at 01/22/20 0717 250 mL at 01/22/20 0717    meropenem (MERREM) 1 g in sterile water (preservative free) 20 mL IV syringe  1 g IntraVENous Q12H Epi Aguilera MD   1 g at 01/21/20 2211    insulin lispro (HUMALOG) injection   SubCUTAneous AC&HS Silvano Butler MD   Stopped at 01/21/20 1630    oxyCODONE IR (ROXICODONE) tablet 5 mg  5 mg Oral Q4H PRN Sayda Karst, DO   5 mg at 01/21/20 0914    0.9% sodium chloride infusion 250 mL  250 mL IntraVENous PRN Sayda Karst, DO        0.9% sodium chloride infusion 250 mL  250 mL IntraVENous PRN Daija Barker MD        heparin 25,000 units in D5W 250 ml infusion  18-36 Units/kg/hr IntraVENous TITRATE Daija Barker MD 8.7 mL/hr at 01/22/20 0625 12 Units/kg/hr at 01/22/20 4822    atorvastatin (LIPITOR) tablet 20 mg  20 mg Oral DAILY Silvano Butler MD   20 mg at 01/21/20 0914    insulin glargine (LANTUS) injection 25 Units  25 Units SubCUTAneous DAILY Silvano Butler MD   25 Units at 01/21/20 0913    losartan (COZAAR) tablet 100 mg  100 mg Oral DAILY Silvano Butler MD   100 mg at 01/21/20 0914    polyethylene glycol (MIRALAX) packet 17 g  17 g Oral DAILY Silvano Butler MD        morphine injection 1 mg  1 mg IntraVENous Q3H PRN Silvano Butler MD   1 mg at 01/20/20 1724    labetaloL (NORMODYNE;TRANDATE) 20 mg/4 mL (5 mg/mL) injection 20 mg  20 mg IntraVENous QID PRN Silvano Butler MD   20 mg at 01/20/20 2239    hydrALAZINE (APRESOLINE) 20 mg/mL injection 10 mg  10 mg IntraVENous Q6H PRN Silvano Butler MD   10 mg at 01/20/20 1606    pantoprazole (PROTONIX) injection 40 mg  40 mg IntraVENous Q24H Silvano Butler MD   40 mg at 01/21/20 2154    acetaminophen (TYLENOL) tablet 650 mg  650 mg Oral Q4H PRN Silvano Butler MD   650 mg at 01/21/20 2151     Facility-Administered Medications Ordered in Other Encounters   Medication Dose Route Frequency Provider Last Rate Last Dose    sodium chloride (NS) flush 10-40 mL  10-40 mL IntraVENous PRN Justa North MD        heparin (porcine) pf 500 Units  500 Units InterCATHeter PRN Justa North MD           Review of Systems:   Constitutional:   Fever: Negative, Chills: Negative, Weight Loss: Negative, Malaise/Fatigue: Negative   Diaphoresis: Negative,  Weakness: Negative  Skin:   Rash: Negative,  Itching: Negative  HENT:   Headache:Negative, Hearing Loss: Negative, Tinnitus: Negative, Ear Pain: Negative   Nosebleeds: Negative, Congestion: Negative, Stridor: Negative,   Sore Throat: Negative  Eyes:    Blurred Vision: Negative, Double Vision: Negative, Photophobia: Negative   Eye Pain: Negative, Eye Discharge: Negative, Eye Redness: Negative  Cardiovascular:    Chest Pain: +, Palpitations: Negative, Orthopnea: Negative   Claudication: Negative, Leg Swelling: Negative PND: Negative RUE > LUE swelling  Respiratory:   Cough: Negative, Hemoptysis: Negative, Sputum Production: Negative   Shortness of Breath: Negative, Wheezing: Negative  Gastrointestinal:   Heartburn: Negative, Nausea: Negative, Vomiting: Negative   Abdominal Pain: Negative, Diarrhea: Negative, Constipation: Negative   Blood in Stool: Negative, Melena: Negative   Genitourinary:    Dysuria: Negative, Urgency: Negative, Frequency: Negative   Hematuria: Negative, Flank Pain: Negative  Musculoskeletal:    Myalgias: Negative, Neck Pain: Negative, Back Pain: Negative   Joint Pain: Negative, Falls: Negative  Endo/Heme/Allergies:    Easy Bruise/Blood: Negative, Env. Allergies: Negative, Polydipsia: Negative   Neurological:    Dizziness: Negative, Tingling: Negative. Tremor: Negative,    Sensory Change: Negative.  Speech Change: Negative, Focal Weakness: Negative     Seizures: Negative, LOC: Negative  Psychiatric:   Depression: Negative, Suicidal Ideas: Negative, Substance Abuse: Negative   Hallucinations: Negative, Nervous/Anxious: Negative   Insomnia: Negative, Memory Loss: Negative     Physical Assessment:     Visit Vitals  /63 (BP 1 Location: Left leg, BP Patient Position: At rest;Supine)   Pulse (!) 106   Temp 98.5 °F (36.9 °C)   Resp 18   Ht 5' 7\" (1.702 m)   Wt 76.1 kg (167 lb 11.2 oz)   SpO2 92%   BMI 26.27 kg/m²       Temp (24hrs), Av.2 °F (36.8 °C), Min:98 °F (36.7 °C), Max:98.5 °F (36.9 °C)    Alert lying flat  No palpable adenopathy  Reduced breath sounds bilaterally without wheeze  Heart RR without M/G  Abdomen soft no HSM, no palpable masses  Extremities swellilng LUE with tenderness along venous sites. RUE also swollen but less than left. Right foot dressing clean and dry not removed. Reduced ppulses bilaterally. Labs:  Recent Labs     20  0418 20  2250 20  1445 20  0345 20  0510   WBC 10.3  --   --  7.7 7.4   RBC 2.66*  --   --  2.58* 2.65*   HCT 23.5* 22.3* 24.2* 22.7* 23.4*   MCV 88.3  --   --  88.0 88.3   MCH 29.7  --   --  29.1 29.4   MCHC 33.6  --   --  33.0 33.3   RDW 14.1  --   --  14.3 14.3       Recent Labs     20  0418 20  0345 20  0510   CO2 26 24 28   BUN 19* 22* 21*   Results for Charlie Cruz (MRN [id number]) as of 2020 07:14   Ref.  Range 2020 05:10 2020 03:45 2020 14:45 2020 22:50 2020 04:18   WBC Latest Ref Range: 4.6 - 13.2 K/uL 7.4 7.7   10.3   RBC Latest Ref Range: 4.70 - 5.50 M/uL 2.65 (L) 2.58 (L)   2.66 (L)   HGB Latest Ref Range: 13.0 - 16.0 g/dL 7.8 (L) 7.5 (L) 8.1 (L) 7.5 (L) 7.9 (L)   HCT Latest Ref Range: 36.0 - 48.0 % 23.4 (L) 22.7 (L) 24.2 (L) 22.3 (L) 23.5 (L)   MCV Latest Ref Range: 74.0 - 97.0 FL 88.3 88.0   88.3   MCH Latest Ref Range: 24.0 - 34.0 PG 29.4 29.1   29.7   MCHC Latest Ref Range: 31.0 - 37.0 g/dL 33.3 33.0   33.6   RDW Latest Ref Range: 11.6 - 14.5 % 14.3 14.3   14.1   PLATELET Latest Ref Range: 135 - 420 K/uL 300 328   326   MPV Latest Ref Range: 9.2 - 11.8 FL 8.9 (L) 9.1 (L)   8.7 (L)   NEUTROPHILS Latest Ref Range: 40 - 73 % 69 69   71   LYMPHOCYTES Latest Ref Range: 21 - 52 % 14 (L) 15 (L)   14 (L)   MONOCYTES Latest Ref Range: 3 - 10 % 14 (H) 14 (H)   15 (H)   EOSINOPHILS Latest Ref Range: 0 - 5 % 2 1   0   BASOPHILS Latest Ref Range: 0 - 2 % 1 1   0   DF Latest Units:   AUTOMATED AUTOMATED   AUTOMATED   ABS. NEUTROPHILS Latest Ref Range: 1.8 - 8.0 K/UL 5.1 5.3   7.4   ABS. LYMPHOCYTES Latest Ref Range: 0.9 - 3.6 K/UL 1.0 1.2   1.4   ABS. MONOCYTES Latest Ref Range: 0.05 - 1.2 K/UL 1.1 1.0   1.5 (H)   ABS. EOSINOPHILS Latest Ref Range: 0.0 - 0.4 K/UL 0.1 0.1   0.0   ABS. BASOPHILS Latest Ref Range: 0.0 - 0.1 K/UL 0.1 0.0   0.0   Results for Gilmar Russell (MRN [id number]) as of 1/22/2020 07:14   Ref.  Range 1/20/2020 05:10 1/21/2020 03:45 1/22/2020 04:18   Sodium Latest Ref Range: 136 - 145 mmol/L 140 139 139   Potassium Latest Ref Range: 3.5 - 5.5 mmol/L 4.0 3.9 3.3 (L)   Chloride Latest Ref Range: 100 - 111 mmol/L 107 106 106   CO2 Latest Ref Range: 21 - 32 mmol/L 28 24 26   Anion gap Latest Ref Range: 3.0 - 18 mmol/L 5 9 7   Glucose Latest Ref Range: 74 - 99 mg/dL 88 135 (H) 56 (L)   BUN Latest Ref Range: 7.0 - 18 MG/DL 21 (H) 22 (H) 19 (H)   Creatinine Latest Ref Range: 0.6 - 1.3 MG/DL 2.02 (H) 1.90 (H) 1.55 (H)   BUN/Creatinine ratio Latest Ref Range: 12 - 20   10 (L) 12 12   Calcium Latest Ref Range: 8.5 - 10.1 MG/DL 8.0 (L) 7.9 (L) 7.9 (L)   Ionized Calcium Latest Ref Range: 1.12 - 1.32 MMOL/L 1.10 (L) 1.06 (L) 1.10 (L)   Phosphorus Latest Ref Range: 2.5 - 4.9 MG/DL 3.5 3.0 2.4 (L)   Magnesium Latest Ref Range: 1.6 - 2.6 mg/dL 2.2 2.0 1.8   GFR est non-AA Latest Ref Range: >60 ml/min/1.73m2 32 (L) 35 (L) 44 (L)   GFR est AA Latest Ref Range: >60 ml/min/1.73m2 39 (L) 42 (L) 53 (L)   Bilirubin, total Latest Ref Range: 0.2 - 1.0 MG/DL 1.3 (H) 1.1 (H) 1.4 (H)   Protein, total Latest Ref Range: 6.4 - 8.2 g/dL 6.4 6.3 (L) 6.6   Albumin Latest Ref Range: 3.4 - 5.0 g/dL 1.7 (L) 1.7 (L) 1.8 (L)   Globulin Latest Ref Range: 2.0 - 4.0 g/dL 4.7 (H) 4.6 (H) 4.8 (H)   A-G Ratio Latest Ref Range: 0.8 - 1.7   0.4 (L) 0.4 (L) 0.4 (L)   ALT (SGPT) Latest Ref Range: 16 - 61 U/L 19 18 17   AST Latest Ref Range: 10 - 38 U/L 29 23 25   Alk. phosphatase Latest Ref Range: 45 - 117 U/L 77 91 85       · Distal segment of the right posterior tibial artery has no color flow and doppler signal but at the ankle posterior tibial is patent with color flow and doppler signal.  · No color flow or doppler signal noted in the left anterior tibial artery. · All the rest of the vessels in the bilateral lower extremity document normal flow. No evidence of significant peripheral arterial disease at rest in the lower extremity bilaterally. The right ankle/brachial index is 1.17   The left ankle/brachial index is 1.29   · Non occlusive thrombus present in the right posterior tibial veins. · No evidence of deep vein thrombosis in the left lower extremity veins assessed. ·  Acute appearing non occlusive thrombus noted in the right internal jugular, subclavian, axillary and brachial vein(s). · Non occlusive superficial thrombophlebitis noted within the right basilic vein. No evidence of DVT in the contralateral internal jugular and proximal subclavian vein. · Acute appearing thrombus noted in the contralateral right proximal internal jugular and subclavian vein(s) which is no different than the privious scan. · Acute appearing thrombus noted in the left proximal subclavian vein(s). CTA:   1.  Redemonstration of thrombus surrounding the included portions of the right  PICC line with accompanying occlusion of the superior vena cava above the level  of the brachiocephalic venous confluence.     >  Partial thrombosis of the left subclavian vein, with significant narrowing  of the proximal portion of the left subclavian vein and adjacent brachiocephalic  vein.  Progressive edema surrounding the left subclavian neurovascular bundle  noted in the interval from preceding CT angiogram chest 1/18/2020.     2. No evidence of pulmonary embolism.     3. Moderately large right and moderate-sized left pleural effusions which have  increased in the interval from prior chest CT.     4. Basilar areas of compressive atelectasis and presumptive area of rounded  atelectasis at the posterior right upper lobe without change. Attention on  follow-up imaging recommended.     5. Progressive body wall edema. CT a,p, non con  1. Moderate right and small left pleural effusions similar to prior  examination.     2. No evidence of intra-abdominal or pelvic hematoma. Specifically, no evidence  of retroperitoneal hematoma.     3.  Unchanged, nonspecific perinephric stranding without evidence of  hydronephrosis.     4. Distal descending and sigmoid colonic diverticulosis without findings to  suggest diverticulitis.     Marlyn Elizondo MD, [address] [place] [address]    [phone]

## 2020-01-22 NOTE — PROGRESS NOTES
Bedside and Verbal shift change report given to Hetal Titus RN (oncoming nurse) by LAURA Dumont RN (offgoing nurse). Report included the following information SBAR, Kardex, Intake/Output, MAR and Recent Results.

## 2020-01-22 NOTE — PROGRESS NOTES
1/22/2020 PT note: Chart reviewed. Pt for thrombolysis today-ICU after procedure per chart. Will acknowledge and d/c PT consult at this time in view of medical status. Please re-consult as appropriate. Thank you.    Carrie Leone, PT

## 2020-01-22 NOTE — PROGRESS NOTES
INITIAL NUTRITION ASSESSMENT     RECOMMENDATIONS/PLAN:   Supplement: Add Glucerna TID when diet is adv  Diet: Adv diet per MD  Avoid prolonged NPO diet order as it does not meet estimated needs  Monitor labs/lytes, diet adv, skin integrity, wt, fluid status, BM  Adult Malnutrition Criteria:     Nutrition assessment was completed by WALT and the patient was found to meet the following malnutrition criteria established by ASPEN/AND:    Adult Malnutrition Guidelines:  SEVERE PROTEIN CALORIE MALNUTRITION IN THE CONTEXT OF ACUTE INJURY/ILLNESS  Energy Intake: <50% energy intake compared to estimated energy needs >5 days  Muscle Mass:  Moderate depletion  Mayra Shine  01/22/20      REASON FOR ASSESSMENT:   []  MD Consult:    [x] General Nutrition Management and Supplements   []  NUTRITION ASSESSMENT:   Client History: 76 yrs old Male admitted with arm swelling, DVT despite eliquis therapy, needs vascular procedure, metabolic encephalopathy, hypoglycemia, BL pleural effusions     PMHx: DM, DVT, foot abscess, HTN   Cultural/Zoroastrian Food Preferences: None Identified    FOOD/NUTRITION HISTORY  Diet History: unable to assess pt intake pt was attempting to get out of bed, had to get CNA for help; NFPE though showed quad and calf muscle wasting    Food Allergies:  [x] NKFA       Pertinent PTA Medications: eliquis, lantus, metformin, miralax, ertapenem     NUTRITION INTAKE   Diet Order:  NPO      Average PO Intake:       Patient Vitals for the past 100 hrs:   % Diet Eaten   01/20/20 1705 90 %      Pertinent Medications:  [x] Reviewed; d5%, lasix, miralax, KCl,   Electrolyte Replacement Protocol: []K  []Mg  []PO4    Insulin:  [] SSI  [x] Pre-meal   [x]  Basal   [] Drip  [] None  Pt expected to meet estimated nutrient needs through next review:          []  Yes     [x] No; NPO does not meet estimated needs ANTHROPOMETRICS  Height: 5' 7\" (170.2 cm)       Weight: 76.1 kg (167 lb 11.2 oz)    BMI: 26.3 kg/m^2  -  overweight (25.0%-29.9% BMI)        Weight change: no recent significant wt loss                                   Comparison to Reference Standards:  IBW: 148 lbs      %IBW:113%      AdjBW: n/a    NUTRITION-FOCUSED PHYSICAL ASSESSMENT  Skin: No PU     GI: +BM 1/21/20    BIOCHEMICAL DATA & MEDICAL TESTS  Pertinent Labs:  [x] Reviewed; K-3.3 Glucose-56 BUN-19 Creatinine-1.55 Phos-2,4 Ca-7.9 GFR est AA-53    NUTRITION PRESCRIPTION  Calories: 0588-0599 kcal/day based on [place] x 1.7-1.8  Protein: 114-152 g/day based on 1.5-2.0 g/kg  CHO: 310-328 g/day based on 50% of total energy  Fluid:  7730-3175 ml/day based on 1 kcal/ml      NUTRITION DIAGNOSES:   1. Malnutrition related to poor appetite as evidence by MD report and NFPE    NUTRITION INTERVENTIONS:   INTERVENTIONS:        GOALS:  1. Supplement: Add Glucerna TID when diet is adv  Diet: Adv diet per MD 1. Adv diet per MD by next review 5 days     LEARNING NEEDS (Diet, Supplementation, Food/Nutrient-Drug Interaction):   [] None Identified  [] Inpatient education provided/documented    [] Identified and patient:  [] Declined     [x] Was not appropriate/indicated  NUTRITION MONITORING /EVALUATION:   Monitor wt  Monitor renal labs, electrolytes, fluid status    [] Participated in Interdisciplinary Rounds  [x] Interdisciplinary Care Plan Reviewed/Documented  DISCHARGE NUTRITION RECOMMENDATIONS ADDRESSED:      [x] To be determined closer to discharge    NUTRITION RISK:     [x]  At risk                     []  Not currently at risk     Will follow-up per policy.   Marcel Ba 1

## 2020-01-22 NOTE — PROGRESS NOTES
Bedside and Verbal shift change report given to April Rogers RN (oncoming nurse) by Sandra Blankenship RN (offgoing nurse). Report included the following information SBAR, Kardex, MAR and Recent Results.

## 2020-01-22 NOTE — PROGRESS NOTES
Occupational Therapy   S/w Car Notice. Alfredo Spencers for treatment as pt is anti-coagulated. Will attempt on next date.     Delmi Wagoner, OTR/L, CSRS

## 2020-01-23 ENCOUNTER — APPOINTMENT (OUTPATIENT)
Dept: INTERVENTIONAL RADIOLOGY/VASCULAR | Age: 75
DRG: 314 | End: 2020-01-23
Attending: SURGERY
Payer: MEDICARE

## 2020-01-23 ENCOUNTER — HOSPITAL ENCOUNTER (OUTPATIENT)
Dept: ULTRASOUND IMAGING | Age: 75
Discharge: HOME | DRG: 314 | End: 2020-01-23
Attending: HOSPITALIST
Payer: MEDICARE

## 2020-01-23 LAB
ALBUMIN SERPL-MCNC: 1.6 G/DL (ref 3.4–5)
ALBUMIN/GLOB SERPL: 0.4 {RATIO} (ref 0.8–1.7)
ALP SERPL-CCNC: 82 U/L (ref 45–117)
ALT SERPL-CCNC: 19 U/L (ref 16–61)
ANION GAP SERPL CALC-SCNC: 4 MMOL/L (ref 3–18)
APTT PPP: 48.2 SEC (ref 23–36.4)
APTT PPP: 48.5 SEC (ref 23–36.4)
AST SERPL-CCNC: 32 U/L (ref 10–38)
BASOPHILS # BLD: 0 K/UL (ref 0–0.1)
BASOPHILS NFR BLD: 0 % (ref 0–2)
BILIRUB SERPL-MCNC: 1.2 MG/DL (ref 0.2–1)
BUN SERPL-MCNC: 20 MG/DL (ref 7–18)
BUN/CREAT SERPL: 12 (ref 12–20)
CA-I SERPL-SCNC: 1.07 MMOL/L (ref 1.12–1.32)
CALCIUM SERPL-MCNC: 7.9 MG/DL (ref 8.5–10.1)
CHLORIDE SERPL-SCNC: 104 MMOL/L (ref 100–111)
CO2 SERPL-SCNC: 31 MMOL/L (ref 21–32)
CREAT SERPL-MCNC: 1.69 MG/DL (ref 0.6–1.3)
DIFFERENTIAL METHOD BLD: ABNORMAL
EOSINOPHIL # BLD: 0.1 K/UL (ref 0–0.4)
EOSINOPHIL NFR BLD: 1 % (ref 0–5)
ERYTHROCYTE [DISTWIDTH] IN BLOOD BY AUTOMATED COUNT: 14.1 % (ref 11.6–14.5)
ERYTHROCYTE [DISTWIDTH] IN BLOOD BY AUTOMATED COUNT: 14.2 % (ref 11.6–14.5)
FIBRINOGEN PPP-MCNC: 593 MG/DL (ref 210–451)
FIBRINOGEN PPP-MCNC: 612 MG/DL (ref 210–451)
GLOBULIN SER CALC-MCNC: 4.5 G/DL (ref 2–4)
GLUCOSE BLD STRIP.AUTO-MCNC: 144 MG/DL (ref 70–110)
GLUCOSE BLD STRIP.AUTO-MCNC: 160 MG/DL (ref 70–110)
GLUCOSE BLD STRIP.AUTO-MCNC: 160 MG/DL (ref 70–110)
GLUCOSE BLD STRIP.AUTO-MCNC: 211 MG/DL (ref 70–110)
GLUCOSE SERPL-MCNC: 98 MG/DL (ref 74–99)
HCT VFR BLD AUTO: 21.3 % (ref 36–48)
HCT VFR BLD AUTO: 22.5 % (ref 36–48)
HCT VFR BLD AUTO: 22.6 % (ref 36–48)
HGB BLD-MCNC: 7.1 G/DL (ref 13–16)
HGB BLD-MCNC: 7.5 G/DL (ref 13–16)
HGB BLD-MCNC: 7.5 G/DL (ref 13–16)
INR PPP: 1.3 (ref 0.8–1.2)
INR PPP: 1.3 (ref 0.8–1.2)
INR PPP: 1.4 (ref 0.8–1.2)
LYMPHOCYTES # BLD: 1.4 K/UL (ref 0.9–3.6)
LYMPHOCYTES NFR BLD: 14 % (ref 21–52)
MAGNESIUM SERPL-MCNC: 1.6 MG/DL (ref 1.6–2.6)
MCH RBC QN AUTO: 29.6 PG (ref 24–34)
MCH RBC QN AUTO: 29.6 PG (ref 24–34)
MCHC RBC AUTO-ENTMCNC: 33.2 G/DL (ref 31–37)
MCHC RBC AUTO-ENTMCNC: 33.3 G/DL (ref 31–37)
MCV RBC AUTO: 88.8 FL (ref 74–97)
MCV RBC AUTO: 89.3 FL (ref 74–97)
MONOCYTES # BLD: 1.4 K/UL (ref 0.05–1.2)
MONOCYTES NFR BLD: 13 % (ref 3–10)
NEUTS SEG # BLD: 7.6 K/UL (ref 1.8–8)
NEUTS SEG NFR BLD: 72 % (ref 40–73)
PHOSPHATE SERPL-MCNC: 2.3 MG/DL (ref 2.5–4.9)
PLATELET # BLD AUTO: 313 K/UL (ref 135–420)
PLATELET # BLD AUTO: 319 K/UL (ref 135–420)
PLATELET # BLD AUTO: 327 K/UL (ref 135–420)
PMV BLD AUTO: 8.7 FL (ref 9.2–11.8)
PMV BLD AUTO: 9.1 FL (ref 9.2–11.8)
POTASSIUM SERPL-SCNC: 3.7 MMOL/L (ref 3.5–5.5)
PROCALCITONIN SERPL-MCNC: 0.61 NG/ML
PROT SERPL-MCNC: 6.1 G/DL (ref 6.4–8.2)
PROTHROMBIN TIME: 16.2 SEC (ref 11.5–15.2)
PROTHROMBIN TIME: 16.4 SEC (ref 11.5–15.2)
PROTHROMBIN TIME: 16.5 SEC (ref 11.5–15.2)
RBC # BLD AUTO: 2.4 M/UL (ref 4.7–5.5)
RBC # BLD AUTO: 2.53 M/UL (ref 4.7–5.5)
SODIUM SERPL-SCNC: 139 MMOL/L (ref 136–145)
WBC # BLD AUTO: 10.5 K/UL (ref 4.6–13.2)
WBC # BLD AUTO: 7.9 K/UL (ref 4.6–13.2)

## 2020-01-23 PROCEDURE — C1769 GUIDE WIRE: HCPCS

## 2020-01-23 PROCEDURE — 82962 GLUCOSE BLOOD TEST: CPT

## 2020-01-23 PROCEDURE — 85730 THROMBOPLASTIN TIME PARTIAL: CPT

## 2020-01-23 PROCEDURE — 77010033678 HC OXYGEN DAILY

## 2020-01-23 PROCEDURE — 74011250636 HC RX REV CODE- 250/636: Performed by: INTERNAL MEDICINE

## 2020-01-23 PROCEDURE — 02PYX3Z REMOVAL OF INFUSION DEVICE FROM GREAT VESSEL, EXTERNAL APPROACH: ICD-10-PCS | Performed by: SURGERY

## 2020-01-23 PROCEDURE — 74011000250 HC RX REV CODE- 250: Performed by: SURGERY

## 2020-01-23 PROCEDURE — 74011250636 HC RX REV CODE- 250/636: Performed by: SURGERY

## 2020-01-23 PROCEDURE — 85049 AUTOMATED PLATELET COUNT: CPT

## 2020-01-23 PROCEDURE — 3E04317 INTRODUCTION OF OTHER THROMBOLYTIC INTO CENTRAL VEIN, PERCUTANEOUS APPROACH: ICD-10-PCS | Performed by: SURGERY

## 2020-01-23 PROCEDURE — 74011636637 HC RX REV CODE- 636/637: Performed by: INTERNAL MEDICINE

## 2020-01-23 PROCEDURE — 83735 ASSAY OF MAGNESIUM: CPT

## 2020-01-23 PROCEDURE — 74011250636 HC RX REV CODE- 250/636: Performed by: HOSPITALIST

## 2020-01-23 PROCEDURE — 74011000258 HC RX REV CODE- 258: Performed by: HOSPITALIST

## 2020-01-23 PROCEDURE — 74011250637 HC RX REV CODE- 250/637: Performed by: HOSPITALIST

## 2020-01-23 PROCEDURE — 02HV33Z INSERTION OF INFUSION DEVICE INTO SUPERIOR VENA CAVA, PERCUTANEOUS APPROACH: ICD-10-PCS | Performed by: SURGERY

## 2020-01-23 PROCEDURE — 74011250637 HC RX REV CODE- 250/637: Performed by: INTERNAL MEDICINE

## 2020-01-23 PROCEDURE — 65610000006 HC RM INTENSIVE CARE

## 2020-01-23 PROCEDURE — B5181ZA FLUOROSCOPY OF SUPERIOR VENA CAVA USING LOW OSMOLAR CONTRAST, GUIDANCE: ICD-10-PCS | Performed by: SURGERY

## 2020-01-23 PROCEDURE — 85384 FIBRINOGEN ACTIVITY: CPT

## 2020-01-23 PROCEDURE — 74011000250 HC RX REV CODE- 250: Performed by: INTERNAL MEDICINE

## 2020-01-23 PROCEDURE — 85018 HEMOGLOBIN: CPT

## 2020-01-23 PROCEDURE — 84100 ASSAY OF PHOSPHORUS: CPT

## 2020-01-23 PROCEDURE — 85610 PROTHROMBIN TIME: CPT

## 2020-01-23 PROCEDURE — 36415 COLL VENOUS BLD VENIPUNCTURE: CPT

## 2020-01-23 PROCEDURE — 74011250636 HC RX REV CODE- 250/636

## 2020-01-23 PROCEDURE — 97535 SELF CARE MNGMENT TRAINING: CPT

## 2020-01-23 PROCEDURE — 97167 OT EVAL HIGH COMPLEX 60 MIN: CPT

## 2020-01-23 PROCEDURE — 80053 COMPREHEN METABOLIC PANEL: CPT

## 2020-01-23 PROCEDURE — 85027 COMPLETE CBC AUTOMATED: CPT

## 2020-01-23 PROCEDURE — 85025 COMPLETE CBC W/AUTO DIFF WBC: CPT

## 2020-01-23 PROCEDURE — 74011000258 HC RX REV CODE- 258: Performed by: SURGERY

## 2020-01-23 PROCEDURE — 82330 ASSAY OF CALCIUM: CPT

## 2020-01-23 RX ORDER — FENTANYL CITRATE 50 UG/ML
INJECTION, SOLUTION INTRAMUSCULAR; INTRAVENOUS
Status: DISCONTINUED
Start: 2020-01-23 | End: 2020-01-23 | Stop reason: WASHOUT

## 2020-01-23 RX ORDER — DIPHENHYDRAMINE HYDROCHLORIDE 50 MG/ML
INJECTION, SOLUTION INTRAMUSCULAR; INTRAVENOUS
Status: DISCONTINUED
Start: 2020-01-23 | End: 2020-01-23 | Stop reason: WASHOUT

## 2020-01-23 RX ORDER — NALOXONE HYDROCHLORIDE 0.4 MG/ML
INJECTION, SOLUTION INTRAMUSCULAR; INTRAVENOUS; SUBCUTANEOUS
Status: DISCONTINUED
Start: 2020-01-23 | End: 2020-01-23 | Stop reason: WASHOUT

## 2020-01-23 RX ORDER — HEPARIN SODIUM 200 [USP'U]/100ML
500 INJECTION, SOLUTION INTRAVENOUS
Status: DISCONTINUED | OUTPATIENT
Start: 2020-01-23 | End: 2020-01-23 | Stop reason: ALTCHOICE

## 2020-01-23 RX ORDER — FENTANYL CITRATE 50 UG/ML
25-200 INJECTION, SOLUTION INTRAMUSCULAR; INTRAVENOUS
Status: DISCONTINUED | OUTPATIENT
Start: 2020-01-23 | End: 2020-01-23 | Stop reason: ALTCHOICE

## 2020-01-23 RX ORDER — LIDOCAINE HYDROCHLORIDE 10 MG/ML
1-20 INJECTION INFILTRATION; PERINEURAL
Status: DISCONTINUED | OUTPATIENT
Start: 2020-01-23 | End: 2020-01-23 | Stop reason: ALTCHOICE

## 2020-01-23 RX ORDER — HEPARIN SODIUM 10000 [USP'U]/100ML
500 INJECTION, SOLUTION INTRAVENOUS CONTINUOUS
Status: DISCONTINUED | OUTPATIENT
Start: 2020-01-23 | End: 2020-01-24 | Stop reason: ALTCHOICE

## 2020-01-23 RX ORDER — NALOXONE HYDROCHLORIDE 0.4 MG/ML
0.4 INJECTION, SOLUTION INTRAMUSCULAR; INTRAVENOUS; SUBCUTANEOUS AS NEEDED
Status: DISCONTINUED | OUTPATIENT
Start: 2020-01-23 | End: 2020-01-23 | Stop reason: ALTCHOICE

## 2020-01-23 RX ORDER — FUROSEMIDE 10 MG/ML
20 INJECTION INTRAMUSCULAR; INTRAVENOUS DAILY
Status: DISCONTINUED | OUTPATIENT
Start: 2020-01-24 | End: 2020-01-27

## 2020-01-23 RX ORDER — SODIUM CHLORIDE 9 MG/ML
25 INJECTION, SOLUTION INTRAVENOUS CONTINUOUS
Status: DISCONTINUED | OUTPATIENT
Start: 2020-01-23 | End: 2020-01-23 | Stop reason: ALTCHOICE

## 2020-01-23 RX ORDER — DEXTROSE MONOHYDRATE AND SODIUM CHLORIDE 5; .45 G/100ML; G/100ML
50 INJECTION, SOLUTION INTRAVENOUS CONTINUOUS
Status: DISCONTINUED | OUTPATIENT
Start: 2020-01-23 | End: 2020-02-23 | Stop reason: HOSPADM

## 2020-01-23 RX ORDER — HEPARIN SODIUM 200 [USP'U]/100ML
INJECTION, SOLUTION INTRAVENOUS
Status: COMPLETED
Start: 2020-01-23 | End: 2020-01-23

## 2020-01-23 RX ADMIN — INSULIN LISPRO 4 UNITS: 100 INJECTION, SOLUTION INTRAVENOUS; SUBCUTANEOUS at 22:49

## 2020-01-23 RX ADMIN — HYDRALAZINE HYDROCHLORIDE 10 MG: 20 INJECTION INTRAMUSCULAR; INTRAVENOUS at 15:47

## 2020-01-23 RX ADMIN — MORPHINE SULFATE 1 MG: 2 INJECTION, SOLUTION INTRAMUSCULAR; INTRAVENOUS at 16:25

## 2020-01-23 RX ADMIN — ACETAMINOPHEN 650 MG: 325 TABLET ORAL at 02:10

## 2020-01-23 RX ADMIN — MEROPENEM 1 G: 1 INJECTION, POWDER, FOR SOLUTION INTRAVENOUS at 12:09

## 2020-01-23 RX ADMIN — SODIUM CHLORIDE 125 MG: 9 INJECTION, SOLUTION INTRAVENOUS at 10:13

## 2020-01-23 RX ADMIN — INSULIN LISPRO 2 UNITS: 100 INJECTION, SOLUTION INTRAVENOUS; SUBCUTANEOUS at 07:20

## 2020-01-23 RX ADMIN — INSULIN LISPRO 2 UNITS: 100 INJECTION, SOLUTION INTRAVENOUS; SUBCUTANEOUS at 12:09

## 2020-01-23 RX ADMIN — DEXTROSE MONOHYDRATE AND SODIUM CHLORIDE 50 ML/HR: 5; .45 INJECTION, SOLUTION INTRAVENOUS at 15:07

## 2020-01-23 RX ADMIN — INSULIN GLARGINE 25 UNITS: 100 INJECTION, SOLUTION SUBCUTANEOUS at 10:09

## 2020-01-23 RX ADMIN — LIDOCAINE HYDROCHLORIDE 12 ML: 10 INJECTION, SOLUTION INFILTRATION; PERINEURAL at 15:09

## 2020-01-23 RX ADMIN — Medication 250 MG: at 10:09

## 2020-01-23 RX ADMIN — HEPARIN SODIUM 500 UNITS/HR: 10000 INJECTION, SOLUTION INTRAVENOUS at 15:04

## 2020-01-23 RX ADMIN — HEPARIN SODIUM 1000 UNITS: 200 INJECTION, SOLUTION INTRAVENOUS at 15:24

## 2020-01-23 RX ADMIN — HEPARIN SODIUM 500 UNITS/HR: 10000 INJECTION, SOLUTION INTRAVENOUS at 15:31

## 2020-01-23 RX ADMIN — LOSARTAN POTASSIUM 100 MG: 50 TABLET, FILM COATED ORAL at 10:08

## 2020-01-23 RX ADMIN — MEROPENEM 1 G: 1 INJECTION, POWDER, FOR SOLUTION INTRAVENOUS at 22:48

## 2020-01-23 RX ADMIN — ALTEPLASE 1 MG/HR: KIT at 15:06

## 2020-01-23 RX ADMIN — POTASSIUM CHLORIDE 20 MEQ: 1500 TABLET, EXTENDED RELEASE ORAL at 10:09

## 2020-01-23 RX ADMIN — FUROSEMIDE 40 MG: 10 INJECTION, SOLUTION INTRAMUSCULAR; INTRAVENOUS at 10:09

## 2020-01-23 RX ADMIN — ATORVASTATIN CALCIUM 20 MG: 20 TABLET, FILM COATED ORAL at 10:09

## 2020-01-23 NOTE — ROUTINE PROCESS
TRANSFER - OUT REPORT: 
 
Verbal report given to SUKHJINDER Gaming R.N.(name) on Leyla Anna  being transferred to ICU(unit) for routine progression of care Report consisted of patients Situation, Background, Assessment and  
Recommendations(SBAR). Information from the following report(s) SBAR, Kardex, Intake/Output, MAR, Accordion, Recent Results and Med Rec Status was reviewed with the receiving nurse. Lines: PICC Double Lumen 16/31/86 Right;Basilic (Active) Central Line Being Utilized Yes 1/23/2020 12:05 PM  
Criteria for Appropriate Use Long term IV/antibiotic administration 1/23/2020 12:05 PM  
Site Assessment Clean, dry, & intact 1/23/2020 12:05 PM  
Phlebitis Assessment 0 1/23/2020 12:05 PM  
Infiltration Assessment 0 1/23/2020 12:05 PM  
Date of Last Dressing Change 01/23/20 1/23/2020 12:05 PM  
Dressing Status Clean, dry, & intact 1/23/2020 12:05 PM  
Action Taken Open ports on tubing capped 1/23/2020 12:05 PM  
Dressing Type Disk with Chlorhexadine gluconate (CHG); Transparent 1/23/2020 12:05 PM  
Hub Color/Line Status Purple;Capped 1/23/2020 12:05 PM  
Positive Blood Return (Site #1) Yes 1/23/2020 12:05 PM  
Hub Color/Line Status Red;Capped 1/23/2020 12:05 PM  
Positive Blood Return (Site #2) Yes 1/23/2020 12:05 PM  
Alcohol Cap Used Yes 1/23/2020 12:05 PM  
   
Peripheral IV 01/21/20 Anterior;Distal;Left Forearm (Active) Site Assessment Clean, dry, & intact 1/23/2020 12:05 PM  
Phlebitis Assessment 0 1/23/2020 12:05 PM  
Infiltration Assessment 0 1/23/2020 12:05 PM  
Dressing Status Clean, dry, & intact 1/23/2020 12:05 PM  
Dressing Type Tape;Transparent 1/23/2020 12:05 PM  
Hub Color/Line Status Pink;Capped 1/23/2020 12:05 PM  
Action Taken Open ports on tubing capped 1/23/2020 12:05 PM  
Alcohol Cap Used Yes 1/23/2020 12:05 PM  
  
 
Opportunity for questions and clarification was provided. Patient transported with: 
 Monitor Tech

## 2020-01-23 NOTE — PROGRESS NOTES
Problem: Self Care Deficits Care Plan (Adult)  Goal: *Therapy Goal (Edit Goal, Insert Text)  Description  Initial Occupational Therapy Goals (1/23/2020) Within 7 day(s):    1. Patient will perform perform grooming seated EOB for 5 minutes for increased independence in ADLs. 2. Patient will perform UB dressing with seup seated EOB for increased independence with ADLs. 3. Patient will perform LB dressing with setup/Gila for increased independence with ADLs. 4. Patient will perform all aspects of toileting with minimal assist for increased independence with ADLs. 5. Patient will perform standing ADLs with 100% compliance with RLE Surgical Shoe with 1 verbal cue for increased safety in ADLs. 6. Patient will independently apply energy conservation techniques with 1 verbal cue(s) for increased independence with ADLs. 7. Patient will perform gentle residual limb desensitization over dressing with SBA/mod I to minimize phantom limb pain. Outcome: Progressing Towards Goal     OCCUPATIONAL THERAPY EVALUATION    Patient: Philly Vega (16 y.o. male)  Date: 1/23/2020  Primary Diagnosis: DVT (deep venous thrombosis) ([facility]) [I82.409]        Precautions:  Fall, PWB(PWB heel in Sx Shoe), DVT BUE's  PLOF: Patient was grossly Supervision from last admission    ASSESSMENT :  Based on the objective data described below, the patient presents with significant functional decline d/t cognition and balance in standing. Pt impulsive and d/t language barrier would be an excellent patient to be on a bowel and bladder program. Pt reports he only speaks minimal English. Basic Lithuanian spoken to patient w/ pt confirming \"el banyo\"/toilet & stood impulsively w/ LOB. Noted bed to be soaked. Pt instructed to sit w/ tactile cue. Pt pointed to surgical shoe and min/modA to josh. Pt stood w/ RW and assisted to [facility] w/ unsteady balance. Pt able to perform 75% of own hygiene. Pt thankful for assistance with remainder of hygiene.  Pt asked for \"pantalones\" as the hospital disposable underwear had been soiled. Pt offered another pair, but had already gotten back in bed. Pt assisted in threading to calf level and able to pull up remainder in long sitting. Pt close to EOB and used \"move over\" with gesture and pt laterally scooted in bed. Pt in 2nd floor apt and needs physical assist; will need Rehab at d/c.    Education: Reviewed basic safety & body mechanics w/ gestures, importance of  Rolling Walker to prevent falls w/ gestures, and adaptive dressing techniques with patient verbalizing understanding at this time via nodding, but will continue to educate. Patient will benefit from skilled intervention to address the above impairments. Patient's rehabilitation potential is considered to be Good  Factors which may influence rehabilitation potential include:   []             None noted  [x]             Mental ability/status  [x]             Medical condition  []             Home/family situation and support systems  [x]             Safety awareness  []             Pain tolerance/management  []             Other:      PLAN :  Recommendations and Planned Interventions:   [x]               Self Care Training                  [x]      Therapeutic Activities  [x]               Functional Mobility Training   [x]      Cognitive Retraining  [x]               Therapeutic Exercises           [x]      Endurance Activities  [x]               Balance Training                    [x]      Neuromuscular Re-Education  [x]               Visual/Perceptual Training     [x]      Home Safety Training  [x]               Patient Education                   [x]      Family Training/Education  []               Other (comment):    Frequency/Duration: Patient will be followed by occupational therapy 1-2 times per day/1-3 days per week to address goals. Discharge Recommendations: Rehab  Further Equipment Recommendations for Discharge:  To Be Determined (TBD) at next level of care SUBJECTIVE:   Patient stated Thank you.     OBJECTIVE DATA SUMMARY:     Past Medical History:   Diagnosis Date    Diabetes ([address]     DVT of deep femoral vein (HCC)     Foot abscess     Hypertension      Past Surgical History:   Procedure Laterality Date    HX ORTHOPAEDIC      toe amputation     Barriers to Learning/Limitations: yes;  language, cultural , cognitive, sensory deficits-vision/hearing/speech, and physical  Compensate with: visual, verbal, tactile, kinesthetic cues/model    Home Situation: lives w/ daughter  Home Situation  Home Environment: Apartment(2nd floor apt)  # Steps to Enter: 15  Rails to Enter: Yes  One/Two Story Residence: One story  Living Alone: No  Support Systems: [employer] / [employer](dtr)  Patient Expects to be Discharged to[de-identified] Unknown  Current DME Used/Available at Home: (surgical shoe)  Tub or Shower Type: Tub/Shower combination  [x]  Right hand dominant   []  Left hand dominant    Cognitive/Behavioral Status:  Neurologic State: Alert;Confused  Orientation Level: Disoriented to situation;Disoriented to time;Oriented to person;Oriented to place  Cognition: Impulsive;Poor safety awareness; Impaired decision making;Decreased attention/concentration  Safety/Judgement: Decreased awareness of need for assistance;Decreased awareness of need for safety;Decreased insight into deficits    Skin: R foot w/ dressing  Edema: BUE edema R>L d/t DVT    Vision/Perceptual:       Limited visual scanning. Pt not looking at surface he is about to sit on (cognitive)    Coordination: BUE  Coordination: Generally decreased, functional  Fine Motor Skills-Upper: Left Intact; Right Intact    Gross Motor Skills-Upper: Left Intact; Right Intact    Balance:  Sitting: Intact; High guard  Standing: Impaired; With support    Strength: BUE  Strength: Generally decreased, functional    Tone & Sensation: BUE  Tone: Normal  Sensation: Intact    Range of Motion: BUE  AROM: Generally decreased, functional  PROM: Generally decreased, functional    Functional Mobility and Transfers for ADLs:  Bed Mobility:  Supine to Sit: Contact guard assistance  Sit to Supine: Contact guard assistance  Scooting: Stand-by assistance;Contact guard assistance; Additional time  Transfers:  Sit to Stand: Contact guard assistance; Adaptive equipment  Bed to Chair: Minimum assistance; Adaptive equipment   Toilet Transfer : Minimum assistance; Adaptive equipment    ADL Assessment:   Feeding: Setup    Oral Facial Hygiene/Grooming: Minimum assistance(seated)    Bathing: Moderate assistance;Maximum assistance    Upper Body Dressing: Moderate assistance    Lower Body Dressing: Moderate assistance;Maximum assistance    Toileting: Moderate assistance; Additional time    ADL Intervention:  Grooming  Washing Hands: Set-up(hand wipes)    Upper Body [address]: Moderate assistance    Lower Body Dressing Assistance  Underpants: Moderate assistance;Maximum assistance; Compensatory technique training  Position Performed: Long sitting on bed    Toileting  Toileting Assistance: Moderate assistance  Bowel Hygiene: Minimum assistance  Clothing Management: Maximum assistance  Cues: Tactile cues provided;Verbal cues provided;Visual cues provided;Physical assistance for pants down;Physical assistance for pants up  Adaptive Equipment: Walker(BSC)    Cognitive Retraining  Problem Solving: Inductive reason; Identifying the task; Identifying the problem;General alternative solution;Deductive reason; Awareness of environment  Executive Functions: Executing cognitive plans;Managing time;Regulating behavior  Organizing/Sequencing: Breaking task down;Prioritizing  Attention to Task: Distractibility; Single task  Maintains Attention For (Time): 30 seconds  Following Commands: Awareness of environment; Follows one step commands/directions  Safety/Judgement: Decreased awareness of need for assistance;Decreased awareness of need for safety;Decreased insight into deficits  Cues: Tactile cues provided;Verbal cues provided;Visual cues provided(in Portuguese & basic English)    Pain:  Pain level pre-treatment: 2/10  confirmed \"delor\", but unable to state little vs a lot nor where  Pain level post-treatment: 2/10  FACES  Pain Intervention(s): Medication provided by Nursing (see MAR); Rest, Repositioning   Response to intervention: Nurse notified, See doc flow sheet    Activity Tolerance:   Fair. Patient able to stand <1 minute(s). Patient able to complete ADLs with frequent rest breaks. Patient limited by cognition, balance, strength, language-barrier & declines use of  phone. Patient unsteady. Please refer to the flowsheet for vital signs taken during this treatment. After treatment:   [] Patient left in no apparent distress sitting up in chair  [x] Patient left in no apparent distress in bed  [x] Call bell left within reach  [x] Nursing notified/Eun  [] Caregiver present  [x] Bed alarm activated    COMMUNICATION/EDUCATION:   [x] Role of Occupational Therapy in the acute care setting  [x] Home safety education was provided and the patient/caregiver indicated understanding. [x] Patient/family have participated as able in goal setting and plan of care. [x] Patient/family agree to work toward stated goals and plan of care. [] Patient understands intent and goals of therapy, but is neutral about his/her participation. [] Patient is unable to participate in goal setting and plan of care. Thank you for this referral.  Diogo Ballard  Time Calculation: 24 mins    Eval Complexity: History: HIGH Complexity : Extensive review of history including physical, cognitive and psychosocial history ; Examination: HIGH Complexity : 5 or more performance deficits relating to physical, cognitive , or psychosocial skils that result in activity limitations and / or participation restrictions;    Decision Making:HIGH Complexity : Patient presents with comorbidities that affect occupational performance.  Signifigant modification of tasks or assistance (eg, physical or verbal) with assessment (s) is necessary to enable patient to complete evaluation

## 2020-01-23 NOTE — PROGRESS NOTES
1/23/2020 PT consult received and chart reviewed. Pt currently off unit at IR. Spoke with ICU nurse Cristela, who reports pt will transition to ICU post procedure. Will f/u tomorrow as appropriate. Thank you.    Micheline Cameron, PT

## 2020-01-23 NOTE — WOUND CARE
Patient seen by wound care due to obed score of 17. No pressure injuries observed at this time. Wound care will be available if needed during this hospitalization.

## 2020-01-23 NOTE — PROCEDURES
Procedure Report    Patient: Sang Vasquez MRN: [id number]  SSN: [id number]    YOB: 1945  Age: 76 y.o. Sex: male       Date of Surgery: 1-    Preoperative Diagnosis: Deep venous Thrombosis right axillary, subclavian veins, Innominate vein and Superior Vena Cava    Postoperative Diagnosis: Same        Anesthesia: 1% lidocaine    Procedure:  Removal of right basilic vein PICC line, fluoroscopic insertion of 0.18 wire to right atrium, placement of 6 [place] sheath over wire into basilic vein, placement of thrombolytic UniFuse infusion catheter in right axillary, subclavian, innominate veins and superior vena cava    Procedure in Detail: The patient was critical identified brought to the interventional suite and placed in supine position. The right arm was prepped and draped in a sterile fashion. A timeout was taken. Moderate sedation was not administered due to the patient's mental status. 1% lidocaine was injected around the PICC line catheter. The catheter was cut and the hub's were allowed to fall out of the sterile field. A wire was passed centrally under fluoroscopic guidance to the right atrium. This was a 0.18 wire. The PICC line was removed. A 6 x 5.5 cm [place] sheath was passed over the wire into the basilic vein. The dilator was removed. The sheath was flushed gently with heparinized saline. At 30 cm infusion length UniFuse catheter was passed over the wire under fluoroscopic guidance with the tip in the atriocaval junction and the more peripheral portion of the axillary vein. The wire was removed and the inner element was connected. Sterile dressings were applied. The sheath was secured the skin with an interrupted single 3-0 nylon suture. I was present scrubbed throughout. The patient tolerated the procedure well without complication. Family was notified.       Estimated Blood Loss:  Minimal    Tourniquet Time: NONE    Implants: NONE           Specimens: NONE    Drains: None                Complications: None    Counts: Sponge and needle counts were correct times two.     Signed By:  Isaías Rojas MD     January 23, 2020

## 2020-01-23 NOTE — PROGRESS NOTES
0810Received verbal bedside report from off going nurse LAURA Thomas R.N. Patient care received. Patient alert and oriented self and place. Patient resting in bed denies pain. Patient stable. Call light with in reach bed in lowest position. 1430:Pt left floor for procedure.

## 2020-01-23 NOTE — PROGRESS NOTES
Transition of care: SNF versus LTC  Patient currently having testing. D/c plan will submit to area snf for continuity of care. Will continue to follow. Pt and ot orders for recommendations for safe transition. Lives with his daughter Jerry Rain. He speaks Greek no english.

## 2020-01-23 NOTE — PROGRESS NOTES
1535- TRANSFER - IN REPORT:    Verbal report received from [facility] (name) on Roxana Conteh  being received from IR (unit) for routine progression of care      Report consisted of patients Situation, Background, Assessment and   Recommendations(SBAR). Information from the following report(s) SBAR, Kardex, [facility] and Recent Results was reviewed with the receiving nurse. Opportunity for questions and clarification was provided. Assessment completed upon patients arrival to unit and care assumed. 1540-  Assessment complete. So signs of bleeding. Patient alert to self. Patient voided 300cc with assistance in the urinal.  PRN pain medicine administered. Radial and pedal pulses palpable. 1900- Bedside and Verbal shift change report given to Haresh Fang RN (oncoming nurse) by Jameel Mancuso RN (offgoing nurse). Report included the following information SBAR, Kardex, MAR and Recent Results.

## 2020-01-23 NOTE — PROGRESS NOTES
[address] [facility]  Hematology / Oncology Consult Note      Impression:   Impression:  Iatrogenic DVT (deep venous thrombosis) ([address] (1/18/2020) RUE and LUE extending into SVC with partial SVCO,  associated with PICC line. Failure of eliquis after short term use. Prior calf DVT on right in posterior tibial vein  Reduced blood flow by  Doppler R posterior tibial artery  It is improving with reduction in swelling and pain. Active Problems:    Stage 3 chronic kidney disease ([address] (1/2/2020)      Type 2 diabetes mellitus, with long-term current use of insulin ([address] (1/3/2020)      Hypertension (1/3/2020)      Sepsis due to Streptococcus agalactiae ([address] (1/6/2020)      Osteomyelitis of right foot ([address] (1/9/2020)      Anemia (1/20/2020) normocytic normochromic. Likely multifactorial:  Renal insufficiency, chronic inflammation with stimulation of hepcidin,    Direct Grecia is negative   No proof of bleeding      Lung infiltrate (1/20/2020) RULn nodule, pleural effusions    Can sometimes clear line associated clot with anticoagulation but this seems fairly refractory so far. Vascular has seen and thrombolytics not in current plan but re evaluation pending. I agree he poses some increase risk. The line id still functional per nursing. Still needed apparently for continued antibiotics. Is linezolid po an option? Malignancy always in the differential with refractory clot. He has 1.5 cm density in RUL which is stable over short term. Hypercoagulable state can occur with even microscopic tumor. Effusions present can consider tapping one or both and send cytology. Can consider Pet/CT when outpatient  Very low albumin, increased globulins, SPEP and TREE pending  Plan:   Lovenox started 1mg^kg bid platelets unchanged  Continue lovenox for outpatient treatment when discharged.   Follow platelets  Measured iron studies, B12, folate, haptoglobin and LDH:  Looks like anemia of chronic disease  Vascular following  SPEP, TERE pending  Thrombophilia workup after acute phase has passed in 6-8 weeks  Thoracentesis pending. Radha Kimble is a 76 y.o.  male who has history of diabetes, hypertension, and lower extremity DVT. He was admitted for foot abscess and osteomyelitis and was discharged with a PICC line and ALBAN and IV antibiotics last dose is scheduled for February 20. On admission day,  prior to getting his infusion his children noticed that he was having increased swelling in the right arm as well as increased pain and erythema he was brought to the emergency room for further evaluation. In the emergency room CTA and ultrasound of the arm showed extensive clot extending into the subclavian and superior vena cava. Patient was started on Eliquis starter pack on discharge and is currently taking 10 mg twice daily his last Eliquis dose was early admission morning verified by his daughter. In the emergency room patient was started on a heparin drip. He also has evidence of clot in left subclavian likely related to SVC clot and reduced blood flow. Clot likely related to PICC. Also found to have anemia with Hemoccult negative stools. Anemia present on 1/1/20. He has CKI and inflammation. Past Medical History:   Diagnosis Date    Diabetes ([address]     DVT of deep femoral vein (HCC)     Foot abscess     Hypertension      Past Surgical History:   Procedure Laterality Date    HX ORTHOPAEDIC      toe amputation       History reviewed. No pertinent family history.   No Known Allergies    Home Medications:       Hospital Medications:     Current Facility-Administered Medications   Medication Dose Route Frequency Provider Last Rate Last Dose    ferric gluconate (FERRLECIT) 125 mg in 0.9% sodium chloride 100 mL IVPB  125 mg IntraVENous ONCE Johnathan Taylor MD        glucose chewable tablet 16 g  4 Tab Oral PRN Kirby Butler MD        glucagon (GLUCAGEN) injection 1 mg  1 mg IntraMUSCular PRN Scott-Brown, Griselda Bliss, MD        dextrose 10% infusion 125-250 mL  125-250 mL IntraVENous PRN Scott-Brown, Griselda Bliss,  mL/hr at 01/22/20 0717 250 mL at 01/22/20 8225    Saccharomyces boulardii (FLORASTOR) capsule 250 mg  250 mg Oral DAILY Semaj Wu MD   250 mg at 01/22/20 1559    furosemide (LASIX) injection 40 mg  40 mg IntraVENous DAILY Semaj Wu MD   40 mg at 01/22/20 1600    potassium chloride (K-DUR, KLOR-CON) SR tablet 20 mEq  20 mEq Oral DAILY Semaj Wu MD   20 mEq at 01/22/20 1559    enoxaparin (LOVENOX) injection 80 mg  80 mg SubCUTAneous Q12H Cristian Ponce PA-C   Stopped at 01/23/20 0100    meropenem (MERREM) 1 g in sterile water (preservative free) 20 mL IV syringe  1 g IntraVENous Q12H Oanh Hanks MD   1 g at 01/22/20 2309    insulin lispro (HUMALOG) injection   SubCUTAneous AC&HS Scott-Brown, Griselda Bliss, MD   2 Units at 01/23/20 0720    oxyCODONE IR (ROXICODONE) tablet 5 mg  5 mg Oral Q4H PRN Ivett Deiters, DO   5 mg at 01/21/20 1705    0.9% sodium chloride infusion 250 mL  250 mL IntraVENous PRN Ivett Deiters, DO        0.9% sodium chloride infusion 250 mL  250 mL IntraVENous PRN Zakiya Orellana MD        atorvastatin (LIPITOR) tablet 20 mg  20 mg Oral DAILY Scott-Brown, Griselda Bliss, MD   20 mg at 01/22/20 0855    insulin glargine (LANTUS) injection 25 Units  25 Units SubCUTAneous DAILY Scott-Brown, Griselda Bliss, MD   Stopped at 01/22/20 0900    losartan (COZAAR) tablet 100 mg  100 mg Oral DAILY Scott-Brown, Griselda Bliss, MD   100 mg at 01/22/20 0855    polyethylene glycol (MIRALAX) packet 17 g  17 g Oral DAILY Scott-Brown, Griselda Bliss, MD        morphine injection 1 mg  1 mg IntraVENous Q3H PRN Scott-Brown, Griselda Bliss, MD   1 mg at 01/20/20 1724    labetaloL (NORMODYNE;TRANDATE) 20 mg/4 mL (5 mg/mL) injection 20 mg  20 mg IntraVENous QID PRN Scott-Brown, Griselda Bliss, MD   20 mg at 01/20/20 5330    hydrALAZINE (APRESOLINE) 20 mg/mL injection 10 mg 10 mg IntraVENous Q6H PRN Luisa Butler MD   10 mg at 01/20/20 1606    pantoprazole (PROTONIX) injection 40 mg  40 mg IntraVENous Q24H Luisa Butler MD   40 mg at 01/22/20 2310    acetaminophen (TYLENOL) tablet 650 mg  650 mg Oral Q4H PRN Luisa Butler MD   650 mg at 01/23/20 0210     Facility-Administered Medications Ordered in Other Encounters   Medication Dose Route Frequency Provider Last Rate Last Dose    sodium chloride (NS) flush 10-40 mL  10-40 mL IntraVENous PRN Chirag Shaw MD        heparin (porcine) pf 500 Units  500 Units InterCATHeter PRN Chirag Shaw MD           Review of Systems:   Constitutional:   Fever: Negative, Chills: Negative, Weight Loss: Negative, Malaise/Fatigue: Negative   Diaphoresis: Negative,  Weakness: Negative  Skin:   Rash: Negative,  Itching: Negative  HENT:   Headache:Negative, Hearing Loss: Negative, Tinnitus: Negative, Ear Pain: Negative   Nosebleeds: Negative, Congestion: Negative, Stridor: Negative,   Sore Throat: Negative  Eyes:    Blurred Vision: Negative, Double Vision: Negative, Photophobia: Negative   Eye Pain: Negative, Eye Discharge: Negative, Eye Redness: Negative  Cardiovascular:    Chest Pain: +, Palpitations: Negative, Orthopnea: Negative   Claudication: Negative, Leg Swelling: Negative PND: Negative RUE > LUE swelling  Respiratory:   Cough: Negative, Hemoptysis: Negative, Sputum Production: Negative   Shortness of Breath: Negative, Wheezing: Negative  Gastrointestinal:   Heartburn: Negative, Nausea: Negative, Vomiting: Negative   Abdominal Pain: Negative, Diarrhea: Negative, Constipation: Negative   Blood in Stool: Negative, Melena: Negative   Genitourinary:    Dysuria: Negative, Urgency: Negative, Frequency: Negative   Hematuria: Negative, Flank Pain: Negative  Musculoskeletal:    Myalgias: Negative, Neck Pain: Negative, Back Pain: Negative   Joint Pain: Negative, Falls: Negative  Endo/Heme/Allergies:    Easy Bruise/Blood: Negative, Env. Allergies: Negative, Polydipsia: Negative   Neurological:    Dizziness: Negative, Tingling: Negative. Tremor: Negative,    Sensory Change: Negative. Speech Change: Negative, Focal Weakness: Negative     Seizures: Negative, LOC: Negative  Psychiatric:   Depression: Negative, Suicidal Ideas: Negative, Substance Abuse: Negative   Hallucinations: Negative, Nervous/Anxious: Negative   Insomnia: Negative, Memory Loss: Negative     Physical Assessment:     Visit Vitals  /56 (BP 1 Location: Left leg, BP Patient Position: At rest)   Pulse (!) 109   Temp 98.3 °F (36.8 °C)   Resp 18   Ht 5' 7\" (1.702 m)   Wt 76.1 kg (167 lb 11.2 oz)   SpO2 94%   BMI 26.27 kg/m²       Temp (24hrs), Av.6 °F (37.6 °C), Min:98.3 °F (36.8 °C), Max:100.2 °F (37.9 °C)    Alert lying flat  No palpable adenopathy  Reduced breath sounds bilaterally without wheeze  Heart RR without M/G  Abdomen soft no HSM, no palpable masses  Extremities swellilng LUE with tenderness along venous sites. RUE also swollen but less than left. Right foot dressing clean and dry not removed. Reduced ppulses bilaterally. Labs:  Recent Labs     20  1635 20  0345   WBC 10.5  --  10.3  --  7.7   RBC 2.40*  --  2.66*  --  2.58*   HCT 21.3* 24.1* 23.5*   < > 22.7*   MCV 88.8  --  88.3  --  88.0   MCH 29.6  --  29.7  --  29.1   MCHC 33.3  --  33.6  --  33.0   RDW 14.2  --  14.1  --  14.3    < > = values in this interval not displayed. Recent Labs     20  032208 20  0345   CO2 31 26 24   BUN 20* 19* 22*   Results for Charlie Cruz (MRN [id number]) as of 2020 07:14   Ref.  Range 2020 05:10 2020 03:45 2020 14:45 2020 22:50 2020 04:18   WBC Latest Ref Range: 4.6 - 13.2 K/uL 7.4 7.7   10.3   RBC Latest Ref Range: 4.70 - 5.50 M/uL 2.65 (L) 2.58 (L)   2.66 (L)   HGB Latest Ref Range: 13.0 - 16.0 g/dL 7.8 (L) 7.5 (L) 8.1 (L) 7.5 (L) 7.9 (L)   HCT Latest Ref Range: 36.0 - 48.0 % 23.4 (L) 22.7 (L) 24.2 (L) 22.3 (L) 23.5 (L)   MCV Latest Ref Range: 74.0 - 97.0 FL 88.3 88.0   88.3   MCH Latest Ref Range: 24.0 - 34.0 PG 29.4 29.1   29.7   MCHC Latest Ref Range: 31.0 - 37.0 g/dL 33.3 33.0   33.6   RDW Latest Ref Range: 11.6 - 14.5 % 14.3 14.3   14.1   PLATELET Latest Ref Range: 135 - 420 K/uL 300 328   326   MPV Latest Ref Range: 9.2 - 11.8 FL 8.9 (L) 9.1 (L)   8.7 (L)   NEUTROPHILS Latest Ref Range: 40 - 73 % 69 69   71   LYMPHOCYTES Latest Ref Range: 21 - 52 % 14 (L) 15 (L)   14 (L)   MONOCYTES Latest Ref Range: 3 - 10 % 14 (H) 14 (H)   15 (H)   EOSINOPHILS Latest Ref Range: 0 - 5 % 2 1   0   BASOPHILS Latest Ref Range: 0 - 2 % 1 1   0   DF Latest Units:   AUTOMATED AUTOMATED   AUTOMATED   ABS. NEUTROPHILS Latest Ref Range: 1.8 - 8.0 K/UL 5.1 5.3   7.4   ABS. LYMPHOCYTES Latest Ref Range: 0.9 - 3.6 K/UL 1.0 1.2   1.4   ABS. MONOCYTES Latest Ref Range: 0.05 - 1.2 K/UL 1.1 1.0   1.5 (H)   ABS. EOSINOPHILS Latest Ref Range: 0.0 - 0.4 K/UL 0.1 0.1   0.0   ABS. BASOPHILS Latest Ref Range: 0.0 - 0.1 K/UL 0.1 0.0   0.0   Results for Mu Jeff (MRN [id number]) as of 1/22/2020 07:14   Ref.  Range 1/20/2020 05:10 1/21/2020 03:45 1/22/2020 04:18   Sodium Latest Ref Range: 136 - 145 mmol/L 140 139 139   Potassium Latest Ref Range: 3.5 - 5.5 mmol/L 4.0 3.9 3.3 (L)   Chloride Latest Ref Range: 100 - 111 mmol/L 107 106 106   CO2 Latest Ref Range: 21 - 32 mmol/L 28 24 26   Anion gap Latest Ref Range: 3.0 - 18 mmol/L 5 9 7   Glucose Latest Ref Range: 74 - 99 mg/dL 88 135 (H) 56 (L)   BUN Latest Ref Range: 7.0 - 18 MG/DL 21 (H) 22 (H) 19 (H)   Creatinine Latest Ref Range: 0.6 - 1.3 MG/DL 2.02 (H) 1.90 (H) 1.55 (H)   BUN/Creatinine ratio Latest Ref Range: 12 - 20   10 (L) 12 12   Calcium Latest Ref Range: 8.5 - 10.1 MG/DL 8.0 (L) 7.9 (L) 7.9 (L)   Ionized Calcium Latest Ref Range: 1.12 - 1.32 MMOL/L 1.10 (L) 1.06 (L) 1.10 (L)   Phosphorus Latest Ref Range: 2.5 - 4.9 MG/DL 3.5 3.0 2.4 (L) Magnesium Latest Ref Range: 1.6 - 2.6 mg/dL 2.2 2.0 1.8   GFR est non-AA Latest Ref Range: >60 ml/min/1.73m2 32 (L) 35 (L) 44 (L)   GFR est AA Latest Ref Range: >60 ml/min/1.73m2 39 (L) 42 (L) 53 (L)   Bilirubin, total Latest Ref Range: 0.2 - 1.0 MG/DL 1.3 (H) 1.1 (H) 1.4 (H)   Protein, total Latest Ref Range: 6.4 - 8.2 g/dL 6.4 6.3 (L) 6.6   Albumin Latest Ref Range: 3.4 - 5.0 g/dL 1.7 (L) 1.7 (L) 1.8 (L)   Globulin Latest Ref Range: 2.0 - 4.0 g/dL 4.7 (H) 4.6 (H) 4.8 (H)   A-G Ratio Latest Ref Range: 0.8 - 1.7   0.4 (L) 0.4 (L) 0.4 (L)   ALT (SGPT) Latest Ref Range: 16 - 61 U/L 19 18 17   AST Latest Ref Range: 10 - 38 U/L 29 23 25   Alk. phosphatase Latest Ref Range: 45 - 117 U/L 77 91 85       · Distal segment of the right posterior tibial artery has no color flow and doppler signal but at the ankle posterior tibial is patent with color flow and doppler signal.  · No color flow or doppler signal noted in the left anterior tibial artery. · All the rest of the vessels in the bilateral lower extremity document normal flow. No evidence of significant peripheral arterial disease at rest in the lower extremity bilaterally. The right ankle/brachial index is 1.17   The left ankle/brachial index is 1.29   · Non occlusive thrombus present in the right posterior tibial veins. · No evidence of deep vein thrombosis in the left lower extremity veins assessed. ·  Acute appearing non occlusive thrombus noted in the right internal jugular, subclavian, axillary and brachial vein(s). · Non occlusive superficial thrombophlebitis noted within the right basilic vein. No evidence of DVT in the contralateral internal jugular and proximal subclavian vein. · Acute appearing thrombus noted in the contralateral right proximal internal jugular and subclavian vein(s) which is no different than the privious scan. · Acute appearing thrombus noted in the left proximal subclavian vein(s).   CTA:   1.  Redemonstration of thrombus surrounding the included portions of the right  PICC line with accompanying occlusion of the superior vena cava above the level  of the brachiocephalic venous confluence.     >  Partial thrombosis of the left subclavian vein, with significant narrowing  of the proximal portion of the left subclavian vein and adjacent brachiocephalic  vein. Progressive edema surrounding the left subclavian neurovascular bundle  noted in the interval from preceding CT angiogram chest 1/18/2020.     2. No evidence of pulmonary embolism.     3. Moderately large right and moderate-sized left pleural effusions which have  increased in the interval from prior chest CT.     4. Basilar areas of compressive atelectasis and presumptive area of rounded  atelectasis at the posterior right upper lobe without change. Attention on  follow-up imaging recommended.     5. Progressive body wall edema. CT a,p, non con  1. Moderate right and small left pleural effusions similar to prior  examination.     2. No evidence of intra-abdominal or pelvic hematoma. Specifically, no evidence  of retroperitoneal hematoma.     3.  Unchanged, nonspecific perinephric stranding without evidence of  hydronephrosis.     4. Distal descending and sigmoid colonic diverticulosis without findings to  suggest diverticulitis.     Cas Luke MD, [address] [place] [address]    [phone]

## 2020-01-23 NOTE — PROGRESS NOTES
[facility]  Pulmonary, Critical Care, and Sleep Medicine    Name: Raymon Naidu MRN: [id number]   : 1945 Hospital: [facility] [place]   Date: 2020        Pulmonary Critical Care Note    IMPRESSION:   Patient Active Problem List   Diagnosis Code    Sepsis ([facility] [address] A41.9    Sepsis due to Streptococcus Grp B ([address] recently 2' to # 3 A40.1    Osteomyelitis of right foot ([address] M86.9    Acute deep vein thrombosis (DVT) ([address] I82.409    Acute on chronic anemia D64.9    CKD (chronic kidney disease) N18.9    Type 2 diabetes mellitus, with long-term current use of insulin ([address] E11.9, Z79.4    Hypertension I10    Hypokalemia E87.6    Diabetic ulcer of right midfoot associated with type 2 diabetes mellitus, with fat layer exposed ([facility] [address] E11.621, L97.412    PAD (peripheral artery disease) ([facility]) I73.9    Moderate-severe protein calorie malnutrition E44.0    Hypoalbuminemia E88.09    Hard of hearing H91.90 ·      RECOMMENDATIONS:   Respiratory:  Compensated respiratory status; on room air. Monitor for goal SPO2> 91%  R>L pleural effusion, worsening. Thoracentesis for cytology was planned, which will help to be held due to vascular procedure requiring TPA plus heparin. Aspiration prevention bundle, head of the bed at 30' all times, pulmonary hygiene care    Infectious disease:  On 2020, he was discharged home on ertapenem IV with RUE PICC line for right foot abscess and osteomyelitis and bacteremia with Streptococcus agalactia. MRI right foot consistent with osteomyelitis of fifth metatarsal.  CRP 22+ on 2020. Blood culture 2020: Negative. Wound culture 2020: Negative. Antibiotics: On Merrem since 2020 with plan to continue for 42 days. Defer antibiotics to ID. Follow-up cultures.     Cardiovascular: (Iatrogenic RUE DVT extending into right IJ/subclavian/axillary/brachial with SVC obstruction, now left proximal subclavian DVT)  On 2020, he was discharged home on Eliquis for DVT. CTA on admission 1/18/2020: No PE but extensive RUE DVT secondary to PICC line. Miguel Martin PVL RUE 1/18/2020: Acute nonocclusive thrombus in right IJ, subclavian, axillary and brachial veins. Nonocclusive superficial thrombophlebitis in the right basilic vein. PVL LUE 1/21/2020: Acute appearing thrombus in left proximal subclavian. Currently on Lovenox 80 mg subcutaneously every 12 hours. Vascular surgeon planning for vascular procedure including thrombolysis followed by TPA plus heparin and so patient will be transferred after vascular procedure to ICU. Defer anticoagulation, duration etc. to vascular surgeon. Blood pressure controlled, on Cozaar, Lasix. Monitor closely. Hematologic: Received total 2 PRBCs since admission due to anemia. No active external bleeding noted. Continue to monitor H&H closely. Received IV iron. Defer to hematologist.    Renal: JC/CKD, monitor closely. Making good urine output, monitor. Avoid nephrotoxic medications. Endocrine: Continue Lantus and Humalog sliding scale. Maintain glycemic control. Monitor for any hypoglycemic spells. GI: Stool occult negative 1/18/2020. No active external bleeding. CNS: Confused, likely metabolic encephalopathy, slightly improving over last few days. MRI brain unremarkable for anything acute. Nutrition: Tolerating p.o. Will defer respective systems problem management to primary and other consultant and follow patient in ICU with primary and other medical team  Further recommendations will be based on the patient's response to recommended treatment and results of the investigation ordered. Quality Care: PPI, DVT prophylaxis, HOB elevated, Infection control all reviewed and addressed. PAIN AND SEDATION: RT foot, RT arm  · Skin/Wound: RT foot ulcer  · Nutrition: diet  · Prophylaxis: DVT and GI Prophylaxis reviewed. On DVT Rx with heparin drip.    · PT/OT eval and treat: as needed   · Lines/Tubes: lines RUE PICC line NOT being used  ADVANCE DIRECTIVE: full code  DISCUSSION: discussed with patient, RN, ICU staff  Events and notes from last 24 hours reviewed. Care plan discussed with nursing        Subjective/History:   Mr. Roney Coronel has been seen and evaluated as Dr. Adrianne Fu requested for assisting with ICU care with multiple medical problems. Patient is a poor historian, hard of hearing. [employer] interpretation service was also used [Jerry [id number]. Patient is a 76 y.o. male with following PMHx who came back to hospital via ER with c/o ongoing chills, swelling an pain of RT shoulder and arm started at home. Patient was recently admitted for RT foot abscess and osteomyelitis and was discharged with a PICC line and IV antibiotics [last dose February 20]. In ER further evaluation with CTA and ultrasound of the arm showed extensive clot extending into the subclavian and superior vena cava. Patient was started on Eliquis starter pack on discharge and was taking per direction with last Eliquis dose early morning on 1/18/20. He was noted to have anemia with Hgb drop more than his baseline, stool occult blood negative. In the ER patient developed fever while receiving PRBC tx andit was not clear wether her had tx reaction or not and admitted to ICU for observation. He has been started on a heparin drip. Later received PRBC tx w/o issue. The patient reports some poor appetite, denies cough, chest pain, dyspnea, wheezing, hemoptysis, palpitations, syncope, orthopnea, or paroxysmal nocturnal dyspnea. No family at bedside. Other information is limited. 1/23/2020   Patient going for vascular procedure and then will be transferred to ICU for TPA plus heparin and close monitoring. Patient seen in room 357. Patient is confused and Kinyarwanda-speaking. No fever. Hemodynamic stable. Blood cultures negative. Temperature max 100.2.   Since transfer out of ICU, he has developed left subclavian DVT and his antibiotic has been changed to [facility] after MRI positive for right fifth metatarsal osteomyelitis. Review of Systems:  Review of systems not obtained due to patient factors. Available information noted in HPI              Latest lactic acid:   Lactic acid   Date Value Ref Range Status   01/18/2020 1.5 0.4 - 2.0 MMOL/L Final   01/02/2020 1.3 0.4 - 2.0 MMOL/L Final       Past Medical History:  Past Medical History:   Diagnosis Date    Diabetes ([facility] [address]     DVT of deep femoral vein ([facility] [address]     Foot abscess     Hypertension         Past Surgical History:  Past Surgical History:   Procedure Laterality Date    HX ORTHOPAEDIC      toe amputation        Medications:  Prior to Admission medications    Medication Sig Start Date End Date Taking? Authorizing Provider   LORazepam (ATIVAN) 1 mg tablet Take 1 mg by mouth every four (4) hours as needed for Anxiety (one tab prior to hyperbaric oxygen treatment). Provider, Historical   apixaban (ELIQUIS) 5 mg (74 tabs) starter pack Take 10 mg (two 5 mg tablets) by mouth twice a day for 7 days   Followed by 5 mg (one 5 mg tablet) by mouth twice a day 1/14/20   Haydee Bhat MD   ertapenem 1 gram 1 g IVPB 1 g by IntraVENous route every twenty-four (24) hours. 1/13/20   Haydee Bhat MD   insulin glargine (LANTUS SOLOSTAR U-100 INSULIN) 100 unit/mL (3 mL) inpn 25 Units by SubCUTAneous route. Mishel Barlow MD   metFORMIN ER (GLUCOPHAGE XR) 750 mg tablet Take 750 mg by mouth daily. Mishel Barlow MD   atorvastatin (LIPITOR) 20 mg tablet Take 20 mg by mouth daily. Mishel Barlow MD   aspirin 81 mg chewable tablet Take 81 mg by mouth daily. Mishel Barlow MD   polyethylene glycol (MIRALAX) 17 gram/dose powder Take 17 g by mouth daily. 1 tablespoon with 8 oz of water daily 12/10/19   Cecy Zamarripa MD   LOSARTAN PO Take 100 mg by mouth.     Mishel Barlow MD       Current Facility-Administered Medications   Medication Dose Route Frequency    [START ON 1/24/2020] furosemide (LASIX) injection 20 mg  20 mg IntraVENous DAILY    Saccharomyces boulardii (FLORASTOR) capsule 250 mg  250 mg Oral DAILY    potassium chloride (K-DUR, KLOR-CON) SR tablet 20 mEq  20 mEq Oral DAILY    enoxaparin (LOVENOX) injection 80 mg  80 mg SubCUTAneous Q12H    meropenem (MERREM) 1 g in sterile water (preservative free) 20 mL IV syringe  1 g IntraVENous Q12H    insulin lispro (HUMALOG) injection   SubCUTAneous AC&HS    atorvastatin (LIPITOR) tablet 20 mg  20 mg Oral DAILY    insulin glargine (LANTUS) injection 25 Units  25 Units SubCUTAneous DAILY    losartan (COZAAR) tablet 100 mg  100 mg Oral DAILY    polyethylene glycol (MIRALAX) packet 17 g  17 g Oral DAILY       Allergy:  No Known Allergies     Social History:  Social History     Tobacco Use    Smoking status: Never Smoker    Smokeless tobacco: Never Used   Substance Use Topics    Alcohol use: No    Drug use: No        Family History:  History reviewed. No pertinent family history. Objective:   Vital Signs:    Blood pressure 160/65, pulse (!) 101, temperature 98.4 °F (36.9 °C), resp. rate 18, height 5' 7\" (1.702 m), weight 76.1 kg (167 lb 11.2 oz), SpO2 91 %. Body mass index is 26.27 kg/m². O2 Device: Room air       Temp (24hrs), Av °F (37.2 °C), Min:98.3 °F (36.8 °C), Max:100.2 °F (37.9 °C)         Intake/Output:   Last shift:      No intake/output data recorded. Last 3 shifts:  1901 -  0700  In: 1901.6 [P.O.:480; I.V.:1421.6]  Out: 1025 [Urine:1025]    Intake/Output Summary (Last 24 hours) at 2020 1346  Last data filed at 2020 1715  Gross per 24 hour   Intake 240 ml   Output 325 ml   Net -85 ml       Physical Exam:  General/Neurology: Alert, Awake, confused. Head:   Normocephalic, without obvious abnormality, atraumatic. Eye:   PERRL, EOM intact, no scleral icterus, no pallor, no cyanosis. Nose:   No sinus tenderness, no erythema, no induration, no discharge  Throat:  No oral thrush. Neck:   Supple, symmetric.  Thyroid: no enlargement/tenderness/nodule. No lymphadenopathy. Trachea midline  Lung:   No air entry at bases. Moderate air entry anteriorly. No rhonchi. No wheezing. No stridors. No prolongded expiration. No accessory muscle use. Heart:   S1 S2 present. No murmur. No JVD. Abdomen:  Soft. NT. ND. +BS. No masses. Extremities:  RUE without edema. Right foot ulcer under dressing. No cyanosis. No clubbing. Pulses: 2+ and symmetric in DP. Lymphatic:  No cervical or supraclavicular palpable lymphadenopathy. Data:     Recent Results (from the past 24 hour(s))   GLUCOSE, POC    Collection Time: 01/22/20  4:26 PM   Result Value Ref Range    Glucose (POC) 160 (H) 70 - 110 mg/dL   HGB & HCT    Collection Time: 01/22/20  4:35 PM   Result Value Ref Range    HGB 8.0 (L) 13.0 - 16.0 g/dL    HCT 24.1 (L) 36.0 - 48.0 %   GLUCOSE, POC    Collection Time: 01/22/20  9:32 PM   Result Value Ref Range    Glucose (POC) 238 (H) 70 - 110 mg/dL   MAGNESIUM    Collection Time: 01/23/20  3:25 AM   Result Value Ref Range    Magnesium 1.6 1.6 - 2.6 mg/dL   PHOSPHORUS    Collection Time: 01/23/20  3:25 AM   Result Value Ref Range    Phosphorus 2.3 (L) 2.5 - 4.9 MG/DL   CALCIUM, IONIZED    Collection Time: 01/23/20  3:25 AM   Result Value Ref Range    Ionized Calcium 1.07 (L) 1.12 - 1.32 MMOL/L   CBC WITH AUTOMATED DIFF    Collection Time: 01/23/20  3:25 AM   Result Value Ref Range    WBC 10.5 4.6 - 13.2 K/uL    RBC 2.40 (L) 4.70 - 5.50 M/uL    HGB 7.1 (L) 13.0 - 16.0 g/dL    HCT 21.3 (L) 36.0 - 48.0 %    MCV 88.8 74.0 - 97.0 FL    MCH 29.6 24.0 - 34.0 PG    MCHC 33.3 31.0 - 37.0 g/dL    RDW 14.2 11.6 - 14.5 %    PLATELET 402 233 - 964 K/uL    MPV 9.1 (L) 9.2 - 11.8 FL    NEUTROPHILS 72 40 - 73 %    LYMPHOCYTES 14 (L) 21 - 52 %    MONOCYTES 13 (H) 3 - 10 %    EOSINOPHILS 1 0 - 5 %    BASOPHILS 0 0 - 2 %    ABS. NEUTROPHILS 7.6 1.8 - 8.0 K/UL    ABS. LYMPHOCYTES 1.4 0.9 - 3.6 K/UL    ABS. MONOCYTES 1.4 (H) 0.05 - 1.2 K/UL    ABS.  EOSINOPHILS 0.1 0.0 - 0.4 K/UL    ABS. BASOPHILS 0.0 0.0 - 0.1 K/UL    DF AUTOMATED     METABOLIC PANEL, COMPREHENSIVE    Collection Time: 01/23/20  3:25 AM   Result Value Ref Range    Sodium 139 136 - 145 mmol/L    Potassium 3.7 3.5 - 5.5 mmol/L    Chloride 104 100 - 111 mmol/L    CO2 31 21 - 32 mmol/L    Anion gap 4 3.0 - 18 mmol/L    Glucose 98 74 - 99 mg/dL    BUN 20 (H) 7.0 - 18 MG/DL    Creatinine 1.69 (H) 0.6 - 1.3 MG/DL    BUN/Creatinine ratio 12 12 - 20      GFR est AA 48 (L) >60 ml/min/1.73m2    GFR est non-AA 40 (L) >60 ml/min/1.73m2    Calcium 7.9 (L) 8.5 - 10.1 MG/DL    Bilirubin, total 1.2 (H) 0.2 - 1.0 MG/DL    ALT (SGPT) 19 16 - 61 U/L    AST (SGOT) 32 10 - 38 U/L    Alk. phosphatase 82 45 - 117 U/L    Protein, total 6.1 (L) 6.4 - 8.2 g/dL    Albumin 1.6 (L) 3.4 - 5.0 g/dL    Globulin 4.5 (H) 2.0 - 4.0 g/dL    A-G Ratio 0.4 (L) 0.8 - 1.7     PTT    Collection Time: 01/23/20  3:25 AM   Result Value Ref Range    aPTT 48.5 (H) 23.0 - 36.4 SEC   GLUCOSE, POC    Collection Time: 01/23/20  7:00 AM   Result Value Ref Range    Glucose (POC) 160 (H) 70 - 110 mg/dL   HGB & HCT    Collection Time: 01/23/20 11:03 AM   Result Value Ref Range    HGB 7.5 (L) 13.0 - 16.0 g/dL    HCT 22.5 (L) 36.0 - 48.0 %   GLUCOSE, POC    Collection Time: 01/23/20 11:40 AM   Result Value Ref Range    Glucose (POC) 160 (H) 70 - 110 mg/dL   PROTHROMBIN TIME + INR    Collection Time: 01/23/20 12:00 PM   Result Value Ref Range    Prothrombin time 16.5 (H) 11.5 - 15.2 sec    INR 1.4 (H) 0.8 - 1.2             No results for input(s): FIO2I, IFO2, HCO3I, IHCO3, HCOPOC, PCO2I, PCOPOC, IPHI, PHI, PHPOC, PO2I, PO2POC in the last 72 hours.     No lab exists for component: IPOC2    All Micro Results     Procedure Component Value Units Date/Time    CULTURE, BLOOD [[id number]] Collected:  01/18/20 1518    Order Status:  Completed Specimen:  Blood Updated:  01/23/20 7284     Special Requests: NO SPECIAL REQUESTS        Culture result: NO GROWTH 5 DAYS CULTURE, BLOOD [[id number]] Collected:  01/18/20 1355    Order Status:  Completed Specimen:  Blood Updated:  01/23/20 0722     Special Requests: NO SPECIAL REQUESTS        Culture result: NO GROWTH 5 DAYS       CULTURE, Charu Gal STAIN [[id number]] Collected:  01/18/20 2015    Order Status:  Completed Specimen:  Wound from Foot Updated:  01/22/20 0836     Special Requests: NO SPECIAL REQUESTS        GRAM STAIN NO WBC'S SEEN         NO ORGANISMS SEEN        Culture result: NO GROWTH 3 DAYS       STREP PNEUMO AG, URINE [[id number]] Collected:  01/18/20 0000    Order Status:  Completed Specimen:  Urine, random Updated:  01/19/20 1629     Strep pneumo Ag, urine NEGATIVE        LEGIONELLA PNEUMOPHILA AG, URINE [[id number]] Collected:  01/18/20 0000    Order Status:  Completed Specimen:  Urine, random Updated:  01/19/20 1629     Legionella Ag, urine NEGATIVE        INFLUENZA A & B AG (RAPID TEST) [[id number]] Collected:  01/18/20 1630    Order Status:  Completed Specimen:  Nasopharyngeal from Nasal washing Updated:  01/18/20 1653     Influenza A Antigen NEGATIVE         Comment: A negative result does not exclude influenza virus infection, seasonal or H1N1 due to suboptimal sensitivity. If influenza is circulating in your community, a diagnosis of influenza should be considered based on a patients clinical presentation and empiric antiviral treatment should be considered, if indicated. Influenza B Antigen NEGATIVE        INFLUENZA A & B AG (RAPID TEST) [[id number]]     Order Status:  Canceled Specimen:  Nasopharyngeal           Telemetry: normal sinus rhythm    1/5/20 ECHO  · Left Ventricle: Normal cavity size and systolic function (ejection fraction normal). Mild concentric hypertrophy . The estimated ejection fraction is 55 - 60%. There is mild (grade 1) left ventricular diastolic dysfunction H/M'UTQTW=4.10.  · Tricuspid Valve: Normal valve structure and no stenosis. Trace regurgitation.  Pulmonary arterial systolic pressure is 23.0 mmHg. PVL RUE 1/18/20:  · Acute appearing non occlusive thrombus noted in the right internal jugular, subclavian, axillary and brachial vein(s). · Non occlusive superficial thrombophlebitis noted within the right basilic vein. · No evidence of DVT in the contralateral internal jugular and proximal subclavian vein. PVL LUE 1/21/20:  · Acute appearing thrombus noted in the contralateral right proximal internal jugular and subclavian vein(s) which is no different than the privious scan. · Acute appearing thrombus noted in the left proximal subclavian vein(s). MRI brain 1/21/20:  1. Mild atrophy and chronic small vessel changes. 2.  Old high left frontal craniotomy. 3.  Nonspecific fluid and/or mucosal thickening in the mastoid air cells, right  greater than left. 4.  Otherwise, unremarkable evaluation. Specifically, no acute intracranial  abnormalities are present. MRI right ankle 1/20/20:  1. No bone marrow signal abnormalities within the imaged ankle, hindfoot, or  midfoot to suggest osteomyelitis. No evidence of fracture, stress fracture, or  marrow stress reaction. 2. Degenerative changes as above. 3. Considerable tendinosis of the peroneal tendons with longitudinal split  tearing of the peroneus brevis tendon. Mild accompanying shared peroneal tendon  sheath tenosynovitis. 4. Diffusely abnormal intrinsic muscle bulk and signal typical for changes  related to sequela of subacute denervation. MRI right foot 1/20/20:  1. Postoperative changes from fifth metatarsal amputation without concomitant  marrow signal abnormality identified to suggest osteomyelitis. 2. No evidence of fracture, stress fracture, or marrow stress reaction. 3. Very mild right first MTP joint chondrosis. 4. Diffusely abnormal intrinsic muscle bulk and signal in keeping with sequela  of subacute denervation.             Imaging:  [x]I have personally reviewed the patients chest radiographs images and report     Results from [place] encounter on 01/18/20   XR CHEST PORT    Narrative EXAM: CHEST RADIOGRAPH    CLINICAL INDICATION/HISTORY: tachy  -Additional: None    COMPARISON: January 15, 2020    TECHNIQUE: Portable frontal view of the chest    _______________    FINDINGS:    SUPPORT DEVICES: A right upper extremity PICC is present. HEART AND MEDIASTINUM: No appreciable cardiomegaly. Remaining mediastinal  contours within normal limits. LUNGS AND PLEURAL SPACES: No consolidation, mass, or pleural effusion. BONY THORAX AND SOFT TISSUES: Unremarkable.    _______________      Impression IMPRESSION:    No active cardiopulmonary disease. Results from [place] encounter on 01/18/20   CTA CHEST W OR W WO CONT    Narrative EXAM: CTA Chest    INDICATION: Known superior vena cava thrombus, left subclavian venous  thrombosis. Evaluation for clot propagation requested. COMPARISON: Several prior exams, most recently CT angiogram of the chest  performed 1/18/2020    TECHNIQUE: Axial CT imaging from the thoracic inlet through the diaphragm with  intravenous contrast utilizing CTA study for pulmonary artery evaluation. Coronal and sagittal MIP reformations were generated at a separate workstation. One or more dose reduction techniques were used on this CT: automated exposure  control, adjustment of the mAs and/or kVp according to patient size, and  iterative reconstruction techniques. The specific techniques used on this CT  exam have been documented in the patient's electronic medical record. Digital  Imaging and Communications in Medicine (DICOM) format image data are available  to nonaffiliated external healthcare facilities or entities on a secure, media  free, reciprocally searchable basis with patient authorization for at least a  12-month period after this study. _______________    FINDINGS:    EXAM QUALITY: Overall exam quality is adequate.  Pulmonary arterial enhancement  is adequate. The breath hold is satisfactory. PULMONARY ARTERIES: No evidence of pulmonary embolism. Main pulmonary arterial  size remains within normal limits. MEDIASTINUM: Included thyroid gland unremarkable. As previously, stranding  surrounding the right PICC line throughout its visualized course in the included  right arm, right axilla, and right subclavian regions is noted. There is an  essentially unchanged configuration to clot within the superior vena cava, with  luminal narrowing extending just below the level of the sternomanubrial  articulation. Contrast injected from the left arm is noted to extend through a  significantly narrowed proximal left subclavian vein and adjacent  brachiocephalic venous confluence, with recruitment of collaterals along the  external jugular venous pathway. Fairly significant stranding surrounds the  subclavian neurovascular bundles bilaterally, which is unchanged on the right  and newly present on the left. The cardiac size is normal. There is a small circumferential pericardial  effusion. Thoracic aorta is of normal course and caliber. LYMPH NODES: No supraclavicular, axilla, mediastinal lymph node enlargement is  present, with an increased number of subcentimeter nodes present across the  axilla and subpectoral regions bilaterally, very likely reactive. AIRWAY: Central airways are patent. LUNGS: Basilar areas of atelectasis noted. No significant groundglass  abnormality or septal line thickening. Rounded area of opacity at the posterior  right upper lobe noted, similar to prior. Biapical pleural parenchymal scar  formation. PLEURA: There are bilateral pleural effusions, moderately large on the right and  moderate size on the left, both appearing increased from comparison CT angiogram  1/18/2020. UPPER ABDOMEN: Included upper abdomen demonstrates no acute abnormality. Focal  fat deposition near the falciform ligament is present. .    OTHER: No acute or aggressive osseous abnormalities identified. There is progressive body wall edema noted in the interval.    _______________      Impression IMPRESSION:    1. Redemonstration of thrombus surrounding the included portions of the right  PICC line with accompanying occlusion of the superior vena cava above the level  of the brachiocephalic venous confluence.     >  Partial thrombosis of the left subclavian vein, with significant narrowing  of the proximal portion of the left subclavian vein and adjacent brachiocephalic  vein. Progressive edema surrounding the left subclavian neurovascular bundle  noted in the interval from preceding CT angiogram chest 1/18/2020.    2. No evidence of pulmonary embolism. 3. Moderately large right and moderate-sized left pleural effusions which have  increased in the interval from prior chest CT. 4. Basilar areas of compressive atelectasis and presumptive area of rounded  atelectasis at the posterior right upper lobe without change. Attention on  follow-up imaging recommended. 5. Progressive body wall edema.          Oscar Bullard MD   1/23/2020

## 2020-01-23 NOTE — PROGRESS NOTES
Problem: Falls - Risk of  Goal: *Absence of Falls  Description  Document Manuel Reas Fall Risk and appropriate interventions in the flowsheet. Outcome: Progressing Towards Goal  Note: Fall Risk Interventions:  Mobility Interventions: Assess mobility with egress test, Bed/chair exit alarm, Communicate number of staff needed for ambulation/transfer, OT consult for ADLs, Patient to call before getting OOB, PT Consult for mobility concerns, PT Consult for assist device competence, Strengthening exercises (ROM-active/passive), Utilize walker, cane, or other assistive device    Mentation Interventions: Adequate sleep, hydration, pain control, Bed/chair exit alarm, Door open when patient unattended, Evaluate medications/consider consulting pharmacy, Increase mobility, Reorient patient, More frequent rounding, Self-releasing belt, Update white board, Toileting rounds    Medication Interventions: Bed/chair exit alarm, Patient to call before getting OOB, Evaluate medications/consider consulting pharmacy, Teach patient to arise slowly    Elimination Interventions: Bed/chair exit alarm, Call light in reach, Patient to call for help with toileting needs, Stay With Me (per policy), Toilet paper/wipes in reach, Toileting schedule/hourly rounds, Urinal in reach    History of Falls Interventions: Bed/chair exit alarm, Consult care management for discharge planning, Door open when patient unattended, Evaluate medications/consider consulting pharmacy, Investigate reason for fall         Problem: Patient Education: Go to Patient Education Activity  Goal: Patient/Family Education  Outcome: Progressing Towards Goal     Problem: Pressure Injury - Risk of  Goal: *Prevention of pressure injury  Description  Document Thomas Scale and appropriate interventions in the flowsheet.   Outcome: Progressing Towards Goal  Note: Pressure Injury Interventions:  Sensory Interventions: Assess changes in LOC, Avoid rigorous massage over bony prominences, Check visual cues for pain, Float heels, Keep linens dry and wrinkle-free, Maintain/enhance activity level, Minimize linen layers, Monitor skin under medical devices, Pad between skin to skin, Pressure redistribution bed/mattress (bed type), Turn and reposition approx. every two hours (pillows and wedges if needed)    Moisture Interventions: Absorbent underpads, Apply protective barrier, creams and emollients, Check for incontinence Q2 hours and as needed, Limit adult briefs, Maintain skin hydration (lotion/cream), Minimize layers, Moisture barrier, Contain wound drainage    Activity Interventions: Increase time out of bed, Pressure redistribution bed/mattress(bed type), PT/OT evaluation    Mobility Interventions: Float heels, HOB 30 degrees or less, Pressure redistribution bed/mattress (bed type), PT/OT evaluation, Turn and reposition approx.  every two hours(pillow and wedges)    Nutrition Interventions: Document food/fluid/supplement intake, Offer support with meals,snacks and hydration    Friction and Shear Interventions: Feet elevated on foot rest, HOB 30 degrees or less, Lift sheet, Lift team/patient mobility team, Minimize layers, Transferring/repositioning devices                Problem: Patient Education: Go to Patient Education Activity  Goal: Patient/Family Education  Outcome: Progressing Towards Goal

## 2020-01-23 NOTE — PROGRESS NOTES
Bedside and Verbal shift change report given to LAURO Sims RN (oncoming nurse) by LAURA Matta RN (offgoing nurse). Report included the following information SBAR, Kardex, Intake/Output, MAR and Recent Results.

## 2020-01-23 NOTE — PROGRESS NOTES
68-year-old  male is being seen for right upper extremity PICC line associated DVT extending from his brachial vein axillary vein subclavian vein into the innominate vein and superior vena cava. He was on Eliquis when this occurred. He does not have marketed head and neck swelling. According to the family his voice is somewhat changed. His mental status is also in question with some confusion and delirium type symptoms. Once again there is no market head and neck swelling. He is placed on a heparin drip on admission and then switch to Lovenox due to concern for underlying malignancy. Repeat CTA/CTV showed clot in the left subclavian vein as well. To debulk some of this clot unwilling to place a thrombolytic infusion catheter via the PICC line site extending to the superior vena cava. I have extensively reviewed the chart and do not find any recent surgery other than a I&D of a foot abscess. I explained to the daughter who is an [place] speaker that there may be some bleeding from this but there are no other signs or symptoms of bleeding in any other anatomic distribution. The risks of bleeding of course were explained. I am a little concerned that his hgb is 7.1. Will monitor closely for signa dn symptoms of bleeding  We will proceed with thrombolytic catheter placement via pre-existing PICC line access site. We will run him at 1 mg of TPA per hour. Fibrinogen levels will be checked every 6 hours. Heparin will be running via the side-port of the sheath. Will restart his Lovenox tomorrow from the lytic infusion therapy is completed.     [address] [facility] [address] [facility]  [phone]    Recent Results (from the past 24 hour(s))   GLUCOSE, POC    Collection Time: 01/22/20  4:26 PM   Result Value Ref Range    Glucose (POC) 160 (H) 70 - 110 mg/dL   HGB & HCT    Collection Time: 01/22/20  4:35 PM   Result Value Ref Range    HGB 8.0 (L) 13.0 - 16.0 g/dL    HCT 24.1 (L) 36.0 - 48.0 %   GLUCOSE, POC    Collection Time: 01/22/20  9:32 PM   Result Value Ref Range    Glucose (POC) 238 (H) 70 - 110 mg/dL   MAGNESIUM    Collection Time: 01/23/20  3:25 AM   Result Value Ref Range    Magnesium 1.6 1.6 - 2.6 mg/dL   PHOSPHORUS    Collection Time: 01/23/20  3:25 AM   Result Value Ref Range    Phosphorus 2.3 (L) 2.5 - 4.9 MG/DL   CALCIUM, IONIZED    Collection Time: 01/23/20  3:25 AM   Result Value Ref Range    Ionized Calcium 1.07 (L) 1.12 - 1.32 MMOL/L   CBC WITH AUTOMATED DIFF    Collection Time: 01/23/20  3:25 AM   Result Value Ref Range    WBC 10.5 4.6 - 13.2 K/uL    RBC 2.40 (L) 4.70 - 5.50 M/uL    HGB 7.1 (L) 13.0 - 16.0 g/dL    HCT 21.3 (L) 36.0 - 48.0 %    MCV 88.8 74.0 - 97.0 FL    MCH 29.6 24.0 - 34.0 PG    MCHC 33.3 31.0 - 37.0 g/dL    RDW 14.2 11.6 - 14.5 %    PLATELET 781 010 - 888 K/uL    MPV 9.1 (L) 9.2 - 11.8 FL    NEUTROPHILS 72 40 - 73 %    LYMPHOCYTES 14 (L) 21 - 52 %    MONOCYTES 13 (H) 3 - 10 %    EOSINOPHILS 1 0 - 5 %    BASOPHILS 0 0 - 2 %    ABS. NEUTROPHILS 7.6 1.8 - 8.0 K/UL    ABS. LYMPHOCYTES 1.4 0.9 - 3.6 K/UL    ABS. MONOCYTES 1.4 (H) 0.05 - 1.2 K/UL    ABS. EOSINOPHILS 0.1 0.0 - 0.4 K/UL    ABS. BASOPHILS 0.0 0.0 - 0.1 K/UL    DF AUTOMATED     METABOLIC PANEL, COMPREHENSIVE    Collection Time: 01/23/20  3:25 AM   Result Value Ref Range    Sodium 139 136 - 145 mmol/L    Potassium 3.7 3.5 - 5.5 mmol/L    Chloride 104 100 - 111 mmol/L    CO2 31 21 - 32 mmol/L    Anion gap 4 3.0 - 18 mmol/L    Glucose 98 74 - 99 mg/dL    BUN 20 (H) 7.0 - 18 MG/DL    Creatinine 1.69 (H) 0.6 - 1.3 MG/DL    BUN/Creatinine ratio 12 12 - 20      GFR est AA 48 (L) >60 ml/min/1.73m2    GFR est non-AA 40 (L) >60 ml/min/1.73m2    Calcium 7.9 (L) 8.5 - 10.1 MG/DL    Bilirubin, total 1.2 (H) 0.2 - 1.0 MG/DL    ALT (SGPT) 19 16 - 61 U/L    AST (SGOT) 32 10 - 38 U/L    Alk.  phosphatase 82 45 - 117 U/L    Protein, total 6.1 (L) 6.4 - 8.2 g/dL    Albumin 1.6 (L) 3.4 - 5.0 g/dL    Globulin 4.5 (H) 2.0 - 4.0 g/dL    A-G Ratio 0.4 (L) 0.8 - 1.7     PTT    Collection Time: 01/23/20  3:25 AM   Result Value Ref Range    aPTT 48.5 (H) 23.0 - 36.4 SEC   GLUCOSE, POC    Collection Time: 01/23/20  7:00 AM   Result Value Ref Range    Glucose (POC) 160 (H) 70 - 110 mg/dL   HGB & HCT    Collection Time: 01/23/20 11:03 AM   Result Value Ref Range    HGB 7.5 (L) 13.0 - 16.0 g/dL    HCT 22.5 (L) 36.0 - 48.0 %   GLUCOSE, POC    Collection Time: 01/23/20 11:40 AM   Result Value Ref Range    Glucose (POC) 160 (H) 70 - 110 mg/dL   PROTHROMBIN TIME + INR    Collection Time: 01/23/20 12:00 PM   Result Value Ref Range    Prothrombin time 16.5 (H) 11.5 - 15.2 sec    INR 1.4 (H) 0.8 - 1.2

## 2020-01-23 NOTE — PROGRESS NOTES
Hospitalist Progress Note    Patient: Tatiana Farnsworth MRN: [id number]  CSN: [id number]    YOB: 1945  Age: 76 y.o. Sex: male    DOA: 1/18/2020 LOS:  LOS: 5 days          Chief Complaint:    DVT's      Assessment/Plan     75 yo male with recent surgery for osteo in foot and on course of IV abx came back with arm swelling, extensive DVT despite eliquis therapy     Metabolic encephalopathy -improved per daughter who translates at bedside, but he is still confused at times  MRI brain done -no acute issue      Hypoglycemia-resolved     CTA ruled out PE  Chest pain seems pleuritic with inspiration-resolved      plan thora and send for cytology to rule out cancer-may do tomorrow if stable, can hold am dose of lovenox     On merrem, this is for continued treatment of foot osteo      Bilateral Upper extremities-new,  and lower extremity DVT present on admission  NOW LOVENOX PER HEMATOLOGY  Vascular and  Hematology on case    Anemia acute on chronic   transfused 2 unit prbc, following H&H  Iron is 13 (low)  Give ferrlicit IV  Occult IZQWZEID 7/90     HTN -stable     Severe prot olivia malnutrition-ask nutrition to see, added glucertana     IDDM with vascular complications     Recent right foot surgery for infection, osteo  Continue current iv abx . change back to ertapenem on d/c as still completing course of therapy as per ID     CKD stage 3    No sepsis on admission, fever likely due to thrombotic burden, cultures are negative, he was already on IV abx for his foot-will need 5 more weeks IV antibiotics which will necessitate a picc line unless he has surgical cure-amputation-daughter and patient are agreeable for amputation if that is necessary    Podiatry states there was good excision of infected bone, so maybe we can observe off antibiotics? ?  Will d/w ID    Look at [facility] and rehab options for d/c     Disposition :  Patient Active Problem List   Diagnosis Code    Stage 3 chronic kidney disease ([facility] N18.3    Type 2 diabetes mellitus, with long-term current use of insulin ([facility]) E11.9, Z79.4    Hypertension I10    Hypokalemia E87.6    Foot abscess, right L02.611    Diabetic ulcer of right midfoot associated with type 2 diabetes mellitus, with fat layer exposed [facility] E11.621, L97.412    PAD (peripheral artery disease) ([facility]) I73.9    Osteomyelitis of right foot ([facility]) M86.9    Acute deep vein thrombosis (DVT) ([facility]) I82.409    DVT (deep venous thrombosis) ([facility]) I82.409    Anemia D64.9    Lung infiltrate R91.8       Subjective:  Denies pain in arms  No new issues per nurse  Patient talking with  this am, sometimes not answering sensibly  Very focussed on going home, and daughter picking him up      Review of systems:    Difficult to obtain due to confusion      Vital signs/Intake and Output:  Visit Vitals  /56 (BP 1 Location: Left leg, BP Patient Position: At rest)   Pulse (!) 109   Temp 98.3 °F (36.8 °C)   Resp 18   Ht 5' 7\" (1.702 m)   Wt 76.1 kg (167 lb 11.2 oz)   SpO2 94%   BMI 26.27 kg/m²     Current Shift:  No intake/output data recorded.   Last three shifts:  01/21 1901 - 01/23 0700  In: 1901.6 [P.O.:480; I.V.:1421.6]  Out: 1025 [Urine:1025]    Exam:    General: elderly  male, NAD  Head/Neck: NCAT, supple, No masses, No lymphadenopathy  CVS:Regular rate and rhythm, no M/R/G, S1/S2 heard, no thrill  Lungs:Clear to auscultation bilaterally, no wheezes, rhonchi, or rales  Abdomen: Soft, Nontender, No distention, Normal Bowel sounds, No hepatomegaly  Extremities:improved UE edema BL  Neuro:grossly normal , follows commands  Psych:appropriate                Labs: Results:       Chemistry Recent Labs     01/23/20  0325 01/22/20  0418 01/21/20  0345   GLU 98 56* 135*    139 139   K 3.7 3.3* 3.9    106 106   CO2 31 26 24   BUN 20* 19* 22*   CREA 1.69* 1.55* 1.90*   CA 7.9* 7.9* 7.9*   AGAP 4 7 9   BUCR 12 12 12   AP 82 85 91   TP 6.1* 6.6 6.3*   ALB 1.6* 1.8* 1.7*   GLOB 4.5* 4.8* 4.6*   AGRAT 0.4* 0.4* 0.4*      CBC w/Diff Recent Labs     01/23/20  0325 01/22/20  1635 01/22/20  0418  01/21/20  0345   WBC 10.5  --  10.3  --  7.7   RBC 2.40*  --  2.66*  --  2.58*   HGB 7.1* 8.0* 7.9*   < > 7.5*   HCT 21.3* 24.1* 23.5*   < > 22.7*     --  326  --  328   GRANS 72  --  71  --  69   LYMPH 14*  --  14*  --  15*   EOS 1  --  0  --  1    < > = values in this interval not displayed. Cardiac Enzymes No results for input(s): CPK, CKND1, KAREN in the last 72 hours. No lab exists for component: CKRMB, TROIP   Coagulation Recent Labs     01/23/20 0325 01/22/20  0418   APTT 48.5* 113.9*       Lipid Panel No results found for: CHOL, CHOLPOCT, CHOLX, CHLST, CHOLV, 852903, HDL, HDLP, LDL, LDLC, DLDLP, 476782, VLDLC, VLDL, TGLX, TRIGL, TRIGP, TGLPOCT, CHHD, CHHDX   BNP No results for input(s): BNPP in the last 72 hours.    Liver Enzymes Recent Labs     01/23/20  0325   TP 6.1*   ALB 1.6*   AP 82   SGOT 32      Thyroid Studies Lab Results   Component Value Date/Time    TSH 1.86 01/19/2020 06:14 AM        Procedures/imaging: see electronic medical records for all procedures/Xrays and details which were not copied into this note but were reviewed prior to creation of Gabriel Allen MD

## 2020-01-23 NOTE — PROGRESS NOTES
Pt's right basilic vein sheath w/TPA infusion at 1mg/hr and Heparin infusion at 500U/hr via sheath's side port. Arm placed in pediatric splint x 2 with wide coban keeping splint in place.

## 2020-01-23 NOTE — PROGRESS NOTES
TRANSFER - OUT REPORT:    Verbal report given to [facility], RN (name) on Roxana Conteh  being transferred to ICU bed3(unit) for routine progression of care       Report consisted of patients Situation, Background, Assessment and   Recommendations(SBAR). Information from the following report(s) SBAR, Kardex and MAR was reviewed with the receiving nurse. Lines:   Peripheral IV 01/21/20 Anterior;Distal;Left Forearm (Active)   Site Assessment Clean, dry, & intact 1/23/2020 12:05 PM   Phlebitis Assessment 0 1/23/2020 12:05 PM   Infiltration Assessment 0 1/23/2020 12:05 PM   Dressing Status Clean, dry, & intact 1/23/2020 12:05 PM   Dressing Type Tape;Transparent 1/23/2020 12:05 PM   Hub Color/Line Status Pink;Capped 1/23/2020 12:05 PM   Action Taken Open ports on tubing capped 1/23/2020 12:05 PM   Alcohol Cap Used Yes 1/23/2020 12:05 PM        Opportunity for questions and clarification was provided. Patient transported with:   Registered Nurse  X 2, telemonitor, TPA/Heparin gtts infusing via thrombolysis catheter, angio tech.

## 2020-01-23 NOTE — PROGRESS NOTES
Pt arrived on unit; Pt Alert and confused; Telephone Consent signed from pt's Edilson Wray, and Dr. Irma Olmos; See MAC_lab for sedation report and/or vital signs. Pt transferred to treatment table for procedure.

## 2020-01-24 ENCOUNTER — APPOINTMENT (OUTPATIENT)
Dept: INTERVENTIONAL RADIOLOGY/VASCULAR | Age: 75
DRG: 314 | End: 2020-01-24
Attending: SURGERY
Payer: MEDICARE

## 2020-01-24 LAB
ALBUMIN SERPL ELPH-MCNC: 2.1 G/DL (ref 2.9–4.4)
ALBUMIN/GLOB SERPL: 0.6 {RATIO} (ref 0.7–1.7)
ALPHA1 GLOB SERPL ELPH-MCNC: 0.5 G/DL (ref 0–0.4)
ALPHA2 GLOB SERPL ELPH-MCNC: 1 G/DL (ref 0.4–1)
ANION GAP SERPL CALC-SCNC: 7 MMOL/L (ref 3–18)
B-GLOBULIN SERPL ELPH-MCNC: 1 G/DL (ref 0.7–1.3)
BACTERIA SPEC CULT: NORMAL
BACTERIA SPEC CULT: NORMAL
BASOPHILS # BLD: 0 K/UL (ref 0–0.1)
BASOPHILS NFR BLD: 0 % (ref 0–2)
BUN SERPL-MCNC: 21 MG/DL (ref 7–18)
BUN/CREAT SERPL: 14 (ref 12–20)
CALCIUM SERPL-MCNC: 8.1 MG/DL (ref 8.5–10.1)
CHLORIDE SERPL-SCNC: 104 MMOL/L (ref 100–111)
CO2 SERPL-SCNC: 30 MMOL/L (ref 21–32)
CREAT SERPL-MCNC: 1.46 MG/DL (ref 0.6–1.3)
DIFFERENTIAL METHOD BLD: ABNORMAL
EOSINOPHIL # BLD: 0.1 K/UL (ref 0–0.4)
EOSINOPHIL NFR BLD: 1 % (ref 0–5)
ERYTHROCYTE [DISTWIDTH] IN BLOOD BY AUTOMATED COUNT: 14.1 % (ref 11.6–14.5)
FIBRINOGEN PPP-MCNC: 475 MG/DL (ref 210–451)
FIBRINOGEN PPP-MCNC: 501 MG/DL (ref 210–451)
FIBRINOGEN PPP-MCNC: 530 MG/DL (ref 210–451)
GAMMA GLOB SERPL ELPH-MCNC: 1.2 G/DL (ref 0.4–1.8)
GLOBULIN SER-MCNC: 3.8 G/DL (ref 2.2–3.9)
GLUCOSE BLD STRIP.AUTO-MCNC: 109 MG/DL (ref 70–110)
GLUCOSE BLD STRIP.AUTO-MCNC: 124 MG/DL (ref 70–110)
GLUCOSE BLD STRIP.AUTO-MCNC: 196 MG/DL (ref 70–110)
GLUCOSE BLD STRIP.AUTO-MCNC: 261 MG/DL (ref 70–110)
GLUCOSE BLD STRIP.AUTO-MCNC: 40 MG/DL (ref 70–110)
GLUCOSE BLD STRIP.AUTO-MCNC: 43 MG/DL (ref 70–110)
GLUCOSE BLD STRIP.AUTO-MCNC: 51 MG/DL (ref 70–110)
GLUCOSE BLD STRIP.AUTO-MCNC: 54 MG/DL (ref 70–110)
GLUCOSE SERPL-MCNC: 151 MG/DL (ref 74–99)
HCT VFR BLD AUTO: 22.2 % (ref 36–48)
HCT VFR BLD AUTO: 22.6 % (ref 36–48)
HCT VFR BLD AUTO: 22.9 % (ref 36–48)
HGB BLD-MCNC: 7.2 G/DL (ref 13–16)
HGB BLD-MCNC: 7.3 G/DL (ref 13–16)
HGB BLD-MCNC: 7.4 G/DL (ref 13–16)
IGA SERPL-MCNC: 780 MG/DL (ref 61–437)
IGG SERPL-MCNC: 1371 MG/DL (ref 700–1600)
IGM SERPL-MCNC: 44 MG/DL (ref 15–143)
INR PPP: 1.3 (ref 0.8–1.2)
INTERPRETATION SERPL IEP-IMP: ABNORMAL
LYMPHOCYTES # BLD: 1.3 K/UL (ref 0.9–3.6)
LYMPHOCYTES NFR BLD: 16 % (ref 21–52)
M PROTEIN SERPL ELPH-MCNC: ABNORMAL G/DL
MAGNESIUM SERPL-MCNC: 2 MG/DL (ref 1.6–2.6)
MCH RBC QN AUTO: 29.4 PG (ref 24–34)
MCHC RBC AUTO-ENTMCNC: 32.9 G/DL (ref 31–37)
MCV RBC AUTO: 89.5 FL (ref 74–97)
MONOCYTES # BLD: 1.1 K/UL (ref 0.05–1.2)
MONOCYTES NFR BLD: 13 % (ref 3–10)
NEUTS SEG # BLD: 5.6 K/UL (ref 1.8–8)
NEUTS SEG NFR BLD: 70 % (ref 40–73)
PLATELET # BLD AUTO: 306 K/UL (ref 135–420)
PLATELET # BLD AUTO: 310 K/UL (ref 135–420)
PLATELET # BLD AUTO: 320 K/UL (ref 135–420)
PMV BLD AUTO: 8.9 FL (ref 9.2–11.8)
POTASSIUM SERPL-SCNC: 3.3 MMOL/L (ref 3.5–5.5)
POTASSIUM SERPL-SCNC: 3.3 MMOL/L (ref 3.5–5.5)
PROT SERPL-MCNC: 5.9 G/DL (ref 6–8.5)
PROTHROMBIN TIME: 15.7 SEC (ref 11.5–15.2)
PROTHROMBIN TIME: 16.1 SEC (ref 11.5–15.2)
PROTHROMBIN TIME: 16.1 SEC (ref 11.5–15.2)
RBC # BLD AUTO: 2.48 M/UL (ref 4.7–5.5)
RBC MORPH BLD: ABNORMAL
SERVICE CMNT-IMP: NORMAL
SERVICE CMNT-IMP: NORMAL
SODIUM SERPL-SCNC: 141 MMOL/L (ref 136–145)
WBC # BLD AUTO: 8.1 K/UL (ref 4.6–13.2)
WBC MORPH BLD: ABNORMAL

## 2020-01-24 PROCEDURE — 74011250637 HC RX REV CODE- 250/637: Performed by: FAMILY MEDICINE

## 2020-01-24 PROCEDURE — 74011000258 HC RX REV CODE- 258: Performed by: INTERNAL MEDICINE

## 2020-01-24 PROCEDURE — 74011636320 HC RX REV CODE- 636/320: Performed by: INTERNAL MEDICINE

## 2020-01-24 PROCEDURE — 74011250636 HC RX REV CODE- 250/636: Performed by: SURGERY

## 2020-01-24 PROCEDURE — 74011636637 HC RX REV CODE- 636/637: Performed by: INTERNAL MEDICINE

## 2020-01-24 PROCEDURE — 74011250636 HC RX REV CODE- 250/636: Performed by: INTERNAL MEDICINE

## 2020-01-24 PROCEDURE — 65610000006 HC RM INTENSIVE CARE

## 2020-01-24 PROCEDURE — 85025 COMPLETE CBC W/AUTO DIFF WBC: CPT

## 2020-01-24 PROCEDURE — 37214 CESSJ THERAPY CATH REMOVAL: CPT

## 2020-01-24 PROCEDURE — 36415 COLL VENOUS BLD VENIPUNCTURE: CPT

## 2020-01-24 PROCEDURE — 82668 ASSAY OF ERYTHROPOIETIN: CPT

## 2020-01-24 PROCEDURE — 85610 PROTHROMBIN TIME: CPT

## 2020-01-24 PROCEDURE — 85384 FIBRINOGEN ACTIVITY: CPT

## 2020-01-24 PROCEDURE — 82962 GLUCOSE BLOOD TEST: CPT

## 2020-01-24 PROCEDURE — 02PYX3Z REMOVAL OF INFUSION DEVICE FROM GREAT VESSEL, EXTERNAL APPROACH: ICD-10-PCS | Performed by: SURGERY

## 2020-01-24 PROCEDURE — 85049 AUTOMATED PLATELET COUNT: CPT

## 2020-01-24 PROCEDURE — 74011250636 HC RX REV CODE- 250/636: Performed by: FAMILY MEDICINE

## 2020-01-24 PROCEDURE — 74011000258 HC RX REV CODE- 258: Performed by: FAMILY MEDICINE

## 2020-01-24 PROCEDURE — 74011250636 HC RX REV CODE- 250/636: Performed by: HOSPITALIST

## 2020-01-24 PROCEDURE — 84132 ASSAY OF SERUM POTASSIUM: CPT

## 2020-01-24 PROCEDURE — 83735 ASSAY OF MAGNESIUM: CPT

## 2020-01-24 PROCEDURE — B5081ZZ PLAIN RADIOGRAPHY OF SUPERIOR VENA CAVA USING LOW OSMOLAR CONTRAST: ICD-10-PCS | Performed by: SURGERY

## 2020-01-24 PROCEDURE — 74011250637 HC RX REV CODE- 250/637: Performed by: HOSPITALIST

## 2020-01-24 PROCEDURE — 85018 HEMOGLOBIN: CPT

## 2020-01-24 RX ORDER — POTASSIUM CHLORIDE 20 MEQ/1
40 TABLET, EXTENDED RELEASE ORAL DAILY
Status: DISCONTINUED | OUTPATIENT
Start: 2020-01-25 | End: 2020-01-24 | Stop reason: ALTCHOICE

## 2020-01-24 RX ORDER — MAGNESIUM SULFATE HEPTAHYDRATE 40 MG/ML
2 INJECTION, SOLUTION INTRAVENOUS ONCE
Status: COMPLETED | OUTPATIENT
Start: 2020-01-24 | End: 2020-01-24

## 2020-01-24 RX ORDER — AMOXICILLIN AND CLAVULANATE POTASSIUM 875; 125 MG/1; MG/1
1 TABLET, FILM COATED ORAL EVERY 12 HOURS
Status: DISCONTINUED | OUTPATIENT
Start: 2020-01-24 | End: 2020-01-29 | Stop reason: HOSPADM

## 2020-01-24 RX ORDER — QUETIAPINE FUMARATE 25 MG/1
50 TABLET, FILM COATED ORAL ONCE
Status: COMPLETED | OUTPATIENT
Start: 2020-01-24 | End: 2020-01-24

## 2020-01-24 RX ORDER — QUETIAPINE FUMARATE 25 MG/1
25 TABLET, FILM COATED ORAL
Status: DISCONTINUED | OUTPATIENT
Start: 2020-01-24 | End: 2020-01-29 | Stop reason: HOSPADM

## 2020-01-24 RX ORDER — POTASSIUM CHLORIDE 7.45 MG/ML
10 INJECTION INTRAVENOUS
Status: COMPLETED | OUTPATIENT
Start: 2020-01-24 | End: 2020-01-24

## 2020-01-24 RX ORDER — LORAZEPAM 2 MG/ML
2 INJECTION INTRAMUSCULAR ONCE
Status: COMPLETED | OUTPATIENT
Start: 2020-01-24 | End: 2020-01-24

## 2020-01-24 RX ORDER — DEXTROSE MONOHYDRATE 100 MG/ML
INJECTION, SOLUTION INTRAVENOUS
Status: DISCONTINUED
Start: 2020-01-24 | End: 2020-01-24 | Stop reason: ALTCHOICE

## 2020-01-24 RX ORDER — INSULIN GLARGINE 100 [IU]/ML
10 INJECTION, SOLUTION SUBCUTANEOUS DAILY
Status: DISCONTINUED | OUTPATIENT
Start: 2020-01-25 | End: 2020-01-28

## 2020-01-24 RX ORDER — POTASSIUM CHLORIDE 7.45 MG/ML
10 INJECTION INTRAVENOUS
Status: COMPLETED | OUTPATIENT
Start: 2020-01-24 | End: 2020-01-25

## 2020-01-24 RX ORDER — DEXTROSE MONOHYDRATE AND SODIUM CHLORIDE 5; .45 G/100ML; G/100ML
50 INJECTION, SOLUTION INTRAVENOUS CONTINUOUS
Status: DISCONTINUED | OUTPATIENT
Start: 2020-01-24 | End: 2020-01-27

## 2020-01-24 RX ADMIN — INSULIN LISPRO 2 UNITS: 100 INJECTION, SOLUTION INTRAVENOUS; SUBCUTANEOUS at 11:46

## 2020-01-24 RX ADMIN — LORAZEPAM 2 MG: 2 INJECTION INTRAMUSCULAR; INTRAVENOUS at 04:05

## 2020-01-24 RX ADMIN — ALTEPLASE 1 MG/HR: KIT at 12:03

## 2020-01-24 RX ADMIN — ENOXAPARIN SODIUM 80 MG: 40 INJECTION, SOLUTION INTRAVENOUS; SUBCUTANEOUS at 13:50

## 2020-01-24 RX ADMIN — IOPAMIDOL 15 ML: 612 INJECTION, SOLUTION INTRAVENOUS at 16:41

## 2020-01-24 RX ADMIN — HYDRALAZINE HYDROCHLORIDE 10 MG: 20 INJECTION INTRAMUSCULAR; INTRAVENOUS at 05:39

## 2020-01-24 RX ADMIN — POTASSIUM CHLORIDE 10 MEQ: 7.46 INJECTION, SOLUTION INTRAVENOUS at 11:46

## 2020-01-24 RX ADMIN — INSULIN LISPRO 6 UNITS: 100 INJECTION, SOLUTION INTRAVENOUS; SUBCUTANEOUS at 09:17

## 2020-01-24 RX ADMIN — MORPHINE SULFATE 1 MG: 2 INJECTION, SOLUTION INTRAMUSCULAR; INTRAVENOUS at 00:04

## 2020-01-24 RX ADMIN — MAGNESIUM SULFATE HEPTAHYDRATE 2 G: 40 INJECTION, SOLUTION INTRAVENOUS at 16:04

## 2020-01-24 RX ADMIN — MORPHINE SULFATE 1 MG: 2 INJECTION, SOLUTION INTRAMUSCULAR; INTRAVENOUS at 19:46

## 2020-01-24 RX ADMIN — INSULIN GLARGINE 25 UNITS: 100 INJECTION, SOLUTION SUBCUTANEOUS at 09:18

## 2020-01-24 RX ADMIN — FUROSEMIDE 20 MG: 10 INJECTION, SOLUTION INTRAMUSCULAR; INTRAVENOUS at 09:15

## 2020-01-24 RX ADMIN — LABETALOL 20 MG/4 ML (5 MG/ML) INTRAVENOUS SYRINGE 20 MG: at 01:11

## 2020-01-24 RX ADMIN — LABETALOL 20 MG/4 ML (5 MG/ML) INTRAVENOUS SYRINGE 20 MG: at 16:04

## 2020-01-24 RX ADMIN — POTASSIUM CHLORIDE 10 MEQ: 7.46 INJECTION, SOLUTION INTRAVENOUS at 13:05

## 2020-01-24 RX ADMIN — POTASSIUM CHLORIDE 10 MEQ: 10 INJECTION, SOLUTION INTRAVENOUS at 23:25

## 2020-01-24 RX ADMIN — DEXTROSE MONOHYDRATE AND SODIUM CHLORIDE 100 ML/HR: 5; .45 INJECTION, SOLUTION INTRAVENOUS at 19:45

## 2020-01-24 RX ADMIN — POTASSIUM CHLORIDE 10 MEQ: 10 INJECTION, SOLUTION INTRAVENOUS at 21:15

## 2020-01-24 RX ADMIN — DEXTROSE MONOHYDRATE 250 ML: 10 INJECTION, SOLUTION INTRAVENOUS at 16:31

## 2020-01-24 RX ADMIN — DEXTROSE MONOHYDRATE 250 ML: 10 INJECTION, SOLUTION INTRAVENOUS at 19:17

## 2020-01-24 RX ADMIN — QUETIAPINE FUMARATE 50 MG: 25 TABLET ORAL at 02:47

## 2020-01-24 RX ADMIN — LABETALOL 20 MG/4 ML (5 MG/ML) INTRAVENOUS SYRINGE 20 MG: at 09:21

## 2020-01-24 RX ADMIN — POTASSIUM CHLORIDE 10 MEQ: 10 INJECTION, SOLUTION INTRAVENOUS at 22:34

## 2020-01-24 RX ADMIN — POTASSIUM CHLORIDE 10 MEQ: 7.46 INJECTION, SOLUTION INTRAVENOUS at 13:50

## 2020-01-24 RX ADMIN — HYDRALAZINE HYDROCHLORIDE 10 MG: 20 INJECTION INTRAMUSCULAR; INTRAVENOUS at 20:04

## 2020-01-24 RX ADMIN — ALTEPLASE 1 MG/HR: KIT at 00:49

## 2020-01-24 NOTE — PROGRESS NOTES
Sepideh check venogram after 24 hours of thrombolysis.  Don't plan on continuing further lytic therapy    Will go straight to LOVENOX therapeutic dose after    No plans for re-inserting PICC line    ID recommends Augmentin for now      Migue Maurice D.O. [address]  [phone]  [phone]

## 2020-01-24 NOTE — PROGRESS NOTES
0720-received report from [facility] RN included SBAR MAR and Kardex. Shift Summary-Pt slept all morning, family at bedside. Confused upon awakening. Restraints ordered for pulling at sheath and IV. Taken to IR and sheath removed. Reported no clot in R arm. Pt NPO including oral meds as per Dr Saad Branham until seen by SLP. Consulted. Potassium replaced as well as magnesium. Blood glucose low at 1630 and again at 1830. Dr Alicia Mccoy notified and IV fluids changed to D5 1/2NS at 100/hr. Bedside and Verbal shift change report given to Alexis Wesley RN (oncoming nurse) by Diane Lindsay RN (offgoing nurse). Report included the following information SBAR, Kardex and MAR.

## 2020-01-24 NOTE — PROGRESS NOTES
Pt arrived on unit; Pt alert, disoriented;  See for vital signs. Pt transferred to treatment table for procedure. CBG=43, repeat CBG=40. Notified Tameka Amanda RN. Hypoglycemia protocol initiated per MD orders. See flow for POC results and medications.     1635-Dextrose 10% 250ml bolus initiated by Tameka Amanda RN per MD orders/protocol

## 2020-01-24 NOTE — CONSULTS
[facility]                                               (A Division of [address] [place] [address])                           Follow-up Note      Date of Admission: 1/18/2020     Date of Note:  1/24/2020    Summary:      Mr Jaimee Mariscal is an elderly 69y diabetic  whom I saw last admission for GBS sepsis emanating from a malodorous R DFU/OM (initial CRP 114mg/L) for which he had OR debridement on 1/7 followed by IV ertapenem since going home on 1/14.     Went home and returned to ER on 1/15 where he was re-evaluated for febrile episode and feeling generalized malaise.  Had repeat CRP done, which returned much better at 54mg/L and negative PCT.  Noted then to have a R apical mass off the pleura.     Returned/admitted 1/18 for fever and swollen arm where PICC had been placed.  Now noted to have extensive R upper arm/subclavian DVT.  Had been on PO novel anticoagulant, which has been replaced by IV heparin.      Back on single carbapenem (house flavor - meropenem)       Interval History:  CC:  Asleep  Events of last few days reviewed. Discussed with daughter at bedside. PICC removed yesterday in OR with VASCULAR; peripheral in left wrist.         Current Antimicrobials: Prior Antimicrobials   1. Meropenem IV (1/14-) #9 1. Pip/tzb IV  2. Vanco IV  3. Levo IV       Assessment Plan:   R Foot DFU/OM  1/6:  CRP - 114mg/L  1/15:  CRP - 54mg/L  1/22:  CRP - 224mg/L    Alright. I'm willing to look at middle ground. Have done a lot of thinking these past few days. He's had nearly 3wks IV therapy to this point towards his 6wks total point for OM therapy. His inflammatory marker has jumped considerably this week, but I think this is nonspecific to his extensive clots. PCT is negative. Linezolid is good idea, but fraught with bone marrow suppression over time (>2wks continuous use) such that I don't want to tempt fate with another issue.     His initial pathogen was GBS out of his bone/blood/urine (blood-filter), and with his initial malodor from his foot, likely anaerobes. A simple transition PO abx is amox-clav. You don't get the great/high concentrations, but may be good enough. Let's try it. ->IV carbapenem #23    Stopped the meropenem (and need for IV access for next few weeks). Start amox-clav 875mg PO bid. Recheck CRP early next week. I'll be back on Monday.    Thromboembolic disease    JC - resolving    DMT2    Microbiology:                 1/18 - BCx x2(-)                                                      Wd - NOS (-) so far                                                      Flu(-)                                                      Spneumo Ag(-)                                                      Legionella Ag(-)                                            1/15 - BCx x2(-)                                            1/6 - Wound (+) CoNS and bacillus (surface contaminants)                                         1/2 - BCx (-)                                         1/1 - BCx 2/2 (+) Streptococcus agalactiae                                                      UA (+) Streptococcus agalactiae        Lines / Catheters:         peripheral      Patient Active Problem List   Diagnosis Code    Stage 3 chronic kidney disease ([facility]) N18.3    Type 2 diabetes mellitus, with long-term current use of insulin ([facility]) E11.9, Z79.4    Hypertension I10    Hypokalemia E87.6    Foot abscess, right L02.611    Diabetic ulcer of right midfoot associated with type 2 diabetes mellitus, with fat layer exposed ([facility] E34.918, L97.412    PAD (peripheral artery disease) ([facility]) I73.9    Osteomyelitis of right foot ([facility]) M86.9    Acute deep vein thrombosis (DVT) ([facility]) I82.409    DVT (deep venous thrombosis) ([facility]) I82.409    Anemia D64.9    Lung infiltrate R91.8       Current Facility-Administered Medications   Medication Dose Route Frequency    amoxicillin-clavulanate (AUGMENTIN) 875-125 mg per tablet 1 Tab  1 Tab Oral Q12H    furosemide (LASIX) injection 20 mg  20 mg IntraVENous DAILY    alteplase (CATHFLO) 10 mg in 0.9% sodium chloride 500 mL infusion  1 mg/hr IntraCATHeter- VENous CONTINUOUS    heparin 25,000 units in D5W 250 ml infusion  500 Units/hr IntraVENous CONTINUOUS    glucose chewable tablet 16 g  4 Tab Oral PRN    glucagon (GLUCAGEN) injection 1 mg  1 mg IntraMUSCular PRN    dextrose 10% infusion 125-250 mL  125-250 mL IntraVENous PRN    Saccharomyces boulardii (FLORASTOR) capsule 250 mg  250 mg Oral DAILY    potassium chloride (K-DUR, KLOR-CON) SR tablet 20 mEq  20 mEq Oral DAILY    enoxaparin (LOVENOX) injection 80 mg  80 mg SubCUTAneous Q12H    insulin lispro (HUMALOG) injection   SubCUTAneous AC&HS    oxyCODONE IR (ROXICODONE) tablet 5 mg  5 mg Oral Q4H PRN    0.9% sodium chloride infusion 250 mL  250 mL IntraVENous PRN    0.9% sodium chloride infusion 250 mL  250 mL IntraVENous PRN    atorvastatin (LIPITOR) tablet 20 mg  20 mg Oral DAILY    insulin glargine (LANTUS) injection 25 Units  25 Units SubCUTAneous DAILY    losartan (COZAAR) tablet 100 mg  100 mg Oral DAILY    polyethylene glycol (MIRALAX) packet 17 g  17 g Oral DAILY    morphine injection 1 mg  1 mg IntraVENous Q3H PRN    labetaloL (NORMODYNE;TRANDATE) 20 mg/4 mL (5 mg/mL) injection 20 mg  20 mg IntraVENous QID PRN    hydrALAZINE (APRESOLINE) 20 mg/mL injection 10 mg  10 mg IntraVENous Q6H PRN    acetaminophen (TYLENOL) tablet 650 mg  650 mg Oral Q4H PRN     Facility-Administered Medications Ordered in Other Encounters   Medication Dose Route Frequency    dextrose 5 % - 0.45% NaCl infusion  50 mL/hr IntraVENous CONTINUOUS    sodium chloride (NS) flush 10-40 mL  10-40 mL IntraVENous PRN    heparin (porcine) pf 500 Units  500 Units InterCATHeter PRN         Review of Systems - General ROS: negative for - chills, fever or night sweats  Respiratory ROS: no cough, shortness of breath, or wheezing  Cardiovascular ROS: no chest pain or dyspnea on exertion       Objective:  Visit Vitals  /47   Pulse (!) 106   Temp 98.5 °F (36.9 °C)   Resp 25   Ht 5' 7\" (1.702 m)   Wt 76.1 kg (167 lb 11.2 oz)   SpO2 95%   BMI 26.27 kg/m²       Temp (24hrs), Av.5 °F (36.9 °C), Min:98.1 °F (36.7 °C), Max:99.1 °F (37.3 °C)      GEN: elderly  in NAD  HEENT: anicteric         Lab results:    Chemistry  Recent Labs     20  03420  0325 20  0418   * 98 56*    139 139   K 3.3* 3.7 3.3*    104 106   CO2 30 31 26   BUN 21* 20* 19*   CREA 1.46* 1.69* 1.55*   CA 8.1* 7.9* 7.9*   AGAP 7 4 7   BUCR 14 12 12   AP  --  82 85   TP  --  6.1* 6.6   ALB  --  1.6* 1.8*   GLOB  --  4.5* 4.8*   AGRAT  --  0.4* 0.4*       CBC w/ Diff  Recent Labs     20  2205 20  1615 20  1103 20  0325  20  0418   WBC 8.1 7.9  --   --  10.5  --  10.3   RBC 2.48* 2.53*  --   --  2.40*  --  2.66*   HGB 7.3* 7.5*  --  7.5* 7.1*   < > 7.9*   HCT 22.2* 22.6*  --  22.5* 21.3*   < > 23.5*    313 319  --  327  --  326   GRANS 70  --   --   --  72  --  71   LYMPH 16*  --   --   --  14*  --  14*   EOS 1  --   --   --  1  --  0    < > = values in this interval not displayed.        Microbiology  All Micro Results     Procedure Component Value Units Date/Time    CULTURE, BLOOD [[id number]] Collected:  20 1355    Order Status:  Completed Specimen:  Blood Updated:  20     Special Requests: NO SPECIAL REQUESTS        Culture result: NO GROWTH 6 DAYS       CULTURE, BLOOD [[id number]] Collected:  20 1518    Order Status:  Completed Specimen:  Blood Updated:  20     Special Requests: NO SPECIAL REQUESTS        Culture result: NO GROWTH 6 DAYS       CULTURE, Donah Him STAIN [[id number]] Collected:  20    Order Status:  Completed Specimen:  Wound from Foot Updated:  2036     Special Requests: NO SPECIAL REQUESTS        Charlotte Saavedra STAIN NO WBC'S SEEN         NO ORGANISMS SEEN        Culture result: NO GROWTH 3 DAYS       STREP PNEUMO AG, URINE [[id number]] Collected:  01/18/20 0000    Order Status:  Completed Specimen:  Urine, random Updated:  01/19/20 1629     Strep pneumo Ag, urine NEGATIVE        LEGIONELLA PNEUMOPHILA AG, URINE [[id number]] Collected:  01/18/20 0000    Order Status:  Completed Specimen:  Urine, random Updated:  01/19/20 1629     Legionella Ag, urine NEGATIVE        INFLUENZA A & B AG (RAPID TEST) [[id number]] Collected:  01/18/20 1630    Order Status:  Completed Specimen:  Nasopharyngeal from Nasal washing Updated:  01/18/20 1653     Influenza A Antigen NEGATIVE         Comment: A negative result does not exclude influenza virus infection, seasonal or H1N1 due to suboptimal sensitivity. If influenza is circulating in your community, a diagnosis of influenza should be considered based on a patients clinical presentation and empiric antiviral treatment should be considered, if indicated.         Influenza B Antigen NEGATIVE        INFLUENZA A & B AG (RAPID TEST) [[id number]]     Order Status:  Canceled Specimen:  Nasopharyngeal            Bart Moran MD  Cell [phone]  [facility]   1/24/2020   10:00 AM

## 2020-01-24 NOTE — PROCEDURES
Operative Report    Patient: Radha Kimble MRN: [id number]  SSN: [id number]    YOB: 1945  Age: 76 y.o. Sex: male       Date of Surgery: 1-      Preoperative Diagnosis: Central venous thrombosis - s/p Thrombolysis    Postoperative Diagnosis: Same         Anesthesia: None    Procedure:  Central venogram with removal of thrombolysis catheter    Procedure in Detail:     The patient was correctly identified and brought to the interventional suite and placed in supine position. His right arm was prepped and draped. The Tegaderm dressings were removed and the catheter was pulled out in its entirety. I injected contrast through my sheath all clot from the axillary vein into the subclavian vein, innominate vein and superior vena cava had cleared. I cut the suture on the sheath and removed it. A pressure dressing was applied. At the end of the procedure the patient tolerated the procedure well and went to the recovery in stable condition. I was present and scrubbed throughout. Estimated Blood Loss:  Minimal    Tourniquet Time: NONE    Implants: NONE           Specimens: None    Drains: None                Complications: None    Counts: Sponge and needle counts were correct times two.     Signed By:  Annalise Camargo MD     January 24, 2020

## 2020-01-24 NOTE — PROGRESS NOTES
1/24/2020 PT note: Chart reviewed and events of the day noted. Spoke with nurse Adwoa Menjivar. Will hold PT evaluation and f/up tomorrow as appropriate. Thank you.    Paul Spencer, PT

## 2020-01-24 NOTE — PROGRESS NOTES
TRANSFER - OUT REPORT:    Verbal report given to [facility], RN(name) on Sherin Kim  being transferred to (unit) for routine progression of care       Report consisted of patients Situation, Background, Assessment and   Recommendations(SBAR). Information from the following report(s) SBAR, Kardex and MAR was reviewed with the receiving nurse. Lines:   Peripheral IV 01/21/20 Anterior;Distal;Left Forearm (Active)   Site Assessment Clean, dry, & intact 1/24/2020 12:00 PM   Phlebitis Assessment 0 1/24/2020 12:00 PM   Infiltration Assessment 0 1/24/2020 12:00 PM   Dressing Status Clean, dry, & intact 1/24/2020 12:00 PM   Dressing Type Transparent 1/24/2020 12:00 PM   Hub Color/Line Status Infusing 1/24/2020 12:00 PM   Action Taken Open ports on tubing capped 1/24/2020 12:00 PM   Alcohol Cap Used Yes 1/24/2020 12:00 PM        Opportunity for questions and clarification was provided. Patient transported with:   Registered Nurse, angio tech, RRT transport, telemonitor.

## 2020-01-24 NOTE — PROGRESS NOTES
[facility]  Hematology / Oncology Consult Note      Impression:   Impression:  Iatrogenic DVT (deep venous thrombosis) ([address] (1/18/2020) RUE and LUE extending into SVC with partial SVCO,  associated with PICC line. Failure of eliquis after short term use. PICC is now out. Prior calf DVT on right in posterior tibial vein  Reduced blood flow by  Doppler R posterior tibial artery  He is getting thrombolysis with TPA at 1mg^hr    Active Problems:    Stage 3, borderline stage 4 chronic kidney disease ([address] (1/2/2020)  My calculation of creatinine clearance is 30 ml/min using IBW by Cockcroft-Gault. Type 2 diabetes mellitus, with long-term current use of insulin ([address] (1/3/2020)      Hypertension (1/3/2020)      Sepsis due to Streptococcus agalactiae ([address] (1/6/2020)      Osteomyelitis of right foot ([address] (1/9/2020)      Anemia (1/20/2020) normocytic normochromic. Likely multifactorial:  Renal insufficiency, chronic inflammation with stimulation of hepcidin,    Direct Grecia is negative   No evidence of hemolysis   No proof of bleeding   He is not iron deficient:  According to a published report in BLOOD, iron deficiency can be inferred in the setting of inflammation or renal disease by ferritin <100 AND saturation <20% in which case iron infusion should be tried. His ferritin is 894. Lung infiltrate (1/20/2020) RULn nodule, pleural effusions     Is linezolid po an option if access becomes an issue. Malignancy always in the differential with refractory clot. He has 1.5 cm density in RUL which is stable over short term. Hypercoagulable state can occur with even microscopic tumor. Effusions present can consider tapping one or both and send cytology when appropriate following TPA and interruption of anticoagulants. .  Can consider Pet/CT when outpatient. Colonoscopy when able. Very low albumin, increased globulins, SPEP and TERE pending.   He has lost weight and has not been eating at home according to his daughter Seven Landaverde. Plan:   Lovenox started 1mg^kg bid platelets unchanged  Continue lovenox for outpatient treatment when discharged. Follow platelets - ok so far  Measured iron studies, B12, folate, haptoglobin and LDH:  Looks like anemia of chronic disease  Vascular following he is on TPA  SPEP, TERE pending  Thrombophilia workup after acute phase has passed in 6-8 weeks, discussed with family. Thoracentesis pending after anticoagulation/ fibrinolysis. Measure erythropoeitin. He might benefit from Procrit 50-100units/kg TIW or retacrit (biosimilar) same dose. Hector Mendez is a 76 y.o.  male who has history of diabetes, hypertension, and lower extremity DVT. He was admitted for foot abscess and osteomyelitis and was discharged with a PICC line and ALBAN and IV antibiotics last dose is scheduled for February 20. On admission day,  prior to getting his infusion his children noticed that he was having increased swelling in the right arm as well as increased pain and erythema he was brought to the emergency room for further evaluation. In the emergency room CTA and ultrasound of the arm showed extensive clot extending into the subclavian and superior vena cava. Patient was started on Eliquis starter pack on discharge and is currently taking 10 mg twice daily his last Eliquis dose was early admission morning verified by his daughter. In the emergency room patient was started on a heparin drip. He also has evidence of clot in left subclavian likely related to SVC clot and reduced blood flow. Clot likely related to PICC. Also found to have anemia with Hemoccult negative stools. Anemia present on 1/1/20. He has CKI and inflammation.       Past Medical History:   Diagnosis Date    Diabetes ([address]     DVT of deep femoral vein (HCC)     Foot abscess     Hypertension      Past Surgical History:   Procedure Laterality Date    HX ORTHOPAEDIC      toe amputation       History reviewed. No pertinent family history.   No Known Allergies    Home Medications:       Hospital Medications:     Current Facility-Administered Medications   Medication Dose Route Frequency Provider Last Rate Last Dose    furosemide (LASIX) injection 20 mg  20 mg IntraVENous DAILY Ac Barragan MD        alteplase (CATHFLO) 10 mg in 0.9% sodium chloride 500 mL infusion  1 mg/hr IntraCATHeter- VENous CONTINUOUS Maria Elena Smith MD 50 mL/hr at 01/24/20 0726 1 mg/hr at 01/24/20 0726    heparin 25,000 units in D5W 250 ml infusion  500 Units/hr IntraVENous CONTINUOUS Maria Elena Smith MD 5 mL/hr at 01/24/20 0726 500 Units/hr at 01/24/20 0726    glucose chewable tablet 16 g  4 Tab Oral PRN Agnes Butler MD        glucagon (GLUCAGEN) injection 1 mg  1 mg IntraMUSCular PRN Agnes Butler MD        dextrose 10% infusion 125-250 mL  125-250 mL IntraVENous PRN Agnes Butler  mL/hr at 01/22/20 0717 250 mL at 01/22/20 0762    Saccharomyces boulardii (FLORASTOR) capsule 250 mg  250 mg Oral DAILY Ac Barragan MD   250 mg at 01/23/20 1009    potassium chloride (K-DUR, KLOR-CON) SR tablet 20 mEq  20 mEq Oral DAILY Ac Barragan MD   20 mEq at 01/23/20 1009    enoxaparin (LOVENOX) injection 80 mg  80 mg SubCUTAneous Q12H Sweta Evans PA-C   Stopped at 01/23/20 0100    meropenem (MERREM) 1 g in sterile water (preservative free) 20 mL IV syringe  1 g IntraVENous Q12H Shola Bower MD   1 g at 01/23/20 2248    insulin lispro (HUMALOG) injection   SubCUTAneous AC&HS Agnes Butler MD   4 Units at 01/23/20 2249    oxyCODONE IR (ROXICODONE) tablet 5 mg  5 mg Oral Q4H PRN [place], DO   5 mg at 01/21/20 0914    0.9% sodium chloride infusion 250 mL  250 mL IntraVENous PRN [place], DO        0.9% sodium chloride infusion 250 mL  250 mL IntraVENous PRN Carmel Angeles MD        atorvastatin (LIPITOR) tablet 20 mg  20 mg Oral DAILY Agnes Butler MD   20 mg at 01/23/20 1009    insulin glargine (LANTUS) injection 25 Units  25 Units SubCUTAneous DAILY Jennifer Butler MD   25 Units at 01/23/20 1009    losartan (COZAAR) tablet 100 mg  100 mg Oral DAILY Jennifer Butler MD   100 mg at 01/23/20 1008    polyethylene glycol (MIRALAX) packet 17 g  17 g Oral DAILY Jennifer Butler MD        morphine injection 1 mg  1 mg IntraVENous Q3H PRN Jennifer Butler MD   1 mg at 01/24/20 0004    labetaloL (NORMODYNE;TRANDATE) 20 mg/4 mL (5 mg/mL) injection 20 mg  20 mg IntraVENous QID PRN Jennifer Butler MD   20 mg at 01/24/20 0111    hydrALAZINE (APRESOLINE) 20 mg/mL injection 10 mg  10 mg IntraVENous Q6H PRN Jennifer Butler MD   10 mg at 01/24/20 0539    acetaminophen (TYLENOL) tablet 650 mg  650 mg Oral Q4H PRN Jennifer Butler MD   650 mg at 01/23/20 0210     Facility-Administered Medications Ordered in Other Encounters   Medication Dose Route Frequency Provider Last Rate Last Dose    dextrose 5 % - 0.45% NaCl infusion  50 mL/hr IntraVENous CONTINUOUS Berna De León MD 50 mL/hr at 01/23/20 1507 50 mL/hr at 01/23/20 1507    sodium chloride (NS) flush 10-40 mL  10-40 mL IntraVENous PRN Obinna Herrera MD        heparin (porcine) pf 500 Units  500 Units InterCATHeter PRN Obinna Herrera MD           Review of Systems:   Constitutional:   Fever: Negative, Chills: Negative, Weight Loss: Negative, Malaise/Fatigue: Negative   Diaphoresis: Negative,  Weakness: Negative  Skin:   Rash: Negative,  Itching: Negative  HENT:   Headache:Negative, Hearing Loss: Negative, Tinnitus: Negative, Ear Pain: Negative   Nosebleeds: Negative, Congestion: Negative, Stridor: Negative,   Sore Throat: Negative  Eyes:    Blurred Vision: Negative, Double Vision: Negative, Photophobia: Negative   Eye Pain: Negative, Eye Discharge: Negative, Eye Redness: Negative  Cardiovascular:    Chest Pain: +, Palpitations: Negative, Orthopnea: Negative   Claudication: Negative, Leg Swelling: Negative PND: Negative RUE > LUE swelling  Respiratory:   Cough: Negative, Hemoptysis: Negative, Sputum Production: Negative   Shortness of Breath: Negative, Wheezing: Negative  Gastrointestinal:   Heartburn: Negative, Nausea: Negative, Vomiting: Negative   Abdominal Pain: Negative, Diarrhea: Negative, Constipation: Negative   Blood in Stool: Negative, Melena: Negative   Genitourinary:    Dysuria: Negative, Urgency: Negative, Frequency: Negative   Hematuria: Negative, Flank Pain: Negative  Musculoskeletal:    Myalgias: Negative, Neck Pain: Negative, Back Pain: Negative   Joint Pain: Negative, Falls: Negative  Endo/Heme/Allergies:    Easy Bruise/Blood: Negative, Env. Allergies: Negative, Polydipsia: Negative   Neurological:    Dizziness: Negative, Tingling: Negative. Tremor: Negative,    Sensory Change: Negative. Speech Change: Negative, Focal Weakness: Negative     Seizures: Negative, LOC: Negative  Psychiatric:   Depression: Negative, Suicidal Ideas: Negative, Substance Abuse: Negative   Hallucinations: Negative, Nervous/Anxious: Negative   Insomnia: Negative, Memory Loss: Negative     Physical Assessment:     Visit Vitals  /47   Pulse (!) 111   Temp 98.1 °F (36.7 °C)   Resp 23   Ht 5' 7\" (1.702 m)   Wt 76.1 kg (167 lb 11.2 oz)   SpO2 97%   BMI 26.27 kg/m²       Temp (24hrs), Av.5 °F (36.9 °C), Min:98.1 °F (36.7 °C), Max:99.1 °F (37.3 °C)    Sedated after ativan,  lying flat  No palpable adenopathy  Reduced breath sounds bilaterally without wheeze  Heart RR without M/G  Abdomen soft no HSM, no palpable masses  Extremities swelling LUE with tenderness along venous sites. RUE also swollen but less than left. Right foot dressing clean and dry not removed. Reduced pulses bilaterally.     Labs:  Recent Labs     20  0346 20  2205 20  1103 20  0325   WBC 8.1 7.9  --  10.5   RBC 2.48* 2.53*  --  2.40*   HCT 22.2* 22.6* 22.5* 21.3*   MCV 89.5 89.3  --  88.8   MCH 29.4 29.6  --  29.6   MCHC 32.9 33.2  --  33.3   RDW 14.1 14.1  --  14.2       Recent Labs     01/24/20  0346 01/23/20  0325 01/22/20  0418   CO2 30 31 26   BUN 21* 20* 19*   Results for Chantelle Russell (MRN [id number]) as of 1/22/2020 07:14   Ref. Range 1/20/2020 05:10 1/21/2020 03:45 1/21/2020 14:45 1/21/2020 22:50 1/22/2020 04:18   WBC Latest Ref Range: 4.6 - 13.2 K/uL 7.4 7.7   10.3   RBC Latest Ref Range: 4.70 - 5.50 M/uL 2.65 (L) 2.58 (L)   2.66 (L)   HGB Latest Ref Range: 13.0 - 16.0 g/dL 7.8 (L) 7.5 (L) 8.1 (L) 7.5 (L) 7.9 (L)   HCT Latest Ref Range: 36.0 - 48.0 % 23.4 (L) 22.7 (L) 24.2 (L) 22.3 (L) 23.5 (L)   MCV Latest Ref Range: 74.0 - 97.0 FL 88.3 88.0   88.3   MCH Latest Ref Range: 24.0 - 34.0 PG 29.4 29.1   29.7   MCHC Latest Ref Range: 31.0 - 37.0 g/dL 33.3 33.0   33.6   RDW Latest Ref Range: 11.6 - 14.5 % 14.3 14.3   14.1   PLATELET Latest Ref Range: 135 - 420 K/uL 300 328   326   MPV Latest Ref Range: 9.2 - 11.8 FL 8.9 (L) 9.1 (L)   8.7 (L)   NEUTROPHILS Latest Ref Range: 40 - 73 % 69 69   71   LYMPHOCYTES Latest Ref Range: 21 - 52 % 14 (L) 15 (L)   14 (L)   MONOCYTES Latest Ref Range: 3 - 10 % 14 (H) 14 (H)   15 (H)   EOSINOPHILS Latest Ref Range: 0 - 5 % 2 1   0   BASOPHILS Latest Ref Range: 0 - 2 % 1 1   0   DF Latest Units:   AUTOMATED AUTOMATED   AUTOMATED   ABS. NEUTROPHILS Latest Ref Range: 1.8 - 8.0 K/UL 5.1 5.3   7.4   ABS. LYMPHOCYTES Latest Ref Range: 0.9 - 3.6 K/UL 1.0 1.2   1.4   ABS. MONOCYTES Latest Ref Range: 0.05 - 1.2 K/UL 1.1 1.0   1.5 (H)   ABS. EOSINOPHILS Latest Ref Range: 0.0 - 0.4 K/UL 0.1 0.1   0.0   ABS. BASOPHILS Latest Ref Range: 0.0 - 0.1 K/UL 0.1 0.0   0.0   Results for Chantelle Russell (MRN [id number]) as of 1/22/2020 07:14   Ref.  Range 1/20/2020 05:10 1/21/2020 03:45 1/22/2020 04:18   Sodium Latest Ref Range: 136 - 145 mmol/L 140 139 139   Potassium Latest Ref Range: 3.5 - 5.5 mmol/L 4.0 3.9 3.3 (L)   Chloride Latest Ref Range: 100 - 111 mmol/L 107 106 106   CO2 Latest Ref Range: 21 - 32 mmol/L 28 24 26   Anion gap Latest Ref Range: 3.0 - 18 mmol/L 5 9 7   Glucose Latest Ref Range: 74 - 99 mg/dL 88 135 (H) 56 (L)   BUN Latest Ref Range: 7.0 - 18 MG/DL 21 (H) 22 (H) 19 (H)   Creatinine Latest Ref Range: 0.6 - 1.3 MG/DL 2.02 (H) 1.90 (H) 1.55 (H)   BUN/Creatinine ratio Latest Ref Range: 12 - 20   10 (L) 12 12   Calcium Latest Ref Range: 8.5 - 10.1 MG/DL 8.0 (L) 7.9 (L) 7.9 (L)   Ionized Calcium Latest Ref Range: 1.12 - 1.32 MMOL/L 1.10 (L) 1.06 (L) 1.10 (L)   Phosphorus Latest Ref Range: 2.5 - 4.9 MG/DL 3.5 3.0 2.4 (L)   Magnesium Latest Ref Range: 1.6 - 2.6 mg/dL 2.2 2.0 1.8   GFR est non-AA Latest Ref Range: >60 ml/min/1.73m2 32 (L) 35 (L) 44 (L)   GFR est AA Latest Ref Range: >60 ml/min/1.73m2 39 (L) 42 (L) 53 (L)   Bilirubin, total Latest Ref Range: 0.2 - 1.0 MG/DL 1.3 (H) 1.1 (H) 1.4 (H)   Protein, total Latest Ref Range: 6.4 - 8.2 g/dL 6.4 6.3 (L) 6.6   Albumin Latest Ref Range: 3.4 - 5.0 g/dL 1.7 (L) 1.7 (L) 1.8 (L)   Globulin Latest Ref Range: 2.0 - 4.0 g/dL 4.7 (H) 4.6 (H) 4.8 (H)   A-G Ratio Latest Ref Range: 0.8 - 1.7   0.4 (L) 0.4 (L) 0.4 (L)   ALT (SGPT) Latest Ref Range: 16 - 61 U/L 19 18 17   AST Latest Ref Range: 10 - 38 U/L 29 23 25   Alk. phosphatase Latest Ref Range: 45 - 117 U/L 77 91 85       · Distal segment of the right posterior tibial artery has no color flow and doppler signal but at the ankle posterior tibial is patent with color flow and doppler signal.  · No color flow or doppler signal noted in the left anterior tibial artery. · All the rest of the vessels in the bilateral lower extremity document normal flow. No evidence of significant peripheral arterial disease at rest in the lower extremity bilaterally. The right ankle/brachial index is 1.17   The left ankle/brachial index is 1.29   · Non occlusive thrombus present in the right posterior tibial veins. · No evidence of deep vein thrombosis in the left lower extremity veins assessed.   ·  Acute appearing non occlusive thrombus noted in the right internal jugular, subclavian, axillary and brachial vein(s). · Non occlusive superficial thrombophlebitis noted within the right basilic vein. No evidence of DVT in the contralateral internal jugular and proximal subclavian vein. · Acute appearing thrombus noted in the contralateral right proximal internal jugular and subclavian vein(s) which is no different than the privious scan. · Acute appearing thrombus noted in the left proximal subclavian vein(s). CTA:   1.  Redemonstration of thrombus surrounding the included portions of the right  PICC line with accompanying occlusion of the superior vena cava above the level  of the brachiocephalic venous confluence.     >  Partial thrombosis of the left subclavian vein, with significant narrowing  of the proximal portion of the left subclavian vein and adjacent brachiocephalic  vein. Progressive edema surrounding the left subclavian neurovascular bundle  noted in the interval from preceding CT angiogram chest 1/18/2020.     2. No evidence of pulmonary embolism.     3. Moderately large right and moderate-sized left pleural effusions which have  increased in the interval from prior chest CT.     4. Basilar areas of compressive atelectasis and presumptive area of rounded  atelectasis at the posterior right upper lobe without change. Attention on  follow-up imaging recommended.     5. Progressive body wall edema. CT a,p, non con  1. Moderate right and small left pleural effusions similar to prior  examination.     2. No evidence of intra-abdominal or pelvic hematoma. Specifically, no evidence  of retroperitoneal hematoma.     3.  Unchanged, nonspecific perinephric stranding without evidence of  hydronephrosis.     4. Distal descending and sigmoid colonic diverticulosis without findings to  suggest diverticulitis.     Edgar Mooney MD, [address] [place] [address]    [phone]

## 2020-01-24 NOTE — PROGRESS NOTES
[facility]  Pulmonary, Critical Care, and Sleep Medicine    Name: Silvina Josue MRN: [id number]   : 1945 Hospital: [facility] [place]   Date: 2020        Pulmonary Critical Care Note    IMPRESSION:   Patient Active Problem List   Diagnosis Code    Sepsis ([address] A41.9    Sepsis due to Streptococcus Grp B ([address] recently 2' to # 3 A40.1    Osteomyelitis of right foot ([address] M86.9    Acute deep vein thrombosis (DVT) ([address] I82.409    Acute on chronic anemia D64.9    CKD (chronic kidney disease) N18.9    Type 2 diabetes mellitus, with long-term current use of insulin ([address] E11.9, Z79.4    Hypertension I10    Hypokalemia E87.6    Diabetic ulcer of right midfoot associated with type 2 diabetes mellitus, with fat layer exposed ([address] E11.621, L97.412    PAD (peripheral artery disease) ([facility]) I73.9    Moderate-severe protein calorie malnutrition E44.0    Hypoalbuminemia E88.09    Hard of hearing H91.90 ·      RECOMMENDATIONS:   Respiratory:  Compensated respiratory status; on room air. Goal SPO2> 91%  AMS and so he is at risk for aspiration. HOB more than 30 degrees all the time and strict aspiration precautions. R>L pleural effusion, worsening on repeat CT, could be due to SVC obstruction with thrombus/DVT. Thoracentesis for cytology was planned, which has been held due to TPA plus Heparin. Aspiration prevention bundle, head of the bed at 30' all times, pulmonary hygiene care    Infectious disease:  On 2020, he was discharged home on ertapenem IV with RUE PICC line for right foot abscess and osteomyelitis and bacteremia with Streptococcus agalactia. MRI right foot consistent with osteomyelitis of fifth metatarsal.  CRP 22+ on 2020. Blood culture 2020: Negative. Wound culture 2020: Negative. Antibiotics: Barber Creamer was started on 2020 with plan to continue for 42 days but develop iatrogenic extensive DVT due to PICC line.   PICC line removed by vascular surgeon 1/23/2020. Meropenem changed to Augmentin on 1/24/2020. Follow-up cultures. Defer antibiotics to ID, D/w Dr. Garland [facility]. Cardiovascular: (Iatrogenic RUE DVT extending into right IJ/subclavian/axillary/brachial with SVC obstruction, now left proximal subclavian DVT)  On 1/14/2020, he was discharged home on Eliquis for DVT. CTA on admission 1/18/2020: No PE but extensive RUE DVT secondary to PICC line. Mabeline Sink PVL RUE 1/18/2020: Acute nonocclusive thrombus in right IJ, subclavian, axillary and brachial veins. Nonocclusive superficial thrombophlebitis in the right basilic vein. PVL LUE 1/21/2020: Acute appearing thrombus in left proximal subclavian. S/p vascular procedure 1/23/2020 with PICC line removal and sheath placement with TPA and heparin infusion. Defer treatment, anticoagulation, duration etc. to vascular surgeon. Had elevated blood pressure overnight due to not able to take p.o. medication while confused and so required labetalol. Continue hydralazine and labetalol as needed. When able to take p.o. medication then restart Cozaar, Lasix. Hematologic: Received total 2 PRBCs since admission due to anemia. No active external bleeding noted. Continue to monitor H&H closely. Received IV iron. Defer to hematologist.    Renal: JC/CKD, monitor closely. Making good urine output, monitor. Avoid nephrotoxic medications. May change Lasix to IV if not able to take p.o. He is on daily 20 mEq KCl p.o. but not able to take p.o. and so will replace IV per protocol. Endocrine:   Continue Lantus and Humalog sliding scale. Maintain glycemic control. Monitor for any hypoglycemic spells since not able to take p.o. while patient has confusion. GI: Stool occult negative 1/18/2020. No active external bleeding. CNS:   Confused, likely metabolic encephalopathy or Ativan received for vascular procedure on 1/23/2020. MRI brain unremarkable for anything acute. Continue with sitter.     Nutrition: N.p.o. while patient is confused. Will defer respective systems problem management to primary and other consultant and follow patient in ICU with primary and other medical team  Further recommendations will be based on the patient's response to recommended treatment and results of the investigation ordered. Quality Care: PPI, DVT prophylaxis, HOB elevated, Infection control all reviewed and addressed. PAIN AND SEDATION: RT foot, RT arm  · Skin/Wound: RT foot ulcer, under dressing. · Nutrition: diet  · Prophylaxis: DVT and GI Prophylaxis reviewed. On DVT Rx with heparin drip. · PT/OT eval and treat: as needed   · Lines/Tubes: lines RUE PICC line removed 1/23/20. Right arm vascular sheath 1/23/20  ADVANCE DIRECTIVE: full code  DISCUSSION: discussed with patient, RN, ICU staff, MDR. Events and notes from last 24 hours reviewed. Care plan discussed with nursing     Total critical care time excluding procedures and family discussion: 37 minutes. Subjective/History:   Mr. Philly Vega with HTN, DM, CKD, PAD, anemia, right foot fifth metatarsal osteomyelitis and Streptococcus agalactia bacteremia in 01/2020 discharged home on IV ertapenem with RUE PICC line, RUE DVT secondary to PICC line extending into right IJ/subclavian/axillary/basilic vein with SVC obstruction leading to RUE edema, later developed left subclavian DVT, underwent PICC line removal and placement of vascular catheter in RUE and treatment of DVT with TPA+ Heparin 1/23/2020. Due to PICC line leading to iatrogenic DVT, IV ertapenem changed to Augmentin by ID on 1/24/2020.   Right > left pleural effusion on CT chest.    Vascular Procedure 1/23/20:  Removal of right basilic vein PICC line, fluoroscopic insertion of 0.18 wire to right atrium, placement of 6 French sheath over wire into basilic vein, placement of thrombolytic UniFuse infusion catheter in right axillary, subclavian, innominate veins and superior vena cava    1/24/2020   Transferred to ICU after vascular procedure on 1/23/2020. Remain in ICU. Right arm sheath in place with heparin and TPA infusion. Vascular protocol being followed. Confused after receiving Ativan for the vascular procedure on 1/23/2020. Sitter at bedside. Drowsy and sleepy but wakes up on verbal command and touch, moving all extremities. Potassium being replaced. Discussed with ID who change antibiotics to Augmentin. Blood pressure was slightly elevated while not able to take p.o. antihypertensive medication, received labetalol overnight PRN. [facility]. No other overnight issues reported. Review of Systems:  Review of systems not obtained due to patient factors. Available information noted in HPI              Latest lactic acid:   Lactic acid   Date Value Ref Range Status   01/18/2020 1.5 0.4 - 2.0 MMOL/L Final   01/02/2020 1.3 0.4 - 2.0 MMOL/L Final       Past Medical History:  Past Medical History:   Diagnosis Date    Diabetes ([facility]     DVT of deep femoral vein ([facility]     Foot abscess     Hypertension         Past Surgical History:  Past Surgical History:   Procedure Laterality Date    HX ORTHOPAEDIC      toe amputation        Medications:  Prior to Admission medications    Medication Sig Start Date End Date Taking? Authorizing Provider   LORazepam (ATIVAN) 1 mg tablet Take 1 mg by mouth every four (4) hours as needed for Anxiety (one tab prior to hyperbaric oxygen treatment). Provider, Historical   apixaban (ELIQUIS) 5 mg (74 tabs) starter pack Take 10 mg (two 5 mg tablets) by mouth twice a day for 7 days   Followed by 5 mg (one 5 mg tablet) by mouth twice a day 1/14/20   Kevan Chowdhury MD   ertapenem 1 gram 1 g IVPB 1 g by IntraVENous route every twenty-four (24) hours. 1/13/20   Kevan Chowdhury MD   insulin glargine (LANTUS SOLOSTAR U-100 INSULIN) 100 unit/mL (3 mL) inpn 25 Units by SubCUTAneous route.     Mishel Barlow MD   metFORMIN ER (GLUCOPHAGE XR) 750 mg tablet Take 750 mg by mouth daily. Mishel Barlow MD   atorvastatin (LIPITOR) 20 mg tablet Take 20 mg by mouth daily. Mishel Bralow MD   aspirin 81 mg chewable tablet Take 81 mg by mouth daily. Mishel Barlow MD   polyethylene glycol (MIRALAX) 17 gram/dose powder Take 17 g by mouth daily. 1 tablespoon with 8 oz of water daily 12/10/19   Mynor Gao MD   LOSARTAN PO Take 100 mg by mouth. Mishel Barlow MD       Current Facility-Administered Medications   Medication Dose Route Frequency    amoxicillin-clavulanate (AUGMENTIN) 875-125 mg per tablet 1 Tab  1 Tab Oral Q12H    furosemide (LASIX) injection 20 mg  20 mg IntraVENous DAILY    alteplase (CATHFLO) 10 mg in 0.9% sodium chloride 500 mL infusion  1 mg/hr IntraCATHeter- VENous CONTINUOUS    heparin 25,000 units in D5W 250 ml infusion  500 Units/hr IntraVENous CONTINUOUS    Saccharomyces boulardii (FLORASTOR) capsule 250 mg  250 mg Oral DAILY    potassium chloride (K-DUR, KLOR-CON) SR tablet 20 mEq  20 mEq Oral DAILY    enoxaparin (LOVENOX) injection 80 mg  80 mg SubCUTAneous Q12H    insulin lispro (HUMALOG) injection   SubCUTAneous AC&HS    atorvastatin (LIPITOR) tablet 20 mg  20 mg Oral DAILY    insulin glargine (LANTUS) injection 25 Units  25 Units SubCUTAneous DAILY    losartan (COZAAR) tablet 100 mg  100 mg Oral DAILY    polyethylene glycol (MIRALAX) packet 17 g  17 g Oral DAILY       Allergy:  No Known Allergies     Social History:  Social History     Tobacco Use    Smoking status: Never Smoker    Smokeless tobacco: Never Used   Substance Use Topics    Alcohol use: No    Drug use: No        Family History:  History reviewed. No pertinent family history. Objective:   Vital Signs:    Blood pressure 156/42, pulse 91, temperature 98.5 °F (36.9 °C), resp. rate 19, height 5' 7\" (1.702 m), weight 76.1 kg (167 lb 11.2 oz), SpO2 98 %. Body mass index is 26.27 kg/m².    O2 Device: Room air   O2 Flow Rate (L/min): 3 l/min   Temp (24hrs), Av.5 °F (36.9 °C), Min:98.1 °F (36.7 °C), Max:99.1 °F (37.3 °C)         Intake/Output:   Last shift:      01/24 0701 - 01/24 1900  In: 743.3 [I.V.:743.3]  Out: -   Last 3 shifts: 01/22 1901 - 01/24 0700  In: 255.5 [I.V.:255.5]  Out: 800 [Urine:800]    Intake/Output Summary (Last 24 hours) at 1/24/2020 1048  Last data filed at 1/24/2020 0705  Gross per 24 hour   Intake 998.76 ml   Output 800 ml   Net 198.76 ml       Physical Exam:  General/Neurology: Drowsy, confused. Opens eyes on touch and verbal command. Moving all 4 extremities. Head:   Normocephalic, without obvious abnormality, atraumatic. Eye:   PERRL, EOM intact, no scleral icterus, no pallor, no cyanosis. Nose:   No sinus tenderness, no erythema, no induration, no discharge  Throat:  No oral thrush. Neck:   Supple, symmetric. No lymphadenopathy. Trachea midline  Lung:   No air entry at bases. Moderate air entry anteriorly. No rhonchi. No wheezing. No stridors. No prolongded expiration. No accessory muscle use. Heart:   S1 S2 present. No murmur. No JVD. Abdomen:  Soft. NT. ND. +BS. No masses. Extremities:  RUE mild edema. Right foot ulcer under dressing. No cyanosis. No clubbing. Pulses: 2+ and symmetric in DP. Lymphatic:  No cervical or supraclavicular palpable lymphadenopathy.      Data:     Recent Results (from the past 24 hour(s))   HGB & HCT    Collection Time: 01/23/20 11:03 AM   Result Value Ref Range    HGB 7.5 (L) 13.0 - 16.0 g/dL    HCT 22.5 (L) 36.0 - 48.0 %   GLUCOSE, POC    Collection Time: 01/23/20 11:40 AM   Result Value Ref Range    Glucose (POC) 160 (H) 70 - 110 mg/dL   PROTHROMBIN TIME + INR    Collection Time: 01/23/20 12:00 PM   Result Value Ref Range    Prothrombin time 16.5 (H) 11.5 - 15.2 sec    INR 1.4 (H) 0.8 - 1.2     PLATELET COUNT    Collection Time: 01/23/20  4:15 PM   Result Value Ref Range    PLATELET 621 078 - 693 K/uL   PROTHROMBIN TIME + INR    Collection Time: 01/23/20  4:15 PM   Result Value Ref Range    Prothrombin time 16. 4 (H) 11.5 - 15.2 sec    INR 1.3 (H) 0.8 - 1.2     FIBRINOGEN    Collection Time: 01/23/20  4:15 PM   Result Value Ref Range    Fibrinogen 612 (H) 210 - 451 mg/dL   GLUCOSE, POC    Collection Time: 01/23/20  5:16 PM   Result Value Ref Range    Glucose (POC) 144 (H) 70 - 110 mg/dL   CBC W/O DIFF    Collection Time: 01/23/20 10:05 PM   Result Value Ref Range    WBC 7.9 4.6 - 13.2 K/uL    RBC 2.53 (L) 4.70 - 5.50 M/uL    HGB 7.5 (L) 13.0 - 16.0 g/dL    HCT 22.6 (L) 36.0 - 48.0 %    MCV 89.3 74.0 - 97.0 FL    MCH 29.6 24.0 - 34.0 PG    MCHC 33.2 31.0 - 37.0 g/dL    RDW 14.1 11.6 - 14.5 %    PLATELET 317 877 - 187 K/uL    MPV 8.7 (L) 9.2 - 11.8 FL   FIBRINOGEN    Collection Time: 01/23/20 10:05 PM   Result Value Ref Range    Fibrinogen 593 (H) 210 - 451 mg/dL   PTT    Collection Time: 01/23/20 10:05 PM   Result Value Ref Range    aPTT 48.2 (H) 23.0 - 36.4 SEC   PROTHROMBIN TIME + INR    Collection Time: 01/23/20 10:05 PM   Result Value Ref Range    Prothrombin time 16.2 (H) 11.5 - 15.2 sec    INR 1.3 (H) 0.8 - 1.2     GLUCOSE, POC    Collection Time: 01/23/20 10:31 PM   Result Value Ref Range    Glucose (POC) 211 (H) 70 - 687 mg/dL   METABOLIC PANEL, BASIC    Collection Time: 01/24/20  3:46 AM   Result Value Ref Range    Sodium 141 136 - 145 mmol/L    Potassium 3.3 (L) 3.5 - 5.5 mmol/L    Chloride 104 100 - 111 mmol/L    CO2 30 21 - 32 mmol/L    Anion gap 7 3.0 - 18 mmol/L    Glucose 151 (H) 74 - 99 mg/dL    BUN 21 (H) 7.0 - 18 MG/DL    Creatinine 1.46 (H) 0.6 - 1.3 MG/DL    BUN/Creatinine ratio 14 12 - 20      GFR est AA 57 (L) >60 ml/min/1.73m2    GFR est non-AA 47 (L) >60 ml/min/1.73m2    Calcium 8.1 (L) 8.5 - 10.1 MG/DL   CBC WITH AUTOMATED DIFF    Collection Time: 01/24/20  3:46 AM   Result Value Ref Range    WBC 8.1 4.6 - 13.2 K/uL    RBC 2.48 (L) 4.70 - 5.50 M/uL    HGB 7.3 (L) 13.0 - 16.0 g/dL    HCT 22.2 (L) 36.0 - 48.0 %    MCV 89.5 74.0 - 97.0 FL    MCH 29.4 24.0 - 34.0 PG    MCHC 32.9 31.0 - 37.0 g/dL RDW 14.1 11.6 - 14.5 %    PLATELET 032 248 - 083 K/uL    MPV 8.9 (L) 9.2 - 11.8 FL    NEUTROPHILS 70 40 - 73 %    LYMPHOCYTES 16 (L) 21 - 52 %    MONOCYTES 13 (H) 3 - 10 %    EOSINOPHILS 1 0 - 5 %    BASOPHILS 0 0 - 2 %    ABS. NEUTROPHILS 5.6 1.8 - 8.0 K/UL    ABS. LYMPHOCYTES 1.3 0.9 - 3.6 K/UL    ABS. MONOCYTES 1.1 0.05 - 1.2 K/UL    ABS. EOSINOPHILS 0.1 0.0 - 0.4 K/UL    ABS. BASOPHILS 0.0 0.0 - 0.1 K/UL    RBC COMMENTS        ROULEAUX  SLIGHT  HYPOCHROMIA  SLIGHT  POLYCHROMASIA  SLIGHT  POIKILOCYTOSIS  SLIGHT      WBC COMMENTS SMEAR SCANNED      DF AUTOMATED     PROTHROMBIN TIME + INR    Collection Time: 01/24/20  3:46 AM   Result Value Ref Range    Prothrombin time 16.1 (H) 11.5 - 15.2 sec    INR 1.3 (H) 0.8 - 1.2     FIBRINOGEN    Collection Time: 01/24/20  3:46 AM   Result Value Ref Range    Fibrinogen 530 (H) 210 - 451 mg/dL   GLUCOSE, POC    Collection Time: 01/24/20  8:52 AM   Result Value Ref Range    Glucose (POC) 261 (H) 70 - 110 mg/dL           No results for input(s): FIO2I, IFO2, HCO3I, IHCO3, HCOPOC, PCO2I, PCOPOC, IPHI, PHI, PHPOC, PO2I, PO2POC in the last 72 hours.     No lab exists for component: IPOC2    All Micro Results     Procedure Component Value Units Date/Time    CULTURE, BLOOD [[id number]] Collected:  01/18/20 1355    Order Status:  Completed Specimen:  Blood Updated:  01/24/20 0820     Special Requests: NO SPECIAL REQUESTS        Culture result: NO GROWTH 6 DAYS       CULTURE, BLOOD [[id number]] Collected:  01/18/20 1518    Order Status:  Completed Specimen:  Blood Updated:  01/24/20 0820     Special Requests: NO SPECIAL REQUESTS        Culture result: NO GROWTH 6 DAYS       CULTURE, Russa Crape STAIN [[id number]] Collected:  01/18/20 2015    Order Status:  Completed Specimen:  Wound from Foot Updated:  01/22/20 0836     Special Requests: NO SPECIAL REQUESTS        GRAM STAIN NO WBC'S SEEN         NO ORGANISMS SEEN        Culture result: NO GROWTH 3 DAYS       STREP PNEUMO AG, URINE [[id number]] Collected:  01/18/20 0000    Order Status:  Completed Specimen:  Urine, random Updated:  01/19/20 1629     Strep pneumo Ag, urine NEGATIVE        LEGIONELLA PNEUMOPHILA AG, URINE [[id number]] Collected:  01/18/20 0000    Order Status:  Completed Specimen:  Urine, random Updated:  01/19/20 1629     Legionella Ag, urine NEGATIVE        INFLUENZA A & B AG (RAPID TEST) [[id number]] Collected:  01/18/20 1630    Order Status:  Completed Specimen:  Nasopharyngeal from Nasal washing Updated:  01/18/20 1653     Influenza A Antigen NEGATIVE         Comment: A negative result does not exclude influenza virus infection, seasonal or H1N1 due to suboptimal sensitivity. If influenza is circulating in your community, a diagnosis of influenza should be considered based on a patients clinical presentation and empiric antiviral treatment should be considered, if indicated. Influenza B Antigen NEGATIVE        INFLUENZA A & B AG (RAPID TEST) [[id number]]     Order Status:  Canceled Specimen:  Nasopharyngeal           Telemetry: normal sinus rhythm    1/5/20 ECHO  · Left Ventricle: Normal cavity size and systolic function (ejection fraction normal). Mild concentric hypertrophy . The estimated ejection fraction is 55 - 60%. There is mild (grade 1) left ventricular diastolic dysfunction Y/N'CTEDM=1.22.  · Tricuspid Valve: Normal valve structure and no stenosis. Trace regurgitation. Pulmonary arterial systolic pressure is 27.5 mmHg. PVL RUE 1/18/20:  · Acute appearing non occlusive thrombus noted in the right internal jugular, subclavian, axillary and brachial vein(s). · Non occlusive superficial thrombophlebitis noted within the right basilic vein. · No evidence of DVT in the contralateral internal jugular and proximal subclavian vein. PVL LUE 1/21/20:  · Acute appearing thrombus noted in the contralateral right proximal internal jugular and subclavian vein(s) which is no different than the privious scan.   · Acute appearing thrombus noted in the left proximal subclavian vein(s). MRI brain 1/21/20:  1. Mild atrophy and chronic small vessel changes. 2.  Old high left frontal craniotomy. 3.  Nonspecific fluid and/or mucosal thickening in the mastoid air cells, right  greater than left. 4.  Otherwise, unremarkable evaluation. Specifically, no acute intracranial  abnormalities are present. MRI right ankle 1/20/20:  1. No bone marrow signal abnormalities within the imaged ankle, hindfoot, or  midfoot to suggest osteomyelitis. No evidence of fracture, stress fracture, or  marrow stress reaction. 2. Degenerative changes as above. 3. Considerable tendinosis of the peroneal tendons with longitudinal split  tearing of the peroneus brevis tendon. Mild accompanying shared peroneal tendon  sheath tenosynovitis. 4. Diffusely abnormal intrinsic muscle bulk and signal typical for changes  related to sequela of subacute denervation. MRI right foot 1/20/20:  1. Postoperative changes from fifth metatarsal amputation without concomitant  marrow signal abnormality identified to suggest osteomyelitis. 2. No evidence of fracture, stress fracture, or marrow stress reaction. 3. Very mild right first MTP joint chondrosis. 4. Diffusely abnormal intrinsic muscle bulk and signal in keeping with sequela  of subacute denervation. Imaging:  [x]I have personally reviewed the patients chest radiographs images and report     Results from Hospital Encounter encounter on 01/18/20   XR CHEST PORT    Narrative EXAM: CHEST RADIOGRAPH    CLINICAL INDICATION/HISTORY: tachy  -Additional: None    COMPARISON: January 15, 2020    TECHNIQUE: Portable frontal view of the chest    _______________    FINDINGS:    SUPPORT DEVICES: A right upper extremity PICC is present. HEART AND MEDIASTINUM: No appreciable cardiomegaly. Remaining mediastinal  contours within normal limits.     LUNGS AND PLEURAL SPACES: No consolidation, mass, or pleural effusion. BONY THORAX AND SOFT TISSUES: Unremarkable.    _______________      Impression IMPRESSION:    No active cardiopulmonary disease. Results from [place] encounter on 01/18/20   CTA CHEST W OR W WO CONT    Narrative EXAM: CTA Chest    INDICATION: Known superior vena cava thrombus, left subclavian venous  thrombosis. Evaluation for clot propagation requested. COMPARISON: Several prior exams, most recently CT angiogram of the chest  performed 1/18/2020    TECHNIQUE: Axial CT imaging from the thoracic inlet through the diaphragm with  intravenous contrast utilizing CTA study for pulmonary artery evaluation. Coronal and sagittal MIP reformations were generated at a separate workstation. One or more dose reduction techniques were used on this CT: automated exposure  control, adjustment of the mAs and/or kVp according to patient size, and  iterative reconstruction techniques. The specific techniques used on this CT  exam have been documented in the patient's electronic medical record. Digital  Imaging and Communications in Medicine (DICOM) format image data are available  to nonaffiliated external healthcare facilities or entities on a secure, media  free, reciprocally searchable basis with patient authorization for at least a  12-month period after this study. _______________    FINDINGS:    EXAM QUALITY: Overall exam quality is adequate. Pulmonary arterial enhancement  is adequate. The breath hold is satisfactory. PULMONARY ARTERIES: No evidence of pulmonary embolism. Main pulmonary arterial  size remains within normal limits. MEDIASTINUM: Included thyroid gland unremarkable. As previously, stranding  surrounding the right PICC line throughout its visualized course in the included  right arm, right axilla, and right subclavian regions is noted.  There is an  essentially unchanged configuration to clot within the superior vena cava, with  luminal narrowing extending just below the level of the sternomanubrial  articulation. Contrast injected from the left arm is noted to extend through a  significantly narrowed proximal left subclavian vein and adjacent  brachiocephalic venous confluence, with recruitment of collaterals along the  external jugular venous pathway. Fairly significant stranding surrounds the  subclavian neurovascular bundles bilaterally, which is unchanged on the right  and newly present on the left. The cardiac size is normal. There is a small circumferential pericardial  effusion. Thoracic aorta is of normal course and caliber. LYMPH NODES: No supraclavicular, axilla, mediastinal lymph node enlargement is  present, with an increased number of subcentimeter nodes present across the  axilla and subpectoral regions bilaterally, very likely reactive. AIRWAY: Central airways are patent. LUNGS: Basilar areas of atelectasis noted. No significant groundglass  abnormality or septal line thickening. Rounded area of opacity at the posterior  right upper lobe noted, similar to prior. Biapical pleural parenchymal scar  formation. PLEURA: There are bilateral pleural effusions, moderately large on the right and  moderate size on the left, both appearing increased from comparison CT angiogram  1/18/2020. UPPER ABDOMEN: Included upper abdomen demonstrates no acute abnormality. Focal  fat deposition near the falciform ligament is present. .    OTHER: No acute or aggressive osseous abnormalities identified. There is progressive body wall edema noted in the interval.    _______________      Impression IMPRESSION:    1.   Redemonstration of thrombus surrounding the included portions of the right  PICC line with accompanying occlusion of the superior vena cava above the level  of the brachiocephalic venous confluence.     >  Partial thrombosis of the left subclavian vein, with significant narrowing  of the proximal portion of the left subclavian vein and adjacent brachiocephalic  vein. Progressive edema surrounding the left subclavian neurovascular bundle  noted in the interval from preceding CT angiogram chest 1/18/2020.    2. No evidence of pulmonary embolism. 3. Moderately large right and moderate-sized left pleural effusions which have  increased in the interval from prior chest CT. 4. Basilar areas of compressive atelectasis and presumptive area of rounded  atelectasis at the posterior right upper lobe without change. Attention on  follow-up imaging recommended. 5. Progressive body wall edema.          Rosalinda Barney MD   1/24/2020

## 2020-01-24 NOTE — PROGRESS NOTES
Hospitalist Progress Note    Patient: Calos Husbands MRN: [id number]  [facility]: [id number]    YOB: 1945  Age: 76 y.o.   Sex: male    DOA: 1/18/2020 LOS:  LOS: 6 days          Chief Complaint:    DVTs      Assessment/Plan     77 yo male with recent surgery for osteo in foot and on course of IV abx came back with arm swelling, extensive DVT despite eliquis therapy    Bilateral Upper extremities-and lower extremity DVT present on admission  Resume lovenox when done with thrombolysis  Vascular and  Hematology on case    Recent foot surgery for osteo and diabetic infection    Vascular moved forward with thrombolysis yesterday, thoracentesis put on hold for now-consider as outpatient   Patient in ICU recovering with lysis procedure     Metabolic encephalopathy -still confused at times, sleep deprived, received seroquel last night  MRI brain done -no acute issue      Hypoglycemia-resolved    Hypokalemia-added K this am      NOW changed to PO augmentin for foot infection per ID,so NO picc planned, it is removed     Anemia acute on chronic   transfused 2 unit prbc, following H&H  Iron is 13 (low)  ferrlicit IVgiven 2/74  Occult negative 1/18     HTN -stable     Severe prot olivia malnutrition     IDDM with vascular complications     Alfonso on CKD stage 3     No sepsis on admission, fever likely due to thrombotic burden, cultures are negative, he was already on IV abx for his foot     Consider SNF on d/c    Observe in ICU today    monitor mental status  Disposition :  Patient Active Problem List   Diagnosis Code    Stage 3 chronic kidney disease ([address] N18.3    Type 2 diabetes mellitus, with long-term current use of insulin ([address] E11.9, Z79.4    Hypertension I10    Hypokalemia E87.6    Foot abscess, right L02.611    Diabetic ulcer of right midfoot associated with type 2 diabetes mellitus, with fat layer exposed ([address] V42.737, L97.412    PAD (peripheral artery disease) ([address] I73.9    Osteomyelitis of right foot ([facility] M86.9    Acute deep vein thrombosis (DVT) (HCC) I82.409    DVT (deep venous thrombosis) (HCC) I82.409    Anemia D64.9    Lung infiltrate R91.8       Subjective:    He is sleeping  Was agitated last night so given medicine to help him sleep  No new issue  Thrombolysis per vasc surgery    picc removed    Review of systems:    UTO      Vital signs/Intake and Output:  Visit Vitals  /47   Pulse (!) 111   Temp 98.1 °F (36.7 °C)   Resp 23   Ht 5' 7\" (1.702 m)   Wt 76.1 kg (167 lb 11.2 oz)   SpO2 97%   BMI 26.27 kg/m²     Current Shift:  01/24 0701 - 01/24 1900  In: 743.3 [I.V.:743.3]  Out: -   Last three shifts:  01/22 1901 - 01/24 0700  In: 255.5 [I.V.:255.5]  Out: 800 [Urine:800]    Exam:    General:somnolent Wm, NAD  Head/Neck: NCAT, supple, No masses, No lymphadenopathy  CVS:Regular rate and rhythm, no M/R/G, S1/S2 heard, no thrill  Lungs:Clear to auscultation bilaterally, no wheezes, rhonchi, or rales  Abdomen: Soft, Nontender, No distention, Normal Bowel sounds, No hepatomegaly  Extremities: reduced edema UE BL  Neuro:somnolent                Labs: Results:       Chemistry Recent Labs     01/24/20  0346 01/23/20  0325 01/22/20  0418   * 98 56*    139 139   K 3.3* 3.7 3.3*    104 106   CO2 30 31 26   BUN 21* 20* 19*   CREA 1.46* 1.69* 1.55*   CA 8.1* 7.9* 7.9*   AGAP 7 4 7   BUCR 14 12 12   AP  --  82 85   TP  --  6.1* 6.6   ALB  --  1.6* 1.8*   GLOB  --  4.5* 4.8*   AGRAT  --  0.4* 0.4*      CBC w/Diff Recent Labs     01/24/20  0346 01/23/20  2205 01/23/20  1615 01/23/20  1103 01/23/20  0325  01/22/20  0418   WBC 8.1 7.9  --   --  10.5  --  10.3   RBC 2.48* 2.53*  --   --  2.40*  --  2.66*   HGB 7.3* 7.5*  --  7.5* 7.1*   < > 7.9*   HCT 22.2* 22.6*  --  22.5* 21.3*   < > 23.5*    313 319  --  327  --  326   GRANS 70  --   --   --  72  --  71   LYMPH 16*  --   --   --  14*  --  14*   EOS 1  --   --   --  1  --  0    < > = values in this interval not displayed.       Cardiac Enzymes No results for input(s): CPK, CKND1, KAREN in the last 72 hours. No lab exists for component: CKRMB, TROIP   Coagulation Recent Labs     01/24/20  0346 01/23/20  2205  01/23/20  0325   PTP 16.1* 16.2*   < >  --    INR 1.3* 1.3*   < >  --    APTT  --  48.2*  --  48.5*    < > = values in this interval not displayed. Lipid Panel No results found for: CHOL, CHOLPOCT, CHOLX, CHLST, CHOLV, 509130, HDL, HDLP, LDL, LDLC, DLDLP, 568392, VLDLC, VLDL, TGLX, TRIGL, TRIGP, TGLPOCT, CHHD, CHHDX   BNP No results for input(s): BNPP in the last 72 hours.    Liver Enzymes Recent Labs     01/23/20  0325   TP 6.1*   ALB 1.6*   AP 82   SGOT 32      Thyroid Studies Lab Results   Component Value Date/Time    TSH 1.86 01/19/2020 06:14 AM        Procedures/imaging: see electronic medical records for all procedures/Xrays and details which were not copied into this note but were reviewed prior to creation of Zamzam Andrews MD

## 2020-01-24 NOTE — PROGRESS NOTES
Chart reviewed pt transferred to icu yesterday after vascular procedure,pt having episodes of confusing requiring sitter at bedside  Last night, pt receiving TPA infusing at this time, no plan for weekend discharge, weekend cm will be available if needed for immediate needs. Cathy Kruse

## 2020-01-24 NOTE — PROGRESS NOTES
1930- - Report and care received, assessment completed per flow sheet. Alert, oriented, speaks English, translation phone in use. Right arm sheath intact and secured, heparin and alteplase infusing per sheath per orders. 2300- Occasionally attempts to get OOB, redirected, sitter to stay at bedside. 0004- Reassessment completed and without change. 0195- Reassessment completed and without change except has increasing confusion, restless, intermittently swings legs OOB.  updated, orders for Seroquel received. 0350- Increased confusion, swinging legs OOB,  updated, orders received for ativan. 0440- NAD, resting quietly with eyes closed.

## 2020-01-24 NOTE — PROGRESS NOTES
NUTRITION UPDATE    -pt was asleep no family present  Per nurse pt has not been eating because he has been asleep    Will continue to monitor appetite and PO intakes       Ryne Vee, NATALIA  PAGER:  [phone]

## 2020-01-24 NOTE — PROGRESS NOTES
All thrombus cleared into the SVC. Sheath pulled and dressing applied. Continue Lovenox. Would be ideal not to place another central catheter. Antibiotics per ID - Augmentin  Not sure if voice changes were due to head and neck congestion from central venous clot.  May need further investigation with ENT if symptoms dont improve    Migue Maurice D.O. [address] [facility] [place]  [phone]  [phone]

## 2020-01-25 ENCOUNTER — HOSPITAL ENCOUNTER (OUTPATIENT)
Dept: INFUSION THERAPY | Age: 75
Discharge: HOME | End: 2020-01-25
Payer: MEDICARE

## 2020-01-25 LAB
ANION GAP SERPL CALC-SCNC: 5 MMOL/L (ref 3–18)
APTT PPP: 48.6 SEC (ref 23–36.4)
APTT PPP: 51.1 SEC (ref 23–36.4)
APTT PPP: 66.7 SEC (ref 23–36.4)
BASOPHILS # BLD: 0 K/UL (ref 0–0.1)
BASOPHILS NFR BLD: 1 % (ref 0–2)
BUN SERPL-MCNC: 18 MG/DL (ref 7–18)
BUN/CREAT SERPL: 15 (ref 12–20)
CALCIUM SERPL-MCNC: 8.3 MG/DL (ref 8.5–10.1)
CHLORIDE SERPL-SCNC: 106 MMOL/L (ref 100–111)
CO2 SERPL-SCNC: 30 MMOL/L (ref 21–32)
CREAT SERPL-MCNC: 1.22 MG/DL (ref 0.6–1.3)
DIFFERENTIAL METHOD BLD: ABNORMAL
EOSINOPHIL # BLD: 0.3 K/UL (ref 0–0.4)
EOSINOPHIL NFR BLD: 5 % (ref 0–5)
EPO SERPL-ACNC: 18.2 MIU/ML (ref 2.6–18.5)
ERYTHROCYTE [DISTWIDTH] IN BLOOD BY AUTOMATED COUNT: 14.1 % (ref 11.6–14.5)
ERYTHROCYTE [DISTWIDTH] IN BLOOD BY AUTOMATED COUNT: 14.2 % (ref 11.6–14.5)
ERYTHROCYTE [DISTWIDTH] IN BLOOD BY AUTOMATED COUNT: 14.2 % (ref 11.6–14.5)
FIBRINOGEN PPP-MCNC: 431 MG/DL (ref 210–451)
FIBRINOGEN PPP-MCNC: 471 MG/DL (ref 210–451)
GLUCOSE BLD STRIP.AUTO-MCNC: 137 MG/DL (ref 70–110)
GLUCOSE BLD STRIP.AUTO-MCNC: 206 MG/DL (ref 70–110)
GLUCOSE BLD STRIP.AUTO-MCNC: 286 MG/DL (ref 70–110)
GLUCOSE BLD STRIP.AUTO-MCNC: 56 MG/DL (ref 70–110)
GLUCOSE BLD STRIP.AUTO-MCNC: 63 MG/DL (ref 70–110)
GLUCOSE BLD STRIP.AUTO-MCNC: 67 MG/DL (ref 70–110)
GLUCOSE BLD STRIP.AUTO-MCNC: 70 MG/DL (ref 70–110)
GLUCOSE BLD STRIP.AUTO-MCNC: 77 MG/DL (ref 70–110)
GLUCOSE BLD STRIP.AUTO-MCNC: 98 MG/DL (ref 70–110)
GLUCOSE SERPL-MCNC: 103 MG/DL (ref 74–99)
HCT VFR BLD AUTO: 22.1 % (ref 36–48)
HCT VFR BLD AUTO: 22.1 % (ref 36–48)
HCT VFR BLD AUTO: 22.3 % (ref 36–48)
HCT VFR BLD AUTO: 22.6 % (ref 36–48)
HGB BLD-MCNC: 7.2 G/DL (ref 13–16)
HGB BLD-MCNC: 7.3 G/DL (ref 13–16)
LYMPHOCYTES # BLD: 1.2 K/UL (ref 0.9–3.6)
LYMPHOCYTES NFR BLD: 20 % (ref 21–52)
MAGNESIUM SERPL-MCNC: 1.9 MG/DL (ref 1.6–2.6)
MCH RBC QN AUTO: 28.8 PG (ref 24–34)
MCH RBC QN AUTO: 29.3 PG (ref 24–34)
MCH RBC QN AUTO: 29.4 PG (ref 24–34)
MCHC RBC AUTO-ENTMCNC: 31.9 G/DL (ref 31–37)
MCHC RBC AUTO-ENTMCNC: 32.6 G/DL (ref 31–37)
MCHC RBC AUTO-ENTMCNC: 32.7 G/DL (ref 31–37)
MCV RBC AUTO: 89.6 FL (ref 74–97)
MCV RBC AUTO: 90.2 FL (ref 74–97)
MCV RBC AUTO: 90.4 FL (ref 74–97)
MONOCYTES # BLD: 0.7 K/UL (ref 0.05–1.2)
MONOCYTES NFR BLD: 12 % (ref 3–10)
NEUTS SEG # BLD: 3.6 K/UL (ref 1.8–8)
NEUTS SEG NFR BLD: 62 % (ref 40–73)
PLATELET # BLD AUTO: 329 K/UL (ref 135–420)
PLATELET # BLD AUTO: 348 K/UL (ref 135–420)
PLATELET # BLD AUTO: 354 K/UL (ref 135–420)
PMV BLD AUTO: 9 FL (ref 9.2–11.8)
PMV BLD AUTO: 9 FL (ref 9.2–11.8)
PMV BLD AUTO: 9.4 FL (ref 9.2–11.8)
POTASSIUM SERPL-SCNC: 3.6 MMOL/L (ref 3.5–5.5)
POTASSIUM SERPL-SCNC: 3.7 MMOL/L (ref 3.5–5.5)
RBC # BLD AUTO: 2.45 M/UL (ref 4.7–5.5)
RBC # BLD AUTO: 2.49 M/UL (ref 4.7–5.5)
RBC # BLD AUTO: 2.5 M/UL (ref 4.7–5.5)
SODIUM SERPL-SCNC: 141 MMOL/L (ref 136–145)
WBC # BLD AUTO: 5.9 K/UL (ref 4.6–13.2)
WBC # BLD AUTO: 5.9 K/UL (ref 4.6–13.2)
WBC # BLD AUTO: 6 K/UL (ref 4.6–13.2)

## 2020-01-25 PROCEDURE — 74011636637 HC RX REV CODE- 636/637: Performed by: FAMILY MEDICINE

## 2020-01-25 PROCEDURE — 85730 THROMBOPLASTIN TIME PARTIAL: CPT

## 2020-01-25 PROCEDURE — 74011250637 HC RX REV CODE- 250/637: Performed by: INTERNAL MEDICINE

## 2020-01-25 PROCEDURE — 82962 GLUCOSE BLOOD TEST: CPT

## 2020-01-25 PROCEDURE — 85027 COMPLETE CBC AUTOMATED: CPT

## 2020-01-25 PROCEDURE — 74011250636 HC RX REV CODE- 250/636: Performed by: INTERNAL MEDICINE

## 2020-01-25 PROCEDURE — 74011250636 HC RX REV CODE- 250/636: Performed by: SURGERY

## 2020-01-25 PROCEDURE — 85018 HEMOGLOBIN: CPT

## 2020-01-25 PROCEDURE — 85384 FIBRINOGEN ACTIVITY: CPT

## 2020-01-25 PROCEDURE — 84132 ASSAY OF SERUM POTASSIUM: CPT

## 2020-01-25 PROCEDURE — 65660000000 HC RM CCU STEPDOWN

## 2020-01-25 PROCEDURE — 74011000258 HC RX REV CODE- 258: Performed by: FAMILY MEDICINE

## 2020-01-25 PROCEDURE — 74011636637 HC RX REV CODE- 636/637: Performed by: INTERNAL MEDICINE

## 2020-01-25 PROCEDURE — 74011250637 HC RX REV CODE- 250/637: Performed by: HOSPITALIST

## 2020-01-25 PROCEDURE — 83735 ASSAY OF MAGNESIUM: CPT

## 2020-01-25 PROCEDURE — 74011250637 HC RX REV CODE- 250/637: Performed by: FAMILY MEDICINE

## 2020-01-25 PROCEDURE — 85025 COMPLETE CBC W/AUTO DIFF WBC: CPT

## 2020-01-25 PROCEDURE — 74011250636 HC RX REV CODE- 250/636: Performed by: HOSPITALIST

## 2020-01-25 PROCEDURE — 36415 COLL VENOUS BLD VENIPUNCTURE: CPT

## 2020-01-25 PROCEDURE — 92610 EVALUATE SWALLOWING FUNCTION: CPT

## 2020-01-25 RX ORDER — POTASSIUM CHLORIDE 750 MG/1
10 TABLET, EXTENDED RELEASE ORAL
Status: COMPLETED | OUTPATIENT
Start: 2020-01-25 | End: 2020-01-25

## 2020-01-25 RX ORDER — POTASSIUM CHLORIDE 20 MEQ/1
20 TABLET, EXTENDED RELEASE ORAL DAILY
Status: DISCONTINUED | OUTPATIENT
Start: 2020-01-25 | End: 2020-01-29 | Stop reason: HOSPADM

## 2020-01-25 RX ORDER — HALOPERIDOL 5 MG/ML
2 INJECTION INTRAMUSCULAR
Status: DISCONTINUED | OUTPATIENT
Start: 2020-01-25 | End: 2020-01-27

## 2020-01-25 RX ADMIN — LOSARTAN POTASSIUM 100 MG: 50 TABLET, FILM COATED ORAL at 09:14

## 2020-01-25 RX ADMIN — Medication 16 G: at 16:18

## 2020-01-25 RX ADMIN — LABETALOL 20 MG/4 ML (5 MG/ML) INTRAVENOUS SYRINGE 20 MG: at 00:26

## 2020-01-25 RX ADMIN — INSULIN LISPRO 6 UNITS: 100 INJECTION, SOLUTION INTRAVENOUS; SUBCUTANEOUS at 22:10

## 2020-01-25 RX ADMIN — AMOXICILLIN AND CLAVULANATE POTASSIUM 1 TABLET: 875; 125 TABLET, FILM COATED ORAL at 09:13

## 2020-01-25 RX ADMIN — QUETIAPINE FUMARATE 25 MG: 25 TABLET ORAL at 22:10

## 2020-01-25 RX ADMIN — HALOPERIDOL LACTATE 2 MG: 5 INJECTION INTRAMUSCULAR at 18:54

## 2020-01-25 RX ADMIN — HYDRALAZINE HYDROCHLORIDE 10 MG: 20 INJECTION INTRAMUSCULAR; INTRAVENOUS at 05:21

## 2020-01-25 RX ADMIN — ENOXAPARIN SODIUM 80 MG: 40 INJECTION, SOLUTION INTRAVENOUS; SUBCUTANEOUS at 00:14

## 2020-01-25 RX ADMIN — MORPHINE SULFATE 1 MG: 2 INJECTION, SOLUTION INTRAMUSCULAR; INTRAVENOUS at 18:27

## 2020-01-25 RX ADMIN — INSULIN LISPRO 4 UNITS: 100 INJECTION, SOLUTION INTRAVENOUS; SUBCUTANEOUS at 12:26

## 2020-01-25 RX ADMIN — FUROSEMIDE 20 MG: 10 INJECTION, SOLUTION INTRAMUSCULAR; INTRAVENOUS at 09:13

## 2020-01-25 RX ADMIN — POLYETHYLENE GLYCOL 3350 17 G: 17 POWDER, FOR SOLUTION ORAL at 09:15

## 2020-01-25 RX ADMIN — INSULIN GLARGINE 10 UNITS: 100 INJECTION, SOLUTION SUBCUTANEOUS at 09:12

## 2020-01-25 RX ADMIN — DEXTROSE MONOHYDRATE AND SODIUM CHLORIDE 50 ML/HR: 5; .45 INJECTION, SOLUTION INTRAVENOUS at 17:25

## 2020-01-25 RX ADMIN — ENOXAPARIN SODIUM 80 MG: 40 INJECTION, SOLUTION INTRAVENOUS; SUBCUTANEOUS at 12:27

## 2020-01-25 RX ADMIN — LABETALOL 20 MG/4 ML (5 MG/ML) INTRAVENOUS SYRINGE 20 MG: at 07:23

## 2020-01-25 RX ADMIN — POTASSIUM CHLORIDE 10 MEQ: 10 TABLET, EXTENDED RELEASE ORAL at 07:23

## 2020-01-25 RX ADMIN — MORPHINE SULFATE 1 MG: 2 INJECTION, SOLUTION INTRAMUSCULAR; INTRAVENOUS at 03:54

## 2020-01-25 RX ADMIN — POTASSIUM CHLORIDE 20 MEQ: 1500 TABLET, EXTENDED RELEASE ORAL at 09:14

## 2020-01-25 RX ADMIN — QUETIAPINE FUMARATE 25 MG: 25 TABLET ORAL at 00:23

## 2020-01-25 RX ADMIN — Medication 250 MG: at 09:13

## 2020-01-25 RX ADMIN — ATORVASTATIN CALCIUM 20 MG: 20 TABLET, FILM COATED ORAL at 09:14

## 2020-01-25 RX ADMIN — AMOXICILLIN AND CLAVULANATE POTASSIUM 1 TABLET: 875; 125 TABLET, FILM COATED ORAL at 22:10

## 2020-01-25 NOTE — PROGRESS NOTES
Hospitalist Progress Note    Patient: Delia Puri MRN: [id number]  CSN: [id number]    YOB: 1945  Age: 76 y.o.   Sex: male    DOA: 1/18/2020 LOS:  LOS: 7 days          Chief Complaint:    DVTs      Assessment/Plan     77 yo male with recent surgery for osteo in foot and on course of IV abx came back with arm swelling, extensive DVT despite eliquis therapy    Bilateral Upper extremities-and lower extremity DVT present on admission  Resume lovenox when done with thrombolysis  Vascular and  Hematology on case    Recent foot surgery for osteo and diabetic infection    Vascular moved forward with thrombolysis , thoracentesis put on hold for now-consider as outpatient   Patient i out of the ICU recovering with lysis procedure     Metabolic encephalopathy -still confused at times, sleep deprived, received seroquel last night  MRI brain done -no acute issue      Hypoglycemia-resolved    Hypokalemia-added K this am      NOW changed to PO augmentin for foot infection per ID,so NO picc planned, it is removed     Anemia acute on chronic   transfused 2 unit prbc, following H&H  Iron is 13 (low)  ferrlicit IVgiven 7/62  Occult negative 1/18     HTN -stable     Severe prot olivia malnutrition     IDDM with vascular complications     Alfonso on CKD stage 3     No sepsis on admission, fever likely due to thrombotic burden, cultures are negative, he was already on IV abx for his foot     Consider SNF on d/c    Transfer to the floor but pretty agitated will add Haldol IV as needed and continue with Seroquel at night    monitor mental status  Disposition :  Patient Active Problem List   Diagnosis Code    Stage 3 chronic kidney disease ([address] N18.3    Type 2 diabetes mellitus, with long-term current use of insulin ([address] E11.9, Z79.4    Hypertension I10    Hypokalemia E87.6    Foot abscess, right L02.611    Diabetic ulcer of right midfoot associated with type 2 diabetes mellitus, with fat layer exposed ([address] G29.723, Y48.768    PAD (peripheral artery disease) ([facility] I73.9    Osteomyelitis of right foot ([facility]) M86.9    Acute deep vein thrombosis (DVT) ([facility]) I82.409    DVT (deep venous thrombosis) ([facility]) I82.409    Anemia D64.9    Lung infiltrate R91.8       Subjective:    He is sleeping  Was agitated last night so given medicine to help him sleep  No new issue  Thrombolysis per vasc surgery    picc removed    Review of systems:    UTO      Vital signs/Intake and Output:  Visit Vitals  /63   Pulse (!) 101   Temp 98.5 °F (36.9 °C)   Resp 19   Ht 5' 7\" (1.702 m)   Wt 76.1 kg (167 lb 11.2 oz)   SpO2 98%   BMI 26.27 kg/m²     Current Shift:  No intake/output data recorded. Last three shifts:  01/23 1901 - 01/25 0700  In: 743.3 [I.V.:743.3]  Out: 800 [Urine:800]    Exam:    General: Agitated  male  Head/Neck: NCAT, supple, No masses, No lymphadenopathy  CVS:Regular rate and rhythm, no M/R/G, S1/S2 heard, no thrill  Lungs:Clear to auscultation bilaterally, no wheezes, rhonchi, or rales  Abdomen: Soft, Nontender, No distention, Normal Bowel sounds, No hepatomegaly  Extremities: reduced edema UE BL  Neuro: Awake and aggravated                Labs: Results:       Chemistry Recent Labs     01/25/20  0432 01/24/20  1822 01/24/20  0346 01/23/20  0325  01/22/20  1221   *  --  151* 98  --   --      --  141 139  --   --    K 3.7 3.3* 3.3* 3.7   < >  --      --  104 104  --   --    CO2 30  --  30 31  --   --    BUN 18  --  21* 20*  --   --    CREA 1.22  --  1.46* 1.69*  --   --    CA 8.3*  --  8.1* 7.9*  --   --    AGAP 5  --  7 4  --   --    BUCR 15  --  14 12  --   --    AP  --   --   --  82  --   --    TP  --   --   --  6.1*  --  5.9*   ALB  --   --   --  1.6*  --   --    GLOB  --   --   --  4.5*  --   --    AGRAT  --   --   --  0.4*  --   --     < > = values in this interval not displayed.       CBC w/Diff Recent Labs     01/25/20  0432 01/24/20  2200 01/24/20  1822 01/24/20  1030 01/24/20  0346 01/23/20  2205  01/23/20 0325   WBC 5.9  --   --   --  8.1 7.9  --  10.5   RBC 2.49*  --   --   --  2.48* 2.53*  --  2.40*   HGB 7.3* 7.4*  --  7.2* 7.3* 7.5*   < > 7.1*   HCT 22.3* 22.9*  --  22.6* 22.2* 22.6*   < > 21.3*     --  306 310 320 313   < > 327   GRANS 62  --   --   --  70  --   --  72   LYMPH 20*  --   --   --  16*  --   --  14*   EOS 5  --   --   --  1  --   --  1    < > = values in this interval not displayed. Cardiac Enzymes No results for input(s): CPK, CKND1, KAREN in the last 72 hours. No lab exists for component: CKRMB, TROIP   Coagulation Recent Labs     01/25/20  0432 01/24/20  1822 01/24/20  1030  01/23/20 2205   PTP  --  16.1* 15.7*   < > 16.2*   INR  --  1.3* 1.3*   < > 1.3*   APTT 66.7*  --   --   --  48.2*    < > = values in this interval not displayed. Lipid Panel No results found for: CHOL, CHOLPOCT, CHOLX, CHLST, CHOLV, 887912, HDL, HDLP, LDL, LDLC, DLDLP, 151215, VLDLC, VLDL, TGLX, TRIGL, TRIGP, TGLPOCT, CHHD, CHHDX   BNP No results for input(s): BNPP in the last 72 hours.    Liver Enzymes Recent Labs     01/23/20  0325   TP 6.1*   ALB 1.6*   AP 82   SGOT 32      Thyroid Studies Lab Results   Component Value Date/Time    TSH 1.86 01/19/2020 06:14 AM        Procedures/imaging: see electronic medical records for all procedures/Xrays and details which were not copied into this note but were reviewed prior to creation of Sandra Sterling MD

## 2020-01-25 NOTE — PROGRESS NOTES
Attempted PT eval however pt drowsy, keep closing eyes during conversation. Pt too drowsy to safely participate w/ PT at this time. Will attempt later as pt appropriate. Nurse [facility] aware.

## 2020-01-25 NOTE — PROGRESS NOTES
1035 TRANSFER - IN REPORT:  Verbal report received from [address] [place] [address] (name) on Mateo Stephens  being received from ICU (unit) for routine progression of care    Report consisted of patients Situation, Background, Assessment and   Recommendations(SBAR). Information from the following report(s) SBAR, Kardex, Intake/Output, MAR and Recent Results was reviewed with the receiving nurse. Opportunity for questions and clarification was provided. Assessment completed upon patients arrival to unit and care assumed. 1115 Pt arrived to unit. 1226 Glucose 206, 4 units insulin given. 18 Notifying daughter of pt being moved upstairs. 1320 Pt pulled out line while attempting to get up. New 22g in right hand placed. 1539 Glucose was 56, rechecked and was 67 by Parvin Jimenez CNA. 4 oz cranberry juice given. 1600 Glucose rechecked, 63, 4 oz apple juice given. Insulin held. 1630 Pt finished glucose tablets (began to refuse, had to crush in applesauce)    1650 Glucose 77, apple juice given. 1719 Glucose 98.    1827 Pt having pain in foot, morphine given. 1846 A [facility] RN spoke with Dr. Tim Caceres to order haldol. 1854 Haldol given. 1950 Pt continuing to get out of bed, refuses to lay down. Attempted redirection with blue phone,  stated he is not answering questions and speaking in tangents.

## 2020-01-25 NOTE — PROGRESS NOTES
Problem: Dysphagia (Adult)  Goal: *Acute Goals and Plan of Care (Insert Text)  Description  Patient will:  1. Tolerate regular diet with thin liquids without overt s/sx of aspiration in 4/5 trials under SLP supervision. - MET 1/25/2020  2. Utilize compensatory swallow strategies of small bite/sip, alternate liquid/solid with min cues in 4/5 trials. - MET 1/25/2020      Rec:   regular diet, thin liquids  HOB >45 during po intake, remain >30 for 30-45 minutes after po   Small bites/sips; alternate liquid/solid, slow feeding rate   Oral care TID  Meds per pt preference     Outcome: Resolved/Met     [facility] [place]    Patient: Sonia Becerril (23 y.o. male)  Date: 1/25/2020  Primary Diagnosis: DVT (deep venous thrombosis) (HCC) [I82.409]        Precautions:  Fall, PWB(PWB heel in Sx Shoe)    ASSESSMENT :  Pt seen for swallow eval. Pt oriented to self, only speaking Greek. [address] [place] [address] staff member in room to translate between care providers and pt. Per discussion with pt's RN in ICU this AM, pt tolerating regular diet with thin liquids and pills with water. RN and aide in room during swallow eval. Noted limited dentition. Remaining oral motor structures, strength, and ROM WFL; grossly intact for mastication and deglutition. Functional communication with . Intelligibility >90%. Pt self-feeding PO trials of thin liquid via cup/straw and tandem drinking, puree, mixed, and regular textures. No s/sx of aspiration appreciated across consistencies. Oropharyngeal swallow appears WFL. Swallow appears timely, adequate laryngeal elevation noted via palpation, no change in vocal/resp quality appreciated. Recommend regular texture diet with thin liquids, meds per pt preference. 0 formal ST needs for dysphagia indicated at this time. Pt educated on and verbalized understanding of findings, recs, and POC; d/w RN. SLP will sign off at this time.   Please re-consult should further concerns arise. PLAN :  Recommendations and Planned Interventions:  No formal ST needs ID'd for dysphagia. Eval only. Discharge Recommendations: To Be Determined     SUBJECTIVE:   Patient stated No. OBJECTIVE:     Past Medical History:   Diagnosis Date    Diabetes ([address]     DVT of deep femoral vein (HCC)     Foot abscess     Hypertension      Past Surgical History:   Procedure Laterality Date    HX ORTHOPAEDIC      toe amputation     Home Situation:   Home Situation  Home Environment: Apartment(2nd floor apt)  # Steps to Enter: 13  Rails to Enter: Yes  One/Two Story Residence: One story  Living Alone: No  Support Systems: [employer](dtr)  Patient Expects to be Discharged to[de-identified] Unknown  Current DME Used/Available at Home: (surgical shoe)  Tub or Shower Type: Tub/Shower combination    Diet prior to admission: regular  Current Diet:  regular/thin     Cognitive and Communication Status:  Neurologic State: Alert, Confused  Orientation Level: Oriented to person  Cognition: Follows commands  Perception: Appears intact  Perseveration: No perseveration noted  Safety/Judgement: Fall prevention  Oral Assessment:  Oral Assessment  Labial: No impairment  Dentition: Natural;Limited  Oral Hygiene: WFL  Lingual: No impairment  Velum: No impairment  Mandible: No impairment  P.O. Trials:  Patient Position: sitting at EOB  Vocal quality prior to P.O.: No impairment  Consistency Presented: Thin liquid;Puree; Solid;Mixed consistency  How Presented: Self-fed/presented;Spoon; Successive swallows;Straw;Cup/sip     Bolus Acceptance: No impairment  Bolus Formation/Control: No impairment     Propulsion: No impairment  Oral Residue: None  Initiation of Swallow: No impairment  Laryngeal Elevation: Functional  Aspiration Signs/Symptoms: None  Pharyngeal Phase Characteristics: No impairment, issues, or problems   Effective Modifications: None  Cues for Modifications: None       Oral Phase Severity: No impairment  Pharyngeal Phase Severity : No impairment    PAIN:  Pain level pre-treatment: 0/10   Pain level post-treatment: 0/10   Pain Intervention(s): NA   Response to intervention: NA      After evaluation:   []            Patient left in no apparent distress sitting up in chair  []            Patient left in no apparent distress in bed  [x]            Call bell left within reach  [x]            Nursing notified and in room  [x]            Family present  []            Caregiver present  []            Bed alarm activated      COMMUNICATION/EDUCATION:   [x]            Aspiration precautions; swallow safety; compensatory techniques. [x]            Patient/family have participated as able in goal setting and plan of care. [x]            Patient/family agree to work toward stated goals and plan of care. []            Patient understands intent and goals of therapy; neutral about participation. []            Patient unable to participate in goal setting/plan of care; educ ongoing with interdisciplinary staff  []         Posted safety precautions in patient's room.     Thank you for this referral.    Dania Meraz M.S., CCC-SLP  Speech-Language Pathologist    Time Calculation: 10 mins

## 2020-01-25 NOTE — ROUTINE PROCESS
[phone] In the chart to help primary nurse. Pt agitated and getting out of bed. Talk with Dr. Shayna Royal and received an order for Haldol 2 mg.

## 2020-01-25 NOTE — PROGRESS NOTES
[facility]  Pulmonary, Critical Care, and Sleep Medicine    Name: Jorge L Saldaña MRN: [id number]   : 1945 Hospital: [facility] [place]   Date: 2020        Pulmonary Critical Care Note    IMPRESSION:   Patient Active Problem List   Diagnosis Code    Sepsis ([facility] A41.9    Sepsis due to Streptococcus Grp B ([address] recently 2' to # 3 A40.1    Osteomyelitis of right foot ([address] M86.9    Acute deep vein thrombosis (DVT) ([address] I82.409    Acute on chronic anemia D64.9    CKD (chronic kidney disease) N18.9    Type 2 diabetes mellitus, with long-term current use of insulin ([address] E11.9, Z79.4    Hypertension I10    Hypokalemia E87.6    Diabetic ulcer of right midfoot associated with type 2 diabetes mellitus, with fat layer exposed ([facility] E11.621, L97.412    PAD (peripheral artery disease) ([facility]) I73.9    Moderate-severe protein calorie malnutrition E44.0    Hypoalbuminemia E88.09    Hard of hearing H91.90 ·      RECOMMENDATIONS:   Respiratory:  Compensated respiratory status; on room air. Goal SPO2> 91%  AMS and so he is at risk for aspiration. HOB more than 30 degrees all the time and strict aspiration precautions. R>L pleural effusion, worsening on repeat CT, could be due to SVC obstruction with thrombus/DVT. Thoracentesis for cytology was planned, which has been canceled due to TPA/heparin used during the vascular procedure and now he is on Lovenox. I believe the pleural effusion could be due to SVC obstruction. Since the SVC clot has been completely resolved after vascular procedure and thrombolysis, recommend repeat CT in couple weeks to evaluate for pleural effusion. Infectious disease:  On 2020, he was discharged home on ertapenem IV with RUE PICC line for right foot abscess and osteomyelitis and bacteremia with Streptococcus agalactia. MRI right foot consistent with osteomyelitis of fifth metatarsal.  CRP 22+ on 2020. Blood culture 2020: Negative.   Wound culture 1/18/2020: Negative. Antibiotics: Veronica Watkins was started on 1/14/2020 with plan to continue for 42 days but develop iatrogenic extensive DVT due to PICC line. PICC line removed by vascular surgeon 1/23/2020. Meropenem changed to Augmentin on 1/24/2020. Follow-up cultures. Defer antibiotics to ID, D/w Dr. Elizabeth Zhong. Cardiovascular:   (Iatrogenic RUE DVT extending into right IJ/subclavian/axillary/brachial with SVC obstruction, now left proximal subclavian DVT. S/p catheter directed thrombolysis with resolved SVC clot on 1/23/20)  On 1/14/2020, he was discharged home on Eliquis for DVT. CTA on admission 1/18/2020: No PE but extensive RUE DVT secondary to PICC line. Tiffany Nicolas PVL RUE 1/18/2020: Acute nonocclusive thrombus in right IJ, subclavian, axillary and brachial veins. Nonocclusive superficial thrombophlebitis in the right basilic vein. PVL LUE 1/21/2020: Acute appearing thrombus in left proximal subclavian. S/p vascular procedure 1/23/2020 with PICC line removal and sheath placement with TPA and heparin infusion. Venogram 1/24/20 showed resolved SVC clot. Sheath removed. Vascular surgeon recommended Lovenox and recommended against placing another PICC line. Currently on Lovenox 80 mg subcutaneously every 12 hours. Defer treatment, anticoagulation, duration etc. to vascular surgeon. Since he is able to take p.o. medication, restart p.o. Cozaar and lisinopril. Continue hydralazine and labetalol IV as needed. If blood pressure not well controlled, then adjust medications. Hematologic: Received total 2 PRBCs since admission due to anemia. No active external bleeding noted. Continue to monitor H&H closely. Received IV iron. Defer to hematologist.    Renal: JC/CKD, monitor closely. Making good urine output, monitor. Avoid nephrotoxic medications. Restart p.o. Lasix. Restart 20 mg p.o. Kdur. Endocrine:   Continue Lantus and Humalog sliding scale. Maintain glycemic control.   Patient had hypoglycemia and so Lantus dose has been reduced. Hypoglycemia resolved after treatment. Monitor closely for any hypoglycemia. Since hypoglycemia resolved, mental status has been normalized. GI: Stool occult negative 1/18/2020. No active external bleeding. CNS:   AMS likely due to metabolic encephalopathy, hypoglycemia and ativan/seroquel use. MRI brain unremarkable for anything acute. Avoid ativan along with Seroquel. Hypoglycemia treated and resolved. Monitor closely. He required a sitter and wrist restraint overnight which has been discontinued. Mental status has been normalized now. Nutrition: Taking p.o. cardiac diet. Strict aspiration precautions. Will defer respective systems problem management to primary and other consultant and follow patient in ICU with primary and other medical team  Further recommendations will be based on the patient's response to recommended treatment and results of the investigation ordered. Quality Care: PPI, DVT prophylaxis, HOB elevated, Infection control all reviewed and addressed. PAIN AND SEDATION: RT foot, RT arm  · Skin/Wound: RT foot ulcer, under dressing. · Nutrition: diet  · Prophylaxis: DVT and GI Prophylaxis reviewed. On DVT Rx with heparin drip. · PT/OT eval and treat: as needed   · Lines/Tubes:RUE PICC line removed 1/23/20. Right arm vascular sheath 1/23/20- removed on 1/24/2020. ADVANCE DIRECTIVE: full code  DISCUSSION: discussed with patient, RN, ICU staff, MDR. Events and notes from last 24 hours reviewed. Care plan discussed with nursing     Moderate decision making complexity. Transfer to tele. Once transferred, PCCM will follow intermittently. Call us with any questions.         Subjective/History:   Mr. Jorge L Saldaña with HTN, DM, CKD, PAD, anemia, right foot fifth metatarsal osteomyelitis and Streptococcus agalactia bacteremia in 01/2020 discharged home on IV ertapenem with RUE PICC line, RUE DVT secondary to PICC line extending into right IJ/subclavian/axillary/basilic vein with SVC obstruction leading to RUE edema, later developed left subclavian DVT, underwent PICC line removal and placement of vascular catheter in RUE and treatment of DVT with TPA+ Heparin 1/23/2020. Due to PICC line leading to iatrogenic DVT, IV ertapenem changed to Augmentin by ID on 1/24/2020. Right > left pleural effusion on CT chest.    Vascular Procedure 1/23/20:  Removal of right basilic vein PICC line, fluoroscopic insertion of 0.18 wire to right atrium, placement of 6 Georgian sheath over wire into basilic vein, placement of thrombolytic UniFuse infusion catheter in right axillary, subclavian, innominate veins and superior vena cava    1/25/2020   Remained in ICU. Venogram showed completely resolved SVC clot. Vascular sheath from right arm removed by vascular on 1/24/2020. Had hypoglycemia which has been resolved. Altered mental status was thought to be due to hypoglycemia, Ativan and Seroquel used simultaneously. AAO x3. No focal neurologic deficit. No dysphonia or voice change which was reported in vascular surgeon's note. No fever, chills, cough, chest pain, S OB. No arm edema. Hemodynamic stable. PCCM was not called for any issues overnight. No other overnight issues reported. Review of Systems:  Review of systems not obtained due to patient factors. Available information noted in HPI              Latest lactic acid:   Lactic acid   Date Value Ref Range Status   01/18/2020 1.5 0.4 - 2.0 MMOL/L Final   01/02/2020 1.3 0.4 - 2.0 MMOL/L Final       Past Medical History:  Past Medical History:   Diagnosis Date    Diabetes ([facility]     DVT of deep femoral vein ([facility] [address] [facility]     Foot abscess     Hypertension         Past Surgical History:  Past Surgical History:   Procedure Laterality Date    HX ORTHOPAEDIC      toe amputation        Medications:  Prior to Admission medications    Medication Sig Start Date End Date Taking?  Authorizing Provider LORazepam (ATIVAN) 1 mg tablet Take 1 mg by mouth every four (4) hours as needed for Anxiety (one tab prior to hyperbaric oxygen treatment). Provider, Historical   apixaban (ELIQUIS) 5 mg (74 tabs) starter pack Take 10 mg (two 5 mg tablets) by mouth twice a day for 7 days   Followed by 5 mg (one 5 mg tablet) by mouth twice a day 1/14/20   Enrico Chen MD   ertapenem 1 gram 1 g IVPB 1 g by IntraVENous route every twenty-four (24) hours. 1/13/20   Enrico Chen MD   insulin glargine (LANTUS SOLOSTAR U-100 INSULIN) 100 unit/mL (3 mL) inpn 25 Units by SubCUTAneous route. Mishel Barlow MD   metFORMIN ER (GLUCOPHAGE XR) 750 mg tablet Take 750 mg by mouth daily. Mishel Barlow MD   atorvastatin (LIPITOR) 20 mg tablet Take 20 mg by mouth daily. Mishel Barlow MD   aspirin 81 mg chewable tablet Take 81 mg by mouth daily. Mishel Barlow MD   polyethylene glycol (MIRALAX) 17 gram/dose powder Take 17 g by mouth daily. 1 tablespoon with 8 oz of water daily 12/10/19   Ioana Alicea MD   LOSARTAN PO Take 100 mg by mouth.     Mishel Barlow MD       Current Facility-Administered Medications   Medication Dose Route Frequency    amoxicillin-clavulanate (AUGMENTIN) 875-125 mg per tablet 1 Tab  1 Tab Oral Q12H    QUEtiapine (SEROquel) tablet 25 mg  25 mg Oral QHS    insulin glargine (LANTUS) injection 10 Units  10 Units SubCUTAneous DAILY    dextrose 5 % - 0.45% NaCl infusion  50 mL/hr IntraVENous CONTINUOUS    furosemide (LASIX) injection 20 mg  20 mg IntraVENous DAILY    Saccharomyces boulardii (FLORASTOR) capsule 250 mg  250 mg Oral DAILY    enoxaparin (LOVENOX) injection 80 mg  80 mg SubCUTAneous Q12H    insulin lispro (HUMALOG) injection   SubCUTAneous AC&HS    atorvastatin (LIPITOR) tablet 20 mg  20 mg Oral DAILY    losartan (COZAAR) tablet 100 mg  100 mg Oral DAILY    polyethylene glycol (MIRALAX) packet 17 g  17 g Oral DAILY       Allergy:  No Known Allergies     Social History:  Social History     Tobacco Use  Smoking status: Never Smoker    Smokeless tobacco: Never Used   Substance Use Topics    Alcohol use: No    Drug use: No        Family History:  History reviewed. No pertinent family history. Objective:   Vital Signs:    Blood pressure 180/63, pulse (!) 101, temperature 98.5 °F (36.9 °C), resp. rate 19, height 5' 7\" (1.702 m), weight 76.1 kg (167 lb 11.2 oz), SpO2 98 %. Body mass index is 26.27 kg/m². O2 Device: Room air   O2 Flow Rate (L/min): 1 l/min   Temp (24hrs), Av.3 °F (36.8 °C), Min:97.6 °F (36.4 °C), Max:98.5 °F (36.9 °C)         Intake/Output:   Last shift:      No intake/output data recorded. Last 3 shifts:  1901 -  0700  In: 1643.3 [I.V.:1643.3]  Out: 800 [Urine:800]    Intake/Output Summary (Last 24 hours) at 2020 0841  Last data filed at 2020 0659  Gross per 24 hour   Intake 900 ml   Output 300 ml   Net 600 ml       Physical Exam:  General/Neurology: AAO x3. No focal deficit. Head:   Normocephalic, without obvious abnormality, atraumatic. Eye:   PERRL, EOM intact, no scleral icterus, no pallor, no cyanosis. Nose:   No sinus tenderness, no erythema, no induration, no discharge  Throat:  No oral thrush. Neck:   Supple, symmetric. No lymphadenopathy. Trachea midline  Lung:   No air entry at bases. Moderate air entry anteriorly. No rhonchi. No wheezing. No stridors. No prolongded expiration. No accessory muscle use. Heart:   S1 S2 present. No murmur. No JVD. Abdomen:  Soft. NT. ND. +BS. No masses. Extremities:  RUE edema resolved. Right foot ulcer under dressing. No cyanosis. No clubbing. Pulses: 2+ and symmetric in DP. Lymphatic:  No cervical or supraclavicular palpable lymphadenopathy.      Data:     Recent Results (from the past 24 hour(s))   GLUCOSE, POC    Collection Time: 20  8:52 AM   Result Value Ref Range    Glucose (POC) 261 (H) 70 - 110 mg/dL   HGB & HCT    Collection Time: 20 10:30 AM   Result Value Ref Range    HGB 7.2 (L) 13.0 - 16.0 g/dL    HCT 22.6 (L) 36.0 - 48.0 %   PLATELET COUNT    Collection Time: 01/24/20 10:30 AM   Result Value Ref Range    PLATELET 550 319 - 026 K/uL   PROTHROMBIN TIME + INR    Collection Time: 01/24/20 10:30 AM   Result Value Ref Range    Prothrombin time 15.7 (H) 11.5 - 15.2 sec    INR 1.3 (H) 0.8 - 1.2     FIBRINOGEN    Collection Time: 01/24/20 10:30 AM   Result Value Ref Range    Fibrinogen 501 (H) 210 - 451 mg/dL   GLUCOSE, POC    Collection Time: 01/24/20 11:24 AM   Result Value Ref Range    Glucose (POC) 196 (H) 70 - 110 mg/dL   GLUCOSE, POC    Collection Time: 01/24/20  4:24 PM   Result Value Ref Range    Glucose (POC) 43 (LL) 70 - 110 mg/dL   GLUCOSE, POC    Collection Time: 01/24/20  4:26 PM   Result Value Ref Range    Glucose (POC) 40 (LL) 70 - 110 mg/dL   GLUCOSE, POC    Collection Time: 01/24/20  5:10 PM   Result Value Ref Range    Glucose (POC) 124 (H) 70 - 110 mg/dL   PLATELET COUNT    Collection Time: 01/24/20  6:22 PM   Result Value Ref Range    PLATELET 961 234 - 451 K/uL   PROTHROMBIN TIME + INR    Collection Time: 01/24/20  6:22 PM   Result Value Ref Range    Prothrombin time 16.1 (H) 11.5 - 15.2 sec    INR 1.3 (H) 0.8 - 1.2     FIBRINOGEN    Collection Time: 01/24/20  6:22 PM   Result Value Ref Range    Fibrinogen 475 (H) 210 - 451 mg/dL   POTASSIUM    Collection Time: 01/24/20  6:22 PM   Result Value Ref Range    Potassium 3.3 (L) 3.5 - 5.5 mmol/L   GLUCOSE, POC    Collection Time: 01/24/20  7:10 PM   Result Value Ref Range    Glucose (POC) 51 (LL) 70 - 110 mg/dL   GLUCOSE, POC    Collection Time: 01/24/20  7:11 PM   Result Value Ref Range    Glucose (POC) 54 (LL) 70 - 110 mg/dL   GLUCOSE, POC    Collection Time: 01/24/20  9:12 PM   Result Value Ref Range    Glucose (POC) 109 70 - 110 mg/dL   HGB & HCT    Collection Time: 01/24/20 10:00 PM   Result Value Ref Range    HGB 7.4 (L) 13.0 - 16.0 g/dL    HCT 22.9 (L) 36.0 - 48.0 %   MAGNESIUM    Collection Time: 01/24/20 10:00 PM   Result Value Ref Range    Magnesium 2.0 1.6 - 2.6 mg/dL   MAGNESIUM    Collection Time: 01/25/20  4:30 AM   Result Value Ref Range    Magnesium 1.9 1.6 - 2.6 mg/dL   METABOLIC PANEL, BASIC    Collection Time: 01/25/20  4:32 AM   Result Value Ref Range    Sodium 141 136 - 145 mmol/L    Potassium 3.7 3.5 - 5.5 mmol/L    Chloride 106 100 - 111 mmol/L    CO2 30 21 - 32 mmol/L    Anion gap 5 3.0 - 18 mmol/L    Glucose 103 (H) 74 - 99 mg/dL    BUN 18 7.0 - 18 MG/DL    Creatinine 1.22 0.6 - 1.3 MG/DL    BUN/Creatinine ratio 15 12 - 20      GFR est AA >60 >60 ml/min/1.73m2    GFR est non-AA 58 (L) >60 ml/min/1.73m2    Calcium 8.3 (L) 8.5 - 10.1 MG/DL   CBC WITH AUTOMATED DIFF    Collection Time: 01/25/20  4:32 AM   Result Value Ref Range    WBC 5.9 4.6 - 13.2 K/uL    RBC 2.49 (L) 4.70 - 5.50 M/uL    HGB 7.3 (L) 13.0 - 16.0 g/dL    HCT 22.3 (L) 36.0 - 48.0 %    MCV 89.6 74.0 - 97.0 FL    MCH 29.3 24.0 - 34.0 PG    MCHC 32.7 31.0 - 37.0 g/dL    RDW 14.2 11.6 - 14.5 %    PLATELET 294 062 - 382 K/uL    MPV 9.0 (L) 9.2 - 11.8 FL    NEUTROPHILS 62 40 - 73 %    LYMPHOCYTES 20 (L) 21 - 52 %    MONOCYTES 12 (H) 3 - 10 %    EOSINOPHILS 5 0 - 5 %    BASOPHILS 1 0 - 2 %    ABS. NEUTROPHILS 3.6 1.8 - 8.0 K/UL    ABS. LYMPHOCYTES 1.2 0.9 - 3.6 K/UL    ABS. MONOCYTES 0.7 0.05 - 1.2 K/UL    ABS. EOSINOPHILS 0.3 0.0 - 0.4 K/UL    ABS. BASOPHILS 0.0 0.0 - 0.1 K/UL    DF AUTOMATED     PTT    Collection Time: 01/25/20  4:32 AM   Result Value Ref Range    aPTT 66.7 (H) 23.0 - 36.4 SEC   GLUCOSE, POC    Collection Time: 01/25/20  8:07 AM   Result Value Ref Range    Glucose (POC) 137 (H) 70 - 110 mg/dL           No results for input(s): FIO2I, IFO2, HCO3I, IHCO3, HCOPOC, PCO2I, PCOPOC, IPHI, PHI, PHPOC, PO2I, PO2POC in the last 72 hours.     No lab exists for component: IPOC2    All Micro Results     Procedure Component Value Units Date/Time    CULTURE, BLOOD [[id number]] Collected:  01/18/20 1355    Order Status:  Completed Specimen:  Blood Updated: 01/24/20 0820     Special Requests: NO SPECIAL REQUESTS        Culture result: NO GROWTH 6 DAYS       CULTURE, BLOOD [[id number]] Collected:  01/18/20 1518    Order Status:  Completed Specimen:  Blood Updated:  01/24/20 0820     Special Requests: NO SPECIAL REQUESTS        Culture result: NO GROWTH 6 DAYS       CULTURE, Dot Priscilla STAIN [[id number]] Collected:  01/18/20 2015    Order Status:  Completed Specimen:  Wound from Foot Updated:  01/22/20 0836     Special Requests: NO SPECIAL REQUESTS        GRAM STAIN NO WBC'S SEEN         NO ORGANISMS SEEN        Culture result: NO GROWTH 3 DAYS       STREP PNEUMO AG, URINE [[id number]] Collected:  01/18/20 0000    Order Status:  Completed Specimen:  Urine, random Updated:  01/19/20 1629     Strep pneumo Ag, urine NEGATIVE        LEGIONELLA PNEUMOPHILA AG, URINE [[id number]] Collected:  01/18/20 0000    Order Status:  Completed Specimen:  Urine, random Updated:  01/19/20 1629     Legionella Ag, urine NEGATIVE        INFLUENZA A & B AG (RAPID TEST) [[id number]] Collected:  01/18/20 1630    Order Status:  Completed Specimen:  Nasopharyngeal from Nasal washing Updated:  01/18/20 1653     Influenza A Antigen NEGATIVE         Comment: A negative result does not exclude influenza virus infection, seasonal or H1N1 due to suboptimal sensitivity. If influenza is circulating in your community, a diagnosis of influenza should be considered based on a patients clinical presentation and empiric antiviral treatment should be considered, if indicated. Influenza B Antigen NEGATIVE        INFLUENZA A & B AG (RAPID TEST) [[id number]]     Order Status:  Canceled Specimen:  Nasopharyngeal           Telemetry: normal sinus rhythm    1/5/20 ECHO  · Left Ventricle: Normal cavity size and systolic function (ejection fraction normal). Mild concentric hypertrophy . The estimated ejection fraction is 55 - 60%.  There is mild (grade 1) left ventricular diastolic dysfunction E/e'ratio=8.60.  · Tricuspid Valve: Normal valve structure and no stenosis. Trace regurgitation. Pulmonary arterial systolic pressure is 87.2 mmHg. PVL RUE 1/18/20:  · Acute appearing non occlusive thrombus noted in the right internal jugular, subclavian, axillary and brachial vein(s). · Non occlusive superficial thrombophlebitis noted within the right basilic vein. · No evidence of DVT in the contralateral internal jugular and proximal subclavian vein. PVL LUE 1/21/20:  · Acute appearing thrombus noted in the contralateral right proximal internal jugular and subclavian vein(s) which is no different than the privious scan. · Acute appearing thrombus noted in the left proximal subclavian vein(s). MRI brain 1/21/20:  1. Mild atrophy and chronic small vessel changes. 2.  Old high left frontal craniotomy. 3.  Nonspecific fluid and/or mucosal thickening in the mastoid air cells, right  greater than left. 4.  Otherwise, unremarkable evaluation. Specifically, no acute intracranial  abnormalities are present. MRI right ankle 1/20/20:  1. No bone marrow signal abnormalities within the imaged ankle, hindfoot, or  midfoot to suggest osteomyelitis. No evidence of fracture, stress fracture, or  marrow stress reaction. 2. Degenerative changes as above. 3. Considerable tendinosis of the peroneal tendons with longitudinal split  tearing of the peroneus brevis tendon. Mild accompanying shared peroneal tendon  sheath tenosynovitis. 4. Diffusely abnormal intrinsic muscle bulk and signal typical for changes  related to sequela of subacute denervation. MRI right foot 1/20/20:  1. Postoperative changes from fifth metatarsal amputation without concomitant  marrow signal abnormality identified to suggest osteomyelitis. 2. No evidence of fracture, stress fracture, or marrow stress reaction. 3. Very mild right first MTP joint chondrosis.   4. Diffusely abnormal intrinsic muscle bulk and signal in keeping with sequela  of subacute denervation. Imaging:  [x]I have personally reviewed the patients chest radiographs images and report     Results from Hospital Encounter encounter on 01/18/20   XR CHEST PORT    Narrative EXAM: CHEST RADIOGRAPH    CLINICAL INDICATION/HISTORY: tachy  -Additional: None    COMPARISON: January 15, 2020    TECHNIQUE: Portable frontal view of the chest    _______________    FINDINGS:    SUPPORT DEVICES: A right upper extremity PICC is present. HEART AND MEDIASTINUM: No appreciable cardiomegaly. Remaining mediastinal  contours within normal limits. LUNGS AND PLEURAL SPACES: No consolidation, mass, or pleural effusion. BONY THORAX AND SOFT TISSUES: Unremarkable.    _______________      Impression IMPRESSION:    No active cardiopulmonary disease. Results from [place] encounter on 01/18/20   CTA CHEST W OR W WO CONT    Narrative EXAM: CTA Chest    INDICATION: Known superior vena cava thrombus, left subclavian venous  thrombosis. Evaluation for clot propagation requested. COMPARISON: Several prior exams, most recently CT angiogram of the chest  performed 1/18/2020    TECHNIQUE: Axial CT imaging from the thoracic inlet through the diaphragm with  intravenous contrast utilizing CTA study for pulmonary artery evaluation. Coronal and sagittal MIP reformations were generated at a separate workstation. One or more dose reduction techniques were used on this CT: automated exposure  control, adjustment of the mAs and/or kVp according to patient size, and  iterative reconstruction techniques. The specific techniques used on this CT  exam have been documented in the patient's electronic medical record.   Digital  Imaging and Communications in Medicine (DICOM) format image data are available  to nonaffiliated external healthcare facilities or entities on a secure, media  free, reciprocally searchable basis with patient authorization for at least a  12-month period after this study. _______________    FINDINGS:    EXAM QUALITY: Overall exam quality is adequate. Pulmonary arterial enhancement  is adequate. The breath hold is satisfactory. PULMONARY ARTERIES: No evidence of pulmonary embolism. Main pulmonary arterial  size remains within normal limits. MEDIASTINUM: Included thyroid gland unremarkable. As previously, stranding  surrounding the right PICC line throughout its visualized course in the included  right arm, right axilla, and right subclavian regions is noted. There is an  essentially unchanged configuration to clot within the superior vena cava, with  luminal narrowing extending just below the level of the sternomanubrial  articulation. Contrast injected from the left arm is noted to extend through a  significantly narrowed proximal left subclavian vein and adjacent  brachiocephalic venous confluence, with recruitment of collaterals along the  external jugular venous pathway. Fairly significant stranding surrounds the  subclavian neurovascular bundles bilaterally, which is unchanged on the right  and newly present on the left. The cardiac size is normal. There is a small circumferential pericardial  effusion. Thoracic aorta is of normal course and caliber. LYMPH NODES: No supraclavicular, axilla, mediastinal lymph node enlargement is  present, with an increased number of subcentimeter nodes present across the  axilla and subpectoral regions bilaterally, very likely reactive. AIRWAY: Central airways are patent. LUNGS: Basilar areas of atelectasis noted. No significant groundglass  abnormality or septal line thickening. Rounded area of opacity at the posterior  right upper lobe noted, similar to prior. Biapical pleural parenchymal scar  formation. PLEURA: There are bilateral pleural effusions, moderately large on the right and  moderate size on the left, both appearing increased from comparison CT angiogram  1/18/2020.     UPPER ABDOMEN: Included upper abdomen demonstrates no acute abnormality. Focal  fat deposition near the falciform ligament is present. .    OTHER: No acute or aggressive osseous abnormalities identified. There is progressive body wall edema noted in the interval.    _______________      Impression IMPRESSION:    1. Redemonstration of thrombus surrounding the included portions of the right  PICC line with accompanying occlusion of the superior vena cava above the level  of the brachiocephalic venous confluence.     >  Partial thrombosis of the left subclavian vein, with significant narrowing  of the proximal portion of the left subclavian vein and adjacent brachiocephalic  vein. Progressive edema surrounding the left subclavian neurovascular bundle  noted in the interval from preceding CT angiogram chest 1/18/2020.    2. No evidence of pulmonary embolism. 3. Moderately large right and moderate-sized left pleural effusions which have  increased in the interval from prior chest CT. 4. Basilar areas of compressive atelectasis and presumptive area of rounded  atelectasis at the posterior right upper lobe without change. Attention on  follow-up imaging recommended. 5. Progressive body wall edema.          Aleat Villavicencio MD   1/25/2020

## 2020-01-25 NOTE — PROGRESS NOTES
0720-received report from Chirag Coburn RN included SBAR MAR and Kardex. 1030-Pt restraints d/c'd, eating and taking PO meds. 1035-Called report to Jeison Kellogg RN included SBAR MAR and Kardex. 1040-transferred to [facility], [place] [facility] with monitor and transport.

## 2020-01-26 ENCOUNTER — APPOINTMENT (OUTPATIENT)
Dept: INFUSION THERAPY | Age: 75
End: 2020-01-26
Payer: MEDICARE

## 2020-01-26 ENCOUNTER — APPOINTMENT (OUTPATIENT)
Dept: CT IMAGING | Age: 75
DRG: 314 | End: 2020-01-26
Attending: INTERNAL MEDICINE
Payer: MEDICARE

## 2020-01-26 PROBLEM — T14.8XXA HEMATOMA: Status: ACTIVE | Noted: 2020-01-26

## 2020-01-26 LAB
ANION GAP SERPL CALC-SCNC: 5 MMOL/L (ref 3–18)
APTT PPP: 46.4 SEC (ref 23–36.4)
APTT PPP: 70.3 SEC (ref 23–36.4)
BASOPHILS # BLD: 0 K/UL (ref 0–0.1)
BASOPHILS # BLD: 0.1 K/UL (ref 0–0.1)
BASOPHILS NFR BLD: 0 % (ref 0–3)
BASOPHILS NFR BLD: 1 % (ref 0–2)
BUN SERPL-MCNC: 17 MG/DL (ref 7–18)
BUN/CREAT SERPL: 13 (ref 12–20)
CALCIUM SERPL-MCNC: 8 MG/DL (ref 8.5–10.1)
CHLORIDE SERPL-SCNC: 106 MMOL/L (ref 100–111)
CO2 SERPL-SCNC: 30 MMOL/L (ref 21–32)
CREAT SERPL-MCNC: 1.33 MG/DL (ref 0.6–1.3)
CRP SERPL-MCNC: 9.8 MG/DL (ref 0–0.3)
DIFFERENTIAL METHOD BLD: ABNORMAL
DIFFERENTIAL METHOD BLD: ABNORMAL
EOSINOPHIL # BLD: 0.2 K/UL (ref 0–0.4)
EOSINOPHIL # BLD: 0.2 K/UL (ref 0–0.4)
EOSINOPHIL NFR BLD: 3 % (ref 0–5)
EOSINOPHIL NFR BLD: 4 % (ref 0–5)
ERYTHROCYTE [DISTWIDTH] IN BLOOD BY AUTOMATED COUNT: 14.3 % (ref 11.6–14.5)
ERYTHROCYTE [DISTWIDTH] IN BLOOD BY AUTOMATED COUNT: 14.3 % (ref 11.6–14.5)
ERYTHROCYTE [DISTWIDTH] IN BLOOD BY AUTOMATED COUNT: 14.4 % (ref 11.6–14.5)
FIBRINOGEN PPP-MCNC: 396 MG/DL (ref 210–451)
GLUCOSE BLD STRIP.AUTO-MCNC: 184 MG/DL (ref 70–110)
GLUCOSE BLD STRIP.AUTO-MCNC: 230 MG/DL (ref 70–110)
GLUCOSE BLD STRIP.AUTO-MCNC: 248 MG/DL (ref 70–110)
GLUCOSE BLD STRIP.AUTO-MCNC: 84 MG/DL (ref 70–110)
GLUCOSE SERPL-MCNC: 168 MG/DL (ref 74–99)
HCT VFR BLD AUTO: 21.6 % (ref 36–48)
HCT VFR BLD AUTO: 22 % (ref 36–48)
HCT VFR BLD AUTO: 22.2 % (ref 36–48)
HGB BLD-MCNC: 6.9 G/DL (ref 13–16)
HGB BLD-MCNC: 7.1 G/DL (ref 13–16)
HGB BLD-MCNC: 7.2 G/DL (ref 13–16)
LYMPHOCYTES # BLD: 1.4 K/UL (ref 0.8–3.5)
LYMPHOCYTES # BLD: 1.5 K/UL (ref 0.9–3.6)
LYMPHOCYTES NFR BLD: 22 % (ref 21–52)
LYMPHOCYTES NFR BLD: 23 % (ref 20–51)
MAGNESIUM SERPL-MCNC: 1.7 MG/DL (ref 1.6–2.6)
MCH RBC QN AUTO: 28.9 PG (ref 24–34)
MCH RBC QN AUTO: 29.2 PG (ref 24–34)
MCH RBC QN AUTO: 29.6 PG (ref 24–34)
MCHC RBC AUTO-ENTMCNC: 31.9 G/DL (ref 31–37)
MCHC RBC AUTO-ENTMCNC: 32 G/DL (ref 31–37)
MCHC RBC AUTO-ENTMCNC: 32.7 G/DL (ref 31–37)
MCV RBC AUTO: 90.4 FL (ref 74–97)
MCV RBC AUTO: 90.5 FL (ref 74–97)
MCV RBC AUTO: 91.4 FL (ref 74–97)
MONOCYTES # BLD: 0.7 K/UL (ref 0–1)
MONOCYTES # BLD: 1 K/UL (ref 0.05–1.2)
MONOCYTES NFR BLD: 11 % (ref 2–9)
MONOCYTES NFR BLD: 14 % (ref 3–10)
NEUTS SEG # BLD: 3.7 K/UL (ref 1.8–8)
NEUTS SEG # BLD: 4.2 K/UL (ref 1.8–8)
NEUTS SEG NFR BLD: 60 % (ref 40–73)
NEUTS SEG NFR BLD: 62 % (ref 42–75)
PLATELET # BLD AUTO: 327 K/UL (ref 135–420)
PLATELET # BLD AUTO: 348 K/UL (ref 135–420)
PLATELET # BLD AUTO: 359 K/UL (ref 135–420)
PMV BLD AUTO: 8.8 FL (ref 9.2–11.8)
PMV BLD AUTO: 9 FL (ref 9.2–11.8)
PMV BLD AUTO: 9.1 FL (ref 9.2–11.8)
POTASSIUM SERPL-SCNC: 3.9 MMOL/L (ref 3.5–5.5)
RBC # BLD AUTO: 2.39 M/UL (ref 4.7–5.5)
RBC # BLD AUTO: 2.43 M/UL (ref 4.7–5.5)
RBC # BLD AUTO: 2.43 M/UL (ref 4.7–5.5)
RBC MORPH BLD: ABNORMAL
RBC MORPH BLD: ABNORMAL
SODIUM SERPL-SCNC: 141 MMOL/L (ref 136–145)
WBC # BLD AUTO: 6 K/UL (ref 4.6–13.2)
WBC # BLD AUTO: 6.5 K/UL (ref 4.6–13.2)
WBC # BLD AUTO: 7 K/UL (ref 4.6–13.2)
WBC MORPH BLD: ABNORMAL

## 2020-01-26 PROCEDURE — 86140 C-REACTIVE PROTEIN: CPT

## 2020-01-26 PROCEDURE — 82962 GLUCOSE BLOOD TEST: CPT

## 2020-01-26 PROCEDURE — 74011250637 HC RX REV CODE- 250/637: Performed by: INTERNAL MEDICINE

## 2020-01-26 PROCEDURE — 36430 TRANSFUSION BLD/BLD COMPNT: CPT

## 2020-01-26 PROCEDURE — 74011636637 HC RX REV CODE- 636/637: Performed by: FAMILY MEDICINE

## 2020-01-26 PROCEDURE — 83735 ASSAY OF MAGNESIUM: CPT

## 2020-01-26 PROCEDURE — 85730 THROMBOPLASTIN TIME PARTIAL: CPT

## 2020-01-26 PROCEDURE — 80048 BASIC METABOLIC PNL TOTAL CA: CPT

## 2020-01-26 PROCEDURE — 74011250636 HC RX REV CODE- 250/636: Performed by: HOSPITALIST

## 2020-01-26 PROCEDURE — 86923 COMPATIBILITY TEST ELECTRIC: CPT

## 2020-01-26 PROCEDURE — 36415 COLL VENOUS BLD VENIPUNCTURE: CPT

## 2020-01-26 PROCEDURE — 97116 GAIT TRAINING THERAPY: CPT

## 2020-01-26 PROCEDURE — 74011250637 HC RX REV CODE- 250/637: Performed by: HOSPITALIST

## 2020-01-26 PROCEDURE — 85025 COMPLETE CBC W/AUTO DIFF WBC: CPT

## 2020-01-26 PROCEDURE — 85027 COMPLETE CBC AUTOMATED: CPT

## 2020-01-26 PROCEDURE — 86900 BLOOD TYPING SEROLOGIC ABO: CPT

## 2020-01-26 PROCEDURE — 74011636637 HC RX REV CODE- 636/637: Performed by: INTERNAL MEDICINE

## 2020-01-26 PROCEDURE — 74176 CT ABD & PELVIS W/O CONTRAST: CPT

## 2020-01-26 PROCEDURE — P9016 RBC LEUKOCYTES REDUCED: HCPCS

## 2020-01-26 PROCEDURE — 97162 PT EVAL MOD COMPLEX 30 MIN: CPT

## 2020-01-26 PROCEDURE — 74011250636 HC RX REV CODE- 250/636: Performed by: SURGERY

## 2020-01-26 PROCEDURE — 85384 FIBRINOGEN ACTIVITY: CPT

## 2020-01-26 PROCEDURE — 65660000000 HC RM CCU STEPDOWN

## 2020-01-26 RX ORDER — SODIUM CHLORIDE 9 MG/ML
250 INJECTION, SOLUTION INTRAVENOUS AS NEEDED
Status: DISCONTINUED | OUTPATIENT
Start: 2020-01-26 | End: 2020-01-29 | Stop reason: HOSPADM

## 2020-01-26 RX ADMIN — LOSARTAN POTASSIUM 100 MG: 50 TABLET, FILM COATED ORAL at 09:58

## 2020-01-26 RX ADMIN — FUROSEMIDE 20 MG: 10 INJECTION, SOLUTION INTRAMUSCULAR; INTRAVENOUS at 09:58

## 2020-01-26 RX ADMIN — ENOXAPARIN SODIUM 80 MG: 40 INJECTION, SOLUTION INTRAVENOUS; SUBCUTANEOUS at 13:05

## 2020-01-26 RX ADMIN — ATORVASTATIN CALCIUM 20 MG: 20 TABLET, FILM COATED ORAL at 09:58

## 2020-01-26 RX ADMIN — AMOXICILLIN AND CLAVULANATE POTASSIUM 1 TABLET: 875; 125 TABLET, FILM COATED ORAL at 09:58

## 2020-01-26 RX ADMIN — Medication 250 MG: at 09:59

## 2020-01-26 RX ADMIN — INSULIN LISPRO 4 UNITS: 100 INJECTION, SOLUTION INTRAVENOUS; SUBCUTANEOUS at 07:55

## 2020-01-26 RX ADMIN — AMOXICILLIN AND CLAVULANATE POTASSIUM 1 TABLET: 875; 125 TABLET, FILM COATED ORAL at 20:19

## 2020-01-26 RX ADMIN — INSULIN LISPRO 4 UNITS: 100 INJECTION, SOLUTION INTRAVENOUS; SUBCUTANEOUS at 13:05

## 2020-01-26 RX ADMIN — POLYETHYLENE GLYCOL 3350 17 G: 17 POWDER, FOR SOLUTION ORAL at 09:58

## 2020-01-26 RX ADMIN — POTASSIUM CHLORIDE 20 MEQ: 1500 TABLET, EXTENDED RELEASE ORAL at 09:59

## 2020-01-26 RX ADMIN — QUETIAPINE FUMARATE 25 MG: 25 TABLET ORAL at 20:20

## 2020-01-26 RX ADMIN — ENOXAPARIN SODIUM 80 MG: 40 INJECTION, SOLUTION INTRAVENOUS; SUBCUTANEOUS at 01:43

## 2020-01-26 RX ADMIN — INSULIN GLARGINE 10 UNITS: 100 INJECTION, SOLUTION SUBCUTANEOUS at 09:58

## 2020-01-26 RX ADMIN — OXYCODONE HYDROCHLORIDE 5 MG: 5 TABLET ORAL at 20:20

## 2020-01-26 NOTE — PROGRESS NOTES
[facility]  Pulmonary, Critical Care, and Sleep Medicine    Name: Bharath Nesbitt MRN: [id number]   : 1945 Hospital: [facility] [place]   Date: 2020        Pulmonary Critical Care Note    IMPRESSION:   Patient Active Problem List   Diagnosis Code    Sepsis ([facility] [address] A41.9    Sepsis due to Streptococcus Grp B ([address] recently 2' to # 3 A40.1    Osteomyelitis of right foot ([address] M86.9    Acute deep vein thrombosis (DVT) ([address] I82.409    Acute on chronic anemia D64.9    CKD (chronic kidney disease) N18.9    Type 2 diabetes mellitus, with long-term current use of insulin ([address] E11.9, Z79.4    Hypertension I10    Hypokalemia E87.6    Diabetic ulcer of right midfoot associated with type 2 diabetes mellitus, with fat layer exposed ([facility] [address] E11.621, L97.412    PAD (peripheral artery disease) ([facility]) I73.9    Moderate-severe protein calorie malnutrition E44.0    Hypoalbuminemia E88.09    Hard of hearing H91.90 ·      RECOMMENDATIONS:   Respiratory:  Compensated respiratory status; on room air. Goal SPO2> 91%  HOB more than 30 degrees all the time and strict aspiration precautions. R>L pleural effusion, worsening on repeat CT, could be due to SVC obstruction with thrombus/DVT. Thoracentesis for cytology was planned, which has been canceled due to TPA/heparin used during the vascular procedure and now he is on Lovenox. I believe the pleural effusion could be due to SVC obstruction. Since the SVC clot has been completely resolved after vascular procedure and thrombolysis, recommend repeat CT in couple weeks to evaluate for pleural effusion. Since this is not an acute issue, will follow intermittently. Call us with any questions.     ALL OTHER FOLLOWING ISSUES BEING MANAGED BY PRIMARY TEAM AND RESPECTIVE CONSULTANTS:   THE FOLLOWING ISSUES ARE AS OF 20:    Infectious disease:  On 2020, he was discharged home on ertapenem IV with RUE PICC line for right foot abscess and osteomyelitis and bacteremia with Streptococcus agalactia. MRI right foot consistent with osteomyelitis of fifth metatarsal.  CRP 22+ on 1/22/2020. Blood culture 1/18/2020: Negative. Wound culture 1/18/2020: Negative. Antibiotics: Phuong Sharma was started on 1/14/2020 with plan to continue for 42 days but develop iatrogenic extensive DVT due to PICC line. PICC line removed by vascular surgeon 1/23/2020. Meropenem changed to Augmentin on 1/24/2020. Follow-up cultures. Defer antibiotics to ID, D/w Dr. Christine Owens. Cardiovascular:   (Iatrogenic RUE DVT extending into right IJ/subclavian/axillary/brachial with SVC obstruction, now left proximal subclavian DVT. S/p catheter directed thrombolysis with resolved SVC clot on 1/23/20)  On 1/14/2020, he was discharged home on Eliquis for DVT. CTA on admission 1/18/2020: No PE but extensive RUE DVT secondary to PICC line. Cheryl Foote PVL RUE 1/18/2020: Acute nonocclusive thrombus in right IJ, subclavian, axillary and brachial veins. Nonocclusive superficial thrombophlebitis in the right basilic vein. PVL LUE 1/21/2020: Acute appearing thrombus in left proximal subclavian. S/p vascular procedure 1/23/2020 with PICC line removal and sheath placement with TPA and heparin infusion. Venogram 1/24/20 showed resolved SVC clot. Sheath removed. Vascular surgeon recommended Lovenox and recommended against placing another PICC line. Currently on Lovenox 80 mg subcutaneously every 12 hours. Defer treatment, anticoagulation, duration etc. to vascular surgeon. Since he is able to take p.o. medication, restart p.o. Cozaar and lisinopril. Continue hydralazine and labetalol IV as needed. If blood pressure not well controlled, then adjust medications. Hematologic: Received total 2 PRBCs since admission due to anemia. No active external bleeding noted. Continue to monitor H&H closely. Received IV iron. Defer to hematologist.    Renal: JC/CKD, monitor closely. Making good urine output, monitor. Avoid nephrotoxic medications. Restart p.o. Lasix. Restart 20 mg p.o. Kdur. Endocrine:   Continue Lantus and Humalog sliding scale. Maintain glycemic control. Patient had hypoglycemia and so Lantus dose has been reduced. Hypoglycemia resolved after treatment. Monitor closely for any hypoglycemia. Since hypoglycemia resolved, mental status has been normalized. GI: Stool occult negative 1/18/2020. No active external bleeding. CNS:   AMS likely due to metabolic encephalopathy, hypoglycemia and ativan/seroquel use. MRI brain unremarkable for anything acute. Avoid ativan along with Seroquel. Hypoglycemia treated and resolved. Monitor closely. He required a sitter and wrist restraint overnight which has been discontinued. Mental status has been normalized now. Nutrition: Taking p.o. cardiac diet. Strict aspiration precautions. Will defer respective systems problem management to primary and other consultant and follow patient in ICU with primary and other medical team  Further recommendations will be based on the patient's response to recommended treatment and results of the investigation ordered. Quality Care: PPI, DVT prophylaxis, HOB elevated, Infection control all reviewed and addressed. PAIN AND SEDATION: RT foot, RT arm  · Skin/Wound: RT foot ulcer, under dressing. · Nutrition: diet  · Prophylaxis: DVT and GI Prophylaxis reviewed. On DVT Rx with heparin drip. · PT/OT eval and treat: as needed   · Lines/Tubes:RUE PICC line removed 1/23/20. Right arm vascular sheath 1/23/20- removed on 1/24/2020. ADVANCE DIRECTIVE: full code  DISCUSSION: discussed with patient, RN, ICU staff, MDR. Events and notes from last 24 hours reviewed. Care plan discussed with nursing     Moderate decision making complexity. PCCM will follow intermittently from pulmonary perspective. Call us with any questions.         Subjective/History:   Mr. Roxana Conteh with HTN, DM, CKD, PAD, anemia, right foot fifth metatarsal osteomyelitis and Streptococcus agalactia bacteremia in 01/2020 discharged home on IV ertapenem with RUE PICC line, RUE DVT secondary to PICC line extending into right IJ/subclavian/axillary/basilic vein with SVC obstruction leading to RUE edema, later developed left subclavian DVT, underwent PICC line removal and placement of vascular catheter in RUE and treatment of DVT with TPA+ Heparin 1/23/2020. Due to PICC line leading to iatrogenic DVT, IV ertapenem changed to Augmentin by ID on 1/24/2020. Right > left pleural effusion on CT chest.    Vascular Procedure 1/23/20:  Removal of right basilic vein PICC line, fluoroscopic insertion of 0.18 wire to right atrium, placement of 6 French sheath over wire into basilic vein, placement of thrombolytic UniFuse infusion catheter in right axillary, subclavian, innominate veins and superior vena cava    1/26/2020   Transfer out of ICU on 1/25/2020. Remains on room air. Alert awake and oriented. No focal deficit. No fever, chills, cough, chest pain, SOB. No arm edema. Hemodynamic stable. [facility] was not called for any issues overnight. No other overnight issues reported. Review of Systems:  Review of systems not obtained due to patient factors. Available information noted in HPI              Latest lactic acid:   Lactic acid   Date Value Ref Range Status   01/18/2020 1.5 0.4 - 2.0 MMOL/L Final   01/02/2020 1.3 0.4 - 2.0 MMOL/L Final       Past Medical History:  Past Medical History:   Diagnosis Date    Diabetes ([facility] [address]     DVT of deep femoral vein ([facility] [address]     Foot abscess     Hypertension         Past Surgical History:  Past Surgical History:   Procedure Laterality Date    HX ORTHOPAEDIC      toe amputation        Medications:  Prior to Admission medications    Medication Sig Start Date End Date Taking?  Authorizing Provider   LORazepam (ATIVAN) 1 mg tablet Take 1 mg by mouth every four (4) hours as needed for Anxiety (one tab prior to hyperbaric oxygen treatment). Provider, Historical   apixaban (ELIQUIS) 5 mg (74 tabs) starter pack Take 10 mg (two 5 mg tablets) by mouth twice a day for 7 days   Followed by 5 mg (one 5 mg tablet) by mouth twice a day 1/14/20   Kevan Chowdhury MD   ertapenem 1 gram 1 g IVPB 1 g by IntraVENous route every twenty-four (24) hours. 1/13/20   Kevan Chowdhury MD   insulin glargine (LANTUS SOLOSTAR U-100 INSULIN) 100 unit/mL (3 mL) inpn 25 Units by SubCUTAneous route. Mishel Barlow MD   metFORMIN ER (GLUCOPHAGE XR) 750 mg tablet Take 750 mg by mouth daily. Mishel Barlow MD   atorvastatin (LIPITOR) 20 mg tablet Take 20 mg by mouth daily. Mishel Barlow MD   aspirin 81 mg chewable tablet Take 81 mg by mouth daily. Mishel Barlow MD   polyethylene glycol (MIRALAX) 17 gram/dose powder Take 17 g by mouth daily. 1 tablespoon with 8 oz of water daily 12/10/19   Eyal Patel MD   LOSARTAN PO Take 100 mg by mouth.     Mishel Barlow MD       Current Facility-Administered Medications   Medication Dose Route Frequency    potassium chloride (K-DUR, KLOR-CON) SR tablet 20 mEq  20 mEq Oral DAILY    amoxicillin-clavulanate (AUGMENTIN) 875-125 mg per tablet 1 Tab  1 Tab Oral Q12H    QUEtiapine (SEROquel) tablet 25 mg  25 mg Oral QHS    insulin glargine (LANTUS) injection 10 Units  10 Units SubCUTAneous DAILY    dextrose 5 % - 0.45% NaCl infusion  50 mL/hr IntraVENous CONTINUOUS    furosemide (LASIX) injection 20 mg  20 mg IntraVENous DAILY    Saccharomyces boulardii (FLORASTOR) capsule 250 mg  250 mg Oral DAILY    enoxaparin (LOVENOX) injection 80 mg  80 mg SubCUTAneous Q12H    insulin lispro (HUMALOG) injection   SubCUTAneous AC&HS    atorvastatin (LIPITOR) tablet 20 mg  20 mg Oral DAILY    losartan (COZAAR) tablet 100 mg  100 mg Oral DAILY    polyethylene glycol (MIRALAX) packet 17 g  17 g Oral DAILY       Allergy:  No Known Allergies     Social History:  Social History     Tobacco Use    Smoking status: Never Smoker    Smokeless tobacco: Never Used   Substance Use Topics    Alcohol use: No    Drug use: No        Family History:  History reviewed. No pertinent family history. Objective:   Vital Signs:    Blood pressure 169/86, pulse (!) 105, temperature 98.9 °F (37.2 °C), resp. rate 16, height 5' 7\" (1.702 m), weight 76.1 kg (167 lb 11.2 oz), SpO2 97 %. Body mass index is 26.27 kg/m². O2 Device: Room air   O2 Flow Rate (L/min): 1 l/min   Temp (24hrs), Av.4 °F (36.9 °C), Min:98 °F (36.7 °C), Max:98.9 °F (37.2 °C)         Intake/Output:   Last shift:       07 - 1900  In: 360 [P.O.:360]  Out: -   Last 3 shifts:  190 -  0700  In: 7373 [P.O.:240; I.V.:900]  Out: 650 [Urine:650]    Intake/Output Summary (Last 24 hours) at 2020 1256  Last data filed at 2020 0940  Gross per 24 hour   Intake 360 ml   Output    Net 360 ml       Physical Exam:  General/Neurology: AAO x3. No focal deficit. Head:   Normocephalic, without obvious abnormality, atraumatic. Eye:   EOM intact, no scleral icterus, no pallor, no cyanosis. Nose:   No sinus tenderness, no erythema, no induration, no discharge  Throat:  No oral thrush. Neck:   Supple, symmetric. No lymphadenopathy. Lung:   No air entry at bases. Moderate air entry anteriorly. No rhonchi. No wheezing. No stridors. No prolongded expiration. No accessory muscle use. Heart:   S1 S2 present. No murmur. No JVD. Abdomen:  Soft. NT. ND. +BS. No masses. Extremities:  RUE edema resolved. Right foot ulcer under dressing. No cyanosis. No clubbing. Pulses: 2+ and symmetric in DP. Lymphatic:  No cervical or supraclavicular palpable lymphadenopathy.      Data:     Recent Results (from the past 24 hour(s))   GLUCOSE, POC    Collection Time: 20  3:37 PM   Result Value Ref Range    Glucose (POC) 56 (L) 70 - 110 mg/dL   GLUCOSE, POC    Collection Time: 20  3:39 PM   Result Value Ref Range    Glucose (POC) 67 (L) 70 - 110 mg/dL   GLUCOSE, POC Collection Time: 01/25/20  3:58 PM   Result Value Ref Range    Glucose (POC) 63 (L) 70 - 110 mg/dL   GLUCOSE, POC    Collection Time: 01/25/20  4:14 PM   Result Value Ref Range    Glucose (POC) 70 70 - 110 mg/dL   GLUCOSE, POC    Collection Time: 01/25/20  4:50 PM   Result Value Ref Range    Glucose (POC) 77 70 - 110 mg/dL   GLUCOSE, POC    Collection Time: 01/25/20  5:19 PM   Result Value Ref Range    Glucose (POC) 98 70 - 110 mg/dL   CBC W/O DIFF    Collection Time: 01/25/20  9:19 PM   Result Value Ref Range    WBC 6.0 4.6 - 13.2 K/uL    RBC 2.50 (L) 4.70 - 5.50 M/uL    HGB 7.2 (L) 13.0 - 16.0 g/dL    HCT 22.6 (L) 36.0 - 48.0 %    MCV 90.4 74.0 - 97.0 FL    MCH 28.8 24.0 - 34.0 PG    MCHC 31.9 31.0 - 37.0 g/dL    RDW 14.2 11.6 - 14.5 %    PLATELET 033 830 - 134 K/uL    MPV 9.0 (L) 9.2 - 11.8 FL   FIBRINOGEN    Collection Time: 01/25/20  9:19 PM   Result Value Ref Range    Fibrinogen 431 210 - 451 mg/dL   PTT    Collection Time: 01/25/20  9:19 PM   Result Value Ref Range    aPTT 51.1 (H) 23.0 - 36.4 SEC   GLUCOSE, POC    Collection Time: 01/25/20  9:25 PM   Result Value Ref Range    Glucose (POC) 286 (H) 70 - 230 mg/dL   METABOLIC PANEL, BASIC    Collection Time: 01/26/20  3:52 AM   Result Value Ref Range    Sodium 141 136 - 145 mmol/L    Potassium 3.9 3.5 - 5.5 mmol/L    Chloride 106 100 - 111 mmol/L    CO2 30 21 - 32 mmol/L    Anion gap 5 3.0 - 18 mmol/L    Glucose 168 (H) 74 - 99 mg/dL    BUN 17 7.0 - 18 MG/DL    Creatinine 1.33 (H) 0.6 - 1.3 MG/DL    BUN/Creatinine ratio 13 12 - 20      GFR est AA >60 >60 ml/min/1.73m2    GFR est non-AA 53 (L) >60 ml/min/1.73m2    Calcium 8.0 (L) 8.5 - 10.1 MG/DL   C REACTIVE PROTEIN, QT    Collection Time: 01/26/20  3:52 AM   Result Value Ref Range    C-Reactive protein 9.8 (H) 0 - 0.3 mg/dL   CBC WITH AUTOMATED DIFF    Collection Time: 01/26/20  3:52 AM   Result Value Ref Range    WBC 6.0 4.6 - 13.2 K/uL    RBC 2.39 (L) 4.70 - 5.50 M/uL    HGB 6.9 (L) 13.0 - 16.0 g/dL    HCT 21.6 (L) 36.0 - 48.0 %    MCV 90.4 74.0 - 97.0 FL    MCH 28.9 24.0 - 34.0 PG    MCHC 31.9 31.0 - 37.0 g/dL    RDW 14.3 11.6 - 14.5 %    PLATELET 271 006 - 410 K/uL    MPV 9.1 (L) 9.2 - 11.8 FL    NEUTROPHILS 62 42 - 75 %    LYMPHOCYTES 23 20 - 51 %    MONOCYTES 11 (H) 2 - 9 %    EOSINOPHILS 4 0 - 5 %    BASOPHILS 0 0 - 3 %    ABS. NEUTROPHILS 3.7 1.8 - 8.0 K/UL    ABS. LYMPHOCYTES 1.4 0.8 - 3.5 K/UL    ABS. MONOCYTES 0.7 0 - 1.0 K/UL    ABS. EOSINOPHILS 0.2 0.0 - 0.4 K/UL    ABS. BASOPHILS 0.0 0.0 - 0.1 K/UL    RBC COMMENTS NORMOCYTIC, NORMOCHROMIC      WBC COMMENTS REACTIVE LYMPHS      DF MANUAL     PTT    Collection Time: 01/26/20  3:52 AM   Result Value Ref Range    aPTT 70.3 (H) 23.0 - 36.4 SEC   MAGNESIUM    Collection Time: 01/26/20  3:52 AM   Result Value Ref Range    Magnesium 1.7 1.6 - 2.6 mg/dL   GLUCOSE, POC    Collection Time: 01/26/20  6:33 AM   Result Value Ref Range    Glucose (POC) 230 (H) 70 - 110 mg/dL   CBC W/O DIFF    Collection Time: 01/26/20  9:29 AM   Result Value Ref Range    WBC 6.5 4.6 - 13.2 K/uL    RBC 2.43 (L) 4.70 - 5.50 M/uL    HGB 7.1 (L) 13.0 - 16.0 g/dL    HCT 22.2 (L) 36.0 - 48.0 %    MCV 91.4 74.0 - 97.0 FL    MCH 29.2 24.0 - 34.0 PG    MCHC 32.0 31.0 - 37.0 g/dL    RDW 14.3 11.6 - 14.5 %    PLATELET 062 807 - 340 K/uL    MPV 8.8 (L) 9.2 - 11.8 FL   FIBRINOGEN    Collection Time: 01/26/20  9:29 AM   Result Value Ref Range    Fibrinogen 396 210 - 451 mg/dL   PTT    Collection Time: 01/26/20  9:29 AM   Result Value Ref Range    aPTT 46.4 (H) 23.0 - 36.4 SEC   GLUCOSE, POC    Collection Time: 01/26/20 12:08 PM   Result Value Ref Range    Glucose (POC) 248 (H) 70 - 110 mg/dL           No results for input(s): FIO2I, IFO2, HCO3I, IHCO3, HCOPOC, PCO2I, PCOPOC, IPHI, PHI, PHPOC, PO2I, PO2POC in the last 72 hours.     No lab exists for component: IPOC2    All Micro Results     Procedure Component Value Units Date/Time    CULTURE, BLOOD [[id number]] Collected:  01/18/20 1355    Order Status: Completed Specimen:  Blood Updated:  01/24/20 0820     Special Requests: NO SPECIAL REQUESTS        Culture result: NO GROWTH 6 DAYS       CULTURE, BLOOD [[id number]] Collected:  01/18/20 1518    Order Status:  Completed Specimen:  Blood Updated:  01/24/20 0820     Special Requests: NO SPECIAL REQUESTS        Culture result: NO GROWTH 6 DAYS       CULTURE, [place] Aisha STAIN [[id number]] Collected:  01/18/20 2015    Order Status:  Completed Specimen:  Wound from Foot Updated:  01/22/20 0836     Special Requests: NO SPECIAL REQUESTS        GRAM STAIN NO WBC'S SEEN         NO ORGANISMS SEEN        Culture result: NO GROWTH 3 DAYS       STREP PNEUMO AG, URINE [[id number]] Collected:  01/18/20 0000    Order Status:  Completed Specimen:  Urine, random Updated:  01/19/20 1629     Strep pneumo Ag, urine NEGATIVE        LEGIONELLA PNEUMOPHILA AG, URINE [[id number]] Collected:  01/18/20 0000    Order Status:  Completed Specimen:  Urine, random Updated:  01/19/20 1629     Legionella Ag, urine NEGATIVE        INFLUENZA A & B AG (RAPID TEST) [[id number]] Collected:  01/18/20 1630    Order Status:  Completed Specimen:  Nasopharyngeal from Nasal washing Updated:  01/18/20 1653     Influenza A Antigen NEGATIVE         Comment: A negative result does not exclude influenza virus infection, seasonal or H1N1 due to suboptimal sensitivity. If influenza is circulating in your community, a diagnosis of influenza should be considered based on a patients clinical presentation and empiric antiviral treatment should be considered, if indicated. Influenza B Antigen NEGATIVE        INFLUENZA A & B AG (RAPID TEST) [[id number]]     Order Status:  Canceled Specimen:  Nasopharyngeal           Telemetry: normal sinus rhythm    1/5/20 ECHO  · Left Ventricle: Normal cavity size and systolic function (ejection fraction normal). Mild concentric hypertrophy . The estimated ejection fraction is 55 - 60%.  There is mild (grade 1) left ventricular diastolic dysfunction Z/K'CBHRG=0.76.  · Tricuspid Valve: Normal valve structure and no stenosis. Trace regurgitation. Pulmonary arterial systolic pressure is 96.5 mmHg. PVL RUE 1/18/20:  · Acute appearing non occlusive thrombus noted in the right internal jugular, subclavian, axillary and brachial vein(s). · Non occlusive superficial thrombophlebitis noted within the right basilic vein. · No evidence of DVT in the contralateral internal jugular and proximal subclavian vein. PVL LUE 1/21/20:  · Acute appearing thrombus noted in the contralateral right proximal internal jugular and subclavian vein(s) which is no different than the privious scan. · Acute appearing thrombus noted in the left proximal subclavian vein(s). MRI brain 1/21/20:  1. Mild atrophy and chronic small vessel changes. 2.  Old high left frontal craniotomy. 3.  Nonspecific fluid and/or mucosal thickening in the mastoid air cells, right  greater than left. 4.  Otherwise, unremarkable evaluation. Specifically, no acute intracranial  abnormalities are present. MRI right ankle 1/20/20:  1. No bone marrow signal abnormalities within the imaged ankle, hindfoot, or  midfoot to suggest osteomyelitis. No evidence of fracture, stress fracture, or  marrow stress reaction. 2. Degenerative changes as above. 3. Considerable tendinosis of the peroneal tendons with longitudinal split  tearing of the peroneus brevis tendon. Mild accompanying shared peroneal tendon  sheath tenosynovitis. 4. Diffusely abnormal intrinsic muscle bulk and signal typical for changes  related to sequela of subacute denervation. MRI right foot 1/20/20:  1. Postoperative changes from fifth metatarsal amputation without concomitant  marrow signal abnormality identified to suggest osteomyelitis. 2. No evidence of fracture, stress fracture, or marrow stress reaction. 3. Very mild right first MTP joint chondrosis.   4. Diffusely abnormal intrinsic muscle bulk and signal in keeping with sequela  of subacute denervation. Imaging:  [x]I have personally reviewed the patients chest radiographs images and report     Results from Hospital Encounter encounter on 01/18/20   XR CHEST PORT    Narrative EXAM: CHEST RADIOGRAPH    CLINICAL INDICATION/HISTORY: tachy  -Additional: None    COMPARISON: January 15, 2020    TECHNIQUE: Portable frontal view of the chest    _______________    FINDINGS:    SUPPORT DEVICES: A right upper extremity PICC is present. HEART AND MEDIASTINUM: No appreciable cardiomegaly. Remaining mediastinal  contours within normal limits. LUNGS AND PLEURAL SPACES: No consolidation, mass, or pleural effusion. BONY THORAX AND SOFT TISSUES: Unremarkable.    _______________      Impression IMPRESSION:    No active cardiopulmonary disease. Results from [place] encounter on 01/18/20   CTA CHEST W OR W WO CONT    Narrative EXAM: CTA Chest    INDICATION: Known superior vena cava thrombus, left subclavian venous  thrombosis. Evaluation for clot propagation requested. COMPARISON: Several prior exams, most recently CT angiogram of the chest  performed 1/18/2020    TECHNIQUE: Axial CT imaging from the thoracic inlet through the diaphragm with  intravenous contrast utilizing CTA study for pulmonary artery evaluation. Coronal and sagittal MIP reformations were generated at a separate workstation. One or more dose reduction techniques were used on this CT: automated exposure  control, adjustment of the mAs and/or kVp according to patient size, and  iterative reconstruction techniques. The specific techniques used on this CT  exam have been documented in the patient's electronic medical record.   Digital  Imaging and Communications in Medicine (DICOM) format image data are available  to nonaffiliated external healthcare facilities or entities on a secure, media  free, reciprocally searchable basis with patient authorization for at least a  12-month period after this study. _______________    FINDINGS:    EXAM QUALITY: Overall exam quality is adequate. Pulmonary arterial enhancement  is adequate. The breath hold is satisfactory. PULMONARY ARTERIES: No evidence of pulmonary embolism. Main pulmonary arterial  size remains within normal limits. MEDIASTINUM: Included thyroid gland unremarkable. As previously, stranding  surrounding the right PICC line throughout its visualized course in the included  right arm, right axilla, and right subclavian regions is noted. There is an  essentially unchanged configuration to clot within the superior vena cava, with  luminal narrowing extending just below the level of the sternomanubrial  articulation. Contrast injected from the left arm is noted to extend through a  significantly narrowed proximal left subclavian vein and adjacent  brachiocephalic venous confluence, with recruitment of collaterals along the  external jugular venous pathway. Fairly significant stranding surrounds the  subclavian neurovascular bundles bilaterally, which is unchanged on the right  and newly present on the left. The cardiac size is normal. There is a small circumferential pericardial  effusion. Thoracic aorta is of normal course and caliber. LYMPH NODES: No supraclavicular, axilla, mediastinal lymph node enlargement is  present, with an increased number of subcentimeter nodes present across the  axilla and subpectoral regions bilaterally, very likely reactive. AIRWAY: Central airways are patent. LUNGS: Basilar areas of atelectasis noted. No significant groundglass  abnormality or septal line thickening. Rounded area of opacity at the posterior  right upper lobe noted, similar to prior. Biapical pleural parenchymal scar  formation.     PLEURA: There are bilateral pleural effusions, moderately large on the right and  moderate size on the left, both appearing increased from comparison CT angiogram  1/18/2020. UPPER ABDOMEN: Included upper abdomen demonstrates no acute abnormality. Focal  fat deposition near the falciform ligament is present. .    OTHER: No acute or aggressive osseous abnormalities identified. There is progressive body wall edema noted in the interval.    _______________      Impression IMPRESSION:    1. Redemonstration of thrombus surrounding the included portions of the right  PICC line with accompanying occlusion of the superior vena cava above the level  of the brachiocephalic venous confluence.     >  Partial thrombosis of the left subclavian vein, with significant narrowing  of the proximal portion of the left subclavian vein and adjacent brachiocephalic  vein. Progressive edema surrounding the left subclavian neurovascular bundle  noted in the interval from preceding CT angiogram chest 1/18/2020.    2. No evidence of pulmonary embolism. 3. Moderately large right and moderate-sized left pleural effusions which have  increased in the interval from prior chest CT. 4. Basilar areas of compressive atelectasis and presumptive area of rounded  atelectasis at the posterior right upper lobe without change. Attention on  follow-up imaging recommended. 5. Progressive body wall edema.          Jaky Morrow MD   1/26/2020

## 2020-01-26 NOTE — PROGRESS NOTES
Problem: Mobility Impaired (Adult and Pediatric)  Goal: *Acute Goals and Plan of Care (Insert Text)  Description  In 1-7 days pt will be able to perform:  ST.  Bed mobility:  Rolling L to R to L modified independent for positioning. 2.  Supine to sit to supine S with HR for meals. 3.  Sit to stand to sit S with RW in prep for ambulation. LT.  Gait:  Ambulate >150ft S with RW, PWB surgical shoe donned R, for improved mobility. 2.  Activity tolerance: Tolerate up in chair 1-2 hours for ADLs. 3.  Patient/Family Education:  Patient/family to be independent with HEP for follow-up care and safe discharge. Note:   PHYSICAL THERAPY EVALUATION    Patient: Starla Krueger (38 y.o. male)  Date: 2020  Primary Diagnosis: DVT (deep venous thrombosis) (HCC) [I82.409]        Precautions:   Fall, PWB    ASSESSMENT :  Based on the objective data described below, the patient presents with decreased functional mobility and independence in regard to bed mobility, transfers, gt quality and tolerance, generalized strength, pain, balance, activity tolerance, stair negotiation and safety. Pt confused w/ intermittent clarity, Lao is pt first language. Pt has had a recent fall and presented unsteady on feet. Pt rating pain on behavorial pain scale  4/10. Pt unable to understand PWB for R WB when instructed. Pt required SBA for supine<>sit and CGA for sit<>stand. Pt required vc for safe techniques. Pt able to participate in gt training w/ RW, PWB w/ surgical shoe R, GB and min A w/ antalgic pattern. Pt c/o light headedness and dizziness w/ standing and gt limiting gt tolerance. Pt returned to supine in bed w/ all needs within reach and bed alarm activated. Nurse Reta Bailon aware. Recommend rehab upon hospital d/c. Patient will benefit from skilled intervention to address the above impairments.   Patients rehabilitation potential is considered to be Fair  Factors which may influence rehabilitation potential include:   []         None noted  [x]         Mental ability/status  [x]         Medical condition  []         Home/family situation and support systems  []         Safety awareness  [x]         Pain tolerance/management  [x]         Other: language     PLAN :  Recommendations and Planned Interventions:  []           Bed Mobility Training             [x]    Neuromuscular Re-Education  [x]           Transfer Training                   []    Orthotic/Prosthetic Training  [x]           Gait Training                          []    Modalities  [x]           Therapeutic Exercises          []    Edema Management/Control  [x]           Therapeutic Activities            [x]    Patient and Family Training/Education  []           Other (comment):    Frequency/Duration: Patient will be followed by physical therapy 1-2 times per day/4-7 days per week to address goals. Discharge Recommendations: Rehab  Further Equipment Recommendations for Discharge: N/A     SUBJECTIVE:   Patient stated Nusrat Speaks.     OBJECTIVE DATA SUMMARY:     Past Medical History:   Diagnosis Date    Diabetes ([facility] [address]     DVT of deep femoral vein (HCC)     Foot abscess     Hypertension      Past Surgical History:   Procedure Laterality Date    HX ORTHOPAEDIC      toe amputation     Barriers to Learning/Limitations: yes;  language  Compensate with: visual, verbal, tactile, kinesthetic cues/model  Prior Level of Function/Home Situation:   Home Situation  Home Environment: Private residence  # Steps to Enter: 15  Rails to Enter: Yes  One/Two Story Residence: One story  Living Alone: No  Support Systems: Family member(s)  Patient Expects to be Discharged to[de-identified] Unknown  Current DME Used/Available at Home: Walker, rolling  Tub or Shower Type: Tub/Shower combination  Critical Behavior:  Neurologic State: Alert;Confused  Orientation Level: Oriented to person;Oriented to place; Disoriented to situation;Disoriented to time  Cognition: Decreased command following;Poor safety awareness  Psychosocial  Patient Behaviors: Confused;Calm; Cooperative  Skin Condition/Temp: Dry;Warm  Skin Integrity: Wound (add Wound LDA)  Skin Integumentary  Skin Color: Appropriate for ethnicity  Skin Condition/Temp: Dry;Warm  Skin Integrity: Wound (add Wound LDA)  Strength:    Strength: Generally decreased, functional  Tone & Sensation:   Tone: Normal  Sensation: Impaired  Range Of Motion:  AROM: Generally decreased, functional  Functional Mobility:  Bed Mobility:  Supine to Sit: Stand-by assistance  Sit to Supine: Stand-by assistance  Scooting: Stand-by assistance  Transfers:  Sit to Stand: Contact guard assistance(vc)  Stand to Sit: Contact guard assistance(vc)  Balance:   Sitting: Intact  Standing: Impaired; With support  Standing - Static: Fair;Constant support  Standing - Dynamic : Not tested  Ambulation/Gait Training:  Distance (ft): 10 Feet (ft)  Assistive Device: Walker, rolling;Gait belt(surgical shoe)  Ambulation - Level of Assistance: Minimal assistance(vc)  Gait Abnormalities: Antalgic;Decreased step clearance; Path deviations  Right Side Weight Bearing: Partial (%)(w/ surgical shoe)  Base of Support: Shift to left;Narrowed  Stance: Weight shift;Time;Right decreased  Speed/Johnna: Slow  Step Length: Left shortened;Right shortened  Swing Pattern: Left asymmetrical;Right asymmetrical  Interventions: Safety awareness training; Tactile cues; Verbal cues; Visual/Demos  Therapeutic Exercises:   Pain:  Pain Scale 1: Numeric (0 - 10)  Pain Intensity 1: 0  Pain Location 1: Foot; Abdomen  Pain Orientation 1: Right; Lower  Activity Tolerance:   Fair   Please refer to the flowsheet for vital signs taken during this treatment.   After treatment:   []         Patient left in no apparent distress sitting up in chair  [x]         Patient left in no apparent distress in bed  [x]         Call bell left within reach  [x]         Nursing notified  []         Caregiver present  [x]         Bed alarm activated    COMMUNICATION/EDUCATION:   [x]         Fall prevention education was provided and the patient/caregiver indicated understanding. [x]         Patient/family have participated as able in goal setting and plan of care. [x]         Patient/family agree to work toward stated goals and plan of care. []         Patient understands intent and goals of therapy, but is neutral about his/her participation. []         Patient is unable to participate in goal setting and plan of care.     Thank you for this referral.  Alyssa Barriga, PT   Time Calculation: 23 mins       Eval Complexity: History: HIGH Complexity :3+ comorbidities / personal factors will impact the outcome/ POC Exam:MEDIUM Complexity : 3 Standardized tests and measures addressing body structure, function, activity limitation and / or participation in recreation  Presentation: MEDIUM Complexity : Evolving with changing characteristics  Clinical Decision Making:Medium Complexity    Overall Complexity:MEDIUM

## 2020-01-26 NOTE — PROGRESS NOTES
Bedside and Verbal shift change report given to RUBEN Eugene RN (oncoming nurse) by LAURA Matta RN (offgoing nurse). Report included the following information SBAR, Kardex, Intake/Output, MAR and Recent Results.

## 2020-01-26 NOTE — PROGRESS NOTES
Hospitalist Progress Note    Patient: Jorge L Saldaña MRN: [id number]  CSN: [id number]    YOB: 1945  Age: 76 y.o. Sex: male    DOA: 1/18/2020 LOS:  LOS: 8 days          Chief Complaint:    Patient with complaints of right lower quadrant pain also anemic with drop of hemoglobin to 6.9 and 7.1 palpable mass on exam      Assessment/Plan     77 yo male with recent surgery for osteo in foot and on course of IV abx came back with arm swelling, extensive DVT despite eliquis therapy    Bilateral Upper extremities-and lower extremity DVT present on admission  Resume lovenox when done with thrombolysis  Vascular and  Hematology on case    Recent foot surgery for osteo and diabetic infection  Now on p.o.  Augmentin    Vascular moved forward with thrombolysis , thoracentesis put on hold for now-consider as outpatient   Patient i out of the ICU recovering with lysis procedure     Metabolic encephalopathy -still confused at times, sleep deprived, received seroquel last night  MRI brain done -no acute issue      Hypoglycemia-resolved    Hypokalemia-added K this am      NOW changed to PO augmentin for foot infection per ID,so NO picc planned, it is removed     HTN -stable     Severe prot olivia malnutrition     IDDM with vascular complications     Alfonso on CKD stage 3    Abdominal pain stat CT of abdomen ordered we will also transfuse 1 unit of packed RBCs     No sepsis on admission, fever likely due to thrombotic burden, cultures are negative, he was already on IV abx for his foot     Consider SNF on d/c    monitor mental status  Disposition :  Patient Active Problem List   Diagnosis Code    Stage 3 chronic kidney disease ([facility] [address] N18.3    Type 2 diabetes mellitus, with long-term current use of insulin ([address] E11.9, Z79.4    Hypertension I10    Hypokalemia E87.6    Foot abscess, right L02.611    Diabetic ulcer of right midfoot associated with type 2 diabetes mellitus, with fat layer exposed ([address] A03.506, X53.881  PAD (peripheral artery disease) ([facility]) I73.9    Osteomyelitis of right foot (HCC) M86.9    Acute deep vein thrombosis (DVT) ([facility]) I82.409    DVT (deep venous thrombosis) ([facility]) I82.409    Anemia D64.9    Lung infiltrate R91.8       Subjective:    He is sleeping  Was agitated last night so given medicine to help him sleep  No new issue  Thrombolysis per vasc surgery    picc removed    Review of systems:    UTO      Vital signs/Intake and Output:  Visit Vitals  /86 (BP 1 Location: Left arm, BP Patient Position: At rest)   Pulse (!) 105   Temp 98.9 °F (37.2 °C)   Resp 16   Ht 5' 7\" (1.702 m)   Wt 76.1 kg (167 lb 11.2 oz)   SpO2 97%   BMI 26.27 kg/m²     Current Shift:  01/26 0701 - 01/26 1900  In: 360 [P.O.:360]  Out: -   Last three shifts:  01/24 1901 - 01/26 0700  In: 1140 [P.O.:240; I.V.:900]  Out: 650 [Urine:650]    Exam:    General: Agitated  male oriented to self head/Neck: NCAT, supple, No masses, No lymphadenopathy  CVS: Irregular rate and rhythm, no M/R/G, S1/S2 heard, no thrill  Lungs:Clear to auscultation bilaterally, no wheezes, rhonchi, or rales  Abdomen: Soft, Nontender, No distention, Normal Bowel sounds, No hepatomegaly right lower quadrant with golfsize mass  Extremities: reduced edema UE BL  Neuro: Awake and aggravated                Labs: Results:       Chemistry Recent Labs     01/26/20  0352 01/25/20  0957 01/25/20  0432  01/24/20  0346   *  --  103*  --  151*     --  141  --  141   K 3.9 3.6 3.7   < > 3.3*     --  106  --  104   CO2 30  --  30  --  30   BUN 17  --  18  --  21*   CREA 1.33*  --  1.22  --  1.46*   CA 8.0*  --  8.3*  --  8.1*   AGAP 5  --  5  --  7   BUCR 13  --  15  --  14    < > = values in this interval not displayed.       CBC w/Diff Recent Labs     01/26/20  0929 01/26/20  0352 01/25/20  2119  01/25/20  0432  01/24/20  0346   WBC 6.5 6.0 6.0   < > 5.9  --  8.1   RBC 2.43* 2.39* 2.50*   < > 2.49*  --  2.48*   HGB 7.1* 6.9* 7.2*   < > 7.3* < > 7.3*   HCT 22.2* 21.6* 22.6*   < > 22.3*   < > 22.2*    327 354   < > 329   < > 320   GRANS  --  62  --   --  62  --  70   LYMPH  --  23  --   --  20*  --  16*   EOS  --  4  --   --  5  --  1    < > = values in this interval not displayed. Cardiac Enzymes No results for input(s): CPK, CKND1, KAREN in the last 72 hours. No lab exists for component: CKRMB, TROIP   Coagulation Recent Labs     01/26/20  0929 01/26/20  0352  01/24/20  1822 01/24/20  1030   PTP  --   --   --  16.1* 15.7*   INR  --   --   --  1.3* 1.3*   APTT 46.4* 70.3*   < >  --   --     < > = values in this interval not displayed. Lipid Panel No results found for: CHOL, CHOLPOCT, CHOLX, CHLST, CHOLV, 594398, HDL, HDLP, LDL, LDLC, DLDLP, 935323, VLDLC, VLDL, TGLX, TRIGL, TRIGP, TGLPOCT, CHHD, CHHDX   BNP No results for input(s): BNPP in the last 72 hours. Liver Enzymes No results for input(s): TP, ALB, TBIL, AP, SGOT, GPT in the last 72 hours.     No lab exists for component: DBIL   Thyroid Studies Lab Results   Component Value Date/Time    TSH 1.86 01/19/2020 06:14 AM        Procedures/imaging: see electronic medical records for all procedures/Xrays and details which were not copied into this note but were reviewed prior to creation of Melissa Smyth MD

## 2020-01-26 NOTE — PROGRESS NOTES
Phone: [phone]  Paging : [phone]      Hematology / Oncology Progress Note    Admit Date: 2020    Assessment:     Principal Problem:    DVT (deep venous thrombosis) ([facility] (2020)    Active Problems:    Stage 3 chronic kidney disease ([facility] (2020)      Type 2 diabetes mellitus, with long-term current use of insulin ([facility] (1/3/2020)      Hypertension (1/3/2020)      PAD (peripheral artery disease) ([facility] (2020)      Osteomyelitis of right foot ([facility] (2020)      Acute deep vein thrombosis (DVT) ([facility] (2020)      Anemia (2020)      Lung infiltrate (2020)    73yo hx SVC/ of provoked iatrogenic DVT s/p thrmbolysis  on Eliquis for  Right calf DVT w/ anemia    Plan:     Can convert to Lovenox 1.5mg/kg daily upon discharge. Cont as outpatient. For eloquis failure  Follow up with Dr. Shantal Rodriguez.  Outpatient workup for anemia  Will follow peripherally    Subjective:     No complaints, speaks minimal english    Objective:     Patient Vitals for the past 24 hrs:   BP Temp Pulse Resp SpO2   20 0952 169/86 98.9 °F (37.2 °C) (!) 105 16 97 %   20 0357 150/70 98.4 °F (36.9 °C) 88 16 98 %   20 2300 143/69 98 °F (36.7 °C) 81 16 99 %   20 1546 154/73 98.2 °F (36.8 °C) 72 16 100 %   20 1147  98.2 °F (36.8 °C) 98 16 97 %         Intake/Output Summary (Last 24 hours) at 2020 1146  Last data filed at 2020 0940  Gross per 24 hour   Intake 360 ml   Output    Net 360 ml       Temp (24hrs), Av.3 °F (36.8 °C), Min:98 °F (36.7 °C), Max:98.9 °F (37.2 °C)           Exam:  General: NAD  HEENT: No JVD  Lungs: CTA B  Cardiovascular: RRR  Abdomen: SNTND      Recent Results (from the past 24 hour(s))   GLUCOSE, POC    Collection Time: 20  3:37 PM   Result Value Ref Range    Glucose (POC) 56 (L) 70 - 110 mg/dL   GLUCOSE, POC    Collection Time: 20  3:39 PM   Result Value Ref Range    Glucose (POC) 67 (L) 70 - 110 mg/dL   GLUCOSE, POC Collection Time: 01/25/20  3:58 PM   Result Value Ref Range    Glucose (POC) 63 (L) 70 - 110 mg/dL   GLUCOSE, POC    Collection Time: 01/25/20  4:14 PM   Result Value Ref Range    Glucose (POC) 70 70 - 110 mg/dL   GLUCOSE, POC    Collection Time: 01/25/20  4:50 PM   Result Value Ref Range    Glucose (POC) 77 70 - 110 mg/dL   GLUCOSE, POC    Collection Time: 01/25/20  5:19 PM   Result Value Ref Range    Glucose (POC) 98 70 - 110 mg/dL   CBC W/O DIFF    Collection Time: 01/25/20  9:19 PM   Result Value Ref Range    WBC 6.0 4.6 - 13.2 K/uL    RBC 2.50 (L) 4.70 - 5.50 M/uL    HGB 7.2 (L) 13.0 - 16.0 g/dL    HCT 22.6 (L) 36.0 - 48.0 %    MCV 90.4 74.0 - 97.0 FL    MCH 28.8 24.0 - 34.0 PG    MCHC 31.9 31.0 - 37.0 g/dL    RDW 14.2 11.6 - 14.5 %    PLATELET 679 976 - 166 K/uL    MPV 9.0 (L) 9.2 - 11.8 FL   FIBRINOGEN    Collection Time: 01/25/20  9:19 PM   Result Value Ref Range    Fibrinogen 431 210 - 451 mg/dL   PTT    Collection Time: 01/25/20  9:19 PM   Result Value Ref Range    aPTT 51.1 (H) 23.0 - 36.4 SEC   GLUCOSE, POC    Collection Time: 01/25/20  9:25 PM   Result Value Ref Range    Glucose (POC) 286 (H) 70 - 460 mg/dL   METABOLIC PANEL, BASIC    Collection Time: 01/26/20  3:52 AM   Result Value Ref Range    Sodium 141 136 - 145 mmol/L    Potassium 3.9 3.5 - 5.5 mmol/L    Chloride 106 100 - 111 mmol/L    CO2 30 21 - 32 mmol/L    Anion gap 5 3.0 - 18 mmol/L    Glucose 168 (H) 74 - 99 mg/dL    BUN 17 7.0 - 18 MG/DL    Creatinine 1.33 (H) 0.6 - 1.3 MG/DL    BUN/Creatinine ratio 13 12 - 20      GFR est AA >60 >60 ml/min/1.73m2    GFR est non-AA 53 (L) >60 ml/min/1.73m2    Calcium 8.0 (L) 8.5 - 10.1 MG/DL   CBC WITH AUTOMATED DIFF    Collection Time: 01/26/20  3:52 AM   Result Value Ref Range    WBC 6.0 4.6 - 13.2 K/uL    RBC 2.39 (L) 4.70 - 5.50 M/uL    HGB 6.9 (L) 13.0 - 16.0 g/dL    HCT 21.6 (L) 36.0 - 48.0 %    MCV 90.4 74.0 - 97.0 FL    MCH 28.9 24.0 - 34.0 PG    MCHC 31.9 31.0 - 37.0 g/dL    RDW 14.3 11.6 - 14.5 % PLATELET 905 010 - 311 K/uL    MPV 9.1 (L) 9.2 - 11.8 FL    NEUTROPHILS 62 42 - 75 %    LYMPHOCYTES 23 20 - 51 %    MONOCYTES 11 (H) 2 - 9 %    EOSINOPHILS 4 0 - 5 %    BASOPHILS 0 0 - 3 %    ABS. NEUTROPHILS 3.7 1.8 - 8.0 K/UL    ABS. LYMPHOCYTES 1.4 0.8 - 3.5 K/UL    ABS. MONOCYTES 0.7 0 - 1.0 K/UL    ABS. EOSINOPHILS 0.2 0.0 - 0.4 K/UL    ABS.  BASOPHILS 0.0 0.0 - 0.1 K/UL    RBC COMMENTS NORMOCYTIC, NORMOCHROMIC      WBC COMMENTS REACTIVE LYMPHS      DF MANUAL     PTT    Collection Time: 01/26/20  3:52 AM   Result Value Ref Range    aPTT 70.3 (H) 23.0 - 36.4 SEC   MAGNESIUM    Collection Time: 01/26/20  3:52 AM   Result Value Ref Range    Magnesium 1.7 1.6 - 2.6 mg/dL   GLUCOSE, POC    Collection Time: 01/26/20  6:33 AM   Result Value Ref Range    Glucose (POC) 230 (H) 70 - 110 mg/dL   CBC W/O DIFF    Collection Time: 01/26/20  9:29 AM   Result Value Ref Range    WBC 6.5 4.6 - 13.2 K/uL    RBC 2.43 (L) 4.70 - 5.50 M/uL    HGB 7.1 (L) 13.0 - 16.0 g/dL    HCT 22.2 (L) 36.0 - 48.0 %    MCV 91.4 74.0 - 97.0 FL    MCH 29.2 24.0 - 34.0 PG    MCHC 32.0 31.0 - 37.0 g/dL    RDW 14.3 11.6 - 14.5 %    PLATELET 128 255 - 102 K/uL    MPV 8.8 (L) 9.2 - 11.8 FL   FIBRINOGEN    Collection Time: 01/26/20  9:29 AM   Result Value Ref Range    Fibrinogen 396 210 - 451 mg/dL   PTT    Collection Time: 01/26/20  9:29 AM   Result Value Ref Range    aPTT 46.4 (H) 23.0 - 36.4 Chioma Sood MD

## 2020-01-26 NOTE — PROGRESS NOTES
Rectus sheath hematoma   5 by 2 3 found on CT scan we will hold 1 AM Lovenox reviewed case with Dr. Junior Hernandez who was on-call vascular and they will discuss in the morning

## 2020-01-26 NOTE — PROGRESS NOTES
Patient developedn RLQ pain and CT scan reveals 5x3cm rectus sheath hematoma. Discussed with NEIL Stack--would hold lovenox this evening and reassess again tomorrow. Has non-occlusive right posterior tibial vein DVT--no indication for IVC filter at this point. Hopefully will be able to restart lovenox tomorrow.     [address] [facility]  RHINAMS Asst Prof Surgery  PG [phone]  Cell [phone]

## 2020-01-26 NOTE — PROGRESS NOTES
Summary:  Patient noted to be agitated and uncooperative at beginning of shift. Confused and talking in circles. Finally stated he needed to have a bowel movement and was assisted to [facility] where he did have a very large, soft BM. Returned to bed with assistance and was pleasant and cooperative for remainder of the shift. Patient slept throughout the night with no issues.

## 2020-01-26 NOTE — PROGRESS NOTES
9391 Bedside and Verbal shift change report given to RUBEN Rojo, LINH (oncoming nurse) by LAURA Dias RN (offgoing nurse). Report included the following information SBAR, Kardex, Intake/Output, and MAR. Pt in bed, call light in reach. 1230 Gave update of pt to Dr. Sherif Franks. Pt complaining of RLQ pain. 1305 Glucose 248, 4 units insulin given. [place] 5077 with Dr. Sherif Franks, CT resulted with a large hematoma in RLQ. She stated to hold the 0100 lovenox dose. Hold lovenox until vascular can see pt.     1547 Glucose 68, rechecked in other hand, 72. Will follow hypoglycemic protocol. Apple juice given. 1616 Glucose 84.     1630 Insulin held. Pt had 3 BM today on bedside commode. 1915 Bedside and Verbal shift change report given to LAURA Dias RN (oncoming nurse) by Suzanna Del Valle. Khushi Rojo RN (offgoing nurse). Report included the following information SBAR, Kardex, Intake/Output and MAR. Pt in bed, call light in reach.

## 2020-01-27 LAB
ABO + RH BLD: NORMAL
ANION GAP SERPL CALC-SCNC: 6 MMOL/L (ref 3–18)
APTT PPP: 36.9 SEC (ref 23–36.4)
BASOPHILS # BLD: 0 K/UL (ref 0–0.1)
BASOPHILS NFR BLD: 0 % (ref 0–2)
BLD PROD TYP BPU: NORMAL
BLOOD GROUP ANTIBODIES SERPL: NORMAL
BPU ID: NORMAL
BUN SERPL-MCNC: 18 MG/DL (ref 7–18)
BUN/CREAT SERPL: 15 (ref 12–20)
CALCIUM SERPL-MCNC: 7.9 MG/DL (ref 8.5–10.1)
CALLED TO:,BCALL1: NORMAL
CHLORIDE SERPL-SCNC: 104 MMOL/L (ref 100–111)
CO2 SERPL-SCNC: 28 MMOL/L (ref 21–32)
CREAT SERPL-MCNC: 1.2 MG/DL (ref 0.6–1.3)
CROSSMATCH RESULT,%XM: NORMAL
DIFFERENTIAL METHOD BLD: ABNORMAL
EOSINOPHIL # BLD: 0 K/UL (ref 0–0.4)
EOSINOPHIL NFR BLD: 0 % (ref 0–5)
ERYTHROCYTE [DISTWIDTH] IN BLOOD BY AUTOMATED COUNT: 14.1 % (ref 11.6–14.5)
GLUCOSE BLD STRIP.AUTO-MCNC: 178 MG/DL (ref 70–110)
GLUCOSE BLD STRIP.AUTO-MCNC: 236 MG/DL (ref 70–110)
GLUCOSE BLD STRIP.AUTO-MCNC: 244 MG/DL (ref 70–110)
GLUCOSE BLD STRIP.AUTO-MCNC: 258 MG/DL (ref 70–110)
GLUCOSE BLD STRIP.AUTO-MCNC: 68 MG/DL (ref 70–110)
GLUCOSE BLD STRIP.AUTO-MCNC: 72 MG/DL (ref 70–110)
GLUCOSE SERPL-MCNC: 182 MG/DL (ref 74–99)
HCT VFR BLD AUTO: 24.7 % (ref 36–48)
HCT VFR BLD AUTO: 24.7 % (ref 36–48)
HGB BLD-MCNC: 8 G/DL (ref 13–16)
HGB BLD-MCNC: 8 G/DL (ref 13–16)
LYMPHOCYTES # BLD: 1.4 K/UL (ref 0.9–3.6)
LYMPHOCYTES NFR BLD: 17 % (ref 21–52)
MAGNESIUM SERPL-MCNC: 1.7 MG/DL (ref 1.6–2.6)
MCH RBC QN AUTO: 29.2 PG (ref 24–34)
MCHC RBC AUTO-ENTMCNC: 32.4 G/DL (ref 31–37)
MCV RBC AUTO: 90.1 FL (ref 74–97)
MONOCYTES # BLD: 0.9 K/UL (ref 0.05–1.2)
MONOCYTES NFR BLD: 11 % (ref 3–10)
NEUTS SEG # BLD: 5.9 K/UL (ref 1.8–8)
NEUTS SEG NFR BLD: 72 % (ref 40–73)
PLATELET # BLD AUTO: 296 K/UL (ref 135–420)
PMV BLD AUTO: 8.9 FL (ref 9.2–11.8)
POTASSIUM SERPL-SCNC: 4 MMOL/L (ref 3.5–5.5)
RBC # BLD AUTO: 2.74 M/UL (ref 4.7–5.5)
SODIUM SERPL-SCNC: 138 MMOL/L (ref 136–145)
SPECIMEN EXP DATE BLD: NORMAL
STATUS OF UNIT,%ST: NORMAL
UNIT DIVISION, %UDIV: 0
WBC # BLD AUTO: 8.3 K/UL (ref 4.6–13.2)

## 2020-01-27 PROCEDURE — 85025 COMPLETE CBC W/AUTO DIFF WBC: CPT

## 2020-01-27 PROCEDURE — 97116 GAIT TRAINING THERAPY: CPT

## 2020-01-27 PROCEDURE — 65660000000 HC RM CCU STEPDOWN

## 2020-01-27 PROCEDURE — 74011250637 HC RX REV CODE- 250/637: Performed by: INTERNAL MEDICINE

## 2020-01-27 PROCEDURE — 83735 ASSAY OF MAGNESIUM: CPT

## 2020-01-27 PROCEDURE — 80048 BASIC METABOLIC PNL TOTAL CA: CPT

## 2020-01-27 PROCEDURE — 85018 HEMOGLOBIN: CPT

## 2020-01-27 PROCEDURE — 82962 GLUCOSE BLOOD TEST: CPT

## 2020-01-27 PROCEDURE — 74011250637 HC RX REV CODE- 250/637: Performed by: HOSPITALIST

## 2020-01-27 PROCEDURE — 85730 THROMBOPLASTIN TIME PARTIAL: CPT

## 2020-01-27 PROCEDURE — 74011250636 HC RX REV CODE- 250/636: Performed by: SURGERY

## 2020-01-27 PROCEDURE — 74011636637 HC RX REV CODE- 636/637: Performed by: INTERNAL MEDICINE

## 2020-01-27 PROCEDURE — 74011636637 HC RX REV CODE- 636/637: Performed by: FAMILY MEDICINE

## 2020-01-27 PROCEDURE — 36415 COLL VENOUS BLD VENIPUNCTURE: CPT

## 2020-01-27 RX ADMIN — AMOXICILLIN AND CLAVULANATE POTASSIUM 1 TABLET: 875; 125 TABLET, FILM COATED ORAL at 22:20

## 2020-01-27 RX ADMIN — INSULIN GLARGINE 10 UNITS: 100 INJECTION, SOLUTION SUBCUTANEOUS at 09:37

## 2020-01-27 RX ADMIN — Medication 250 MG: at 09:38

## 2020-01-27 RX ADMIN — AMOXICILLIN AND CLAVULANATE POTASSIUM 1 TABLET: 875; 125 TABLET, FILM COATED ORAL at 09:38

## 2020-01-27 RX ADMIN — INSULIN LISPRO 4 UNITS: 100 INJECTION, SOLUTION INTRAVENOUS; SUBCUTANEOUS at 17:40

## 2020-01-27 RX ADMIN — ATORVASTATIN CALCIUM 20 MG: 20 TABLET, FILM COATED ORAL at 09:39

## 2020-01-27 RX ADMIN — POTASSIUM CHLORIDE 20 MEQ: 1500 TABLET, EXTENDED RELEASE ORAL at 09:37

## 2020-01-27 RX ADMIN — OXYCODONE HYDROCHLORIDE 5 MG: 5 TABLET ORAL at 09:37

## 2020-01-27 RX ADMIN — LOSARTAN POTASSIUM 100 MG: 50 TABLET, FILM COATED ORAL at 09:37

## 2020-01-27 RX ADMIN — POLYETHYLENE GLYCOL 3350 17 G: 17 POWDER, FOR SOLUTION ORAL at 09:37

## 2020-01-27 RX ADMIN — INSULIN LISPRO 4 UNITS: 100 INJECTION, SOLUTION INTRAVENOUS; SUBCUTANEOUS at 22:20

## 2020-01-27 RX ADMIN — OXYCODONE HYDROCHLORIDE 5 MG: 5 TABLET ORAL at 00:29

## 2020-01-27 RX ADMIN — QUETIAPINE FUMARATE 25 MG: 25 TABLET ORAL at 22:20

## 2020-01-27 RX ADMIN — ENOXAPARIN SODIUM 80 MG: 40 INJECTION, SOLUTION INTRAVENOUS; SUBCUTANEOUS at 13:55

## 2020-01-27 RX ADMIN — OXYCODONE HYDROCHLORIDE 5 MG: 5 TABLET ORAL at 15:32

## 2020-01-27 NOTE — PROGRESS NOTES
Bedside and Verbal shift change report given to NIR Henry RN (oncoming nurse) by LAURA Matta RN (offgoing nurse). Report included the following information SBAR, Kardex, Intake/Output, MAR and Recent Results.

## 2020-01-27 NOTE — PROGRESS NOTES
Transition of care: anticipate rehab when medically cleared  Met with patient at bedside. He was sleeping. Met with Dr. Fransisca Solis patient is getting ready for rehab  Cm has spoken to Ravi Ludwigjob she will start auth for Wednesday no bed any sooner and daughter Davian Ford confirmed she does want [facility]. Cm willl conitnue to follow  Sharyle Cal informed cm she will need a [facility] as well  Care Management Interventions  PCP Verified by CM:  Yes  Transition of Care Consult (CM Consult): Discharge Planning  Current Support Network: Relative's Home  Confirm Follow Up Transport: Family  The Plan for Transition of Care is Related to the Following Treatment Goals : rehab  The Patient and/or Patient Representative was Provided with a Choice of Provider and Agrees with the Discharge Plan?: Yes  Freedom of Choice List was Provided with Basic Dialogue that Supports the Patient's Individualized Plan of Care/Goals, Treatment Preferences and Shares the Quality Data Associated with the Providers?: Yes  Discharge Location  Discharge Placement: Skilled nursing facility

## 2020-01-27 NOTE — DIABETES MGMT
GLYCEMIC CONTROL PROGRESS NOTE:    - known h/o T2DM, HbA1C not within recommended range for age + comorbids on basal/oral home regimen  Noted:  - pt with three hypoglycemia episodes 1/24-1/25 ranging from 40-53 mg/dL  - recommend additional adjustment to basal insulin     *Lantus 6 units daily    Recent Glucose Results:   Lab Results   Component Value Date/Time     (H) 01/27/2020 04:43 AM    GLUCPOC 258 (H) 01/27/2020 12:14 PM    GLUCPOC 178 (H) 01/27/2020 06:07 AM    GLUCPOC 184 (H) 01/26/2020 09:00 PM     Karan Dobbins MS, RN, CDE  Glycemic Control Team  [phone]  Pager [phone] (M-TH 8:00-4:30P)  *After Hours pager [phone]

## 2020-01-27 NOTE — PROGRESS NOTES
Hospitalist Progress Note    Patient: Mateo Stephens MRN: [id number]  [facility]: [id number]    YOB: 1945  Age: 76 y.o. Sex: male    DOA: 1/18/2020 LOS:  LOS: 9 days          Chief Complaint:    DVT's      Assessment/Plan     77 yo male with recent surgery for osteo in foot and on course of IV abx came back with arm swelling, extensive DVT despite eliquis therapy     Bilateral Upper extremities-and lower extremity DVT present on admission  S/p thrombolysis  Now on BID lovenox  rosetat go to 1.5 mg/kg daily dosing on d/c     Recent foot surgery for osteo and diabetic infection  Now on p.o. Augmentin (picc out)     Vascular moved forward with thrombolysis , thoracentesis put on hold for now-consider as outpatient   lovenox for eliquis failure now     Metabolic encephalopathy -still confused at times, but better per family, had been sleep deprived  MRI brain done -no acute issue      HTN -stable     Severe prot olivia malnutrition-changed to reg diet to try to facilitate improved PO     IDDM with vascular complications     Alfonso on CKD stage 3-improved and stable     Rectus sheath hematoma causing some pain-transfused blood this weekend, recent H&H appears stable    Stop IV lasix and IVF       SNF on d/c-arrangements pending, discussed all with daughter     Disposition :  Patient Active Problem List   Diagnosis Code    Stage 3 chronic kidney disease ([facility] N18.3    Type 2 diabetes mellitus, with long-term current use of insulin ([address] E11.9, Z79.4    Hypertension I10    Hypokalemia E87.6    Foot abscess, right L02.611    Diabetic ulcer of right midfoot associated with type 2 diabetes mellitus, with fat layer exposed ([address] K88.752, L97.412    PAD (peripheral artery disease) ([facility]) I73.9    Osteomyelitis of right foot ([address] M86.9    Acute deep vein thrombosis (DVT) ([address] I82.409    DVT (deep venous thrombosis) ([facility]) I82.409    Anemia D64.9    Lung infiltrate R91.8    Hematoma T14. Shiela Joan Subjective:    C/o not wanting much to eat  Appetite poor  Pain RLQ and into groin    Review of systems:    Constitutional: denies fevers, chills  Respiratory: denies SOB  Cardiovascular: denies chest pain  Gastrointestinal: denies nausea, vomiting, diarrhea      Vital signs/Intake and Output:  Visit Vitals  /70 (BP 1 Location: Right arm, BP Patient Position: At rest)   Pulse (!) 105   Temp 98.7 °F (37.1 °C)   Resp 18   Ht 5' 7\" (1.702 m)   Wt 76.1 kg (167 lb 12.3 oz)   SpO2 91%   BMI 26.28 kg/m²     Current Shift:  01/27 0701 - 01/27 1900  In: -   Out: 600 [Urine:600]  Last three shifts:  01/25 1901 - 01/27 0700  In: 676.7 [P.O.:360]  Out: -     Exam:    General: elderly HM alert, NAD  Head/Neck: NCAT, supple, No masses, No lymphadenopathy  CVS:Regular rate and rhythm, no M/R/G, S1/S2 heard, no thrill  Lungs:Clear to auscultation bilaterally, no wheezes, rhonchi, or rales  Abdomen: Soft, tender RLQ and SP area, c/w hematoma on CT scan, no erythema or ecchymoses seen, No distention, Normal Bowel sounds, No hepatomegaly  Extremities: No C/C/E, pulses palpable 2+  Neuro:grossly normal , follows commands  Psych:appropriate                Labs: Results:       Chemistry Recent Labs     01/27/20  0443 01/26/20  0352 01/25/20  0957 01/25/20  0432   * 168*  --  103*    141  --  141   K 4.0 3.9 3.6 3.7    106  --  106   CO2 28 30  --  30   BUN 18 17  --  18   CREA 1.20 1.33*  --  1.22   CA 7.9* 8.0*  --  8.3*   AGAP 6 5  --  5   BUCR 15 13  --  15      CBC w/Diff Recent Labs     01/27/20  1100 01/27/20  0443 01/26/20  1640 01/26/20  0929 01/26/20  0352   WBC  --  8.3 7.0 6.5 6.0   RBC  --  2.74* 2.43* 2.43* 2.39*   HGB 8.0* 8.0* 7.2* 7.1* 6.9*   HCT 24.7* 24.7* 22.0* 22.2* 21.6*   PLT  --  296 359 348 327   GRANS  --  72 60  --  62   LYMPH  --  17* 22  --  23   EOS  --  0 3  --  4      Cardiac Enzymes No results for input(s): CPK, CKND1, KAREN in the last 72 hours.     No lab exists for component: CKRMB, TROIP   Coagulation Recent Labs     01/27/20  0443 01/26/20  0929  01/24/20  1822   PTP  --   --   --  16.1*   INR  --   --   --  1.3*   APTT 36.9* 46.4*   < >  --     < > = values in this interval not displayed. Lipid Panel No results found for: CHOL, CHOLPOCT, CHOLX, CHLST, CHOLV, 325229, HDL, HDLP, LDL, LDLC, DLDLP, 483579, VLDLC, VLDL, TGLX, TRIGL, TRIGP, TGLPOCT, CHHD, CHHDX   BNP No results for input(s): BNPP in the last 72 hours. Liver Enzymes No results for input(s): TP, ALB, TBIL, AP, SGOT, GPT in the last 72 hours.     No lab exists for component: DBIL   Thyroid Studies Lab Results   Component Value Date/Time    TSH 1.86 01/19/2020 06:14 AM        Procedures/imaging: see electronic medical records for all procedures/Xrays and details which were not copied into this note but were reviewed prior to creation of Chanell Loo MD

## 2020-01-27 NOTE — PROGRESS NOTES
NUTRITION FOLLOW-UP    RECOMMENDATIONS/PLAN:   - Diet: Change to DM 2400kcal to meet estimated needs  -Supplement: Change to Magic Cup TID  Monitor labs/lytes, PO intakes, skin integrity, wt, fluid status, BM    NUTRITION ASSESSMENT:   Client Update: 76 yrs old Male with arm swelling, extensive DVT despite eliquis therapy, rectus sheath hematoma per MD note, thrombolysis , thoracentesis-vascular following, metabolic encephalopathy, hypoglycemia, hypokalemia, HTN, JC on CKD stage III, 1 unit of packed RBCs. Per family pt continues to have a very poor appetite and they have to force him to eat; this started about 2 weeks before coming to [facility]. He is just not eating; recc switching supplement to Magic Cup TID    FOOD/NUTRITION INTAKE   Diet Order:  DM 1800kcal  Supplements: Glucerna TID   Food Allergies: NKFA  Average PO Intake:      Patient Vitals for the past 100 hrs:   % Diet Eaten   01/26/20 0940 90 %   01/25/20 0800 100 %      Pertinent Medications:  [x] Reviewed; d5%, Miralax, KCl,   Electrolyte Replacement Protocol: [x]K [x]Mg []PO4  Insulin:  [x]SSI  []Pre-meal   [x]Basal    []Drip  []None  Cultural/Holiness Food Preferences: None Identified    BIOCHEMICAL DATA & MEDICAL TESTS  Pertinent Labs:  [x] Reviewed;     ANTHROPOMETRICS  Height: 5' 7\" (170.2 cm)       Weight: 76.1 kg (167 lb 12.3 oz)         BMI: 26.3 kg/m^2 overweight (25.0%-29.9% BMI)   Adm Weight: 167 lbs                Weight change: 0lbs  Adjusted Body Weight: n/a     NUTRITION-FOCUSED PHYSICAL ASSESSMENT  Skin: No PU     GI: +BM 1/26/20    NUTRITION PRESCRIPTION  Calories: 0273-7865 kcal/day based on [place] x 1.7-1.8  Protein: 114-152 g/day based on 1.5-2.0 g/kg  CHO: 310-328 g/day based on 50% of total energy  Fluid:  2680-0616 ml/day based on 1 kcal/ml         NUTRITION DIAGNOSES:   1. Malnutrition related to poor appetite as evidence by MD report and NFPE    NUTRITION INTERVENTIONS:   INTERVENTIONS:        GOALS:  1. - Diet: Change to DM 2400kcal to meet estimated needs  -Supplement: Change to Magic Cup TID 1. Encourage PO intake >50% at most meals by next review 5 days     LEARNING NEEDS (Diet, Supplementation, Food/Nutrient-Drug Interaction):   [] None Identified   [] Education provided/documented      Identified and patient: [] Declined   [x] Was not appropriate/indicated        NUTRITION MONITORING /EVALUATION:   Follow PO intake  Monitor wt  Monitor renal labs, electrolytes, fluid status  Monitor for additional supplement needs     Previous Recommendations Implemented: yes        Previous Goals Met:  yes -      [] Participated in Interdisciplinary Rounds    [x] Interdisciplinary Care Plan Reviewed  DISCHARGE NUTRITION RECOMMENDATIONS ADDRESSED:      [x] To be determined closer to discharge    NUTRITION RISK:           [x] At risk                        [] Not currently at risk        Will follow-up per policy.   Marcel Lopez 1

## 2020-01-27 NOTE — PROGRESS NOTES
Problem: Mobility Impaired (Adult and Pediatric)  Goal: *Acute Goals and Plan of Care (Insert Text)  Description  In 1-7 days pt will be able to perform:  ST.  Bed mobility:  Rolling L to R to L modified independent for positioning. 2.  Supine to sit to supine S with HR for meals. 3.  Sit to stand to sit S with RW in prep for ambulation. LT.  Gait:  Ambulate >150ft S with RW, PWB surgical shoe donned R, for improved mobility. 2.  Activity tolerance: Tolerate up in chair 1-2 hours for ADLs. 3.  Patient/Family Education:  Patient/family to be independent with HEP for follow-up care and safe discharge. Outcome: Progressing Towards Goal   PHYSICAL THERAPY TREATMENT    Patient: Silvina Josue (40 y.o. male)  Date: 2020  Diagnosis: DVT (deep venous thrombosis) ([facility]) [I82.409]   DVT (deep venous thrombosis) ([facility])       Precautions: Fall, PWB  Chart, physical therapy assessment, plan of care and goals were reviewed. ASSESSMENT:  Pt seen in supine prior to session. Bed alarm don secondary to safety. Pt requires  during session as pt does not speak [place]. Pt demonstrates unsteadiness while ambulate and requires constant VCs for proper gait sequencing and to maintain PWBing precautions. Pt c/o abdominal discomfort during session but reports decrease sxs once pt transferred to sitting on the EOB. Pt transferred to supine after session, call bell and tray in reach, bed alarm donned, nurse notified after session. Progression toward goals:  [x]      Improving appropriately and progressing toward goals  []      Improving slowly and progressing toward goals  []      Not making progress toward goals and plan of care will be adjusted     PLAN:  Patient continues to benefit from skilled intervention to address the above impairments. Continue treatment per established plan of care.   Discharge Recommendations:  Rehab  Further Equipment Recommendations for Discharge:  rolling walker SUBJECTIVE:   Patient stated My stomach is bothering me.     OBJECTIVE DATA SUMMARY:   Critical Behavior:  Neurologic State: Alert, Confused  Orientation Level: Oriented to person, Oriented to place, Disoriented to time, Disoriented to situation  Cognition: Decreased command following, Poor safety awareness, Impulsive  Safety/Judgement: Fall prevention  Functional Mobility Training:  Bed Mobility:  Supine to Sit: Supervision  Sit to Supine: Supervision  Scooting: Supervision  Transfers:  Sit to Stand: Contact guard assistance  Stand to Sit: Contact guard assistance  Balance:  Sitting: Intact  Standing: Impaired; With support  Standing - Static: Fair  Standing - Dynamic : Fair;Poor  Ambulation/Gait Training:  Distance (ft): 35 Feet (ft)  Assistive Device: Gait belt;Walker, rolling  Ambulation - Level of Assistance: Minimal assistance  Gait Abnormalities: Antalgic;Decreased step clearance; Step to gait  Right Side Weight Bearing: Partial (%)(w/ surgical shoe)  Base of Support: Shift to left  Stance: Right decreased  Speed/Johnna: Slow  Step Length: Left shortened;Right shortened  Swing Pattern: Left asymmetrical;Right asymmetrical  Interventions: Safety awareness training; Tactile cues; Verbal cues  Pain:  Pain Scale 1: Numeric (0 - 10)  Pain Intensity 1: 0  Activity Tolerance:   Fair  Please refer to the flowsheet for vital signs taken during this treatment.   After treatment:   [] Patient left in no apparent distress sitting up in chair  [x] Patient left in no apparent distress in bed  [x] Call bell left within reach  [x] Nursing notified  [] Caregiver present  [x] Bed alarm activated      Paco Salcedo, PT   Time Calculation: 18 mins

## 2020-01-27 NOTE — CONSULTS
[facility]                                               (A Division of [address] [place] [address])                           Follow-up Note      Date of Admission: 1/18/2020     Date of Note:  1/27/2020    Summary:      Mr Josey Dan is an elderly 69y diabetic  whom I saw last admission for GBS sepsis emanating from a malodorous R DFU/OM (initial CRP 114mg/L) for which he had OR debridement on 1/7 followed by IV ertapenem since going home on 1/14.     Went home and returned to ER on 1/15 where he was re-evaluated for febrile episode and feeling generalized malaise.  Had repeat CRP done, which returned much better at 54mg/L and negative PCT.  Noted then to have a R apical mass off the pleura.     Returned/admitted 1/18 for fever and swollen arm where PICC had been placed.  Now noted to have extensive R upper arm/subclavian DVT.  Had been on PO novel anticoagulant, which has been replaced by heparin.        Interval History:  CC:  My stomach hurts  Weekend events reviewed. Missed abx on Friday due to NPO status; started on Saturday. No appetite. Had BM yesterday. Hurts in RLQ abdomen. Current Antimicrobials: Prior Antimicrobials   1. amox-clav PO (1/25-) #2 1. Meropenem IV (1/14-23)       Assessment Plan:   R Foot DFU/OM - s/p 3wks IV therapy before switching to PO due to DVT/PE from PICC  1/6:  CRP - 114mg/L  1/15:  CRP - 54mg/L  1/22:  CRP - 224mg/L  1/26:  CRP - 98mg/L    Foot is stable. In acceptable therapy. Follow CRP and foot weekly. ->IV x3wks+PO #2    GO one month PO following weekly CRP/foot exams. Once CRP hits normal/<10mg/L and hole closes, can entertain stopping amox-clav. RLQ pain    Doubt appendicitis, but will defer to hospitalists. ->Amox-clav will loosen stools such that laxatives will be unnecessary. On good/decent GI abx that may mute appendicitis presentation.    Thromboembolic disease    JC - resolved      Microbiology:                 1/18 - BCx x2(-)                                                      Wd - NOS (-)                                                      Flu(-)                                                      Spneumo Ag(-)                                                      Legionella Ag(-)                                            1/15 - BCx x2(-)                                            1/6 - Wound (+) CoNS and bacillus (surface contaminants)                                         1/2 - BCx (-)                                         1/1 - BCx 2/2 (+) Streptococcus agalactiae                                                      UA (+) Streptococcus agalactiae        Lines / Catheters:         peripheral         Patient Active Problem List   Diagnosis Code    Stage 3 chronic kidney disease ([facility]) N18.3    Type 2 diabetes mellitus, with long-term current use of insulin ([facility]) E11.9, Z79.4    Hypertension I10    Hypokalemia E87.6    Foot abscess, right L02.611    Diabetic ulcer of right midfoot associated with type 2 diabetes mellitus, with fat layer exposed ([facility] W41.453, L97.412    PAD (peripheral artery disease) ([facility]) I73.9    Osteomyelitis of right foot ([facility]) M86.9    Acute deep vein thrombosis (DVT) ([facility]) I82.409    DVT (deep venous thrombosis) ([facility]) I82.409    Anemia D64.9    Lung infiltrate R91.8    Hematoma T14. 8XXA       Current Facility-Administered Medications   Medication Dose Route Frequency    0.9% sodium chloride infusion 250 mL  250 mL IntraVENous PRN    potassium chloride (K-DUR, KLOR-CON) SR tablet 20 mEq  20 mEq Oral DAILY    haloperidol lactate (HALDOL) injection 2 mg  2 mg IntraVENous Q6H PRN    amoxicillin-clavulanate (AUGMENTIN) 875-125 mg per tablet 1 Tab  1 Tab Oral Q12H    ELECTROLYTE REPLACEMENT PROTOCOL - Potassium Renal Dosing  1 Each Other PRN    ELECTROLYTE REPLACEMENT PROTOCOL - Magnesium   1 Each Other PRN    QUEtiapine (SEROquel) tablet 25 mg  25 mg Oral QHS    insulin glargine (LANTUS) injection 10 Units  10 Units SubCUTAneous DAILY    glucose chewable tablet 16 g  4 Tab Oral PRN    glucagon (GLUCAGEN) injection 1 mg  1 mg IntraMUSCular PRN    dextrose 10% infusion 125-250 mL  125-250 mL IntraVENous PRN    Saccharomyces boulardii (FLORASTOR) capsule 250 mg  250 mg Oral DAILY    enoxaparin (LOVENOX) injection 80 mg  80 mg SubCUTAneous Q12H    insulin lispro (HUMALOG) injection   SubCUTAneous AC&HS    oxyCODONE IR (ROXICODONE) tablet 5 mg  5 mg Oral Q4H PRN    0.9% sodium chloride infusion 250 mL  250 mL IntraVENous PRN    0.9% sodium chloride infusion 250 mL  250 mL IntraVENous PRN    atorvastatin (LIPITOR) tablet 20 mg  20 mg Oral DAILY    losartan (COZAAR) tablet 100 mg  100 mg Oral DAILY    polyethylene glycol (MIRALAX) packet 17 g  17 g Oral DAILY    morphine injection 1 mg  1 mg IntraVENous Q3H PRN    labetaloL (NORMODYNE;TRANDATE) 20 mg/4 mL (5 mg/mL) injection 20 mg  20 mg IntraVENous QID PRN    hydrALAZINE (APRESOLINE) 20 mg/mL injection 10 mg  10 mg IntraVENous Q6H PRN    acetaminophen (TYLENOL) tablet 650 mg  650 mg Oral Q4H PRN     Facility-Administered Medications Ordered in Other Encounters   Medication Dose Route Frequency    dextrose 5 % - 0.45% NaCl infusion  50 mL/hr IntraVENous CONTINUOUS    sodium chloride (NS) flush 10-40 mL  10-40 mL IntraVENous PRN    heparin (porcine) pf 500 Units  500 Units InterCATHeter PRN         Review of Systems - General ROS: negative for - chills, fever or night sweats  Respiratory ROS: no cough, shortness of breath, or wheezing  Cardiovascular ROS: no chest pain or dyspnea on exertion  Gastrointestinal ROS: positive for - abdominal pain, appetite loss and gas/bloating  negative for - diarrhea       Objective:  Visit Vitals  /75 (BP 1 Location: Right arm, BP Patient Position: At rest)   Pulse (!) 107   Temp 99 °F (37.2 °C)   Resp 18   Ht 5' 7\" (1.702 m)   Wt 76.1 kg (167 lb 12.3 oz)   SpO2 90%   BMI 26.28 kg/m² Temp (24hrs), Av.6 °F (37 °C), Min:97.5 °F (36.4 °C), Max:99.1 °F (37.3 °C)      GEN: elderly  in NAD  HEENT: anicteric  CHEST: CTA  CVS:RRR  ABD: RLQ TTP without guarding; NABS/hyperactive BS  EXT: R Foot wrapped       Lab results:    Chemistry  Recent Labs     20  0443 20  0352 20  0957 20  0432   * 168*  --  103*    141  --  141   K 4.0 3.9 3.6 3.7    106  --  106   CO2 28 30  --  30   BUN 18 17  --  18   CREA 1.20 1.33*  --  1.22   CA 7.9* 8.0*  --  8.3*   AGAP 6 5  --  5   BUCR 15 13  --  15       CBC w/ Diff  Recent Labs     20  0443 20  1640 20  0929 20  035   WBC 8.3 7.0 6.5 6.0   RBC 2.74* 2.43* 2.43* 2.39*   HGB 8.0* 7.2* 7.1* 6.9*   HCT 24.7* 22.0* 22.2* 21.6*    359 348 327   GRANS 72 60  --  62   LYMPH 17* 22  --  23   EOS 0 3  --  4       Microbiology  All Micro Results     Procedure Component Value Units Date/Time    CULTURE, BLOOD [[id number]] Collected:  20 1355    Order Status:  Completed Specimen:  Blood Updated:  20     Special Requests: NO SPECIAL REQUESTS        Culture result: NO GROWTH 6 DAYS       CULTURE, BLOOD [[id number]] Collected:  20 1518    Order Status:  Completed Specimen:  Blood Updated:  20     Special Requests: NO SPECIAL REQUESTS        Culture result: NO GROWTH 6 DAYS       CULTURE, Severiano Bridegroom STAIN [[id number]] Collected:  20    Order Status:  Completed Specimen:  Wound from Foot Updated:  20 0836     Special Requests: NO SPECIAL REQUESTS        GRAM STAIN NO WBC'S SEEN         NO ORGANISMS SEEN        Culture result: NO GROWTH 3 DAYS       STREP PNEUMO AG, URINE [[id number]] Collected:  20 0000    Order Status:  Completed Specimen:  Urine, random Updated:  20 1629     Strep pneumo Ag, urine NEGATIVE        LEGIONELLA PNEUMOPHILA AG, URINE [[id number]] Collected:  20 0000    Order Status:  Completed Specimen:  Urine, random Updated:  01/19/20 1629     Legionella Ag, urine NEGATIVE        INFLUENZA A & B AG (RAPID TEST) [[id number]] Collected:  01/18/20 1630    Order Status:  Completed Specimen:  Nasopharyngeal from Nasal washing Updated:  01/18/20 1653     Influenza A Antigen NEGATIVE         Comment: A negative result does not exclude influenza virus infection, seasonal or H1N1 due to suboptimal sensitivity. If influenza is circulating in your community, a diagnosis of influenza should be considered based on a patients clinical presentation and empiric antiviral treatment should be considered, if indicated.         Influenza B Antigen NEGATIVE        INFLUENZA A & B AG (RAPID TEST) [[id number]]     Order Status:  Canceled Specimen:  Nasopharyngeal            Mara Campbell MD  Cell [phone]  [facility]   1/27/2020   10:19 AM

## 2020-01-27 NOTE — PROGRESS NOTES
Occupational Therapy Treatment Attempt    Chart reviewed. Attempted Occupational Therapy Treatment, however, patient unable to be seen due to:  []  Nausea/vomiting  []  Eating  [x]  Pain/abdominal (pt w/ rectus sheath hematoma d/t anticoagulant injections)  []  Patient too lethargic  []  Off Unit for testing/procedure  []  Dialysis treatment in progress  []  Telemetry Results  []  Other:     Will f/u later as patient's schedule allows.    Thank you for this referral.  Delmi Wagoner, OTR/L, CSRS

## 2020-01-27 NOTE — PROGRESS NOTES
[facility]  Pulmonary, Critical Care, and Sleep Medicine    Name: Michael Khan MRN: [id number]   : 1945 Hospital: [facility] [place]   Date: 2020        Pulmonary Critical Care Note      2020  respiratory status stable on Ra >90  Not in distress poor appetite  Tolerating Ac  Please clal if any respiratory issues I will sign off     IMPRESSION:   Patient Active Problem List   Diagnosis Code    Sepsis ([facility] [address] A41.9    Sepsis due to Streptococcus Grp B ([facility] [address] recently 2' to # 3 A40.1    Osteomyelitis of right foot ([facility]) M86.9    Acute deep vein thrombosis (DVT) ([facility]) I82.409    Acute on chronic anemia D64.9    CKD (chronic kidney disease) N18.9    Type 2 diabetes mellitus, with long-term current use of insulin ([facility]) E11.9, Z79.4    Hypertension I10    Hypokalemia E87.6    Diabetic ulcer of right midfoot associated with type 2 diabetes mellitus, with fat layer exposed ([facility] [address] E11.621, L97.412    PAD (peripheral artery disease) ([facility]) I73.9    Moderate-severe protein calorie malnutrition E44.0    Hypoalbuminemia E88.09    Hard of hearing H91.90 ·      RECOMMENDATIONS:   Respiratory:  Compensated respiratory status; on room air. Goal SPO2> 91%  HOB more than 30 degrees all the time and strict aspiration precautions. R>L pleural effusion, worsening on repeat CT, could be due to SVC obstruction with thrombus/DVT. Thoracentesis for cytology was planned, which has been canceled due to TPA/heparin used during the vascular procedure and now he is on Lovenox. I believe the pleural effusion could be due to SVC obstruction. Since the SVC clot has been completely resolved after vascular procedure and thrombolysis, recommend repeat CT in couple weeks to evaluate for pleural effusion. Since this is not an acute issue, will follow intermittently. Call us with any questions.     ALL OTHER FOLLOWING ISSUES BEING MANAGED BY PRIMARY TEAM AND RESPECTIVE CONSULTANTS:   THE FOLLOWING ISSUES ARE AS OF 1/25/20:    Infectious disease:  On 1/14/2020, he was discharged home on ertapenem IV with RUE PICC line for right foot abscess and osteomyelitis and bacteremia with Streptococcus agalactia. MRI right foot consistent with osteomyelitis of fifth metatarsal.  CRP 22+ on 1/22/2020. Blood culture 1/18/2020: Negative. Wound culture 1/18/2020: Negative. Antibiotics: Lang Bach was started on 1/14/2020 with plan to continue for 42 days but develop iatrogenic extensive DVT due to PICC line. PICC line removed by vascular surgeon 1/23/2020. Meropenem changed to Augmentin on 1/24/2020. Follow-up cultures. Defer antibiotics to ID, D/w Dr. Yolie Luther. Cardiovascular:   (Iatrogenic RUE DVT extending into right IJ/subclavian/axillary/brachial with SVC obstruction, now left proximal subclavian DVT. S/p catheter directed thrombolysis with resolved SVC clot on 1/23/20)  On 1/14/2020, he was discharged home on Eliquis for DVT. CTA on admission 1/18/2020: No PE but extensive RUE DVT secondary to PICC line. Katherene Means PVL RUE 1/18/2020: Acute nonocclusive thrombus in right IJ, subclavian, axillary and brachial veins. Nonocclusive superficial thrombophlebitis in the right basilic vein. PVL LUE 1/21/2020: Acute appearing thrombus in left proximal subclavian. S/p vascular procedure 1/23/2020 with PICC line removal and sheath placement with TPA and heparin infusion. Venogram 1/24/20 showed resolved SVC clot. Sheath removed. Vascular surgeon recommended Lovenox and recommended against placing another PICC line. Currently on Lovenox 80 mg subcutaneously every 12 hours. Defer treatment, anticoagulation, duration etc. to vascular surgeon. Since he is able to take p.o. medication, restart p.o. Cozaar and lisinopril. Continue hydralazine and labetalol IV as needed. If blood pressure not well controlled, then adjust medications. Hematologic: Received total 2 PRBCs since admission due to anemia. No active external bleeding noted. Continue to monitor H&H closely. Received IV iron. Defer to hematologist.    Renal: JC/CKD, monitor closely. Making good urine output, monitor. Avoid nephrotoxic medications. Restart p.o. Lasix. Restart 20 mg p.o. Kdur. Endocrine:   Continue Lantus and Humalog sliding scale. Maintain glycemic control. Patient had hypoglycemia and so Lantus dose has been reduced. Hypoglycemia resolved after treatment. Monitor closely for any hypoglycemia. Since hypoglycemia resolved, mental status has been normalized. GI: Stool occult negative 1/18/2020. No active external bleeding. CNS:   AMS likely due to metabolic encephalopathy, hypoglycemia and ativan/seroquel use. MRI brain unremarkable for anything acute. Avoid ativan along with Seroquel. Hypoglycemia treated and resolved. Monitor closely. He required a sitter and wrist restraint overnight which has been discontinued. Mental status has been normalized now. Nutrition: Taking p.o. cardiac diet. Strict aspiration precautions. Will defer respective systems problem management to primary and other consultant and follow patient in ICU with primary and other medical team  Further recommendations will be based on the patient's response to recommended treatment and results of the investigation ordered. Quality Care: PPI, DVT prophylaxis, HOB elevated, Infection control all reviewed and addressed. PAIN AND SEDATION: RT foot, RT arm  · Skin/Wound: RT foot ulcer, under dressing. · Nutrition: diet  · Prophylaxis: DVT and GI Prophylaxis reviewed. On DVT Rx with heparin drip. · PT/OT eval and treat: as needed   · Lines/Tubes:RUE PICC line removed 1/23/20. Right arm vascular sheath 1/23/20- removed on 1/24/2020. ADVANCE DIRECTIVE: full code  DISCUSSION: discussed with patient, RN, ICU staff, MDR. Events and notes from last 24 hours reviewed. Care plan discussed with nursing     Moderate decision making complexity.     PCCM will follow intermittently from pulmonary perspective. Call us with any questions. Subjective/History:   Mr. Sang Vasquez with HTN, DM, CKD, PAD, anemia, right foot fifth metatarsal osteomyelitis and Streptococcus agalactia bacteremia in 01/2020 discharged home on IV ertapenem with RUE PICC line, RUE DVT secondary to PICC line extending into right IJ/subclavian/axillary/basilic vein with SVC obstruction leading to RUE edema, later developed left subclavian DVT, underwent PICC line removal and placement of vascular catheter in RUE and treatment of DVT with TPA+ Heparin 1/23/2020. Due to PICC line leading to iatrogenic DVT, IV ertapenem changed to Augmentin by ID on 1/24/2020. Right > left pleural effusion on CT chest.    Vascular Procedure 1/23/20:  Removal of right basilic vein PICC line, fluoroscopic insertion of 0.18 wire to right atrium, placement of 6 [place]ish sheath over wire into basilic vein, placement of thrombolytic UniFuse infusion catheter in right axillary, subclavian, innominate veins and superior vena cava    1/27/2020   Transfer out of ICU on 1/25/2020. Remains on room air. Alert awake and oriented. No focal deficit. No fever, chills, cough, chest pain, SOB. No arm edema. Hemodynamic stable. [facility] was not called for any issues overnight. No other overnight issues reported. Review of Systems:  Review of systems not obtained due to patient factors. Available information noted in HPI              Latest lactic acid:   Lactic acid   Date Value Ref Range Status   01/18/2020 1.5 0.4 - 2.0 MMOL/L Final   01/02/2020 1.3 0.4 - 2.0 MMOL/L Final       Past Medical History:  Past Medical History:   Diagnosis Date    Diabetes ([facility] [address]     DVT of deep femoral vein ([facility] [address]     Foot abscess     Hypertension         Past Surgical History:  Past Surgical History:   Procedure Laterality Date    HX ORTHOPAEDIC      toe amputation        Medications:  Prior to Admission medications    Medication Sig Start Date End Date Taking? Authorizing Provider   LORazepam (ATIVAN) 1 mg tablet Take 1 mg by mouth every four (4) hours as needed for Anxiety (one tab prior to hyperbaric oxygen treatment). Provider, Ashley   apixaban (ELIQUIS) 5 mg (74 tabs) starter pack Take 10 mg (two 5 mg tablets) by mouth twice a day for 7 days   Followed by 5 mg (one 5 mg tablet) by mouth twice a day 1/14/20   Anjali Ashley MD   ertapenem 1 gram 1 g IVPB 1 g by IntraVENous route every twenty-four (24) hours. 1/13/20   Anjali Ashley MD   insulin glargine (LANTUS SOLOSTAR U-100 INSULIN) 100 unit/mL (3 mL) inpn 25 Units by SubCUTAneous route. Mishel Bralow MD   metFORMIN ER (GLUCOPHAGE XR) 750 mg tablet Take 750 mg by mouth daily. Mishel Barlow MD   atorvastatin (LIPITOR) 20 mg tablet Take 20 mg by mouth daily. Mishel Barlow MD   aspirin 81 mg chewable tablet Take 81 mg by mouth daily. Mishel Barlow MD   polyethylene glycol (MIRALAX) 17 gram/dose powder Take 17 g by mouth daily. 1 tablespoon with 8 oz of water daily 12/10/19   Rusty Morris MD   LOSARTAN PO Take 100 mg by mouth.     Mishel Barlow MD       Current Facility-Administered Medications   Medication Dose Route Frequency    potassium chloride (K-DUR, KLOR-CON) SR tablet 20 mEq  20 mEq Oral DAILY    amoxicillin-clavulanate (AUGMENTIN) 875-125 mg per tablet 1 Tab  1 Tab Oral Q12H    QUEtiapine (SEROquel) tablet 25 mg  25 mg Oral QHS    insulin glargine (LANTUS) injection 10 Units  10 Units SubCUTAneous DAILY    Saccharomyces boulardii (FLORASTOR) capsule 250 mg  250 mg Oral DAILY    enoxaparin (LOVENOX) injection 80 mg  80 mg SubCUTAneous Q12H    insulin lispro (HUMALOG) injection   SubCUTAneous AC&HS    atorvastatin (LIPITOR) tablet 20 mg  20 mg Oral DAILY    losartan (COZAAR) tablet 100 mg  100 mg Oral DAILY    polyethylene glycol (MIRALAX) packet 17 g  17 g Oral DAILY       Allergy:  No Known Allergies     Social History:  Social History     Tobacco Use    Smoking status: Never Smoker    Smokeless tobacco: Never Used   Substance Use Topics    Alcohol use: No    Drug use: No        Family History:  History reviewed. No pertinent family history. Objective:   Vital Signs:    Blood pressure 153/70, pulse (!) 105, temperature 98.7 °F (37.1 °C), resp. rate 18, height 5' 7\" (1.702 m), weight 76.1 kg (167 lb 12.3 oz), SpO2 91 %. Body mass index is 26.28 kg/m². O2 Device: Room air   O2 Flow Rate (L/min): 1 l/min   Temp (24hrs), Av.6 °F (37 °C), Min:97.5 °F (36.4 °C), Max:99.1 °F (37.3 °C)         Intake/Output:   Last shift:      701 - 1900  In: -   Out: 600 [Urine:600]  Last 3 shifts: 1901 -  0700  In: 676.7 [P.O.:360]  Out: -     Intake/Output Summary (Last 24 hours) at 2020 1303  Last data filed at 2020 0744  Gross per 24 hour   Intake 316.7 ml   Output 600 ml   Net -283.3 ml       Physical Exam:  General/Neurology: AAO x3. No focal deficit. Head:   Normocephalic, without obvious abnormality, atraumatic. Eye:   EOM intact, no scleral icterus, no pallor, no cyanosis. Nose:   No sinus tenderness, no erythema, no induration, no discharge  Throat:  No oral thrush. Neck:   Supple, symmetric. No lymphadenopathy. Lung:   No air entry at bases. Moderate air entry anteriorly. No rhonchi. No wheezing. No stridors. No prolongded expiration. No accessory muscle use. Heart:   S1 S2 present. No murmur. No JVD. Abdomen:  Soft. NT. ND. +BS. No masses. Extremities:  RUE edema resolved. Right foot ulcer under dressing. No cyanosis. No clubbing. Pulses: 2+ and symmetric in DP. Lymphatic:  No cervical or supraclavicular palpable lymphadenopathy.      Data:     Recent Results (from the past 24 hour(s))   GLUCOSE, POC    Collection Time: 20  3:45 PM   Result Value Ref Range    Glucose (POC) 68 (L) 70 - 110 mg/dL   GLUCOSE, POC    Collection Time: 20  3:46 PM   Result Value Ref Range    Glucose (POC) 72 70 - 110 mg/dL   GLUCOSE, POC    Collection Time: 01/26/20  4:16 PM   Result Value Ref Range    Glucose (POC) 84 70 - 110 mg/dL   TYPE + CROSSMATCH    Collection Time: 01/26/20  4:40 PM   Result Value Ref Range    Crossmatch Expiration 01/29/2020     ABO/Rh(D) A POSITIVE     Antibody screen NEG     CALLED TO:       ARVINIED ROSITA MELTON TELE AT 2010 ON 1/26/20 BY ALMA. Unit number [id number]     Blood component type RC LR     Unit division 00     Status of unit TRANSFUSED     Crossmatch result Compatible    CBC WITH AUTOMATED DIFF    Collection Time: 01/26/20  4:40 PM   Result Value Ref Range    WBC 7.0 4.6 - 13.2 K/uL    RBC 2.43 (L) 4.70 - 5.50 M/uL    HGB 7.2 (L) 13.0 - 16.0 g/dL    HCT 22.0 (L) 36.0 - 48.0 %    MCV 90.5 74.0 - 97.0 FL    MCH 29.6 24.0 - 34.0 PG    MCHC 32.7 31.0 - 37.0 g/dL    RDW 14.4 11.6 - 14.5 %    PLATELET 847 359 - 871 K/uL    MPV 9.0 (L) 9.2 - 11.8 FL    NEUTROPHILS 60 40 - 73 %    LYMPHOCYTES 22 21 - 52 %    MONOCYTES 14 (H) 3 - 10 %    EOSINOPHILS 3 0 - 5 %    BASOPHILS 1 0 - 2 %    ABS. NEUTROPHILS 4.2 1.8 - 8.0 K/UL    ABS. LYMPHOCYTES 1.5 0.9 - 3.6 K/UL    ABS. MONOCYTES 1.0 0.05 - 1.2 K/UL    ABS. EOSINOPHILS 0.2 0.0 - 0.4 K/UL    ABS.  BASOPHILS 0.1 0.0 - 0.1 K/UL    RBC COMMENTS NORMOCYTIC, NORMOCHROMIC      DF AUTOMATED     GLUCOSE, POC    Collection Time: 01/26/20  9:00 PM   Result Value Ref Range    Glucose (POC) 184 (H) 70 - 416 mg/dL   METABOLIC PANEL, BASIC    Collection Time: 01/27/20  4:43 AM   Result Value Ref Range    Sodium 138 136 - 145 mmol/L    Potassium 4.0 3.5 - 5.5 mmol/L    Chloride 104 100 - 111 mmol/L    CO2 28 21 - 32 mmol/L    Anion gap 6 3.0 - 18 mmol/L    Glucose 182 (H) 74 - 99 mg/dL    BUN 18 7.0 - 18 MG/DL    Creatinine 1.20 0.6 - 1.3 MG/DL    BUN/Creatinine ratio 15 12 - 20      GFR est AA >60 >60 ml/min/1.73m2    GFR est non-AA 59 (L) >60 ml/min/1.73m2    Calcium 7.9 (L) 8.5 - 10.1 MG/DL   CBC WITH AUTOMATED DIFF    Collection Time: 01/27/20  4:43 AM   Result Value Ref Range    WBC 8.3 4.6 - 13.2 K/uL    RBC 2.74 (L) 4.70 - 5.50 M/uL    HGB 8.0 (L) 13.0 - 16.0 g/dL    HCT 24.7 (L) 36.0 - 48.0 %    MCV 90.1 74.0 - 97.0 FL    MCH 29.2 24.0 - 34.0 PG    MCHC 32.4 31.0 - 37.0 g/dL    RDW 14.1 11.6 - 14.5 %    PLATELET 366 736 - 841 K/uL    MPV 8.9 (L) 9.2 - 11.8 FL    NEUTROPHILS 72 40 - 73 %    LYMPHOCYTES 17 (L) 21 - 52 %    MONOCYTES 11 (H) 3 - 10 %    EOSINOPHILS 0 0 - 5 %    BASOPHILS 0 0 - 2 %    ABS. NEUTROPHILS 5.9 1.8 - 8.0 K/UL    ABS. LYMPHOCYTES 1.4 0.9 - 3.6 K/UL    ABS. MONOCYTES 0.9 0.05 - 1.2 K/UL    ABS. EOSINOPHILS 0.0 0.0 - 0.4 K/UL    ABS. BASOPHILS 0.0 0.0 - 0.1 K/UL    DF AUTOMATED     PTT    Collection Time: 01/27/20  4:43 AM   Result Value Ref Range    aPTT 36.9 (H) 23.0 - 36.4 SEC   MAGNESIUM    Collection Time: 01/27/20  4:43 AM   Result Value Ref Range    Magnesium 1.7 1.6 - 2.6 mg/dL   GLUCOSE, POC    Collection Time: 01/27/20  6:07 AM   Result Value Ref Range    Glucose (POC) 178 (H) 70 - 110 mg/dL   HGB & HCT    Collection Time: 01/27/20 11:00 AM   Result Value Ref Range    HGB 8.0 (L) 13.0 - 16.0 g/dL    HCT 24.7 (L) 36.0 - 48.0 %   GLUCOSE, POC    Collection Time: 01/27/20 12:14 PM   Result Value Ref Range    Glucose (POC) 258 (H) 70 - 110 mg/dL           No results for input(s): FIO2I, IFO2, HCO3I, IHCO3, HCOPOC, PCO2I, PCOPOC, IPHI, PHI, PHPOC, PO2I, PO2POC in the last 72 hours.     No lab exists for component: IPOC2    All Micro Results     Procedure Component Value Units Date/Time    CULTURE, BLOOD [[id number]] Collected:  01/18/20 1355    Order Status:  Completed Specimen:  Blood Updated:  01/24/20 0820     Special Requests: NO SPECIAL REQUESTS        Culture result: NO GROWTH 6 DAYS       CULTURE, BLOOD [[id number]] Collected:  01/18/20 1518    Order Status:  Completed Specimen:  Blood Updated:  01/24/20 0820     Special Requests: NO SPECIAL REQUESTS        Culture result: NO GROWTH 6 DAYS       CULTURE, [place] STAIN [[id number]] Collected:  01/18/20 2015    Order Status:  Completed Specimen:  Wound from Foot Updated:  01/22/20 0836     Special Requests: NO SPECIAL REQUESTS        GRAM STAIN NO WBC'S SEEN         NO ORGANISMS SEEN        Culture result: NO GROWTH 3 DAYS       STREP PNEUMO AG, URINE [[id number]] Collected:  01/18/20 0000    Order Status:  Completed Specimen:  Urine, random Updated:  01/19/20 1629     Strep pneumo Ag, urine NEGATIVE        LEGIONELLA PNEUMOPHILA AG, URINE [[id number]] Collected:  01/18/20 0000    Order Status:  Completed Specimen:  Urine, random Updated:  01/19/20 1629     Legionella Ag, urine NEGATIVE        INFLUENZA A & B AG (RAPID TEST) [[id number]] Collected:  01/18/20 1630    Order Status:  Completed Specimen:  Nasopharyngeal from Nasal washing Updated:  01/18/20 1653     Influenza A Antigen NEGATIVE         Comment: A negative result does not exclude influenza virus infection, seasonal or H1N1 due to suboptimal sensitivity. If influenza is circulating in your community, a diagnosis of influenza should be considered based on a patients clinical presentation and empiric antiviral treatment should be considered, if indicated. Influenza B Antigen NEGATIVE        INFLUENZA A & B AG (RAPID TEST) [[id number]]     Order Status:  Canceled Specimen:  Nasopharyngeal           Telemetry: normal sinus rhythm    1/5/20 ECHO  · Left Ventricle: Normal cavity size and systolic function (ejection fraction normal). Mild concentric hypertrophy . The estimated ejection fraction is 55 - 60%. There is mild (grade 1) left ventricular diastolic dysfunction D/W'[id number]=5.49.  · Tricuspid Valve: Normal valve structure and no stenosis. Trace regurgitation. Pulmonary arterial systolic pressure is 84.1 mmHg. PVL RUE 1/18/20:  · Acute appearing non occlusive thrombus noted in the right internal jugular, subclavian, axillary and brachial vein(s). · Non occlusive superficial thrombophlebitis noted within the right basilic vein.   · No evidence of DVT in the contralateral internal jugular and proximal subclavian vein. PVL LUE 1/21/20:  · Acute appearing thrombus noted in the contralateral right proximal internal jugular and subclavian vein(s) which is no different than the privious scan. · Acute appearing thrombus noted in the left proximal subclavian vein(s). MRI brain 1/21/20:  1. Mild atrophy and chronic small vessel changes. 2.  Old high left frontal craniotomy. 3.  Nonspecific fluid and/or mucosal thickening in the mastoid air cells, right  greater than left. 4.  Otherwise, unremarkable evaluation. Specifically, no acute intracranial  abnormalities are present. MRI right ankle 1/20/20:  1. No bone marrow signal abnormalities within the imaged ankle, hindfoot, or  midfoot to suggest osteomyelitis. No evidence of fracture, stress fracture, or  marrow stress reaction. 2. Degenerative changes as above. 3. Considerable tendinosis of the peroneal tendons with longitudinal split  tearing of the peroneus brevis tendon. Mild accompanying shared peroneal tendon  sheath tenosynovitis. 4. Diffusely abnormal intrinsic muscle bulk and signal typical for changes  related to sequela of subacute denervation. MRI right foot 1/20/20:  1. Postoperative changes from fifth metatarsal amputation without concomitant  marrow signal abnormality identified to suggest osteomyelitis. 2. No evidence of fracture, stress fracture, or marrow stress reaction. 3. Very mild right first MTP joint chondrosis. 4. Diffusely abnormal intrinsic muscle bulk and signal in keeping with sequela  of subacute denervation.             Imaging:  [x]I have personally reviewed the patients chest radiographs images and report     Results from Hospital Encounter encounter on 01/18/20   XR CHEST PORT    Narrative EXAM: CHEST RADIOGRAPH    CLINICAL INDICATION/HISTORY: tachy  -Additional: None    COMPARISON: January 15, 2020    TECHNIQUE: Portable frontal view of the chest    _______________    FINDINGS:    SUPPORT DEVICES: A right upper extremity PICC is present. HEART AND MEDIASTINUM: No appreciable cardiomegaly. Remaining mediastinal  contours within normal limits. LUNGS AND PLEURAL SPACES: No consolidation, mass, or pleural effusion. BONY THORAX AND SOFT TISSUES: Unremarkable.    _______________      Impression IMPRESSION:    No active cardiopulmonary disease. Results from [place] encounter on 01/18/20   CT ABD PELV WO CONT    Narrative EXAM: CT of the abdomen and pelvis    INDICATION: 71-year-old patient status post catheter directed thrombolysis,  anemia with abdominal pain    COMPARISON: Abdominal/pelvic CT 1/20/2020; CT scan of the chest 1/22/2020    TECHNIQUE: Axial CT imaging of the abdomen and pelvis was performed without  intravenous contrast. Multiplanar reformats were generated. One or more dose reduction techniques were used on this CT: automated exposure  control, adjustment of the mAs and/or kVp according to patient size, and  iterative reconstruction techniques. The specific techniques used on this CT  exam have been documented in the patient's electronic medical record. Digital  Imaging and Communications in Medicine (DICOM) format image data are available  to nonaffiliated external healthcare facilities or entities on a secure, media  free, reciprocally searchable basis with patient authorization for at least a  12-month period after this study. _______________    FINDINGS:    LOWER CHEST: Small-moderate pleural effusions appearing overall similar to prior  examination 1/22/2020. Basilar changes of atelectasis noted with mild  interlobular septal line thickening. Normal cardiac size. No pericardial  effusion. LIVER, BILIARY: Unenhanced appearance of the liver demonstrates no focal  abnormality. No biliary dilation. Gallbladder is unremarkable.     PANCREAS: Normal.    SPLEEN: Normal.    ADRENALS: Normal.    KIDNEYS/URETERS/BLADDER: No hydronephrosis or urolithiasis. Urinary bladder  mildly distended but otherwise unremarkable. PELVIC ORGANS: Small foci of dystrophic calcification within the prostate. VASCULATURE: Diffuse aortobiiliac atherosclerotic vascular calcification is  present without evidence of aneurysmal dilatation. LYMPH NODES: No enlarged lymph nodes. GASTROINTESTINAL TRACT: Small hiatal hernia. No focal bowel wall thickening or  dilatation. No morphology of bowel obstruction. No free intraperitoneal gas. Scattered colonic diverticula without diverticulitis. BONES: No acute or aggressive osseous abnormalities identified. OTHER: Within the substance of the inferior right rectus sheath, there is a  rounded/elliptical region of hyperdensity with adjacent fat stranding which is  estimated to measure approximately 5.2 x 2.9 x 3.2 cm in size.    _______________      Impression IMPRESSION:    1. Small right rectus sheath hematoma, estimated at approximately 5.2 x 2.9 x  3.2 cm in size. 2. No intra-abdominal or intrapelvic hematoma. No retroperitoneal hematoma. 3. Small moderate bilateral pleural effusions with compressive atelectasis and  potential faint/minor interstitial edema.     Findings called to Dr. Steven Ulrich at 3:15 PM on 1/26/2020         Bhavani Alvarez MD   1/27/2020

## 2020-01-28 ENCOUNTER — HOSPITAL ENCOUNTER (INPATIENT)
Dept: CT IMAGING | Age: 75
Discharge: HOME | DRG: 314 | End: 2020-01-28
Attending: HOSPITALIST
Payer: MEDICARE

## 2020-01-28 LAB
ANION GAP SERPL CALC-SCNC: 4 MMOL/L (ref 3–18)
APTT PPP: 52.1 SEC (ref 23–36.4)
BUN SERPL-MCNC: 15 MG/DL (ref 7–18)
BUN/CREAT SERPL: 11 (ref 12–20)
CALCIUM SERPL-MCNC: 8.4 MG/DL (ref 8.5–10.1)
CHLORIDE SERPL-SCNC: 104 MMOL/L (ref 100–111)
CO2 SERPL-SCNC: 31 MMOL/L (ref 21–32)
CREAT SERPL-MCNC: 1.36 MG/DL (ref 0.6–1.3)
ERYTHROCYTE [DISTWIDTH] IN BLOOD BY AUTOMATED COUNT: 14.1 % (ref 11.6–14.5)
FIBRINOGEN PPP-MCNC: 345 MG/DL (ref 210–451)
GLUCOSE BLD STRIP.AUTO-MCNC: 102 MG/DL (ref 70–110)
GLUCOSE BLD STRIP.AUTO-MCNC: 162 MG/DL (ref 70–110)
GLUCOSE BLD STRIP.AUTO-MCNC: 183 MG/DL (ref 70–110)
GLUCOSE BLD STRIP.AUTO-MCNC: 193 MG/DL (ref 70–110)
GLUCOSE SERPL-MCNC: 120 MG/DL (ref 74–99)
HCT VFR BLD AUTO: 26.3 % (ref 36–48)
HGB BLD-MCNC: 8.4 G/DL (ref 13–16)
MCH RBC QN AUTO: 29 PG (ref 24–34)
MCHC RBC AUTO-ENTMCNC: 31.9 G/DL (ref 31–37)
MCV RBC AUTO: 90.7 FL (ref 74–97)
PLATELET # BLD AUTO: 354 K/UL (ref 135–420)
PMV BLD AUTO: 9.2 FL (ref 9.2–11.8)
POTASSIUM SERPL-SCNC: 3.8 MMOL/L (ref 3.5–5.5)
RBC # BLD AUTO: 2.9 M/UL (ref 4.7–5.5)
SODIUM SERPL-SCNC: 139 MMOL/L (ref 136–145)
WBC # BLD AUTO: 10.1 K/UL (ref 4.6–13.2)

## 2020-01-28 PROCEDURE — 97116 GAIT TRAINING THERAPY: CPT

## 2020-01-28 PROCEDURE — 36415 COLL VENOUS BLD VENIPUNCTURE: CPT

## 2020-01-28 PROCEDURE — 85384 FIBRINOGEN ACTIVITY: CPT

## 2020-01-28 PROCEDURE — 74011250637 HC RX REV CODE- 250/637: Performed by: INTERNAL MEDICINE

## 2020-01-28 PROCEDURE — 74011636637 HC RX REV CODE- 636/637: Performed by: HOSPITALIST

## 2020-01-28 PROCEDURE — 82962 GLUCOSE BLOOD TEST: CPT

## 2020-01-28 PROCEDURE — 74011636637 HC RX REV CODE- 636/637: Performed by: INTERNAL MEDICINE

## 2020-01-28 PROCEDURE — 74177 CT ABD & PELVIS W/CONTRAST: CPT

## 2020-01-28 PROCEDURE — 85027 COMPLETE CBC AUTOMATED: CPT

## 2020-01-28 PROCEDURE — 85730 THROMBOPLASTIN TIME PARTIAL: CPT

## 2020-01-28 PROCEDURE — 74011250636 HC RX REV CODE- 250/636: Performed by: SURGERY

## 2020-01-28 PROCEDURE — 97535 SELF CARE MNGMENT TRAINING: CPT

## 2020-01-28 PROCEDURE — 74011636320 HC RX REV CODE- 636/320: Performed by: HOSPITALIST

## 2020-01-28 PROCEDURE — 74011250637 HC RX REV CODE- 250/637: Performed by: HOSPITALIST

## 2020-01-28 PROCEDURE — 65660000000 HC RM CCU STEPDOWN

## 2020-01-28 PROCEDURE — 80048 BASIC METABOLIC PNL TOTAL CA: CPT

## 2020-01-28 RX ORDER — INSULIN GLARGINE 100 [IU]/ML
5 INJECTION, SOLUTION SUBCUTANEOUS DAILY
Qty: 1 VIAL | Refills: 0 | Status: SHIPPED
Start: 2020-01-28

## 2020-01-28 RX ORDER — ENOXAPARIN SODIUM 100 MG/ML
120 INJECTION SUBCUTANEOUS DAILY
Qty: 1 SYRINGE | Refills: 0 | Status: SHIPPED
Start: 2020-01-28 | End: 2020-04-24

## 2020-01-28 RX ORDER — QUETIAPINE FUMARATE 25 MG/1
25 TABLET, FILM COATED ORAL
Qty: 10 TAB | Refills: 0 | Status: SHIPPED | OUTPATIENT
Start: 2020-01-28

## 2020-01-28 RX ORDER — AMOXICILLIN AND CLAVULANATE POTASSIUM 875; 125 MG/1; MG/1
1 TABLET, FILM COATED ORAL EVERY 12 HOURS
Qty: 48 TAB | Refills: 0 | Status: SHIPPED
Start: 2020-01-28 | End: 2020-02-21

## 2020-01-28 RX ORDER — METOPROLOL SUCCINATE 50 MG/1
50 TABLET, EXTENDED RELEASE ORAL DAILY
Status: DISCONTINUED | OUTPATIENT
Start: 2020-01-28 | End: 2020-01-29 | Stop reason: HOSPADM

## 2020-01-28 RX ORDER — SAME BUTANEDISULFONATE/BETAINE 400-600 MG
250 POWDER IN PACKET (EA) ORAL DAILY
Qty: 7 CAP | Refills: 0 | Status: SHIPPED | OUTPATIENT
Start: 2020-01-29 | End: 2020-02-05

## 2020-01-28 RX ORDER — OXYCODONE HYDROCHLORIDE 5 MG/1
5 TABLET ORAL
Qty: 15 TAB | Refills: 0 | Status: SHIPPED | OUTPATIENT
Start: 2020-01-28 | End: 2020-01-31

## 2020-01-28 RX ORDER — INSULIN GLARGINE 100 [IU]/ML
5 INJECTION, SOLUTION SUBCUTANEOUS DAILY
Status: DISCONTINUED | OUTPATIENT
Start: 2020-01-28 | End: 2020-01-29 | Stop reason: HOSPADM

## 2020-01-28 RX ADMIN — AMOXICILLIN AND CLAVULANATE POTASSIUM 1 TABLET: 875; 125 TABLET, FILM COATED ORAL at 09:34

## 2020-01-28 RX ADMIN — POLYETHYLENE GLYCOL 3350 17 G: 17 POWDER, FOR SOLUTION ORAL at 09:34

## 2020-01-28 RX ADMIN — METOPROLOL SUCCINATE 50 MG: 50 TABLET, EXTENDED RELEASE ORAL at 17:31

## 2020-01-28 RX ADMIN — AMOXICILLIN AND CLAVULANATE POTASSIUM 1 TABLET: 875; 125 TABLET, FILM COATED ORAL at 22:31

## 2020-01-28 RX ADMIN — POTASSIUM CHLORIDE 20 MEQ: 1500 TABLET, EXTENDED RELEASE ORAL at 09:34

## 2020-01-28 RX ADMIN — ENOXAPARIN SODIUM 80 MG: 40 INJECTION, SOLUTION INTRAVENOUS; SUBCUTANEOUS at 12:56

## 2020-01-28 RX ADMIN — Medication 250 MG: at 09:34

## 2020-01-28 RX ADMIN — INSULIN LISPRO 2 UNITS: 100 INJECTION, SOLUTION INTRAVENOUS; SUBCUTANEOUS at 12:56

## 2020-01-28 RX ADMIN — ATORVASTATIN CALCIUM 20 MG: 20 TABLET, FILM COATED ORAL at 09:34

## 2020-01-28 RX ADMIN — IOPAMIDOL 100 ML: 612 INJECTION, SOLUTION INTRAVENOUS at 17:47

## 2020-01-28 RX ADMIN — INSULIN GLARGINE 5 UNITS: 100 INJECTION, SOLUTION SUBCUTANEOUS at 09:33

## 2020-01-28 RX ADMIN — INSULIN LISPRO 2 UNITS: 100 INJECTION, SOLUTION INTRAVENOUS; SUBCUTANEOUS at 17:31

## 2020-01-28 RX ADMIN — ENOXAPARIN SODIUM 80 MG: 40 INJECTION, SOLUTION INTRAVENOUS; SUBCUTANEOUS at 00:27

## 2020-01-28 RX ADMIN — LOSARTAN POTASSIUM 100 MG: 50 TABLET, FILM COATED ORAL at 09:33

## 2020-01-28 RX ADMIN — QUETIAPINE FUMARATE 25 MG: 25 TABLET ORAL at 22:31

## 2020-01-28 RX ADMIN — INSULIN LISPRO 2 UNITS: 100 INJECTION, SOLUTION INTRAVENOUS; SUBCUTANEOUS at 07:53

## 2020-01-28 NOTE — ROUTINE PROCESS
Bedside shift change report given to Urban Casas RN (oncoming nurse) by Erik Almaguer RN (offgoing nurse). Report included the following information SBAR, Kardex, ED Summary, Intake/Output and MAR.

## 2020-01-28 NOTE — PROGRESS NOTES
Problem: Mobility Impaired (Adult and Pediatric)  Goal: *Acute Goals and Plan of Care (Insert Text)  Description  In 1-7 days pt will be able to perform:  ST.  Bed mobility:  Rolling L to R to L modified independent for positioning. 2.  Supine to sit to supine S with HR for meals. 3.  Sit to stand to sit S with RW in prep for ambulation. LT.  Gait:  Ambulate >150ft S with RW, PWB surgical shoe donned R, for improved mobility. 2.  Activity tolerance: Tolerate up in chair 1-2 hours for ADLs. 3.  Patient/Family Education:  Patient/family to be independent with HEP for follow-up care and safe discharge. Outcome: Progressing Towards Goal   PHYSICAL THERAPY TREATMENT    Patient: Mateo Stephens (43 y.o. male)  Date: 2020  Diagnosis: DVT (deep venous thrombosis) ([facility]) [I82.409]   DVT (deep venous thrombosis) ([facility])    Precautions: Fall, PWB      ASSESSMENT:  On hold with the blue phone for a long time waiting for a . Delmi from OT arrived and able to communicate with pt during session. Pt reports min>mod pain in R foot and at R groin. No difficulty performing supine<>sit or sit<>stand. Unsteady with RW during gait, LOB 3x, pt with a scissoring gait, unable to maintain PWB on the R foot, ambulates on lateral aspect of R foot, significant ankle inversion noted. Pt left sitting EOB with OT. Progression toward goals:   []      Improving appropriately and progressing toward goals  [x]      Improving slowly and progressing toward goals  []      Not making progress toward goals and plan of care will be adjusted     PLAN:  Patient continues to benefit from skilled intervention to address the above impairments. Continue treatment per established plan of care.   Discharge Recommendations:  Rufus Friend  Further Equipment Recommendations for Discharge:  rolling walker     SUBJECTIVE:   Patient stated .    OBJECTIVE DATA SUMMARY:   Critical Behavior:  Neurologic State: Alert  Orientation Level: Oriented to place, Oriented to person, Disoriented to situation, Disoriented to time  Cognition: Follows commands  Safety/Judgement: Fall prevention  Functional Mobility Training:  Bed Mobility:    Supine to Sit: Supervision  Transfers:  Sit to Stand: Contact guard assistance  Stand to Sit: Contact guard assistance  Balance:  Sitting: Intact  Standing: Impaired; With support  Standing - Static: Fair  Standing - Dynamic : Poor;Fair(-)   Ambulation/Gait Training:  Distance (ft): 60 Feet (ft)  Assistive Device: Gait belt;Walker, rolling(orthopedic shoe)  Ambulation - Level of Assistance: Minimal assistance; Moderate assistance    Gait Abnormalities: Antalgic;Decreased step clearance;Scissoring  Right Side Weight Bearing: Partial (%)    Pain:  Pain level pre-treatment: 4/10  Pain level post-treatment: 4/10   Pain Intervention(s): Medication (see MAR); Rest, Ice, Repositioning   Response to intervention: Nurse notified, See doc flow    Activity Tolerance:   fair(-)  Please refer to the flowsheet for vital signs taken during this treatment. After treatment:   [] Patient left in no apparent distress sitting up in chair  [x] Patient left in no apparent distress in bed  [x] Call bell left within reach  [] Nursing notified  [] Caregiver present  [] Bed alarm activated  [] SCDs applied      COMMUNICATION/EDUCATION:   [x]         Role of Physical Therapy in the acute care setting. [x]         Fall prevention education was provided and the patient/caregiver indicated understanding. [x]         Patient/family have participated as able in working toward goals and plan of care. [x]         Patient/family agree to work toward stated goals and plan of care. []         Patient understands intent and goals of therapy, but is neutral about his/her participation.   []         Patient is unable to participate in stated goals/plan of care: ongoing with therapy staff.  []         Other:        Paige Obrien Grady PTA   Time Calculation: 16 mins

## 2020-01-28 NOTE — PROGRESS NOTES
DC Plan: Discharge to [facility] rehab tomorrow by medical transport. Medical transport arranged by unit secretary for 10am.    call report [phone]    Chart reviewed. Pt admitted to tele by hospitalist. Sage Ortega @ [address] obtained for pt to be accepted at [facility]. MD Jefe Reed made aware. This CM spoke with pts daughter on phone and made her aware pt can dc tomorrow. She states she agrees with plan. She will bring pt clothes tonight. UAI faxed to [facility]. Name Relation Home Work [address] Daughter [phone]       Transition of care to SNF: [facility]     Communication to Patient/Family:  Met with patient and family, and they are agreeable to the transition plan. The Plan for Transition of Care is related to the following treatment goals: SNF    The Patient and/or patient representative Valdez Hackett was provided with a choice of provider and agrees   with the discharge plan. Yes [x] No []    Freedom of choice list was provided with basic dialogue that supports the patient's individualized plan of care/goals and shares the quality data associated with the providers. Yes [x] No []    SNF/Rehab Transition:  Patient has been accepted to [facility] and meets criteria for admission. Patient will transported by [facility] and expected to leave at [facility]. Communication to SNF/Rehab:  Bedside RN, , has been notified to update the transition plan to the facility and call report [phone]    Discharge information has been updated on the AVS and sent via [facility] and/or CC link. Discharge instructions to be fax'd to facility at [phone]     Please include all hard scripts for controlled substances, med rec and dc summary, and AVS in packet. Please medicate for pain prior to dc if possible and needed to help offset delay when patient first arrives to facility.     Reviewed and confirmed with facility, [facility] of , can manage the patient care needs for the following:     Mickie Lemos with (X) only those applicable:  Medication:  []Medications are available at the facility  []IV Antibiotics    []Controlled Substance  hard copies available sent. []Weekly Labs    Equipment:  []CPAP/BiPAP  []Wound Vacuum  []Bee or Urinary Device  []PICC/Central Line  []Nebulizer  []Ventilator    Treatment:  []Isolation (for MRSA, VRE, etc.)  []Surgical Drain Management  []Tracheostomy Care  []Dressing Changes  []Dialysis with transportation  []PEG Care  []Oxygen  []Daily Weights for Heart Failure    Dietary:  []Any diet limitations  []Tube Feedings   []Total Parenteral Management (TPN)    Financial Resources:  []Medicaid Application Completed    [x]UAI Completed and copy given to pt/family. []A screening has previously been completed. []Level II Completed    [] Private pay individual who will not become financially eligible for Medicaid within 6 months from admission to a [address] [place] [address] facility. [] Individual refused to have screening conducted. []Medicaid Application Completed    []The screening denied because it was determined individual did not need/did not qualify for nursing facility level of care. [] Out of state residents seeking direct admission to a [address] facility. [] Individuals who are inpatients of an out of state hospital, or in state or out of state veterans/ hospital and seek direct admission to a [address] facility  [] Individuals who are pateints or residents of a state owned/operated facility that is licensed by [employer] of [employer] and seek direct admission to [address] [place] [address]  [] A screening not required for enrollment in [employer]  S services as set out in [address] [place] [address] 30-  [] [facility]) staff shall perform screenings of the [facility] clients.     Advanced Care Plan:  []Surrogate Decision Maker of Care  [x]POA  []Communicated Code Status and copy sent. Other:         Care Management Interventions  PCP Verified by CM:  Yes  Transition of Care Consult (CM Consult): Discharge Planning  Current Support Network: Relative's Home  Confirm Follow Up Transport: Family  The Plan for Transition of Care is Related to the Following Treatment Goals : rehab  The Patient and/or Patient Representative was Provided with a Choice of Provider and Agrees with the Discharge Plan?: Yes  Freedom of Choice List was Provided with Basic Dialogue that Supports the Patient's Individualized Plan of Care/Goals, Treatment Preferences and Shares the Quality Data Associated with the Providers?: Yes  Discharge Location  Discharge Placement: Skilled nursing facility

## 2020-01-28 NOTE — PROGRESS NOTES
0730 Bedside and Verbal shift change report given to M. Inell Lesch (oncoming nurse) by Ford Scheuermann, RN (offgoing nurse). Report included the following information SBAR, Kardex, Intake/Output, and MAR. Pt in bed, call light in reach. 1256 Glucose 183, 2 units insulin given. 1731 Glucose 162, 2 units insulin given. 46 Pt went down to CT for repeat CT of abdomen. 1955 Bedside and Verbal shift change report given to CHRISTAL Ba RN (oncoming nurse) by [facility]. Gabo Rosado RN (offgoing nurse). Report included the following information SBAR, Kardex, Intake/Output and MAR. Pt in bed, call light in reach.

## 2020-01-28 NOTE — PROGRESS NOTES
Hospitalist Progress Note    Patient: Evelyn Ortega MRN: [id number]  CSN: [id number]    YOB: 1945  Age: 76 y.o. Sex: male    DOA: 1/18/2020 LOS:  LOS: 10 days          Chief Complaint:    DVT      Assessment/Plan   75 yo male with recent surgery for osteo in foot and on course of IV abx came back with arm swelling, extensive DVT despite eliquis therapy     Bilateral Upper extremities-and lower extremity DVT present on admission  S/p thrombolysis  Now on BID lovenox  rosetta go to 1.5 mg/kg daily dosing on d/c    RLQ pain, could still be rectus sheath hematoma extensing to groin, but get repeat CT with contrast now    No wbc count, no fever     Recent foot surgery for osteo and diabetic infection  Now on p.o. Augmentin (picc out)     Metabolic encephalopathy -improved     HTN -uncontrolled-add toprol XL 50 mg     Severe prot olivia malnutrition-changed to reg diet to try to facilitate improved PO     IDDM with vascular complications-los due to poor appetite, decrease lantus dosing     JC on CKD stage 3-improved and stable     Rectus sheath hematoma causing some pain-transfused blood this weekend, recent H&H appears stable     D/c planning to rehab tomorrow    Disposition :  Patient Active Problem List   Diagnosis Code    Stage 3 chronic kidney disease ([facility] N18.3    Type 2 diabetes mellitus, with long-term current use of insulin ([address] E11.9, Z79.4    Hypertension I10    Hypokalemia E87.6    Foot abscess, right L02.611    Diabetic ulcer of right midfoot associated with type 2 diabetes mellitus, with fat layer exposed ([address] A64.863, L97.412    PAD (peripheral artery disease) ([facility]) I73.9    Osteomyelitis of right foot ([facility] M86.9    Acute deep vein thrombosis (DVT) ([facility]) I82.409    DVT (deep venous thrombosis) ([facility]) I82.409    Anemia D64.9    Lung infiltrate R91.8    Hematoma T14. 8XXA       Subjective:    States he does not want much to eat  Had few bites of egg this am  Pain right lower abd and right suprapubic area    Review of systems:    Constitutional: denies fevers  Respiratory: denies SOB  Gastrointestinal: denies nausea, vomiting, diarrhea      Vital signs/Intake and Output:  Visit Vitals  /70   Pulse (!) 103   Temp 100.4 °F (38 °C)   Resp 15   Ht 5' 7\" (1.702 m)   Wt 76.1 kg (167 lb 12.3 oz)   SpO2 92%   BMI 26.28 kg/m²     Current Shift:  No intake/output data recorded. Last three shifts:  01/26 1901 - 01/28 0700  In: 316.7   Out: 1975 [Urine:1975]    Exam:    General: elderly HM, alert, NAD, OX3  Head/Neck: NCAT, supple, No masses, No lymphadenopathy  CVS:Regular rate and rhythm, no M/R/G, S1/S2 heard, no thrill  Lungs:Clear to auscultation bilaterally, no wheezes, rhonchi, or rales  Abdomen: Soft, tender right lower abd and SP area, No distention, Normal Bowel sounds, No hepatomegaly  Extremities: No C/C/E, pulses palpable 2+  Neuro:grossly normal , follows commands  Psych:appropriate                Labs: Results:       Chemistry Recent Labs     01/28/20  0330 01/27/20  0443 01/26/20  0352   * 182* 168*    138 141   K 3.8 4.0 3.9    104 106   CO2 31 28 30   BUN 15 18 17   CREA 1.36* 1.20 1.33*   CA 8.4* 7.9* 8.0*   AGAP 4 6 5   BUCR 11* 15 13      CBC w/Diff Recent Labs     01/28/20  0330 01/27/20  1100 01/27/20  0443 01/26/20  1640  01/26/20  0352   WBC 10.1  --  8.3 7.0   < > 6.0   RBC 2.90*  --  2.74* 2.43*   < > 2.39*   HGB 8.4* 8.0* 8.0* 7.2*   < > 6.9*   HCT 26.3* 24.7* 24.7* 22.0*   < > 21.6*     --  296 359   < > 327   GRANS  --   --  72 60  --  62   LYMPH  --   --  17* 22  --  23   EOS  --   --  0 3  --  4    < > = values in this interval not displayed. Cardiac Enzymes No results for input(s): CPK, CKND1, KAREN in the last 72 hours.     No lab exists for component: CKRMB, TROIP   Coagulation Recent Labs     01/28/20  0415 01/27/20  0443   APTT 52.1* 36.9*       Lipid Panel No results found for: CHOL, CHOLPOCT, CHOLX, CHLST, CHOLV, 683316, HDL, HDLP, LDL, LDLC, DLDLP, 882172, VLDLC, VLDL, TGLX, TRIGL, TRIGP, TGLPOCT, CHHD, CHHDX   BNP No results for input(s): BNPP in the last 72 hours. Liver Enzymes No results for input(s): TP, ALB, TBIL, AP, SGOT, GPT in the last 72 hours.     No lab exists for component: DBIL   Thyroid Studies Lab Results   Component Value Date/Time    TSH 1.86 01/19/2020 06:14 AM        Procedures/imaging: see electronic medical records for all procedures/Xrays and details which were not copied into this note but were reviewed prior to creation of Remedios Barnett MD

## 2020-01-28 NOTE — CONSULTS
68yo hx SVC w/ provoked iatrogenic DVT s/p thrombolysis on Eloquis converted to Lovenox. Plans noted for SNF  As previously documented can convert to Daily 1.5mg/kg dosing upon Discharge  Follow up with dr. Marci Garcia 3-4 weeks    Perlita Toscano M.D.  [facility]

## 2020-01-29 ENCOUNTER — APPOINTMENT (OUTPATIENT)
Dept: NUCLEAR MEDICINE | Age: 75
DRG: 314 | End: 2020-01-29
Attending: HOSPITALIST
Payer: MEDICARE

## 2020-01-29 ENCOUNTER — APPOINTMENT (OUTPATIENT)
Dept: ULTRASOUND IMAGING | Age: 75
DRG: 314 | End: 2020-01-29
Attending: HOSPITALIST
Payer: MEDICARE

## 2020-01-29 VITALS
SYSTOLIC BLOOD PRESSURE: 168 MMHG | WEIGHT: 167.99 LBS | RESPIRATION RATE: 16 BRPM | HEIGHT: 67 IN | BODY MASS INDEX: 26.37 KG/M2 | DIASTOLIC BLOOD PRESSURE: 73 MMHG | TEMPERATURE: 98.4 F | HEART RATE: 98 BPM | OXYGEN SATURATION: 96 %

## 2020-01-29 LAB
ANION GAP SERPL CALC-SCNC: 6 MMOL/L (ref 3–18)
BASOPHILS # BLD: 0 K/UL (ref 0–0.1)
BASOPHILS NFR BLD: 1 % (ref 0–2)
BUN SERPL-MCNC: 12 MG/DL (ref 7–18)
BUN/CREAT SERPL: 10 (ref 12–20)
CALCIUM SERPL-MCNC: 8.1 MG/DL (ref 8.5–10.1)
CHLORIDE SERPL-SCNC: 105 MMOL/L (ref 100–111)
CO2 SERPL-SCNC: 30 MMOL/L (ref 21–32)
CREAT SERPL-MCNC: 1.2 MG/DL (ref 0.6–1.3)
DIFFERENTIAL METHOD BLD: ABNORMAL
EOSINOPHIL # BLD: 0.2 K/UL (ref 0–0.4)
EOSINOPHIL NFR BLD: 2 % (ref 0–5)
ERYTHROCYTE [DISTWIDTH] IN BLOOD BY AUTOMATED COUNT: 14.4 % (ref 11.6–14.5)
GLUCOSE BLD STRIP.AUTO-MCNC: 123 MG/DL (ref 70–110)
GLUCOSE BLD STRIP.AUTO-MCNC: 158 MG/DL (ref 70–110)
GLUCOSE BLD STRIP.AUTO-MCNC: 182 MG/DL (ref 70–110)
GLUCOSE SERPL-MCNC: 128 MG/DL (ref 74–99)
HCT VFR BLD AUTO: 24.2 % (ref 36–48)
HGB BLD-MCNC: 7.8 G/DL (ref 13–16)
LYMPHOCYTES # BLD: 1.9 K/UL (ref 0.9–3.6)
LYMPHOCYTES NFR BLD: 27 % (ref 21–52)
MCH RBC QN AUTO: 29.5 PG (ref 24–34)
MCHC RBC AUTO-ENTMCNC: 32.2 G/DL (ref 31–37)
MCV RBC AUTO: 91.7 FL (ref 74–97)
MONOCYTES # BLD: 1 K/UL (ref 0.05–1.2)
MONOCYTES NFR BLD: 15 % (ref 3–10)
NEUTS SEG # BLD: 3.9 K/UL (ref 1.8–8)
NEUTS SEG NFR BLD: 55 % (ref 40–73)
PLATELET # BLD AUTO: 329 K/UL (ref 135–420)
PMV BLD AUTO: 9.1 FL (ref 9.2–11.8)
POTASSIUM SERPL-SCNC: 3.8 MMOL/L (ref 3.5–5.5)
RBC # BLD AUTO: 2.64 M/UL (ref 4.7–5.5)
SODIUM SERPL-SCNC: 141 MMOL/L (ref 136–145)
WBC # BLD AUTO: 7 K/UL (ref 4.6–13.2)

## 2020-01-29 PROCEDURE — 85025 COMPLETE CBC W/AUTO DIFF WBC: CPT

## 2020-01-29 PROCEDURE — 82962 GLUCOSE BLOOD TEST: CPT

## 2020-01-29 PROCEDURE — 36415 COLL VENOUS BLD VENIPUNCTURE: CPT

## 2020-01-29 PROCEDURE — 74011636637 HC RX REV CODE- 636/637: Performed by: HOSPITALIST

## 2020-01-29 PROCEDURE — 80048 BASIC METABOLIC PNL TOTAL CA: CPT

## 2020-01-29 PROCEDURE — 74011250637 HC RX REV CODE- 250/637: Performed by: INTERNAL MEDICINE

## 2020-01-29 PROCEDURE — 97110 THERAPEUTIC EXERCISES: CPT

## 2020-01-29 PROCEDURE — 76705 ECHO EXAM OF ABDOMEN: CPT

## 2020-01-29 PROCEDURE — 74011250637 HC RX REV CODE- 250/637: Performed by: HOSPITALIST

## 2020-01-29 PROCEDURE — 97116 GAIT TRAINING THERAPY: CPT

## 2020-01-29 PROCEDURE — 74011636637 HC RX REV CODE- 636/637: Performed by: INTERNAL MEDICINE

## 2020-01-29 PROCEDURE — A9537 TC99M MEBROFENIN: HCPCS

## 2020-01-29 PROCEDURE — 74011250636 HC RX REV CODE- 250/636: Performed by: PHYSICIAN ASSISTANT

## 2020-01-29 PROCEDURE — 74011250636 HC RX REV CODE- 250/636: Performed by: HOSPITALIST

## 2020-01-29 RX ORDER — METOPROLOL SUCCINATE 50 MG/1
50 TABLET, EXTENDED RELEASE ORAL DAILY
Qty: 60 TAB | Refills: 0 | Status: SHIPPED | OUTPATIENT
Start: 2020-01-30

## 2020-01-29 RX ORDER — MORPHINE SULFATE 2 MG/ML
3 INJECTION, SOLUTION INTRAMUSCULAR; INTRAVENOUS ONCE
Status: COMPLETED | OUTPATIENT
Start: 2020-01-29 | End: 2020-01-29

## 2020-01-29 RX ADMIN — INSULIN LISPRO 2 UNITS: 100 INJECTION, SOLUTION INTRAVENOUS; SUBCUTANEOUS at 17:18

## 2020-01-29 RX ADMIN — ENOXAPARIN SODIUM 80 MG: 40 INJECTION, SOLUTION INTRAVENOUS; SUBCUTANEOUS at 01:36

## 2020-01-29 RX ADMIN — Medication 250 MG: at 10:57

## 2020-01-29 RX ADMIN — METOPROLOL SUCCINATE 50 MG: 50 TABLET, EXTENDED RELEASE ORAL at 10:57

## 2020-01-29 RX ADMIN — ATORVASTATIN CALCIUM 20 MG: 20 TABLET, FILM COATED ORAL at 09:00

## 2020-01-29 RX ADMIN — INSULIN LISPRO 2 UNITS: 100 INJECTION, SOLUTION INTRAVENOUS; SUBCUTANEOUS at 12:29

## 2020-01-29 RX ADMIN — POLYETHYLENE GLYCOL 3350 17 G: 17 POWDER, FOR SOLUTION ORAL at 10:58

## 2020-01-29 RX ADMIN — POTASSIUM CHLORIDE 20 MEQ: 1500 TABLET, EXTENDED RELEASE ORAL at 10:57

## 2020-01-29 RX ADMIN — MORPHINE SULFATE 3 MG: 2 INJECTION, SOLUTION INTRAMUSCULAR; INTRAVENOUS at 15:39

## 2020-01-29 RX ADMIN — ENOXAPARIN SODIUM 80 MG: 40 INJECTION, SOLUTION INTRAVENOUS; SUBCUTANEOUS at 12:29

## 2020-01-29 RX ADMIN — LOSARTAN POTASSIUM 100 MG: 50 TABLET, FILM COATED ORAL at 10:57

## 2020-01-29 RX ADMIN — INSULIN GLARGINE 5 UNITS: 100 INJECTION, SOLUTION SUBCUTANEOUS at 10:58

## 2020-01-29 RX ADMIN — AMOXICILLIN AND CLAVULANATE POTASSIUM 1 TABLET: 875; 125 TABLET, FILM COATED ORAL at 10:57

## 2020-01-29 NOTE — ROUTINE PROCESS
Bedside and Verbal shift change report given to Gina Diaz RN (oncoming nurse) by Marc Olmstead RN (offgoing nurse). Report included the following information SBAR and Kardex.

## 2020-01-29 NOTE — ROUTINE PROCESS
Assume care of patient from [facility] Colt RN. Patient received in bed awake. Patient is alert to self and place, denies pain and discomfort. No distress noted. Frequently use items within reach. Bed locked in low position. Call bell within reach and patient verbalized understanding of use for assistance and needs. 8249- Bedside and Verbal shift change report given to Olivia Mcguire RN (oncoming nurse) by Sofy Burroughs RN (offgoing nurse). Report included the following information SBAR, Kardex, Intake/Output, MAR and Recent Results.

## 2020-01-29 NOTE — PROGRESS NOTES
Problem: Diabetes Self-Management  Goal: *Disease process and treatment process  Description  Define diabetes and identify own type of diabetes; list 3 options for treating diabetes. Outcome: Progressing Towards Goal  Goal: *Incorporating nutritional management into lifestyle  Description  Describe effect of type, amount and timing of food on blood glucose; list 3 methods for planning meals. Outcome: Progressing Towards Goal  Goal: *Incorporating physical activity into lifestyle  Description  State effect of exercise on blood glucose levels. Outcome: Progressing Towards Goal  Goal: *Developing strategies to promote health/change behavior  Description  Define the ABC's of diabetes; identify appropriate screenings, schedule and personal plan for screenings. Outcome: Progressing Towards Goal  Goal: *Using medications safely  Description  State effect of diabetes medications on diabetes; name diabetes medication taking, action and side effects. Outcome: Progressing Towards Goal  Goal: *Monitoring blood glucose, interpreting and using results  Description  Identify recommended blood glucose targets  and personal targets. Outcome: Progressing Towards Goal  Goal: *Prevention, detection, treatment of acute complications  Description  List symptoms of hyper- and hypoglycemia; describe how to treat low blood sugar and actions for lowering  high blood glucose level. Outcome: Progressing Towards Goal  Goal: *Prevention, detection and treatment of chronic complications  Description  Define the natural course of diabetes and describe the relationship of blood glucose levels to long term complications of diabetes.   Outcome: Progressing Towards Goal  Goal: *Developing strategies to address psychosocial issues  Description  Describe feelings about living with diabetes; identify support needed and support network  Outcome: Progressing Towards Goal  Goal: *Insulin pump training  Outcome: Progressing Towards Goal  Goal: *Sick day guidelines  Outcome: Progressing Towards Goal  Goal: *Patient Specific Goal (EDIT GOAL, INSERT TEXT)  Outcome: Progressing Towards Goal     Problem: Patient Education: Go to Patient Education Activity  Goal: Patient/Family Education  Outcome: Progressing Towards Goal     Problem: Falls - Risk of  Goal: *Absence of Falls  Description  Document [address] Fall Risk and appropriate interventions in the flowsheet. Outcome: Progressing Towards Goal  Note: Fall Risk Interventions:  Mobility Interventions: Bed/chair exit alarm, Communicate number of staff needed for ambulation/transfer, Patient to call before getting OOB, Utilize walker, cane, or other assistive device    Mentation Interventions: Bed/chair exit alarm, Increase mobility, More frequent rounding, Reorient patient, Update white board    Medication Interventions: Bed/chair exit alarm, Teach patient to arise slowly, Patient to call before getting OOB    Elimination Interventions: Bed/chair exit alarm, Call light in reach, Patient to call for help with toileting needs, Toilet paper/wipes in reach, Toileting schedule/hourly rounds, Stay With Me (per policy)    History of Falls Interventions: Bed/chair exit alarm, Door open when patient unattended, Investigate reason for fall, Room close to nurse's station         Problem: Patient Education: Go to Patient Education Activity  Goal: Patient/Family Education  Outcome: Progressing Towards Goal     Problem: Pressure Injury - Risk of  Goal: *Prevention of pressure injury  Description  Document Thomas Scale and appropriate interventions in the flowsheet.   Outcome: Progressing Towards Goal  Note: Pressure Injury Interventions:  Sensory Interventions: Assess changes in LOC, Float heels, Keep linens dry and wrinkle-free, Maintain/enhance activity level, Minimize linen layers, Monitor skin under medical devices, Pad between skin to skin, Pressure redistribution bed/mattress (bed type)    Moisture Interventions: Absorbent underpads    Activity Interventions: Pressure redistribution bed/mattress(bed type), Increase time out of bed    Mobility Interventions: HOB 30 degrees or less, Pressure redistribution bed/mattress (bed type), PT/OT evaluation    Nutrition Interventions: Document food/fluid/supplement intake, Offer support with meals,snacks and hydration    Friction and Shear Interventions: Apply protective barrier, creams and emollients, Foam dressings/transparent film/skin sealants, HOB 30 degrees or less                Problem: Patient Education: Go to Patient Education Activity  Goal: Patient/Family Education  Outcome: Progressing Towards Goal     Problem: Patient Education: Go to Patient Education Activity  Goal: Patient/Family Education  Outcome: Progressing Towards Goal     Problem: Deep Venous Thrombosis - Risk of  Goal: *Absence of deep venous thrombosis signs and symptoms(Stroke Metric)  Outcome: Progressing Towards Goal  Goal: *Absence of impaired coagulation signs and symptoms  Outcome: Progressing Towards Goal  Goal: *Knowledge of prescribed medications  Outcome: Progressing Towards Goal  Goal: *Absence of bleeding  Outcome: Progressing Towards Goal     Problem: Pain  Goal: *Control of Pain  Outcome: Progressing Towards Goal     Problem: Patient Education: Go to Patient Education Activity  Goal: Patient/Family Education  Outcome: Progressing Towards Goal     Problem: Patient Education: Go to Patient Education Activity  Goal: Patient/Family Education  Outcome: Progressing Towards Goal     Problem: Non-Violent Restraints  Goal: *Removal from restraints as soon as assessed to be safe  Outcome: Progressing Towards Goal  Goal: *No harm/injury to patient while restraints in use  Outcome: Progressing Towards Goal  Goal: *Patient's dignity will be maintained  Outcome: Progressing Towards Goal  Goal: *Patient Specific Goal (EDIT GOAL, INSERT TEXT)  Outcome: Progressing Towards Goal  Goal: Non-violent Restaints:Standard Interventions  Outcome: Progressing Towards Goal  Goal: Non-violent Restraints:Patient Interventions  Outcome: Progressing Towards Goal  Goal: Patient/Family Education  Outcome: Progressing Towards Goal     Problem: Patient Education: Go to Patient Education Activity  Goal: Patient/Family Education  Outcome: Progressing Towards Goal     Problem: Patient Education: Go to Patient Education Activity  Goal: Patient/Family Education  Outcome: Progressing Towards Goal

## 2020-01-29 NOTE — PROGRESS NOTES
5899-Confused, attempting to get out of bed. Dressing, right foot in place; positive pulse with doppler to right lower extremity. Utilized [employer] phone to translate, but  unable to translate. Restless, bed alarm utilized and constantly checking on patient. Assessment complete. 3172-No change from initial assessment. 1925-New IV place. Restless, confused. Seems more lucid now than earlier, yet still restless. Bed alarm utilized    Shift Summary:  Resting in bed with bed alarm engaged at shift change. Stable.

## 2020-01-29 NOTE — PROGRESS NOTES
Problem: Diabetes Self-Management  Goal: *Disease process and treatment process  Description  Define diabetes and identify own type of diabetes; list 3 options for treating diabetes. Outcome: Progressing Towards Goal  Note:   Pt uses insulin to control diabetes. Problem: Falls - Risk of  Goal: *Absence of Falls  Description  Document Sussy Pham Fall Risk and appropriate interventions in the flowsheet. Outcome: Progressing Towards Goal  Note:   No falls during shift, pt has bed alarm on. Fall Risk Interventions:  Mobility Interventions: Communicate number of staff needed for ambulation/transfer, Bed/chair exit alarm    Mentation Interventions: Adequate sleep, hydration, pain control    Medication Interventions: Teach patient to arise slowly, Patient to call before getting OOB    Elimination Interventions: Call light in reach, Bed/chair exit alarm, Elevated toilet seat    History of Falls Interventions: Bed/chair exit alarm, Door open when patient unattended, Investigate reason for fall, Room close to nurse's station         Problem: Pain  Goal: *Control of Pain  Outcome: Progressing Towards Goal  Note:   No reports of pain.

## 2020-01-29 NOTE — PROGRESS NOTES
DC Plan: Discharge to [facility] rehab today    call report [phone]. Pt must be in building by 8pm.    Chart reviewed. Spoke with MD Kimberly Pacheco this morning and she is requesting a 2-3p transport to rehab. Primary RN Marge made aware and will make transport arrangements. Jennifer Valencia @ [facility] admissions also made aware of new dc time. CM will cont to follow for transition of care needs, .    1400  Spoke with primary RN Marge who says transport was pushed back. Per MD Kimberly Pacheco pt needs HIDA before dc. Per primary RN Marge she has notified [facility] of later [employer]. This CM notified Jennifer Valencia in admission @ [facility]. 1450   Per Jennifer Valencia @ [facility] pt must be in house at [facility] by 8p    Care Management Interventions  PCP Verified by CM:  Yes  Transition of Care Consult (CM Consult): Discharge Planning  Current Support Network: Relative's Home  Confirm Follow Up Transport: Family  The Plan for Transition of Care is Related to the Following Treatment Goals : rehab  The Patient and/or Patient Representative was Provided with a Choice of Provider and Agrees with the Discharge Plan?: Yes  Freedom of Choice List was Provided with Basic Dialogue that Supports the Patient's Individualized Plan of Care/Goals, Treatment Preferences and Shares the Quality Data Associated with the Providers?: Yes  Discharge Location  Discharge Placement: Skilled nursing facility

## 2020-01-29 NOTE — PROGRESS NOTES
Problem: Self Care Deficits Care Plan (Adult)  Goal: *Therapy Goal (Edit Goal, Insert Text)  Description  Initial Occupational Therapy Goals (1/23/2020) Within 7 day(s):    1. Patient will perform perform grooming seated EOB for 5 minutes for increased independence in ADLs. 2. Patient will perform UB dressing with seup seated EOB for increased independence with ADLs. 3. Patient will perform LB dressing with setup/Gila for increased independence with ADLs. 4. Patient will perform all aspects of toileting with minimal assist for increased independence with ADLs. 5. Patient will perform standing ADLs with 100% compliance with RLE Surgical Shoe with 1 verbal cue for increased safety in ADLs. 6. Patient will independently apply energy conservation techniques with 1 verbal cue(s) for increased independence with ADLs. 7. Patient will perform gentle residual limb desensitization over dressing with SBA/mod I to minimize phantom limb pain. Outcome: Progressing Towards Goal     OCCUPATIONAL THERAPY TREATMENT    Patient: Ernestina Oconnell (56 y.o. male)  Date: 1/29/2020  Diagnosis: DVT (deep venous thrombosis) ([facility]) [I82.409]   DVT (deep venous thrombosis) ([facility])       Precautions: Fall, PWB  Chart, occupational therapy assessment, plan of care, and goals were reviewed. ASSESSMENT:  Pt completed B UE exercises in bed this session. Pt completed 2 sets x 10 reps for shld flexion, chest press and biceps. Pt excited to discharge to rehab today. Continue addressing goals for therapy. Progression toward goals:  [x]          Improving appropriately and progressing toward goals  []          Improving slowly and progressing toward goals  []          Not making progress toward goals and plan of care will be adjusted     PLAN:  Patient continues to benefit from skilled intervention to address the above impairments. Continue treatment per established plan of care.   Discharge Recommendations:  Rehab  Further Equipment Recommendations for Discharge:  N/A     SUBJECTIVE:   Patient stated Hello.     OBJECTIVE DATA SUMMARY:   Cognitive/Behavioral Status:  Neurologic State: Alert, Appropriate for age  Orientation Level: Oriented to person, Disoriented to time, Disoriented to situation, Disoriented to place  Cognition: Follows commands  Safety/Judgement: Decreased awareness of need for safety, Decreased awareness of need for assistance, Decreased insight into deficits  Functional Mobility and Transfers for ADLs:   Bed Mobility:  N/A   Transfers:  N/A  Balance:     ADL Intervention:  N/A this session    Therapeutic Exercises:   Pt completed 2 sets x 10 reps for shld flexion, chest press and biceps. Pain:  Pain Scale 1: Numeric (0 - 10)  Pain Intensity 1: 0      Activity Tolerance:    Fair +  Please refer to the flowsheet for vital signs taken during this treatment.   After treatment:   []  Patient left in no apparent distress sitting up in chair  [x]  Patient left in no apparent distress in bed  [x]  Call bell left within reach  []  Nursing notified  []  Caregiver present  []  Bed alarm activated    Media Latin, OTR/L  Time Calculation: 8 mins

## 2020-01-29 NOTE — PROGRESS NOTES
Problem: Mobility Impaired (Adult and Pediatric)  Goal: *Acute Goals and Plan of Care (Insert Text)  Description  In 1-7 days pt will be able to perform:  ST.  Bed mobility:  Rolling L to R to L modified independent for positioning. 2.  Supine to sit to supine S with HR for meals. 3.  Sit to stand to sit S with RW in prep for ambulation. LT.  Gait:  Ambulate >150ft S with RW, PWB surgical shoe donned R, for improved mobility. 2.  Activity tolerance: Tolerate up in chair 1-2 hours for ADLs. 3.  Patient/Family Education:  Patient/family to be independent with HEP for follow-up care and safe discharge. Outcome: Progressing Towards Goal   PHYSICAL THERAPY TREATMENT    Patient: Silvina Josue (68 y.o. male)  Date: 2020  Diagnosis: DVT (deep venous thrombosis) ([facility]) [I82.409]   DVT (deep venous thrombosis) ([facility])       Precautions: Fall, PWB   Chart, physical therapy assessment, plan of care and goals were reviewed. ASSESSMENT:  Pt seen in supine prior to session. Pt's family present in the room to assist w/ translation. Pt able to ambulate w/ RW/GB w/ surgical boot donned. Pt demonstrates increase improvement w/ mobility w/ AD and requires min VCs to maintain PWBing. Pt however does demonstrates LOB when turning w/ AD and requires assistance to maintain stability. Pt transferred back to the room where pt was left in supine after session, call bell and tray in reach, nurse notified after session. Progression toward goals:  [x]      Improving appropriately and progressing toward goals  []      Improving slowly and progressing toward goals  []      Not making progress toward goals and plan of care will be adjusted     PLAN:  Patient continues to benefit from skilled intervention to address the above impairments. Continue treatment per established plan of care.   Discharge Recommendations:  Rehab  Further Equipment Recommendations for Discharge:  N/A     SUBJECTIVE:   Patient stated No pain, I feel fine.     OBJECTIVE DATA SUMMARY:   Critical Behavior:  Neurologic State: Alert, Appropriate for age  Orientation Level: Oriented to person, Disoriented to time, Disoriented to situation, Disoriented to place  Cognition: Follows commands  Safety/Judgement: Decreased awareness of need for safety, Decreased awareness of need for assistance, Decreased insight into deficits  Functional Mobility Training:  Bed Mobility:  Supine to Sit: Supervision  Sit to Supine: Modified independent;Supervision  Scooting: Supervision  Transfers:  Sit to Stand: Contact guard assistance  Stand to Sit: Contact guard assistance  Balance:  Sitting: Intact  Standing: Impaired; Without support  Ambulation/Gait Training:  Distance (ft): 120 Feet (ft)  Assistive Device: Gait belt;Walker, rolling  Ambulation - Level of Assistance: Contact guard assistance;Minimal assistance  Gait Abnormalities: Antalgic;Decreased step clearance  Right Side Weight Bearing: Partial (%)  Interventions: Safety awareness training;Verbal cues; Visual/Demos  Pain:  Pain Scale 1: Numeric (0 - 10)  Pain Intensity 1: 0  Activity Tolerance:   Good  Please refer to the flowsheet for vital signs taken during this treatment.   After treatment:   [] Patient left in no apparent distress sitting up in chair  [x] Patient left in no apparent distress in bed  [x] Call bell left within reach  [x] Nursing notified  [] Caregiver present  [x] Bed alarm activated      Jony Xiong PT   Time Calculation: 13 mins

## 2020-01-29 NOTE — ROUTINE PROCESS
0700: received bedside shift report from Sapna Rodrigues RN (offgoing nurse) and assumed care of the pt. Updated white board, helped pt back into bed, left bed in lowest position with wheels locked and call light within reach. 0830: spoke with Dr. Skip Crockett, pt needs to have an ultrasound of the abdomen to rule out cholelithiasis before being transported to the [place] this afternoon. Spoke with ultrasound, they will be calling for the pt shortly. I also spoke with [facility] to change the pt's transport time from 1000 this morning to 1400 this afternoon. 1400 slot was secured. 1200: called and spoke with Selena Grier LPN at [facility] and gave her a full report on the pt, most recent vitals and insight on his language barrier. All questions were answered and a call back number was given for any other needs/questions. ETA for transport is 1400. 
 
1215: Dr. Skip Crockett has ordered a [facility] med hepatobiliary duct scan. Calderon Guerin from [facility] has called to let me know that she needs to order some medication for the procedure so it likely won't get done until around 1400 this afternoon. Have called and spoke to Selena Grier LPN and let her know that his transport will be delayed. Spoke [facility] his new transport time will be scheduled for 1800. Reached out to pt's daughter to let her know that his transport has been delayed to 1800, she would also like to know what the hepatobiliary scan reveals. 1730: IV removed, tele box removed, armband removed and shredded. Full set of vitals taken, pt covered with 2 units, all set for transportation.   
  
Taylor Olivier RN

## 2020-01-29 NOTE — DISCHARGE SUMMARY
Discharge Summary    Patient: Terry Zamora MRN: [id number]  CSN: [id number]    YOB: 1945  Age: 76 y.o. Sex: male    DOA: 1/18/2020 LOS:  LOS: 11 days   Discharge Date:      Primary Care Provider:  Shilpi Hicks MD    Admission Diagnoses: DVT (deep venous thrombosis) [facility]) [I82.409]    Discharge Diagnoses:    Hospital Problems  Date Reviewed: 1/18/2020          Codes Class Noted POA    Hematoma ICD-10-CM: T14. 8XXA  ICD-9-CM: 924.9  1/26/2020 Unknown        Anemia ICD-10-CM: D64.9  ICD-9-CM: 285.9  1/20/2020 Unknown        Lung infiltrate ICD-10-CM: R91.8  ICD-9-CM: 793.19  1/20/2020 Unknown        * (Principal) DVT (deep venous thrombosis) ([facility] ICD-10-CM: I82.409  ICD-9-CM: 453.40  1/18/2020 Unknown        Acute deep vein thrombosis (DVT) ([facility] ICD-10-CM: I82.409  ICD-9-CM: 453.40  1/13/2020 Yes        Osteomyelitis of right foot ([facility] ICD-10-CM: M86.9  ICD-9-CM: 730.27  1/9/2020 Yes        PAD (peripheral artery disease) (HCC) ICD-10-CM: I73.9  ICD-9-CM: 443.9  1/7/2020 Yes        Type 2 diabetes mellitus, with long-term current use of insulin (HCC) ICD-10-CM: E11.9, Z79.4  ICD-9-CM: 250.00, V58.67  1/3/2020 Yes        Hypertension ICD-10-CM: I10  ICD-9-CM: 401.9  1/3/2020 Yes        Stage 3 chronic kidney disease ([facility] ICD-10-CM: N18.3  ICD-9-CM: 585.3  1/2/2020 Yes              Discharge Condition: stable     Discharge Medications:     Current Discharge Medication List      START taking these medications    Details   metoprolol succinate (TOPROL-XL) 50 mg XL tablet Take 1 Tab by mouth daily. Qty: 60 Tab, Refills: 0      insulin glargine (LANTUS) 100 unit/mL injection 5 Units by SubCUTAneous route daily. Qty: 1 Vial, Refills: 0      oxyCODONE IR (ROXICODONE) 5 mg immediate release tablet Take 1 Tab by mouth every four (4) hours as needed for Pain for up to 3 days.  Max Daily Amount: 30 mg.  Qty: 15 Tab, Refills: 0    Associated Diagnoses: Acute hematogenous osteomyelitis of right foot ([facility]      QUEtiapine (SEROQUEL) 25 mg tablet Take 1 Tab by mouth nightly. Qty: 10 Tab, Refills: 0      Saccharomyces boulardii (FLORASTOR) 250 mg capsule Take 1 Cap by mouth daily for 7 days. Qty: 7 Cap, Refills: 0      amoxicillin-clavulanate (AUGMENTIN) 875-125 mg per tablet Take 1 Tab by mouth every twelve (12) hours for 24 days. Qty: 48 Tab, Refills: 0      enoxaparin (LOVENOX) 80 mg/0.8 mL injection 120 mg by SubCUTAneous route daily. Qty: 1 Syringe, Refills: 0         CONTINUE these medications which have NOT CHANGED    Details   atorvastatin (LIPITOR) 20 mg tablet Take 20 mg by mouth daily. aspirin 81 mg chewable tablet Take 81 mg by mouth daily. polyethylene glycol (MIRALAX) 17 gram/dose powder Take 17 g by mouth daily. 1 tablespoon with 8 oz of water daily  Qty: 507 g, Refills: 0      LOSARTAN PO Take 100 mg by mouth. STOP taking these medications       LORazepam (ATIVAN) 1 mg tablet Comments:   Reason for Stopping:         apixaban (ELIQUIS) 5 mg (74 tabs) starter pack Comments:   Reason for Stopping:         ertapenem 1 gram 1 g IVPB Comments:   Reason for Stopping:         insulin glargine (LANTUS SOLOSTAR U-100 INSULIN) 100 unit/mL (3 mL) inpn Comments:   Reason for Stopping:         metFORMIN ER (GLUCOPHAGE XR) 750 mg tablet Comments:   Reason for Stopping:               Procedures :   Central venogram with removal of thrombolysis catheter  Consults: Hematology/Oncology, Infectious Disease and Pulmonary/Critical Care      PHYSICAL EXAM   Visit Vitals  /44   Pulse 92   Temp 98.1 °F (36.7 °C)   Resp 16   Ht 5' 7\" (1.702 m)   Wt 76.2 kg (167 lb 15.9 oz)   SpO2 98%   BMI 26.31 kg/m²     General: Awake, cooperative, no acute distress    HEENT: NC, Atraumatic. PERRLA, EOMI. Anicteric sclerae. Lungs:  CTA Bilaterally. No Wheezing/Rhonchi/Rales. Heart:  Regular  rhythm,  No murmur, No Rubs, No Gallops  Abdomen: Soft, Non distended, Non tender.  +Bowel sounds,   Extremities: No c/c rt foot wrapped   Psych:   Not anxious or agitated. Neurologic:  No acute neurological deficits. Admission HPI :   Ernestina Oconnell is a 76 y.o.  male who has history of diabetes, hypertension, and lower extremity DVT. He was admitted for foot abscess and osteomyelitis and was discharged with a PICC line and IVAnd IV intraperitoneum last dose is scheduled for February 20 today prior to getting his infusion his children noticed that he was having increased swelling in the right arm as well as increased pain and erythema he was brought to the emergency room for further evaluation. In the emergency room CTA and ultrasound of the arm showed extensive clot extending into the subclavian and superior vena cava. Patient was started on Eliquis starter pack on discharge and is currently taking 10 mg twice daily his last Eliquis dose was early this morning verified by his daughter. In the emergency room patient was started on a heparin drip  Also found to have new onset anemia with Hemoccult negative stools    Hospital Course :   Ernestina Oconnell is a 76 y.o.  male who has history of diabetes, hypertension, and lower extremity DVT on eliquis was admitted for svc thrombosis. Heparin gtt was started on admission, eliquis was hold. vascaular and hematology were on board, he received thrombolysis per vascular and thrombolysis catheter was removed. Hematologist recommend 1.5 mg/kg lovenox for dvt treatment. He presented RLQ pain, ct indicated rectus sheath hematoma, repeated ct hematoma got improving. hida was performed and acute cholecystitis was ruled out. He needs f/u with surgeon for elective cholecystectomy. We continued merrem during his stay for recent bacteremia and OM of foot. ID was on board and recommend augmetin 24 days. ID also recommended: weekly CRP/foot exams. Once CRP hits normal/<10mg/L and hole close-will stop po abx     We also managed his HTN and DM. He continued remained hemodynamic stable. Activity: Activity as tolerated    Diet: Diabetic Diet    Follow-up: PCp, podiatry. ID , hematology, vascular, surgeon     Disposition: rehab     Minutes spent on discharge: 45 min       Labs: Results:       Chemistry Recent Labs     01/29/20 0417 01/28/20 0330 01/27/20 0443   * 120* 182*    139 138   K 3.8 3.8 4.0    104 104   CO2 30 31 28   BUN 12 15 18   CREA 1.20 1.36* 1.20   CA 8.1* 8.4* 7.9*   AGAP 6 4 6   BUCR 10* 11* 15      CBC w/Diff Recent Labs     01/29/20 0417 01/28/20  0330 01/27/20  1100 01/27/20 0443   WBC 7.0 10.1  --  8.3   RBC 2.64* 2.90*  --  2.74*   HGB 7.8* 8.4* 8.0* 8.0*   HCT 24.2* 26.3* 24.7* 24.7*    354  --  296   GRANS 55  --   --  72   LYMPH 27  --   --  17*   EOS 2  --   --  0      Cardiac Enzymes No results for input(s): CPK, CKND1, KAREN in the last 72 hours. No lab exists for component: CKRMB, TROIP   Coagulation Recent Labs     01/28/20 0415 01/27/20 0443   APTT 52.1* 36.9*       Lipid Panel No results found for: CHOL, CHOLPOCT, CHOLX, CHLST, CHOLV, 817359, HDL, HDLP, LDL, LDLC, DLDLP, 358336, VLDLC, VLDL, TGLX, TRIGL, TRIGP, TGLPOCT, CHHD, CHHDX   BNP No results for input(s): BNPP in the last 72 hours. Liver Enzymes No results for input(s): TP, ALB, TBIL, AP, SGOT, GPT in the last 72 hours. No lab exists for component: DBIL   Thyroid Studies Lab Results   Component Value Date/Time    TSH 1.86 01/19/2020 06:14 AM            Significant Diagnostic Studies: Nm Hepatobiliary Duct Scan    Result Date: 1/29/2020  EXAM: NM HEPATOBILIARY DUCT SCAN. CLINICAL INDICATION/HISTORY: possible cholecystitis. -Additional: None. COMPARISON: Correlation is made with the ultrasound examination performed earlier the same day and CT examination of one day prior.  TECHNIQUE: Following the uneventful intravenous administration of 6.3 mCi Tc-99m mebrofenin, dynamic images of the right upper quadrant were acquired in the anterior projection for 60 seconds per frame for 1 hour. Subsequently, 3 mg of Morphine Sulfate was administered intravenously. Additional dynamic images of the right upper quadrant were acquired in the anterior projection over 30 minutes. _______________ FINDINGS:  There is brisk tracer uptake by the liver. Radioactive bile could be seen entering the duodenum. The gallbladder was not clearly identified on the initial 60 minutes of imaging. After administration of morphine, the gallbladder was visualized. _______________     IMPRESSION: Visualization of the gallbladder after morphine administration, excluding acute cholecystitis. Given delayed gallbladder filling not seen on the 1st hour of imaging, chronic cholecystitis is not excluded. _______________    Nm Hepatobiliary Duct Scan    Result Date: 1/2/2020  EXAM: Hepatobiliary Scintigraphy  INDICATION: Abnormal finding on right upper quadrant ultrasound with equivocal evidence for cholecystitis COMPARISON: Right upper quadrant ultrasound and CT of the abdomen and pelvis from same day RADIOPHARMACEUTICAL: 6.3 mCi Tc-99m mebrofenin IV INJECTION SITE: Right antecubital fossa _______________ FINDINGS: There is normal uptake of radiopharmaceutical by the liver. The liver is of normal size and morphology. There is prompt excretion of tracer by the liver with normal visualization of the intra-hepatic biliary ducts, the common hepatic duct, the gallbladder, the common bile duct, and the small bowel. _______________     IMPRESSION: 1. Normal filling of the gallbladder. No evidence of cystic duct obstruction. Mri Brain Wo Cont    Result Date: 1/21/2020  EXAM: MRI brain without contrast 1/21/2020. CLINICAL INDICATION/HISTORY: Altered mental status. COMPARISON: CT scan of the head from 1/21/2020.  TECHNIQUE: Multiplanar multisequential images of the brain were obtained without contrast. _______________ FINDINGS: BRAIN AND POSTERIOR FOSSA: Postsurgical changes are again noted status post high left frontal craniotomy. Mild atrophy and chronic small vessel changes are noted in the periventricular and subcortical white matter of both cerebral hemispheres. There is no intracranial hemorrhage, mass effect, or midline shift. There are no significant additional areas of abnormal parenchymal signal. There are no areas of restricted diffusion to suggest acute infarct. EXTRA-AXIAL SPACES AND MENINGES: There are no abnormal extra-axial fluid collections. Lerry Victoria VASCULAR: The visualized portions of the intracranial carotid and vertebrobasilar systems demonstrate patent appearing flow voids. CALVARIA: Otherwise, intact. SINUSES: Mild mucosal thickening is present in the maxillary sinus antra. The paranasal sinuses are otherwise clear and no air-fluid levels are identified. CRANIOCERVICAL JUNCTION: Within normal limits for age. OTHER: Mild mucosal thickening and/or fluid are noted partially opacifying the mastoid air cells, right worse than left. _______________     IMPRESSION: 1. Mild atrophy and chronic small vessel changes. 2.  Old high left frontal craniotomy. 3.  Nonspecific fluid and/or mucosal thickening in the mastoid air cells, right greater than left. 4.  Otherwise, unremarkable evaluation. Specifically, no acute intracranial abnormalities are present. Mri Foot Rt Wo Cont    Result Date: 1/20/2020  Examination: MRI right forefoot without contrast. HISTORY: 79-year-old patient with diabetic foot infection status post operative debridement, GBS sepsis. TECHNIQUE: An MRI examination of the right forefoot was performed utilizing a local coil. Coronal and sagittal STIR and T1-weighted images as well as axial T1 and T2 fat-suppressed images were obtained without contrast. COMPARISON: CT right foot 1/5/2020. FINDINGS: There are postoperative changes from fifth ray amputation at the level of the mid metatarsal shaft.  Evaluation of the bone marrow signal in this region as well as throughout the imaged midfoot and forefoot demonstrates no T1 hypointense marrow replacing process to suggest osteomyelitis. There is a small quantity of nonspecific fluid adjacent to the amputation stump margin. Within the limitations of an unenhanced examination, no evidence of an organized or drainable fluid collection is present. No evidence of fracture, stress fracture, or marrow stress reaction. Tarsometatarsal articulations appear within normal alignment and appearance. There is very mild right first MTP joint chondrosis noted. Remaining MTP and included IP joints appear within normal limits as visualized. Imaged flexor and extensor tendons appear intact. Diffusely abnormal intrinsic muscle bulk and signal as seen on same-day hindfoot MRI. Mild dorsal and lateral forefoot soft tissue swelling. IMPRESSION: 1. Postoperative changes from fifth metatarsal amputation without concomitant marrow signal abnormality identified to suggest osteomyelitis. 2. No evidence of fracture, stress fracture, or marrow stress reaction. 3. Very mild right first MTP joint chondrosis. 4. Diffusely abnormal intrinsic muscle bulk and signal in keeping with sequela of subacute denervation. Mri Ankle Rt Wo Cont    Result Date: 1/20/2020  EXAM: MRI OF THE RIGHT ANKLE CLINICAL INDICATIONS/HISTORY: 80-year-old patient with diabetic foot infection status post operative debridement, GBS sepsis. COMPARISON: CT of the right foot 1/5/2020. TECHNIQUE: Sagittal T1 and STIR; axial PD and T2 with fat saturation; coronal T2 with fat saturation and axial oblique PD images of the ankle were obtained. _______________ FINDINGS: Evaluation of the bone marrow signal for the imaged ankle, hindfoot, and included mid foot demonstrates no evidence of T1 marrow hypointensity to suggest osteomyelitis. No evidence of fracture, stress fracture, or marrow stress reaction.  There is moderate chondrosis of the tibiotalar articulation without evidence of joint effusion demonstrated. The subtalar joint spaces appear preserved. Both the calcaneocuboid and talonavicular articulations are within normal limits. Navicular cuneiform, intercuneiform, and tarsometatarsal joints are normal in appearance. Medially, ankle flexor tendons appear intact. Small amount of fluid noted surrounding the FHL and FDL tendons at the level of the master knot of [place]. Anteriorly, ankle extensor tendons appear within normal limits. Posteriorly, Achilles tendon is of uniform thickness and morphology. Laterally, considerable tendinosis of the peroneus brevis and longus tendons noted with longitudinal tearing of the peroneus brevis tendon extending from the level of the tibial plafond and the level of the insertion at the base of the fifth metatarsal. Small quantity of fluid within the shared peroneal tendon sheath. There is diffusely abnormal intrinsic muscle bulk and signal present in keeping with sequela of subacute denervation. Bimalleolar and distal lower extremity soft tissue swelling without evidence of an organized or drainable fluid collection. IMPRESSION: 1. No bone marrow signal abnormalities within the imaged ankle, hindfoot, or midfoot to suggest osteomyelitis. No evidence of fracture, stress fracture, or marrow stress reaction. 2. Degenerative changes as above. 3. Considerable tendinosis of the peroneal tendons with longitudinal split tearing of the peroneus brevis tendon. Mild accompanying shared peroneal tendon sheath tenosynovitis. 4. Diffusely abnormal intrinsic muscle bulk and signal typical for changes related to sequela of subacute denervation. Ct Head Wo Cont    Result Date: 1/21/2020  EXAM: CT head INDICATION: Altered mental status. DVT on anticoagulation COMPARISON: None. TECHNIQUE: Axial CT imaging of the head was performed without intravenous contrast. Standard multiplanar coronal and sagittal reformatted images were obtained and are included in interpretation.  One or more dose reduction techniques were used on this CT: automated exposure control, adjustment of the mAs and/or kVp according to patient size, and iterative reconstruction techniques. The specific techniques used on this CT exam have been documented in the patient's electronic medical record. Digital Imaging and Communications in Medicine (DICOM) format image data are available to nonaffiliated external healthcare facilities or entities on a secure, media free, reciprocally searchable basis with patient authorization for at least a 12-month period after this study. _______________ FINDINGS: BRAIN AND POSTERIOR FOSSA: There is mild cortical and cerebellar volume loss present. The ventricular size and configuration is normal. Basilar cisterns are patent. No acute intra-axial hemorrhage. No evidence of mass effect or midline shift. There are no areas of abnormal parenchymal attenuation. Mild periventricular white matter low-attenuation EXTRA-AXIAL SPACES AND MENINGES: No acute extra-axial fluid collection. Mild dural thickening and small foci of dural based calcification adjacent to left frontal craniotomy site. CALVARIUM: Left frontal craniotomy with additional likely nory hole craniotomies in the right frontal and parietal bones. No acute osseous abnormality. Carly Riser SINUSES: Clear. OTHER: None. _______________     IMPRESSION: 1. No acute intracranial abnormality. 2. Mild periventricular white matter low-attenuation; nonspecific, and favored to reflect sequela of chronic ischemic microvascular change. 3. Postoperative changes as above, to include prior left frontal craniotomy. Cta Chest W Or W Wo Cont    Result Date: 1/22/2020  EXAM: CTA Chest INDICATION: Known superior vena cava thrombus, left subclavian venous thrombosis. Evaluation for clot propagation requested.  COMPARISON: Several prior exams, most recently CT angiogram of the chest performed 1/18/2020 TECHNIQUE: Axial CT imaging from the thoracic inlet through the diaphragm with intravenous contrast utilizing CTA study for pulmonary artery evaluation. Coronal and sagittal MIP reformations were generated at a separate workstation. One or more dose reduction techniques were used on this CT: automated exposure control, adjustment of the mAs and/or kVp according to patient size, and iterative reconstruction techniques. The specific techniques used on this CT exam have been documented in the patient's electronic medical record. Digital Imaging and Communications in Medicine (DICOM) format image data are available to nonaffiliated external healthcare facilities or entities on a secure, media free, reciprocally searchable basis with patient authorization for at least a 12-month period after this study. _______________ FINDINGS: EXAM QUALITY: Overall exam quality is adequate. Pulmonary arterial enhancement is adequate. The breath hold is satisfactory. PULMONARY ARTERIES: No evidence of pulmonary embolism. Main pulmonary arterial size remains within normal limits. MEDIASTINUM: Included thyroid gland unremarkable. As previously, stranding surrounding the right PICC line throughout its visualized course in the included right arm, right axilla, and right subclavian regions is noted. There is an essentially unchanged configuration to clot within the superior vena cava, with luminal narrowing extending just below the level of the sternomanubrial articulation. Contrast injected from the left arm is noted to extend through a significantly narrowed proximal left subclavian vein and adjacent brachiocephalic venous confluence, with recruitment of collaterals along the external jugular venous pathway. Fairly significant stranding surrounds the subclavian neurovascular bundles bilaterally, which is unchanged on the right and newly present on the left. The cardiac size is normal. There is a small circumferential pericardial effusion. Thoracic aorta is of normal course and caliber.  LYMPH NODES: No supraclavicular, axilla, mediastinal lymph node enlargement is present, with an increased number of subcentimeter nodes present across the axilla and subpectoral regions bilaterally, very likely reactive. AIRWAY: Central airways are patent. LUNGS: Basilar areas of atelectasis noted. No significant groundglass abnormality or septal line thickening. Rounded area of opacity at the posterior right upper lobe noted, similar to prior. Biapical pleural parenchymal scar formation. PLEURA: There are bilateral pleural effusions, moderately large on the right and moderate size on the left, both appearing increased from comparison CT angiogram 1/18/2020. UPPER ABDOMEN: Included upper abdomen demonstrates no acute abnormality. Focal fat deposition near the falciform ligament is present. . OTHER: No acute or aggressive osseous abnormalities identified. There is progressive body wall edema noted in the interval. _______________     IMPRESSION: 1. Redemonstration of thrombus surrounding the included portions of the right PICC line with accompanying occlusion of the superior vena cava above the level of the brachiocephalic venous confluence.   >  Partial thrombosis of the left subclavian vein, with significant narrowing of the proximal portion of the left subclavian vein and adjacent brachiocephalic vein. Progressive edema surrounding the left subclavian neurovascular bundle noted in the interval from preceding CT angiogram chest 1/18/2020. 2. No evidence of pulmonary embolism. 3. Moderately large right and moderate-sized left pleural effusions which have increased in the interval from prior chest CT. 4. Basilar areas of compressive atelectasis and presumptive area of rounded atelectasis at the posterior right upper lobe without change. Attention on follow-up imaging recommended. 5. Progressive body wall edema. Cta Chest W Or W Wo Cont    Result Date: 1/18/2020  EXAM: CTA chest CLINICAL INDICATION/HISTORY:  Pain.  COMPARISON: None TECHNIQUE: Axial CT imaging from the thoracic inlet through the diaphragm with intravenous contrast. Coronal and sagittal MIP reformats were generated. One or more dose reduction techniques were used on this CT: automated exposure control, adjustment of the mAs and/or kVp according to patient size, and iterative reconstruction techniques. The specific techniques used on this CT exam have been documented in the patient's electronic medical record. Digital Imaging and Communications in Medicine (DICOM) format image data are available to nonaffiliated external healthcare facilities or entities on a secure, media free, reciprocally searchable basis with patient authorization for at least a 12-month period after this study. _______________ FINDINGS: EXAM QUALITY: Adequate opacification of the pulmonary arteries. PULMONARY ARTERIES: No evidence of pulmonary embolism. MEDIASTINUM: Normal heart size. No evidence of right heart strain. Small pericardial effusion. VASCULATURE: Right PICC in situ. There is perivascular inflammation of the right subclavian vein. 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 LUNGS: Bilateral subsegmental atelectasis. At the right upper lobe, there is persistent rounded opacity, unchanged from the previous exam measuring approximately 1.5 cm in diameter. PLEURA: There are bilateral pleural effusions, small on the left moderate on the right. AIRWAY: Normal. LYMPH NODES: No enlarged nodes. UPPER ABDOMEN: Unremarkable. OTHER: No acute or aggressive osseous abnormalities identified. SUPERFICIAL SOFT TISSUES: Unremarkable. _______________     IMPRESSION: 1. 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 Thrombophlebitis related to prior PICC line placement suspected. 2. No evidence of pulmonary embolism. 3. Bilateral pleural effusions, right greater than left with associated bilateral subsegmental atelectasis in the dependent lungs.  4. Nonspecific rounded opacity in the right upper lobe, potentially rounded atelectasis given underlying pleural disease. This area can be followed on subsequent imaging to ensure resolution. Critical results discussed with Dr. Mikki Romano at 6:00 PM on 01/18/2020. Cta Chest W Or W Wo Cont    Result Date: 1/15/2020  EXAM: CTA chest INDICATION: Pain. COMPARISON: None TECHNIQUE: Axial CT imaging from the thoracic inlet through the diaphragm with intravenous contrast. Coronal and sagittal MIP reformats were generated. One or more dose reduction techniques were used on this CT: automated exposure control, adjustment of the mAs and/or kVp according to patient size, and iterative reconstruction techniques. The specific techniques used on this CT exam have been documented in the patient's electronic medical record. Digital Imaging and Communications in Medicine (DICOM) format image data are available to nonaffiliated external healthcare facilities or entities on a secure, media free, reciprocally searchable basis with patient authorization for at least a 12-month period after this study. _______________ FINDINGS: EXAM QUALITY: Adequate. PULMONARY ARTERIES: There is good opacification of the pulmonary arteries with no filling defect to suggest pulmonary embolus. MEDIASTINUM: Cardiac size is within normal limits. No evidence of right heart strain. No evidence of aneurysm or dissection. Right-sided PICC line is in place with distal distal tip in the SVC. Stranding is noted surrounding the right brachiocephalic vein likely secondary to recent PICC line insertion. LUNGS: Bilateral pleural effusions with adjacent atelectasis. Right upper lobe posterior masslike opacity measures 1.5 cm on image 19 with questionable small cavitary area. Left apical pleural thickening. No evidence for pneumothorax. PLEURA: As detailed above. AIRWAY: Normal. LYMPH NODES: No enlarged nodes. UPPER ABDOMEN: Small hiatal hernia.  OTHER: No acute or aggressive osseous abnormalities identified. The thyroid is grossly within normal limits. _______________     IMPRESSION: 1. No evidence for pulmonary embolus. 2. Bilateral pleural effusions with adjacent atelectasis. 3. Masslike opacity in the right upper lobe likely represents atelectasis or pneumonia. Recommend follow-up to exclude neoplasm. Ct Foot Rt Wo Cont    Result Date: 1/5/2020  EXAM: CT of the Right Foot History: Admitting history of sepsis and bacteremia, right foot abscess-bottom aspect of right foot, evaluate for abscess Comparison: none Technique: Noncontrast axial CT scan of the right was performed with coronal and sagittal reformations. One or more dose reduction techniques were used on this CT: automated exposure control, adjustment of the mAs and/or kVp according to patient size, and iterative reconstruction techniques. The specific techniques used on this CT exam have been documented in the patient's electronic medical record. Digital Imaging and Communications in Medicine (DICOM) format image data are available to nonaffiliated external healthcare facilities or entities on a secure, media free, reciprocally searchable basis with patient authorization for at least a 12-month period after this study. ___________________ FINDINGS: -The absence of intravenous contrast, degrades evaluation of the soft tissue structures. OSSEOUS: No acute or destructive abnormality. Specifically no CT evidence of osteomyelitis. Mild enthesopathy of the distal Achilles at the posterior calcaneus. SOFT TISSUE:   > At the plantar lateral aspect of the fifth MTP joint, there is a small skin defect with localized dermal thickening/edema and subcutaneous emphysema measuring approximately 1.5 cm (series 2/image 80,/sagittal soft tissue series image 298 and coronal soft tissue series image 172). > Nonspecific bimalleolar dermal thickening with diffuse distal ankle and soft tissue stranding of the foot.  Extensive atherosclerotic vascular calcification from the distal leg to the intertriginous vessels. ___________________    IMPRESSION: 1. Localized dermal defect with edema/stranding and subcutaneous emphysema at the plantar lateral aspect of the fifth MTP joint, suboptimally characterized on this noncontrast study. Diagnostic considerations would include cellulitis, with or without gas forming infection in the setting of open skin defect. No discrete localized fluid collection/abscess. No associated osteomyelitis. 2. Extensive atherosclerotic vascular calcification and nonspecific mild diffuse soft tissue stranding/induration/edema. Ct Abd Pelv Wo Cont    Result Date: 1/26/2020  EXAM: CT of the abdomen and pelvis INDICATION: 77-year-old patient status post catheter directed thrombolysis, anemia with abdominal pain COMPARISON: Abdominal/pelvic CT 1/20/2020; CT scan of the chest 1/22/2020 TECHNIQUE: Axial CT imaging of the abdomen and pelvis was performed without intravenous contrast. Multiplanar reformats were generated. One or more dose reduction techniques were used on this CT: automated exposure control, adjustment of the mAs and/or kVp according to patient size, and iterative reconstruction techniques. The specific techniques used on this CT exam have been documented in the patient's electronic medical record. Digital Imaging and Communications in Medicine (DICOM) format image data are available to nonaffiliated external healthcare facilities or entities on a secure, media free, reciprocally searchable basis with patient authorization for at least a 12-month period after this study. _______________ FINDINGS: LOWER CHEST: Small-moderate pleural effusions appearing overall similar to prior examination 1/22/2020. Basilar changes of atelectasis noted with mild interlobular septal line thickening. Normal cardiac size. No pericardial effusion. LIVER, BILIARY: Unenhanced appearance of the liver demonstrates no focal abnormality.  No biliary dilation. Gallbladder is unremarkable. PANCREAS: Normal. SPLEEN: Normal. ADRENALS: Normal. KIDNEYS/URETERS/BLADDER: No hydronephrosis or urolithiasis. Urinary bladder mildly distended but otherwise unremarkable. PELVIC ORGANS: Small foci of dystrophic calcification within the prostate. VASCULATURE: Diffuse aortobiiliac atherosclerotic vascular calcification is present without evidence of aneurysmal dilatation. LYMPH NODES: No enlarged lymph nodes. GASTROINTESTINAL TRACT: Small hiatal hernia. No focal bowel wall thickening or dilatation. No morphology of bowel obstruction. No free intraperitoneal gas. Scattered colonic diverticula without diverticulitis. BONES: No acute or aggressive osseous abnormalities identified. OTHER: Within the substance of the inferior right rectus sheath, there is a rounded/elliptical region of hyperdensity with adjacent fat stranding which is estimated to measure approximately 5.2 x 2.9 x 3.2 cm in size. _______________     IMPRESSION: 1. Small right rectus sheath hematoma, estimated at approximately 5.2 x 2.9 x 3.2 cm in size. 2. No intra-abdominal or intrapelvic hematoma. No retroperitoneal hematoma. 3. Small moderate bilateral pleural effusions with compressive atelectasis and potential faint/minor interstitial edema. Findings called to Dr. Leoncio Contreras at 3:15 PM on 1/26/2020    Ct Abd Pelv Wo Cont    Result Date: 1/20/2020  EXAM: CT of the abdomen and pelvis INDICATION: Decreasing hemoglobin and hematocrit. Evaluation for potential intra-abdominal/pelvic hematoma COMPARISON: CT angiogram of the chest 1/18/2020, CT abdomen and pelvis 1/15/2020. TECHNIQUE: Axial CT imaging of the abdomen and pelvis was performed without oral or intravenous contrast. Multiplanar reformats were generated. One or more dose reduction techniques were used on this CT: automated exposure control, adjustment of the mAs and/or kVp according to patient size, and iterative reconstruction techniques.   The specific techniques used on this CT exam have been documented in the patient's electronic medical record. Digital Imaging and Communications in Medicine (DICOM) format image data are available to nonaffiliated external healthcare facilities or entities on a secure, media free, reciprocally searchable basis with patient authorization for at least a 12-month period after this study. _______________ FINDINGS: LOWER CHEST: Basilar areas of atelectasis are present with moderate right and small left pleural effusions present. Cardiac size is upper limits of normal. No evidence of pericardial effusion. LIVER, BILIARY: Unenhanced appearance of the liver demonstrates small regions of focal fat deposition near the falciform ligament. No biliary dilation. Gallbladder is unremarkable. PANCREAS: Mildly diffuse fatty infiltration of the pancreas is present without evidence of pancreatic ductal dilatation. SPLEEN: Normal. ADRENALS: Normal. KIDNEYS/URETERS/BLADDER: No evidence of hydronephrosis involving either kidney. Mild bilateral perinephric stranding is present. Bladder contains excreted contrast from prior CT exams. PELVIC ORGANS: Unremarkable. VASCULATURE: Mild and diffuse aortobiiliac atherosclerotic vascular calcification is present without evidence of aneurysmal dilatation. LYMPH NODES: No enlarged lymph nodes. GASTROINTESTINAL TRACT: No bowel dilation or wall thickening. Diverticulosis of the distal descending and sigmoid colon without findings of diverticulitis. No morphology of bowel obstruction. No free intraperitoneal gas. Small hiatal hernia. BONES: No acute or aggressive osseous abnormalities identified. OTHER: No evidence of retroperitoneal hematoma. _______________     IMPRESSION: 1. Moderate right and small left pleural effusions similar to prior examination. 2. No evidence of intra-abdominal or pelvic hematoma. Specifically, no evidence of retroperitoneal hematoma.  3. Unchanged, nonspecific perinephric stranding without evidence of hydronephrosis. 4. Distal descending and sigmoid colonic diverticulosis without findings to suggest diverticulitis. Ct Abd Pelv W Cont    Result Date: 1/28/2020  EXAM: CT of the abdomen and pelvis INDICATION: Abdominal pain. COMPARISON: None. TECHNIQUE: Axial CT imaging of the abdomen and pelvis was performed with intravenous contrast. Multiplanar reformats were generated. One or more dose reduction techniques were used on this CT: automated exposure control, adjustment of the mAs and/or kVp according to patient size, and iterative reconstruction techniques. The specific techniques used on this CT exam have been documented in the patient's electronic medical record. Digital Imaging and Communications in Medicine (DICOM) format image data are available to nonaffiliated external healthcare facilities or entities on a secure, media free, reciprocally searchable basis with patient authorization for at least a 12-month period after this study. . _______________ FINDINGS: LOWER CHEST: Small/moderate pleural effusions bilaterally and minor dependent atelectasis. Betzy Barrio LIVER, BILIARY: Liver is normal. No biliary dilation. Gallbladder is distended with wall thickening and mild surrounding inflammatory change. Betzy Barrio PANCREAS: Normal. SPLEEN: Normal. ADRENALS: Normal. KIDNEYS/URETERS/BLADDER: Normal. PELVIC ORGANS: Unremarkable. VASCULATURE: Unremarkable LYMPH NODES: No enlarged lymph nodes. GASTROINTESTINAL TRACT: No bowel dilation or wall thickening. BONES: No acute or aggressive osseous abnormalities identified. OTHER: Right inferior rectus sheath hematoma measures 3.3 cm fairly stable in size. . _______________     IMPRESSION: New inflammatory changes and distention involving the gallbladder. Findings suspicious for acute cholecystitis. Stable small inferior right rectus sheath hematoma. Small/moderate pleural effusions again demonstrated.      Ct Abd Pelv W Cont    Result Date: 1/15/2020  EXAM: CT of the abdomen and pelvis INDICATION: Pain. COMPARISON: 1/4/2020. TECHNIQUE: Axial CT imaging of the abdomen and pelvis was performed with intravenous contrast. Multiplanar reformats were generated. One or more dose reduction techniques were used on this CT: automated exposure control, adjustment of the mAs and/or kVp according to patient size, and iterative reconstruction techniques. The specific techniques used on this CT exam have been documented in the patient's electronic medical record. Digital Imaging and Communications in Medicine (DICOM) format image data are available to nonaffiliated external healthcare facilities or entities on a secure, media free, reciprocally searchable basis with patient authorization for at least a 12-month period after this study. _______________ FINDINGS: LOWER CHEST: Bilateral pleural effusions with adjacent atelectasis. Small hiatal hernia. LIVER, BILIARY: Liver is normal.  No biliary dilation. Gallbladder is unremarkable. PANCREAS: Fatty replaced. SPLEEN: Normal. ADRENALS: Normal. KIDNEYS/GENITOURINARY SYSTEM: Unchanged perinephric stranding. LYMPH NODES: No enlarged lymph nodes. GASTROINTESTINAL TRACT: No bowel dilation or wall thickening. Uncomplicated sigmoid diverticula. PELVIC ORGANS: Stable enlarged prostate. VASCULATURE: Aorta has a normal caliber. No periaortic collections. Moderate atherosclerotic disease. BONES: No acute or aggressive osseous abnormalities identified. OTHER: No intraperitoneal free air. No free or loculated fluid. No evidence for abdominal wall hernia. Stable left gluteal subcutaneous calcified mass. _______________     IMPRESSION: 1. No significant change of perinephric stranding which could represent pyelonephritis. 2. No evidence for bowel obstruction. 3. Bilateral pleural effusions with adjacent atelectasis.      Ct Abd Pelv W Cont    Result Date: 1/4/2020  EXAM: CT of the abdomen and pelvis INDICATION: Fever, interval change; sepsis, fever and adults, lactic acidosis COMPARISON: 01/02/2020 and 12/10/2019 TECHNIQUE: Axial CT imaging of the abdomen and pelvis was performed intravenous contrast. Multiplanar reformats were generated. One or more dose reduction techniques were used on this CT: automated exposure control, adjustment of the mAs and/or kVp according to patient size, and iterative reconstruction techniques. The specific techniques used on this CT exam have been documented in the patient's electronic medical record. Digital Imaging and Communications in Medicine (DICOM) format image data are available to nonaffiliated external healthcare facilities or entities on a secure, media free, reciprocally searchable basis with patient authorization for at least a 12-month period after this study. _______________ FINDINGS: LOWER CHEST: Interval developing small bilateral lower thoracic effusions, and asymmetric right septal thickening LIVER, BILIARY: Left lobe of liver ill-defined hypodensity adjacent to the falciform ligament, a CT finding that is commonly associated with focal fatty replacement. Otherwise, no acute hepatic parenchymal process. Mild periportal edema. No intrahepatic or extra hepatic biliary duct dilatation. Diffuse circumferential gallbladder wall thickening with small volume pericholecystic fluid, similar appearance to preceding right upper quadrant ultrasound from 1/2/2020. PANCREAS: Diffuse parenchymal fat replacement SPLEEN: Normal. ADRENALS: Normal. KIDNEYS, URETERS, BLADDER: Symmetric enhancing bilateral kidneys without hydronephrosis or perinephric fluid. Intervally improved mild perinephric stranding appearance. No italo hydroureter. Unremarkable CT appearance of the bladder. GASTROINTESTINAL TRACT: No bowel dilation or wall thickening. Small amount of liquid stool in the right and transverse colon. LYMPH NODES: No enlarged lymph nodes. PELVIC ORGANS: Unremarkable.  VASCULATURE: Normal caliber aorta with mild atherosclerotic changes. OSSEOUS: No acute or aggressive osseous abnormalities identified. OTHER: Nonspecific trace pelvic ascites. _______________     IMPRESSION: 1. Persistent prominent gallbladder without italo hydrops. CT findings of nonspecific gallbladder wall thickening and trace pericholecystic fluid, not significantly changed since preceding right upper quadrant ultrasound from 1/2/2020. In the absence of gallstones and in the setting of a normal HIDA scan, gallbladder wall thickening may be seen in the setting of hypoproteinemia, anemia, ascites, right heart failure and hepatic dysfunction. 2. Nonspecific small volume of liquid stool in the right and transverse colon without italo bowel wall thickening. 3. Nonspecific tiny amount of pelvic ascites. 4. CT findings suggestive of developing interstitial edema and new small bilateral effusions. Potentially fluid overload component. Ct Abd Pelv W Cont    Result Date: 1/2/2020  EXAM: CT ABD PELV W CONT CLINICAL INDICATION/HISTORY: Abdominal pain and fever, sepsis COMPARISON: 12/10/2019 TECHNIQUE:   CT of the abdomen and pelvis following intravenous contrast administration. Coronal and sagittal reformats were generated and reviewed. One or more dose reduction techniques were used on this CT: automated exposure control, adjustment of the mAs and/or kVp according to patient size, and iterative reconstruction techniques. The specific techniques used on this CT exam have been documented in the patient's electronic medical record. Digital Imaging and Communications in Medicine (DICOM) format image data are available to nonaffiliated external healthcare facilities or entities on a secure, media free, reciprocally searchable basis with patient authorization for at least a 12-month period after this study. _______________ FINDINGS: LOWER THORAX: Mild left and right basilar dependent atelectasis. No acute pulmonary infiltrate. No pleural effusion.   No global cardiomegaly or pericardial effusion. LIVER AND BILIARY:  There is hydropic distention of the gallbladder. Mild periportal edema. Common bile duct appears normal in caliber. No suspicious liver lesions. SPLEEN:  Normal. PANCREAS:  Peripancreatic fatty atrophy. ADRENALS:  Normal. KIDNEYS:  Normal. LYMPH NODES:  No mesenteric or retroperitoneal lymphadenopathy. GI TRACT:  Small hiatal hernia. Sigmoid colon diverticulosis without evidence of acute inflammation. Normal caliber small and large bowel loops. No morphology of bowel obstruction. No bowel wall thickening. Normal appendix. PELVIC ORGANS:  Mild circumferential bladder wall thickening despite mild bladder distention. .  No acute abnormality. VASCULATURE: Normal caliber lower thoracic and abdominal aorta with mild atherosclerotic vascular calcification. OTHER:   No ascites or free intraperitoneal air. OSSEOUS STRUCTURES:  No acute osseous abnormality. Mild multilevel degenerative disk disease and facet arthropathy. _______________     IMPRESSION: 1. Nonspecific hydropic gallbladder distention with no additional secondary CT findings of cholecystitis. Right upper quadrant ultrasound could better evaluate these findings if there is clinical concern for cholecystitis. 2. Mild circumferential bladder wall thickening, secondary findings of chronic bladder outlet obstruction versus cystitis. 3. Mild left and right perinephric inflammatory stranding is nonspecific since are no additional findings to suggest pyelonephritis. Although this frequently can represent senescent changes and was not present on the prior CT of 12/10/2019. Correlation with urinalysis could more accurately evaluate for potential ascending urinary tract infection. [id number] [facility]    Result Date: 1/2/2020  EXAM: Limited right upper quadrant abdominal ultrasound INDICATION: Right upper quadrant pain. COMPARISON: CT earlier the same day.  TECHNIQUE: Real-time abdomen/right upper quadrant sonography in multiple planes was performed with image documentation. Grayscale, color flow Doppler imaging, and velocity spectral waveform analysis of the portal vein was performed (duplex imaging). _______________ FINDINGS: LIVER: Normal in echotexture. No focal mass Color flow Doppler and velocity spectral waveform analysis of the portal vein shows normal direction of flow. Brunner Ortiz BILIARY SYSTEM: No intrahepatic biliary dilatation. Common bile duct is normal in caliber measuring 0.5 cm. GALLBLADDER: Distended gallbladder with mildly thickened, edematous wall measuring 4 mm. No shadowing calculus. Layering sludge. Sonographic Deyanira Lek sign is negative as per technologist. RIGHT KIDNEY: 10.3 cm in length. No hydronephrosis or renal mass. No visible calculi. PANCREAS: Head and body are unremarkable in appearance though the tail is obscured by overlying bowel gas. IVC: Visualized portions are unremarkable in appearance. OTHER: No free intraperitoneal fluid. _______________     IMPRESSION: Gallbladder findings are equivocal for cholecystitis. No shadowing calculus. Further evaluation with nuclear hepatobiliary scan is recommended. Xr Chest Port    Result Date: 1/18/2020  EXAM: CHEST RADIOGRAPH CLINICAL INDICATION/HISTORY: tachy -Additional: None COMPARISON: January 15, 2020 TECHNIQUE: Portable frontal view of the chest _______________ FINDINGS: SUPPORT DEVICES: A right upper extremity PICC is present. HEART AND MEDIASTINUM: No appreciable cardiomegaly. Remaining mediastinal contours within normal limits. LUNGS AND PLEURAL SPACES: No consolidation, mass, or pleural effusion. BONY THORAX AND SOFT TISSUES: Unremarkable. _______________     IMPRESSION: No active cardiopulmonary disease. Xr Chest Port    Result Date: 1/15/2020  EXAM: CHEST RADIOGRAPH CLINICAL INDICATION/HISTORY: Sepsis   > Additional: None COMPARISON: 1/1/2020.  TECHNIQUE: Portable frontal view of the chest _______________ FINDINGS: SUPPORT DEVICES: Right-sided PICC line distal tip projects at the [facility] [place] HEART AND MEDIASTINUM: No appreciable cardiomegaly. Remaining mediastinal contours within normal limits. LUNGS AND PLEURAL SPACES: Interstitial prominence. Patchy densities in the lung bases. No evidence for pneumothorax probable small bilateral pleural effusions. BONY THORAX AND SOFT TISSUES: Unremarkable. _______________     IMPRESSION: 1. Bilateral lower lobe atelectasis versus infiltrates. 2. Right sided PICC line as detailed above. Xr Chest Port    Result Date: 1/1/2020  EXAM: XR CHEST PORT. CLINICAL INDICATION/HISTORY: sepsis. -Additional: None. TECHNIQUE: A portable erect AP radiographic view of the chest is reviewed without comparison. _______________ FINDINGS: The lungs and pleural spaces are clear. The cardiac silhouette, naida, and mediastinal contours are normal for age. No acute osseous abnormality is revealed. _______________     IMPRESSION: No acute cardiopulmonary disease. _______________     Us Gallbladder    Result Date: 1/29/2020  EXAM: US GALLBLADDER. CLINICAL INDICATION/HISTORY: r/o acute cholecystitis. -Additional: None. COMPARISON: Correlation is made with the CT examination of 01/28/2020. TECHNIQUE: Grayscale and color Doppler ultrasound evaluation of the right upper quadrant was performed, with spectral Doppler waveform analysis of the portal vein. _______________ FINDINGS: Liver: Normal in size, measuring 15 cm in craniocaudal dimension. Normal echotexture and smooth contour. Hyperechogenicity measuring 3.5 x 2.1 x 1.3 cm corresponds with focal fatty infiltration seen on CT in hepatic segment IVb adjacent to the fissure of ligamentum teres. Color flow Doppler and velocity spectral waveform analysis of the portal vein shows normal (hepatopetal) direction of flow. Gallbladder: The gallbladder remains distended with diffuse wall thickening up to 5 mm, hyperemia of the wall, and pericholecystic fluid. Sludge is present, however no stones are identified.  Bile ducts: No intra or extrahepatic biliary ductal dilatation. The common hepatic segment of the extrahepatic biliary system measures 5 mm in maximal diameter. Pancreas: Partially obscured by bowel gas, however the visualized portions reveal no suspicious abnormality. A simple cyst either within or adjacent to the pancreas measures 0.7 x 0.7 cm. Right kidney: Normal in size, measuring 10.5 cm in length. Survey images reveal no hydronephrosis or abnormal findings. Vasculature: No significant findings of the visualized abdominal aorta and inferior vena cava. Ascites: None. _______________     IMPRESSION: Redemonstrated distended gallbladder with diffuse wall thickening and pericholecystic fluid. Ultrasound also reveals hyperemia of the gallbladder wall. Dependent sludge is present, with no shadowing gallstones. Findings remain suspicious for acute, acalculous cholecystitis. _______________     Ir Guide Insert Picc Over 5 Yrs    Result Date: 1/13/2020  PROCEDURE:  Ultrasound Guided Right Arm PICC Placement CLINICAL INDICATION/HISTORY: Need for long-term IV antibiotics PERFORMED BY: Ebonie Isabel PA-C ATTENDING RADIOLOGIST: Idalia Patterson MD TECHNIQUE: The procedure was performed following standard maximal barrier technique which includes, cap, mask, sterile gown, sterile gloves, sterile full body drape, hand hygiene with alcohol foam, and 2% chlorhexidine for cutaneous antisepsis. Sterile ultrasound gel and a sterile cover for the US probe was used. Ultrasound evaluation for potential access sites was performed. The right basilic vein is patent and normally compresses. A permanent recording was created for the patient record. The patient's upper arm was prepped and draped in sterile fashion and lidocaine was used for local anesthesia. The vein was accessed in the upper arm with a 21 gauge needle under ultrasound guidance. A guide wire was advanced under fluoroscopic control to the superior vena cava.  The distance between the superior vena cava and skin puncture site was measured and a 5 Western Radha double lumen power PICC was cut to the appropriate length. Overall catheter length was 37 cm. The PICC was advanced to the SVC under fluoroscopic control. The line was flushed with heparinized saline and adhered to the skin with a Statlock device. Final coned AP view of the chest was obtained to document catheter position. The patient tolerated the procedure well and there were no immediate complications. __________________________________________ FINDINGS: Final coned AP view of the chest shows good position of the PICC with its tip in the SVC. Both ports flushed easily and there was good blood return. Timeout:  1245 hours. Radiation dose (reference air kerma):  1 mGy. Fluoroscopy Time: 0.1 minutes. GUIDANCE: Ultrasound and fluoroscopic guidance was used to position (and confirm the position of) the needle and catheter. Image(s) saved in PACS: Ultrasound and fluoroscopy. ____________________________________________     IMPRESSION: Successful placement of a double lumen power PICC via the right arm.               [facility]     CC: Saundra Huddleston MD

## 2020-01-29 NOTE — ROUTINE PROCESS
Bedside and Verbal shift change report given to Kina Urbina RN (oncoming nurse) by Felipa Velasco RN (offgoing nurse). Report included the following information SBAR and Kardex.

## 2020-02-01 ENCOUNTER — HOSPITAL ENCOUNTER (OUTPATIENT)
Dept: INFUSION THERAPY | Age: 75
End: 2020-02-01

## 2020-02-02 ENCOUNTER — APPOINTMENT (OUTPATIENT)
Dept: INFUSION THERAPY | Age: 75
End: 2020-02-02

## 2020-02-05 ENCOUNTER — PATIENT OUTREACH (OUTPATIENT)
Dept: CASE MANAGEMENT | Age: 75
End: 2020-02-05

## 2020-02-05 ENCOUNTER — HOSPITAL ENCOUNTER (OUTPATIENT)
Dept: WOUND CARE | Age: 75
End: 2020-02-05

## 2020-02-05 ENCOUNTER — HOSPITAL ENCOUNTER (OUTPATIENT)
Dept: INFUSION THERAPY | Age: 75
End: 2020-02-05

## 2020-02-05 NOTE — PROGRESS NOTES
Community Care Team Documentation for Patient in [facility]     [facility] [address] [facility]. Hospital Discharge diagnosis:       DVT    Lung infiltrate   Anemia   PAD   Type II DM   HTN    SNF Attending Provider:      Anticipated discharge date from SNF:  Tentatively 2/11/20 - set by Joseph Vanegas, updates will be sent prior to that day. PCP : Raya Jon MD    CTN:     [facility] Team rounds completed, updates provided by facility. Brief Summary of Care:    Patient doesn't speak [place] - speaks only [place] Patient receiving PT/OT. Making progress. Dispo:  Patient was living with daughter PTA. Goal is for pt to return home with daughter. RRAT:  Medium Risk            16       Total Score        3 Has Seen PCP in Last 6 Months (Yes=3, No=0)    4 IP Visits Last 12 Months (1-3=4, 4=9, >4=11)    9 Charlson Comorbidity Score (Age + Comorbid Conditions)        Criteria that do not apply:    . Living with Significant Other. Assisted Living. LTAC. SNF. or   Rehab    Patient Length of Stay (>5 days = 3)    Pt.  Coverage (Medicare=5 , Medicaid, or Self-Pay=4)        Active Ambulatory Problems     Diagnosis Date Noted    Stage 3 chronic kidney disease ([address] 01/02/2020    Type 2 diabetes mellitus, with long-term current use of insulin ([address] 01/03/2020    Hypertension 01/03/2020    Hypokalemia 01/03/2020    Foot abscess, right 01/05/2020    Diabetic ulcer of right midfoot associated with type 2 diabetes mellitus, with fat layer exposed ([address] 01/06/2020    PAD (peripheral artery disease) ([address] 01/07/2020    Osteomyelitis of right foot ([address] 01/09/2020    Acute deep vein thrombosis (DVT) ([address] 01/13/2020    DVT (deep venous thrombosis) ([address] 01/18/2020    Anemia 01/20/2020    Lung infiltrate 01/20/2020    Hematoma 01/26/2020     Resolved Ambulatory Problems     Diagnosis Date Noted    Sepsis ([facility] 01/02/2020    Abdominal pain 01/02/2020    UTI (urinary tract infection) 01/03/2020    Positive blood culture 01/03/2020    Corn of foot 01/05/2020    Sepsis due to Streptococcus agalactiae ([facility] 01/06/2020     Past Medical History:   Diagnosis Date    Diabetes ([facility]     DVT of deep femoral vein (HCC)     Foot abscess

## 2020-02-06 ENCOUNTER — HOSPITAL ENCOUNTER (OUTPATIENT)
Dept: INFUSION THERAPY | Age: 75
End: 2020-02-06

## 2020-02-06 NOTE — PROGRESS NOTES
February 6, 2020      Kamar Higgins      Dear Patient:     The [facility] requires that any individual seeking admission to a nursing facility, assisted living facility, or a home- and community-based waiver be evaluated to determine whether the individual requires that level of services, if the individual is financially Medicaid eligible, or expects to become Medicaid eligible within 180 days of the beginning date of services. If the individual is Medicaid eligible at the time of application or expects to become Medicaid eligible within 180 days of the beginning date of services, the screening must be completed. Medicaid contracts with several different agencies to perform pre-admission screening using the level-of-care criteria, assessment tool, and procedures established by Medicaid. The Pre-Admission Screening Team, in accordance with Medicaid policy and procedures, has determined that you do meet the criteria requirements for Medicaid-funded long-term care. This determination is based on the screening teams assessment of your functioning abilities, medical needs, and overall risk of needing institutional care. If you have been denied services, you may appeal this decision by writing to the Recipient Appeals Unit, [address] [place] [address], [address], [address], of your desire to appeal within thirty (30) days of receipt of this decision letter.  You have the right to represent yourself or use , a relative, a friend, or other spokesperson in the appeal. If you choose to appeal, please include a copy of the letter with your appeal request.     Sincerely,     Nikki Dias, MSN, RN  [address]

## 2020-02-07 ENCOUNTER — APPOINTMENT (OUTPATIENT)
Dept: INFUSION THERAPY | Age: 75
End: 2020-02-07

## 2020-02-08 ENCOUNTER — APPOINTMENT (OUTPATIENT)
Dept: INFUSION THERAPY | Age: 75
End: 2020-02-08

## 2020-02-09 ENCOUNTER — APPOINTMENT (OUTPATIENT)
Dept: INFUSION THERAPY | Age: 75
End: 2020-02-09

## 2020-02-10 ENCOUNTER — APPOINTMENT (OUTPATIENT)
Dept: INFUSION THERAPY | Age: 75
End: 2020-02-10

## 2020-02-11 ENCOUNTER — APPOINTMENT (OUTPATIENT)
Dept: INFUSION THERAPY | Age: 75
End: 2020-02-11

## 2020-02-12 ENCOUNTER — APPOINTMENT (OUTPATIENT)
Dept: INFUSION THERAPY | Age: 75
End: 2020-02-12

## 2020-02-12 ENCOUNTER — PATIENT OUTREACH (OUTPATIENT)
Dept: CASE MANAGEMENT | Age: 75
End: 2020-02-12

## 2020-02-12 NOTE — PROGRESS NOTES
Community Care Team Documentation for Patient in [facility]     [facility] [address] [facility] Hospital Discharge diagnosis:       DVT    Lung infiltrate   Anemia   PAD   Type II DM   HTN    SNF Attending Provider:      Anticipated discharge date from SNF:  2/11/20. PCP : Lalo Cervantes MD    [facility] Team rounds completed, updates provided by facility. Brief Summary of Care:    Patient doesn't speak [place] - speaks only [place] Patient receiving PT/OT. Making progress. Dispo:  Patient was living with daughter PTA. He was d/c home with daughter on 2/11/20. [facility] will use [facility]. RRAT:  Medium Risk            16       Total Score        3 Has Seen PCP in Last 6 Months (Yes=3, No=0)    4 IP Visits Last 12 Months (1-3=4, 4=9, >4=11)    9 Charlson Comorbidity Score (Age + Comorbid Conditions)        Criteria that do not apply:    . Living with Significant Other. Assisted Living. LTAC. SNF. or   Rehab    Patient Length of Stay (>5 days = 3)    Pt.  Coverage (Medicare=5 , Medicaid, or Self-Pay=4)        Active Ambulatory Problems     Diagnosis Date Noted    Stage 3 chronic kidney disease ([address] 01/02/2020    Type 2 diabetes mellitus, with long-term current use of insulin ([address] 01/03/2020    Hypertension 01/03/2020    Hypokalemia 01/03/2020    Foot abscess, right 01/05/2020    Diabetic ulcer of right midfoot associated with type 2 diabetes mellitus, with fat layer exposed ([address] 01/06/2020    PAD (peripheral artery disease) ([address] 01/07/2020    Osteomyelitis of right foot ([address] 01/09/2020    Acute deep vein thrombosis (DVT) ([address] 01/13/2020    DVT (deep venous thrombosis) ([address] 01/18/2020    Anemia 01/20/2020    Lung infiltrate 01/20/2020    Hematoma 01/26/2020     Resolved Ambulatory Problems     Diagnosis Date Noted    Sepsis ([address] 01/02/2020    Abdominal pain 01/02/2020    UTI (urinary tract infection) 01/03/2020    Positive blood culture 01/03/2020    Corn of foot 01/05/2020    Sepsis due to Streptococcus agalactiae ([facility] 01/06/2020     Past Medical History:   Diagnosis Date    Diabetes ([facility]     DVT of deep femoral vein (HCC)     Foot abscess

## 2020-02-13 ENCOUNTER — APPOINTMENT (OUTPATIENT)
Dept: INFUSION THERAPY | Age: 75
End: 2020-02-13

## 2020-02-14 ENCOUNTER — APPOINTMENT (OUTPATIENT)
Dept: INFUSION THERAPY | Age: 75
End: 2020-02-14

## 2020-02-15 ENCOUNTER — APPOINTMENT (OUTPATIENT)
Dept: INFUSION THERAPY | Age: 75
End: 2020-02-15

## 2020-02-16 ENCOUNTER — APPOINTMENT (OUTPATIENT)
Dept: INFUSION THERAPY | Age: 75
End: 2020-02-16

## 2020-02-17 ENCOUNTER — APPOINTMENT (OUTPATIENT)
Dept: INFUSION THERAPY | Age: 75
End: 2020-02-17

## 2020-04-20 ENCOUNTER — APPOINTMENT (OUTPATIENT)
Dept: CT IMAGING | Age: 75
DRG: 987 | End: 2020-04-20
Attending: EMERGENCY MEDICINE
Payer: MEDICARE

## 2020-04-20 ENCOUNTER — HOSPITAL ENCOUNTER (INPATIENT)
Age: 75
LOS: 4 days | Discharge: HOME | DRG: 987 | End: 2020-04-24
Attending: EMERGENCY MEDICINE | Admitting: HOSPITALIST
Payer: MEDICARE

## 2020-04-20 DIAGNOSIS — K92.2 ACUTE LOWER GI BLEEDING: Primary | ICD-10-CM

## 2020-04-20 DIAGNOSIS — T45.515A BLEEDING ON COUMADIN: ICD-10-CM

## 2020-04-20 DIAGNOSIS — R58 BLEEDING ON COUMADIN: ICD-10-CM

## 2020-04-20 PROBLEM — R79.1 SUPRATHERAPEUTIC INR: Status: ACTIVE | Noted: 2020-04-20

## 2020-04-20 LAB
ALBUMIN SERPL-MCNC: 3 G/DL (ref 3.4–5)
ALBUMIN/GLOB SERPL: 0.7 {RATIO} (ref 0.8–1.7)
ALP SERPL-CCNC: 94 U/L (ref 45–117)
ALT SERPL-CCNC: 41 U/L (ref 16–61)
ANION GAP SERPL CALC-SCNC: 6 MMOL/L (ref 3–18)
AST SERPL-CCNC: 17 U/L (ref 10–38)
ATRIAL RATE: 83 BPM
BASOPHILS # BLD: 0 K/UL (ref 0–0.1)
BASOPHILS NFR BLD: 0 % (ref 0–3)
BILIRUB SERPL-MCNC: 0.5 MG/DL (ref 0.2–1)
BUN SERPL-MCNC: 28 MG/DL (ref 7–18)
BUN/CREAT SERPL: 22 (ref 12–20)
CALCIUM SERPL-MCNC: 8.7 MG/DL (ref 8.5–10.1)
CALCULATED P AXIS, ECG09: 60 DEGREES
CALCULATED R AXIS, ECG10: -5 DEGREES
CALCULATED T AXIS, ECG11: 30 DEGREES
CHLORIDE SERPL-SCNC: 106 MMOL/L (ref 100–111)
CO2 SERPL-SCNC: 27 MMOL/L (ref 21–32)
CREAT SERPL-MCNC: 1.26 MG/DL (ref 0.6–1.3)
DIAGNOSIS, 93000: NORMAL
DIFFERENTIAL METHOD BLD: ABNORMAL
EOSINOPHIL # BLD: 3.2 K/UL (ref 0–0.4)
EOSINOPHIL NFR BLD: 33 % (ref 0–5)
ERYTHROCYTE [DISTWIDTH] IN BLOOD BY AUTOMATED COUNT: 14.5 % (ref 11.6–14.5)
EST. AVERAGE GLUCOSE BLD GHB EST-MCNC: 203 MG/DL
GLOBULIN SER CALC-MCNC: 4.3 G/DL (ref 2–4)
GLUCOSE BLD STRIP.AUTO-MCNC: 356 MG/DL (ref 70–110)
GLUCOSE BLD STRIP.AUTO-MCNC: 426 MG/DL (ref 70–110)
GLUCOSE SERPL-MCNC: 285 MG/DL (ref 74–99)
HBA1C MFR BLD: 8.7 % (ref 4.2–5.6)
HCT VFR BLD AUTO: 23.7 % (ref 36–48)
HCT VFR BLD AUTO: 26.3 % (ref 36–48)
HCT VFR BLD AUTO: 28.4 % (ref 36–48)
HGB BLD-MCNC: 8.2 G/DL (ref 13–16)
HGB BLD-MCNC: 9.2 G/DL (ref 13–16)
HGB BLD-MCNC: 9.8 G/DL (ref 13–16)
INR PPP: 3.3 (ref 0.8–1.2)
LYMPHOCYTES # BLD: 2.1 K/UL (ref 0.8–3.5)
LYMPHOCYTES NFR BLD: 21 % (ref 20–51)
MCH RBC QN AUTO: 29.5 PG (ref 24–34)
MCHC RBC AUTO-ENTMCNC: 34.5 G/DL (ref 31–37)
MCV RBC AUTO: 85.5 FL (ref 74–97)
MONOCYTES # BLD: 0.5 K/UL (ref 0–1)
MONOCYTES NFR BLD: 5 % (ref 2–9)
NEUTS SEG # BLD: 4 K/UL (ref 1.8–8)
NEUTS SEG NFR BLD: 41 % (ref 42–75)
P-R INTERVAL, ECG05: 170 MS
PLATELET # BLD AUTO: 229 K/UL (ref 135–420)
PMV BLD AUTO: 9.3 FL (ref 9.2–11.8)
POTASSIUM SERPL-SCNC: 4.3 MMOL/L (ref 3.5–5.5)
PROT SERPL-MCNC: 7.3 G/DL (ref 6.4–8.2)
PROTHROMBIN TIME: 33.2 SEC (ref 11.5–15.2)
Q-T INTERVAL, ECG07: 352 MS
QRS DURATION, ECG06: 96 MS
QTC CALCULATION (BEZET), ECG08: 413 MS
RBC # BLD AUTO: 3.32 M/UL (ref 4.7–5.5)
RBC MORPH BLD: ABNORMAL
SODIUM SERPL-SCNC: 139 MMOL/L (ref 136–145)
VENTRICULAR RATE, ECG03: 83 BPM
VIT B12 SERPL-MCNC: 346 PG/ML (ref 211–911)
WBC # BLD AUTO: 9.8 K/UL (ref 4.6–13.2)

## 2020-04-20 PROCEDURE — 83540 ASSAY OF IRON: CPT

## 2020-04-20 PROCEDURE — 82607 VITAMIN B-12: CPT

## 2020-04-20 PROCEDURE — 80053 COMPREHEN METABOLIC PANEL: CPT

## 2020-04-20 PROCEDURE — 74011636320 HC RX REV CODE- 636/320: Performed by: EMERGENCY MEDICINE

## 2020-04-20 PROCEDURE — 82962 GLUCOSE BLOOD TEST: CPT

## 2020-04-20 PROCEDURE — 85018 HEMOGLOBIN: CPT

## 2020-04-20 PROCEDURE — 86923 COMPATIBILITY TEST ELECTRIC: CPT

## 2020-04-20 PROCEDURE — 0W3P8ZZ CONTROL BLEEDING IN GASTROINTESTINAL TRACT, VIA NATURAL OR ARTIFICIAL OPENING ENDOSCOPIC: ICD-10-PCS | Performed by: INTERNAL MEDICINE

## 2020-04-20 PROCEDURE — 77030040361 HC SLV COMPR DVT MDII -B: Performed by: INTERNAL MEDICINE

## 2020-04-20 PROCEDURE — 74174 CTA ABD&PLVS W/CONTRAST: CPT

## 2020-04-20 PROCEDURE — 74011000258 HC RX REV CODE- 258: Performed by: HOSPITALIST

## 2020-04-20 PROCEDURE — 76040000009: Performed by: INTERNAL MEDICINE

## 2020-04-20 PROCEDURE — 74011250636 HC RX REV CODE- 250/636: Performed by: INTERNAL MEDICINE

## 2020-04-20 PROCEDURE — P9059 PLASMA, FRZ BETWEEN 8-24HOUR: HCPCS

## 2020-04-20 PROCEDURE — 85610 PROTHROMBIN TIME: CPT

## 2020-04-20 PROCEDURE — 65270000029 HC RM PRIVATE

## 2020-04-20 PROCEDURE — 74011250636 HC RX REV CODE- 250/636: Performed by: HOSPITALIST

## 2020-04-20 PROCEDURE — 93005 ELECTROCARDIOGRAM TRACING: CPT

## 2020-04-20 PROCEDURE — 77030014243 HC BND LIG VRCES BSC -D: Performed by: INTERNAL MEDICINE

## 2020-04-20 PROCEDURE — 83036 HEMOGLOBIN GLYCOSYLATED A1C: CPT

## 2020-04-20 PROCEDURE — 74011636637 HC RX REV CODE- 636/637: Performed by: HOSPITALIST

## 2020-04-20 PROCEDURE — 30233K1 TRANSFUSION OF NONAUTOLOGOUS FROZEN PLASMA INTO PERIPHERAL VEIN, PERCUTANEOUS APPROACH: ICD-10-PCS | Performed by: INTERNAL MEDICINE

## 2020-04-20 PROCEDURE — 36430 TRANSFUSION BLD/BLD COMPNT: CPT

## 2020-04-20 PROCEDURE — 36415 COLL VENOUS BLD VENIPUNCTURE: CPT

## 2020-04-20 PROCEDURE — 99153 MOD SED SAME PHYS/QHP EA: CPT | Performed by: INTERNAL MEDICINE

## 2020-04-20 PROCEDURE — 86900 BLOOD TYPING SEROLOGIC ABO: CPT

## 2020-04-20 PROCEDURE — G0500 MOD SEDAT ENDO SERVICE >5YRS: HCPCS | Performed by: INTERNAL MEDICINE

## 2020-04-20 PROCEDURE — 85025 COMPLETE CBC W/AUTO DIFF WBC: CPT

## 2020-04-20 PROCEDURE — 99285 EMERGENCY DEPT VISIT HI MDM: CPT

## 2020-04-20 PROCEDURE — 77030003657 HC NDL SCLER BSC -B: Performed by: INTERNAL MEDICINE

## 2020-04-20 PROCEDURE — 77030020018 HC MRKR ENDOSC SPOT 5ML SYR GISP -B: Performed by: INTERNAL MEDICINE

## 2020-04-20 RX ORDER — SODIUM CHLORIDE 9 MG/ML
75 INJECTION, SOLUTION INTRAVENOUS CONTINUOUS
Status: DISCONTINUED | OUTPATIENT
Start: 2020-04-20 | End: 2020-04-24 | Stop reason: HOSPADM

## 2020-04-20 RX ORDER — ONDANSETRON 2 MG/ML
4 INJECTION INTRAMUSCULAR; INTRAVENOUS
Status: DISCONTINUED | OUTPATIENT
Start: 2020-04-20 | End: 2020-04-24 | Stop reason: HOSPADM

## 2020-04-20 RX ORDER — METRONIDAZOLE 500 MG/100ML
500 INJECTION, SOLUTION INTRAVENOUS EVERY 8 HOURS
Status: DISCONTINUED | OUTPATIENT
Start: 2020-04-20 | End: 2020-04-24 | Stop reason: HOSPADM

## 2020-04-20 RX ORDER — ACETAMINOPHEN 325 MG/1
650 TABLET ORAL
Status: DISCONTINUED | OUTPATIENT
Start: 2020-04-20 | End: 2020-04-24 | Stop reason: HOSPADM

## 2020-04-20 RX ORDER — DIPHENHYDRAMINE HYDROCHLORIDE 50 MG/ML
12.5 INJECTION, SOLUTION INTRAMUSCULAR; INTRAVENOUS
Status: DISCONTINUED | OUTPATIENT
Start: 2020-04-20 | End: 2020-04-24 | Stop reason: HOSPADM

## 2020-04-20 RX ORDER — FENTANYL CITRATE 50 UG/ML
100 INJECTION, SOLUTION INTRAMUSCULAR; INTRAVENOUS
Status: DISCONTINUED | OUTPATIENT
Start: 2020-04-20 | End: 2020-04-20 | Stop reason: HOSPADM

## 2020-04-20 RX ORDER — POLYETHYLENE GLYCOL 3350 17 G/17G
17 POWDER, FOR SOLUTION ORAL DAILY
Status: DISCONTINUED | OUTPATIENT
Start: 2020-04-21 | End: 2020-04-24 | Stop reason: HOSPADM

## 2020-04-20 RX ORDER — DEXTROMETHORPHAN/PSEUDOEPHED 2.5-7.5/.8
1.2 DROPS ORAL
Status: CANCELLED | OUTPATIENT
Start: 2020-04-20

## 2020-04-20 RX ORDER — DEXTROSE MONOHYDRATE 100 MG/ML
125-250 INJECTION, SOLUTION INTRAVENOUS AS NEEDED
Status: DISCONTINUED | OUTPATIENT
Start: 2020-04-20 | End: 2020-04-24 | Stop reason: HOSPADM

## 2020-04-20 RX ORDER — NALOXONE HYDROCHLORIDE 0.4 MG/ML
0.4 INJECTION, SOLUTION INTRAMUSCULAR; INTRAVENOUS; SUBCUTANEOUS
Status: DISCONTINUED | OUTPATIENT
Start: 2020-04-20 | End: 2020-04-20 | Stop reason: HOSPADM

## 2020-04-20 RX ORDER — DIPHENHYDRAMINE HYDROCHLORIDE 50 MG/ML
50 INJECTION, SOLUTION INTRAMUSCULAR; INTRAVENOUS ONCE
Status: CANCELLED | OUTPATIENT
Start: 2020-04-20 | End: 2020-04-20

## 2020-04-20 RX ORDER — DOCUSATE SODIUM 100 MG/1
100 CAPSULE, LIQUID FILLED ORAL 2 TIMES DAILY
Status: DISCONTINUED | OUTPATIENT
Start: 2020-04-20 | End: 2020-04-21

## 2020-04-20 RX ORDER — SODIUM CHLORIDE 0.9 % (FLUSH) 0.9 %
5-40 SYRINGE (ML) INJECTION AS NEEDED
Status: CANCELLED | OUTPATIENT
Start: 2020-04-20

## 2020-04-20 RX ORDER — CIPROFLOXACIN 2 MG/ML
400 INJECTION, SOLUTION INTRAVENOUS EVERY 12 HOURS
Status: DISCONTINUED | OUTPATIENT
Start: 2020-04-20 | End: 2020-04-24 | Stop reason: HOSPADM

## 2020-04-20 RX ORDER — INSULIN LISPRO 100 [IU]/ML
INJECTION, SOLUTION INTRAVENOUS; SUBCUTANEOUS
Status: DISCONTINUED | OUTPATIENT
Start: 2020-04-20 | End: 2020-04-24 | Stop reason: HOSPADM

## 2020-04-20 RX ORDER — ATROPINE SULFATE 0.1 MG/ML
0.5 INJECTION INTRAVENOUS
Status: CANCELLED | OUTPATIENT
Start: 2020-04-20 | End: 2020-04-21

## 2020-04-20 RX ORDER — METOPROLOL SUCCINATE 50 MG/1
50 TABLET, EXTENDED RELEASE ORAL DAILY
Status: DISCONTINUED | OUTPATIENT
Start: 2020-04-21 | End: 2020-04-24 | Stop reason: HOSPADM

## 2020-04-20 RX ORDER — MIDAZOLAM HYDROCHLORIDE 1 MG/ML
.25-5 INJECTION, SOLUTION INTRAMUSCULAR; INTRAVENOUS
Status: DISCONTINUED | OUTPATIENT
Start: 2020-04-20 | End: 2020-04-20 | Stop reason: HOSPADM

## 2020-04-20 RX ORDER — SODIUM CHLORIDE 9 MG/ML
250 INJECTION, SOLUTION INTRAVENOUS AS NEEDED
Status: DISCONTINUED | OUTPATIENT
Start: 2020-04-20 | End: 2020-04-21 | Stop reason: ALTCHOICE

## 2020-04-20 RX ORDER — ATORVASTATIN CALCIUM 20 MG/1
20 TABLET, FILM COATED ORAL DAILY
Status: DISCONTINUED | OUTPATIENT
Start: 2020-04-21 | End: 2020-04-24 | Stop reason: HOSPADM

## 2020-04-20 RX ORDER — INSULIN GLARGINE 100 [IU]/ML
5 INJECTION, SOLUTION SUBCUTANEOUS DAILY
Status: DISCONTINUED | OUTPATIENT
Start: 2020-04-21 | End: 2020-04-21

## 2020-04-20 RX ORDER — SODIUM CHLORIDE 9 MG/ML
1000 INJECTION, SOLUTION INTRAVENOUS CONTINUOUS
Status: DISCONTINUED | OUTPATIENT
Start: 2020-04-20 | End: 2020-04-20 | Stop reason: HOSPADM

## 2020-04-20 RX ORDER — FLUMAZENIL 0.1 MG/ML
0.2 INJECTION INTRAVENOUS
Status: DISCONTINUED | OUTPATIENT
Start: 2020-04-20 | End: 2020-04-20 | Stop reason: HOSPADM

## 2020-04-20 RX ORDER — EPINEPHRINE 0.1 MG/ML
INJECTION INTRACARDIAC; INTRAVENOUS AS NEEDED
Status: DISCONTINUED | OUTPATIENT
Start: 2020-04-20 | End: 2020-04-24 | Stop reason: HOSPADM

## 2020-04-20 RX ORDER — SODIUM CHLORIDE 0.9 % (FLUSH) 0.9 %
5-40 SYRINGE (ML) INJECTION EVERY 8 HOURS
Status: CANCELLED | OUTPATIENT
Start: 2020-04-20

## 2020-04-20 RX ORDER — SODIUM CHLORIDE 9 MG/ML
1000 INJECTION, SOLUTION INTRAVENOUS CONTINUOUS
Status: CANCELLED | OUTPATIENT
Start: 2020-04-20 | End: 2020-04-20

## 2020-04-20 RX ORDER — MAGNESIUM SULFATE 100 %
4 CRYSTALS MISCELLANEOUS AS NEEDED
Status: DISCONTINUED | OUTPATIENT
Start: 2020-04-20 | End: 2020-04-24 | Stop reason: HOSPADM

## 2020-04-20 RX ORDER — NALOXONE HYDROCHLORIDE 0.4 MG/ML
0.4 INJECTION, SOLUTION INTRAMUSCULAR; INTRAVENOUS; SUBCUTANEOUS AS NEEDED
Status: DISCONTINUED | OUTPATIENT
Start: 2020-04-20 | End: 2020-04-24 | Stop reason: HOSPADM

## 2020-04-20 RX ORDER — EPINEPHRINE 0.1 MG/ML
1 INJECTION INTRACARDIAC; INTRAVENOUS
Status: DISCONTINUED | OUTPATIENT
Start: 2020-04-20 | End: 2020-04-20 | Stop reason: HOSPADM

## 2020-04-20 RX ADMIN — CIPROFLOXACIN 400 MG: 2 INJECTION, SOLUTION INTRAVENOUS at 19:29

## 2020-04-20 RX ADMIN — ONDANSETRON 4 MG: 2 INJECTION INTRAMUSCULAR; INTRAVENOUS at 20:51

## 2020-04-20 RX ADMIN — INSULIN LISPRO 10 UNITS: 100 INJECTION, SOLUTION INTRAVENOUS; SUBCUTANEOUS at 21:49

## 2020-04-20 RX ADMIN — SODIUM CHLORIDE 125 ML/HR: 900 INJECTION, SOLUTION INTRAVENOUS at 15:30

## 2020-04-20 RX ADMIN — METRONIDAZOLE 500 MG: 500 INJECTION, SOLUTION INTRAVENOUS at 17:34

## 2020-04-20 RX ADMIN — IOPAMIDOL 100 ML: 755 INJECTION, SOLUTION INTRAVENOUS at 12:30

## 2020-04-20 RX ADMIN — PHYTONADIONE 5 MG: 10 INJECTION, EMULSION INTRAMUSCULAR; INTRAVENOUS; SUBCUTANEOUS at 19:58

## 2020-04-20 NOTE — ED NOTES
TRANSFER - OUT REPORT:    Verbal report given to brit (name) on Cristela Fothergill  being transferred to tele (unit) for routine progression of care       Report consisted of patients Situation, Background, Assessment and   Recommendations(SBAR). Information from the following report(s) SBAR, Kardex and ED Summary was reviewed with the receiving nurse. Lines:   Peripheral IV 04/20/20 Right Antecubital (Active)   Site Assessment Clean, dry, & intact 4/20/2020 10:06 AM   Hub Color/Line Status Pink 4/20/2020 10:06 AM        Opportunity for questions and clarification was provided.       Patient transported with:   [employer]

## 2020-04-20 NOTE — PERIOP NOTES
Pt tolerated colonoscopy well. Received Versed 5mg; Fentanyl 150mg; Glucagon 1.0mg. Bleeding sigmoid diverticulum at 30cm noted.

## 2020-04-20 NOTE — PROCEDURES
[facility]  Colonoscopy Procedure Report  _______________________________________________________  Patient: Papito Monet                                         Attending Physician: Gabe Patel MD    Patient ID: [id number]                                      Referring Physician: Caleb Abrams MD    Exam Date: April 20, 2020 _______________________________________________________      Introduction: A  76 y.o. male patient, presents for outpatient Colonoscopy    Indications: chronic constipation has one bm every 4 days. He has been on Coumadin since January for deep vein thrombosis requiring thrombolysis in his right upper arm. This morning he started passing large quantity of fresh blood per rectum with LLQ abdominal pain. CT scan angio revealed sigmoid diverticulosis with active bleeding in the proximal sigmoid colon. Hgb 9.2    Consent: The benefits, risks, and alternatives to the procedure were discussed and informed consent was obtained from the patient. Preparation: EKG, pulse, pulse oximetry and blood pressure were monitored throughout the procedure. ASA Classification: Class 2 - . The heart is an S1-S2 and regular heart rate and rhythm. Lungs are clear to auscultation and percussion. Abdomen is soft, nondistended, and nontender. Mental Status: awake, alert, and oriented to person, place, and time    Medications:  · Fentanyl 150 mcg IV, Versed 5 mg IV and Glucagon throughout the procedure. Rectal Exam: Normal Rectal Exam. Prostate not enlarged  Pathology Specimens: No specimens removed. Procedure: The colonoscope was passed with difficulty through the anus under direct visualization and advanced to the ascending colon. The scope was withdrawn and the mucosa was carefully examined. The quality of the preparation was poor with large quantity of fresh blood in the left and transverse colon and formed soft stools in the right colon. The views were poor.  The patient's toleration of the procedure was excellent. The exam was done twice to the ascending colon. Total time is 63 minutes. Findings:    Rectum:   Normal  Sigmoid:   Tortuous sigmoid with moderate sigmoid diverticulosis. At 30 cm there active bleeding coming from a small diverticulum. Tattoo spot is injected just blow to nelson the area as the colonoscope is withdrawn and changed for the gastroscope with the multi- is applied to the scope. The gastroscope is reinserted and advanced all the way to the bleeding site. I had to pull it out many times to unclog it from stools and blood. I was Unable to apply a rubber band on the bleeding diverticulum as the are was stiff and was unable to suction any tissue inside the cup. I removed the multibander device and returned back to inject the diverticulum with epinephrine but there was too much blood, clots and stools to see unable to suction with the needle catheter inside the working channel. I had to go back to the colonoscope and was able with great difficulty to inject epinephrine at the entrance of the diverticulum. The position was very difficult in a sharp angulation to work at. The bleeding stopped. This may not be permanent as no clipping device could be applied. I decided to stop and pull out the scope hoping to buy some time to control the coagulation issue. Descending Colon:   Stools and blood  Transverse Colon:   Stools and blood  Ascending Colon:   Formed brown soft stools   Cecum:   Stools not seen  Terminal Ileum:   Not entered      Unplanned Events: There were no unplanned events. Estimated Blood Loss: None  Impressions: Tortuous sigmoid with moderate sigmoid diverticulosis. At 30 cm there active bleeding coming from a small diverticulum. Tattoo spot is injected just blow to nelson the area as the colonoscope is withdrawn and changed for the gastroscope with the multi- is applied to the scope.  The gastroscope is reinserted and advanced all the way to the bleeding site. I had to pull it out many times to unclog it from stools and blood. I was Unable to apply a rubber band on the bleeding diverticulum as the are was stiff and was unable to suction any tissue inside the cup. I removed the multibander device and returned back to inject the diverticulum with epinephrine but there was too much blood, clots and stools to see unable to suction with the needle catheter inside the working channel. I had to go back to the colonoscope and was able with great difficulty to inject epinephrine 6 cc at the entrance of the bleeding diverticulum. The position was very difficult in a sharp angulation to work at. The bleeding stopped. This may not be permanent as no clipping device could be applied. I decided to stop and pull out the scope hoping to buy some time to control the coagulation issue. Normal Mucosa. Complications: None; patient tolerated the procedure well. Recommendations:  · Return to the floort. · Resume clear liquid diet. · Repeat later Colonoscopy with a full bowel prep to exclude other significant lesions. · Treat the constipation with stool softener Colace 100 mg twice daily with or without Miralax daily. · If bleeding recurs then he may benefit from arterial embolization by IR or surgical resection of the sigmoid. · Reverse the coagulation with fresh frozen plasma and Vit K. Hold coumadin for at least 5 days if he still needs it.  No NSAID's    Procedure Codes:    · Robyn Stout [[id number]]    Endoscope Information:  Model Number(s)    [id number]   Assistant: None    Signed By: Crystal Chapa MD Date: April 20, 2020

## 2020-04-20 NOTE — PROGRESS NOTES
Actively bleeding small sigmoid diverticulum at 30 cm. Poor bowel prep. Formed stools in the right colon but no blood there. Bleeding is temporarily controlled with injection of epinephrine. Need to reverse the anticoagulation. Would need later another full colonoscopy with bowel prep. He has probably Vit B12 deficiency? I am surprised that he has 30 % eosinophile that may have to be investigated?

## 2020-04-20 NOTE — PROGRESS NOTES
1705 - Patient arrived to floor via stretcher. Assisted onto bed. Denies any pain/discomfort. Respirations even and unlabored. 1715 - Received patient from PACU in satisfactory condition. VSS. Assumed care of patient. Patient primarily speaks Sy[place], but able to understand some [place]. Patient is alert and oriented x 4. Patient is calm and cooperative. Lung sounds clear bilaterally. Respirations even and unlabored. No use of accessory muscles. Abdomen is soft and non-tender. Bowel sounds hyperactive to all quadrants. Continent of bowel and bladder. Skin is warm, dry and skin color is appropriate to race. No other skin integrity issues present. Sequential compression device applied to BLE. IV intact to left and right AC's and flush without difficulty. Reports pain 0/10. Patient oriented to call bell use as well as bed use. Call bell within reach. Bed in low position. Three side rails up. 1825 - Patient ate 100% of meal. Tolerated well. No c/o abdominal discomfort. Patient resting quietly in bed with call light in reach. Shift summary: Patient had uneventful shift. Tolerated clear liquid meal. No bloody stools noted. No issues/concerns at this time.

## 2020-04-20 NOTE — CDMP QUERY
Patient admitted with GI bleeding and is on chronic anti-coagulation. If possible, please document in the progress notes and discharge summary if you are evaluating and/or treating any of the following: 
 
=> Bleeding (Hemorrhagic disorder) due to Coumadin 
=> Bleeding unrelated to anti-coagulation 
=> Other explanation of clinical findings, please specify 
=> unable to determine The medical record reflects the following: 
---> Risk factors:  76 yr old on Coumadin 
 
---> Clinical Indicators: * 4- H&P:  \" 75 yo male with PMHX of PAD, DVT on Coumadin. ..came to ER due to bloody stool. [facility] Lower GI bleed. [employer] Supratherapeutic INR. [facility] [place] \" 
   * 4- Lab:  INR 3.3 
 
---> Treatment: GI consult; Per GI recommendations: reverse the coagulation with fresh frozen plasma and Vit K; hold Coumadin for at least 5 days Thank you for your time, 
Bigg Acosta, [facility], [address] [place] [address]

## 2020-04-20 NOTE — ED PROVIDER NOTES
EMERGENCY DEPARTMENT HISTORY AND PHYSICAL EXAM    Date: 4/20/2020  Patient Name: Kristy Vasquez    History of Presenting Illness     Chief Complaint   Patient presents with    Rectal Bleeding         History Provided By: Patient    [address] is a 76 y.o. male with PMHX of hypertension, DVT on Coumadin, diabetes on metformin who presents to the emergency department C/O rectal bleeding. Patient is primarily Romanian-speaking, history limited by interpretation service. Patient reports that he had 1 bowel movement this morning that he thought was diarrhea when he looked in the bowl he saw bright red blood. States bowel movement was not painful. He reports having constipation recently however this was resolved after his daughter got him MiraLAX. States nonpainful bowel movement today and no straining. No nausea or lightheadedness. No prior episodes of rectal bleeding.     Patient says he has been on Coumadin for the past few months after provoked DVT from foot surgery    PCP: Pita Sultana MD    Current Facility-Administered Medications   Medication Dose Route Frequency Provider Last Rate Last Dose    0.9% sodium chloride infusion 250 mL  250 mL IntraVENous PRN Arnoldo Carr MD        0.9% sodium chloride infusion  125 mL/hr IntraVENous CONTINUOUS Arnoldo Carr  mL/hr at 04/20/20 1530 125 mL/hr at 04/20/20 1530    0.9% sodium chloride infusion 1,000 mL  1,000 mL IntraVENous CONTINUOUS Issac Dixon MD        midazolam (VERSED) injection 0.25-5 mg  0.25-5 mg IntraVENous Multiple Issac Dixon MD        fentaNYL citrate (PF) injection 100 mcg  100 mcg IntraVENous Multiple Issac Dixon MD        naloxone [facility]) injection 0.4 mg  0.4 mg IntraVENous Multiple Issac Dixon MD        flumazeniL (ROMAZICON) 0.1 mg/mL injection 0.2 mg  0.2 mg IntraVENous Issac Meza MD           Past History     Past Medical History:  Past Medical History:   Diagnosis Date    [facility] [address] [address]     DVT of deep femoral vein [address] [address]     Foot abscess     Hypertension        Past Surgical History:  Past Surgical History:   Procedure Laterality Date    HX ORTHOPAEDIC      toe amputation    IR THROMBOLYSIS TRANSCATH NON CORONARY OR INTRACRANIAL  1/23/2020       Family History:  History reviewed. No pertinent family history. Social History:  Social History     Tobacco Use    Smoking status: Never Smoker    Smokeless tobacco: Never Used   Substance Use Topics    Alcohol use: No    Drug use: No       Allergies:  No Known Allergies      Review of Systems   Review of Systems   Constitutional: Negative for chills and fever. Gastrointestinal: Positive for anal bleeding and constipation. Negative for diarrhea, nausea and rectal pain. All other systems reviewed and are negative. Physical Exam     Vitals:    04/20/20 0936 04/20/20 1236 04/20/20 1450 04/20/20 1534   BP: 143/69 164/66 146/85    Pulse: 99  (!) 103 (!) 112   Resp: 20 17 23   Temp: 98.6 °F (37 °C)      SpO2: 100% 100%  99%   Weight: 68.5 kg (151 lb)      Height: 5' 6\" (1.676 m)        Physical Exam  Vitals signs and nursing note reviewed. Constitutional:       General: He is not in acute distress. Appearance: He is well-developed. HENT:      Head: Normocephalic and atraumatic. Eyes:      Conjunctiva/sclera: Conjunctivae normal.      Pupils: Pupils are equal, round, and reactive to light. Neck:      Musculoskeletal: Normal range of motion and neck supple. Cardiovascular:      Rate and Rhythm: Normal rate and regular rhythm. Heart sounds: Normal heart sounds. Pulmonary:      Effort: Pulmonary effort is normal. No respiratory distress. Breath sounds: Normal breath sounds. No wheezing or rales. Chest:      Chest wall: No tenderness. Abdominal:      General: There is no distension. Palpations: Abdomen is soft. Tenderness: There is no abdominal tenderness. There is no guarding or rebound. Genitourinary:     Rectum: Normal. No anal fissure or external hemorrhoid. Musculoskeletal: Normal range of motion. General: No tenderness. Lymphadenopathy:      Cervical: No cervical adenopathy. Skin:     General: Skin is warm and dry. Neurological:      General: No focal deficit present. Mental Status: He is alert and oriented to person, place, and time. Cranial Nerves: No cranial nerve deficit. Motor: No abnormal muscle tone. Coordination: Coordination normal.   Psychiatric:         Mood and Affect: Mood normal.         Behavior: Behavior normal.           Diagnostic Study Results     Labs -     Recent Results (from the past 12 hour(s))   CBC WITH AUTOMATED DIFF    Collection Time: 04/20/20  9:36 AM   Result Value Ref Range    WBC 9.8 4.6 - 13.2 K/uL    RBC 3.32 (L) 4.70 - 5.50 M/uL    HGB 9.8 (L) 13.0 - 16.0 g/dL    HCT 28.4 (L) 36.0 - 48.0 %    MCV 85.5 74.0 - 97.0 FL    MCH 29.5 24.0 - 34.0 PG    MCHC 34.5 31.0 - 37.0 g/dL    RDW 14.5 11.6 - 14.5 %    PLATELET 648 211 - 667 K/uL    MPV 9.3 9.2 - 11.8 FL    NEUTROPHILS 41 (L) 42 - 75 %    LYMPHOCYTES 21 20 - 51 %    MONOCYTES 5 2 - 9 %    EOSINOPHILS 33 (H) 0 - 5 %    BASOPHILS 0 0 - 3 %    ABS. NEUTROPHILS 4.0 1.8 - 8.0 K/UL    ABS. LYMPHOCYTES 2.1 0.8 - 3.5 K/UL    ABS. MONOCYTES 0.5 0 - 1.0 K/UL    ABS. EOSINOPHILS 3.2 (H) 0.0 - 0.4 K/UL    ABS.  BASOPHILS 0.0 0.0 - 0.1 K/UL    RBC COMMENTS BASOPHILIC STIPPLING  1+        RBC COMMENTS POLYCHROMASIA  1+        RBC COMMENTS SCHISTOCYTES  1+        DF MANUAL     PROTHROMBIN TIME + INR    Collection Time: 04/20/20  9:36 AM   Result Value Ref Range    Prothrombin time 33.2 (H) 11.5 - 15.2 sec    INR 3.3 (H) 0.8 - 1.2     TYPE & SCREEN    Collection Time: 04/20/20  9:36 AM   Result Value Ref Range    Crossmatch Expiration 04/23/2020     ABO/Rh(D) A POSITIVE     Antibody screen NEG    METABOLIC PANEL, COMPREHENSIVE    Collection Time: 04/20/20  9:36 AM   Result Value Ref Range Sodium 139 136 - 145 mmol/L    Potassium 4.3 3.5 - 5.5 mmol/L    Chloride 106 100 - 111 mmol/L    CO2 27 21 - 32 mmol/L    Anion gap 6 3.0 - 18 mmol/L    Glucose 285 (H) 74 - 99 mg/dL    BUN 28 (H) 7.0 - 18 MG/DL    Creatinine 1.26 0.6 - 1.3 MG/DL    BUN/Creatinine ratio 22 (H) 12 - 20      GFR est AA >60 >60 ml/min/1.73m2    GFR est non-AA 56 (L) >60 ml/min/1.73m2    Calcium 8.7 8.5 - 10.1 MG/DL    Bilirubin, total 0.5 0.2 - 1.0 MG/DL    ALT (SGPT) 41 16 - 61 U/L    AST (SGOT) 17 10 - 38 U/L    Alk. phosphatase 94 45 - 117 U/L    Protein, total 7.3 6.4 - 8.2 g/dL    Albumin 3.0 (L) 3.4 - 5.0 g/dL    Globulin 4.3 (H) 2.0 - 4.0 g/dL    A-G Ratio 0.7 (L) 0.8 - 1.7     EKG, 12 LEAD, INITIAL    Collection Time: 04/20/20 10:14 AM   Result Value Ref Range    Ventricular Rate 83 BPM    Atrial Rate 83 BPM    P-R Interval 170 ms    QRS Duration 96 ms    Q-T Interval 352 ms    QTC Calculation (Bezet) 413 ms    Calculated P Axis 60 degrees    Calculated R Axis -5 degrees    Calculated T Axis 30 degrees    Diagnosis       Normal sinus rhythm  Normal ECG  When compared with ECG of 18-JAN-2020 18:53,  No significant change was found  Confirmed by Kendal Santillan MD. ([id number]) on 4/20/2020 12:54:45 PM     HGB & HCT    Collection Time: 04/20/20  2:42 PM   Result Value Ref Range    HGB 9.2 (L) 13.0 - 16.0 g/dL    HCT 26.3 (L) 36.0 - 48.0 %       Radiologic Studies -   CTA ABDOMEN PELV W CONT   Final Result   IMPRESSION:      1. Diverticulosis with evidence of active GI bleeding in the proximal sigmoid   colon. Cannot exclude a component of active diverticulitis. CRITICAL RESULT:     These critical results were directly communicated to Bladimir Renner on   4/20/2020 1:36 PM.  Results understood and acknowledged. CT Results  (Last 48 hours)               04/20/20 1234  CTA ABDOMEN PELV W CONT Final result    Impression:  IMPRESSION:       1.   Diverticulosis with evidence of active GI bleeding in the proximal sigmoid colon. Cannot exclude a component of active diverticulitis. CRITICAL RESULT:     These critical results were directly communicated to Bladimir Renner on   4/20/2020 1:36 PM.  Results understood and acknowledged. Narrative:  EXAM:  CT ARTERIOGRAM ABDOMEN AND PELVIS       CLINICAL INDICATION/HISTORY: BRBPR since today on coumadin     > Additional: None. COMPARISON: CT of the abdomen pelvis dated January 28, 2020     > Reference Exam: None. TECHNIQUE:   Pre-contrast imaging was performed. Subsequent CT angiogram of the   abdomen, and pelvis. Thin sagittal and coronal MPR and MIP reconstructions were   performed. Extensive separate workstation post processing was performed by [facility] to include MIPs, color surface images, 3-D reconstructions, and   curve planar reformat imaging. One or more dose reduction techniques were used on this CT: automated exposure   control, adjustment of the mAs and/or kVp according to patient size, and   iterative reconstruction techniques. The specific techniques used on this CT   exam have been documented in the patient's electronic medical record. Digital   Imaging and Communications in Medicine (DICOM) format image data are available   to nonaffiliated external healthcare facilities or entities on a secure, media   free, reciprocally searchable basis with patient authorization for at least a   12-month period after this study. _______________           FINDINGS:           --- CT ANGIOGRAM ---       Noncontrast imaging through the abdomen and pelvis demonstrate scattered colonic   diverticulosis with areas of increased attenuation within the lumen of the   diverticula predominantly in the distal descending and proximal sigmoid colon.    These areas persist on the arterial phase imaging with a new blush of contrast   present in the proximal sigmoid colon on axial series images 129-134 which then   fades and becomes less apparent on the delayed imaging. Noncontrast imaging also demonstrates scattered aortic atherosclerosis. Normal   caliber aorta without abnormal increased attenuation along the wall to suggest   intramural hematoma. Postcontrast arterial phase imaging through the aorta demonstrates normal   enhancement without evidence of dissection or other filling defect. --- CT ABDOMEN/PELVIS ---           LOWER CHEST: Unremarkable. LIVER, BILIARY: Liver is normal. No biliary dilation. Gallbladder is   unremarkable. PANCREAS: Normal.       SPLEEN: Normal.       ADRENALS: Normal.       KIDNEYS: Normal.       LYMPH NODES: No enlarged lymph nodes. GASTROINTESTINAL TRACT: Scattered diverticulosis as described above. In addition   to the area of active bleed described there is mild wall thickening of the   proximal sigmoid colon which could represent active inflammation or related to   the bleeding. PELVIC ORGANS: Unremarkable. BONES: No acute or aggressive osseous abnormalities identified. OTHER: No ascites. Calcified subcutaneous granuloma in the left medial gluteal   soft tissues noted, unchanged.        _______________                   CXR Results  (Last 48 hours)    None          Medications given in the ED-  Medications   0.9% sodium chloride infusion 250 mL (has no administration in time range)   0.9% sodium chloride infusion (125 mL/hr IntraVENous New Bag 4/20/20 1530)   0.9% sodium chloride infusion 1,000 mL (has no administration in time range)   midazolam (VERSED) injection 0.25-5 mg (has no administration in time range)   fentaNYL citrate (PF) injection 100 mcg (has no administration in time range)   naloxone [facility]) injection 0.4 mg (has no administration in time range)   flumazeniL (ROMAZICON) 0.1 mg/mL injection 0.2 mg (has no administration in time range)   iopamidoL (ISOVUE-370) 76 % injection 100 mL (100 mL IntraVENous Given 4/20/20 1230)         Medical Decision Making   I am the first provider for this patient. I reviewed the vital signs, available nursing notes, past medical history, past surgical history, family history and social history. Vital Signs-Reviewed the patient's vital signs. Pulse Oximetry Analysis - 100% on RA     Cardiac Monitor:  Rate:  99 bpm  Rhythm: NSR      Records Reviewed: Nursing Notes and Old Medical Records    Provider Notes (Medical Decision Making): Orlando Woodruff is a 76 y.o. male with painless rectal bleeding -on Coumadin. Concern for diverticular bleed. Patient hemodynamically stable    Procedures:  Procedures    ED Course:   1:36 PM  Tracy with radiology patient has focal area of bleeding sigmoid colon on CTA. Will d/w GI    CONSULT NOTE:   1:44 PM   I discussed care with Dr Linh Mendez It was a standard discussion, including history of patients chief complaint, available diagnostic results, and treatment course. Discussed case. Agrees with admission to hospital.    CONSULT NOTE:   2:27 PM   I discussed care with Dr Yolande Rendon It was a standard discussion, including history of patients chief complaint, available diagnostic results, and treatment course. Discussed case. She is requesting Heme/Onc consult and recheck H/G    3:34 PM  Patient admitted to hospital into endoscopy. I discussed with hematology will follow patient on the floor        Diagnosis and Disposition     Critical Care Time:     Core Measures:  For Hospitalized Patients:    1. Hospitalization Decision Time:  The decision to hospitalize the patient was made by Dr Kylah Preciado on 4/20/2020    2. Aspirin: Aspirin was not given because the patient did not present with a stroke at the time of their Emergency Department evaluation    3:35 PM  Patient is being admitted to the hospital by Dr Yolande Rendon. The results of their tests and reasons for their admission have been discussed with them and/or available family.  They convey agreement and understanding for the need to be admitted and for their admission diagnosis. CONDITIONS ON ADMISSION:  Sepsis is not present at the time of admission. Deep Vein Thrombosis is not present at the time of admission. Thrombosis is not present at the time of admission. Urinary Tract Infection is not present at the time of admission. Pneumonia is not present at the time of admission. MRSA is not present at the time of admission. Wound infection is not present at the time of admission. Pressure Ulcer is not present at the time of admission. CLINICAL IMPRESSION:    1. Acute lower GI bleeding    2. Bleeding on Coumadin      _______________________________      Please note that this dictation was completed with [employer], the computer voice recognition software. Quite often unanticipated grammatical, syntax, homophones, and other interpretive errors are inadvertently transcribed by the computer software. Please disregard these errors. Please excuse any errors that have escaped final proofreading.

## 2020-04-20 NOTE — PROGRESS NOTES
Problem: Upper and Lower GI Bleed: Day 1  Goal: Activity/Safety  Outcome: Progressing Towards Goal  Goal: Consults, if ordered  Outcome: Progressing Towards Goal  Goal: Diagnostic Test/Procedures  Outcome: Progressing Towards Goal  Goal: Nutrition/Diet  Outcome: Progressing Towards Goal  Goal: Discharge Planning  Outcome: Progressing Towards Goal  Goal: Medications  Outcome: Progressing Towards Goal  Goal: Respiratory  Outcome: Progressing Towards Goal  Goal: Treatments/Interventions/Procedures  Outcome: Progressing Towards Goal  Goal: Psychosocial  Outcome: Progressing Towards Goal  Goal: *Optimal pain control at patient's stated goal  Outcome: Progressing Towards Goal  Goal: *Hemodynamically stable  Outcome: Progressing Towards Goal  Goal: *Demonstrates progressive activity  Outcome: Progressing Towards Goal

## 2020-04-20 NOTE — H&P
History & Physical    Patient: Kristy Vasquez MRN: [id number]  CSN: [id number]    YOB: 1945  Age: 76 y.o. Sex: male      DOA: 4/20/2020  Primary Care Provider:  Pita Sultana MD      Assessment/Plan     Hospital Problems  Date Reviewed: 1/18/2020          Codes Class Noted POA    * (Principal) Lower GI bleed ICD-10-CM: K92.2  ICD-9-CM: 578.9  4/20/2020 Unknown        Supratherapeutic INR ICD-10-CM: R79.1  ICD-9-CM: 790.92  4/20/2020 Unknown        DVT (deep venous thrombosis) ([facility] ICD-10-CM: I82.409  ICD-9-CM: 453.40  1/18/2020 Yes        Type 2 diabetes mellitus, with long-term current use of insulin (HCC) ICD-10-CM: E11.9, Z79.4  ICD-9-CM: 250.00, V58.67  1/3/2020 Yes        Hypertension ICD-10-CM: I10  ICD-9-CM: 401.9  1/3/2020 Yes              Admit to tele       Low GI bleeding  Case discussed with Dr. Javad Christianson -he will scope pt today and recommended ffp to reverse inr   H/h monitoring   Ns infusion  So far bp is ok   Hold warfarin/aspirin. Hx of Bilateral Upper extremities  and lower extremity DVT   F/u with hematologist   D/c with lovenox, was on warfarin before admission  Will have hematologist on board for [facility] recommendation -discussed with Dr. Silverio Needs      Anemia acute bleeding    transfuse as needed   H/h monitoring      HTN    Continue metoprolol if bp is ok , hold other meds      DM  Lantus and  sliding scale insulin        Full code       Estimate  length of stay : 2-3 day    DVT : scd   CC: bloody stool        HPI:     Kristy Vasquez is a 76 y.o. male with PMHX of PAD, DVT on Coumadin, diabetes came to ER due to bloody stool. He reported he was sitting at home and found it blood came out from his rectum, associated with abdominal cramping. Ct abdomen :Diverticulosis with evidence of active GI bleeding in the proximal sigmoid colon. can not r/o diverticulitis. He had another 3 episodes in ER. GI was called.  He was discharged with the [facility] last admission for DVT treatment. He follow-up with hematologist and primary care physician. Warfarin was started per primary care physician. He most time had constipation, but he had BM yesterday. INR 3.3, h/h 9.8/28.4-9.2/26.3     He speaks some [place]. RN spoke Moroccan and really helps, spoke with her daughter per phone. Pt completed po abx for OM . No fever/cough, no covid exposure      Visit Vitals  BP 98/40   Pulse 84   Temp 98.6 °F (37 °C)   Resp 15   Ht 5' 6\" (1.676 m)   Wt 68.5 kg (151 lb)   SpO2 98%   BMI 24.37 kg/m²      O2 Device: Room air      Past Medical History:   Diagnosis Date    Diabetes ([facility]     DVT of deep femoral vein (HCC)     Foot abscess     Hypertension        Past Surgical History:   Procedure Laterality Date    HX ORTHOPAEDIC      toe amputation    IR THROMBOLYSIS TRANSCATH NON CORONARY OR INTRACRANIAL  1/23/2020      fhx : htn parents         Social History     Socioeconomic History    Marital status: SINGLE     Spouse name: Not on file    Number of children: Not on file    Years of education: Not on file    Highest education level: Not on file   Tobacco Use    Smoking status: Never Smoker    Smokeless tobacco: Never Used   Substance and Sexual Activity    Alcohol use: No    Drug use: No       Prior to Admission medications    Medication Sig Start Date End Date Taking? Authorizing Provider   metoprolol succinate (TOPROL-XL) 50 mg XL tablet Take 1 Tab by mouth daily. 1/30/20   Angelito Romero MD   insulin glargine (LANTUS) 100 unit/mL injection 5 Units by SubCUTAneous route daily. 1/28/20   Crystal Harris MD   QUEtiapine (SEROQUEL) 25 mg tablet Take 1 Tab by mouth nightly. 1/28/20   Crystal Harris MD   enoxaparin (LOVENOX) 80 mg/0.8 mL injection 120 mg by SubCUTAneous route daily. 1/28/20   Crystal Harris MD   atorvastatin (LIPITOR) 20 mg tablet Take 20 mg by mouth daily. Mishel Barlow MD   aspirin 81 mg chewable tablet Take 81 mg by mouth daily.     Mishel Barlow MD   polyethylene glycol (MIRALAX) 17 gram/dose powder Take 17 g by mouth daily. 1 tablespoon with 8 oz of water daily 12/10/19   Irnea Urbina MD   LOSARTAN PO Take 100 mg by mouth. Mishel Barlow MD       No Known Allergies    Review of Systems  Gen: No fever, chills, malaise, weight loss/gain. Heent: No headache, rhinorrhea, epistaxis, ear pain, hearing loss, sinus pain, neck pain/stiffness, sore throat. Heart: No chest pain, palpitations, THOMPSON, pnd, or orthopnea. Resp: No cough, hemoptysis, wheezing and shortness of breath. GI: No nausea, vomiting, diarrhea,  +constipation, +melena or hematochezia. Abdomen cramping   : No urinary obstruction, dysuria or hematuria. Derm: No rash, new skin lesion or pruritis. Musc/skeletal: no bone or joint complains. Vasc: No edema, cyanosis or claudication. Endo: No heat/cold intolerance, no polyuria,polydipsia or polyphagia. Neuro: No unilateral weakness, numbness, tingling. No seizures. Heme: No easy bruising or bleeding. Physical Exam:     Physical Exam:  Visit Vitals  BP 98/40   Pulse 84   Temp 98.6 °F (37 °C)   Resp 15   Ht 5' 6\" (1.676 m)   Wt 68.5 kg (151 lb)   SpO2 98%   BMI 24.37 kg/m²      O2 Device: Room air    Temp (24hrs), Av.6 °F (37 °C), Min:98.6 °F (37 °C), Max:98.6 °F (37 °C)    No intake/output data recorded. No intake/output data recorded. General:  Awake, cooperative, no distress. Head:  Normocephalic, without obvious abnormality, atraumatic. Eyes:  Conjunctivae/corneas clear, sclera anicteric, PERRL, EOMs intact. Nose: Nares normal. No drainage or sinus tenderness. Throat: Lips, mucosa, and tongue normal. .   Neck: Supple, symmetrical, trachea midline, no adenopathy. Lungs:   Clear to auscultation bilaterally. Heart:  Regular rate and rhythm, S1, S2 normal, no murmur, click, rub or gallop. Abdomen: Soft, non-tender. Bowel sounds normal. No masses,  No organomegaly.    Extremities: Extremities normal, atraumatic, no cyanosis or edema. Rt 5th toe missing    Pulses: 2+ and symmetric all extremities. Skin: Skin color-pink, texture, turgor normal. No rashes or lesions. Capillary refill normal    Neurologic: CNII-XII intact. No focal motor or sensory deficit.        Labs Reviewed:    BMP:   Lab Results   Component Value Date/Time     04/20/2020 09:36 AM    K 4.3 04/20/2020 09:36 AM     04/20/2020 09:36 AM    CO2 27 04/20/2020 09:36 AM    AGAP 6 04/20/2020 09:36 AM     (H) 04/20/2020 09:36 AM    BUN 28 (H) 04/20/2020 09:36 AM    CREA 1.26 04/20/2020 09:36 AM    GFRAA >60 04/20/2020 09:36 AM    GFRNA 56 (L) 04/20/2020 09:36 AM     CMP:   Lab Results   Component Value Date/Time     04/20/2020 09:36 AM    K 4.3 04/20/2020 09:36 AM     04/20/2020 09:36 AM    CO2 27 04/20/2020 09:36 AM    AGAP 6 04/20/2020 09:36 AM     (H) 04/20/2020 09:36 AM    BUN 28 (H) 04/20/2020 09:36 AM    CREA 1.26 04/20/2020 09:36 AM    GFRAA >60 04/20/2020 09:36 AM    GFRNA 56 (L) 04/20/2020 09:36 AM    CA 8.7 04/20/2020 09:36 AM    ALB 3.0 (L) 04/20/2020 09:36 AM    TP 7.3 04/20/2020 09:36 AM    GLOB 4.3 (H) 04/20/2020 09:36 AM    AGRAT 0.7 (L) 04/20/2020 09:36 AM    SGOT 17 04/20/2020 09:36 AM    ALT 41 04/20/2020 09:36 AM     CBC:   Lab Results   Component Value Date/Time    WBC 9.8 04/20/2020 09:36 AM    HGB 9.2 (L) 04/20/2020 02:42 PM    HCT 26.3 (L) 04/20/2020 02:42 PM     04/20/2020 09:36 AM     All Cardiac Markers in the last 24 hours: No results found for: CPK, CK, CKMMB, CKMB, RCK3, CKMBT, CKNDX, CKND1, KAREN, TROPT, TROIQ, KRYSTIN, TROPT, TNIPOC, BNP, BNPP  Recent Glucose Results:   Lab Results   Component Value Date/Time     (H) 04/20/2020 09:36 AM     ABG: No results found for: PH, PHI, PCO2, PCO2I, PO2, PO2I, HCO3, HCO3I, FIO2, FIO2I  COAGS:   Lab Results   Component Value Date/Time    PTP 33.2 (H) 04/20/2020 09:36 AM    INR 3.3 (H) 04/20/2020 09:36 AM     Liver Panel:   Lab Results   Component Value Date/Time ALB 3.0 (L) 04/20/2020 09:36 AM    TP 7.3 04/20/2020 09:36 AM    GLOB 4.3 (H) 04/20/2020 09:36 AM    AGRAT 0.7 (L) 04/20/2020 09:36 AM    SGOT 17 04/20/2020 09:36 AM    ALT 41 04/20/2020 09:36 AM    AP 94 04/20/2020 09:36 AM     Pancreatic Markers: No results found for: AMYLPOCT, AML, LIPPOCT, LPSE    Cta Abdomen Pelv W Cont    Result Date: 4/20/2020  EXAM:  CT ARTERIOGRAM ABDOMEN AND PELVIS CLINICAL INDICATION/HISTORY: BRBPR since today on coumadin   > Additional: None. COMPARISON: CT of the abdomen pelvis dated January 28, 2020   > Reference Exam: None. TECHNIQUE:   Pre-contrast imaging was performed. Subsequent CT angiogram of the abdomen, and pelvis. Thin sagittal and coronal MPR and MIP reconstructions were performed. Extensive separate workstation post processing was performed by [employer] to include MIPs, color surface images, 3-D reconstructions, and curve planar reformat imaging. One or more dose reduction techniques were used on this CT: automated exposure control, adjustment of the mAs and/or kVp according to patient size, and iterative reconstruction techniques. The specific techniques used on this CT exam have been documented in the patient's electronic medical record. Digital Imaging and Communications in Medicine (DICOM) format image data are available to nonaffiliated external healthcare facilities or entities on a secure, media free, reciprocally searchable basis with patient authorization for at least a 12-month period after this study. _______________ FINDINGS: --- CT ANGIOGRAM --- Noncontrast imaging through the abdomen and pelvis demonstrate scattered colonic diverticulosis with areas of increased attenuation within the lumen of the diverticula predominantly in the distal descending and proximal sigmoid colon.  These areas persist on the arterial phase imaging with a new blush of contrast present in the proximal sigmoid colon on axial series images 129-134 which then fades and becomes less apparent on the delayed imaging. Noncontrast imaging also demonstrates scattered aortic atherosclerosis. Normal caliber aorta without abnormal increased attenuation along the wall to suggest intramural hematoma. Postcontrast arterial phase imaging through the aorta demonstrates normal enhancement without evidence of dissection or other filling defect. --- CT ABDOMEN/PELVIS --- LOWER CHEST: Unremarkable. LIVER, BILIARY: Liver is normal. No biliary dilation. Gallbladder is unremarkable. PANCREAS: Normal. SPLEEN: Normal. ADRENALS: Normal. KIDNEYS: Normal. LYMPH NODES: No enlarged lymph nodes. GASTROINTESTINAL TRACT: Scattered diverticulosis as described above. In addition to the area of active bleed described there is mild wall thickening of the proximal sigmoid colon which could represent active inflammation or related to the bleeding. PELVIC ORGANS: Unremarkable. BONES: No acute or aggressive osseous abnormalities identified. OTHER: No ascites. Calcified subcutaneous granuloma in the left medial gluteal soft tissues noted, unchanged. _______________     IMPRESSION: 1. Diverticulosis with evidence of active GI bleeding in the proximal sigmoid colon. Cannot exclude a component of active diverticulitis. CRITICAL RESULT:  These critical results were directly communicated to Bladimir Renner on 4/20/2020 1:36 PM.  Results understood and acknowledged.      Procedures/imaging: see electronic medical records for all procedures/Xrays and details which were not copied into this note but were reviewed prior to creation of Vicenta Cannon MD, Internal Medicine     CC: Caleb Abrams MD

## 2020-04-20 NOTE — ED PROVIDER NOTES
EMERGENCY DEPARTMENT HISTORY AND PHYSICAL EXAM 
 
Date: 4/20/2020 Patient Name: Joyice Aschoff History of Presenting Illness Chief Complaint Patient presents with  Rectal Bleeding History Provided By: Patient [address] Joyice Aschoff is a 76 y.o. male with PMHX of hypertension, DVT on Coumadin, diabetes on metformin who presents to the emergency department C/O rectal bleeding. Patient is primarily Czech-speaking, history limited by interpretation service. Patient reports that he had 1 bowel movement this morning that he thought was diarrhea when he looked in the bowl he saw bright red blood. States bowel movement was not painful. He reports having constipation recently however this was resolved after his daughter got him MiraLAX. States nonpainful bowel movement today and no straining. No nausea or lightheadedness. No prior episodes of rectal bleeding. Patient says he has been on Coumadin for the past few months after provoked DVT from foot surgery PCP: Griffin Lee MD 
 
Current Outpatient Medications Medication Sig Dispense Refill  metoprolol succinate (TOPROL-XL) 50 mg XL tablet Take 1 Tab by mouth daily. 60 Tab 0  
 insulin glargine (LANTUS) 100 unit/mL injection 5 Units by SubCUTAneous route daily. 1 Vial 0  
 QUEtiapine (SEROQUEL) 25 mg tablet Take 1 Tab by mouth nightly. 10 Tab 0  
 enoxaparin (LOVENOX) 80 mg/0.8 mL injection 120 mg by SubCUTAneous route daily. 1 Syringe 0  
 atorvastatin (LIPITOR) 20 mg tablet Take 20 mg by mouth daily.  aspirin 81 mg chewable tablet Take 81 mg by mouth daily.  polyethylene glycol (MIRALAX) 17 gram/dose powder Take 17 g by mouth daily. 1 tablespoon with 8 oz of water daily 507 g 0  
 LOSARTAN PO Take 100 mg by mouth. Past History Past Medical History: 
Past Medical History:  
Diagnosis Date  Diabetes ([address]  DVT of deep femoral vein ([address]  Foot abscess  Hypertension Past Surgical History: 
Past Surgical History:  
Procedure Laterality Date  HX ORTHOPAEDIC    
 toe amputation  IR THROMBOLYSIS TRANSCATH NON CORONARY OR INTRACRANIAL  1/23/2020 Family History: 
History reviewed. No pertinent family history. Social History: 
Social History Tobacco Use  Smoking status: Never Smoker  Smokeless tobacco: Never Used Substance Use Topics  Alcohol use: No  
 Drug use: No  
 
 
Allergies: 
No Known Allergies Review of Systems Review of Systems Constitutional: Negative for chills and fever. Gastrointestinal: Positive for anal bleeding and constipation. Negative for diarrhea, nausea and rectal pain. All other systems reviewed and are negative. Physical Exam  
 
Vitals:  
 04/20/20 2023 BP: 143/69 Pulse: 99 Resp: 20 Temp: 98.6 °F (37 °C) SpO2: 100% Weight: 68.5 kg (151 lb) Height: 5' 6\" (1.676 m) Physical Exam 
Vitals signs and nursing note reviewed. Constitutional:   
   General: He is not in acute distress. Appearance: He is well-developed. HENT:  
   Head: Normocephalic and atraumatic. Eyes:  
   Conjunctiva/sclera: Conjunctivae normal.  
   Pupils: Pupils are equal, round, and reactive to light. Neck: Musculoskeletal: Normal range of motion and neck supple. Cardiovascular:  
   Rate and Rhythm: Normal rate and regular rhythm. Heart sounds: Normal heart sounds. Pulmonary:  
   Effort: Pulmonary effort is normal. No respiratory distress. Breath sounds: Normal breath sounds. No wheezing or rales. Chest:  
   Chest wall: No tenderness. Abdominal:  
   General: There is no distension. Palpations: Abdomen is soft. Tenderness: There is no abdominal tenderness. There is no guarding or rebound. Genitourinary: 
   Rectum: Normal. No anal fissure or external hemorrhoid. Musculoskeletal: Normal range of motion. General: No tenderness. Lymphadenopathy: Cervical: No cervical adenopathy. Skin: 
   General: Skin is warm and dry. Neurological:  
   General: No focal deficit present. Mental Status: He is alert and oriented to person, place, and time. Cranial Nerves: No cranial nerve deficit. Motor: No abnormal muscle tone. Coordination: Coordination normal.  
Psychiatric:     
   Mood and Affect: Mood normal.     
   Behavior: Behavior normal.  
 
 
 
 
Diagnostic Study Results Labs - No results found for this or any previous visit (from the past 12 hour(s)). Radiologic Studies -  
CTA ABDOMEN PELV W CONT    (Results Pending) CT Results  (Last 48 hours) None CXR Results  (Last 48 hours) None Medications given in the ED- Medications - No data to display Medical Decision Making I am the first provider for this patient. I reviewed the vital signs, available nursing notes, past medical history, past surgical history, family history and social history. Vital Signs-Reviewed the patient's vital signs. Pulse Oximetry Analysis - 100% on RA Cardiac Monitor: 
Rate:  99 bpm 
Rhythm: NSR 
 
EKG interpretation: (Preliminary) EKG read by Dr. Tani Sierra at [facility]  
*** Records Reviewed: Nursing Notes and Old Medical Records Provider Notes (Medical Decision Making): Kristy Vasquez is a 76 y.o. male with painless rectal bleeding -on Coumadin. Concern for diverticular bleed. Patient hemodynamically stable Procedures: 
Procedures ED Course:  
1:36 PM 
Tracy with radiology patient has focal area of bleeding sigmoid colon on CTA. Will d/w GI 
 
CONSULT NOTE:  
1:44 PM  
I discussed care with Dr Patti Fabian It was a standard discussion, including history of patients chief complaint, available diagnostic results, and treatment course. Discussed case.   Agrees with admission to hospital. 
 
CONSULT NOTE:  
2:27 PM  
I discussed care with Dr Silvia Garibay It was a standard discussion, including history of patients chief complaint, available diagnostic results, and treatment course. Discussed case. She is requesting Heme/Onc consult and recheck H/G 
 
3:06 PM 
Repeat hemoglobin stable. Patient to go to endoscopy stat Diagnosis and Disposition Critical Care Time:  
 
Core Measures: 
For Hospitalized Patients: 
 
1. Hospitalization Decision Time: The decision to hospitalize the patient was made by Dr Thony Duarte  on 4/20/2020 2. Aspirin: Aspirin was not given because the patient did not present with a stroke at the time of their Emergency Department evaluation 3:06 PM  Patient is being admitted to the hospital by Dr Ashlie Galvin. The results of their tests and reasons for their admission have been discussed with them and/or available family. They convey agreement and understanding for the need to be admitted and for their admission diagnosis. CONDITIONS ON ADMISSION: 
Sepsis is not present at the time of admission. Deep Vein Thrombosis is not present at the time of admission. Thrombosis is not present at the time of admission. Urinary Tract Infection is not present at the time of admission. Pneumonia is not present at the time of admission. MRSA is not present at the time of admission. Wound infection is not present at the time of admission. Pressure Ulcer is not present at the time of admission. CLINICAL IMPRESSION: 
 
No diagnosis found. _______________________________ Please note that this dictation was completed with [employer], the computer voice recognition software. Quite often unanticipated grammatical, syntax, homophones, and other interpretive errors are inadvertently transcribed by the computer software. Please disregard these errors. Please excuse any errors that have escaped final proofreading.

## 2020-04-21 ENCOUNTER — APPOINTMENT (OUTPATIENT)
Dept: INTERVENTIONAL RADIOLOGY/VASCULAR | Age: 75
DRG: 987 | End: 2020-04-21
Attending: INTERNAL MEDICINE
Payer: MEDICARE

## 2020-04-21 ENCOUNTER — APPOINTMENT (OUTPATIENT)
Dept: VASCULAR SURGERY | Age: 75
DRG: 987 | End: 2020-04-21
Attending: STUDENT IN AN ORGANIZED HEALTH CARE EDUCATION/TRAINING PROGRAM
Payer: MEDICARE

## 2020-04-21 PROBLEM — D62 ANEMIA DUE TO ACUTE BLOOD LOSS: Status: ACTIVE | Noted: 2020-04-21

## 2020-04-21 LAB
ANION GAP SERPL CALC-SCNC: 7 MMOL/L (ref 3–18)
BASOPHILS # BLD: 0 K/UL (ref 0–0.1)
BASOPHILS NFR BLD: 0 % (ref 0–2)
BLD PROD TYP BPU: NORMAL
BLD PROD TYP BPU: NORMAL
BPU ID: NORMAL
BPU ID: NORMAL
BUN SERPL-MCNC: 31 MG/DL (ref 7–18)
BUN/CREAT SERPL: 26 (ref 12–20)
CALCIUM SERPL-MCNC: 7.8 MG/DL (ref 8.5–10.1)
CALLED TO:,BCALL1: NORMAL
CHLORIDE SERPL-SCNC: 110 MMOL/L (ref 100–111)
CO2 SERPL-SCNC: 24 MMOL/L (ref 21–32)
CREAT SERPL-MCNC: 1.21 MG/DL (ref 0.6–1.3)
DIFFERENTIAL METHOD BLD: ABNORMAL
EOSINOPHIL # BLD: 0.9 K/UL (ref 0–0.4)
EOSINOPHIL NFR BLD: 13 % (ref 0–5)
ERYTHROCYTE [DISTWIDTH] IN BLOOD BY AUTOMATED COUNT: 14.7 % (ref 11.6–14.5)
FOLATE SERPL-MCNC: 19.3 NG/ML (ref 3.1–17.5)
GLUCOSE BLD STRIP.AUTO-MCNC: 129 MG/DL (ref 70–110)
GLUCOSE BLD STRIP.AUTO-MCNC: 130 MG/DL (ref 70–110)
GLUCOSE BLD STRIP.AUTO-MCNC: 153 MG/DL (ref 70–110)
GLUCOSE BLD STRIP.AUTO-MCNC: 258 MG/DL (ref 70–110)
GLUCOSE BLD STRIP.AUTO-MCNC: 258 MG/DL (ref 70–110)
GLUCOSE SERPL-MCNC: 182 MG/DL (ref 74–99)
HCT VFR BLD AUTO: 16.4 % (ref 36–48)
HCT VFR BLD AUTO: 18.6 % (ref 36–48)
HCT VFR BLD AUTO: 19.3 % (ref 36–48)
HGB BLD-MCNC: 5.7 G/DL (ref 13–16)
HGB BLD-MCNC: 6.4 G/DL (ref 13–16)
HGB BLD-MCNC: 6.7 G/DL (ref 13–16)
INR PPP: 1.7 (ref 0.8–1.2)
IRON SATN MFR SERPL: 26 % (ref 20–50)
IRON SERPL-MCNC: 44 UG/DL (ref 50–175)
LYMPHOCYTES # BLD: 1.8 K/UL (ref 0.9–3.6)
LYMPHOCYTES NFR BLD: 25 % (ref 21–52)
MAGNESIUM SERPL-MCNC: 1.7 MG/DL (ref 1.6–2.6)
MCH RBC QN AUTO: 29.8 PG (ref 24–34)
MCHC RBC AUTO-ENTMCNC: 34.8 G/DL (ref 31–37)
MCV RBC AUTO: 85.9 FL (ref 74–97)
MONOCYTES # BLD: 0.6 K/UL (ref 0.05–1.2)
MONOCYTES NFR BLD: 8 % (ref 3–10)
NEUTS SEG # BLD: 3.9 K/UL (ref 1.8–8)
NEUTS SEG NFR BLD: 54 % (ref 40–73)
PLATELET # BLD AUTO: 132 K/UL (ref 135–420)
PMV BLD AUTO: 8.6 FL (ref 9.2–11.8)
POTASSIUM SERPL-SCNC: 4 MMOL/L (ref 3.5–5.5)
PROTHROMBIN TIME: 20.1 SEC (ref 11.5–15.2)
RBC # BLD AUTO: 1.91 M/UL (ref 4.7–5.5)
SODIUM SERPL-SCNC: 141 MMOL/L (ref 136–145)
STATUS OF UNIT,%ST: NORMAL
STATUS OF UNIT,%ST: NORMAL
TIBC SERPL-MCNC: 168 UG/DL (ref 250–450)
UNIT DIVISION, %UDIV: 0
UNIT DIVISION, %UDIV: 0
VIT B12 SERPL-MCNC: 336 PG/ML (ref 211–911)
WBC # BLD AUTO: 7.2 K/UL (ref 4.6–13.2)

## 2020-04-21 PROCEDURE — 74011250636 HC RX REV CODE- 250/636: Performed by: HOSPITALIST

## 2020-04-21 PROCEDURE — 74011250636 HC RX REV CODE- 250/636: Performed by: RADIOLOGY

## 2020-04-21 PROCEDURE — 74011250636 HC RX REV CODE- 250/636

## 2020-04-21 PROCEDURE — 93970 EXTREMITY STUDY: CPT

## 2020-04-21 PROCEDURE — 74011000250 HC RX REV CODE- 250: Performed by: RADIOLOGY

## 2020-04-21 PROCEDURE — P9059 PLASMA, FRZ BETWEEN 8-24HOUR: HCPCS

## 2020-04-21 PROCEDURE — 65270000029 HC RM PRIVATE

## 2020-04-21 PROCEDURE — 85025 COMPLETE CBC W/AUTO DIFF WBC: CPT

## 2020-04-21 PROCEDURE — 85610 PROTHROMBIN TIME: CPT

## 2020-04-21 PROCEDURE — 80048 BASIC METABOLIC PNL TOTAL CA: CPT

## 2020-04-21 PROCEDURE — 85018 HEMOGLOBIN: CPT

## 2020-04-21 PROCEDURE — 97535 SELF CARE MNGMENT TRAINING: CPT

## 2020-04-21 PROCEDURE — 74011636637 HC RX REV CODE- 636/637: Performed by: HOSPITALIST

## 2020-04-21 PROCEDURE — 36430 TRANSFUSION BLD/BLD COMPNT: CPT

## 2020-04-21 PROCEDURE — 36415 COLL VENOUS BLD VENIPUNCTURE: CPT

## 2020-04-21 PROCEDURE — 77030013687 IR AORTOGRAPHY ABDOMINAL SERIAL

## 2020-04-21 PROCEDURE — 77030032966

## 2020-04-21 PROCEDURE — 77030013687 IR ANGIO ABD VISC SEL W AORTOGRAM

## 2020-04-21 PROCEDURE — 82962 GLUCOSE BLOOD TEST: CPT

## 2020-04-21 PROCEDURE — 74011636320 HC RX REV CODE- 636/320: Performed by: RADIOLOGY

## 2020-04-21 PROCEDURE — 97166 OT EVAL MOD COMPLEX 45 MIN: CPT

## 2020-04-21 PROCEDURE — 74011250637 HC RX REV CODE- 250/637: Performed by: HOSPITALIST

## 2020-04-21 PROCEDURE — 83735 ASSAY OF MAGNESIUM: CPT

## 2020-04-21 PROCEDURE — 04LB3DZ OCCLUSION OF INFERIOR MESENTERIC ARTERY WITH INTRALUMINAL DEVICE, PERCUTANEOUS APPROACH: ICD-10-PCS | Performed by: RADIOLOGY

## 2020-04-21 PROCEDURE — P9016 RBC LEUKOCYTES REDUCED: HCPCS

## 2020-04-21 PROCEDURE — 30233N1 TRANSFUSION OF NONAUTOLOGOUS RED BLOOD CELLS INTO PERIPHERAL VEIN, PERCUTANEOUS APPROACH: ICD-10-PCS | Performed by: INTERNAL MEDICINE

## 2020-04-21 RX ORDER — NALOXONE HYDROCHLORIDE 0.4 MG/ML
INJECTION, SOLUTION INTRAMUSCULAR; INTRAVENOUS; SUBCUTANEOUS
Status: DISCONTINUED
Start: 2020-04-21 | End: 2020-04-21 | Stop reason: WASHOUT

## 2020-04-21 RX ORDER — NALOXONE HYDROCHLORIDE 0.4 MG/ML
0.4 INJECTION, SOLUTION INTRAMUSCULAR; INTRAVENOUS; SUBCUTANEOUS AS NEEDED
Status: DISCONTINUED | OUTPATIENT
Start: 2020-04-21 | End: 2020-04-21 | Stop reason: ALTCHOICE

## 2020-04-21 RX ORDER — FENTANYL CITRATE 50 UG/ML
25-200 INJECTION, SOLUTION INTRAMUSCULAR; INTRAVENOUS
Status: DISCONTINUED | OUTPATIENT
Start: 2020-04-21 | End: 2020-04-21 | Stop reason: ALTCHOICE

## 2020-04-21 RX ORDER — HEPARIN SODIUM 200 [USP'U]/100ML
500 INJECTION, SOLUTION INTRAVENOUS
Status: DISCONTINUED | OUTPATIENT
Start: 2020-04-21 | End: 2020-04-21 | Stop reason: ALTCHOICE

## 2020-04-21 RX ORDER — MIDAZOLAM HYDROCHLORIDE 1 MG/ML
.5-4 INJECTION, SOLUTION INTRAMUSCULAR; INTRAVENOUS
Status: DISCONTINUED | OUTPATIENT
Start: 2020-04-21 | End: 2020-04-21 | Stop reason: ALTCHOICE

## 2020-04-21 RX ORDER — SODIUM CHLORIDE 9 MG/ML
250 INJECTION, SOLUTION INTRAVENOUS AS NEEDED
Status: DISCONTINUED | OUTPATIENT
Start: 2020-04-21 | End: 2020-04-24 | Stop reason: HOSPADM

## 2020-04-21 RX ORDER — FENTANYL CITRATE 50 UG/ML
INJECTION, SOLUTION INTRAMUSCULAR; INTRAVENOUS
Status: DISCONTINUED
Start: 2020-04-21 | End: 2020-04-21 | Stop reason: ALTCHOICE

## 2020-04-21 RX ORDER — INSULIN GLARGINE 100 [IU]/ML
5 INJECTION, SOLUTION SUBCUTANEOUS ONCE
Status: ACTIVE | OUTPATIENT
Start: 2020-04-21 | End: 2020-04-21

## 2020-04-21 RX ORDER — FLUMAZENIL 0.1 MG/ML
INJECTION INTRAVENOUS
Status: DISCONTINUED
Start: 2020-04-21 | End: 2020-04-21 | Stop reason: WASHOUT

## 2020-04-21 RX ORDER — SODIUM CHLORIDE 9 MG/ML
250 INJECTION, SOLUTION INTRAVENOUS AS NEEDED
Status: DISCONTINUED | OUTPATIENT
Start: 2020-04-21 | End: 2020-04-21 | Stop reason: ALTCHOICE

## 2020-04-21 RX ORDER — SODIUM CHLORIDE 9 MG/ML
25 INJECTION, SOLUTION INTRAVENOUS CONTINUOUS
Status: DISCONTINUED | OUTPATIENT
Start: 2020-04-21 | End: 2020-04-21 | Stop reason: ALTCHOICE

## 2020-04-21 RX ORDER — INSULIN GLARGINE 100 [IU]/ML
10 INJECTION, SOLUTION SUBCUTANEOUS DAILY
Status: DISCONTINUED | OUTPATIENT
Start: 2020-04-22 | End: 2020-04-24 | Stop reason: HOSPADM

## 2020-04-21 RX ORDER — MIDAZOLAM HYDROCHLORIDE 1 MG/ML
INJECTION, SOLUTION INTRAMUSCULAR; INTRAVENOUS
Status: DISCONTINUED
Start: 2020-04-21 | End: 2020-04-21 | Stop reason: ALTCHOICE

## 2020-04-21 RX ORDER — FLUMAZENIL 0.1 MG/ML
0.2 INJECTION INTRAVENOUS
Status: DISCONTINUED | OUTPATIENT
Start: 2020-04-21 | End: 2020-04-21 | Stop reason: ALTCHOICE

## 2020-04-21 RX ORDER — PHYTONADIONE 10 MG/ML
5 INJECTION, EMULSION INTRAMUSCULAR; INTRAVENOUS; SUBCUTANEOUS ONCE
Status: COMPLETED | OUTPATIENT
Start: 2020-04-21 | End: 2020-04-21

## 2020-04-21 RX ORDER — LIDOCAINE HYDROCHLORIDE 10 MG/ML
1-10 INJECTION, SOLUTION EPIDURAL; INFILTRATION; INTRACAUDAL; PERINEURAL
Status: DISCONTINUED | OUTPATIENT
Start: 2020-04-21 | End: 2020-04-21 | Stop reason: ALTCHOICE

## 2020-04-21 RX ORDER — HEPARIN SODIUM 200 [USP'U]/100ML
INJECTION, SOLUTION INTRAVENOUS
Status: DISCONTINUED
Start: 2020-04-21 | End: 2020-04-21 | Stop reason: ALTCHOICE

## 2020-04-21 RX ORDER — HEPARIN SODIUM 200 [USP'U]/100ML
INJECTION, SOLUTION INTRAVENOUS
Status: COMPLETED
Start: 2020-04-21 | End: 2020-04-21

## 2020-04-21 RX ADMIN — METRONIDAZOLE 500 MG: 500 INJECTION, SOLUTION INTRAVENOUS at 02:17

## 2020-04-21 RX ADMIN — PHYTONADIONE 5 MG: 10 INJECTION, EMULSION INTRAMUSCULAR; INTRAVENOUS; SUBCUTANEOUS at 11:00

## 2020-04-21 RX ADMIN — INSULIN GLARGINE 5 UNITS: 100 INJECTION, SOLUTION SUBCUTANEOUS at 09:00

## 2020-04-21 RX ADMIN — METRONIDAZOLE 500 MG: 500 INJECTION, SOLUTION INTRAVENOUS at 18:41

## 2020-04-21 RX ADMIN — LIDOCAINE HYDROCHLORIDE ANHYDROUS 5 ML: 10 INJECTION, SOLUTION INFILTRATION at 14:09

## 2020-04-21 RX ADMIN — HEPARIN SODIUM 1000 UNITS: 200 INJECTION, SOLUTION INTRAVENOUS at 13:21

## 2020-04-21 RX ADMIN — METRONIDAZOLE 500 MG: 500 INJECTION, SOLUTION INTRAVENOUS at 10:27

## 2020-04-21 RX ADMIN — HEPARIN SODIUM IN SODIUM CHLORIDE 1000 UNITS: 200 INJECTION INTRAVENOUS at 13:21

## 2020-04-21 RX ADMIN — CIPROFLOXACIN 400 MG: 2 INJECTION, SOLUTION INTRAVENOUS at 18:41

## 2020-04-21 RX ADMIN — INSULIN LISPRO 2 UNITS: 100 INJECTION, SOLUTION INTRAVENOUS; SUBCUTANEOUS at 15:08

## 2020-04-21 RX ADMIN — ATORVASTATIN CALCIUM 20 MG: 20 TABLET, FILM COATED ORAL at 10:26

## 2020-04-21 RX ADMIN — SODIUM CHLORIDE 25 ML/HR: 900 INJECTION, SOLUTION INTRAVENOUS at 13:30

## 2020-04-21 RX ADMIN — CIPROFLOXACIN 400 MG: 2 INJECTION, SOLUTION INTRAVENOUS at 05:14

## 2020-04-21 RX ADMIN — INSULIN LISPRO 6 UNITS: 100 INJECTION, SOLUTION INTRAVENOUS; SUBCUTANEOUS at 10:27

## 2020-04-21 RX ADMIN — METOPROLOL SUCCINATE 50 MG: 50 TABLET, EXTENDED RELEASE ORAL at 10:27

## 2020-04-21 RX ADMIN — IOPAMIDOL 42 ML: 510 INJECTION, SOLUTION INTRAVASCULAR at 14:09

## 2020-04-21 NOTE — PROGRESS NOTES
Called SBAR/MAYNOR/MAR report to inpt nurse, Nba Antonio RN. Informed of pt's current location in Care Unit x 1 hr w/lying flat x 2hrs. Nba Antonio RN verbalized understanding.

## 2020-04-21 NOTE — PROGRESS NOTES
Pt arrived to care unit via bed,  Pt aaox3, dressing to right groin area clean, dry and intact, pedal pulses palpable,  Pt without complaints

## 2020-04-21 NOTE — PROGRESS NOTES
Problem: Self Care Deficits Care Plan (Adult)  Goal: *Acute Goals and Plan of Care (Insert Text)  Description: Occupational Therapy Goals  Initiated 4/21/2020 within 7 day(s). 1.  Patient will perform bathing with minimal assistance   2. Patient will perform upper body dressing with modified independence. 3.  Patient will perform lower body dressing with minimal contact guard assist.  4.  Patient will perform toilet transfers with supervision. 5.  Patient will perform all aspects of toileting with supervision. 6.  Patient will perform upper extremity therapeutic exercise/activities with supervision/set-up for 5 minutes. 7.  Patient will utilize energy conservation techniques during functional activities with 1 verbal cue(s). Outcome: Progressing Towards Goal   OCCUPATIONAL THERAPY EVALUATION    Patient: Kristy Vasquez (88 y.o. male)  Date: 4/21/2020  Primary Diagnosis: Lower GI bleed [K92.2]  Procedure(s) (LRB):  COLONOSCOPY; SPOT INJECTION; (N/A)  ENDOSCOPIC BANDING OR LIGATION (N/A) 1 Day Post-Op   Precautions: Fall  PLOF: pt living with daughter, reports using walker at home    ASSESSMENT :  Based on the objective data described below, the patient presents with decreased activity tolerance, independence with toileting, dressing, functional mobility. Pt speaks primarily Anguill[place]. Pt reports he lives with his daughter and utilizes walker for mobility. Pt able to sit up to EOB without assist, and STS with CGA. Pt sat back down to EOB, reporting mild dizziness upon standing. Pt reporting having bloody BM, and stating it has been happening intermittently. Pt able to roll to B sides/complete bridge without assist, and required mod A for bowel hygiene. Pt left supine in bed, call bell/phone within reach. Above discussed with LINH COLÓN. Education: fall prevention, role of OT in acute care    Patient will benefit from skilled intervention to address the above impairments.   Patient's rehabilitation potential is considered to be Good  Factors which may influence rehabilitation potential include:   []             None noted  []             Mental ability/status  [x]             Medical condition  []             Home/family situation and support systems  []             Safety awareness  []             Pain tolerance/management  []             Other:      PLAN :  Recommendations and Planned Interventions:   [x]               Self Care Training                  [x]      Therapeutic Activities  [x]               Functional Mobility Training   []      Cognitive Retraining  [x]               Therapeutic Exercises           [x]      Endurance Activities  [x]               Balance Training                    []      Neuromuscular Re-Education  []               Visual/Perceptual Training     [x]      Home Safety Training  [x]               Patient Education                   []      Family Training/Education  []               Other (comment):    Frequency/Duration: Patient will be followed by occupational therapy 1-2 times per day/4-7 days per week to address goals. Discharge Recommendations: Home Health  Further Equipment Recommendations for Discharge: To Be Determined (TBD) at next level of care     SUBJECTIVE:   Patient stated Tracy Anderson helped me to the bathroom earlier.     OBJECTIVE DATA SUMMARY:     Past Medical History:   Diagnosis Date    Diabetes ([facility] [address]     DVT of deep femoral vein ([facility] [address]     Foot abscess     Hypertension      Past Surgical History:   Procedure Laterality Date    COLONOSCOPY N/A 4/20/2020    COLONOSCOPY; SPOT INJECTION; performed by Marisa Nichols MD at [facility] ENDOSCOPY    HX ORTHOPAEDIC      toe amputation    IR THROMBOLYSIS TRANSCATH NON CORONARY OR INTRACRANIAL  1/23/2020     Barriers to Learning/Limitations: yes;  language and physical  Compensate with: visual, verbal, tactile, kinesthetic cues/model    Home Situation: pt living with daughter, uses walker for mobility  [address] [facility] Environment: Private residence  One/Two Story Residence: Two story  Living Alone: No  Support Systems: Child(shahla)  Patient Expects to be Discharged to[de-identified] Private residence  Current DME Used/Available at Home: Kirk Emery  []  Right hand dominant   []  Left hand dominant    Cognitive/Behavioral Status:  Neurologic State: Alert  Orientation Level: Oriented X4  Cognition: Poor safety awareness; Appropriate for age attention/concentration; Follows commands  Safety/Judgement: Decreased awareness of need for assistance;Decreased awareness of need for safety; Insight into deficits    Coordination: BUE  Coordination: Within functional limits  Fine Motor Skills-Upper: Left Intact; Right Intact    Gross Motor Skills-Upper: Left Intact; Right Intact    Balance:  Sitting: Intact  Standing: Impaired    Strength: BUE  Strength: Generally decreased, functional    Tone & Sensation: BUE  Tone: Normal  Sensation: Intact    Range of Motion: BUE  AROM: Generally decreased, functional    Functional Mobility and Transfers for ADLs:  Bed Mobility:  Supine to Sit: Stand-by assistance  Sit to Supine: Stand-by assistance    Transfers:  Sit to Stand: Contact guard assistance    ADL Assessment:   Feeding: Setup    Oral Facial Hygiene/Grooming: Setup    Bathing: Moderate assistance    Upper Body Dressing: Stand-by assistance    Lower Body Dressing: Minimum assistance    Toileting: Moderate assistance    ADL Intervention:  Lower Body Dressing Assistance  Dressing Assistance: Moderate assistance  Protective Undergarmet: Moderate assistance  Socks: Minimum assistance    Toileting  Toileting Assistance: Moderate assistance  Bowel Hygiene: Moderate assistance  Clothing Management: Moderate assistance    Cognitive Retraining  Safety/Judgement: Decreased awareness of need for assistance;Decreased awareness of need for safety; Insight into deficits    Pain:  Pain level pre-treatment: 0/10   Pain level post-treatment: 0/10   Pain Intervention(s): Medication provided by Nursing (see MAR); Rest, Repositioning   Response to intervention: Nurse notified, See doc flow sheet    Activity Tolerance:   Fair-. Patient able to stand ~10 seconds. Patient able to complete ADLs with rest breaks. Patient limited by strength, ROM, balance. Patient unsteady. Please refer to the flowsheet for vital signs taken during this treatment. After treatment:   [] Patient left in no apparent distress sitting up in chair  [x] Patient left in no apparent distress in bed  [x] Call bell left within reach  [x] Nursing notified  [] Caregiver present  [] Bed alarm activated    COMMUNICATION/EDUCATION:   [x] Role of Occupational Therapy in the acute care setting  [x] Home safety education was provided and the patient/caregiver indicated understanding. [x] Patient/family have participated as able in goal setting and plan of care. [x] Patient/family agree to work toward stated goals and plan of care. [] Patient understands intent and goals of therapy, but is neutral about his/her participation. [] Patient is unable to participate in goal setting and plan of care. Thank you for this referral.  Stevenson Baker, OTR/L  Time Calculation: 29 mins    Eval Complexity: History: MEDIUM Complexity : Expanded review of history including physical, cognitive and psychosocial  history ; Examination: LOW Complexity : 1-3 performance deficits relating to physical, cognitive , or psychosocial skils that result in activity limitations and / or participation restrictions ; Decision Making:MEDIUM Complexity : Patient may present with comorbidities that affect occupational performnce.  Miniml to moderate modification of tasks or assistance (eg, physical or verbal ) with assesment(s) is necessary to enable patient to complete evaluation

## 2020-04-21 NOTE — PROGRESS NOTES
Problem: Upper and Lower GI Bleed: Day 1  Goal: Activity/Safety  Outcome: Progressing Towards Goal  Goal: Consults, if ordered  Outcome: Progressing Towards Goal  Goal: Diagnostic Test/Procedures  Outcome: Progressing Towards Goal  Goal: Nutrition/Diet  Outcome: Progressing Towards Goal  Goal: Discharge Planning  Outcome: Progressing Towards Goal  Goal: Medications  Outcome: Progressing Towards Goal  Goal: Respiratory  Outcome: Progressing Towards Goal  Goal: Treatments/Interventions/Procedures  Outcome: Progressing Towards Goal  Goal: Psychosocial  Outcome: Progressing Towards Goal  Goal: *Optimal pain control at patient's stated goal  Outcome: Progressing Towards Goal  Goal: *Hemodynamically stable  Outcome: Progressing Towards Goal  Goal: *Demonstrates progressive activity  Outcome: Progressing Towards Goal     Problem: Upper and Lower GI Bleed: Day 2  Goal: Off Pathway (Use only if patient is Off Pathway)  Outcome: Progressing Towards Goal  Goal: Activity/Safety  Outcome: Progressing Towards Goal  Goal: Consults, if ordered  Outcome: Progressing Towards Goal  Goal: Diagnostic Test/Procedures  Outcome: Progressing Towards Goal  Goal: Nutrition/Diet  Outcome: Progressing Towards Goal  Goal: Discharge Planning  Outcome: Progressing Towards Goal  Goal: Medications  Outcome: Progressing Towards Goal  Goal: Respiratory  Outcome: Progressing Towards Goal  Goal: Treatments/Interventions/Procedures  Outcome: Progressing Towards Goal  Goal: Psychosocial  Outcome: Progressing Towards Goal  Goal: *Optimal pain control at patient's stated goal  Outcome: Progressing Towards Goal  Goal: *Hemodynamically stable  Outcome: Progressing Towards Goal  Goal: *Tolerating diet  Outcome: Progressing Towards Goal  Goal: *Demonstrates progressive activity  Outcome: Progressing Towards Goal     Problem: Upper and Lower GI Bleed: Day 3  Goal: Off Pathway (Use only if patient is Off Pathway)  Outcome: Progressing Towards Goal  Goal: Activity/Safety  Outcome: Progressing Towards Goal  Goal: Diagnostic Test/Procedures  Outcome: Progressing Towards Goal  Goal: Nutrition/Diet  Outcome: Progressing Towards Goal  Goal: Discharge Planning  Outcome: Progressing Towards Goal  Goal: Medications  Outcome: Progressing Towards Goal  Goal: Treatments/Interventions/Procedures  Outcome: Progressing Towards Goal  Goal: Psychosocial  Outcome: Progressing Towards Goal     Problem: Upper and Lower GI Bleed:  Discharge Outcomes  Goal: *Hemodynamically stable  Outcome: Progressing Towards Goal  Goal: *Lungs clear or at baseline  Outcome: Progressing Towards Goal  Goal: *Demonstrates independent activity or return to baseline  Outcome: Progressing Towards Goal  Goal: *Pain is controlled to three or less  Outcome: Progressing Towards Goal  Goal: *Verbalizes understanding and describes prescribed diet  Outcome: Progressing Towards Goal  Goal: *Tolerating diet  Outcome: Progressing Towards Goal  Goal: *Verbalizes name, dosage, time, side effects, and number of days to continue medications  Outcome: Progressing Towards Goal  Goal: *Anxiety reduced or absent  Outcome: Progressing Towards Goal  Goal: *Understands and describes signs and symptoms to report to providers(Stroke Metric)  Outcome: Progressing Towards Goal  Goal: *Describes follow-up/return visits to physicians  Outcome: Progressing Towards Goal  Goal: *Describes available resources and support systems  Outcome: Progressing Towards Goal     Problem: Diabetes Self-Management  Goal: *Disease process and treatment process  Description: Define diabetes and identify own type of diabetes; list 3 options for treating diabetes. Outcome: Progressing Towards Goal  Goal: *Incorporating nutritional management into lifestyle  Description: Describe effect of type, amount and timing of food on blood glucose; list 3 methods for planning meals.   Outcome: Progressing Towards Goal  Goal: *Incorporating physical activity into lifestyle  Description: State effect of exercise on blood glucose levels. Outcome: Progressing Towards Goal  Goal: *Developing strategies to promote health/change behavior  Description: Define the ABC's of diabetes; identify appropriate screenings, schedule and personal plan for screenings. Outcome: Progressing Towards Goal  Goal: *Using medications safely  Description: State effect of diabetes medications on diabetes; name diabetes medication taking, action and side effects. Outcome: Progressing Towards Goal  Goal: *Monitoring blood glucose, interpreting and using results  Description: Identify recommended blood glucose targets  and personal targets. Outcome: Progressing Towards Goal  Goal: *Prevention, detection, treatment of acute complications  Description: List symptoms of hyper- and hypoglycemia; describe how to treat low blood sugar and actions for lowering  high blood glucose level. Outcome: Progressing Towards Goal  Goal: *Prevention, detection and treatment of chronic complications  Description: Define the natural course of diabetes and describe the relationship of blood glucose levels to long term complications of diabetes. Outcome: Progressing Towards Goal  Goal: *Developing strategies to address psychosocial issues  Description: Describe feelings about living with diabetes; identify support needed and support network  Outcome: Progressing Towards Goal  Goal: *Insulin pump training  Outcome: Progressing Towards Goal  Goal: *Sick day guidelines  Outcome: Progressing Towards Goal  Goal: *Patient Specific Goal (EDIT GOAL, INSERT TEXT)  Outcome: Progressing Towards Goal     Problem: Patient Education: Go to Patient Education Activity  Goal: Patient/Family Education  Outcome: Progressing Towards Goal     Problem: Falls - Risk of  Goal: *Absence of Falls  Description: Document Harlan Fall Risk and appropriate interventions in the flowsheet.   Outcome: Progressing Towards Goal  Note: Fall Risk Interventions:  Mobility Interventions: Bed/chair exit alarm, Communicate number of staff needed for ambulation/transfer, OT consult for ADLs, Patient to call before getting OOB, PT Consult for mobility concerns, PT Consult for assist device competence, Strengthening exercises (ROM-active/passive), Utilize walker, cane, or other assistive device         Medication Interventions: Bed/chair exit alarm, Evaluate medications/consider consulting pharmacy, Patient to call before getting OOB, Teach patient to arise slowly    Elimination Interventions: Bed/chair exit alarm, Call light in reach, Elevated toilet seat, Patient to call for help with toileting needs, Stay With Me (per policy), Toilet paper/wipes in reach, Toileting schedule/hourly rounds, Urinal in reach              Problem: Patient Education: Go to Patient Education Activity  Goal: Patient/Family Education  Outcome: Progressing Towards Goal     Problem: Patient Education: Go to Patient Education Activity  Goal: Patient/Family Education  Outcome: Progressing Towards Goal     Problem: Pressure Injury - Risk of  Goal: *Prevention of pressure injury  Description: Document Thomas Scale and appropriate interventions in the flowsheet. Outcome: Progressing Towards Goal  Note: Pressure Injury Interventions:  Sensory Interventions: Assess changes in LOC, Assess need for specialty bed, Chair cushion, Avoid rigorous massage over bony prominences, Check visual cues for pain, Discuss PT/OT consult with provider, Float heels, Keep linens dry and wrinkle-free, Maintain/enhance activity level, Minimize linen layers, Monitor skin under medical devices, Pad between skin to skin, Pressure redistribution bed/mattress (bed type), Turn and reposition approx.  every two hours (pillows and wedges if needed)    Moisture Interventions: Absorbent underpads, Assess need for specialty bed, Check for incontinence Q2 hours and as needed, Limit adult briefs, Maintain skin hydration (lotion/cream), Minimize layers, Offer toileting Q_hr    Activity Interventions: Assess need for specialty bed, Chair cushion, Increase time out of bed, Pressure redistribution bed/mattress(bed type), PT/OT evaluation    Mobility Interventions: Assess need for specialty bed, Chair cushion, Float heels, Pressure redistribution bed/mattress (bed type), PT/OT evaluation, Turn and reposition approx.  every two hours(pillow and wedges)    Nutrition Interventions: Document food/fluid/supplement intake, Discuss nutritional consult with provider, Offer support with meals,snacks and hydration    Friction and Shear Interventions: Feet elevated on foot rest, Foam dressings/transparent film/skin sealants, Lift sheet, Lift team/patient mobility team, Minimize layers, Transferring/repositioning devices                Problem: Patient Education: Go to Patient Education Activity  Goal: Patient/Family Education  Outcome: Progressing Towards Goal

## 2020-04-21 NOTE — PROGRESS NOTES
Hospitalist Progress Note-critical care note     Patient: Maritza Luna MRN: [id number]  CSN: [id number]    YOB: 1945  Age: 76 y.o. Sex: male    DOA: 4/20/2020 LOS:  LOS: 1 day            Chief complaint: gi bleeding , anemia,     Assessment/Plan         Hospital Problems  Date Reviewed: 4/21/2020          Codes Class Noted POA    Anemia due to acute blood loss ICD-10-CM: D62  ICD-9-CM: 285.1  4/21/2020 Unknown        * (Principal) Lower GI bleed ICD-10-CM: K92.2  ICD-9-CM: 578.9  4/20/2020 Unknown        Supratherapeutic INR ICD-10-CM: R79.1  ICD-9-CM: 790.92  4/20/2020 Unknown        DVT (deep venous thrombosis) ([facility] ICD-10-CM: I82.409  ICD-9-CM: 453.40  1/18/2020 Yes        Type 2 diabetes mellitus, with long-term current use of insulin (HCC) ICD-10-CM: E11.9, Z79.4  ICD-9-CM: 250.00, V58.67  1/3/2020 Yes        Hypertension ICD-10-CM: I10  ICD-9-CM: 401.9  1/3/2020 Yes              Lower GI bleeding  Colonoscopy done last night, epi administrated, still two bloody stool AM   Ir embolization  Blood transfusion,  Will have surgery on board if can not stop per IR . Case discussed  With dr. Diane Miller      supra therapeutic inr   Received vit K. Will give vit K today and ffp   inr today 1.7- due to active bleeding     Anemia acute bleeding   transfuse ffp and prbc today   H/h monitoring , ns infusion       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 Hamilton Rudolph on board   Repeat ultrasound       HTN    Will hold metoprolol now,      DM2 -hyperglycemia    Lantus and  sliding scale insulin, increase lantus     Subjective: two bloody bm     Called   Debby Krishna Daughter [phone]      54 total min's spent on patient care including >50% on counseling/coordinating care. Discussed the above assessments. also discussed labs, medications and hospital course      Disposition :tbd,   Review of systems:    General: No fevers or chills. Cardiovascular: No chest pain or pressure.  No palpitations. Pulmonary: No shortness of breath. Gastrointestinal: No nausea, vomiting. Bleeding     Vital signs/Intake and Output:  Visit Vitals  /54   Pulse 91   Temp 98.2 °F (36.8 °C)   Resp 14   Ht 5' 6\" (1.676 m)   Wt 68.5 kg (151 lb)   SpO2 100%   BMI 24.37 kg/m²     Current Shift:  04/21 0701 - 04/21 1900  In: 700   Out: -   Last three shifts:  04/19 1901 - 04/21 0700  In: 784.3   Out: 1100 [Urine:950]    Physical Exam:  General: WD, WN. Alert, cooperative, no acute distress    HEENT: NC, Atraumatic. PERRLA, anicteric sclerae. Lungs: CTA Bilaterally. No Wheezing/Rhonchi/Rales. Heart:  Regular  rhythm,  No murmur, No Rubs, No Gallops  Abdomen: Soft, Non distended, Non tender. +Bowel sounds,   Extremities: No c/c/e  Psych:   Not anxious or agitated. Neurologic:  No acute neurological deficit. Labs: Results:       Chemistry Recent Labs     04/21/20 0215 04/20/20  0936   * 285*    139   K 4.0 4.3    106   CO2 24 27   BUN 31* 28*   CREA 1.21 1.26   CA 7.8* 8.7   AGAP 7 6   BUCR 26* 22*   AP  --  94   TP  --  7.3   ALB  --  3.0*   GLOB  --  4.3*   AGRAT  --  0.7*      CBC w/Diff Recent Labs     04/21/20  0950 04/21/20  0215 04/20/20 2020 04/20/20  0936   WBC  --  7.2  --   --  9.8   RBC  --  1.91*  --   --  3.32*   HGB 6.4* 5.7* 8.2*   < > 9.8*   HCT 18.6* 16.4* 23.7*   < > 28.4*   PLT  --  132*  --   --  229   GRANS  --  54  --   --  41*   LYMPH  --  25  --   --  21   EOS  --  13*  --   --  33*    < > = values in this interval not displayed. Cardiac Enzymes No results for input(s): CPK, CKND1, KAREN in the last 72 hours.     No lab exists for component: CKRMB, TROIP   Coagulation Recent Labs     04/21/20  0215 04/20/20  0936   PTP 20.1* 33.2*   INR 1.7* 3.3*       Lipid Panel No results found for: CHOL, CHOLPOCT, CHOLX, CHLST, CHOLV, 840339, HDL, HDLP, LDL, LDLC, DLDLP, 482037, VLDLC, VLDL, TGLX, TRIGL, TRIGP, TGLPOCT, CHHD, CHHDX   BNP No results for input(s): BNPP in the last 72 hours. Liver Enzymes Recent Labs     04/20/20  0936   TP 7.3   ALB 3.0*   AP 94   SGOT 17      Thyroid Studies Lab Results   Component Value Date/Time    TSH 1.86 01/19/2020 06:14 AM        Procedures/imaging: see electronic medical records for all procedures/Xrays and details which were not copied into this note but were reviewed prior to creation of Plan    Cta Abdomen Pelv W Cont    Result Date: 4/20/2020  EXAM:  CT ARTERIOGRAM ABDOMEN AND PELVIS CLINICAL INDICATION/HISTORY: BRBPR since today on coumadin   > Additional: None. COMPARISON: CT of the abdomen pelvis dated January 28, 2020   > Reference Exam: None. TECHNIQUE:   Pre-contrast imaging was performed. Subsequent CT angiogram of the abdomen, and pelvis. Thin sagittal and coronal MPR and MIP reconstructions were performed. Extensive separate workstation post processing was performed by [facility] to include MIPs, color surface images, 3-D reconstructions, and curve planar reformat imaging. One or more dose reduction techniques were used on this CT: automated exposure control, adjustment of the mAs and/or kVp according to patient size, and iterative reconstruction techniques. The specific techniques used on this CT exam have been documented in the patient's electronic medical record. Digital Imaging and Communications in Medicine (DICOM) format image data are available to nonaffiliated external healthcare facilities or entities on a secure, media free, reciprocally searchable basis with patient authorization for at least a 12-month period after this study. _______________ FINDINGS: --- CT ANGIOGRAM --- Noncontrast imaging through the abdomen and pelvis demonstrate scattered colonic diverticulosis with areas of increased attenuation within the lumen of the diverticula predominantly in the distal descending and proximal sigmoid colon.  These areas persist on the arterial phase imaging with a new blush of contrast present in the proximal sigmoid colon on axial series images 129-134 which then fades and becomes less apparent on the delayed imaging. Noncontrast imaging also demonstrates scattered aortic atherosclerosis. Normal caliber aorta without abnormal increased attenuation along the wall to suggest intramural hematoma. Postcontrast arterial phase imaging through the aorta demonstrates normal enhancement without evidence of dissection or other filling defect. --- CT ABDOMEN/PELVIS --- LOWER CHEST: Unremarkable. LIVER, BILIARY: Liver is normal. No biliary dilation. Gallbladder is unremarkable. PANCREAS: Normal. SPLEEN: Normal. ADRENALS: Normal. KIDNEYS: Normal. LYMPH NODES: No enlarged lymph nodes. GASTROINTESTINAL TRACT: Scattered diverticulosis as described above. In addition to the area of active bleed described there is mild wall thickening of the proximal sigmoid colon which could represent active inflammation or related to the bleeding. PELVIC ORGANS: Unremarkable. BONES: No acute or aggressive osseous abnormalities identified. OTHER: No ascites. Calcified subcutaneous granuloma in the left medial gluteal soft tissues noted, unchanged. _______________     IMPRESSION: 1. Diverticulosis with evidence of active GI bleeding in the proximal sigmoid colon. Cannot exclude a component of active diverticulitis. CRITICAL RESULT:  These critical results were directly communicated to Bladimir Renner on 4/20/2020 1:36 PM.  Results understood and acknowledged.        Toi Laurent MD

## 2020-04-21 NOTE — PROGRESS NOTES
TRANSFER - OUT REPORT:    Verbal report given to Tomasa Stanley RN (name) on Kristofer Ojeda  being transferred to Care Unit(unit) for routine progression of care       Report consisted of patients Situation, Background, Assessment and   Recommendations(SBAR). Information from the following report(s) SBAR, Kardex and MAR was reviewed with the receiving nurse. Lines:   Peripheral IV 04/20/20 Right Antecubital (Active)   Site Assessment Clean, dry, & intact 4/21/2020 12:03 PM   Phlebitis Assessment 0 4/21/2020 12:03 PM   Infiltration Assessment 0 4/21/2020 12:03 PM   Dressing Status Clean, dry, & intact 4/21/2020 12:03 PM   Dressing Type Transparent 4/21/2020 12:03 PM   Hub Color/Line Status Pink;Capped; Infusing 4/21/2020 12:03 PM   Action Taken Open ports on tubing capped 4/20/2020  5:15 PM   Alcohol Cap Used Yes 4/21/2020 12:03 PM       Peripheral IV 04/20/20 Left Antecubital (Active)   Site Assessment Clean, dry, & intact 4/21/2020  7:45 AM   Phlebitis Assessment 0 4/21/2020  7:45 AM   Infiltration Assessment 0 4/21/2020  7:45 AM   Dressing Status Clean, dry, & intact 4/21/2020  7:45 AM   Dressing Type Transparent 4/21/2020  7:45 AM   Hub Color/Line Status Capped 4/21/2020  7:45 AM   Alcohol Cap Used Yes 4/21/2020  7:45 AM        Opportunity for questions and clarification was provided.       Patient transported with:   Registered Nurse

## 2020-04-21 NOTE — PROGRESS NOTES
Pt arrived on unit; Pt Alert and Oriented; Consent signed using [employer] Cos[employer] telephone translation,  #[phone]; See MAC_lab for sedation report and/or vital signs. Pt transferred to treatment table for procedure.

## 2020-04-21 NOTE — PROGRESS NOTES
Reason for Admission:   Lower GI bleed               RUR Score:     41%    PCP: First and Last name:  Sudheermikael Lloyd   Name of Practice: [place]   Are you a current patient: Yes/No: yes   Approximate date of last visit: unsure, last 3-6 months             Resources/supports as identified by patient/family:   Lives with daughter                Top Challenges facing patient (as identified by patient/family and CM): Finances/Medication cost?                    Transportation? Support system or lack thereof? Living arrangements? Self-care/ADLs/Cognition? Current Advanced Directive/Advance Care Plan:  None on file, daughter states she makes care decisions if needed. Johnnie Mendez                           Plan for utilizing home health:    tbd (discharged from [facility] 4/2020)                 Transition of Care Plan:    Chart reviewed, attempted to reach pt via phone into room, no answer, noted in chart review pt declined  services and nurse spoke with daughter regarding care/decisions. Called daughter Larissa Quarles [phone], verified information via face sheet. Per daughter, pt lives with her, has walker but was not needing it prior to admission; otherwise pt uses no other DME in the home. Daughter is planning for pt to return home at discharge, noted OT indra recommending [place] currently. CM will continue to follow. Care Management Interventions  PCP Verified by CM:  Yes  Transition of Care Consult (CM Consult): Discharge Planning  Discharge Durable Medical Equipment: No  Physical Therapy Consult: Yes  Occupational Therapy Consult: Yes  Current Support Network: Relative's Home  Confirm Follow Up Transport: Family

## 2020-04-21 NOTE — ROUTINE PROCESS
1915 Bedside and Verbal shift change report given to Chanelle Wall RN (oncoming nurse) by YUMIKO Newton RN (offgoing nurse). Report included the following information SBAR, Kardex, ED Summary, Intake/Output, MAR and Recent Results.

## 2020-04-21 NOTE — PROGRESS NOTES
Called hospitalist  [facility] to inform patients H&H 5.7/16.4. SBAR provided. Patient alert/oriented, with no complaints, VS stable. Physician ordered 1 unit of PRBC and stated she would speak with GI in the morning. No further orders given. Will continue to monitor.

## 2020-04-21 NOTE — PROGRESS NOTES
0730: Bedside and Verbal shift change report given to [address] (oncoming nurse) by Lexa Jordan RN (offgoing nurse). Report included the following information Kardex and Cardiac Rhythm NSR.     0805: pt escorted to the vascular dept for testing in hospital bed  AJean Eyal RN    2476: pt returns from the vascular dept  Ciaran Lerma RN    0930: OT at the bedside working with pt  Ciaran Lerma RN    1110: MD Elder Lantigua communicates with this nurse pt condition and to call MD for Gastrology  Ciaran Lerma RN    9048: MD Samra Archuleta called to be made aware of pt condition, no answer noted, unable to leave message due to mail box is full, MD office to be called to leave message in reference to pt  Ciaran Lerma RN    1115: MD Daniel Ram phoned this nurse speaks with MD Samra Archuleta, MD made aware of pt condition and tx, instructions noted to consult interventional radiology, MD made aware matter to be addressed  Ciaran Lerma RN    [phone]: MD Hess phones this nurse and makes aware verbal order for mesenteric angiogram with embolization, MD made aware matter will be addressed  Ciaran Lerma RN     [phone]: radiology nurse made aware of pt condition and pt receiving blood product, this nurse made aware radiology will be putting in transport soon  Ciaran Lerma RN    [phone]: pt transported to interventional radiology for procedure  Ciaran Lerma RN    9731: MD Samra Archuleta made aware pt hgb after return from care unit, MD gives order for pt to receive 1 unit of blood  DREW Birch RN    1906: Bedside and Verbal shift change report given to [facility] (oncoming nurse) by Mary Donahue RN (offgoing nurse). Report included the following information Kardex and Cardiac Rhythm NSR.

## 2020-04-21 NOTE — PROGRESS NOTES
Called hospitalist Dr. Sivakumar Garrido to inform patients bedside blood sugar was 426, immediately retaken 356. SBAR provided. Physician ordered to give patient 10 units of insulin per sliding scale order and not to place patient on insulin resistant sliding scale. Will continue to monitor.

## 2020-04-21 NOTE — PROCEDURES
Interventional Radiology Brief Procedure Note    Interventional Radiologist: Ashley Chilel MD    Pre-operative Diagnosis: Lower GI bleed    Post-operative Diagnosis: Same as pre-operative diagnosis    Procedure(s) Performed:  NICKI angiogram and sigmoid artery branch embolization    Anesthesia:  Local and Moderate Sedation    Findings: active bleeding from sigmoid artery branch of NICKI; treated with coil embolization. Right leg straight for 4 hours.      Complications: None    Estimated Blood Loss:  minimal    Tubes and Drains: None    Specimens: None    Condition: Good      Ashley Chilel MD  [address] [place] [address],[facility]  4/21/2020

## 2020-04-21 NOTE — PROGRESS NOTES
4/21/2020 PT note: consult received and chart reviewed. Noted pt receiving blood transfusion currently (Hgb 6.4), call unit and same confirmed-nurse Renea Decree in room hanging blood at this time. Will f/u at later time as pt schedule allows for PT evaluation. Thank you.    Michelle Stacy, PT

## 2020-04-21 NOTE — PROGRESS NOTES
Patient primarily [address] speaking. Patient refused to use two way translation phone that would provide a Sudanese speaking . Patient had signed a  service waiver. Called patients daughter Gloria Raya with permission from patient to explain consent for plasma transfusion. Patients daughter speaks Milan Nogueira and Sudanese fluently, explained in detail to patient plasma transfusion treatment and risks associated. Patients daughter verbalized patient consents to plasma transfusion. No questions from daughter or patient at this time. Patient signed consent for transfusion.

## 2020-04-21 NOTE — PROGRESS NOTES
4/21/2020 PT note: Pt recently returned to unit from Radiology following: NICKI angiogram and sigmoid artery branch embolization. Pt to keep Right leg straight for 4 hours. Will f/u tomorrow for PT evaluation. Nurse Sukhwinder rOoscos notified of same. Thank you.    Hai Madera, PT

## 2020-04-21 NOTE — CONSULTS
[address] [facility]  Phone: [phone]  Paging : [facility]) [phone]  [facility]) [phone]     Hematology / Oncology Consult Note    Impression:   Principal Problem:    Lower GI bleed (4/20/2020)    Active Problems:    Type 2 diabetes mellitus, with long-term current use of insulin ([address] (1/3/2020)      Hypertension (1/3/2020)      DVT (deep venous thrombosis) ([address] (1/18/2020)      Supratherapeutic INR (4/20/2020)        Hx of SVC / UE DVT - this was related to PICC line and is s/p TPA remotely. He is no longer symptomatic. I would like to know whether there was any residual thrombus. For either of these VTE, I do not think he needs indefinite anticoagulation. He has completed a little less than 3 months of anticoagulation. Given the severity of his SVC, it would be optimal to continue for a little bit longer but at the moment with his diverticular bleed, we can delay and reconsider later. Hx of LE DVT - this was a below the knee DVT that was provoked by ?infection. This does not require lifelong anticoagulation and is at low risk for recurrence in the near term. Anemia. Dt/ bleeding. B12 level is normal.  We will supplement with iron later. Plan:   Agree w/ reversal of warfarin  I will consider IV Iron but right now receiving blood which has iron  I will repeat dopplers of UE/LE  Hold anticoagulation  Depending upon his imaging and bleeding trajectory over the next few days, will make a final decision on whether to restart anticoagulation  Trend CBC, INR daily      Reason for Consultation:  Joseph Lewis is a 76 y.o. male who I've been asked to consult for DVT / bleeding    HPI:      He speaks Togolese and declined to use a  phone. He as able to speak some [place]. He continues to have bleeding overnight.   He had a colonoscopy yesterday with incomplete prep and they think he is having a diverticular bleed    This is a 40-year-old male with history of peripheral arterial disease, DVTs on Coumadin as well as diabetes. He came to the emergency room complaining of bloody stool. CT scan of his abdomen showed diverticulosis with evidence of active GI bleeding. He had a long complicated admission in January 2020 when she was discovered to have a foot abscess that required surgery as well as long-term antibiotics requiring a PICC line. During this hospital stay he was also noted to have thrombus in the right posterior tibial vein. To my knowledge, he never had any prior thrombosis. He had a after discharge, he developed progressive swelling in his right arm and was brought back to the emergency room. CT of his chest which showed thrombus involving the right PICC line with occlusion of the superior vena cava to the level of the brachiocephalic venous confluence as well as partial thrombosis in the left subclavian with narrowing of the left subclavian vein. He underwent thrombolysis by vascular surgery. At some point during this hospital stay he developed some rectal pain and was noticed to have a rectal sheath hematoma. After a brief pause and anticoagulation, this was restarted. Past Medical History:   Diagnosis Date    Diabetes ([address]     DVT of deep femoral vein (HCC)     Foot abscess     Hypertension      Past Surgical History:   Procedure Laterality Date    HX ORTHOPAEDIC      toe amputation    IR THROMBOLYSIS TRANSCATH NON CORONARY OR INTRACRANIAL  1/23/2020     @socr@  History reviewed. No pertinent family history.   No Known Allergies    Home Medications:   [unfilled]    Hospital Medications:     Current Facility-Administered Medications   Medication Dose Route Frequency Provider Last Rate Last Dose    0.9% sodium chloride infusion 250 mL  250 mL IntraVENous PRN Marvin Faustin MD        0.9% sodium chloride infusion  125 mL/hr IntraVENous CONTINUOUS Marvin Faustin  mL/hr at 04/20/20 9353 910 mL/hr at 04/20/20 1530    glucagon (GLUCAGEN) injection    PRN Ace Aldridge MD   0.5 mg at 04/20/20 1610    acetaminophen (TYLENOL) tablet 650 mg  650 mg Oral Q4H PRN Magaly Rahman MD        naloxone [facility]) injection 0.4 mg  0.4 mg IntraVENous PRN Magaly Rahman MD        diphenhydrAMINE (BENADRYL) injection 12.5 mg  12.5 mg IntraVENous Q4H PRN Magaly Rahman MD        ondansetron [facility]) injection 4 mg  4 mg IntraVENous Q4H PRN Magaly Rahman MD   4 mg at 04/20/20 2051    [START ON 4/21/2020] insulin glargine (LANTUS) injection 5 Units  5 Units SubCUTAneous DAILY Magaly Rahman MD        [START ON 4/21/2020] metoprolol succinate (TOPROL-XL) XL tablet 50 mg  50 mg Oral DAILY MD Jame Aceves ON 4/21/2020] atorvastatin (LIPITOR) tablet 20 mg  20 mg Oral DAILY Magaly Rahman MD        insulin lispro (HUMALOG) injection   SubCUTAneous AC&HS Magaly Rahman MD        glucose chewable tablet 16 g  4 Tab Oral PRN Magaly Rahman MD        glucagon [facility]) injection 1 mg  1 mg IntraMUSCular PRN Magaly Rahman MD        dextrose 10% infusion 125-250 mL  125-250 mL IntraVENous PRN Magaly Rahman MD        ciprofloxacin (CIPRO) 400 mg in D5W IVPB (premix)  400 mg IntraVENous Q12H Magaly Rahman  mL/hr at 04/20/20 1929 400 mg at 04/20/20 1929    metroNIDAZOLE (FLAGYL) IVPB premix 500 mg  500 mg IntraVENous Q8H Magaly Rahman  mL/hr at 04/20/20 1734 500 mg at 04/20/20 1734    EPINEPHrine (ADRENALIN) 0.1 mg/mL syringe    PRN Ace Aldridge MD   6 mg at 04/20/20 1630    docusate sodium (COLACE) capsule 100 mg  100 mg Oral BID Rolm Duffel, MD Ardyth Moritz [START ON 4/21/2020] polyethylene glycol (MIRALAX) packet 17 g  17 g Oral DAILY El-Safadi, Marella Lukes, MD           Review of Systems:   Constitutional:   Fever: Negative, Chills: Negative, Weight Loss: Negative, Malaise/Fatigue: Negative   Diaphoresis: Negative,  Weakness: Negative  Skin:   Rash: Negative,  Itching: Negative  HENT:   Headache:Negative, Hearing Loss: Negative, Tinnitus: Negative, Ear Pain: Negative   Nosebleeds: Negative, Congestion: Negative, Stridor: Negative,   Sore Throat: Negative  Eyes:    Blurred Vision: Negative, Double Vision: Negative, Photophobia: Negative   Eye Pain: Negative, Eye Discharge: Negative, Eye Redness: Negative  Cardiovascular:    Chest Pain: Negative, Palpitations: Negative, Orthopnea: Negative   Claudication: Negative, Leg Swelling: Negative PND: Negative  Respiratory:   Cough: Negative, Hemoptysis: Negative, Sputum Production: Negative   Shortness of Breath: Negative, Wheezing: Negative  Gastrointestinal:   Heartburn: Negative, Nausea: Negative, Vomiting: Negative   Abdominal Pain: Negative, Diarrhea: Negative, Constipation: Negative   Blood in Stool: Negative, Melena: Negative   Genitourinary:    Dysuria: Negative, Urgency: Negative, Frequency: Negative   Hematuria: Negative, Flank Pain: Negative  Musculoskeletal:    Myalgias: Negative, Neck Pain: Negative, Back Pain: Negative   Joint Pain: Negative, Falls: Negative  Endo/Heme/Allergies:    Easy Bruise/Blood: Negative, Env. Allergies: Negative, Polydipsia: Negative   Neurological:    Dizziness: Negative, Tingling: Negative. Tremor: Negative,    Sensory Change: Negative.  Speech Change: Negative, Focal Weakness: Negative     Seizures: Negative, LOC: Negative  Psychiatric:   Depression: Negative, Suicidal Ideas: Negative, Substance Abuse: Negative   Hallucinations: Negative, Nervous/Anxious: Negative   Insomnia: Negative, Memory Loss: Negative     Visit Vitals  /68   Pulse 88   Temp 98 °F (36.7 °C)   Resp 16   Ht 5' 6\" (1.676 m)   Wt 68.5 kg (151 lb)   SpO2 100%   BMI 24.37 kg/m²       Temp (24hrs), Av.1 °F (36.7 °C), Min:97.9 °F (36.6 °C), Max:98.6 °F (37 °C)      gen no acute distress, laying in bed  heent anicteric, no oral lesions  lym no cervical supraclavicualr adenopathy  abd nontender, bs+  Per warm no upper/lower extremity edema  msk no sarcopenia  Derm no rash  Neuro conversant, moving extremities  msk normal muscle tone, gait    Labs:  Recent Labs     04/20/20 2020 04/20/20  1442 04/20/20  0936   WBC  --   --  9.8   RBC  --   --  3.32*   HCT 23.7* 26.3* 28.4*   MCV  --   --  85.5   MCH  --   --  29.5   MCHC  --   --  34.5   RDW  --   --  14.5       Recent Labs     04/20/20  0936   CO2 27   BUN 28*     Reviewed all old chart history / labs / imaging, summarized above    No results for input(s): ALBUMIN in the last 72 hours.     No lab exists for component: DONNA Otero    @[email or web address]    [facility] MD CARLOS Membreno  [facility]

## 2020-04-21 NOTE — CONSULTS
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 He also has lower extremity DVT. He is followed by the hematologist.  He is on aspirin and Coumadin. He arrived with a supratherapeutic INR at 3.3. He is currently constipated, and he usually has one bowel movement every four days. He was doing relatively well until this morning at 8 o'clock, found to have passed a large amount of fresh blood per rectum in large quantities. He became just weak and dizzy, but he did not fall or pass out. A CT scan of the abdomen revealed sigmoid diverticulosis with evidence of active bleeding coming from the proximal sigmoid colon. The patient continued passing large amount of blood at the emergency room. His hemoglobin on arrival was 9.8. He was hypotensive. We decided in this case to bring him directly to the endoscopy suite to try to find the bleeding and try to do something about it. The patient claimed that he never had a colonoscopy. He claims that he is chronically constipated. He has one bowel movement every four days. He denies taking any anti-inflammatory medication. He openly denies having any dyspepsia, heartburns, nausea, vomiting, or dysphagia. His weight has been stable. Questioning is not very easy because he is mostly a Lao-speaking person. He does not smoke. Apparently, he used to drink heavily, but quit about a year ago. He is not known to have any liver disease, but he has diabetes mellitus, on insulin. He also has hypertension. No coronary artery disease, no stroke. He does have two amputations for a foot abscess. He had thrombolysis on 2020. MEDICATIONS AT HOME:  1. Metoprolol XL 50 mg once a day. 2.  Lantus 5 units. 3.  Seroquel 25.  4.  Lipitor 20.  5.  Aspirin 81 mg.  6.  MiraLax 17 g.  7.  Losartan 100 mg. ALLERGIES:  NO KNOWN DRUG ALLERGIES. FAMILY HISTORY:  He has no family history of cancer. REVIEW OF SYSTEMS:  Denies having any chest pain, shortness of breath. No dysuria, hematuria, or burning sensation. He has no abdominal surgery. PHYSICAL EXAMINATION:  GENERAL:  We have a 70-year-old  male, who appears to be in no distress. He does not speak good English at all. He appears to be comfortable. VITAL SIGNS:  Blood pressure 98/40, pulse 84, breathing 15, saturation 98% on room air. He weighs 151 pounds. SKIN:  Normal.  No evidence of any rash. No stigmata of chronic liver disease. HEENT:  The eyes are remarkable for pale conjunctivae. The sclerae are anicteric. The pupils are equal and reactive to light. Oropharyngeal cavity, he has his own teeth. The mucous membranes are pale and moist.  NECK:  Supple. No palpable mass. No enlarged thyroid. LUNGS:  Clear to auscultation. HEART:  The cardiac rhythm is regular. S1, S2 normal.  No murmur. ABDOMEN:  Not distended, very soft, not tender. No mass or organomegaly. Bowel sounds are normal.  EXTREMITIES:  He has no leg edema. NEUROLOGIC:  He is alert and oriented. Moves all his four extremities. LABORATORY DATA:  Blood tests:  Hemoglobin 9.8 and it dropped to 9.2, it was 7.8 on 01/29/2020. Normal MCV and MCH. WBC 9.8, platelets 436, and 10% eosinophils. Complete metabolic panel: We have a BUN of 28, creatinine 1.26, when they were 12 and 1.20 on 01/29/2020. Normal LFT. Albumin is 3. Iron saturation on 01/22 was 10, folate normal, but vitamin B12 was at the borderline 240 mg.  Occult blood in the stool was negative on 01/18/2020. CT scan of the abdomen and pelvis with contrast at admission showed diverticulosis, with active bleeding coming from the proximal sigmoid.   Liver is normal.    ASSESSMENT:  In conclusion, this is a 59-year-old male who has been on Coumadin since late 01/2020 for deep vein thrombosis. He had catheter thrombolysis for deep vein thrombosis of the right arm since 01/23/2020. The patient presented with lower gastrointestinal bleeding that appeared to be coming from a diverticulum, at least according to the CT scan. Bleeding appears to be significant because he is anticoagulated with Coumadin, with supratherapeutic INR at 3.3. There is acute but mild blood loss anemia. The patient is on the hypotensive side. Therefore, we would like to do an endoscopy quickly to try to localize the bleeding and also perform, if possible, endoscopic hemostasis. If not, this patient needs to go for either surgical resection or radiological embolization of the bleeding branch if we are not successful. I explained this to the patient, he agreed that we proceed. This patient was known already to have iron-deficiency anemia, but also his vitamin B12 is on the borderline, and it might be needing to be supplemented. He does not appear to have any upper gastrointestinal symptoms, but he is chronically constipated, and it is important that he be treated to prevent any complication of his diverticulosis, but also we need to do later a full colonoscopy with full prep, to evaluate fully his colon as he never had any colonoscopy previously. He is a diabetic. He has borderline chronic kidney disease. Further note to follow.     Sincerely,      MD TEETEE Aranda/[email or web address]  D:  04/20/2020 17:28  T:  04/20/2020 21:23  JOB #:  [id number]

## 2020-04-22 LAB
ANION GAP SERPL CALC-SCNC: 4 MMOL/L (ref 3–18)
BASOPHILS # BLD: 0 K/UL (ref 0–0.1)
BASOPHILS NFR BLD: 1 % (ref 0–2)
BLD PROD TYP BPU: NORMAL
BPU ID: NORMAL
BUN SERPL-MCNC: 32 MG/DL (ref 7–18)
BUN/CREAT SERPL: 26 (ref 12–20)
CALCIUM SERPL-MCNC: 7.7 MG/DL (ref 8.5–10.1)
CALLED TO:,BCALL1: NORMAL
CHLORIDE SERPL-SCNC: 112 MMOL/L (ref 100–111)
CO2 SERPL-SCNC: 26 MMOL/L (ref 21–32)
CREAT SERPL-MCNC: 1.24 MG/DL (ref 0.6–1.3)
DIFFERENTIAL METHOD BLD: ABNORMAL
EOSINOPHIL # BLD: 1.5 K/UL (ref 0–0.4)
EOSINOPHIL NFR BLD: 19 % (ref 0–5)
ERYTHROCYTE [DISTWIDTH] IN BLOOD BY AUTOMATED COUNT: 14.8 % (ref 11.6–14.5)
GLUCOSE BLD STRIP.AUTO-MCNC: 128 MG/DL (ref 70–110)
GLUCOSE BLD STRIP.AUTO-MCNC: 128 MG/DL (ref 70–110)
GLUCOSE BLD STRIP.AUTO-MCNC: 141 MG/DL (ref 70–110)
GLUCOSE BLD STRIP.AUTO-MCNC: 171 MG/DL (ref 70–110)
GLUCOSE SERPL-MCNC: 126 MG/DL (ref 74–99)
HCT VFR BLD AUTO: 18.8 % (ref 36–48)
HCT VFR BLD AUTO: 19.6 % (ref 36–48)
HCT VFR BLD AUTO: 19.8 % (ref 36–48)
HCT VFR BLD AUTO: 19.8 % (ref 36–48)
HGB BLD-MCNC: 6.6 G/DL (ref 13–16)
HGB BLD-MCNC: 6.9 G/DL (ref 13–16)
HGB BLD-MCNC: 7 G/DL (ref 13–16)
HGB BLD-MCNC: 7 G/DL (ref 13–16)
INR PPP: 1.5 (ref 0.8–1.2)
LYMPHOCYTES # BLD: 2.1 K/UL (ref 0.9–3.6)
LYMPHOCYTES NFR BLD: 27 % (ref 21–52)
MAGNESIUM SERPL-MCNC: 1.7 MG/DL (ref 1.6–2.6)
MCH RBC QN AUTO: 29.5 PG (ref 24–34)
MCHC RBC AUTO-ENTMCNC: 35.2 G/DL (ref 31–37)
MCV RBC AUTO: 83.8 FL (ref 74–97)
MONOCYTES # BLD: 0.8 K/UL (ref 0.05–1.2)
MONOCYTES NFR BLD: 11 % (ref 3–10)
NEUTS SEG # BLD: 3.3 K/UL (ref 1.8–8)
NEUTS SEG NFR BLD: 42 % (ref 40–73)
PLATELET # BLD AUTO: 105 K/UL (ref 135–420)
PMV BLD AUTO: 9.3 FL (ref 9.2–11.8)
POTASSIUM SERPL-SCNC: 4 MMOL/L (ref 3.5–5.5)
PROTHROMBIN TIME: 17.7 SEC (ref 11.5–15.2)
RBC # BLD AUTO: 2.34 M/UL (ref 4.7–5.5)
SODIUM SERPL-SCNC: 142 MMOL/L (ref 136–145)
STATUS OF UNIT,%ST: NORMAL
UNIT DIVISION, %UDIV: 0
WBC # BLD AUTO: 7.8 K/UL (ref 4.6–13.2)

## 2020-04-22 PROCEDURE — P9040 RBC LEUKOREDUCED IRRADIATED: HCPCS

## 2020-04-22 PROCEDURE — 97530 THERAPEUTIC ACTIVITIES: CPT

## 2020-04-22 PROCEDURE — 82962 GLUCOSE BLOOD TEST: CPT

## 2020-04-22 PROCEDURE — 74011250637 HC RX REV CODE- 250/637: Performed by: HOSPITALIST

## 2020-04-22 PROCEDURE — 36430 TRANSFUSION BLD/BLD COMPNT: CPT

## 2020-04-22 PROCEDURE — 83735 ASSAY OF MAGNESIUM: CPT

## 2020-04-22 PROCEDURE — 97116 GAIT TRAINING THERAPY: CPT

## 2020-04-22 PROCEDURE — 85025 COMPLETE CBC W/AUTO DIFF WBC: CPT

## 2020-04-22 PROCEDURE — 36415 COLL VENOUS BLD VENIPUNCTURE: CPT

## 2020-04-22 PROCEDURE — 97161 PT EVAL LOW COMPLEX 20 MIN: CPT

## 2020-04-22 PROCEDURE — 74011250636 HC RX REV CODE- 250/636: Performed by: HOSPITALIST

## 2020-04-22 PROCEDURE — 74011636637 HC RX REV CODE- 636/637: Performed by: HOSPITALIST

## 2020-04-22 PROCEDURE — 74011250636 HC RX REV CODE- 250/636: Performed by: STUDENT IN AN ORGANIZED HEALTH CARE EDUCATION/TRAINING PROGRAM

## 2020-04-22 PROCEDURE — 85018 HEMOGLOBIN: CPT

## 2020-04-22 PROCEDURE — 80048 BASIC METABOLIC PNL TOTAL CA: CPT

## 2020-04-22 PROCEDURE — 85610 PROTHROMBIN TIME: CPT

## 2020-04-22 PROCEDURE — 65270000029 HC RM PRIVATE

## 2020-04-22 RX ORDER — SODIUM CHLORIDE 9 MG/ML
250 INJECTION, SOLUTION INTRAVENOUS AS NEEDED
Status: DISCONTINUED | OUTPATIENT
Start: 2020-04-22 | End: 2020-04-24 | Stop reason: HOSPADM

## 2020-04-22 RX ADMIN — METRONIDAZOLE 500 MG: 500 INJECTION, SOLUTION INTRAVENOUS at 02:42

## 2020-04-22 RX ADMIN — INSULIN GLARGINE 10 UNITS: 100 INJECTION, SOLUTION SUBCUTANEOUS at 09:03

## 2020-04-22 RX ADMIN — SODIUM CHLORIDE 75 ML/HR: 900 INJECTION, SOLUTION INTRAVENOUS at 20:27

## 2020-04-22 RX ADMIN — INSULIN LISPRO 2 UNITS: 100 INJECTION, SOLUTION INTRAVENOUS; SUBCUTANEOUS at 09:03

## 2020-04-22 RX ADMIN — ATORVASTATIN CALCIUM 20 MG: 20 TABLET, FILM COATED ORAL at 09:03

## 2020-04-22 RX ADMIN — METRONIDAZOLE 500 MG: 500 INJECTION, SOLUTION INTRAVENOUS at 09:03

## 2020-04-22 RX ADMIN — METRONIDAZOLE 500 MG: 500 INJECTION, SOLUTION INTRAVENOUS at 17:47

## 2020-04-22 RX ADMIN — IRON SUCROSE 200 MG: 20 INJECTION, SOLUTION INTRAVENOUS at 12:15

## 2020-04-22 RX ADMIN — CIPROFLOXACIN 400 MG: 2 INJECTION, SOLUTION INTRAVENOUS at 17:47

## 2020-04-22 RX ADMIN — CIPROFLOXACIN 400 MG: 2 INJECTION, SOLUTION INTRAVENOUS at 05:34

## 2020-04-22 NOTE — PROGRESS NOTES
0730: Bedside and Verbal shift change report given to [address] (oncoming nurse) by Ana Buck RN (offgoing nurse). Report included the following information Kardex and Cardiac Rhythm NSR.     1030: MD [place] at the bedside and communicates with this nurse pt condition at , MD assess and discuss tx with pt  Jose Alberto Cooper RN    [phone]: MD Hollins at the bedside to discuss tx with pt, n/o noted, see LALA Birch RN    1250: PT works with pt and uses communication phone on speaker in order for pt to comprehend instruction, this nurse made aware by PT pt does well, no c/o voiced, continue to monitor pt for any change  Jose Alberto Cooper RN    1700: pt speaks with him on ice phone in reference to condition  Jose Alberto Cooper RN    1900: Bedside and Verbal shift change report given to Jose Ramon Murray (oncoming nurse) by Gladys Blanchard RN (offgoing nurse). Report included the following information Kardex and Cardiac Rhythm NSR.

## 2020-04-22 NOTE — PROGRESS NOTES
Called hospitalist Dr. Madina Vo to inform patients H&H 6.9/19.6. SBAR provided. Patient alert/oriented, with no complaints, VS stable. Physician ordered to continue to monitor Q6H H&H results. No further orders given. Will continue to monitor.

## 2020-04-22 NOTE — CONSULTS
[address] [facility]  Phone: [phone]  Paging : [facility]) [phone] ([phone]  [phone] [facility]) [phone]     Hematology / Oncology Consult Note    Impression:   Principal Problem:    Lower GI bleed (4/20/2020)    Active Problems:    Type 2 diabetes mellitus, with long-term current use of insulin ([facility] [address] (1/3/2020)      Hypertension (1/3/2020)      DVT (deep venous thrombosis) ([address] (1/18/2020)      Supratherapeutic INR (4/20/2020)      Anemia due to acute blood loss (4/21/2020)        Hx of SVC / UE DVT - this was related to PICC line and is s/p TPA remotely. As this was a provoked thrombosis, we do not need to consider lifelong anticoagulation. He has now had several bleeding events on anticoagulation. Our typical protocol is to complete 3 months of anticoagulation and then stop. This thrombosis occurred at the end of January and he has almost completed 3 months, with some interruptions periodically for procedures or bleeding events previously. I think at this point because of his bleeding events and that he has almost completed 3 months of therapy and because there is no residual thrombosis, I think we should just stop at this point. I have no immediate plans for him to resume warfarin. Hx of LE DVT - this was a below the knee DVT that was provoked by ?infection. This does not require lifelong anticoagulation and is at low risk for recurrence in the near term. Anemia. Dt/ bleeding. B12 level is normal.      Plan:   Hold anticoagulation indefinitely  Trend hemoglobin  I will give him 400 mg of iron sucrose today to replete his iron stores  I will have him follow-up with me in 2 weeks to make sure he is doing well and to finalize our plans but overall I am not thinking to restart anticoagulation.       Reason for Consultation:  Lani Washington is a 76 y.o. male who I've been asked to consult for DVT / bleeding    HPI:    Yesterday he underwent an angiogram and was discovered to have active bleeding from the sigmoid artery branch of the inferior mesenteric artery. He underwent coil embolization by interventional radiology. This morning, his hemoglobin did stabilize. I reviewed his ultrasounds of his upper and lower extremity and they do not show any residual thrombosis. He speaks Bel[place]ian and declined to use a  phone. He as able to speak some [place]. He continues to have bleeding overnight. He had a colonoscopy yesterday with incomplete prep and they think he is having a diverticular bleed    This is a 25-year-old male with history of peripheral arterial disease, DVTs on Coumadin as well as diabetes. He came to the emergency room complaining of bloody stool. CT scan of his abdomen showed diverticulosis with evidence of active GI bleeding. He had a long complicated admission in January 2020 when she was discovered to have a foot abscess that required surgery as well as long-term antibiotics requiring a PICC line. During this hospital stay he was also noted to have thrombus in the right posterior tibial vein. To my knowledge, he never had any prior thrombosis. He had a after discharge, he developed progressive swelling in his right arm and was brought back to the emergency room. CT of his chest which showed thrombus involving the right PICC line with occlusion of the superior vena cava to the level of the brachiocephalic venous confluence as well as partial thrombosis in the left subclavian with narrowing of the left subclavian vein. He underwent thrombolysis by vascular surgery. At some point during this hospital stay he developed some rectal pain and was noticed to have a rectal sheath hematoma. After a brief pause and anticoagulation, this was restarted.     Past Medical History:   Diagnosis Date    Diabetes ([address]     DVT of deep femoral vein ([address]     Foot abscess     Hypertension      Past Surgical History:   Procedure Laterality Date    COLONOSCOPY N/A 4/20/2020    COLONOSCOPY; SPOT INJECTION; performed by Antonio Gill MD at [facility]    HX ORTHOPAEDIC      toe amputation    IR THROMBOLYSIS TRANSCATH NON CORONARY OR INTRACRANIAL  1/23/2020     @[email or web address]@  History reviewed. No pertinent family history.   No Known Allergies    Home Medications:   [unfilled]    Hospital Medications:     Current Facility-Administered Medications   Medication Dose Route Frequency Provider Last Rate Last Dose    insulin glargine (LANTUS) injection 10 Units  10 Units SubCUTAneous DAILY Isaías Ash MD   10 Units at 04/22/20 3791    0.9% sodium chloride infusion 250 mL  250 mL IntraVENous PRN Bri Dixon MD        0.9% sodium chloride infusion  125 mL/hr IntraVENous CONTINUOUS Isaías Ash  mL/hr at 04/20/20 1530 125 mL/hr at 04/20/20 1530    glucagon (GLUCAGEN) injection    PRN Antonio Gill MD   0.5 mg at 04/20/20 1610    acetaminophen (TYLENOL) tablet 650 mg  650 mg Oral Q4H PRN Isaías Ash MD        naloxone [facility]) injection 0.4 mg  0.4 mg IntraVENous PRN Isaías Ash MD        diphenhydrAMINE (BENADRYL) injection 12.5 mg  12.5 mg IntraVENous Q4H PRN Isaías Ash MD        ondansetron [facility]) injection 4 mg  4 mg IntraVENous Q4H PRN Isaías Ash MD   4 mg at 04/20/20 2051    [Held by provider] metoprolol succinate (TOPROL-XL) XL tablet 50 mg  50 mg Oral DAILY Isaías Ash MD   50 mg at 04/21/20 1027    atorvastatin (LIPITOR) tablet 20 mg  20 mg Oral DAILY Isaías Ash MD   20 mg at 04/22/20 5133    insulin lispro (HUMALOG) injection   SubCUTAneous AC&HS Isaías Ash MD   2 Units at 04/22/20 6930    glucose chewable tablet 16 g  4 Tab Oral PRN Isaías Ash MD        glucagon [facility]) injection 1 mg  1 mg IntraMUSCular PRN Isaías Ash MD        dextrose 10% infusion 125-250 mL  125-250 mL IntraVENous PRN Isaías Ash MD        ciprofloxacin (CIPRO) 400 mg in D5W IVPB (premix)  400 mg IntraVENous Q12H Isaías Ash  mL/hr at 04/22/20 0534 400 mg at 04/22/20 0534    metroNIDAZOLE (FLAGYL) IVPB premix 500 mg  500 mg IntraVENous Q8H Roly Watson  mL/hr at 04/22/20 0903 500 mg at 04/22/20 9718    EPINEPHrine (ADRENALIN) 0.1 mg/mL syringe    PRN Olamide Cheney MD   6 mg at 04/20/20 1630    polyethylene glycol (MIRALAX) packet 17 g  17 g Oral DAILY Olamide Cheney MD   Stopped at 04/21/20 0900       Review of Systems:   Constitutional:   Fever: Negative, Chills: Negative, Weight Loss: Negative, Malaise/Fatigue: Negative   Diaphoresis: Negative,  Weakness: Negative  Skin:   Rash: Negative,  Itching: Negative  HENT:   Headache:Negative, Hearing Loss: Negative, Tinnitus: Negative, Ear Pain: Negative   Nosebleeds: Negative, Congestion: Negative, Stridor: Negative,   Sore Throat: Negative  Eyes:    Blurred Vision: Negative, Double Vision: Negative, Photophobia: Negative   Eye Pain: Negative, Eye Discharge: Negative, Eye Redness: Negative  Cardiovascular:    Chest Pain: Negative, Palpitations: Negative, Orthopnea: Negative   Claudication: Negative, Leg Swelling: Negative PND: Negative  Respiratory:   Cough: Negative, Hemoptysis: Negative, Sputum Production: Negative   Shortness of Breath: Negative, Wheezing: Negative  Gastrointestinal:   Heartburn: Negative, Nausea: Negative, Vomiting: Negative   Abdominal Pain: Negative, Diarrhea: Negative, Constipation: Negative   Blood in Stool: Negative, Melena: Negative   Genitourinary:    Dysuria: Negative, Urgency: Negative, Frequency: Negative   Hematuria: Negative, Flank Pain: Negative  Musculoskeletal:    Myalgias: Negative, Neck Pain: Negative, Back Pain: Negative   Joint Pain: Negative, Falls: Negative  Endo/Heme/Allergies:    Easy Bruise/Blood: Negative, Env. Allergies: Negative, Polydipsia: Negative   Neurological:    Dizziness: Negative, Tingling: Negative. Tremor: Negative,    Sensory Change: Negative.  Speech Change: Negative, Focal Weakness: Negative     Seizures: Negative, LOC: Negative  Psychiatric:   Depression: Negative, Suicidal Ideas: Negative, Substance Abuse: Negative   Hallucinations: Negative, Nervous/Anxious: Negative   Insomnia: Negative, Memory Loss: Negative     Visit Vitals  /62 (BP 1 Location: Left arm, BP Patient Position: At rest)   Pulse 87   Temp 98.5 °F (36.9 °C)   Resp 16   Ht 5' 6\" (1.676 m)   Wt 68.5 kg (151 lb)   SpO2 100%   BMI 24.37 kg/m²       Temp (24hrs), Av.9 °F (37.2 °C), Min:97.8 °F (36.6 °C), Max:99.8 °F (37.7 °C)      gen no acute distress, laying in bed  heent anicteric, no oral lesions  lym no cervical supraclavicualr adenopathy  abd nontender, bs+  Per warm no upper/lower extremity edema  msk no sarcopenia  Derm no rash  Neuro conversant, moving extremities  msk normal muscle tone, gait    Labs:  Recent Labs     20  0629 20  00520  1505  20  0215  20  0936   WBC  --  7.8  --   --  7.2  --  9.8   RBC  --  2.34*  --   --  1.91*  --  3.32*   HCT 19.8* 19.6* 19.3*   < > 16.4*   < > 28.4*   MCV  --  83.8  --   --  85.9  --  85.5   MCH  --  29.5  --   --  29.8  --  29.5   MCHC  --  35.2  --   --  34.8  --  34.5   RDW  --  14.8*  --   --  14.7*  --  14.5    < > = values in this interval not displayed. Recent Labs     20  00520  0215 20  0936   CO2 26 24 27   BUN 32* 31* 28*     Reviewed all old chart history / labs / imaging, summarized above    No results for input(s): ALBUMIN in the last 72 hours.     No lab exists for component: DONNA Otero    @[email or web address]    [facility] MD CARLOS Dillon  [facility]

## 2020-04-22 NOTE — PROGRESS NOTES
Problem: Upper and Lower GI Bleed: Day 1  Goal: Activity/Safety  Outcome: Progressing Towards Goal  Goal: Consults, if ordered  Outcome: Progressing Towards Goal  Goal: Diagnostic Test/Procedures  Outcome: Progressing Towards Goal  Goal: Nutrition/Diet  Outcome: Progressing Towards Goal  Goal: Discharge Planning  Outcome: Progressing Towards Goal  Goal: Medications  Outcome: Progressing Towards Goal  Goal: Respiratory  Outcome: Progressing Towards Goal  Goal: Treatments/Interventions/Procedures  Outcome: Progressing Towards Goal  Goal: Psychosocial  Outcome: Progressing Towards Goal  Goal: *Optimal pain control at patient's stated goal  Outcome: Progressing Towards Goal  Goal: *Hemodynamically stable  Outcome: Progressing Towards Goal  Goal: *Demonstrates progressive activity  Outcome: Progressing Towards Goal     Problem: Upper and Lower GI Bleed: Day 2  Goal: Off Pathway (Use only if patient is Off Pathway)  Outcome: Progressing Towards Goal  Goal: Activity/Safety  Outcome: Progressing Towards Goal  Goal: Consults, if ordered  Outcome: Progressing Towards Goal  Goal: Diagnostic Test/Procedures  Outcome: Progressing Towards Goal  Goal: Nutrition/Diet  Outcome: Progressing Towards Goal  Goal: Discharge Planning  Outcome: Progressing Towards Goal  Goal: Medications  Outcome: Progressing Towards Goal  Goal: Respiratory  Outcome: Progressing Towards Goal  Goal: Treatments/Interventions/Procedures  Outcome: Progressing Towards Goal  Goal: Psychosocial  Outcome: Progressing Towards Goal  Goal: *Optimal pain control at patient's stated goal  Outcome: Progressing Towards Goal  Goal: *Hemodynamically stable  Outcome: Progressing Towards Goal  Goal: *Tolerating diet  Outcome: Progressing Towards Goal  Goal: *Demonstrates progressive activity  Outcome: Progressing Towards Goal     Problem: Upper and Lower GI Bleed: Day 3  Goal: Off Pathway (Use only if patient is Off Pathway)  Outcome: Progressing Towards Goal  Goal: Activity/Safety  Outcome: Progressing Towards Goal  Goal: Diagnostic Test/Procedures  Outcome: Progressing Towards Goal  Goal: Nutrition/Diet  Outcome: Progressing Towards Goal  Goal: Discharge Planning  Outcome: Progressing Towards Goal  Goal: Medications  Outcome: Progressing Towards Goal  Goal: Treatments/Interventions/Procedures  Outcome: Progressing Towards Goal  Goal: Psychosocial  Outcome: Progressing Towards Goal     Problem: Upper and Lower GI Bleed:  Discharge Outcomes  Goal: *Hemodynamically stable  Outcome: Progressing Towards Goal  Goal: *Lungs clear or at baseline  Outcome: Progressing Towards Goal  Goal: *Demonstrates independent activity or return to baseline  Outcome: Progressing Towards Goal  Goal: *Pain is controlled to three or less  Outcome: Progressing Towards Goal  Goal: *Verbalizes understanding and describes prescribed diet  Outcome: Progressing Towards Goal  Goal: *Tolerating diet  Outcome: Progressing Towards Goal  Goal: *Verbalizes name, dosage, time, side effects, and number of days to continue medications  Outcome: Progressing Towards Goal  Goal: *Anxiety reduced or absent  Outcome: Progressing Towards Goal  Goal: *Understands and describes signs and symptoms to report to providers(Stroke Metric)  Outcome: Progressing Towards Goal  Goal: *Describes follow-up/return visits to physicians  Outcome: Progressing Towards Goal  Goal: *Describes available resources and support systems  Outcome: Progressing Towards Goal     Problem: Diabetes Self-Management  Goal: *Disease process and treatment process  Description: Define diabetes and identify own type of diabetes; list 3 options for treating diabetes. Outcome: Progressing Towards Goal  Goal: *Incorporating nutritional management into lifestyle  Description: Describe effect of type, amount and timing of food on blood glucose; list 3 methods for planning meals.   Outcome: Progressing Towards Goal  Goal: *Incorporating physical activity into lifestyle  Description: State effect of exercise on blood glucose levels. Outcome: Progressing Towards Goal  Goal: *Developing strategies to promote health/change behavior  Description: Define the ABC's of diabetes; identify appropriate screenings, schedule and personal plan for screenings. Outcome: Progressing Towards Goal  Goal: *Using medications safely  Description: State effect of diabetes medications on diabetes; name diabetes medication taking, action and side effects. Outcome: Progressing Towards Goal  Goal: *Monitoring blood glucose, interpreting and using results  Description: Identify recommended blood glucose targets  and personal targets. Outcome: Progressing Towards Goal  Goal: *Prevention, detection, treatment of acute complications  Description: List symptoms of hyper- and hypoglycemia; describe how to treat low blood sugar and actions for lowering  high blood glucose level. Outcome: Progressing Towards Goal  Goal: *Prevention, detection and treatment of chronic complications  Description: Define the natural course of diabetes and describe the relationship of blood glucose levels to long term complications of diabetes. Outcome: Progressing Towards Goal  Goal: *Developing strategies to address psychosocial issues  Description: Describe feelings about living with diabetes; identify support needed and support network  Outcome: Progressing Towards Goal  Goal: *Insulin pump training  Outcome: Progressing Towards Goal  Goal: *Sick day guidelines  Outcome: Progressing Towards Goal  Goal: *Patient Specific Goal (EDIT GOAL, INSERT TEXT)  Outcome: Progressing Towards Goal     Problem: Patient Education: Go to Patient Education Activity  Goal: Patient/Family Education  Outcome: Progressing Towards Goal     Problem: Falls - Risk of  Goal: *Absence of Falls  Description: Document Harlan Fall Risk and appropriate interventions in the flowsheet.   Outcome: Progressing Towards Goal  Note: Fall Risk Interventions:  Mobility Interventions: Bed/chair exit alarm, Communicate number of staff needed for ambulation/transfer, OT consult for ADLs, Patient to call before getting OOB, PT Consult for mobility concerns, PT Consult for assist device competence, Strengthening exercises (ROM-active/passive), Utilize walker, cane, or other assistive device         Medication Interventions: Bed/chair exit alarm, Patient to call before getting OOB, Teach patient to arise slowly, Evaluate medications/consider consulting pharmacy    Elimination Interventions: Bed/chair exit alarm, Call light in reach, Elevated toilet seat, Patient to call for help with toileting needs, Stay With Me (per policy), Toilet paper/wipes in reach, Toileting schedule/hourly rounds, Urinal in reach              Problem: Patient Education: Go to Patient Education Activity  Goal: Patient/Family Education  Outcome: Progressing Towards Goal     Problem: Patient Education: Go to Patient Education Activity  Goal: Patient/Family Education  Outcome: Progressing Towards Goal     Problem: Pressure Injury - Risk of  Goal: *Prevention of pressure injury  Description: Document Thomas Scale and appropriate interventions in the flowsheet. Outcome: Progressing Towards Goal  Note: Pressure Injury Interventions:  Sensory Interventions: Assess changes in LOC, Assess need for specialty bed, Avoid rigorous massage over bony prominences, Chair cushion, Check visual cues for pain, Discuss PT/OT consult with provider, Float heels, Keep linens dry and wrinkle-free, Maintain/enhance activity level, Minimize linen layers, Monitor skin under medical devices, Pad between skin to skin, Pressure redistribution bed/mattress (bed type), Turn and reposition approx.  every two hours (pillows and wedges if needed)    Moisture Interventions: Absorbent underpads, Assess need for specialty bed, Check for incontinence Q2 hours and as needed, Limit adult briefs, Maintain skin hydration (lotion/cream), Minimize layers, Offer toileting Q_hr    Activity Interventions: Assess need for specialty bed, Chair cushion, Increase time out of bed, Pressure redistribution bed/mattress(bed type), PT/OT evaluation    Mobility Interventions: Assess need for specialty bed, Chair cushion, Float heels, HOB 30 degrees or less, Pressure redistribution bed/mattress (bed type), PT/OT evaluation, Turn and reposition approx.  every two hours(pillow and wedges)    Nutrition Interventions: Document food/fluid/supplement intake, Discuss nutritional consult with provider, Offer support with meals,snacks and hydration    Friction and Shear Interventions: Feet elevated on foot rest, Foam dressings/transparent film/skin sealants, Lift sheet, Lift team/patient mobility team, Minimize layers, Transferring/repositioning devices                Problem: Patient Education: Go to Patient Education Activity  Goal: Patient/Family Education  Outcome: Progressing Towards Goal

## 2020-04-22 NOTE — CONSULTS
Consult Note    Patient: Yoana Cho MRN: [id number]  CSN: [id number]    YOB: 1945  Age: 76 y.o. Sex: male    DOA: 4/20/2020 LOS:  LOS: 1 day        Requesting Physician: Dr. Noni Ray  Reason for Consultation: GI bleed               HPI:     Yoana Cho is a 76 y.o. male who has been seen for GI bleed    Past Medical History:   Diagnosis Date    Diabetes ([facility]     DVT of deep femoral vein ([facility] [address]     Foot abscess     Hypertension        Past Surgical History:   Procedure Laterality Date    COLONOSCOPY N/A 4/20/2020    COLONOSCOPY; SPOT INJECTION; performed by Warner North MD at THE [facility] ENDOSCOPY    HX ORTHOPAEDIC      toe amputation    IR THROMBOLYSIS TRANSCATH NON CORONARY OR INTRACRANIAL  1/23/2020       History reviewed. No pertinent family history. Social History     Socioeconomic History    Marital status: SINGLE     Spouse name: Not on file    Number of children: Not on file    Years of education: Not on file    Highest education level: Not on file   Tobacco Use    Smoking status: Never Smoker    Smokeless tobacco: Never Used   Substance and Sexual Activity    Alcohol use: No    Drug use: No       Prior to Admission medications    Medication Sig Start Date End Date Taking? Authorizing Provider   metoprolol succinate (TOPROL-XL) 50 mg XL tablet Take 1 Tab by mouth daily. 1/30/20   Angelito Romero MD   insulin glargine (LANTUS) 100 unit/mL injection 5 Units by SubCUTAneous route daily. 1/28/20   Crystal Harris MD   QUEtiapine (SEROQUEL) 25 mg tablet Take 1 Tab by mouth nightly. 1/28/20   Crystal Harris MD   enoxaparin (LOVENOX) 80 mg/0.8 mL injection 120 mg by SubCUTAneous route daily. 1/28/20   Crystal Harris MD   atorvastatin (LIPITOR) 20 mg tablet Take 20 mg by mouth daily. Mishel Barlow MD   aspirin 81 mg chewable tablet Take 81 mg by mouth daily. Mishel Barlow MD   polyethylene glycol (MIRALAX) 17 gram/dose powder Take 17 g by mouth daily.  1 tablespoon with 8 oz of water daily 12/10/19   Scott Johnson MD   LOSARTAN PO Take 100 mg by mouth. Other, MD Mishel       No Known Allergies    Review of Systems  Review of systems not obtained due to patient factors. Physical Exam:      Visit Vitals  /58   Pulse 88   Temp 98.7 °F (37.1 °C)   Resp 18   Ht 5' 6\" (1.676 m)   Wt 68.5 kg (151 lb)   SpO2 100%   BMI 24.37 kg/m²         Physical Exam:    GENERAL: alert, cooperative, no distress, appears stated age, EYE: negative, LYMPH NODES: Cervical, supraclavicular, and axillary nodes normal. , THROAT & NECK: normal and no erythema or exudates noted. , LUNG: clear to auscultation bilaterally, HEART: regular rate and rhythm, ABDOMEN: soft, non-tender.  Bowel sounds normal. No masses,  no organomegaly, EXTREMITIES:  extremities normal, atraumatic, no cyanosis or edema, SKIN: no rash or abnormalities, NEUROLOGIC: negative, PSYCH: non focal    Labs Reviewed:  BMP:   Lab Results   Component Value Date/Time     04/21/2020 02:15 AM    K 4.0 04/21/2020 02:15 AM     04/21/2020 02:15 AM    CO2 24 04/21/2020 02:15 AM    AGAP 7 04/21/2020 02:15 AM     (H) 04/21/2020 02:15 AM    BUN 31 (H) 04/21/2020 02:15 AM    CREA 1.21 04/21/2020 02:15 AM    GFRAA >60 04/21/2020 02:15 AM    GFRNA 59 (L) 04/21/2020 02:15 AM     CBC:   Lab Results   Component Value Date/Time    WBC 7.2 04/21/2020 02:15 AM    HGB 6.7 (L) 04/21/2020 03:05 PM    HCT 19.3 (L) 04/21/2020 03:05 PM     (L) 04/21/2020 02:15 AM       Labs: Results:       Chemistry Recent Labs     04/21/20  0215 04/20/20  0936   * 285*    139   K 4.0 4.3    106   CO2 24 27   BUN 31* 28*   CREA 1.21 1.26   CA 7.8* 8.7   AGAP 7 6   BUCR 26* 22*   AP  --  94   TP  --  7.3   ALB  --  3.0*   GLOB  --  4.3*   AGRAT  --  0.7*      CBC w/Diff Recent Labs     04/21/20  1505 04/21/20  0950 04/21/20  0215  04/20/20  0936   WBC  --   --  7.2  --  9.8   RBC  --   --  1.91*  --  3.32*   HGB 6.7* 6.4* 5.7*   < > 9.8* HCT 19.3* 18.6* 16.4*   < > 28.4*   PLT  --   --  132*  --  229   GRANS  --   --  54  --  41*   LYMPH  --   --  25  --  21   EOS  --   --  13*  --  33*    < > = values in this interval not displayed. Cardiac Enzymes No results for input(s): CPK, CKND1, KAREN in the last 72 hours. No lab exists for component: CKRMB, TROIP   Coagulation Recent Labs     04/21/20  0215 04/20/20  0936   PTP 20.1* 33.2*   INR 1.7* 3.3*       Lipid Panel No results found for: CHOL, CHOLPOCT, CHOLX, CHLST, CHOLV, 976897, HDL, HDLP, LDL, LDLC, DLDLP, 780304, VLDLC, VLDL, TGLX, TRIGL, TRIGP, TGLPOCT, CHHD, CHHDX   BNP No results for input(s): BNPP in the last 72 hours. Liver Enzymes Recent Labs     04/20/20  0936   TP 7.3   ALB 3.0*   AP 94   SGOT 17      Thyroid Studies Lab Results   Component Value Date/Time    TSH 1.86 01/19/2020 06:14 AM            Imaging:  images and reports reviewed and reviewed  arteriogram, Consultants documentation, Nursing documentation and I & O      Assessment/Plan     Patient Active Problem List   Diagnosis Code    Stage 3 chronic kidney disease ([facility] [address] N18.3    Type 2 diabetes mellitus, with long-term current use of insulin ([address] E11.9, Z79.4    Hypertension I10    Hypokalemia E87.6    Foot abscess, right L02.611    Diabetic ulcer of right midfoot associated with type 2 diabetes mellitus, with fat layer exposed ([address] G80.213, L97.412    PAD (peripheral artery disease) ([address] I73.9    Osteomyelitis of right foot ([address] M86.9    Acute deep vein thrombosis (DVT) ([address] I82.409    DVT (deep venous thrombosis) (HCC) I82.409    Anemia D64.9    Lung infiltrate R91.8    Hematoma T14. 8XXA    Lower GI bleed K92.2    Supratherapeutic INR R79.1    Anemia due to acute blood loss D62       Pt underwent angiography today with IR and bleeding was identified in a sigmoid artery which was embolized. The patient is resting comfortably without abdominal pain or bloody BMs.   No surgical indication at this time.  Call if clinical picture worsens.     Deleta Moors, DO

## 2020-04-22 NOTE — PROGRESS NOTES
Problem: Mobility Impaired (Adult and Pediatric)  Goal: *Acute Goals and Plan of Care (Insert Text)  Description: Physical Therapy Goals   Initiated 4/22/2020 and to be accomplished within 5-7 day(s)  1. Patient will move from supine <> sit with S in prep for out of bed activity and change of position. 2.  Patient will perform sit<> stand with S with LRAD in prep for transfers/ambulation. 3.  Patient will transfer from bed <> chair with S with LRAD for time up in chair for completion of ADL activity. 4.  Patient will ambulate 150 feet with LRAD/S for improved functional mobility/safe discharge. 5.  Patient will ascend/descend 3-5 stairs with handrail with minimal assistance/contact guard assist for home re-entry as needed. Outcome: Progressing Towards Goal   PHYSICAL THERAPY EVALUATION    Patient: Joseph Lewis (89 y.o. male)  Date: 4/22/2020  Primary Diagnosis: Lower GI bleed [K92.2]  Procedure(s) (LRB):  COLONOSCOPY; SPOT INJECTION; (N/A)  ENDOSCOPIC BANDING OR LIGATION (N/A) 2 Days Post-Op   Precautions: Fall    ASSESSMENT :  Based on the objective data described below, the patient presents with decrease independence w/ bed mobility, transfers, gait, and step negotiation. Pt seen in supine prior to session w/ IV connected. Pt reported no pain. Pt primarily speaks Icelandic but can speak some english but very minimal. Zone phone used to assist w/ translating for pt which required additional time. Per pt, pt uses a RW to assist w/ mobility but request to use a SPC because he thinks he can get around the community better. Pt able to stand w/ RW and ambulate into the hallway and demonstrates decrease esme, decrease stride length, and decrease step clearance. At this time pt demonstrates increase stability with RW, and recommended pt continue using the RW at this time. Pt transferred back to room where pt was left in supine after session, call bell and tray in reach, nurse notified after session. Patient will benefit from skilled intervention to address the above impairments. Patients rehabilitation potential is considered to be Good  Factors which may influence rehabilitation potential include:   []         None noted  []         Mental ability/status  [x]         Medical condition  [x]         Home/family situation and support systems  [x]         Safety awareness  []         Pain tolerance/management  []         Other:      PLAN :  Recommendations and Planned Interventions:  [x]           Bed Mobility Training             [x]    Neuromuscular Re-Education  [x]           Transfer Training                   []    Orthotic/Prosthetic Training  [x]           Gait Training                          []    Modalities  [x]           Therapeutic Exercises          []    Edema Management/Control  [x]           Therapeutic Activities            [x]    Patient and Family Training/Education  []           Other (comment):    Frequency/Duration: Patient will be followed by physical therapy 1-2 times per day to address goals. Discharge Recommendations: Home Health  Further Equipment Recommendations for Discharge: N/A     SUBJECTIVE:   Patient stated I move pretty okay.     OBJECTIVE DATA SUMMARY:     Past Medical History:   Diagnosis Date    Diabetes ([facility] [address]     DVT of deep femoral vein (HCC)     Foot abscess     Hypertension      Past Surgical History:   Procedure Laterality Date    COLONOSCOPY N/A 4/20/2020    COLONOSCOPY; SPOT INJECTION; performed by Ace Aldridge MD at THE [facility] ENDOSCOPY    HX ORTHOPAEDIC      toe amputation    IR THROMBOLYSIS TRANSCATH NON CORONARY OR INTRACRANIAL  1/23/2020     Barriers to Learning/Limitations: yes;  physical  Compensate with: Verbal Cues and Tactile Cues  Prior Level of Function/Home Situation:   Home Situation  Home Environment: Private residence  One/Two Story Residence: Two story  Living Alone: No  Support Systems: Child(shahla)  Patient Expects to be Discharged to[de-identified] Private residence  Current DME Used/Available at Home: [place] Behavior:  Neurologic State: Appropriate for age  Orientation Level: Appropriate for age  Cognition: Appropriate decision making; Appropriate for age attention/concentration; Appropriate safety awareness; Follows commands  Psychosocial  Patient Behaviors: Calm  Needs Expressed: Cultural;Emotional;Educational;Nutritional;Spiritual;Transportation  Purposeful Interaction: Yes  Pt Identified Daily Priority: Clinical issues (comment)  Caritas Process: Nurture loving kindness;Establish trust;Enable chloe/hope;Nurture spiritual self;Teaching/learning; Attend basic human needs;Create healing environment;Supportive expression;Creative use of self  Caring Interventions: Reassure; Therapeutic modalities  Reassure: Therapeutic listening; Informing; Acceptance; Instilling chloe and hope;Caring rounds;Quiet presence;Story tellings; Sit with patient  Therapeutic Modalities: Deep breathing; Intentional therapeutic touch;Humor  Skin Condition/Temp: Warm;Dry  Skin Integumentary  Skin Color: Appropriate for ethnicity  Skin Condition/Temp: Warm;Dry  Strength:    Strength: Generally decreased, functional  Tone & Sensation:   Tone: Normal  Sensation: Intact  Range Of Motion:  AROM: Generally decreased, functional  Functional Mobility:  Bed Mobility:  Supine to Sit: Stand-by assistance  Sit to Supine: Stand-by assistance  Scooting: Stand-by assistance  Transfers:  Sit to Stand: Stand-by assistance;Contact guard assistance  Stand to Sit: Stand-by assistance;Contact guard assistance  Balance:   Sitting: Intact  Standing: Intact; With support  Ambulation/Gait Training:  Distance (ft): 50 Feet (ft)  Assistive Device: Gait belt;Walker, rolling  Ambulation - Level of Assistance: Contact guard assistance  Gait Description (WDL): Exceptions to WDL  Gait Abnormalities: Decreased step clearance  Speed/Johnna: Slow  Step Length: Left shortened;Right shortened  Swing Pattern: Left asymmetrical;Right asymmetrical  Pain:  Pain Scale 1: Numeric (0 - 10)  Pain Intensity 1: 0  Activity Tolerance:   Fair  Please refer to the flowsheet for vital signs taken during this treatment. After treatment:   []         Patient left in no apparent distress sitting up in chair  [x]         Patient left in no apparent distress in bed  [x]         Call bell left within reach  [x]         Nursing notified  []         Caregiver present  []         Bed alarm activated    COMMUNICATION/EDUCATION:   [x]         Fall prevention education was provided and the patient/caregiver indicated understanding. [x]         Patient/family have participated as able in goal setting and plan of care. [x]         Patient/family agree to work toward stated goals and plan of care. []         Patient understands intent and goals of therapy, but is neutral about his/her participation. []         Patient is unable to participate in goal setting and plan of care.     Thank you for this referral.  Rey Garcia, PT   Time Calculation: 46 mins   Eval Complexity: History: HIGH Complexity :3+ comorbidities / personal factors will impact the outcome/ POC Exam:LOW Complexity : 1-2 Standardized tests and measures addressing body structure, function, activity limitation and / or participation in recreation  Presentation: LOW Complexity : Stable, uncomplicated  Clinical Decision Making:Low Complexity ambulate >30ft  Overall Complexity:LOW

## 2020-04-22 NOTE — PROGRESS NOTES
Hospitalist Progress Note-critical care note     Patient: Merissa Ray MRN: [id number]  CSN: [id number]    YOB: 1945  Age: 76 y.o. Sex: male    DOA: 4/20/2020 LOS:  LOS: 2 days            Chief complaint: gi bleeding , anemia,     Assessment/Plan         Hospital Problems  Date Reviewed: 4/21/2020          Codes Class Noted POA    Anemia due to acute blood loss ICD-10-CM: D62  ICD-9-CM: 285.1  4/21/2020 Unknown        * (Principal) Lower GI bleed ICD-10-CM: K92.2  ICD-9-CM: 578.9  4/20/2020 Unknown        Supratherapeutic INR ICD-10-CM: R79.1  ICD-9-CM: 790.92  4/20/2020 Unknown        DV[facility] thrombosis) [facility] ICD-10-CM: I82.409  ICD-9-CM: 453.40  1/18/2020 Yes        Type 2 diabetes mellitus, with long-term current use of insulin (HCC) ICD-10-CM: E11.9, Z79.4  ICD-9-CM: 250.00, V58.67  1/3/2020 Yes        Hypertension ICD-10-CM: I10  ICD-9-CM: 401.9  1/3/2020 Yes              Lower GI bleeding  Look like stopped per embolization -watch the rebleeding   Colonoscopy done last night, epi administrated,   Blood transfusion as needed    dr. Lai Cagle f/u   H/h stable       supra therapeutic inr   Received vit K. And ffp      Anemia acute bleeding, so far stable    \H/h monitoring , ns infusion   Received iron per hemo       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 Piper Bhakta on board -hold ac on d/c -f/u as out-pt   No dvt from ultrasound       HTN    bp so far is good      DM2 -better controlled   Lantus and  sliding scale insulin,    Subjective: no bleeding, feel good     Will start diet, pt/ot. Watch for rebleeding      Disposition :1-2 days   Review of systems:    General: No fevers or chills. Cardiovascular: No chest pain or pressure. No palpitations. Pulmonary: No shortness of breath. Gastrointestinal: No nausea, vomiting.  Bleeding     Vital signs/Intake and Output:  Visit Vitals  /59 (BP 1 Location: Left arm, BP Patient Position: Supine)   Pulse 80 Temp 98.4 °F (36.9 °C)   Resp 16   Ht 5' 6\" (1.676 m)   Wt 68.5 kg (151 lb)   SpO2 100%   BMI 24.37 kg/m²     Current Shift:  04/22 0701 - 04/22 1900  In: 338 [P.O.:238; I.V.:100]  Out: 650 [Urine:650]  Last three shifts:  04/20 1901 - 04/22 0700  In: 1796.8 [I.V.:100]  Out: 2300 [Urine:2150]    Physical Exam:  General: WD, WN. Alert, cooperative, no acute distress    HEENT: NC, Atraumatic. PERRLA, anicteric sclerae. Lungs: CTA Bilaterally. No Wheezing/Rhonchi/Rales. Heart:  Regular  rhythm,  No murmur, No Rubs, No Gallops  Abdomen: Soft, Non distended, Non tender. +Bowel sounds,   Extremities: No c/c/e  Psych:   Not anxious or agitated. Neurologic:  No acute neurological deficit. Labs: Results:       Chemistry Recent Labs     04/22/20 0051 04/21/20 0215 04/20/20  0936   * 182* 285*    141 139   K 4.0 4.0 4.3   * 110 106   CO2 26 24 27   BUN 32* 31* 28*   CREA 1.24 1.21 1.26   CA 7.7* 7.8* 8.7   AGAP 4 7 6   BUCR 26* 26* 22*   AP  --   --  94   TP  --   --  7.3   ALB  --   --  3.0*   GLOB  --   --  4.3*   AGRAT  --   --  0.7*      CBC w/Diff Recent Labs     04/22/20  1205 04/22/20  0629 04/22/20  0051 04/21/20 0215 04/20/20  0936   WBC  --   --  7.8  --  7.2  --  9.8   RBC  --   --  2.34*  --  1.91*  --  3.32*   HGB 7.0* 7.0* 6.9*   < > 5.7*   < > 9.8*   HCT 19.8* 19.8* 19.6*   < > 16.4*   < > 28.4*   PLT  --   --  105*  --  132*  --  229   GRANS  --   --  42  --  54  --  41*   LYMPH  --   --  27  --  25  --  21   EOS  --   --  19*  --  13*  --  33*    < > = values in this interval not displayed. Cardiac Enzymes No results for input(s): CPK, CKND1, KAREN in the last 72 hours.     No lab exists for component: CKRMB, TROIP   Coagulation Recent Labs     04/22/20  0051 04/21/20  0215   PTP 17.7* 20.1*   INR 1.5* 1.7*       Lipid Panel No results found for: CHOL, CHOLPOCT, CHOLX, CHLST, CHOLV, 832054, HDL, HDLP, LDL, LDLC, DLDLP, 822886, VLDLC, VLDL, TGLX, TRIGL, TRIGP, TGLPOCT, CHHD, CHHDX   BNP No results for input(s): BNPP in the last 72 hours. Liver Enzymes Recent Labs     04/20/20  0936   TP 7.3   ALB 3.0*   AP 94   SGOT 17      Thyroid Studies Lab Results   Component Value Date/Time    TSH 1.86 01/19/2020 06:14 AM        Procedures/imaging: see electronic medical records for all procedures/Xrays and details which were not copied into this note but were reviewed prior to creation of Plan    Cta Abdomen Pelv W Cont    Result Date: 4/20/2020  EXAM:  CT ARTERIOGRAM ABDOMEN AND PELVIS CLINICAL INDICATION/HISTORY: BRBPR since today on coumadin   > Additional: None. COMPARISON: CT of the abdomen pelvis dated January 28, 2020   > Reference Exam: None. TECHNIQUE:   Pre-contrast imaging was performed. Subsequent CT angiogram of the abdomen, and pelvis. Thin sagittal and coronal MPR and MIP reconstructions were performed. Extensive separate workstation post processing was performed by [employer] [facility] to include MIPs, color surface images, 3-D reconstructions, and curve planar reformat imaging. One or more dose reduction techniques were used on this CT: automated exposure control, adjustment of the mAs and/or kVp according to patient size, and iterative reconstruction techniques. The specific techniques used on this CT exam have been documented in the patient's electronic medical record. Digital Imaging and Communications in Medicine (DICOM) format image data are available to nonaffiliated external healthcare facilities or entities on a secure, media free, reciprocally searchable basis with patient authorization for at least a 12-month period after this study. _______________ FINDINGS: --- CT ANGIOGRAM --- Noncontrast imaging through the abdomen and pelvis demonstrate scattered colonic diverticulosis with areas of increased attenuation within the lumen of the diverticula predominantly in the distal descending and proximal sigmoid colon.  These areas persist on the arterial phase imaging with a new blush of contrast present in the proximal sigmoid colon on axial series images 129-134 which then fades and becomes less apparent on the delayed imaging. Noncontrast imaging also demonstrates scattered aortic atherosclerosis. Normal caliber aorta without abnormal increased attenuation along the wall to suggest intramural hematoma. Postcontrast arterial phase imaging through the aorta demonstrates normal enhancement without evidence of dissection or other filling defect. --- CT ABDOMEN/PELVIS --- LOWER CHEST: Unremarkable. LIVER, BILIARY: Liver is normal. No biliary dilation. Gallbladder is unremarkable. PANCREAS: Normal. SPLEEN: Normal. ADRENALS: Normal. KIDNEYS: Normal. LYMPH NODES: No enlarged lymph nodes. GASTROINTESTINAL TRACT: Scattered diverticulosis as described above. In addition to the area of active bleed described there is mild wall thickening of the proximal sigmoid colon which could represent active inflammation or related to the bleeding. PELVIC ORGANS: Unremarkable. BONES: No acute or aggressive osseous abnormalities identified. OTHER: No ascites. Calcified subcutaneous granuloma in the left medial gluteal soft tissues noted, unchanged. _______________     IMPRESSION: 1. Diverticulosis with evidence of active GI bleeding in the proximal sigmoid colon. Cannot exclude a component of active diverticulitis. CRITICAL RESULT:  These critical results were directly communicated to Bladimir Renner on 4/20/2020 1:36 PM.  Results understood and acknowledged.        Shey Laws MD

## 2020-04-22 NOTE — PROGRESS NOTES
Surgery Progress Note    Patient: Kolton Lockett MRN: [id number]  CSN: [id number]    YOB: 1945  Age: 76 y.o. Sex: male    DOA: 4/20/2020 LOS:  LOS: 2 days          Chief Complaint:          Assessment/Plan     Diet ok. Stable. No surgical indication at this time. Patient Active Problem List   Diagnosis Code    Stage 3 chronic kidney disease ([facility].) N18.3    Type 2 diabetes mellitus, with long-term current use of insulin ([facility] E11.9, Z79.4    Hypertension I10    Hypokalemia E87.6    Foot abscess, right L02.611    Diabetic ulcer of right midfoot associated with type 2 diabetes mellitus, with fat layer exposed ([facility] S08.262, L97.412    PAD (peripheral artery disease) ([facility]) I73.9    Osteomyelitis of right foot ([facility]) M86.9    Acute deep vein thrombosis (DVT) ([facility]) I82.409    DVT (deep venous thrombosis) ([facility]) I82.409    Anemia D64.9    Lung infiltrate R91.8    Hematoma T14. 8XXA    Lower GI bleed K92.2    Supratherapeutic INR R79.1    Anemia due to acute blood loss D62       Subjective:    No bloody stools, no abd pain    Review of systems:    Constitutional: denies fevers, chills, myalgias  Respiratory: denies SOB, cough  Cardiovascular: denies chest pain, palpitations  Gastrointestinal: denies nausea, vomiting, diarrhea      Vital signs/Intake and Output:  Visit Vitals  /59 (BP 1 Location: Left arm, BP Patient Position: Supine)   Pulse 80   Temp 98.4 °F (36.9 °C)   Resp 16   Ht 5' 6\" (1.676 m)   Wt 68.5 kg (151 lb)   SpO2 100%   BMI 24.37 kg/m²     Current Shift:  04/22 0701 - 04/22 1900  In: 338 [P.O.:238;  I.V.:100]  Out: 650 [Urine:650]  Last three shifts:  04/20 1901 - 04/22 0700  In: 1796.8 [I.V.:100]  Out: 2300 [Urine:2150]    Exam:    General: Well developed, alert, NAD, OX3  Head/Neck: NCAT, supple, No masses, No lymphadenopathy  CVS:Regular rate and rhythm,   Lungs:Clear to auscultation bilaterally, no wheezes,  Abdomen: Soft, Nontender, No distention, Normal Bowel sounds, No hepatomegaly  Extremities: No C/C/E, pulses palpable 2+  Skin:normal texture and turgor, no rashes, no lesions  Neuro:grossly normal , follows commands  Psych:appropriate                Labs: Results:       Chemistry Recent Labs     04/22/20 0051 04/21/20 0215 04/20/20  0936   * 182* 285*    141 139   K 4.0 4.0 4.3   * 110 106   CO2 26 24 27   BUN 32* 31* 28*   CREA 1.24 1.21 1.26   CA 7.7* 7.8* 8.7   AGAP 4 7 6   BUCR 26* 26* 22*   AP  --   --  94   TP  --   --  7.3   ALB  --   --  3.0*   GLOB  --   --  4.3*   AGRAT  --   --  0.7*      CBC w/Diff Recent Labs     04/22/20  1205 04/22/20  0629 04/22/20 0051 04/21/20 0215 04/20/20  0936   WBC  --   --  7.8  --  7.2  --  9.8   RBC  --   --  2.34*  --  1.91*  --  3.32*   HGB 7.0* 7.0* 6.9*   < > 5.7*   < > 9.8*   HCT 19.8* 19.8* 19.6*   < > 16.4*   < > 28.4*   PLT  --   --  105*  --  132*  --  229   GRANS  --   --  42  --  54  --  41*   LYMPH  --   --  27  --  25  --  21   EOS  --   --  19*  --  13*  --  33*    < > = values in this interval not displayed. Cardiac Enzymes No results for input(s): CPK, CKND1, KAREN in the last 72 hours. No lab exists for component: CKRMB, TROIP   Coagulation Recent Labs     04/22/20 0051 04/21/20 0215   PTP 17.7* 20.1*   INR 1.5* 1.7*       Lipid Panel No results found for: CHOL, CHOLPOCT, CHOLX, CHLST, CHOLV, 594223, HDL, HDLP, LDL, LDLC, DLDLP, 938686, VLDLC, VLDL, TGLX, TRIGL, TRIGP, TGLPOCT, CHHD, CHHDX   BNP No results for input(s): BNPP in the last 72 hours.    Liver Enzymes Recent Labs     04/20/20  0936   TP 7.3   ALB 3.0*   AP 94   SGOT 17      Thyroid Studies Lab Results   Component Value Date/Time    TSH 1.86 01/19/2020 06:14 AM        Procedures/imaging: see electronic medical records for all procedures/Xrays and details which were not copied into this note but were reviewed prior to creation of [address] [place] [address]

## 2020-04-23 LAB
ANION GAP SERPL CALC-SCNC: 4 MMOL/L (ref 3–18)
BASOPHILS # BLD: 0 K/UL (ref 0–0.1)
BASOPHILS NFR BLD: 0 % (ref 0–3)
BUN SERPL-MCNC: 25 MG/DL (ref 7–18)
BUN/CREAT SERPL: 24 (ref 12–20)
CALCIUM SERPL-MCNC: 8 MG/DL (ref 8.5–10.1)
CHLORIDE SERPL-SCNC: 114 MMOL/L (ref 100–111)
CO2 SERPL-SCNC: 25 MMOL/L (ref 21–32)
CREAT SERPL-MCNC: 1.05 MG/DL (ref 0.6–1.3)
DIFFERENTIAL METHOD BLD: ABNORMAL
EOSINOPHIL # BLD: 2.1 K/UL (ref 0–0.4)
EOSINOPHIL NFR BLD: 26 % (ref 0–5)
ERYTHROCYTE [DISTWIDTH] IN BLOOD BY AUTOMATED COUNT: 14.3 % (ref 11.6–14.5)
GLUCOSE BLD STRIP.AUTO-MCNC: 118 MG/DL (ref 70–110)
GLUCOSE BLD STRIP.AUTO-MCNC: 186 MG/DL (ref 70–110)
GLUCOSE BLD STRIP.AUTO-MCNC: 218 MG/DL (ref 70–110)
GLUCOSE BLD STRIP.AUTO-MCNC: 88 MG/DL (ref 70–110)
GLUCOSE SERPL-MCNC: 91 MG/DL (ref 74–99)
HCT VFR BLD AUTO: 22.5 % (ref 36–48)
HGB BLD-MCNC: 7.9 G/DL (ref 13–16)
INR PPP: 1.4 (ref 0.8–1.2)
LYMPHOCYTES # BLD: 1.7 K/UL (ref 0.8–3.5)
LYMPHOCYTES NFR BLD: 21 % (ref 20–51)
MAGNESIUM SERPL-MCNC: 1.7 MG/DL (ref 1.6–2.6)
MCH RBC QN AUTO: 29.6 PG (ref 24–34)
MCHC RBC AUTO-ENTMCNC: 35.1 G/DL (ref 31–37)
MCV RBC AUTO: 84.3 FL (ref 74–97)
MONOCYTES # BLD: 0.6 K/UL (ref 0–1)
MONOCYTES NFR BLD: 8 % (ref 2–9)
NEUTS SEG # BLD: 3.5 K/UL (ref 1.8–8)
NEUTS SEG NFR BLD: 45 % (ref 42–75)
PLATELET # BLD AUTO: 129 K/UL (ref 135–420)
PMV BLD AUTO: 9 FL (ref 9.2–11.8)
POTASSIUM SERPL-SCNC: 4.1 MMOL/L (ref 3.5–5.5)
PROTHROMBIN TIME: 17 SEC (ref 11.5–15.2)
RBC # BLD AUTO: 2.67 M/UL (ref 4.7–5.5)
RBC MORPH BLD: ABNORMAL
RBC MORPH BLD: ABNORMAL
SODIUM SERPL-SCNC: 143 MMOL/L (ref 136–145)
WBC # BLD AUTO: 7.9 K/UL (ref 4.6–13.2)

## 2020-04-23 PROCEDURE — 74011250636 HC RX REV CODE- 250/636: Performed by: HOSPITALIST

## 2020-04-23 PROCEDURE — 85025 COMPLETE CBC W/AUTO DIFF WBC: CPT

## 2020-04-23 PROCEDURE — 97530 THERAPEUTIC ACTIVITIES: CPT

## 2020-04-23 PROCEDURE — 82962 GLUCOSE BLOOD TEST: CPT

## 2020-04-23 PROCEDURE — 36415 COLL VENOUS BLD VENIPUNCTURE: CPT

## 2020-04-23 PROCEDURE — 80048 BASIC METABOLIC PNL TOTAL CA: CPT

## 2020-04-23 PROCEDURE — 85610 PROTHROMBIN TIME: CPT

## 2020-04-23 PROCEDURE — 74011250637 HC RX REV CODE- 250/637: Performed by: INTERNAL MEDICINE

## 2020-04-23 PROCEDURE — 83735 ASSAY OF MAGNESIUM: CPT

## 2020-04-23 PROCEDURE — 74011250637 HC RX REV CODE- 250/637: Performed by: HOSPITALIST

## 2020-04-23 PROCEDURE — 97535 SELF CARE MNGMENT TRAINING: CPT

## 2020-04-23 PROCEDURE — 65270000029 HC RM PRIVATE

## 2020-04-23 PROCEDURE — 74011636637 HC RX REV CODE- 636/637: Performed by: HOSPITALIST

## 2020-04-23 PROCEDURE — 97116 GAIT TRAINING THERAPY: CPT

## 2020-04-23 RX ADMIN — ATORVASTATIN CALCIUM 20 MG: 20 TABLET, FILM COATED ORAL at 09:37

## 2020-04-23 RX ADMIN — METRONIDAZOLE 500 MG: 500 INJECTION, SOLUTION INTRAVENOUS at 17:18

## 2020-04-23 RX ADMIN — INSULIN LISPRO 2 UNITS: 100 INJECTION, SOLUTION INTRAVENOUS; SUBCUTANEOUS at 17:18

## 2020-04-23 RX ADMIN — METRONIDAZOLE 500 MG: 500 INJECTION, SOLUTION INTRAVENOUS at 02:15

## 2020-04-23 RX ADMIN — POLYETHYLENE GLYCOL 3350 17 G: 17 POWDER, FOR SOLUTION ORAL at 09:37

## 2020-04-23 RX ADMIN — INSULIN GLARGINE 10 UNITS: 100 INJECTION, SOLUTION SUBCUTANEOUS at 09:37

## 2020-04-23 RX ADMIN — CIPROFLOXACIN 400 MG: 2 INJECTION, SOLUTION INTRAVENOUS at 18:34

## 2020-04-23 RX ADMIN — CIPROFLOXACIN 400 MG: 2 INJECTION, SOLUTION INTRAVENOUS at 05:02

## 2020-04-23 RX ADMIN — INSULIN LISPRO 4 UNITS: 100 INJECTION, SOLUTION INTRAVENOUS; SUBCUTANEOUS at 13:08

## 2020-04-23 RX ADMIN — METRONIDAZOLE 500 MG: 500 INJECTION, SOLUTION INTRAVENOUS at 09:37

## 2020-04-23 NOTE — PROGRESS NOTES
OT order received, pt chart reviewed. Pt already on caseload and evaluation complete, see previous note. Will acknowledge new orders and continue with OT POC.      Espinoza Odonnell, OTR/L

## 2020-04-23 NOTE — PROGRESS NOTES
Problem: Mobility Impaired (Adult and Pediatric)  Goal: *Acute Goals and Plan of Care (Insert Text)  Description: Physical Therapy Goals   Initiated 4/22/2020 and to be accomplished within 5-7 day(s)  1. Patient will move from supine <> sit with S in prep for out of bed activity and change of position. 2.  Patient will perform sit<> stand with S with LRAD in prep for transfers/ambulation. 3.  Patient will transfer from bed <> chair with S with LRAD for time up in chair for completion of ADL activity. 4.  Patient will ambulate 150 feet with LRAD/S for improved functional mobility/safe discharge. 5.  Patient will ascend/descend 3-5 stairs with handrail with minimal assistance/contact guard assist for home re-entry as needed. Outcome: Progressing Towards Goal   PHYSICAL THERAPY TREATMENT    Patient: Merissa Ray (89 y.o. male)  Date: 4/23/2020  Diagnosis: Lower GI bleed [K92.2]   Lower GI bleed  Procedure(s) (LRB):  COLONOSCOPY; SPOT INJECTION; (N/A)  ENDOSCOPIC BANDING OR LIGATION (N/A) 3 Days Post-Op  Precautions: Fall   Chart, physical therapy assessment, plan of care and goals were reviewed. ASSESSMENT:  Pt showing continued progress with mobility and increasing ambulation distance with RW. No LOB. Cont POC     Progression toward goals:  []      Improving appropriately and progressing toward goals  [x]      Improving slowly and progressing toward goals  []      Not making progress toward goals and plan of care will be adjusted     PLAN:  Patient continues to benefit from skilled intervention to address the above impairments. Continue treatment per established plan of care.   Discharge Recommendations:  Home Health  Further Equipment Recommendations for Discharge:  rolling walker     SUBJECTIVE:   Patient stated  okay      OBJECTIVE DATA SUMMARY:   Critical Behavior:  Neurologic State: Alert, Appropriate for age, Eyes open spontaneously  Orientation Level: Oriented X4, Appropriate for age  Cognition: Appropriate decision making, Appropriate safety awareness, Appropriate for age attention/concentration, Follows commands, Recognition of people/places  Safety/Judgement: Decreased awareness of need for assistance, Decreased awareness of need for safety, Insight into deficits  Functional Mobility Training:  Bed Mobility:  Supine to Sit: Supervision  Sit to Supine: Supervision    Transfers:  Sit to Stand: Stand-by assistance  Stand to Sit: Stand-by assistance    Balance:  Sitting: Intact  Standing: Intact; With support  Ambulation/Gait Training:  Distance (ft): 80 Feet (ft)  Assistive Device: Walker, rolling;Gait belt  Ambulation - Level of Assistance: Contact guard assistance  Gait Abnormalities: Decreased step clearance  Speed/Johnna: Slow  Step Length: Left shortened;Right shortened  Swing Pattern: Right asymmetrical;Left asymmetrical      Pain:  Pain Scale 1: Numeric (0 - 10)  Pain Intensity 1: 0    Activity Tolerance:   Fair     After treatment:   [] Patient left in no apparent distress sitting up in chair  [x] Patient left in no apparent distress in bed  [x] Call bell left within reach  [] Nursing notified  [] Caregiver present  [] Bed alarm activated      Carl Nichole PTA   Time Calculation: 24 mins

## 2020-04-23 NOTE — PROGRESS NOTES
Problem: Self Care Deficits Care Plan (Adult)  Goal: *Acute Goals and Plan of Care (Insert Text)  Description: Occupational Therapy Goals  Initiated 4/21/2020 within 7 day(s). 1.  Patient will perform bathing with minimal assistance   2. Patient will perform upper body dressing with modified independence. 3.  Patient will perform lower body dressing with minimal contact guard assist.  4.  Patient will perform toilet transfers with supervision. 5.  Patient will perform all aspects of toileting with supervision. 6.  Patient will perform upper extremity therapeutic exercise/activities with supervision/set-up for 5 minutes. 7.  Patient will utilize energy conservation techniques during functional activities with 1 verbal cue(s). Outcome: Progressing Towards Goal   OCCUPATIONAL THERAPY TREATMENT    Patient: Maritza uLna (51 y.o. male)  Date: 4/23/2020  Diagnosis: Lower GI bleed [K92.2]   Lower GI bleed  Procedure(s) (LRB):  COLONOSCOPY; SPOT INJECTION; (N/A)  ENDOSCOPIC BANDING OR LIGATION (N/A) 3 Days Post-Op  Precautions: Fall     Chart, occupational therapy assessment, plan of care, and goals were reviewed. ASSESSMENT:  Pt found supine in bed, agreeable to therapy. Pt able to sit up to EOB without assist. Pt completed sock/underwear donning with SBA. Pt performed bathroom mobility/toilet transfer with SBA using RW. Pt able to perform hand hygiene standing at sink with SBA. Pt left supine in bed, call bell/phone within reach. Progression toward goals:  [x]          Improving appropriately and progressing toward goals  []          Improving slowly and progressing toward goals  []          Not making progress toward goals and plan of care will be adjusted     PLAN:  Patient continues to benefit from skilled intervention to address the above impairments. Continue treatment per established plan of care.   Discharge Recommendations:  Home Health  Further Equipment Recommendations for Discharge:  N/A SUBJECTIVE:   Patient stated there's no more blood when I go to the bathroom.     OBJECTIVE DATA SUMMARY:   Cognitive/Behavioral Status:  Neurologic State: Alert, Appropriate for age, Eyes open spontaneously  Orientation Level: Oriented X4, Appropriate for age  Cognition: Appropriate decision making, Appropriate safety awareness, Appropriate for age attention/concentration, Follows commands, Recognition of people/places  Safety/Judgement: Decreased awareness of need for assistance, Decreased awareness of need for safety, Insight into deficits    Functional Mobility and Transfers for ADLs:   Bed Mobility:  Supine to Sit: Supervision  Sit to Supine: Supervision     Transfers:  Sit to Stand: Stand-by assistance   Toilet Transfer : Stand-by assistance   Bathroom Mobility: Stand-by assistance    Balance:  Sitting: Intact  Standing: Intact; With support    ADL Intervention:  Lower Body Dressing Assistance  Dressing Assistance: Stand-by assistance  Underpants: Stand-by assistance  Socks: Stand-by assistance  Position Performed: Seated edge of bed    Pain:  Pain level pre-treatment: 0/10   Pain level post-treatment: 0/10  Pain Intervention(s): Medication administered by RN (see MAR); Rest, Repositioning   Response to intervention: Nurse notified, See doc flow sheet    Activity Tolerance:    Fair. Pt able to stand ~5 minutes. Pt limited by strength, balance. Please refer to the flowsheet for vital signs taken during this treatment. After treatment:   []  Patient left in no apparent distress sitting up in chair  [x]  Patient left in no apparent distress in bed  [x]  Call bell left within reach  [x]  Nursing notified  []  Caregiver present  []  Bed alarm activated    COMMUNICATION/EDUCATION:   [x] Role of Occupational Therapy in the acute care setting  [x] Home safety education was provided and the patient/caregiver indicated understanding.   [x] Patient/family have participated as able in working towards goals and plan of care. [x] Patient/family agree to work toward stated goals and plan of care. [] Patient understands intent and goals of therapy, but is neutral about his/her participation. [] Patient is unable to participate in goal setting and plan of care.       Thank you for this referral.  Leonid Avila OTR/L  Time Calculation: 23 mins

## 2020-04-23 NOTE — PROGRESS NOTES
Dr. Holli Obrien paged for pt's H/H of 6.6/18. 8. pt asymptomatic, resting quietly, using urinal    2115 Received order to transfuse 1 unit PRBC    2320 Blood transfusion started, pt monitored    54790 Blood transfusion completed at this time, no adverse reaction noted. H/H ordered post transfusion per MD    0730 Bedside and Verbal shift change report given to LARUO Vuong RN (oncoming nurse) by Mikael Fisher RN   (offgoing nurse). Report included the following information SBAR, Kardex, Intake/Output, MAR and Recent Results.

## 2020-04-23 NOTE — PROGRESS NOTES
Hospitalist Progress Note-critical care note     Patient: Alva Loo MRN: [id number]  CSN: [id number]    YOB: 1945  Age: 76 y.o. Sex: male    DOA: 4/20/2020 LOS:  LOS: 3 days            Chief complaint: gi bleeding , anemia,      Assessment/Plan         Hospital Problems  Date Reviewed: 4/21/2020          Codes Class Noted POA    Anemia due to acute blood loss ICD-10-CM: D62  ICD-9-CM: 285.1  4/21/2020 Unknown        * (Principal) Lower GI bleed ICD-10-CM: K92.2  ICD-9-CM: 578.9  4/20/2020 Unknown        Supratherapeutic INR ICD-10-CM: R79.1  ICD-9-CM: 790.92  4/20/2020 Unknown        DV[facility]deep venous thrombosis) ([facility] ICD-10-CM: I82.409  ICD-9-CM: 453.40  1/18/2020 Yes        Type 2 diabetes mellitus, with long-term current use of insulin ([facility]) ICD-10-CM: E11.9, Z79.4  ICD-9-CM: 250.00, V58.67  1/3/2020 Yes        Hypertension ICD-10-CM: I10  ICD-9-CM: 401.9  1/3/2020 Yes              Lower GI bleeding-no bleeding overnight   stopped per embolization   Colonoscopy done ,   Blood transfusion as needed    H/h stable       supra therapeutic inr   Received vit K , resolved     Anemia acute bleeding, so far stable    \H/h monitoring ,  Received iron per hemo       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 Sha Simpson on board -hold ac on d/c -f/u as out-pt   No dvt from ultrasound       HTN    bp so far is good      DM2 -better controlled   Lantus and  sliding scale insulin,    Subjective: feel fine       will advance diet today,   If continue stable d/c tomorrow     Called her daughter and updated her       Will start diet, pt/ot. Watch for rebleeding    35 total min's spent on patient care including >50% on counseling/coordinating care. Discussed the above assessments. also discussed labs, medications and hospital course    Disposition :1-2 days   Review of systems:    General: No fevers or chills. Cardiovascular: No chest pain or pressure. No palpitations.    Pulmonary: No shortness of breath. Gastrointestinal: No nausea, vomiting. No Bleeding     Vital signs/Intake and Output:  Visit Vitals  /71 (BP 1 Location: Left arm, BP Patient Position: At rest)   Pulse 88   Temp 98.4 °F (36.9 °C)   Resp 16   Ht 5' 6\" (1.676 m)   Wt 68.5 kg (151 lb)   SpO2 100%   BMI 24.37 kg/m²     Current Shift:  04/23 0701 - 04/23 1900  In: -   Out: 350 [Urine:350]  Last three shifts:  04/21 1901 - 04/23 0700  In: 1775.5 [P.O.:238; I.V.:975]  Out: 4203 [Urine:4203]    Physical Exam:  General: WD, WN. Alert, cooperative, no acute distress    HEENT: NC, Atraumatic. PERRLA, anicteric sclerae. Lungs: CTA Bilaterally. No Wheezing/Rhonchi/Rales. Heart:  Regular  rhythm,  No murmur, No Rubs, No Gallops  Abdomen: Soft, Non distended, Non tender. +Bowel sounds,   Extremities: No c/c/e  Psych:   Not anxious or agitated. Neurologic:  No acute neurological deficit. Labs: Results:       Chemistry Recent Labs     04/23/20  0349 04/22/20  0051 04/21/20  0215   GLU 91 126* 182*    142 141   K 4.1 4.0 4.0   * 112* 110   CO2 25 26 24   BUN 25* 32* 31*   CREA 1.05 1.24 1.21   CA 8.0* 7.7* 7.8*   AGAP 4 4 7   BUCR 24* 26* 26*      CBC w/Diff Recent Labs     04/23/20  0349 04/22/20  1840 04/22/20  1205  04/22/20  0051  04/21/20  0215   WBC 7.9  --   --   --  7.8  --  7.2   RBC 2.67*  --   --   --  2.34*  --  1.91*   HGB 7.9* 6.6* 7.0*   < > 6.9*   < > 5.7*   HCT 22.5* 18.8* 19.8*   < > 19.6*   < > 16.4*   *  --   --   --  105*  --  132*   GRANS 45  --   --   --  42  --  54   LYMPH 21  --   --   --  27  --  25   EOS 26*  --   --   --  19*  --  13*    < > = values in this interval not displayed. Cardiac Enzymes No results for input(s): CPK, CKND1, KAREN in the last 72 hours.     No lab exists for component: CKRMB, TROIP   Coagulation Recent Labs     04/23/20  0349 04/22/20  0051   PTP 17.0* 17.7*   INR 1.4* 1.5*       Lipid Panel No results found for: CHOL, CHOLPOCT, CHOLX, CHLST, CHOLV, [id number], HDL, HDLP, LDL, LDLC, DLDLP, 488159, VLDLC, VLDL, TGLX, TRIGL, TRIGP, TGLPOCT, CHHD, CHHDX   BNP No results for input(s): BNPP in the last 72 hours. Liver Enzymes No results for input(s): TP, ALB, TBIL, AP, SGOT, GPT in the last 72 hours. No lab exists for component: DBIL   Thyroid Studies Lab Results   Component Value Date/Time    TSH 1.86 01/19/2020 06:14 AM        Procedures/imaging: see electronic medical records for all procedures/Xrays and details which were not copied into this note but were reviewed prior to creation of Plan    Cta Abdomen Pelv W Cont    Result Date: 4/20/2020  EXAM:  CT ARTERIOGRAM ABDOMEN AND PELVIS CLINICAL INDICATION/HISTORY: BRBPR since today on coumadin   > Additional: None. COMPARISON: CT of the abdomen pelvis dated January 28, 2020   > Reference Exam: None. TECHNIQUE:   Pre-contrast imaging was performed. Subsequent CT angiogram of the abdomen, and pelvis. Thin sagittal and coronal MPR and MIP reconstructions were performed. Extensive separate workstation post processing was performed by [employer] to include MIPs, color surface images, 3-D reconstructions, and curve planar reformat imaging. One or more dose reduction techniques were used on this CT: automated exposure control, adjustment of the mAs and/or kVp according to patient size, and iterative reconstruction techniques. The specific techniques used on this CT exam have been documented in the patient's electronic medical record. Digital Imaging and Communications in Medicine (DICOM) format image data are available to nonaffiliated external healthcare facilities or entities on a secure, media free, reciprocally searchable basis with patient authorization for at least a 12-month period after this study.  _______________ FINDINGS: --- CT ANGIOGRAM --- Noncontrast imaging through the abdomen and pelvis demonstrate scattered colonic diverticulosis with areas of increased attenuation within the lumen of the diverticula predominantly in the distal descending and proximal sigmoid colon. These areas persist on the arterial phase imaging with a new blush of contrast present in the proximal sigmoid colon on axial series images 129-134 which then fades and becomes less apparent on the delayed imaging. Noncontrast imaging also demonstrates scattered aortic atherosclerosis. Normal caliber aorta without abnormal increased attenuation along the wall to suggest intramural hematoma. Postcontrast arterial phase imaging through the aorta demonstrates normal enhancement without evidence of dissection or other filling defect. --- CT ABDOMEN/PELVIS --- LOWER CHEST: Unremarkable. LIVER, BILIARY: Liver is normal. No biliary dilation. Gallbladder is unremarkable. PANCREAS: Normal. SPLEEN: Normal. ADRENALS: Normal. KIDNEYS: Normal. LYMPH NODES: No enlarged lymph nodes. GASTROINTESTINAL TRACT: Scattered diverticulosis as described above. In addition to the area of active bleed described there is mild wall thickening of the proximal sigmoid colon which could represent active inflammation or related to the bleeding. PELVIC ORGANS: Unremarkable. BONES: No acute or aggressive osseous abnormalities identified. OTHER: No ascites. Calcified subcutaneous granuloma in the left medial gluteal soft tissues noted, unchanged. _______________     IMPRESSION: 1. Diverticulosis with evidence of active GI bleeding in the proximal sigmoid colon. Cannot exclude a component of active diverticulitis. CRITICAL RESULT:  These critical results were directly communicated to Bladimir Renner on 4/20/2020 1:36 PM.  Results understood and acknowledged.        Nisha Oneal MD

## 2020-04-23 NOTE — ROUTINE PROCESS
0700: received bedside shift report from [address] [place] [address], [facility] (offgoing nurse) and assumed care of the pt. Updated white board, assessed all current needs, left bed in lowest position with wheels locked and call light within reach. 1200: small amount of blood in the pt's stool per MARTA Gaines. 1900: Shift summary, shift uneventful, no complaints of chest pain or shortness of breath.   
 
Gertrude Rolle, RN

## 2020-04-23 NOTE — PROGRESS NOTES
Problem: Upper and Lower GI Bleed: Day 1  Goal: Activity/Safety  Outcome: Progressing Towards Goal  Goal: Consults, if ordered  Outcome: Progressing Towards Goal  Goal: Diagnostic Test/Procedures  Outcome: Progressing Towards Goal  Goal: Nutrition/Diet  Outcome: Progressing Towards Goal  Goal: Discharge Planning  Outcome: Progressing Towards Goal  Goal: Medications  Outcome: Progressing Towards Goal  Goal: Respiratory  Outcome: Progressing Towards Goal  Goal: Treatments/Interventions/Procedures  Outcome: Progressing Towards Goal  Goal: Psychosocial  Outcome: Progressing Towards Goal  Goal: *Optimal pain control at patient's stated goal  Outcome: Progressing Towards Goal  Goal: *Hemodynamically stable  Outcome: Progressing Towards Goal  Goal: *Demonstrates progressive activity  Outcome: Progressing Towards Goal     Problem: Upper and Lower GI Bleed: Day 2  Goal: Off Pathway (Use only if patient is Off Pathway)  Outcome: Progressing Towards Goal  Goal: Activity/Safety  Outcome: Progressing Towards Goal  Goal: Consults, if ordered  Outcome: Progressing Towards Goal  Goal: Diagnostic Test/Procedures  Outcome: Progressing Towards Goal  Goal: Nutrition/Diet  Outcome: Progressing Towards Goal  Goal: Discharge Planning  Outcome: Progressing Towards Goal  Goal: Medications  Outcome: Progressing Towards Goal  Goal: Respiratory  Outcome: Progressing Towards Goal  Goal: Treatments/Interventions/Procedures  Outcome: Progressing Towards Goal  Goal: Psychosocial  Outcome: Progressing Towards Goal  Goal: *Optimal pain control at patient's stated goal  Outcome: Progressing Towards Goal  Goal: *Hemodynamically stable  Outcome: Progressing Towards Goal  Goal: *Tolerating diet  Outcome: Progressing Towards Goal  Goal: *Demonstrates progressive activity  Outcome: Progressing Towards Goal     Problem: Upper and Lower GI Bleed: Day 3  Goal: Off Pathway (Use only if patient is Off Pathway)  Outcome: Progressing Towards Goal  Goal: Activity/Safety  Outcome: Progressing Towards Goal  Goal: Diagnostic Test/Procedures  Outcome: Progressing Towards Goal  Goal: Nutrition/Diet  Outcome: Progressing Towards Goal  Goal: Discharge Planning  Outcome: Progressing Towards Goal  Goal: Medications  Outcome: Progressing Towards Goal  Goal: Treatments/Interventions/Procedures  Outcome: Progressing Towards Goal  Goal: Psychosocial  Outcome: Progressing Towards Goal     Problem: Upper and Lower GI Bleed:  Discharge Outcomes  Goal: *Hemodynamically stable  Outcome: Progressing Towards Goal  Goal: *Lungs clear or at baseline  Outcome: Progressing Towards Goal  Goal: *Demonstrates independent activity or return to baseline  Outcome: Progressing Towards Goal  Goal: *Pain is controlled to three or less  Outcome: Progressing Towards Goal  Goal: *Verbalizes understanding and describes prescribed diet  Outcome: Progressing Towards Goal  Goal: *Tolerating diet  Outcome: Progressing Towards Goal  Goal: *Verbalizes name, dosage, time, side effects, and number of days to continue medications  Outcome: Progressing Towards Goal  Goal: *Anxiety reduced or absent  Outcome: Progressing Towards Goal  Goal: *Understands and describes signs and symptoms to report to providers(Stroke Metric)  Outcome: Progressing Towards Goal  Goal: *Describes follow-up/return visits to physicians  Outcome: Progressing Towards Goal  Goal: *Describes available resources and support systems  Outcome: Progressing Towards Goal     Problem: Diabetes Self-Management  Goal: *Disease process and treatment process  Description: Define diabetes and identify own type of diabetes; list 3 options for treating diabetes. Outcome: Progressing Towards Goal  Goal: *Incorporating nutritional management into lifestyle  Description: Describe effect of type, amount and timing of food on blood glucose; list 3 methods for planning meals.   Outcome: Progressing Towards Goal  Goal: *Incorporating physical activity into lifestyle  Description: State effect of exercise on blood glucose levels. Outcome: Progressing Towards Goal  Goal: *Developing strategies to promote health/change behavior  Description: Define the ABC's of diabetes; identify appropriate screenings, schedule and personal plan for screenings. Outcome: Progressing Towards Goal  Goal: *Using medications safely  Description: State effect of diabetes medications on diabetes; name diabetes medication taking, action and side effects. Outcome: Progressing Towards Goal  Goal: *Monitoring blood glucose, interpreting and using results  Description: Identify recommended blood glucose targets  and personal targets. Outcome: Progressing Towards Goal  Goal: *Prevention, detection, treatment of acute complications  Description: List symptoms of hyper- and hypoglycemia; describe how to treat low blood sugar and actions for lowering  high blood glucose level. Outcome: Progressing Towards Goal  Goal: *Prevention, detection and treatment of chronic complications  Description: Define the natural course of diabetes and describe the relationship of blood glucose levels to long term complications of diabetes. Outcome: Progressing Towards Goal  Goal: *Developing strategies to address psychosocial issues  Description: Describe feelings about living with diabetes; identify support needed and support network  Outcome: Progressing Towards Goal  Goal: *Insulin pump training  Outcome: Progressing Towards Goal  Goal: *Sick day guidelines  Outcome: Progressing Towards Goal  Goal: *Patient Specific Goal (EDIT GOAL, INSERT TEXT)  Outcome: Progressing Towards Goal     Problem: Patient Education: Go to Patient Education Activity  Goal: Patient/Family Education  Outcome: Progressing Towards Goal     Problem: Falls - Risk of  Goal: *Absence of Falls  Description: Document Harlan Fall Risk and appropriate interventions in the flowsheet.   Outcome: Progressing Towards Goal  Note: Fall Risk Interventions:  Mobility Interventions: Assess mobility with egress test, Bed/chair exit alarm, OT consult for ADLs, Patient to call before getting OOB, PT Consult for mobility concerns, PT Consult for assist device competence, Strengthening exercises (ROM-active/passive), Utilize walker, cane, or other assistive device         Medication Interventions: Assess postural VS orthostatic hypotension, Bed/chair exit alarm, Patient to call before getting OOB, Teach patient to arise slowly    Elimination Interventions: Bed/chair exit alarm, Call light in reach, Patient to call for help with toileting needs, Stay With Me (per policy), Toilet paper/wipes in reach, Toileting schedule/hourly rounds, Urinal in reach              Problem: Patient Education: Go to Patient Education Activity  Goal: Patient/Family Education  Outcome: Progressing Towards Goal     Problem: Patient Education: Go to Patient Education Activity  Goal: Patient/Family Education  Outcome: Progressing Towards Goal     Problem: Pressure Injury - Risk of  Goal: *Prevention of pressure injury  Description: Document Thomas Scale and appropriate interventions in the flowsheet. Outcome: Progressing Towards Goal  Note: Pressure Injury Interventions:  Sensory Interventions: Assess changes in LOC, Check visual cues for pain, Discuss PT/OT consult with provider, Keep linens dry and wrinkle-free, Maintain/enhance activity level, Minimize linen layers, Turn and reposition approx.  every two hours (pillows and wedges if needed), Pressure redistribution bed/mattress (bed type)    Moisture Interventions: Absorbent underpads, Limit adult briefs, Maintain skin hydration (lotion/cream), Minimize layers, Offer toileting Q_hr    Activity Interventions: Increase time out of bed, Pressure redistribution bed/mattress(bed type), PT/OT evaluation    Mobility Interventions: HOB 30 degrees or less, Pressure redistribution bed/mattress (bed type), PT/OT evaluation, Turn and reposition approx.  every two hours(pillow and wedges)    Nutrition Interventions: Document food/fluid/supplement intake, Offer support with meals,snacks and hydration    Friction and Shear Interventions: HOB 30 degrees or less, Lift sheet, Lift team/patient mobility team                Problem: Patient Education: Go to Patient Education Activity  Goal: Patient/Family Education  Outcome: Progressing Towards Goal     Problem: Patient Education: Go to Patient Education Activity  Goal: Patient/Family Education  Outcome: Progressing Towards Goal     Linda Armas RN

## 2020-04-23 NOTE — ROUTINE PROCESS
Bedside shift change report given to David Kruse RN (oncoming nurse) by Lady Reshma RN (offgoing nurse). Report included the following information SBAR, Kardex, Intake/Output, MAR and Cardiac Rhythm SR. Visit Vitals /65 (BP 1 Location: Left arm, BP Patient Position: At rest) Pulse 81 Temp 97.9 °F (36.6 °C) Resp 16 Ht 5' 6\" (1.676 m) Wt 68.5 kg (151 lb) SpO2 100% BMI 24.37 kg/m² Lady Reshma RN

## 2020-04-23 NOTE — PROGRESS NOTES
Plan: home with family assistance. Chart reviewed, noted PT recommending home health. Attempted to reach pt in room without success, contacted daughter Johnnie Mendez  to offer home health. Pt recently discharged from [facility], daughter declines home health at this time. She does state that phone in pt room not working. Called unit, nurse not available to come to phone, will attempt later to reach pt through nurse. If not today, will attempt tomorrow prior to discharge.

## 2020-04-23 NOTE — PROGRESS NOTES
Surgery Progress Note    Patient: Kristofer Ojeda MRN: [id number]  CSN: [id number]    YOB: 1945  Age: 76 y.o. Sex: male    DOA: 4/20/2020 LOS:  LOS: 3 days          Chief Complaint:          Assessment/Plan     Transfused for HCT 16. Reported small blood in stool. Will have to wait if he stabilizes from embolization. Likely mucosal sloughing. No surgical indication at this time. Patient Active Problem List   Diagnosis Code    Stage 3 chronic kidney disease ([facility] N18.3    Type 2 diabetes mellitus, with long-term current use of insulin [facility] E11.9, Z79.4    Hypertension I10    Hypokalemia E87.6    Foot abscess, right L02.611    Diabetic ulcer of right midfoot associated with type 2 diabetes mellitus, with fat layer exposed ([facility] K50.124, L97.412    PAD (peripheral artery disease) ([facility]) I73.9    Osteomyelitis of right foot ([facility]) M86.9    Acute deep vein thrombosis (DVT) ([facility]) I82.409    DVT (deep venous thrombosis) ([facility]) I82.409    Anemia D64.9    Lung infiltrate R91.8    Hematoma T14. 8XXA    Lower GI bleed K92.2    Supratherapeutic INR R79.1    Anemia due to acute blood loss D62       Subjective:    No bloody stools, no abd pain    Review of systems:    Constitutional: denies fevers, chills, myalgias  Respiratory: denies SOB, cough  Cardiovascular: denies chest pain, palpitations  Gastrointestinal: denies nausea, vomiting, diarrhea      Vital signs/Intake and Output:  Visit Vitals  /71 (BP 1 Location: Left arm, BP Patient Position: At rest)   Pulse 88   Temp 98.4 °F (36.9 °C)   Resp 16   Ht 5' 6\" (1.676 m)   Wt 68.5 kg (151 lb)   SpO2 100%   BMI 24.37 kg/m²     Current Shift:  04/23 0701 - 04/23 1900  In: -   Out: 350 [Urine:350]  Last three shifts:  04/21 1901 - 04/23 0700  In: 1775.5 [P.O.:238;  I.V.:975]  Out: 4203 [Urine:4203]    Exam:    General: Well developed, alert, NAD, OX3  Head/Neck: NCAT, supple, No masses, No lymphadenopathy  CVS:Regular rate and rhythm, Lungs:Clear to auscultation bilaterally, no wheezes,  Abdomen: Soft, Nontender, No distention, Normal Bowel sounds, No hepatomegaly  Extremities: No C/C/E, pulses palpable 2+  Skin:normal texture and turgor, no rashes, no lesions  Neuro:grossly normal , follows commands  Psych:appropriate                Labs: Results:       Chemistry Recent Labs     20  021   GLU 91 126* 182*    142 141   K 4.1 4.0 4.0   * 112* 110   CO2 25 26 24   BUN 25* 32* 31*   CREA 1.05 1.24 1.21   CA 8.0* 7.7* 7.8*   AGAP 4 4 7   BUCR 24* 26* 26*      CBC w/Diff Recent Labs     20  1840 20  1205  20  0215   WBC 7.9  --   --   --  7.8  --  7.2   RBC 2.67*  --   --   --  2.34*  --  1.91*   HGB 7.9* 6.6* 7.0*   < > 6.9*   < > 5.7*   HCT 22.5* 18.8* 19.8*   < > 19.6*   < > 16.4*   *  --   --   --  105*  --  132*   GRANS 45  --   --   --  42  --  54   LYMPH 21  --   --   --  27  --  25   EOS 26*  --   --   --  19*  --  13*    < > = values in this interval not displayed. Cardiac Enzymes No results for input(s): CPK, CKND1, KAREN in the last 72 hours. No lab exists for component: CKRMB, TROIP   Coagulation Recent Labs     20   PTP 17.0* 17.7*   INR 1.4* 1.5*       Lipid Panel No results found for: CHOL, CHOLPOCT, CHOLX, CHLST, CHOLV, 248446, HDL, HDLP, LDL, LDLC, DLDLP, 775537, VLDLC, VLDL, TGLX, TRIGL, TRIGP, TGLPOCT, CHHD, CHHDX   BNP No results for input(s): BNPP in the last 72 hours. Liver Enzymes No results for input(s): TP, ALB, TBIL, AP, SGOT, GPT in the last 72 hours.     No lab exists for component: DBIL   Thyroid Studies Lab Results   Component Value Date/Time    TSH 1.86 2020 06:14 AM        Procedures/imaging: see electronic medical records for all procedures/Xrays and details which were not copied into this note but were reviewed prior to creation of Plan      Nyra , DO

## 2020-04-24 ENCOUNTER — APPOINTMENT (OUTPATIENT)
Dept: GENERAL RADIOLOGY | Age: 75
DRG: 987 | End: 2020-04-24
Attending: HOSPITALIST
Payer: MEDICARE

## 2020-04-24 VITALS
OXYGEN SATURATION: 100 % | RESPIRATION RATE: 16 BRPM | SYSTOLIC BLOOD PRESSURE: 149 MMHG | BODY MASS INDEX: 24.27 KG/M2 | DIASTOLIC BLOOD PRESSURE: 59 MMHG | HEART RATE: 85 BPM | TEMPERATURE: 98.8 F | WEIGHT: 151 LBS | HEIGHT: 66 IN

## 2020-04-24 LAB
ABO + RH BLD: NORMAL
BLD PROD TYP BPU: NORMAL
BLOOD BANK CMNT PATIENT-IMP: NORMAL
BLOOD GROUP ANTIBODIES SERPL: NORMAL
BPU ID: NORMAL
CALLED TO:,BCALL1: NORMAL
CALLED TO:,BCALL2: NORMAL
CROSSMATCH RESULT,%XM: NORMAL
GLUCOSE BLD STRIP.AUTO-MCNC: 131 MG/DL (ref 70–110)
HCT VFR BLD AUTO: 22.3 % (ref 36–48)
HGB BLD-MCNC: 8 G/DL (ref 13–16)
INR PPP: 1.4 (ref 0.8–1.2)
MAGNESIUM SERPL-MCNC: 1.6 MG/DL (ref 1.6–2.6)
PROTHROMBIN TIME: 16.9 SEC (ref 11.5–15.2)
SPECIMEN EXP DATE BLD: NORMAL
STATUS OF UNIT,%ST: NORMAL
UNIT DIVISION, %UDIV: 0

## 2020-04-24 PROCEDURE — 85610 PROTHROMBIN TIME: CPT

## 2020-04-24 PROCEDURE — 74011636637 HC RX REV CODE- 636/637: Performed by: HOSPITALIST

## 2020-04-24 PROCEDURE — 74011250636 HC RX REV CODE- 250/636: Performed by: HOSPITALIST

## 2020-04-24 PROCEDURE — 74011250637 HC RX REV CODE- 250/637: Performed by: HOSPITALIST

## 2020-04-24 PROCEDURE — 71045 X-RAY EXAM CHEST 1 VIEW: CPT

## 2020-04-24 PROCEDURE — 74011250637 HC RX REV CODE- 250/637: Performed by: INTERNAL MEDICINE

## 2020-04-24 PROCEDURE — 36415 COLL VENOUS BLD VENIPUNCTURE: CPT

## 2020-04-24 PROCEDURE — 82962 GLUCOSE BLOOD TEST: CPT

## 2020-04-24 PROCEDURE — 83735 ASSAY OF MAGNESIUM: CPT

## 2020-04-24 PROCEDURE — 85018 HEMOGLOBIN: CPT

## 2020-04-24 RX ADMIN — ATORVASTATIN CALCIUM 20 MG: 20 TABLET, FILM COATED ORAL at 09:11

## 2020-04-24 RX ADMIN — POLYETHYLENE GLYCOL 3350 17 G: 17 POWDER, FOR SOLUTION ORAL at 09:12

## 2020-04-24 RX ADMIN — METRONIDAZOLE 500 MG: 500 INJECTION, SOLUTION INTRAVENOUS at 01:05

## 2020-04-24 RX ADMIN — INSULIN GLARGINE 10 UNITS: 100 INJECTION, SOLUTION SUBCUTANEOUS at 09:19

## 2020-04-24 RX ADMIN — SODIUM CHLORIDE 75 ML/HR: 900 INJECTION, SOLUTION INTRAVENOUS at 01:12

## 2020-04-24 RX ADMIN — CIPROFLOXACIN 400 MG: 2 INJECTION, SOLUTION INTRAVENOUS at 06:35

## 2020-04-24 RX ADMIN — METOPROLOL SUCCINATE 50 MG: 50 TABLET, EXTENDED RELEASE ORAL at 09:10

## 2020-04-24 RX ADMIN — METRONIDAZOLE 500 MG: 500 INJECTION, SOLUTION INTRAVENOUS at 09:15

## 2020-04-24 NOTE — ROUTINE PROCESS
Bedside and Verbal shift change report given to Judson Reddy RN by Oralia Solomon. Report included the following information SBAR, Kardex, OR Summary, Intake/Output and MAR.

## 2020-04-24 NOTE — PROGRESS NOTES
1920 - Bedside report received from Norma ''SADIQ'' , [facility]. Patient in bed. Pain 0/10. .    2005 - Patient in bed at this time. IV to RAC  intact and patent. + CMS. Pt A & O x 4. LS clear, on RA. Abdomen soft, NT and ND. + BS to all 4 quadrants. Denies nausea. Pain 0/10. Call light within reach. Pt had uneventful shift. No issues/concerns at this time.  Call bell within reach

## 2020-04-24 NOTE — PROGRESS NOTES
1005: Xray in pt room, will follow up again for PT.    1127: 2nd attempt. Pt getting dressed for d/c home.

## 2020-04-24 NOTE — PROGRESS NOTES
Assumed responsibility for patient from [facility], [address] [facility]

## 2020-04-24 NOTE — DISCHARGE INSTRUCTIONS
Patient Education        Gastrointestinal Bleeding: Care Instructions  Your Care Instructions    The digestive or gastrointestinal tract goes from the mouth to the anus. It is often called the GI tract. Bleeding can happen anywhere in the GI tract. It may be caused by an ulcer, an infection, or cancer. It may also be caused by medicines such as aspirin or ibuprofen. Light bleeding may not cause any symptoms at first. But if you continue to bleed for a while, you may feel very weak or tired. Sudden, heavy bleeding means you need to see a doctor right away. This kind of bleeding can be very dangerous. But it can usually be cured or controlled. The doctor may do some tests to find the cause of your bleeding. Follow-up care is a key part of your treatment and safety. Be sure to make and go to all appointments, and call your doctor if you are having problems. It's also a good idea to know your test results and keep a list of the medicines you take. How can you care for yourself at home? · Be safe with medicines. Take your medicines exactly as prescribed. Call your doctor if you think you are having a problem with your medicine. You will get more details on the specific medicines your doctor prescribes. · Do not take aspirin or other anti-inflammatory medicines, such as naproxen (Aleve) or ibuprofen (Advil, Motrin), without talking to your doctor first. Ask your doctor if it is okay to use acetaminophen (Tylenol). · Do not drink alcohol. · The bleeding may make you lose iron. So it's important to eat foods that have a lot of iron. These include red meat, shellfish, poultry, and eggs. They also include beans, raisins, whole-grain breads, and leafy green vegetables. If you want help planning meals, you can make an appointment with a dietitian. When should you call for help? Call 911 anytime you think you may need emergency care.  For example, call if:    · You have sudden, severe belly pain.     · You vomit blood or what looks like coffee grounds.     · You passed out (lost consciousness).     · Your stools are maroon or very bloody.    Call your doctor now or seek immediate medical care if:    · You are dizzy or lightheaded, or you feel like you may faint.     · Your stools are black and look like tar, or they have streaks of blood.     · You have belly pain.     · You vomit or have nausea.     · You have trouble swallowing, or it hurts when you swallow.    Watch closely for changes in your health, and be sure to contact your doctor if:    · You do not get better as expected. Where can you learn more? Go to http://nancy-pan.info/  Enter F981 in the search box to learn more about \"Gastrointestinal Bleeding: Care Instructions. \"  Current as of: June 26, 2019Content Version: 12.4  © 0206-4723 [employer]. Care instructions adapted under license by [employer] (which disclaims liability or warranty for this information). If you have questions about a medical condition or this instruction, always ask your healthcare professional. [address] any warranty or liability for your use of this information.

## 2020-04-24 NOTE — DISCHARGE SUMMARY
Discharge Summary    Patient: Rosa Israel MRN: [id number]  CSN: [id number]    YOB: 1945  Age: 76 y.o. Sex: male    DOA: 4/20/2020 LOS:  LOS: 4 days   Discharge Date:      Primary Care Provider:  Zaid Spencer MD    Admission Diagnoses: Lower GI bleed [K92.2]    Discharge Diagnoses:    Hospital Problems  Date Reviewed: 4/21/2020          Codes Class Noted POA    Anemia due to acute blood loss ICD-10-CM: D62  ICD-9-CM: 285.1  4/21/2020 Unknown        * (Principal) Lower GI bleed ICD-10-CM: K92.2  ICD-9-CM: 578.9  4/20/2020 Unknown        Supratherapeutic INR ICD-10-CM: R79.1  ICD-9-CM: 790.92  4/20/2020 Unknown        DVT (deep venous thrombosis) ([facility] ICD-10-CM: I82.409  ICD-9-CM: 453.40  1/18/2020 Yes        Type 2 diabetes mellitus, with long-term current use of insulin ([facility] ICD-10-CM: E11.9, Z79.4  ICD-9-CM: 250.00, V58.67  1/3/2020 Yes        Hypertension ICD-10-CM: I10  ICD-9-CM: 401.9  1/3/2020 Yes              Discharge Condition: stable     Discharge Medications:     Current Discharge Medication List      START taking these medications    Details   multivitamin, tx-iron-ca-min (THERA-M w/ IRON) 9 mg iron-400 mcg tab tablet Take 1 Tab by mouth daily. Qty: 30 Tab, Refills: 0         CONTINUE these medications which have NOT CHANGED    Details   metoprolol succinate (TOPROL-XL) 50 mg XL tablet Take 1 Tab by mouth daily. Qty: 60 Tab, Refills: 0      insulin glargine (LANTUS) 100 unit/mL injection 5 Units by SubCUTAneous route daily. Qty: 1 Vial, Refills: 0      QUEtiapine (SEROQUEL) 25 mg tablet Take 1 Tab by mouth nightly. Qty: 10 Tab, Refills: 0      atorvastatin (LIPITOR) 20 mg tablet Take 20 mg by mouth daily. polyethylene glycol (MIRALAX) 17 gram/dose powder Take 17 g by mouth daily. 1 tablespoon with 8 oz of water daily  Qty: 507 g, Refills: 0      LOSARTAN PO Take 100 mg by mouth.          STOP taking these medications       enoxaparin (LOVENOX) 80 mg/0.8 mL injection Comments:   Reason for Stopping:         aspirin 81 mg chewable tablet Comments:   Reason for Stopping:               Procedures : colonoscopy   Embolization     Consults: Hematology/Oncology and GI , surgery       PHYSICAL EXAM   Visit Vitals  /59 (BP 1 Location: Left arm, BP Patient Position: At rest)   Pulse 85   Temp 98.8 °F (37.1 °C)   Resp 16   Ht 5' 6\" (1.676 m)   Wt 68.5 kg (151 lb)   SpO2 100%   BMI 24.37 kg/m²     General: Awake, cooperative, no acute distress    HEENT: NC, Atraumatic. PERRLA, EOMI. Anicteric sclerae. Lungs:  CTA Bilaterally. No Wheezing/Rhonchi/Rales. Heart:  Regular  rhythm,  No murmur, No Rubs, No Gallops  Abdomen: Soft, Non distended, Non tender. +Bowel sounds,   Extremities: No c/c/e  Psych:   Not anxious or agitated. Neurologic:  No acute neurological deficits. Admission HPI :   Zaid Epperson is a 76 y.o. male with PMHX of PAD, DVT on Coumadin, diabetes came to ER due to bloody stool. He reported he was sitting at home and found it blood came out from his rectum, associated with abdominal cramping. Ct abdomen :Diverticulosis with evidence of active GI bleeding in the proximal sigmoid colon. can not r/o diverticulitis. He had another 3 episodes in ER. GI was called. He was discharged with the [facility] last admission for DVT treatment. He follow-up with hematologist and primary care physician. Warfarin was started per primary care physician. He most time had constipation, but he had BM yesterday. INR 3.3, h/h 9.8/28.4-9.2/26. 3      He speaks some [place]. RN spoke Italian and really helps, spoke with her daughter per phone. Pt completed po abx for OM . No fever/cough, no covid exposure     Hospital Course :  Zaid Epperson is a 76 y.o. male with PMHX of PAD, DVT on Coumadin, diabetes was admitted due to GI bleeding. He had been taking warfarin at home, INR was 3.3 . Vitamin K and FFP were given due to acute GI bleeding to reverse INR. GI  was on board,colonoscopy was done to stop bleeding. He continued have bloody stool after the colonoscopy. IR was consulted and  embolization was given and bloody stool stopped. Surgery was on board also for backup plan. He also received blood transfusion. His H/H was monitored very closely. After the embolization. His H&H has been stable. hematology was on board for [facility] recommendation for his hx of dvt. PVL  has been  repeated, no acute DVT noted. Hematology recommended hold [facility] on discharge. And follow-up was outpatient. Before discharge, his H&H is stable, he  tolerated diet very well. Discussed with daughter before discharge, recommended hold [facility] on discharge and follow-up with hematologist for recommendation. Also continue p.o. iron replacement. He  received iron infusion during his stay. Also recommended advance diet slowly and follow-up with GI. Discharge planning discussed with patient, pt agrees  with the plan and no questions and concerns at this point. Activity: Activity as tolerated    Diet:full  liquids, advance slowly    Follow-up: PCP and GI and hematologist     Disposition: home with home health     Minutes spent on discharge: 45 min       Labs: Results:       Chemistry Recent Labs     04/23/20  0349 04/22/20  0051   GLU 91 126*    142   K 4.1 4.0   * 112*   CO2 25 26   BUN 25* 32*   CREA 1.05 1.24   CA 8.0* 7.7*   AGAP 4 4   BUCR 24* 26*      CBC w/Diff Recent Labs     04/24/20  0202 04/23/20  0349 04/22/20  1840  04/22/20  0051   WBC  --  7.9  --   --  7.8   RBC  --  2.67*  --   --  2.34*   HGB 8.0* 7.9* 6.6*   < > 6.9*   HCT 22.3* 22.5* 18.8*   < > 19.6*   PLT  --  129*  --   --  105*   GRANS  --  45  --   --  42   LYMPH  --  21  --   --  27   EOS  --  26*  --   --  19*    < > = values in this interval not displayed. Cardiac Enzymes No results for input(s): CPK, CKND1, KAREN in the last 72 hours.     No lab exists for component: CKRMB, TROIP   Coagulation Recent Labs     04/24/20  0202 04/23/20  0349   PTP 16.9* 17.0*   INR 1.4* 1.4*       Lipid Panel No results found for: CHOL, CHOLPOCT, CHOLX, CHLST, CHOLV, 967254, HDL, HDLP, LDL, LDLC, DLDLP, 094001, VLDLC, VLDL, TGLX, TRIGL, TRIGP, TGLPOCT, CHHD, CHHDX   BNP No results for input(s): BNPP in the last 72 hours. Liver Enzymes No results for input(s): TP, ALB, TBIL, AP, SGOT, GPT in the last 72 hours. No lab exists for component: DBIL   Thyroid Studies Lab Results   Component Value Date/Time    TSH 1.86 01/19/2020 06:14 AM          @micro    Significant Diagnostic Studies: Cta Abdomen Pelv W Cont    Result Date: 4/20/2020  EXAM:  CT ARTERIOGRAM ABDOMEN AND PELVIS CLINICAL INDICATION/HISTORY: BRBPR since today on coumadin   > Additional: None. COMPARISON: CT of the abdomen pelvis dated January 28, 2020   > Reference Exam: None. TECHNIQUE:   Pre-contrast imaging was performed. Subsequent CT angiogram of the abdomen, and pelvis. Thin sagittal and coronal MPR and MIP reconstructions were performed. Extensive separate workstation post processing was performed by [employer] [facility] to include MIPs, color surface images, 3-D reconstructions, and curve planar reformat imaging. One or more dose reduction techniques were used on this CT: automated exposure control, adjustment of the mAs and/or kVp according to patient size, and iterative reconstruction techniques. The specific techniques used on this CT exam have been documented in the patient's electronic medical record. Digital Imaging and Communications in Medicine (DICOM) format image data are available to nonaffiliated external healthcare facilities or entities on a secure, media free, reciprocally searchable basis with patient authorization for at least a 12-month period after this study.  _______________ FINDINGS: --- CT ANGIOGRAM --- Noncontrast imaging through the abdomen and pelvis demonstrate scattered colonic diverticulosis with areas of increased attenuation within the lumen of the diverticula predominantly in the distal descending and proximal sigmoid colon. These areas persist on the arterial phase imaging with a new blush of contrast present in the proximal sigmoid colon on axial series images 129-134 which then fades and becomes less apparent on the delayed imaging. Noncontrast imaging also demonstrates scattered aortic atherosclerosis. Normal caliber aorta without abnormal increased attenuation along the wall to suggest intramural hematoma. Postcontrast arterial phase imaging through the aorta demonstrates normal enhancement without evidence of dissection or other filling defect. --- CT ABDOMEN/PELVIS --- LOWER CHEST: Unremarkable. LIVER, BILIARY: Liver is normal. No biliary dilation. Gallbladder is unremarkable. PANCREAS: Normal. SPLEEN: Normal. ADRENALS: Normal. KIDNEYS: Normal. LYMPH NODES: No enlarged lymph nodes. GASTROINTESTINAL TRACT: Scattered diverticulosis as described above. In addition to the area of active bleed described there is mild wall thickening of the proximal sigmoid colon which could represent active inflammation or related to the bleeding. PELVIC ORGANS: Unremarkable. BONES: No acute or aggressive osseous abnormalities identified. OTHER: No ascites. Calcified subcutaneous granuloma in the left medial gluteal soft tissues noted, unchanged. _______________     IMPRESSION: 1. Diverticulosis with evidence of active GI bleeding in the proximal sigmoid colon. Cannot exclude a component of active diverticulitis. CRITICAL RESULT:  These critical results were directly communicated to Bladimir Renner on 4/20/2020 1:36 PM.  Results understood and acknowledged.              [facility]     CC: Jack Morales MD

## 2020-04-24 NOTE — PROGRESS NOTES
I did not see the patient today. His hemoglobin is improving. We are making follow-up with him. My staff will call his daughter to give him a date and time.

## 2020-04-25 ENCOUNTER — PATIENT OUTREACH (OUTPATIENT)
Dept: CASE MANAGEMENT | Age: 75
End: 2020-04-25

## 2020-04-25 NOTE — PROGRESS NOTES
Patient contacted regarding recent discharge and COVID-19 risk   Care Transition Nurse/ Ambulatory Care Manager contacted the patient's daughter, Oneida Hinson by telephone to perform post discharge assessment. Verified name and  with daughter as identifiers. Patient has following risk factors of: diabetes. CTN/ACM reviewed discharge instructions, medical action plan and red flags related to discharge diagnosis. Reviewed and educated them on any new and changed medications related to discharge diagnosis. Advised obtaining a 90-day supply of all daily and as-needed medications. Education provided regarding infection prevention, and signs and symptoms of COVID-19 and when to seek medical attention with daughter  who verbalized understanding. Discussed exposure protocols and quarantine from [address] you at higher risk for severe illness  and given an opportunity for questions and concerns. The daughter agrees to contact the COVID-19 hotline [phone] or PCP office for questions related to their healthcare. CTN/ACM provided contact information for future reference. From CDC: Are you at higher risk for severe illness?  Wash your hands often.  Avoid close contact (6 feet, which is about two arm lengths) with people who are sick.  Put distance between yourself and other people if COVID-19 is spreading in your community.  Clean and disinfect frequently touched surfaces.  Avoid all cruise travel and non-essential air travel.  Call your healthcare professional if you have concerns about COVID-19 and your underlying condition or if you are sick. For more information on steps you can take to protect yourself, see CDC's How to Protect Yourself      Patient/family/caregiver given information for [facility] and agrees to enroll no      Plan for follow-up call in 7-14 days based on severity of symptoms and risk factors.
Normal rate, regular rhythm, normal S1, S2 heart sounds heard.

## 2020-05-11 ENCOUNTER — PATIENT OUTREACH (OUTPATIENT)
Dept: CASE MANAGEMENT | Age: 75
End: 2020-05-11

## 2020-05-11 NOTE — PROGRESS NOTES
Patient resolved from Transition of Care episode on 5/11/20  Patient/family has been provided the following resources and education related to COVID-19:                         Signs, symptoms and red flags related to COVID-19            CDC exposure and quarantine guidelines            [employer] exposure contact - [phone]            Contact for their local Department of Health                 Patient's daughter Romeo Maldonado currently reports that the following symptoms have improved:  no new symptoms and no worsening symptoms. No further outreach scheduled with this CTN/ACM. Episode of Care resolved. Patient has this CTN/ACM contact information if future needs arise.

## 2020-07-28 NOTE — DIABETES MGMT
[facility] Hospitalist History and Physical       ASSESSMENT / PLAN:   Randy Mendez  is a 50 year old male with coronary artery disease s/p recent NSTEMI c/b LV thrombus, chronic systolic CHF, T2DM, GERD, HTN, HLD, anxiety admitted post-op after ICD placement.     Chronic Systolic CHF; ICD placement   EF 30-35% on most recent echo 6/2020   Admitted for ICD placement  Monitor overnight  Telemetry   Cardiology following   Continue lasix, cont entresto, beta blocker, aldactone     HTN / HLD / Coronary Artery Disease  Resume home medications including ASA, prasugrel, statin, beta blocker, entresto   Cardiology following    T2DM with Hyperglycemia  Hold home metformin   Home regimen: lispro 13 units TID with meals plus sliding scale; 39 units long-acting at night.   Will reduce dose here to 30 units glargine tonight and 10 units lispro TID plus additional SSI  Accucheks, hypoglycemia protocol     Dispo: Admitted observation. ADOD tomorrow.     PCP: Ariadna Aquino MD    Concerns regarding plan of care were discussed with patient. Patient agrees with plan as detailed above.       HISTORY:   CC: No chief complaint on file.       PCP: Ariadna Aquino MD    History of Present Illness:   Randy Mendez  is a 50 year old male with coronary artery disease s/p recent NSTEMI c/b LV thrombus, chronic systolic CHF, T2DM, GERD, HTN, HLD, anxiety admitted post-op after ICD placement.     Pt doing well post op. Seen in [facility]. He has no complaints other than mild pain at the ICD insertion site.     Objectives    Visit Vitals  Ht 6' (1.829 m)   Wt 108.9 kg (240 lb 1.3 oz)   BMI 32.56 kg/m²       Physical Exam  Constitutional:       Appearance: He is not toxic-appearing.   HENT:      Head: Normocephalic.      Nose: No congestion.      Mouth/Throat:      Mouth: Mucous membranes are moist.   Eyes:      Extraocular Movements: Extraocular movements intact.      Conjunctiva/sclera: Conjunctivae normal.      Pupils: Pupils are equal,  GLYCEMIC CONTROL PROGRESS NOTE:    - known h/o T2DM, HbA1C not within recommended range for age + comorbids on basal home regimen  - BG out of target range non-CU: < 180 mg/dL  - TDD = 10 units  - FBG out of target range, basal insulin initiated per Hospitalist  - recommend monitor BG trends and make adjustments as needed  - this writer will confirm with daughter if pt is taking home insulin regimen r/t current A1C    Addendum:  - BG remains in 200s, recommend increase basal insulin (orders entered with permission from Dr. Noni Ray)   *Lantus 5 units x1 now   *Lantus 10 units daily    Recent Glucose Results:   Lab Results   Component Value Date/Time     (H) 04/21/2020 02:15 AM    GLUCPOC 258 (H) 04/21/2020 10:16 AM    GLUCPOC 356 (H) 04/20/2020 09:03 PM    GLUCPOC 426 ([facility]) 04/20/2020 09:02 PM     Karyn Rock MS, RN, CDE  Glycemic Control Team  [phone]  Pager [phone] (M-TH 8:00-4:30P)  *After Hours pager [phone] round, and reactive to light.   Cardiovascular:      Rate and Rhythm: Normal rate and regular rhythm.      Heart sounds: No murmur.   Pulmonary:      Effort: No respiratory distress.      Breath sounds: Normal breath sounds. No wheezing.   Abdominal:      General: There is no distension.      Palpations: Abdomen is soft.      Tenderness: There is no abdominal tenderness. There is no guarding.   Musculoskeletal:         General: No swelling.   Skin:     Findings: No rash.   Neurological:      General: No focal deficit present.      Mental Status: He is alert.         PMH  Past Medical History:   Diagnosis Date   • Congestive cardiac failure (CMS/HCC)    • Coronary artery disease    • Diabetes mellitus (CMS/HCC)    • Essential (primary) hypertension    • High cholesterol    • Ischemic cardiomyopathy    • Myocardial infarction (CMS/HCC)         PSH  Past Surgical History:   Procedure Laterality Date   • Cardiac catherization  02/2020    stent lad   • Pilonidal cyst drainage          ALL:  ALLERGIES:  No Known Allergies     Home Medications:  No current facility-administered medications on file prior to encounter.   Lactobacillus (PROBIOTIC ACIDOPHILUS PO)  Ascorbic Acid (VITAMIN C) 500 MG tablet  B Complex Vitamins (VITAMIN B COMPLEX PO)  VITAMIN D, CHOLECALCIFEROL, PO  sacubitril-valsartan (ENTRESTO)  MG per tablet  VITAMIN E PO  carvedilol (COREG) 25 MG tablet  insulin aspart (NOVOLOG) 100 UNIT/ML injectable solution  atorvastatin (LIPITOR) 40 MG tablet  furosemide (LASIX) 40 MG tablet  prasugrel (EFFIENT) 10 MG Tab  spironolactone (ALDACTONE) 25 MG tablet  aspirin 81 MG EC tablet  metformin (GLUCOPHAGE) 1000 MG tablet  insulin glargine (LANTUS) 100 UNIT/ML vial solution  insulin aspart (NOVOLOG FLEXPEN) 100 UNIT/ML pen-injector  empagliflozin (JARDIANCE) 25 MG tablet  [DISCONTINUED] lisinopril (PRINIVIL,ZESTRIL) 40 MG tablet  [DISCONTINUED] carvedilol (COREG) 6.25 MG tablet  [DISCONTINUED] apixaBAN (ELIQUIS) 5  MG Tab  [DISCONTINUED] pantoprazole (PROTONIX) 20 MG tablet  [DISCONTINUED] insulin aspart (NOVOLOG) 100 UNIT/ML sliding scale injection         Soc Hx  Social History     Tobacco Use   • Smoking status: Former Smoker   • Smokeless tobacco: Never Used   Substance Use Topics   • Alcohol use: Yes     Comment: socially        Fam Hx  No family history on file.    Review of Systems  A comprehensive 10 point review of systems was completed.  Pertinent positives and negatives noted in the the HPI.      DIAGNOSTIC DATA:     Recent Results (from the past 24 hour(s))   Metered blood glucose    Collection Time: 07/28/20  3:26 PM   Result Value Ref Range    Glucose Bedside  (H) 70 - 99 mg/dL   Metered blood glucose    Collection Time: 07/28/20  5:46 PM   Result Value Ref Range    Glucose Bedside  (H) 70 - 99 mg/dL       Radiology: No results found.

## 2020-12-03 ENCOUNTER — HOSPITAL ENCOUNTER (OUTPATIENT)
Age: 75
Setting detail: OBSERVATION
Discharge: HOME | End: 2020-12-07
Attending: EMERGENCY MEDICINE | Admitting: FAMILY MEDICINE
Payer: MEDICARE

## 2020-12-03 ENCOUNTER — APPOINTMENT (OUTPATIENT)
Dept: CT IMAGING | Age: 75
End: 2020-12-03
Attending: EMERGENCY MEDICINE
Payer: MEDICARE

## 2020-12-03 DIAGNOSIS — N17.9 AKI (ACUTE KIDNEY INJURY) (HCC): ICD-10-CM

## 2020-12-03 DIAGNOSIS — R10.84 ABDOMINAL PAIN, GENERALIZED: Primary | ICD-10-CM

## 2020-12-03 DIAGNOSIS — R73.9 HYPERGLYCEMIA: ICD-10-CM

## 2020-12-03 LAB
ALBUMIN SERPL-MCNC: 2.9 G/DL (ref 3.4–5)
ALBUMIN/GLOB SERPL: 0.7 {RATIO} (ref 0.8–1.7)
ALP SERPL-CCNC: 74 U/L (ref 45–117)
ALT SERPL-CCNC: 18 U/L (ref 16–61)
AMORPH CRY URNS QL MICRO: ABNORMAL
ANION GAP SERPL CALC-SCNC: 8 MMOL/L (ref 3–18)
APPEARANCE UR: ABNORMAL
AST SERPL-CCNC: 6 U/L (ref 10–38)
ATRIAL RATE: 90 BPM
BACTERIA URNS QL MICRO: ABNORMAL /HPF
BASOPHILS # BLD: 0 K/UL (ref 0–0.1)
BASOPHILS NFR BLD: 0 % (ref 0–2)
BILIRUB SERPL-MCNC: 0.9 MG/DL (ref 0.2–1)
BILIRUB UR QL: NEGATIVE
BUN SERPL-MCNC: 60 MG/DL (ref 7–18)
BUN/CREAT SERPL: 25 (ref 12–20)
CALCIUM SERPL-MCNC: 9.1 MG/DL (ref 8.5–10.1)
CALCULATED P AXIS, ECG09: 57 DEGREES
CALCULATED R AXIS, ECG10: 26 DEGREES
CALCULATED T AXIS, ECG11: 60 DEGREES
CHLORIDE SERPL-SCNC: 101 MMOL/L (ref 100–111)
CK MB CFR SERPL CALC: ABNORMAL % (ref 0–4)
CK MB CFR SERPL CALC: ABNORMAL % (ref 0–4)
CK MB SERPL-MCNC: <1 NG/ML (ref 5–25)
CK MB SERPL-MCNC: <1 NG/ML (ref 5–25)
CK SERPL-CCNC: 18 U/L (ref 39–308)
CK SERPL-CCNC: 20 U/L (ref 39–308)
CO2 SERPL-SCNC: 23 MMOL/L (ref 21–32)
COLOR UR: YELLOW
CREAT SERPL-MCNC: 2.44 MG/DL (ref 0.6–1.3)
DIAGNOSIS, 93000: NORMAL
DIFFERENTIAL METHOD BLD: ABNORMAL
EOSINOPHIL # BLD: 0.3 K/UL (ref 0–0.4)
EOSINOPHIL NFR BLD: 4 % (ref 0–5)
EPITH CASTS URNS QL MICRO: ABNORMAL /LPF (ref 0–5)
ERYTHROCYTE [DISTWIDTH] IN BLOOD BY AUTOMATED COUNT: 12.6 % (ref 11.6–14.5)
GLOBULIN SER CALC-MCNC: 3.9 G/DL (ref 2–4)
GLUCOSE BLD STRIP.AUTO-MCNC: 326 MG/DL (ref 70–110)
GLUCOSE BLD STRIP.AUTO-MCNC: 458 MG/DL (ref 70–110)
GLUCOSE BLD STRIP.AUTO-MCNC: 489 MG/DL (ref 70–110)
GLUCOSE SERPL-MCNC: 523 MG/DL (ref 74–99)
GLUCOSE UR STRIP.AUTO-MCNC: >1000 MG/DL
HCT VFR BLD AUTO: 28.2 % (ref 36–48)
HGB BLD-MCNC: 10 G/DL (ref 13–16)
HGB UR QL STRIP: NEGATIVE
KETONES UR QL STRIP.AUTO: ABNORMAL MG/DL
LEUKOCYTE ESTERASE UR QL STRIP.AUTO: NEGATIVE
LIPASE SERPL-CCNC: 37 U/L (ref 73–393)
LYMPHOCYTES # BLD: 1.1 K/UL (ref 0.9–3.6)
LYMPHOCYTES NFR BLD: 15 % (ref 21–52)
MCH RBC QN AUTO: 31.4 PG (ref 24–34)
MCHC RBC AUTO-ENTMCNC: 35.5 G/DL (ref 31–37)
MCV RBC AUTO: 88.7 FL (ref 74–97)
MONOCYTES # BLD: 0.7 K/UL (ref 0.05–1.2)
MONOCYTES NFR BLD: 9 % (ref 3–10)
NEUTS SEG # BLD: 5.3 K/UL (ref 1.8–8)
NEUTS SEG NFR BLD: 72 % (ref 40–73)
NITRITE UR QL STRIP.AUTO: NEGATIVE
P-R INTERVAL, ECG05: 174 MS
PH UR STRIP: 5 [PH] (ref 5–8)
PLATELET # BLD AUTO: 245 K/UL (ref 135–420)
PMV BLD AUTO: 10 FL (ref 9.2–11.8)
POTASSIUM SERPL-SCNC: 4.6 MMOL/L (ref 3.5–5.5)
PROT SERPL-MCNC: 6.8 G/DL (ref 6.4–8.2)
PROT UR STRIP-MCNC: 30 MG/DL
Q-T INTERVAL, ECG07: 342 MS
QRS DURATION, ECG06: 100 MS
QTC CALCULATION (BEZET), ECG08: 418 MS
RBC # BLD AUTO: 3.18 M/UL (ref 4.7–5.5)
RBC #/AREA URNS HPF: ABNORMAL /HPF (ref 0–5)
SODIUM SERPL-SCNC: 132 MMOL/L (ref 136–145)
SP GR UR REFRACTOMETRY: 1.02 (ref 1–1.03)
TROPONIN I SERPL-MCNC: <0.02 NG/ML (ref 0–0.04)
TROPONIN I SERPL-MCNC: <0.02 NG/ML (ref 0–0.04)
UROBILINOGEN UR QL STRIP.AUTO: 1 EU/DL (ref 0.2–1)
VENTRICULAR RATE, ECG03: 90 BPM
WBC # BLD AUTO: 7.5 K/UL (ref 4.6–13.2)
WBC URNS QL MICRO: ABNORMAL /HPF (ref 0–5)

## 2020-12-03 PROCEDURE — 96361 HYDRATE IV INFUSION ADD-ON: CPT

## 2020-12-03 PROCEDURE — 83036 HEMOGLOBIN GLYCOSYLATED A1C: CPT

## 2020-12-03 PROCEDURE — 65270000029 HC RM PRIVATE

## 2020-12-03 PROCEDURE — 74011636637 HC RX REV CODE- 636/637: Performed by: EMERGENCY MEDICINE

## 2020-12-03 PROCEDURE — 96374 THER/PROPH/DIAG INJ IV PUSH: CPT

## 2020-12-03 PROCEDURE — 74176 CT ABD & PELVIS W/O CONTRAST: CPT

## 2020-12-03 PROCEDURE — 81001 URINALYSIS AUTO W/SCOPE: CPT

## 2020-12-03 PROCEDURE — 80053 COMPREHEN METABOLIC PANEL: CPT

## 2020-12-03 PROCEDURE — 96375 TX/PRO/DX INJ NEW DRUG ADDON: CPT

## 2020-12-03 PROCEDURE — 83690 ASSAY OF LIPASE: CPT

## 2020-12-03 PROCEDURE — 96372 THER/PROPH/DIAG INJ SC/IM: CPT

## 2020-12-03 PROCEDURE — 74011250636 HC RX REV CODE- 250/636: Performed by: EMERGENCY MEDICINE

## 2020-12-03 PROCEDURE — 82962 GLUCOSE BLOOD TEST: CPT

## 2020-12-03 PROCEDURE — 85025 COMPLETE CBC W/AUTO DIFF WBC: CPT

## 2020-12-03 PROCEDURE — 99285 EMERGENCY DEPT VISIT HI MDM: CPT

## 2020-12-03 PROCEDURE — 84153 ASSAY OF PSA TOTAL: CPT

## 2020-12-03 PROCEDURE — 93005 ELECTROCARDIOGRAM TRACING: CPT

## 2020-12-03 PROCEDURE — 74011250636 HC RX REV CODE- 250/636: Performed by: INTERNAL MEDICINE

## 2020-12-03 PROCEDURE — 82553 CREATINE MB FRACTION: CPT

## 2020-12-03 PROCEDURE — 74011636637 HC RX REV CODE- 636/637: Performed by: INTERNAL MEDICINE

## 2020-12-03 PROCEDURE — 65390000012 HC CONDITION CODE 44 OBSERVATION

## 2020-12-03 PROCEDURE — C9113 INJ PANTOPRAZOLE SODIUM, VIA: HCPCS | Performed by: INTERNAL MEDICINE

## 2020-12-03 RX ORDER — INSULIN LISPRO 100 [IU]/ML
INJECTION, SOLUTION INTRAVENOUS; SUBCUTANEOUS EVERY 4 HOURS
Status: DISCONTINUED | OUTPATIENT
Start: 2020-12-03 | End: 2020-12-06

## 2020-12-03 RX ORDER — DEXTROSE MONOHYDRATE 100 MG/ML
125-250 INJECTION, SOLUTION INTRAVENOUS AS NEEDED
Status: DISCONTINUED | OUTPATIENT
Start: 2020-12-03 | End: 2020-12-07 | Stop reason: HOSPADM

## 2020-12-03 RX ORDER — ACETAMINOPHEN 650 MG/1
650 SUPPOSITORY RECTAL
Status: DISCONTINUED | OUTPATIENT
Start: 2020-12-03 | End: 2020-12-07 | Stop reason: HOSPADM

## 2020-12-03 RX ORDER — QUETIAPINE FUMARATE 25 MG/1
25 TABLET, FILM COATED ORAL
Status: DISCONTINUED | OUTPATIENT
Start: 2020-12-03 | End: 2020-12-07 | Stop reason: HOSPADM

## 2020-12-03 RX ORDER — POLYETHYLENE GLYCOL 3350 17 G/17G
17 POWDER, FOR SOLUTION ORAL DAILY PRN
Status: DISCONTINUED | OUTPATIENT
Start: 2020-12-03 | End: 2020-12-07 | Stop reason: HOSPADM

## 2020-12-03 RX ORDER — SODIUM CHLORIDE 9 MG/ML
75 INJECTION, SOLUTION INTRAVENOUS CONTINUOUS
Status: DISCONTINUED | OUTPATIENT
Start: 2020-12-03 | End: 2020-12-04

## 2020-12-03 RX ORDER — SODIUM CHLORIDE 0.9 % (FLUSH) 0.9 %
5-40 SYRINGE (ML) INJECTION EVERY 8 HOURS
Status: DISCONTINUED | OUTPATIENT
Start: 2020-12-03 | End: 2020-12-07 | Stop reason: HOSPADM

## 2020-12-03 RX ORDER — FAMOTIDINE 10 MG/ML
20 INJECTION INTRAVENOUS
Status: COMPLETED | OUTPATIENT
Start: 2020-12-03 | End: 2020-12-03

## 2020-12-03 RX ORDER — INSULIN GLARGINE 100 [IU]/ML
40 INJECTION, SOLUTION SUBCUTANEOUS
Status: DISCONTINUED | OUTPATIENT
Start: 2020-12-04 | End: 2020-12-04

## 2020-12-03 RX ORDER — INSULIN GLARGINE 100 [IU]/ML
25 INJECTION, SOLUTION SUBCUTANEOUS
Status: DISCONTINUED | OUTPATIENT
Start: 2020-12-03 | End: 2020-12-03

## 2020-12-03 RX ORDER — MAGNESIUM SULFATE 100 %
4 CRYSTALS MISCELLANEOUS AS NEEDED
Status: DISCONTINUED | OUTPATIENT
Start: 2020-12-03 | End: 2020-12-07 | Stop reason: HOSPADM

## 2020-12-03 RX ORDER — ONDANSETRON 2 MG/ML
4 INJECTION INTRAMUSCULAR; INTRAVENOUS
Status: DISCONTINUED | OUTPATIENT
Start: 2020-12-03 | End: 2020-12-07 | Stop reason: HOSPADM

## 2020-12-03 RX ORDER — ATORVASTATIN CALCIUM 20 MG/1
20 TABLET, FILM COATED ORAL DAILY
Status: DISCONTINUED | OUTPATIENT
Start: 2020-12-04 | End: 2020-12-07 | Stop reason: HOSPADM

## 2020-12-03 RX ORDER — ONDANSETRON 2 MG/ML
4 INJECTION INTRAMUSCULAR; INTRAVENOUS
Status: COMPLETED | OUTPATIENT
Start: 2020-12-03 | End: 2020-12-03

## 2020-12-03 RX ORDER — PANTOPRAZOLE SODIUM 40 MG/10ML
40 INJECTION, POWDER, LYOPHILIZED, FOR SOLUTION INTRAVENOUS EVERY 24 HOURS
Status: DISCONTINUED | OUTPATIENT
Start: 2020-12-03 | End: 2020-12-07 | Stop reason: ALTCHOICE

## 2020-12-03 RX ORDER — ACETAMINOPHEN 325 MG/1
650 TABLET ORAL
Status: DISCONTINUED | OUTPATIENT
Start: 2020-12-03 | End: 2020-12-07 | Stop reason: HOSPADM

## 2020-12-03 RX ORDER — PROMETHAZINE HYDROCHLORIDE 25 MG/1
12.5 TABLET ORAL
Status: DISCONTINUED | OUTPATIENT
Start: 2020-12-03 | End: 2020-12-07 | Stop reason: HOSPADM

## 2020-12-03 RX ORDER — METOPROLOL SUCCINATE 50 MG/1
50 TABLET, EXTENDED RELEASE ORAL DAILY
Status: DISCONTINUED | OUTPATIENT
Start: 2020-12-04 | End: 2020-12-07 | Stop reason: HOSPADM

## 2020-12-03 RX ORDER — SODIUM CHLORIDE 0.9 % (FLUSH) 0.9 %
5-40 SYRINGE (ML) INJECTION AS NEEDED
Status: DISCONTINUED | OUTPATIENT
Start: 2020-12-03 | End: 2020-12-07 | Stop reason: HOSPADM

## 2020-12-03 RX ADMIN — PANTOPRAZOLE SODIUM 40 MG: 40 INJECTION, POWDER, FOR SOLUTION INTRAVENOUS at 20:57

## 2020-12-03 RX ADMIN — FAMOTIDINE 20 MG: 10 INJECTION, SOLUTION INTRAVENOUS at 17:21

## 2020-12-03 RX ADMIN — INSULIN LISPRO 8 UNITS: 100 INJECTION, SOLUTION INTRAVENOUS; SUBCUTANEOUS at 20:57

## 2020-12-03 RX ADMIN — INSULIN GLARGINE 25 UNITS: 100 INJECTION, SOLUTION SUBCUTANEOUS at 20:57

## 2020-12-03 RX ADMIN — ONDANSETRON 4 MG: 2 INJECTION INTRAMUSCULAR; INTRAVENOUS at 17:21

## 2020-12-03 RX ADMIN — SODIUM CHLORIDE 1000 ML: 900 INJECTION, SOLUTION INTRAVENOUS at 18:46

## 2020-12-03 RX ADMIN — INSULIN HUMAN 10 UNITS: 100 INJECTION, SOLUTION PARENTERAL at 18:46

## 2020-12-03 RX ADMIN — SODIUM CHLORIDE 1000 ML: 900 INJECTION, SOLUTION INTRAVENOUS at 17:21

## 2020-12-03 NOTE — ED PROVIDER NOTES
EMERGENCY DEPARTMENT HISTORY AND PHYSICAL EXAM    Date: 12/3/2020  Patient Name: Papito Monet    History of Presenting Illness     Chief Complaint   Patient presents with    Abdominal Pain         History Provided By: Patient and Patient's Daughter    4:48 PM  Papito Monet is a 76 y.o. male with PMHX of diabetes, hypertension who presents to the emergency department C/O aminal pain. Per patient and his daughter he started having abdominal pain Monday. They went to a urgent care on Tuesday and was told it was possibly related to gas so he has been taking Gas-X. He also took some laxatives because he had had a bowel movement for a few days and had a large bowel movement yesterday. He states the pain is still there and comes and goes without any clear relieving or exacerbating factors. He reports today he started having nausea and vomiting. He reports he still passing gas and denies any urinary symptoms, fever, chest pain, shortness of breath, cough, sick contacts, recent travel, other complaints. PCP: Caleb Abrams MD    Current Outpatient Medications   Medication Sig Dispense Refill    multivitamin, tx-iron-ca-min (THERA-M w/ IRON) 9 mg iron-400 mcg tab tablet Take 1 Tab by mouth daily. 30 Tab 0    metoprolol succinate (TOPROL-XL) 50 mg XL tablet Take 1 Tab by mouth daily. 60 Tab 0    insulin glargine (LANTUS) 100 unit/mL injection 5 Units by SubCUTAneous route daily. 1 Vial 0    QUEtiapine (SEROQUEL) 25 mg tablet Take 1 Tab by mouth nightly. 10 Tab 0    atorvastatin (LIPITOR) 20 mg tablet Take 20 mg by mouth daily.  polyethylene glycol (MIRALAX) 17 gram/dose powder Take 17 g by mouth daily. 1 tablespoon with 8 oz of water daily 507 g 0    LOSARTAN PO Take 100 mg by mouth.          Past History     Past Medical History:  Past Medical History:   Diagnosis Date    Diabetes ([address]     DVT of deep femoral vein ([address]     Foot abscess     Hypertension        Past Surgical History:  Past Surgical History:   Procedure Laterality Date    COLONOSCOPY N/A 4/20/2020    COLONOSCOPY; SPOT INJECTION; performed by Vikas Ramirez MD at THE [facility] ENDOSCOPY    HX ORTHOPAEDIC      toe amputation    IR THROMBOLYSIS TRANSCATH NON CORONARY OR INTRACRANIAL  1/23/2020       Family History:  History reviewed. No pertinent family history. Social History:  Social History     Tobacco Use    Smoking status: Never Smoker    Smokeless tobacco: Never Used   Substance Use Topics    Alcohol use: No    Drug use: No       Allergies:  No Known Allergies      Review of Systems   Review of Systems   Constitutional: Negative for fever. Respiratory: Negative for shortness of breath. Cardiovascular: Negative for chest pain. Gastrointestinal: Positive for abdominal pain, nausea and vomiting. All other systems reviewed and are negative.         Physical Exam     Vitals:    12/03/20 1715 12/03/20 1730 12/03/20 1745 12/03/20 1800   BP: (!) 117/50 (!) 140/48 (!) 156/62 (!) 159/59   Pulse: 83 76 82 83   Resp: 25      Temp:       SpO2: 99% 100% 100% 100%   Weight:       Height:         Physical Exam    Nursing notes and vital signs reviewed    Constitutional: Non toxic appearing, moderate distress  Head: Normocephalic, Atraumatic  Eyes: EOMI  Neck: Supple  Cardiovascular: Regular rate and rhythm, no murmurs, rubs, or gallops  Chest: Normal work of breathing and chest excursion bilaterally  Lungs: Clear to ausculation bilaterally  Abdomen: Soft, non tender, non distended, normoactive bowel sounds  Back: No evidence of trauma or deformity  Extremities: No evidence of trauma or deformity, no LE edema  Skin: Warm and dry, normal cap refill  Neuro: Alert and appropriate  Psychiatric: Normal mood and affect      Diagnostic Study Results     Labs -     Recent Results (from the past 12 hour(s))   URINALYSIS W/ RFLX MICROSCOPIC    Collection Time: 12/03/20  4:55 PM   Result Value Ref Range    Color YELLOW      Appearance CLOUDY Specific gravity 1.022 1.005 - 1.030      pH (UA) 5.0 5.0 - 8.0      Protein 30 (A) NEG mg/dL    Glucose >1,000 (A) NEG mg/dL    Ketone TRACE (A) NEG mg/dL    Bilirubin Negative NEG      Blood Negative NEG      Urobilinogen 1.0 0.2 - 1.0 EU/dL    Nitrites Negative NEG      Leukocyte Esterase Negative NEG     URINE MICROSCOPIC ONLY    Collection Time: 12/03/20  4:55 PM   Result Value Ref Range    WBC 0 to 1 0 - 5 /hpf    RBC 0 to 2 0 - 5 /hpf    Epithelial cells FEW 0 - 5 /lpf    Bacteria NONE SEEN NEG /hpf    Amorphous Crystals FEW (A) NEG     EKG, 12 LEAD, INITIAL    Collection Time: 12/03/20  4:56 PM   Result Value Ref Range    Ventricular Rate 90 BPM    Atrial Rate 90 BPM    P-R Interval 174 ms    QRS Duration 100 ms    Q-T Interval 342 ms    QTC Calculation (Bezet) 418 ms    Calculated P Axis 57 degrees    Calculated R Axis 26 degrees    Calculated T Axis 60 degrees    Diagnosis       Normal sinus rhythm  Normal ECG  When compared with ECG of 20-APR-2020 10:14,  No significant change was found     LIPASE    Collection Time: 12/03/20  5:05 PM   Result Value Ref Range    Lipase 37 (L) 73 - 393 U/L   CBC WITH AUTOMATED DIFF    Collection Time: 12/03/20  5:05 PM   Result Value Ref Range    WBC 7.5 4.6 - 13.2 K/uL    RBC 3.18 (L) 4.70 - 5.50 M/uL    HGB 10.0 (L) 13.0 - 16.0 g/dL    HCT 28.2 (L) 36.0 - 48.0 %    MCV 88.7 74.0 - 97.0 FL    MCH 31.4 24.0 - 34.0 PG    MCHC 35.5 31.0 - 37.0 g/dL    RDW 12.6 11.6 - 14.5 %    PLATELET 841 410 - 940 K/uL    MPV 10.0 9.2 - 11.8 FL    NEUTROPHILS 72 40 - 73 %    LYMPHOCYTES 15 (L) 21 - 52 %    MONOCYTES 9 3 - 10 %    EOSINOPHILS 4 0 - 5 %    BASOPHILS 0 0 - 2 %    ABS. NEUTROPHILS 5.3 1.8 - 8.0 K/UL    ABS. LYMPHOCYTES 1.1 0.9 - 3.6 K/UL    ABS. MONOCYTES 0.7 0.05 - 1.2 K/UL    ABS. EOSINOPHILS 0.3 0.0 - 0.4 K/UL    ABS.  BASOPHILS 0.0 0.0 - 0.1 K/UL    DF AUTOMATED     METABOLIC PANEL, COMPREHENSIVE    Collection Time: 12/03/20  5:05 PM   Result Value Ref Range    Sodium 132 (L) 136 - 145 mmol/L    Potassium 4.6 3.5 - 5.5 mmol/L    Chloride 101 100 - 111 mmol/L    CO2 23 21 - 32 mmol/L    Anion gap 8 3.0 - 18 mmol/L    Glucose 523 (HH) 74 - 99 mg/dL    BUN 60 (H) 7.0 - 18 MG/DL    Creatinine 2.44 (H) 0.6 - 1.3 MG/DL    BUN/Creatinine ratio 25 (H) 12 - 20      GFR est AA 32 (L) >60 ml/min/1.73m2    GFR est non-AA 26 (L) >60 ml/min/1.73m2    Calcium 9.1 8.5 - 10.1 MG/DL    Bilirubin, total 0.9 0.2 - 1.0 MG/DL    ALT (SGPT) 18 16 - 61 U/L    AST (SGOT) 6 (L) 10 - 38 U/L    Alk. phosphatase 74 45 - 117 U/L    Protein, total 6.8 6.4 - 8.2 g/dL    Albumin 2.9 (L) 3.4 - 5.0 g/dL    Globulin 3.9 2.0 - 4.0 g/dL    A-G Ratio 0.7 (L) 0.8 - 1.7     CARDIAC PANEL,(CK, CKMB & TROPONIN)    Collection Time: 12/03/20  5:05 PM   Result Value Ref Range    CK - MB <1.0 <3.6 ng/ml    CK-MB Index  0.0 - 4.0 %     CALCULATION NOT PERFORMED WHEN RESULT IS BELOW LINEAR LIMIT    CK 18 (L) 39 - 308 U/L    Troponin-I, QT <0.02 0.0 - 0.045 NG/ML   GLUCOSE, POC    Collection Time: 12/03/20  6:45 PM   Result Value Ref Range    Glucose (POC) 458 (HH) 70 - 110 mg/dL   GLUCOSE, POC    Collection Time: 12/03/20  6:46 PM   Result Value Ref Range    Glucose (POC) 489 (HH) 70 - 110 mg/dL       Radiologic Studies -   CT ABD PELV WO CONT   Final Result   IMPRESSION:      1. Small amount of pelvic ascites as well as along the left paracolic gutter,   nonspecific. 2. Otherwise, no findings of acute intra-abdominal abnormality. CT Results  (Last 48 hours)               12/03/20 1836  CT ABD PELV WO CONT Final result    Impression:  IMPRESSION:       1. Small amount of pelvic ascites as well as along the left paracolic gutter,   nonspecific. 2. Otherwise, no findings of acute intra-abdominal abnormality. Narrative:  EXAM: CT of the abdomen and pelvis       CLINICAL INDICATION/HISTORY: abd pain     > Additional: None. COMPARISON: None.      > Reference Exam: CTA abdomen and pelvis 04/20/2020 TECHNIQUE: Axial CT imaging of the abdomen and pelvis was performed without   intravenous contrast. Oral contrast not administered. Multiplanar reformats   were generated. Dose reduction techniques used: automated exposure control,   adjustment of the mAs and/or kVp according to patient size, and iterative   reconstruction techniques. Digital Imaging and Communications in Medicine   (DICOM) format image data are available to nonaffiliated external healthcare   facilities or entities on a secure, media free, reciprocally searchable basis   with patient authorization for at least a 12-month period after this study. _______________       FINDINGS:       General comment: Mildly limited due to respiratory motion. LOWER CHEST: No focal consolidation. LIVER, BILIARY: Liver is unremarkable. No biliary dilation. Gallbladder is   unremarkable. PANCREAS: Diffusely atrophic and fatty replaced. SPLEEN: Unremarkable. ADRENALS: Unremarkable. KIDNEYS/URETERS/BLADDER: No hydronephrosis. No calculi. LYMPH NODES: No enlarged lymph nodes. GASTROINTESTINAL TRACT: No bowel dilation or wall thickening. PELVIC ORGANS: Mild prostatic enlargement. VASCULATURE: Moderate atherosclerosis. Metallic densities in the left upper   pelvis consistent with embolization coils. BONES: No acute or aggressive osseous abnormalities identified.        OTHER: Small amount of free pelvic fluid, as well as along the left paracolic   gutter nonspecific.       _______________               CXR Results  (Last 48 hours)    None          Medications given in the ED-  Medications   sodium chloride 0.9 % bolus infusion 1,000 mL (0 mL IntraVENous IV Completed 12/3/20 1821)   ondansetron (ZOFRAN) injection 4 mg (4 mg IntraVENous Given 12/3/20 1721)   famotidine (PF) (PEPCID) injection 20 mg (20 mg IntraVENous Given 12/3/20 1721)   sodium chloride 0.9 % bolus infusion 1,000 mL (1,000 mL IntraVENous New Bag 12/3/20 1846)   insulin regular (NOVOLIN R, HUMULIN R) injection 10 Units (10 Units IntraVENous Given 12/3/20 1846)         Medical Decision Making   I am the first provider for this patient. I reviewed the vital signs, available nursing notes, past medical history, past surgical history, family history and social history. Vital Signs-Reviewed the patient's vital signs. Pulse Oximetry Analysis - 100% on room air, not hypoxic     Cardiac Monitor:  Rate: 89 bpm  Rhythm: Normal sinus    EKG interpretation: (Preliminary)  EKG read by Dr. Tremaine Garcia at 4:59 PM  Normal sinus rhythm at a rate of 90 bpm, OK interval 174 ms, QRS duration 100 ms, similar when compared to prior from April 2020    Records Reviewed: Nursing Notes, Old Medical Records and Previous electrocardiograms    Provider Notes (Medical Decision Making): Maida Hess is a 76 y.o. male presenting for abdominal pain. CT without acute process within the abdomen but labs show significant hyperglycemia without DKA and acute kidney injury. IV hydration and insulin initiated and will discuss with hospitalist for further in-hospital management. Patient understands and agrees with this plan. Procedures:  Procedures    ED Course:   6:54 PM  Updated patient on all results and plan. All questions answered. CONSULT NOTE:   7:11 PM  Dr. Tremaine Garcia spoke with Dr. Josué Garcia   Specialty: Hospitalist  Discussed pt's hx, disposition, and available diagnostic and imaging results over the telephone. Reviewed care plans. Accepts to medical.       Diagnosis and Disposition     Critical Care Time: None    Core Measures:  For Hospitalized Patients:    1. Hospitalization Decision Time:  The decision to hospitalize the patient was made by Dr. Tremaine Garcia at 6:30 PM on 12/3/2020    2.  Aspirin: Aspirin was not given because the patient did not present with a stroke at the time of their Emergency Department evaluation    6:54 PM  Patient is being admitted to the hospital by Dr. Gladys Cox. The results of their tests and reasons for their admission have been discussed with them and/or available family. They convey agreement and understanding for the need to be admitted and for their admission diagnosis. CONDITIONS ON ADMISSION:  Sepsis is not present at the time of admission. Deep Vein Thrombosis is not present at the time of admission. Thrombosis is not present at the time of admission. Urinary Tract Infection is not present at the time of admission. Pneumonia is not present at the time of admission. MRSA is not present at the time of admission. Wound infection is not present at the time of admission. Pressure Ulcer is not present at the time of admission. CLINICAL IMPRESSION:    1. Abdominal pain, generalized    2. JC (acute kidney injury) ([address]    3. Hyperglycemia      _______________________________      Please note that this dictation was completed with [employer], the computer voice recognition software. Quite often unanticipated grammatical, syntax, homophones, and other interpretive errors are inadvertently transcribed by the computer software. Please disregard these errors. Please excuse any errors that have escaped final proofreading.

## 2020-12-04 ENCOUNTER — APPOINTMENT (OUTPATIENT)
Dept: MRI IMAGING | Age: 75
End: 2020-12-04
Attending: INTERNAL MEDICINE
Payer: MEDICARE

## 2020-12-04 ENCOUNTER — HOSPITAL ENCOUNTER (OUTPATIENT)
Dept: ULTRASOUND IMAGING | Age: 75
Discharge: HOME | End: 2020-12-04
Attending: INTERNAL MEDICINE
Payer: MEDICARE

## 2020-12-04 ENCOUNTER — APPOINTMENT (OUTPATIENT)
Dept: CT IMAGING | Age: 75
End: 2020-12-04
Attending: HOSPITALIST
Payer: MEDICARE

## 2020-12-04 LAB
ALBUMIN SERPL-MCNC: 2.6 G/DL (ref 3.4–5)
ALBUMIN/GLOB SERPL: 0.7 {RATIO} (ref 0.8–1.7)
ALP SERPL-CCNC: 60 U/L (ref 45–117)
ALT SERPL-CCNC: 18 U/L (ref 16–61)
ANION GAP SERPL CALC-SCNC: 7 MMOL/L (ref 3–18)
AST SERPL-CCNC: 11 U/L (ref 10–38)
BASOPHILS # BLD: 0 K/UL (ref 0–0.1)
BASOPHILS NFR BLD: 0 % (ref 0–2)
BILIRUB SERPL-MCNC: 0.6 MG/DL (ref 0.2–1)
BUN SERPL-MCNC: 44 MG/DL (ref 7–18)
BUN/CREAT SERPL: 26 (ref 12–20)
CALCIUM SERPL-MCNC: 8.9 MG/DL (ref 8.5–10.1)
CHLORIDE SERPL-SCNC: 114 MMOL/L (ref 100–111)
CK MB CFR SERPL CALC: ABNORMAL % (ref 0–4)
CK MB SERPL-MCNC: <1 NG/ML (ref 5–25)
CK SERPL-CCNC: 23 U/L (ref 39–308)
CO2 SERPL-SCNC: 21 MMOL/L (ref 21–32)
CREAT SERPL-MCNC: 1.7 MG/DL (ref 0.6–1.3)
DIFFERENTIAL METHOD BLD: ABNORMAL
EOSINOPHIL # BLD: 0.4 K/UL (ref 0–0.4)
EOSINOPHIL NFR BLD: 9 % (ref 0–5)
ERYTHROCYTE [DISTWIDTH] IN BLOOD BY AUTOMATED COUNT: 12.6 % (ref 11.6–14.5)
EST. AVERAGE GLUCOSE BLD GHB EST-MCNC: 243 MG/DL
GLOBULIN SER CALC-MCNC: 3.6 G/DL (ref 2–4)
GLUCOSE BLD STRIP.AUTO-MCNC: 105 MG/DL (ref 70–110)
GLUCOSE BLD STRIP.AUTO-MCNC: 108 MG/DL (ref 70–110)
GLUCOSE BLD STRIP.AUTO-MCNC: 146 MG/DL (ref 70–110)
GLUCOSE BLD STRIP.AUTO-MCNC: 170 MG/DL (ref 70–110)
GLUCOSE BLD STRIP.AUTO-MCNC: 89 MG/DL (ref 70–110)
GLUCOSE BLD STRIP.AUTO-MCNC: 92 MG/DL (ref 70–110)
GLUCOSE SERPL-MCNC: 130 MG/DL (ref 74–99)
HBA1C MFR BLD: 10.1 % (ref 4.2–5.6)
HCT VFR BLD AUTO: 25.7 % (ref 36–48)
HGB BLD-MCNC: 9.2 G/DL (ref 13–16)
LYMPHOCYTES # BLD: 1.2 K/UL (ref 0.9–3.6)
LYMPHOCYTES NFR BLD: 25 % (ref 21–52)
MAGNESIUM SERPL-MCNC: 1.9 MG/DL (ref 1.6–2.6)
MCH RBC QN AUTO: 31.6 PG (ref 24–34)
MCHC RBC AUTO-ENTMCNC: 35.8 G/DL (ref 31–37)
MCV RBC AUTO: 88.3 FL (ref 74–97)
MONOCYTES # BLD: 0.6 K/UL (ref 0.05–1.2)
MONOCYTES NFR BLD: 12 % (ref 3–10)
NEUTS SEG # BLD: 2.6 K/UL (ref 1.8–8)
NEUTS SEG NFR BLD: 54 % (ref 40–73)
PLATELET # BLD AUTO: 219 K/UL (ref 135–420)
PMV BLD AUTO: 9.8 FL (ref 9.2–11.8)
POTASSIUM SERPL-SCNC: 4.7 MMOL/L (ref 3.5–5.5)
PROT SERPL-MCNC: 6.2 G/DL (ref 6.4–8.2)
PSA SERPL-MCNC: 0.7 NG/ML (ref 0–4)
RBC # BLD AUTO: 2.91 M/UL (ref 4.7–5.5)
SODIUM SERPL-SCNC: 142 MMOL/L (ref 136–145)
TROPONIN I SERPL-MCNC: <0.02 NG/ML (ref 0–0.04)
WBC # BLD AUTO: 4.8 K/UL (ref 4.6–13.2)

## 2020-12-04 PROCEDURE — 82962 GLUCOSE BLOOD TEST: CPT

## 2020-12-04 PROCEDURE — 97166 OT EVAL MOD COMPLEX 45 MIN: CPT

## 2020-12-04 PROCEDURE — 65390000012 HC CONDITION CODE 44 OBSERVATION

## 2020-12-04 PROCEDURE — 83735 ASSAY OF MAGNESIUM: CPT

## 2020-12-04 PROCEDURE — 97161 PT EVAL LOW COMPLEX 20 MIN: CPT

## 2020-12-04 PROCEDURE — 74011250636 HC RX REV CODE- 250/636: Performed by: FAMILY MEDICINE

## 2020-12-04 PROCEDURE — 74011636637 HC RX REV CODE- 636/637: Performed by: INTERNAL MEDICINE

## 2020-12-04 PROCEDURE — 76705 ECHO EXAM OF ABDOMEN: CPT

## 2020-12-04 PROCEDURE — 65270000029 HC RM PRIVATE

## 2020-12-04 PROCEDURE — 74176 CT ABD & PELVIS W/O CONTRAST: CPT

## 2020-12-04 PROCEDURE — 96375 TX/PRO/DX INJ NEW DRUG ADDON: CPT

## 2020-12-04 PROCEDURE — C9113 INJ PANTOPRAZOLE SODIUM, VIA: HCPCS | Performed by: INTERNAL MEDICINE

## 2020-12-04 PROCEDURE — 82553 CREATINE MB FRACTION: CPT

## 2020-12-04 PROCEDURE — 97535 SELF CARE MNGMENT TRAINING: CPT

## 2020-12-04 PROCEDURE — 74011000258 HC RX REV CODE- 258: Performed by: FAMILY MEDICINE

## 2020-12-04 PROCEDURE — 96361 HYDRATE IV INFUSION ADD-ON: CPT

## 2020-12-04 PROCEDURE — 96376 TX/PRO/DX INJ SAME DRUG ADON: CPT

## 2020-12-04 PROCEDURE — 74181 MRI ABDOMEN W/O CONTRAST: CPT

## 2020-12-04 PROCEDURE — 36415 COLL VENOUS BLD VENIPUNCTURE: CPT

## 2020-12-04 PROCEDURE — 80053 COMPREHEN METABOLIC PANEL: CPT

## 2020-12-04 PROCEDURE — 74011250637 HC RX REV CODE- 250/637: Performed by: INTERNAL MEDICINE

## 2020-12-04 PROCEDURE — 74011250636 HC RX REV CODE- 250/636: Performed by: INTERNAL MEDICINE

## 2020-12-04 PROCEDURE — 85025 COMPLETE CBC W/AUTO DIFF WBC: CPT

## 2020-12-04 RX ORDER — INSULIN GLARGINE 100 [IU]/ML
30 INJECTION, SOLUTION SUBCUTANEOUS
Status: DISCONTINUED | OUTPATIENT
Start: 2020-12-04 | End: 2020-12-07

## 2020-12-04 RX ORDER — MORPHINE SULFATE 2 MG/ML
2 INJECTION, SOLUTION INTRAMUSCULAR; INTRAVENOUS
Status: DISCONTINUED | OUTPATIENT
Start: 2020-12-04 | End: 2020-12-07 | Stop reason: HOSPADM

## 2020-12-04 RX ORDER — AMLODIPINE BESYLATE 5 MG/1
5 TABLET ORAL DAILY
COMMUNITY

## 2020-12-04 RX ORDER — METFORMIN HYDROCHLORIDE 750 MG/1
750 TABLET, EXTENDED RELEASE ORAL DAILY
COMMUNITY

## 2020-12-04 RX ORDER — DEXTROSE MONOHYDRATE AND SODIUM CHLORIDE 5; .9 G/100ML; G/100ML
75 INJECTION, SOLUTION INTRAVENOUS CONTINUOUS
Status: DISCONTINUED | OUTPATIENT
Start: 2020-12-04 | End: 2020-12-06

## 2020-12-04 RX ADMIN — SODIUM CHLORIDE 100 ML/HR: 900 INJECTION, SOLUTION INTRAVENOUS at 00:23

## 2020-12-04 RX ADMIN — DEXTROSE MONOHYDRATE AND SODIUM CHLORIDE 75 ML/HR: 5; .9 INJECTION, SOLUTION INTRAVENOUS at 23:15

## 2020-12-04 RX ADMIN — QUETIAPINE FUMARATE 25 MG: 25 TABLET ORAL at 00:16

## 2020-12-04 RX ADMIN — Medication 10 ML: at 15:37

## 2020-12-04 RX ADMIN — INSULIN LISPRO 2 UNITS: 100 INJECTION, SOLUTION INTRAVENOUS; SUBCUTANEOUS at 04:40

## 2020-12-04 RX ADMIN — Medication 10 ML: at 23:18

## 2020-12-04 RX ADMIN — PANTOPRAZOLE SODIUM 40 MG: 40 INJECTION, POWDER, FOR SOLUTION INTRAVENOUS at 21:52

## 2020-12-04 RX ADMIN — PIPERACILLIN AND TAZOBACTAM 3.38 G: 3; .375 INJECTION, POWDER, LYOPHILIZED, FOR SOLUTION INTRAVENOUS at 23:16

## 2020-12-04 RX ADMIN — Medication 10 ML: at 00:24

## 2020-12-04 RX ADMIN — ACETAMINOPHEN 650 MG: 325 TABLET ORAL at 06:04

## 2020-12-04 RX ADMIN — QUETIAPINE FUMARATE 25 MG: 25 TABLET ORAL at 23:15

## 2020-12-04 NOTE — ROUTINE PROCESS
Bedside and Verbal shift change report given to Whitney Acevedo (oncoming nurse) by Mari Neville (offgoing nurse). Report included the following information SBAR, Kardex and MAR.

## 2020-12-04 NOTE — PROGRESS NOTES
Shift Summary:  Patient slept through the night. Received Tylenol 650 mg po for pain to abdomen, unable to rate level. NPO since MN for ERCP and ultrasound. POC glucose overnight 146 and at 0439 170 in which Humalog 2 units sq given. 0740 - Bedside and Verbal shift change report given to DREW Devries RN (oncoming nurse) by Bettina Beaulieu (offgoing nurse). Report included the following information SBAR, Kardex, Intake/Output and MAR.

## 2020-12-04 NOTE — PROGRESS NOTES
S: Diet implemented via DR. Inis Koyanagi Li's order. B: This am patient presented NPO for MRCP study and Ultrasound study of abdomen. As of 1400 pm, patient had all procedures completed as directed. A: At 1448 pm, DR. Víctor Parker contacted floor and stated that diabetic diet order had been implemented as directed. Understanding was verbalized. R: Will continue with prescribed plan of care as directed.    Adriel Tsang  12/4/2020  3:05 PM

## 2020-12-04 NOTE — ED NOTES
TRANSFER - OUT REPORT:    Verbal report given to T. Nyle Sandhoff, RN(name) on [facility]  being transferred to [facility] (unit) for routine progression of care       Report consisted of patients Situation, Background, Assessment and   Recommendations(SBAR). Information from the following report(s) SBAR, Kardex, ED Summary, [facility] and Recent Results was reviewed with the receiving nurse. Lines:   Peripheral IV 12/03/20 Left Antecubital (Active)        Opportunity for questions and clarification was provided.       Patient transported with:   [employer]

## 2020-12-04 NOTE — H&P
History & Physical    Patient: Maritza Luna MRN: [id number]  [facility]: [id number]    YOB: 1945  Age: 76 y.o. Sex: male      DOA: 12/3/2020    Chief Complaint:   Chief Complaint   Patient presents with    Abdominal Pain          HPI:     Maritza Luna is a 76 y.o.  male who he of diabetes, DVT off of back anticoagulation, hypertension hyperlipidemia presents to the emergency room with 3 to 4-day history of increasing abdominal pain and constipation he went to urgent urgent care and a series of x-rays was done he was given laxative with some improvement. He has had progressive decrease in appetite over the last 2 days and yesterday had a series of loose stool from the laxative. Today he presents to the emergency room with persistent bloating and gas as well as weakness and nausea and 1 or 2 episodes of vomiting. A CT scan done in the emergency room shows pericolic fluid normal gallbladder however there is worsening renal function creatinine increased to 2.44 from a baseline of 1.3 I am asked to admit for hydration and pain control of abdomen. Hx obtained from daughter who lives with patient Rafiq Iglesias   Past Medical History:   Diagnosis Date    Diabetes ([address]     DVT of deep femoral vein ([facility]     Foot abscess     Hypertension        Past Surgical History:   Procedure Laterality Date    COLONOSCOPY N/A 4/20/2020    COLONOSCOPY; SPOT INJECTION; performed by Olamide Cheney MD at THE [facility] ENDOSCOPY    HX ORTHOPAEDIC      toe amputation    IR THROMBOLYSIS TRANSCATH NON CORONARY OR INTRACRANIAL  1/23/2020       History reviewed. No pertinent family history.     Social History     Socioeconomic History    Marital status: SINGLE     Spouse name: Not on file    Number of children: Not on file    Years of education: Not on file    Highest education level: Not on file   Tobacco Use    Smoking status: Never Smoker    Smokeless tobacco: Never Used   Substance and Sexual Activity    Alcohol use: No    Drug use: No       Prior to Admission medications    Medication Sig Start Date End Date Taking? Authorizing Provider   multivitamin, tx-iron-ca-min (THERA-M w/ IRON) 9 mg iron-400 mcg tab tablet Take 1 Tab by mouth daily. 4/24/20   Isis Silbey MD   metoprolol succinate (TOPROL-XL) 50 mg XL tablet Take 1 Tab by mouth daily. 1/30/20   Isis Sibley MD   insulin glargine (LANTUS) 100 unit/mL injection 5 Units by SubCUTAneous route daily. 1/28/20   Anh Harrington MD   QUEtiapine (SEROQUEL) 25 mg tablet Take 1 Tab by mouth nightly. 1/28/20   Anh Harrington MD   atorvastatin (LIPITOR) 20 mg tablet Take 20 mg by mouth daily. Mishel Barlow MD   polyethylene glycol (MIRALAX) 17 gram/dose powder Take 17 g by mouth daily. 1 tablespoon with 8 oz of water daily 12/10/19   Olamide West MD   LOSARTAN PO Take 100 mg by mouth. OtherMishel MD        No Known Allergies      Review of Systems  GENERAL: Patient alert, awake and oriented times 3, able to communicate full sentences and not in distress. As of history is obtained from daughter as patient has limited English and also with some confusion  HEENT: No change in vision, no earache, tinnitus, sore throat or sinus congestion. NECK: No pain or stiffness. PULMONARY: No shortness of breath, cough or wheeze. Cardiovascular: no pnd or orthopnea, no CP  GASTROINTESTINAL+ abdominal pain, +nausea, +vomiting + diarrhea, no melena or bright red blood per rectum. GENITOURINARY: No urinary frequency, urgency, hesitancy or dysuria. MUSCULOSKELETAL: No joint or muscle pain, no back pain, no recent trauma. DERMATOLOGIC: No rash, no itching, no lesions. ENDOCRINE: No polyuria, polydipsia, no heat or cold intolerance. No recent change in weight. HEMATOLOGICAL: No anemia or easy bruising or bleeding. NEUROLOGIC: No headache, seizures, numbness, tingling or weakness.        Physical Exam:     Physical Exam:  Visit Vitals  BP (!) 159/59   Pulse 83   Temp 97.7 °F (36.5 °C)   Resp 25   Ht 5' 10\" (1.778 m)   Wt 67.6 kg (149 lb)   SpO2 100%   BMI 21.38 kg/m²      O2 Device: Room air    Temp (24hrs), Av.7 °F (36.5 °C), Min:97.7 °F (36.5 °C), Max:97.7 °F (36.5 °C)    No intake/output data recorded.  07 -  1900  In: 1000 [I.V.:1000]  Out: -     General:  Alert, cooperative, no distress, appears stated age. Head: Normocephalic, without obvious abnormality, atraumatic. Eyes:  Conjunctivae/corneas clear. PERRL, EOMs intact. Nose: Nares normal. No drainage or sinus tenderness. Neck: Supple, symmetrical, trachea midline, no adenopathy, thyroid: no enlargement, no carotid bruit and no JVD. Lungs:   Clear to auscultation bilaterally. Heart:  Regular rate and rhythm, S1, S2 normal.     Abdomen: Soft, non-tender. Bowel sounds normal.    Extremities: Extremities normal, atraumatic, no cyanosis or edema. Pulses: 2+ and symmetric all extremities. Skin:  No rashes or lesions   Neurologic: AAOx3, No focal motor or sensory deficit. Labs Reviewed: All lab results for the last 24 hours reviewed. and EKG    Procedures/imaging: see electronic medical records for all procedures/Xrays and details which were not copied into this note but were reviewed prior to creation of Plan  CT Results  (Last 48 hours)               20 1836  CT ABD PELV WO CONT Final result    Impression:  IMPRESSION:       1. Small amount of pelvic ascites as well as along the left paracolic gutter,   nonspecific. 2. Otherwise, no findings of acute intra-abdominal abnormality. Narrative:  EXAM: CT of the abdomen and pelvis       CLINICAL INDICATION/HISTORY: abd pain     > Additional: None. COMPARISON: None. > Reference Exam: CTA abdomen and pelvis 2020       TECHNIQUE: Axial CT imaging of the abdomen and pelvis was performed without   intravenous contrast. Oral contrast not administered. Multiplanar reformats   were generated.  Dose reduction techniques used: automated exposure control,   adjustment of the mAs and/or kVp according to patient size, and iterative   reconstruction techniques. Digital Imaging and Communications in Medicine   (DICOM) format image data are available to nonaffiliated external healthcare   facilities or entities on a secure, media free, reciprocally searchable basis   with patient authorization for at least a 12-month period after this study. _______________       FINDINGS:       General comment: Mildly limited due to respiratory motion. LOWER CHEST: No focal consolidation. LIVER, BILIARY: Liver is unremarkable. No biliary dilation. Gallbladder is   unremarkable. PANCREAS: Diffusely atrophic and fatty replaced. SPLEEN: Unremarkable. ADRENALS: Unremarkable. KIDNEYS/URETERS/BLADDER: No hydronephrosis. No calculi. LYMPH NODES: No enlarged lymph nodes. GASTROINTESTINAL TRACT: No bowel dilation or wall thickening. PELVIC ORGANS: Mild prostatic enlargement. VASCULATURE: Moderate atherosclerosis. Metallic densities in the left upper   pelvis consistent with embolization coils. BONES: No acute or aggressive osseous abnormalities identified. OTHER: Small amount of free pelvic fluid, as well as along the left paracolic   gutter nonspecific.       _______________                   Assessment/Plan     Active Problems:  1. Acute kidney injury we will hold his losartan for now and start gentle fluids he is given antiemetics in the form of Zofran  2. Abdominal pain likely from constipation however has had gallbladder sludge in the past we will obtain an ultrasound and an MRI of the abdomen with MRCP  3.  Diabetes poorly controlled he is given IV insulin his Lantus is increased to 40 units and he is placed on a resistant sliding scale  4. Hypertension losartan is held he is continued on his per metoprolol  5.   History of DVT but bleeding on anticoagulation no longer on blood thinners will use SCD for DVT prevention       DVT/GI Prophylaxis: SCD's        Michelle Buck MD  12/3/2020 7:13 PM

## 2020-12-04 NOTE — PROGRESS NOTES
Progress Note





- Progress Note


Date of Service: 11/03/19


SOAP: 


Subjective:


No pain.





Objective:





 Vital Signs











Temp  98.2 F   11/03/19 11:28


 


Pulse  79   11/03/19 11:28


 


Resp  18   11/03/19 11:28


 


BP  99/48   11/03/19 11:28


 


Pulse Ox  98   11/03/19 11:28





R groin dressing removed.  Granulation tissue present.  Bleeding from edges 

medially.


VAC replaced.  


 Intake & Output











 11/02/19 11/03/19 11/03/19





 19:59 06:59 18:59


 


Intake Total   0


 


Output Total   


 


Balance   0


 


Intake:   


 


  IV Fluids   


 


    ABX - ZOSYN   


 


    LR   


 


    NS (0.9%)   


 


  Oral   0


 


Output:   


 


  Urine   


 


Other:   


 


  Estimated Void   Medium


 


  # Bowel Movements   


 


  # Voids   3














Assessment:


s/p R orchiectomy.  Wound dehiscence.  No evidence of infection.  Responding 

well to VAC.








Plan:


Cont VAC with dressing changes per [facility] routine. S: NPO implementation due to Small bowel obstruction. B: This AM patient was made NPO for MRCP & abdominal ultrasound study. As of 1448 pm, patient presented back on unit and Dr. Meghann Solorzano inputted diabetic diet for patient. A: As of 1523 pm, Dr. Rai Hallman contacted floor by phone and stated that patient's MRCP study revealed a small bowel obstruction to hold  Medications as directed and to make patient NPO. Understanding was verbalized and medications were held as directed. R: Will continue with prescribed plan of care as directed.   Adriel Tsang  12/4/2020  3:24 PM

## 2020-12-04 NOTE — PROGRESS NOTES
Problem: Mobility Impaired (Adult and Pediatric)  Goal: *Acute Goals and Plan of Care (Insert Text)  Description: Physical Therapy Goals   Initiated 12/4/2020 and to be accomplished within 3-4 day(s)  1. Patient will move from supine <> sit with mod I in prep for out of bed activity and change of position. 2.  Patient will perform sit<> stand with mod I with LRAD in prep for transfers/ambulation. 3.  Patient will ambulate 300 feet with mod I/LRAD for improved functional mobility at discharge. 4.  Patient will ascend/descend 3-5 stairs with handrail(s) with CGA for home re-entry as needed. Outcome: Progressing Towards Goal  PHYSICAL THERAPY EVALUATION    Patient: Joyice Aschoff (23 y.o. male)  Date: 12/4/2020  Primary Diagnosis: JC (acute kidney injury) ([facility] [address] [N17.9]  Precautions:   Fall  PLOF: amb by furniture walking per pt report    ASSESSMENT :  Based on the objective data described below, the patient is a 77 yo M seen on medical unit following admission for SBO. Pt presents with decreased independence in functional mobility with regard to transfers, impaired gait quality and balance due to h/o lateral toe amputations. Pt seen with OT this session. Demonstrates bed mobility with supervision. Donned shoes w/o assist. Transfers STS with CGA and participated in GT with min HHA for balance and safety to prevent fall. Pt with tendency to wt bear on lateral border of R foot with subsequent occasional path deviations and intermittent scissoring/narrow ANN MARIE. Pt reports holding onto furniture at home when ambulating. Pt returned to room; advised to have staff assist when ambulating in room to limit fall risk. Pt expressed understanding of same, nurse notified and white board updated to reflect same. decreased activity tolerance. Pt left seated EOB with all needs in reach, and in NAD.   Recommend HHPT for follow up physical therapy upon discharge to reach maximal level of independence/safety with functional mobility. Pt Education: Role of physical therapy in acute care setting, fall prevention and safety/technique during functional mobility tasks      Patient will benefit from skilled intervention to address the above impairments. Patients rehabilitation potential is considered to be Good  Factors which may influence rehabilitation potential include:   []         None noted  []         Mental ability/status  [x]         Medical condition  []         Home/family situation and support systems  []         Safety awareness  []         Pain tolerance/management  []         Other:      PLAN :  Recommendations and Planned Interventions:  [x]           Bed Mobility Training             []    Neuromuscular Re-Education  [x]           Transfer Training                   []    Orthotic/Prosthetic Training  [x]           Gait Training                          []    Modalities  [x]           Therapeutic Exercises          []    Edema Management/Control  [x]           Therapeutic Activities            [x]    Patient and Family Training/Education  []           Other (comment):    Frequency/Duration: Patient will be followed by physical therapy 1-2 times per day to address goals. Discharge Recommendations: Home Health   Further Equipment Recommendations for Discharge: N/A     SUBJECTIVE:   Patient stated My stomach hurts some.  (5-6/10 nurse made aware Thais Kang).     OBJECTIVE DATA SUMMARY:     Past Medical History:   Diagnosis Date    Diabetes ([facility] [place] [address]     DVT of deep femoral vein ([facility] [place] [address]     Foot abscess     Hypertension      Past Surgical History:   Procedure Laterality Date    COLONOSCOPY N/A 4/20/2020    COLONOSCOPY; SPOT INJECTION; performed by Malick Nunez MD at [facility] ENDOSCOPY    HX ORTHOPAEDIC      toe amputation    IR THROMBOLYSIS TRANSCATH NON CORONARY OR INTRACRANIAL  1/23/2020     Barriers to Learning/Limitations: yes;  language  Compensate with: Visual Cues and Tactile Cues  Prior Level of Function/Home Situation: as noted above  Home Situation  Home Environment: Apartment  # Steps to Enter: (unknown)  One/Two Story Residence: One story  Living Alone: No  Support Systems: Child(shahla), Family member(s)  Patient Expects to be Discharged to[de-identified] Apartment  Current DME Used/Available at Home: Cane, straight, Walker, rolling  Critical Behavior:  Neurologic State: Alert  Orientation Level: Oriented X4  Cognition: Appropriate for age attention/concentration; Follows commands;Poor safety awareness  Safety/Judgement: Awareness of environment;Decreased awareness of need for safety;Decreased awareness of need for assistance  Psychosocial  Patient Behaviors: Calm; Cooperative  Purposeful Interaction: Yes  Pt Identified Daily Priority: Clinical issues (comment)  Caritas Process: Nurture loving kindness;Enable chloe/hope;Establish trust;Nurture spiritual self;Teaching/learning; Attend basic human needs;Create healing environment  Caring Interventions: Reassure; Therapeutic modalities  Reassure: Therapeutic listening; Informing; Acceptance;Quiet presence; Sit with patient;Caring rounds  Therapeutic Modalities: Humor; Intentional therapeutic touch  Skin Condition/Temp: Warm  Skin Integrity: Intact  Skin Integumentary  Skin Color: Appropriate for ethnicity  Skin Condition/Temp: Warm  Skin Integrity: Intact  Turgor: Non-tenting  Hair Growth: Present  Nails: Within Defined Limits  Strength:    Strength: Generally decreased, functional  Tone & Sensation:   Tone: Normal  Sensation: Intact  Range Of Motion:  AROM: Generally decreased, functional  Functional Mobility:  Bed Mobility:  Supine to Sit: Supervision  Scooting: Supervision  Transfers:  Sit to Stand: Contact guard assistance  Stand to Sit: Contact guard assistance  Balance:   Sitting: Intact  Standing: Impaired; Without support  Standing - Static: Good  Standing - Dynamic : Fair;Occasional  Ambulation/Gait Training:  Distance (ft): 300 Feet (ft)  Assistive Device: Gait belt  Ambulation - Level of Assistance: Minimal assistance(HHA)  Gait Description (WDL): Exceptions to WDL  Gait Abnormalities: Decreased step clearance;Scissoring(Wt bearing on lateral boarder of R foot w/decr'd balance occ)  Base of Support: Narrowed  Speed/Johnna: Pace decreased (<100 feet/min)  Interventions: Safety awareness training  Pain:  Pain Scale 1: Numeric (0 - 10)  Pain Intensity 1: 5  Pain Location 1: Abdomen  Pain Orientation 1: Anterior;Mid  Pain Description 1: Aching  Pain Intervention(s) 1: Nurse notified  Activity Tolerance:   Good   Please refer to the flowsheet for vital signs taken during this treatment. After treatment:   [x]         Patient left in no apparent distress sitting up EOB  []         Patient left in no apparent distress in bed  [x]         Call bell left within reach  [x]         Nursing notified  []         Caregiver present  []         Bed alarm activated    COMMUNICATION/EDUCATION:   [x]         Fall prevention education was provided and the patient/caregiver indicated understanding. [x]         Patient/family have participated as able in goal setting and plan of care. [x]         Patient/family agree to work toward stated goals and plan of care. []         Patient understands intent and goals of therapy, but is neutral about his/her participation. []         Patient is unable to participate in goal setting and plan of care.     Eval Complexity: History: HIGH Complexity :3+ comorbidities / personal factors will impact the outcome/ POC Exam:LOW Complexity : 1-2 Standardized tests and measures addressing body structure, function, activity limitation and / or participation in recreation  Presentation: LOW Complexity : Stable, uncomplicated  Clinical Decision Making:Low Complexity    Overall Complexity:LOW     Thank you for this referral.  Juan Jose Butler, PT   Time Calculation: 16 mins

## 2020-12-04 NOTE — ROUTINE PROCESS
TRANSFER - IN REPORT:    Verbal report received from NUNO Guallpa RN(name) on Papito Monet  being received from ED(unit) for routine progression of care      Report consisted of patients Situation, Background, Assessment and   Recommendations(SBAR). Information from the following report(s) SBAR, Kardex, ED Summary, Intake/Output, MAR and Recent Results was reviewed with the receiving nurse. Opportunity for questions and clarification was provided. Assessment completed upon patients arrival to unit and care assumed. 2120 - Verbal shift change report given to RUBEN Domínguez (oncoming nurse) by Seven Andrade RN (offgoing nurse). Report included the following information SBAR, Kardex, ED Summary, Intake/Output, MAR and Recent Results.

## 2020-12-04 NOTE — PROGRESS NOTES
Problem: Self Care Deficits Care Plan (Adult)  Goal: *Acute Goals and Plan of Care (Insert Text)  Description: Initial Occupational Therapy Goals (12/4/2020) Within 7 day(s):    1. Patient will perform grooming standing at sink with supervision x 5 minutes for increased independence with ADLs. 2. Patient will perform UB dressing with mod I for increased independence with ADLs. 3. Patient will perform LB dressing with mod I & A/E PRN for increased independence with ADLs. 4. Patient will perform all aspects of toileting with mod I for increased independence in ADLs  5. Patient will independently apply energy conservation techniques with 1 verbal cue(s) for increased independence with ADLs. 6. Patient will utilize good body mechanics during ADLs with 1 verbal cue(s). 7. Patient will perform bathroom mobility with mod I for increased independence with ADLs. Outcome: Progressing Towards Goal   OCCUPATIONAL THERAPY EVALUATION    Patient: Papito Monet (13 y.o. male)  Date: 12/4/2020  Primary Diagnosis: JC (acute kidney injury) ([facility] [address] [N17.9]        Precautions: Fall  PLOF: pt mod I for ADLs/functional mobility, pt reports he was not using AD for ambulation however would furniture walk PTA, lives with daughter    ASSESSMENT :  Based on the objective data described below, the patient presents with decreased activity tolerance, balance, strength limiting independence with ADLs. Pt seen with PT for additional set of skilled hands. Pt speaks primarily Malay but is able to understand/respond minimal [place]. Pt reporting pain 3/10 in abdomen. Pt supervision to sit up to EOB, and was able to don B shoes with cloth laces with CGA. 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 Pt ambulated in hallway with CGA and HHA from PT. Pt returned to seated EOB, instructed pt to press call button before walking to the bathroom for safety, pt verbalized understanding. Pt would benefit from skilled OT services to improve functional balance/endurance/strength to maximize safety/independence with ADLs. Education: fall prevention, role of OT in acute care    Patient will benefit from skilled intervention to address the above impairments. Patient's rehabilitation potential is considered to be Good  Factors which may influence rehabilitation potential include:   []             None noted  []             Mental ability/status  []             Medical condition  []             Home/family situation and support systems  [x]             Safety awareness  []             Pain tolerance/management  []             Other:      PLAN :  Recommendations and Planned Interventions:   [x]               Self Care Training                  [x]      Therapeutic Activities  [x]               Functional Mobility Training   []      Cognitive Retraining  [x]               Therapeutic Exercises           [x]      Endurance Activities  [x]               Balance Training                    []      Neuromuscular Re-Education  []               Visual/Perceptual Training     [x]      Home Safety Training  [x]               Patient Education                   [x]      Family Training/Education  []               Other (comment):    Frequency/Duration: Patient will be followed by occupational therapy 1-2 times per day/4-7 days per week to address goals. Discharge Recommendations: Home Health  Further Equipment Recommendations for Discharge: N/A     SUBJECTIVE:   Patient stated I can't eat anything today.     OBJECTIVE DATA SUMMARY:     Past Medical History:   Diagnosis Date    Diabetes ([facility] [address]     DVT of deep femoral vein ([facility])     Foot abscess     Hypertension      Past Surgical History:   Procedure Laterality Date    COLONOSCOPY N/A 4/20/2020    COLONOSCOPY; SPOT INJECTION; performed by Malick Nunez MD at [facility] ENDOSCOPY    HX ORTHOPAEDIC      toe amputation    IR THROMBOLYSIS TRANSCATH NON CORONARY OR INTRACRANIAL  1/23/2020     Barriers to Learning/Limitations: yes;  language and physical  Compensate with: visual, verbal, tactile, kinesthetic cues/model    Home Situation: pt grossly mod I for ADLs/functional mobility  Home Situation  Home Environment: Apartment  # Steps to Enter: (unknown)  One/Two Story Residence: One story  Living Alone: No  Support Systems: Child(shahla), Family member(s)  Patient Expects to be Discharged to[de-identified] Apartment  Current DME Used/Available at Home: Cane, straight, Walker, rolling  []  Right hand dominant   []  Left hand dominant    Cognitive/Behavioral Status:  Neurologic State: Alert  Orientation Level: Oriented X4  Cognition: Appropriate for age attention/concentration; Follows commands;Poor safety awareness  Safety/Judgement: Awareness of environment;Decreased awareness of need for safety;Decreased awareness of need for assistance    Coordination: BUE  Coordination: Within functional limits  Fine Motor Skills-Upper: Left Intact; Right Intact    Gross Motor Skills-Upper: Left Intact; Right Intact    Balance:  Sitting: Intact  Standing: Impaired; Without support    Strength: BUE  Strength: Generally decreased, functional    Tone & Sensation: BUE  Tone: Normal  Sensation: Intact     Range of Motion: BUE  AROM: Generally decreased, functional    Functional Mobility and Transfers for ADLs:  Bed Mobility:  Supine to Sit: Supervision  Scooting: Supervision    Transfers:  Sit to Stand: Contact guard assistance    ADL Assessment:   Feeding: Modified independent    Oral Facial Hygiene/Grooming: Contact guard assistance    Bathing: Contact guard assistance    Upper Body Dressing: Supervision    Lower Body Dressing: Contact guard assistance    Toileting: Stand by assistance     ADL Intervention:  Lower Body Dressing Assistance  Dressing Assistance: Contact guard assistance  Socks: Contact guard assistance  Shoes with Cloth Laces: Contact guard assistance  Leg Crossed Method Used: Yes  Position Performed: Seated edge of bed  Cues: Verbal cues provided    Cognitive Retraining  Safety/Judgement: Awareness of environment;Decreased awareness of need for safety;Decreased awareness of need for assistance    Pain:  Pain level pre-treatment: 3/10   Pain level post-treatment: 3/10   Pain Intervention(s): Medication provided by Nursing (see MAR); Rest, Ice, Repositioning   Response to intervention: Nurse notified, See doc flow sheet    Activity Tolerance:   Fair. Patient able to stand ~5 minute(s). Patient requires intermittent rest breaks. Patient limited by endurance, balance. Patient unsteady. Please refer to the flowsheet for vital signs taken during this treatment. After treatment:   [] Patient left in no apparent distress sitting up in chair  [x] Patient left in no apparent distress in bed  [x] Call bell left within reach  [x] Nursing notified  [] Caregiver present  [] Bed alarm activated    COMMUNICATION/EDUCATION:   [x] Role of Occupational Therapy in the acute care setting  [x] Home safety education was provided and the patient/caregiver indicated understanding. [x] Patient/family have participated as able in goal setting and plan of care. [x] Patient/family agree to work toward stated goals and plan of care. [] Patient understands intent and goals of therapy, but is neutral about his/her participation. [] Patient is unable to participate in goal setting and plan of care. Thank you for this referral.  Belen Naidu OTR/L  Time Calculation: 23 mins    Eval Complexity: History: MEDIUM Complexity : Expanded review of history including physical, cognitive and psychosocial  history ; Examination: LOW Complexity : 1-3 performance deficits relating to physical, cognitive , or psychosocial skils that result in activity limitations and / or participation restrictions ; Decision Making:MEDIUM Complexity : Patient may present with comorbidities that affect occupational performnce.  Miniml to moderate modification of tasks or assistance (eg, physical or verbal ) with assesment(s) is necessary to enable patient to complete evaluation

## 2020-12-04 NOTE — PROGRESS NOTES
Hospitalist Progress Note-critical care note     Patient: Yoana Cho MRN: [id number]  CSN: [id number]    YOB: 1945  Age: 76 y.o. Sex: male    DOA: 12/3/2020 LOS:  LOS: 1 day            Chief complaint: jc, hx of dvt,dm abdomen pain     Assessment/Plan         Hospital Problems  Date Reviewed: 12/3/2020          Codes Class Noted POA    * (Principal) JC (acute kidney injury) ([facility] ICD-10-CM: N17.9  ICD-9-CM: 584.9  12/3/2020 Unknown        DVT (deep venous thrombosis) ([facility] ICD-10-CM: I82.409  ICD-9-CM: 453.40  1/18/2020 Yes        Acute deep vein thrombosis (DVT) ([facility] ICD-10-CM: I82.409  ICD-9-CM: 453.40  1/13/2020 Yes        Type 2 diabetes mellitus, with long-term current use of insulin ([facility] ICD-10-CM: E11.9, Z79.4  ICD-9-CM: 250.00, V58.67  1/3/2020 Yes        Abdominal pain ICD-10-CM: R10.9  ICD-9-CM: 789.00  1/2/2020 Unknown               Acute kidney injury  Improving  Continue iv hydration   Avoid nsaids     Abdominal pain   Improving, no n/v   Ultrasound:  gallbladder sludge in the past   mrcp done no stone ,concerns sbo   Will do ct without contrast to confirm   Will keep npo       Diabetes poorly controlled  Continue lantus and ssi     Hypertension   Continue  metoprolol    History of DVT but bleeding on anticoagulation   no longer on blood thinners     He is Ni[place] speaker, interpretor used during the interview     Subjective: abdomen pain better, no n/v , I want to eat, I have diabetics     Case discussed with radiologist about mrcp findings     Disposition :tbd,   Review of systems:    General: No fevers or chills. Cardiovascular: No chest pain or pressure. No palpitations. Pulmonary: No shortness of breath. Gastrointestinal: No nausea, vomiting.  Mild ABDOMEN PAIN     Vital signs/Intake and Output:  Visit Vitals  BP (!) 143/57 (BP 1 Location: Right arm, BP Patient Position: At rest)   Pulse 89   Temp 98.1 °F (36.7 °C)   Resp 18   Ht 5' 10\" (1.778 m)   Wt 67.6 kg (149 lb) SpO2 100%   BMI 21.38 kg/m²     Current Shift:  No intake/output data recorded. Last three shifts:  12/02 1901 - 12/04 0700  In: 1815 [P.O.:120; I.V.:1695]  Out: 200 [Urine:200]    Physical Exam:  General: WD, WN. Alert, cooperative, no acute distress    HEENT: NC, Atraumatic. PERRLA, anicteric sclerae. Lungs: CTA Bilaterally. No Wheezing/Rhonchi/Rales. Heart:  Regular  rhythm,  No murmur, No Rubs, No Gallops  Abdomen: Soft, Non distended, MILD tender. +Bowel sounds,   Extremities: No c/c/e  Psych:   Not anxious or agitated. Neurologic:  No acute neurological deficit. Labs: Results:       Chemistry Recent Labs     12/04/20  0540 12/03/20  1705   * 523*    132*   K 4.7 4.6   * 101   CO2 21 23   BUN 44* 60*   CREA 1.70* 2.44*   CA 8.9 9.1   AGAP 7 8   BUCR 26* 25*   AP 60 74   TP 6.2* 6.8   ALB 2.6* 2.9*   GLOB 3.6 3.9   AGRAT 0.7* 0.7*      CBC w/Diff Recent Labs     12/04/20  0540 12/03/20  1705   WBC 4.8 7.5   RBC 2.91* 3.18*   HGB 9.2* 10.0*   HCT 25.7* 28.2*    245   GRANS 54 72   LYMPH 25 15*   EOS 9* 4      Cardiac Enzymes Recent Labs     12/04/20  0540 12/03/20  2105   CPK 23* 20*   CKND1 CALCULATION NOT PERFORMED WHEN RESULT IS BELOW LINEAR LIMIT CALCULATION NOT PERFORMED WHEN RESULT IS BELOW LINEAR LIMIT      Coagulation No results for input(s): PTP, INR, APTT, INREXT in the last 72 hours. Lipid Panel No results found for: CHOL, CHOLPOCT, CHOLX, CHLST, CHOLV, 175743, HDL, HDLP, LDL, LDLC, DLDLP, 312546, VLDLC, VLDL, TGLX, TRIGL, TRIGP, TGLPOCT, CHHD, CHHDX   BNP No results for input(s): BNPP in the last 72 hours.    Liver Enzymes Recent Labs     12/04/20  0540   TP 6.2*   ALB 2.6*   AP 60      Thyroid Studies Lab Results   Component Value Date/Time    TSH 1.86 01/19/2020 06:14 AM        Procedures/imaging: see electronic medical records for all procedures/Xrays and details which were not copied into this note but were reviewed prior to creation of Plan    Ct Abd Pelv Wo Cont    Result Date: 12/3/2020  EXAM: CT of the abdomen and pelvis CLINICAL INDICATION/HISTORY: abd pain   > Additional: None. COMPARISON: None. > Reference Exam: CTA abdomen and pelvis 04/20/2020 TECHNIQUE: Axial CT imaging of the abdomen and pelvis was performed without intravenous contrast. Oral contrast not administered. Multiplanar reformats were generated. Dose reduction techniques used: automated exposure control, adjustment of the mAs and/or kVp according to patient size, and iterative reconstruction techniques. Digital Imaging and Communications in Medicine (DICOM) format image data are available to nonaffiliated external healthcare facilities or entities on a secure, media free, reciprocally searchable basis with patient authorization for at least a 12-month period after this study. _______________ FINDINGS: General comment: Mildly limited due to respiratory motion. LOWER CHEST: No focal consolidation. LIVER, BILIARY: Liver is unremarkable. No biliary dilation. Gallbladder is unremarkable. PANCREAS: Diffusely atrophic and fatty replaced. SPLEEN: Unremarkable. ADRENALS: Unremarkable. KIDNEYS/URETERS/BLADDER: No hydronephrosis. No calculi. LYMPH NODES: No enlarged lymph nodes. GASTROINTESTINAL TRACT: No bowel dilation or wall thickening. PELVIC ORGANS: Mild prostatic enlargement. VASCULATURE: Moderate atherosclerosis. Metallic densities in the left upper pelvis consistent with embolization coils. BONES: No acute or aggressive osseous abnormalities identified. OTHER: Small amount of free pelvic fluid, as well as along the left paracolic gutter nonspecific. _______________     IMPRESSION: 1. Small amount of pelvic ascites as well as along the left paracolic gutter, nonspecific. 2. Otherwise, no findings of acute intra-abdominal abnormality.      Mri Abd W Mrcp Wo Cont    Result Date: 12/4/2020  EXAM: MRI ABDOMEN CLINICAL INDICATION/HISTORY:  billary colic -Additional: History of hypertension and diabetes. COMPARISON: CT: 12/3/2020 TECHNIQUE: MRI of the abdomen was performed without intravenous contrast. MRCP included. Post processing 3D rendering was done on a separate workstation under concurrent physician supervision. _______________ FINDINGS: LOWER CHEST: Unremarkable. LIVER: Normal contours. No significant loss of signal on opposed phase images. No abnormal parenchymal signal or restricted diffusion to suggest underlying mass lesion. BILIARY: No biliary dilation, irregularity, or filling defects. Gallbladder is unremarkable. PANCREAS: T2 hyperintense 7 mm structure in the proximal pancreatic body (series 7 image 9), likely side branch documented. Evaluation is somewhat limited given lack of intravenous contrast. No pancreatic ductal dilation. SPLEEN: Normal. ADRENALS: Normal. KIDNEYS: T2 hyperintense 6 mm cyst is seen in the left lower kidney (series 3 image 25). No hydronephrosis. LYMPH NODES: No enlarged lymph nodes. GASTROINTESTINAL TRACT: Diffusely dilated loops of small bowel measure up to 3.9 cm (series 3 image 13) this has increased since one day prior CT. There are some decompressed loops of bowel in the left upper abdomen (series 3 image 15) though no definite transition point is seen on this study. VASCULATURE: Atherosclerosis. BONES: No acute or aggressive osseous abnormalities identified. Multilevel degenerative changes most pronounced in the lumbar spine. OTHER: Moderate volume ascites present. _______________     IMPRESSION: 1. Diffusely dilated loops of small bowel have increased caliber since CT: 12/3/2020. Findings concerning for small bowel obstruction. 2.  No cholelithiasis, choledocholithiasis, biliary ductal dilation, or MR evidence of acute cholecystitis. 3.  Cystic lesion in the proximal pancreatic body likely represents a small side branch IPMN. Contrast enhanced MRI with MRCP in one year may be used to monitor.  4.  Osseous degenerative changes and additional findings, as detailed in the body of the report. Findings discussed with Dr. Mary Lou Riley by Dr. Denice Aleman MD, PhD at 3:18 PM on 12/4/2020; confirmed receipt. Us Gallbladder    Result Date: 12/4/2020  Right Upper Quadrant Abdominal Ultrasound: CLINICAL HISTORY: Right upper quadrant pain COMPARISON EXAMINATIONS: CT 12/3/2020. FINDINGS: Numerous images were obtained during real-time examination. The study is limited due to patient's body habitus and/or bowel gas. LIVER:  Heterogeneous in echotexture. No focal mass. The portal vein size measures 12mm in diameter with normal hepatopedal flow. BILIARY SYSTEM:  No intrahepatic biliary dilatation. Common bile duct measures 5mm in diameter. GALLBLADDER:  Gallbladder is distended with multiple stones and sludge. No definite pericholecystic fluid. Negative Mccarthy's sign according to the sonographer. RIGHT KIDNEY:9.8cm in length. No hydronephrosis or renal mass. PANCREAS:   Mostly obscured due to bowel gas. IVC: Visualized portions are unremarkable in appearance. OTHER: No significant free intraperitoneal fluid. IMPRESSION: Stones and sludge seen throughout the gallbladder which is distended. There is no significant surrounding wall thickening or pericholecystic fluid. No evidence of biliary distention is seen to suggest biliary obstruction. Heterogeneous liver parenchyma could be due to underlying fatty infiltration or hepatocellular disease. No definite mass identified.        Nisha Oneal MD

## 2020-12-04 NOTE — PROGRESS NOTES
Reason for Admission:   Abdominal Pain                  RUR Score:   Mod; 23%               PCP: First and Last name:     Name of Practice:    Are you a current patient: Yes/No:    Approximate date of last visit:    Can you participate in a virtual visit if needed:     Do you (patient/family) have any concerns for transition/discharge? Plan for utilizing home health:   TBD    Current Advanced Directive/Advance Care Plan:  Not on file            Transition of Care Plan:     Home with physician follow up vs [place] and physician follow up      Chart reviewed. Per H&P \"Amanuel Lantigua is a 76 y.o.  male who he of diabetes, DVT off of back anticoagulation, hypertension hyperlipidemia presents to the emergency room with 3 to 4-day history of increasing abdominal pain and constipation he went to urgent urgent care and a series of x-rays was done he was given laxative with some improvement. He has had progressive decrease in appetite over the last 2 days and yesterday had a series of loose stool from the laxative. Today he presents to the emergency room with persistent bloating and gas as well as weakness and nausea and 1 or 2 episodes of vomiting. A CT scan done in the emergency room shows pericolic fluid normal gallbladder however there is worsening renal function creatinine increased to 2.44 from a baseline of 1.3 I am asked to admit for hydration and pain control of abdomen. Hx obtained from daughter who lives with patient Len Brown. \"    Please encourage ambulation as appropriate or order therapy services to assist with identifying transition of care needs. CM to continue to follow and assist.      CM attempted to meet with pt to discuss transition of care. Pt has referred me to this daughter, Leilani Minor ([phone]). CM attempted to contact pt's daughter and left a message requesting a return call. Noted pt has PT/OT consults pending.   CM to await outcome of therapy consult to further assist with transition of care.   CM to continue to follow and assist.      Care Management Interventions  Transition of Care Consult (CM Consult): Discharge Planning  Health Maintenance Reviewed: Yes  Current Support Network: Relative's Home(daughter lives with pt)

## 2020-12-05 LAB
ALBUMIN SERPL-MCNC: 2.4 G/DL (ref 3.4–5)
ALBUMIN/GLOB SERPL: 0.8 {RATIO} (ref 0.8–1.7)
ALP SERPL-CCNC: 77 U/L (ref 45–117)
ALT SERPL-CCNC: 23 U/L (ref 16–61)
ANION GAP SERPL CALC-SCNC: 7 MMOL/L (ref 3–18)
AST SERPL-CCNC: 25 U/L (ref 10–38)
BASOPHILS # BLD: 0 K/UL (ref 0–0.1)
BASOPHILS NFR BLD: 1 % (ref 0–2)
BILIRUB SERPL-MCNC: 0.6 MG/DL (ref 0.2–1)
BUN SERPL-MCNC: 34 MG/DL (ref 7–18)
BUN/CREAT SERPL: 25 (ref 12–20)
CALCIUM SERPL-MCNC: 8.3 MG/DL (ref 8.5–10.1)
CHLORIDE SERPL-SCNC: 114 MMOL/L (ref 100–111)
CO2 SERPL-SCNC: 22 MMOL/L (ref 21–32)
CREAT SERPL-MCNC: 1.35 MG/DL (ref 0.6–1.3)
DIFFERENTIAL METHOD BLD: ABNORMAL
EOSINOPHIL # BLD: 0.3 K/UL (ref 0–0.4)
EOSINOPHIL NFR BLD: 7 % (ref 0–5)
ERYTHROCYTE [DISTWIDTH] IN BLOOD BY AUTOMATED COUNT: 12.9 % (ref 11.6–14.5)
GLOBULIN SER CALC-MCNC: 3.2 G/DL (ref 2–4)
GLUCOSE BLD STRIP.AUTO-MCNC: 110 MG/DL (ref 70–110)
GLUCOSE BLD STRIP.AUTO-MCNC: 111 MG/DL (ref 70–110)
GLUCOSE BLD STRIP.AUTO-MCNC: 150 MG/DL (ref 70–110)
GLUCOSE BLD STRIP.AUTO-MCNC: 83 MG/DL (ref 70–110)
GLUCOSE SERPL-MCNC: 97 MG/DL (ref 74–99)
HCT VFR BLD AUTO: 23.5 % (ref 36–48)
HGB BLD-MCNC: 8.3 G/DL (ref 13–16)
LYMPHOCYTES # BLD: 1.3 K/UL (ref 0.9–3.6)
LYMPHOCYTES NFR BLD: 34 % (ref 21–52)
MAGNESIUM SERPL-MCNC: 1.9 MG/DL (ref 1.6–2.6)
MCH RBC QN AUTO: 31.4 PG (ref 24–34)
MCHC RBC AUTO-ENTMCNC: 35.3 G/DL (ref 31–37)
MCV RBC AUTO: 89 FL (ref 74–97)
MONOCYTES # BLD: 0.6 K/UL (ref 0.05–1.2)
MONOCYTES NFR BLD: 15 % (ref 3–10)
NEUTS SEG # BLD: 1.7 K/UL (ref 1.8–8)
NEUTS SEG NFR BLD: 43 % (ref 40–73)
PLATELET # BLD AUTO: 212 K/UL (ref 135–420)
PMV BLD AUTO: 9.5 FL (ref 9.2–11.8)
POTASSIUM SERPL-SCNC: 4 MMOL/L (ref 3.5–5.5)
PROT SERPL-MCNC: 5.6 G/DL (ref 6.4–8.2)
RBC # BLD AUTO: 2.64 M/UL (ref 4.7–5.5)
SODIUM SERPL-SCNC: 143 MMOL/L (ref 136–145)
WBC # BLD AUTO: 3.9 K/UL (ref 4.6–13.2)

## 2020-12-05 PROCEDURE — 85025 COMPLETE CBC W/AUTO DIFF WBC: CPT

## 2020-12-05 PROCEDURE — 83735 ASSAY OF MAGNESIUM: CPT

## 2020-12-05 PROCEDURE — 65390000012 HC CONDITION CODE 44 OBSERVATION

## 2020-12-05 PROCEDURE — 74011000258 HC RX REV CODE- 258: Performed by: FAMILY MEDICINE

## 2020-12-05 PROCEDURE — 74011250637 HC RX REV CODE- 250/637: Performed by: INTERNAL MEDICINE

## 2020-12-05 PROCEDURE — 97530 THERAPEUTIC ACTIVITIES: CPT

## 2020-12-05 PROCEDURE — 80053 COMPREHEN METABOLIC PANEL: CPT

## 2020-12-05 PROCEDURE — 97535 SELF CARE MNGMENT TRAINING: CPT

## 2020-12-05 PROCEDURE — 36415 COLL VENOUS BLD VENIPUNCTURE: CPT

## 2020-12-05 PROCEDURE — 74011250636 HC RX REV CODE- 250/636: Performed by: INTERNAL MEDICINE

## 2020-12-05 PROCEDURE — C9113 INJ PANTOPRAZOLE SODIUM, VIA: HCPCS | Performed by: INTERNAL MEDICINE

## 2020-12-05 PROCEDURE — 96375 TX/PRO/DX INJ NEW DRUG ADDON: CPT

## 2020-12-05 PROCEDURE — 82962 GLUCOSE BLOOD TEST: CPT

## 2020-12-05 PROCEDURE — 65270000029 HC RM PRIVATE

## 2020-12-05 PROCEDURE — 96361 HYDRATE IV INFUSION ADD-ON: CPT

## 2020-12-05 PROCEDURE — 74011250636 HC RX REV CODE- 250/636: Performed by: FAMILY MEDICINE

## 2020-12-05 PROCEDURE — 74011636637 HC RX REV CODE- 636/637: Performed by: INTERNAL MEDICINE

## 2020-12-05 PROCEDURE — 96376 TX/PRO/DX INJ SAME DRUG ADON: CPT

## 2020-12-05 RX ADMIN — PIPERACILLIN AND TAZOBACTAM 3.38 G: 3; .375 INJECTION, POWDER, LYOPHILIZED, FOR SOLUTION INTRAVENOUS at 16:37

## 2020-12-05 RX ADMIN — PIPERACILLIN AND TAZOBACTAM 3.38 G: 3; .375 INJECTION, POWDER, LYOPHILIZED, FOR SOLUTION INTRAVENOUS at 22:22

## 2020-12-05 RX ADMIN — MORPHINE SULFATE 2 MG: 2 INJECTION, SOLUTION INTRAMUSCULAR; INTRAVENOUS at 22:22

## 2020-12-05 RX ADMIN — PANTOPRAZOLE SODIUM 40 MG: 40 INJECTION, POWDER, FOR SOLUTION INTRAVENOUS at 22:22

## 2020-12-05 RX ADMIN — PIPERACILLIN AND TAZOBACTAM 3.38 G: 3; .375 INJECTION, POWDER, LYOPHILIZED, FOR SOLUTION INTRAVENOUS at 12:13

## 2020-12-05 RX ADMIN — Medication 20 ML: at 22:22

## 2020-12-05 RX ADMIN — Medication 10 ML: at 12:14

## 2020-12-05 RX ADMIN — QUETIAPINE FUMARATE 25 MG: 25 TABLET ORAL at 22:22

## 2020-12-05 RX ADMIN — PIPERACILLIN AND TAZOBACTAM 3.38 G: 3; .375 INJECTION, POWDER, LYOPHILIZED, FOR SOLUTION INTRAVENOUS at 04:47

## 2020-12-05 RX ADMIN — MORPHINE SULFATE 2 MG: 2 INJECTION, SOLUTION INTRAMUSCULAR; INTRAVENOUS at 00:23

## 2020-12-05 RX ADMIN — INSULIN LISPRO 2 UNITS: 100 INJECTION, SOLUTION INTRAVENOUS; SUBCUTANEOUS at 12:12

## 2020-12-05 RX ADMIN — DEXTROSE MONOHYDRATE AND SODIUM CHLORIDE 75 ML/HR: 5; .9 INJECTION, SOLUTION INTRAVENOUS at 11:57

## 2020-12-05 NOTE — PROGRESS NOTES
Hospitalist Progress Note    Patient: Reji Cohen MRN: [id number]  CSN: [id number]    YOB: 1945  Age: 76 y.o. Sex: male    DOA: 12/3/2020 LOS:  LOS: 2 days            Assessment/Plan     Principal Problem:    JC (acute kidney injury) ([facility] (12/3/2020)    Active Problems:    Abdominal pain (1/2/2020)      Type 2 diabetes mellitus, with long-term current use of insulin ([facility] (1/3/2020)      Acute deep vein thrombosis (DVT) ([facility] (1/13/2020)      DVT (deep venous thrombosis) ([facility] (1/18/2020)      Acute appendicitis with mild abdominal pain: Per CT; 1.7 cm dilated and inflamed appendix w/o perforation. No fevers, no leukocytosis. On zosyn. Appreciated the consult from general surgery. Acute kidney injury: Improving  Continue iv hydration. Avoid nsaids     Abdominal pain: Improving, no n/v   Ultrasound:  gallbladder sludge in the past   mrcp done no stone ,no sbo/per ct without contrast      Diabetes poorly controlled: Continue lantus and ssi      Hypertension: Continue  metoprolol     History of DVT but bleeding on anticoagulation   no longer on blood thinners      Disposition :tbd     CC:   \" I am fine\"          Subjective:     Pt was seen and examined with the nurse in the morning round. He is [place] speaker, interpretor used during the interview      abdomen pain only mild, no n/v     Review of systems  General: No fevers or chills. Cardiovascular: No chest pain or pressure. No palpitations. Pulmonary: No cough, SOB  Gastrointestinal: No nausea, vomiting. Objective:      Visit Vitals  BP (!) 126/49 (BP 1 Location: Left arm, BP Patient Position: At rest)   Pulse 88   Temp 98.8 °F (37.1 °C)   Resp 17   Ht 5' 10\" (1.778 m)   Wt 69.7 kg (153 lb 11.2 oz)   SpO2 100%   BMI 22.05 kg/m²       Physical Exam:    Gen: NAD, non-toxic. Heent:  MMM, NC, AT. Cor: s1s2 RRR. No MRG. PMI mid 5th intercostal space. Resp:  CTA b/l. No w/r/r. Nml effort and diaphragmatic excursion.   Abd:  NT ND. BS positive. No rebound or guarding. No masses. Ext: No edema or cyanosis. Intake and Output:  Current Shift:  12/05 0701 - 12/05 1900  In: -   Out: 500 [Urine:500]  Last three shifts:  12/03 1901 - 12/05 0700  In: 815 [P.O.:120; I.V.:695]  Out: 200 [Urine:200]    Labs: Results:       Chemistry Recent Labs     12/05/20 0456 12/04/20  0540 12/03/20  1705   GLU 97 130* 523*    142 132*   K 4.0 4.7 4.6   * 114* 101   CO2 22 21 23   BUN 34* 44* 60*   CREA 1.35* 1.70* 2.44*   CA 8.3* 8.9 9.1   AGAP 7 7 8   BUCR 25* 26* 25*   AP 77 60 74   TP 5.6* 6.2* 6.8   ALB 2.4* 2.6* 2.9*   GLOB 3.2 3.6 3.9   AGRAT 0.8 0.7* 0.7*      CBC w/Diff Recent Labs     12/05/20  0456 12/04/20  0540 12/03/20  1705   WBC 3.9* 4.8 7.5   RBC 2.64* 2.91* 3.18*   HGB 8.3* 9.2* 10.0*   HCT 23.5* 25.7* 28.2*    219 245   GRANS 43 54 72   LYMPH 34 25 15*   EOS 7* 9* 4      Cardiac Enzymes Recent Labs     12/04/20  0540 12/03/20  2105   CPK 23* 20*   CKND1 CALCULATION NOT PERFORMED WHEN RESULT IS BELOW LINEAR LIMIT CALCULATION NOT PERFORMED WHEN RESULT IS BELOW LINEAR LIMIT      Coagulation No results for input(s): PTP, INR, APTT, INREXT in the last 72 hours. Lipid Panel No results found for: CHOL, CHOLPOCT, CHOLX, CHLST, CHOLV, 844095, HDL, HDLP, LDL, LDLC, DLDLP, 289867, VLDLC, VLDL, TGLX, TRIGL, TRIGP, TGLPOCT, CHHD, CHHDX   BNP No results for input(s): BNPP in the last 72 hours.    Liver Enzymes Recent Labs     12/05/20  0456   TP 5.6*   ALB 2.4*   AP 77      Thyroid Studies Lab Results   Component Value Date/Time    TSH 1.86 01/19/2020 06:14 AM        Procedures/imaging: see electronic medical records for all procedures/Xrays and details which were not copied into this note but were reviewed prior to creation of Plan      Medications Reviewed  Dorota Yee MD

## 2020-12-05 NOTE — CONSULTS
Consult Note    Patient: Gilbert Melchor MRN: [id number]  CSN: [id number]    YOB: 1945  Age: 76 y.o. Sex: male    DOA: 12/3/2020 LOS:  LOS: 2 days        Requesting Physician: Hospitalist  Reason for Consultation: Appendicitis               HPI:     Gilbert Melchor is a 76 y.o. male who has been seen for possible appendicitis. Dilated appendix seen on CT. Past Medical History:   Diagnosis Date    [facility]     DVT of deep femoral vein ([facility]     Foot abscess     Hypertension        Past Surgical History:   Procedure Laterality Date    COLONOSCOPY N/A 4/20/2020    COLONOSCOPY; SPOT INJECTION; performed by Malick Nunez MD at [facility] ENDOSCOPY    HX ORTHOPAEDIC      toe amputation    IR THROMBOLYSIS TRANSCATH NON CORONARY OR INTRACRANIAL  1/23/2020       History reviewed. No pertinent family history. Social History     Socioeconomic History    Marital status: SINGLE     Spouse name: Not on file    Number of children: Not on file    Years of education: Not on file    Highest education level: Not on file   Tobacco Use    Smoking status: Never Smoker    Smokeless tobacco: Never Used   Substance and Sexual Activity    Alcohol use: No    Drug use: No       Prior to Admission medications    Medication Sig Start Date End Date Taking? Authorizing Provider   amLODIPine (NORVASC) 5 mg tablet Take 5 mg by mouth daily. Yes Provider, Historical   metFORMIN ER (GLUCOPHAGE XR) 750 mg tablet Take 750 mg by mouth daily. With breakfast.   Yes Provider, Historical   multivitamin, tx-iron-ca-min (THERA-M w/ IRON) 9 mg iron-400 mcg tab tablet Take 1 Tab by mouth daily. 4/24/20  Yes Chino Chamberlain MD   metoprolol succinate (TOPROL-XL) 50 mg XL tablet Take 1 Tab by mouth daily. 1/30/20  Yes Chino Chamberlain MD   insulin glargine (LANTUS) 100 unit/mL injection 5 Units by SubCUTAneous route daily. Patient taking differently: 25 Units by SubCUTAneous route daily.  1/28/20  Yes Kavitha Burks MD   atorvastatin (LIPITOR) 20 mg tablet Take 20 mg by mouth daily. Yes Other, MD Mishel   LOSARTAN PO Take 100 mg by mouth. Yes Other, MD Mishel   QUEtiapine (SEROQUEL) 25 mg tablet Take 1 Tab by mouth nightly. 1/28/20   Jonathon Parra MD   polyethylene glycol [facility]) 17 gram/dose powder Take 17 g by mouth daily. 1 tablespoon with 8 oz of water daily 12/10/19   Mona Reddy MD       No Known Allergies    Review of Systems  A comprehensive review of systems was negative except for that written in the History of Present Illness. Physical Exam:      Visit Vitals  /66 (BP 1 Location: Left arm, BP Patient Position: At rest)   Pulse 88   Temp 98.6 °F (37 °C)   Resp 18   Ht 5' 10\" (1.778 m)   Wt 69.7 kg (153 lb 11.2 oz)   SpO2 100%   BMI 22.05 kg/m²       Physical Exam:  Pertinent items are noted in HPI. Labs Reviewed: All lab results for the last 24 hours reviewed. Assessment/Plan     Principal Problem:    JC (acute kidney injury) ([address] (12/3/2020)    Active Problems:    Abdominal pain (1/2/2020)      Type 2 diabetes mellitus, with long-term current use of insulin ([address] (1/3/2020)      Acute deep vein thrombosis (DVT) ([address] (1/13/2020)      DVT (deep venous thrombosis) ([facility] (1/18/2020)        Not convinced this is acute appendicitis clinically. He is afebrile and WBC is normal.  Not a good surgical candidate. Recommend treating with IV antibiotics. Follow WBC and if needed repeat CT.

## 2020-12-05 NOTE — PROGRESS NOTES
Shift Summary :  Complaining of pain in abdomen level 3. Medicated with effectiveness and went to sleep. Once awake, complained of dry mouth but sleeping when mouth care left at bedside. CT of abdomen done around 9 PM.  Possible appendicitis reported by radiologist.  Nikolas Jese started with antibiotics. NPO continued. [id number]  Bedside and Verbal shift change report given to DREW Saavedra RN  (oncoming nurse) by  DREW Hull RN  (offgoing nurse). Report included the following information SBAR, Kardex, Procedure Summary, Intake/Output, MAR and Recent Results.

## 2020-12-05 NOTE — PROGRESS NOTES
1102: Pt asleep, will follow up again later for PT.    1253: Pt still asleep, did not arouse to name, will follow up as schedule permits. 1339: final attempt today, pt sit still sleeping sound, did not arouse to name, will follow up tomorrow.

## 2020-12-05 NOTE — PROGRESS NOTES
Bedside and Verbal shift change report given to  Junito 33  (oncoming nurse) by Mariya PRIETON, RN  (offgoing nurse). Report included the following information SBAR, Kardex and MAR. SHIFT UPDATES:  Patient is a primarily [address] speaking patient who presented off unit for duration of shift and had abdominal ultrasound as well as MRCP procedure performed today. At 1523 pm, Dr. Kameron Matta verbalized that patient's study presented with a bowel obstruction and that patient should remain NPO until further notice. ABNORMAL LAB:   Results for Marcial Adair (MRN [id number]) as of 12/4/2020 19:46   Ref. Range 12/4/2020 05:40 12/4/2020 08:02 12/4/2020 09:44 12/4/2020 11:27 12/4/2020 15:09   GLUCOSE,FAST - POC Latest Ref Range: 70 - 110 mg/dL  105   108   WBC Latest Ref Range: 4.6 - 13.2 K/uL 4.8       RBC Latest Ref Range: 4.70 - 5.50 M/uL 2.91 (L)       HGB Latest Ref Range: 13.0 - 16.0 g/dL 9.2 (L)       HCT Latest Ref Range: 36.0 - 48.0 % 25.7 (L)       MCV Latest Ref Range: 74.0 - 97.0 FL 88.3       MCH Latest Ref Range: 24.0 - 34.0 PG 31.6       MCHC Latest Ref Range: 31.0 - 37.0 g/dL 35.8       RDW Latest Ref Range: 11.6 - 14.5 % 12.6       PLATELET Latest Ref Range: 135 - 420 K/uL 219       MPV Latest Ref Range: 9.2 - 11.8 FL 9.8       NEUTROPHILS Latest Ref Range: 40 - 73 % 54       LYMPHOCYTES Latest Ref Range: 21 - 52 % 25       MONOCYTES Latest Ref Range: 3 - 10 % 12 (H)       EOSINOPHILS Latest Ref Range: 0 - 5 % 9 (H)       BASOPHILS Latest Ref Range: 0 - 2 % 0       DF Latest Units:   AUTOMATED       ABS. NEUTROPHILS Latest Ref Range: 1.8 - 8.0 K/UL 2.6       ABS. LYMPHOCYTES Latest Ref Range: 0.9 - 3.6 K/UL 1.2       ABS. MONOCYTES Latest Ref Range: 0.05 - 1.2 K/UL 0.6       ABS. EOSINOPHILS Latest Ref Range: 0.0 - 0.4 K/UL 0.4       ABS.  BASOPHILS Latest Ref Range: 0.0 - 0.1 K/UL 0.0       Sodium Latest Ref Range: 136 - 145 mmol/L 142       Potassium Latest Ref Range: 3.5 - 5.5 mmol/L 4.7 Chloride Latest Ref Range: 100 - 111 mmol/L 114 (H)       CO2 Latest Ref Range: 21 - 32 mmol/L 21       Anion gap Latest Ref Range: 3.0 - 18 mmol/L 7       Glucose Latest Ref Range: 74 - 99 mg/dL 130 (H)       BUN Latest Ref Range: 7.0 - 18 MG/DL 44 (H)       Creatinine Latest Ref Range: 0.6 - 1.3 MG/DL 1.70 (H)       BUN/Creatinine ratio Latest Ref Range: 12 - 20   26 (H)       Calcium Latest Ref Range: 8.5 - 10.1 MG/DL 8.9       Magnesium Latest Ref Range: 1.6 - 2.6 mg/dL 1.9       GFR est non-AA Latest Ref Range: >60 ml/min/1.73m2 39 (L)       GFR est AA Latest Ref Range: >60 ml/min/1.73m2 48 (L)       Bilirubin, total Latest Ref Range: 0.2 - 1.0 MG/DL 0.6       Protein, total Latest Ref Range: 6.4 - 8.2 g/dL 6.2 (L)       Albumin Latest Ref Range: 3.4 - 5.0 g/dL 2.6 (L)       Globulin Latest Ref Range: 2.0 - 4.0 g/dL 3.6       A-G Ratio Latest Ref Range: 0.8 - 1.7   0.7 (L)       ALT Latest Ref Range: 16 - 61 U/L 18       AST Latest Ref Range: 10 - 38 U/L 11       Alk. phosphatase Latest Ref Range: 45 - 117 U/L 60       CK Latest Ref Range: 39 - 308 U/L 23 (L)       CK-MB Index Latest Ref Range: 0.0 - 4.0 % CALCULATION NOT PERFORMED WHEN RESULT IS BELOW LINEAR LIMIT       CK - MB Latest Ref Range: <3.6 ng/ml <1.0       Troponin-I, Qt. Latest Ref Range: 0.0 - 0.045 NG/ML <0.02       MRI ABD W MRCP WO CONT Unknown   Rpt     US GALLBLADDER Unknown    Rpt      Wounds? None. Central Lines? None. Last BM:  12/3/2020 (prior to admission). Pending Labs for AM Draw: CBC with diff, Magnesium level & CMP on 12/5/2020 at 4 am.     Discharge plan:  As of 12/4/2020 at 1003 am case management note, patient will discharge home with home health when medically stable.      Adriel Tsang  12/4/2020  7:43 PM

## 2020-12-05 NOTE — PROGRESS NOTES
Radiology called about 1.7 cm dilated and inflamed appendix w/o perforation. Will start zosyn and consult general surgery.

## 2020-12-06 PROBLEM — K37 APPENDICITIS: Status: ACTIVE | Noted: 2020-12-06

## 2020-12-06 LAB
ALBUMIN SERPL-MCNC: 2.3 G/DL (ref 3.4–5)
ALBUMIN/GLOB SERPL: 0.7 {RATIO} (ref 0.8–1.7)
ALP SERPL-CCNC: 76 U/L (ref 45–117)
ALT SERPL-CCNC: 22 U/L (ref 16–61)
ANION GAP SERPL CALC-SCNC: 5 MMOL/L (ref 3–18)
AST SERPL-CCNC: 17 U/L (ref 10–38)
BASOPHILS # BLD: 0 K/UL (ref 0–0.1)
BASOPHILS NFR BLD: 1 % (ref 0–3)
BILIRUB SERPL-MCNC: 0.7 MG/DL (ref 0.2–1)
BUN SERPL-MCNC: 20 MG/DL (ref 7–18)
BUN/CREAT SERPL: 16 (ref 12–20)
CALCIUM SERPL-MCNC: 8.2 MG/DL (ref 8.5–10.1)
CHLORIDE SERPL-SCNC: 116 MMOL/L (ref 100–111)
CO2 SERPL-SCNC: 23 MMOL/L (ref 21–32)
CREAT SERPL-MCNC: 1.28 MG/DL (ref 0.6–1.3)
DIFFERENTIAL METHOD BLD: ABNORMAL
EOSINOPHIL # BLD: 0.4 K/UL (ref 0–0.4)
EOSINOPHIL NFR BLD: 8 % (ref 0–5)
ERYTHROCYTE [DISTWIDTH] IN BLOOD BY AUTOMATED COUNT: 13 % (ref 11.6–14.5)
GLOBULIN SER CALC-MCNC: 3.2 G/DL (ref 2–4)
GLUCOSE BLD STRIP.AUTO-MCNC: 128 MG/DL (ref 70–110)
GLUCOSE BLD STRIP.AUTO-MCNC: 202 MG/DL (ref 70–110)
GLUCOSE BLD STRIP.AUTO-MCNC: 99 MG/DL (ref 70–110)
GLUCOSE SERPL-MCNC: 121 MG/DL (ref 74–99)
HCT VFR BLD AUTO: 22.7 % (ref 36–48)
HGB BLD-MCNC: 8 G/DL (ref 13–16)
LYMPHOCYTES # BLD: 1.1 K/UL (ref 0.8–3.5)
LYMPHOCYTES NFR BLD: 25 % (ref 20–51)
MAGNESIUM SERPL-MCNC: 1.6 MG/DL (ref 1.6–2.6)
MCH RBC QN AUTO: 31.3 PG (ref 24–34)
MCHC RBC AUTO-ENTMCNC: 35.2 G/DL (ref 31–37)
MCV RBC AUTO: 88.7 FL (ref 74–97)
MONOCYTES # BLD: 0.5 K/UL (ref 0–1)
MONOCYTES NFR BLD: 11 % (ref 2–9)
NEUTS BAND NFR BLD MANUAL: 2 % (ref 0–5)
NEUTS SEG # BLD: 2.4 K/UL (ref 1.8–8)
NEUTS SEG NFR BLD: 53 % (ref 42–75)
PLATELET # BLD AUTO: 224 K/UL (ref 135–420)
PMV BLD AUTO: 9.4 FL (ref 9.2–11.8)
POTASSIUM SERPL-SCNC: 3.9 MMOL/L (ref 3.5–5.5)
PROT SERPL-MCNC: 5.5 G/DL (ref 6.4–8.2)
RBC # BLD AUTO: 2.56 M/UL (ref 4.7–5.5)
RBC MORPH BLD: ABNORMAL
SODIUM SERPL-SCNC: 144 MMOL/L (ref 136–145)
WBC # BLD AUTO: 4.5 K/UL (ref 4.6–13.2)

## 2020-12-06 PROCEDURE — 74011250637 HC RX REV CODE- 250/637: Performed by: INTERNAL MEDICINE

## 2020-12-06 PROCEDURE — 36415 COLL VENOUS BLD VENIPUNCTURE: CPT

## 2020-12-06 PROCEDURE — 82962 GLUCOSE BLOOD TEST: CPT

## 2020-12-06 PROCEDURE — C9113 INJ PANTOPRAZOLE SODIUM, VIA: HCPCS | Performed by: INTERNAL MEDICINE

## 2020-12-06 PROCEDURE — 80053 COMPREHEN METABOLIC PANEL: CPT

## 2020-12-06 PROCEDURE — 74011250636 HC RX REV CODE- 250/636: Performed by: FAMILY MEDICINE

## 2020-12-06 PROCEDURE — 74011636637 HC RX REV CODE- 636/637: Performed by: FAMILY MEDICINE

## 2020-12-06 PROCEDURE — 85025 COMPLETE CBC W/AUTO DIFF WBC: CPT

## 2020-12-06 PROCEDURE — 74011250636 HC RX REV CODE- 250/636: Performed by: INTERNAL MEDICINE

## 2020-12-06 PROCEDURE — 96376 TX/PRO/DX INJ SAME DRUG ADON: CPT

## 2020-12-06 PROCEDURE — 74011000258 HC RX REV CODE- 258: Performed by: FAMILY MEDICINE

## 2020-12-06 PROCEDURE — 83735 ASSAY OF MAGNESIUM: CPT

## 2020-12-06 PROCEDURE — 65390000012 HC CONDITION CODE 44 OBSERVATION

## 2020-12-06 PROCEDURE — 96361 HYDRATE IV INFUSION ADD-ON: CPT

## 2020-12-06 PROCEDURE — 99218 HC RM OBSERVATION: CPT

## 2020-12-06 RX ORDER — INSULIN LISPRO 100 [IU]/ML
INJECTION, SOLUTION INTRAVENOUS; SUBCUTANEOUS EVERY 6 HOURS
Status: DISCONTINUED | OUTPATIENT
Start: 2020-12-06 | End: 2020-12-07

## 2020-12-06 RX ADMIN — Medication 10 ML: at 22:03

## 2020-12-06 RX ADMIN — METOPROLOL SUCCINATE 50 MG: 50 TABLET, EXTENDED RELEASE ORAL at 11:22

## 2020-12-06 RX ADMIN — PIPERACILLIN AND TAZOBACTAM 3.38 G: 3; .375 INJECTION, POWDER, LYOPHILIZED, FOR SOLUTION INTRAVENOUS at 11:28

## 2020-12-06 RX ADMIN — INSULIN LISPRO 4 UNITS: 100 INJECTION, SOLUTION INTRAVENOUS; SUBCUTANEOUS at 20:08

## 2020-12-06 RX ADMIN — ATORVASTATIN CALCIUM 20 MG: 20 TABLET, FILM COATED ORAL at 11:22

## 2020-12-06 RX ADMIN — PIPERACILLIN AND TAZOBACTAM 3.38 G: 3; .375 INJECTION, POWDER, LYOPHILIZED, FOR SOLUTION INTRAVENOUS at 17:32

## 2020-12-06 RX ADMIN — PANTOPRAZOLE SODIUM 40 MG: 40 INJECTION, POWDER, FOR SOLUTION INTRAVENOUS at 20:07

## 2020-12-06 RX ADMIN — QUETIAPINE FUMARATE 25 MG: 25 TABLET ORAL at 21:42

## 2020-12-06 RX ADMIN — Medication 10 ML: at 14:00

## 2020-12-06 RX ADMIN — PIPERACILLIN AND TAZOBACTAM 3.38 G: 3; .375 INJECTION, POWDER, LYOPHILIZED, FOR SOLUTION INTRAVENOUS at 05:21

## 2020-12-06 RX ADMIN — Medication 10 ML: at 05:21

## 2020-12-06 RX ADMIN — PIPERACILLIN AND TAZOBACTAM 3.38 G: 3; .375 INJECTION, POWDER, LYOPHILIZED, FOR SOLUTION INTRAVENOUS at 22:03

## 2020-12-06 NOTE — PROGRESS NOTES
Problem: Falls - Risk of  Goal: *Absence of Falls  Description: Document Chino Olmstead Fall Risk and appropriate interventions in the flowsheet. Outcome: Progressing Towards Goal  Note: Fall Risk Interventions:            Medication Interventions: Patient to call before getting OOB, Teach patient to arise slowly    Elimination Interventions: Urinal in reach, Call light in reach, Patient to call for help with toileting needs              Problem: Pressure Injury - Risk of  Goal: *Prevention of pressure injury  Description: Document Thomas Scale and appropriate interventions in the flowsheet.   Outcome: Progressing Towards Goal  Note: Pressure Injury Interventions:  Sensory Interventions: Assess changes in LOC, Check visual cues for pain, Maintain/enhance activity level, Monitor skin under medical devices, Pressure redistribution bed/mattress (bed type)         Activity Interventions: Increase time out of bed    Mobility Interventions: Pressure redistribution bed/mattress (bed type)(npo)    Nutrition Interventions: Document food/fluid/supplement intake    Friction and Shear Interventions: HOB 30 degrees or less

## 2020-12-06 NOTE — PROGRESS NOTES
Virtual reviewer communicated change to CM which reflect outpatient observation order written prior to discharge order being written. Code 44 delivered to patient. CM met with the patient and provided to the patient the printed information about her outpatient observation status. The patient was given the flyer entitled, \"Medicare Outpatient Observation: Information & notification. \" All questions were answered, no additional discharge needs identified at this time and patient expects to return to their (home, assisted living facility, relatives home, etc.) after discharge today. Met with pt at bedside, used [employer] phone  for [place]. Obs letters given.  MOOM given in [facility]

## 2020-12-06 NOTE — PROGRESS NOTES
Physical Therapy Treatment Attempt     Chart reviewed. Attempted Physical Therapy Treatment, however, patient unable to be seen due to:  []  Nausea/vomiting  [x]  Eating  []  Pain  []  Patient too lethargic  []  Off Unit for testing/procedure  []  Dialysis treatment in progress   []  Telemetry Results  []  Other:      Will follow up later as patient's schedule allows.    Thank you for this referral.    James Patel, PT, DPT

## 2020-12-06 NOTE — PROGRESS NOTES
Hospitalist Progress Note    Patient: Daniel Abbott MRN: [id number]  CSN: [id number]    YOB: 1945  Age: 76 y.o. Sex: male    DOA: 12/3/2020 LOS:  LOS: 3 days            Assessment/Plan     Principal Problem:    JC (acute kidney injury) ([facility] (12/3/2020)    Active Problems:    Abdominal pain (1/2/2020)      Type 2 diabetes mellitus, with long-term current use of insulin ([facility] (1/3/2020)      Acute deep vein thrombosis (DVT) ([facility] (1/13/2020)      DVT (deep venous thrombosis) ([facility] (1/18/2020)        Acute appendicitis with mild abdominal pain: Per CT; 1.7 cm dilated and inflamed appendix w/o perforation. No fevers, no leukocytosis. On zosyn. Appreciated the consult from general surgery. A trial of DM diet today, repeat CT tomorrow     Acute kidney injury: Improving  Continue iv hydration. Avoid nsaids     Abdominal pain: Improving, no n/v   Ultrasound:  gallbladder sludge in the past   mrcp done no stone ,no sbo/per ct without contrast      Diabetes poorly controlled: Continue lantus and ssi      Hypertension: Continue  metoprolol     History of DVT but bleeding on anticoagulation   no longer on blood thinners      Disposition :1-2 days     CC:   \" Can I eat\"          Subjective:      Pt was seen and examined with the nurse in the morning round. He is Tristanian speaker, interpretor used during the interview      abdomen pain only mild, no n/v     He is hungry and wants to eat. Review of systems  General: No fevers or chills. Cardiovascular: No chest pain or pressure. No palpitations. Pulmonary: No cough, SOB  Gastrointestinal: No nausea, vomiting. Objective:      Visit Vitals  /63 (BP 1 Location: Left arm, BP Patient Position: At rest)   Pulse 76   Temp 98.5 °F (36.9 °C)   Resp 16   Ht 5' 10\" (1.778 m)   Wt 69.7 kg (153 lb 11.2 oz)   SpO2 98%   BMI 22.05 kg/m²       Physical Exam:    Gen: NAD, non-toxic. Heent:  MMM, NC, AT. Cor: s1s2 RRR. No MRG.   PMI mid 5th intercostal space.  Resp:  CTA b/l. No w/r/r. Nml effort and diaphragmatic excursion. Abd:  NT ND.  BS positive. No rebound or guarding. No masses. Ext: No edema or cyanosis. Intake and Output:  Current Shift:  No intake/output data recorded. Last three shifts:  12/04 1901 - 12/06 0700  In: 200 [I.V.:200]  Out: 500 [Urine:500]    Labs: Results:       Chemistry Recent Labs     12/06/20 0100 12/05/20  0456 12/04/20  0540   * 97 130*    143 142   K 3.9 4.0 4.7   * 114* 114*   CO2 23 22 21   BUN 20* 34* 44*   CREA 1.28 1.35* 1.70*   CA 8.2* 8.3* 8.9   AGAP 5 7 7   BUCR 16 25* 26*   AP 76 77 60   TP 5.5* 5.6* 6.2*   ALB 2.3* 2.4* 2.6*   GLOB 3.2 3.2 3.6   AGRAT 0.7* 0.8 0.7*      CBC w/Diff Recent Labs     12/06/20 0100 12/05/20 0456 12/04/20  0540   WBC 4.5* 3.9* 4.8   RBC 2.56* 2.64* 2.91*   HGB 8.0* 8.3* 9.2*   HCT 22.7* 23.5* 25.7*    212 219   GRANS 53 43 54   LYMPH 25 34 25   EOS 8* 7* 9*      Cardiac Enzymes Recent Labs     12/04/20  0540 12/03/20  2105   CPK 23* 20*   CKND1 CALCULATION NOT PERFORMED WHEN RESULT IS BELOW LINEAR LIMIT CALCULATION NOT PERFORMED WHEN RESULT IS BELOW LINEAR LIMIT      Coagulation No results for input(s): PTP, INR, APTT, INREXT in the last 72 hours. Lipid Panel No results found for: CHOL, CHOLPOCT, CHOLX, CHLST, CHOLV, 227225, HDL, HDLP, LDL, LDLC, DLDLP, 593120, VLDLC, VLDL, TGLX, TRIGL, TRIGP, TGLPOCT, CHHD, CHHDX   BNP No results for input(s): BNPP in the last 72 hours.    Liver Enzymes Recent Labs     12/06/20  0100   TP 5.5*   ALB 2.3*   AP 76      Thyroid Studies Lab Results   Component Value Date/Time    TSH 1.86 01/19/2020 06:14 AM        Procedures/imaging: see electronic medical records for all procedures/Xrays and details which were not copied into this note but were reviewed prior to creation of Plan      Medications Reviewed  Ovidio Wang MD

## 2020-12-06 NOTE — PROGRESS NOTES
Bedside and Verbal shift change report given to Anita 18  (oncoming nurse) by Kassandra MONTOYA, RN  (offgoing nurse). Report included the following information SBAR, Kardex and MAR. SHIFT UPDATES:  Patient presented with small bowel obstruction as of 12/4/2020 via MRCP. As of 12/5/2020, patient's abdominal CT presented with Appendicitis in which IV Zosyn 3.37 5 mg was implemented. Patient is receiving IV Continuous fluid therapy management with Dextrose 5% and 0.9% Normal saline at 75 cc/hr due to NPO order. Patient was provided oral kit for management of oral dryness. Patient ambulates with stand by assistance and walker without difficulty. Patient's daughter presented at bedside around 1630 pm and was notified about patient's plan of care with the permission of patient. ABNORMAL LAB:   Results for Sharrie Koyanagi (MRN [id number]) as of 12/5/2020 19:31   Ref. Range 12/5/2020 04:56   WBC Latest Ref Range: 4.6 - 13.2 K/uL 3.9 (L)   RBC Latest Ref Range: 4.70 - 5.50 M/uL 2.64 (L)   HGB Latest Ref Range: 13.0 - 16.0 g/dL 8.3 (L)   HCT Latest Ref Range: 36.0 - 48.0 % 23.5 (L)   MCV Latest Ref Range: 74.0 - 97.0 FL 89.0   MCH Latest Ref Range: 24.0 - 34.0 PG 31.4   MCHC Latest Ref Range: 31.0 - 37.0 g/dL 35.3   RDW Latest Ref Range: 11.6 - 14.5 % 12.9   PLATELET Latest Ref Range: 135 - 420 K/uL 212   MPV Latest Ref Range: 9.2 - 11.8 FL 9.5   NEUTROPHILS Latest Ref Range: 40 - 73 % 43   LYMPHOCYTES Latest Ref Range: 21 - 52 % 34   MONOCYTES Latest Ref Range: 3 - 10 % 15 (H)   EOSINOPHILS Latest Ref Range: 0 - 5 % 7 (H)   BASOPHILS Latest Ref Range: 0 - 2 % 1   DF Latest Units:   AUTOMATED   ABS. NEUTROPHILS Latest Ref Range: 1.8 - 8.0 K/UL 1.7 (L)   ABS. LYMPHOCYTES Latest Ref Range: 0.9 - 3.6 K/UL 1.3   ABS. MONOCYTES Latest Ref Range: 0.05 - 1.2 K/UL 0.6   ABS. EOSINOPHILS Latest Ref Range: 0.0 - 0.4 K/UL 0.3   ABS.  BASOPHILS Latest Ref Range: 0.0 - 0.1 K/UL 0.0   Sodium Latest Ref Range: 136 - 145 mmol/L 143   Potassium Latest Ref Range: 3.5 - 5.5 mmol/L 4.0   Chloride Latest Ref Range: 100 - 111 mmol/L 114 (H)   CO2 Latest Ref Range: 21 - 32 mmol/L 22   Anion gap Latest Ref Range: 3.0 - 18 mmol/L 7   Glucose Latest Ref Range: 74 - 99 mg/dL 97   BUN Latest Ref Range: 7.0 - 18 MG/DL 34 (H)   Creatinine Latest Ref Range: 0.6 - 1.3 MG/DL 1.35 (H)   BUN/Creatinine ratio Latest Ref Range: 12 - 20   25 (H)   Calcium Latest Ref Range: 8.5 - 10.1 MG/DL 8.3 (L)   Magnesium Latest Ref Range: 1.6 - 2.6 mg/dL 1.9   GFR est non-AA Latest Ref Range: >60 ml/min/1.73m2 52 (L)   GFR est AA Latest Ref Range: >60 ml/min/1.73m2 >60   Bilirubin, total Latest Ref Range: 0.2 - 1.0 MG/DL 0.6   Protein, total Latest Ref Range: 6.4 - 8.2 g/dL 5.6 (L)   Albumin Latest Ref Range: 3.4 - 5.0 g/dL 2.4 (L)   Globulin Latest Ref Range: 2.0 - 4.0 g/dL 3.2   A-G Ratio Latest Ref Range: 0.8 - 1.7   0.8   ALT Latest Ref Range: 16 - 61 U/L 23   AST Latest Ref Range: 10 - 38 U/L 25   Alk. phosphatase Latest Ref Range: 45 - 117 U/L 77     Wounds? None. Central Lines? None. Last BM:  12/3/2020 (Prior to admission). Pending Labs for AM Draw: None. Discharge plan: As of 12/4/2020 at 1003 am case management note, patient will discharge home with home health when medically stable.      Adriel Tsang  12/5/2020  7:35 PM

## 2020-12-06 NOTE — PROGRESS NOTES
Bedside and verbal report given to FRANCO Ryan RN (oncoming nurse) by Eliseo Giraldo RN  (off going nurse). Report included the following information SBAR, Kardex, OR Summary, Intake/Output, and MAR. Pt complained of pain, treated with Morphine x1. No acute changes, NAD. Bedside and verbal report given by (off going nurse) FRANCO Ryan RN to (oncoming nurse) Karen Edward RN. Report included the following information SBAR, Kardex, OR Summary, Intake/Output, and MAR.

## 2020-12-07 ENCOUNTER — APPOINTMENT (OUTPATIENT)
Dept: CT IMAGING | Age: 75
End: 2020-12-07
Attending: HOSPITALIST
Payer: MEDICARE

## 2020-12-07 VITALS
WEIGHT: 153.7 LBS | SYSTOLIC BLOOD PRESSURE: 157 MMHG | BODY MASS INDEX: 22 KG/M2 | RESPIRATION RATE: 18 BRPM | HEIGHT: 70 IN | HEART RATE: 78 BPM | DIASTOLIC BLOOD PRESSURE: 66 MMHG | TEMPERATURE: 98.2 F | OXYGEN SATURATION: 100 %

## 2020-12-07 LAB
GLUCOSE BLD STRIP.AUTO-MCNC: 141 MG/DL (ref 70–110)
GLUCOSE BLD STRIP.AUTO-MCNC: 184 MG/DL (ref 70–110)
GLUCOSE BLD STRIP.AUTO-MCNC: 187 MG/DL (ref 70–110)
GLUCOSE BLD STRIP.AUTO-MCNC: 254 MG/DL (ref 70–110)
MAGNESIUM SERPL-MCNC: 1.5 MG/DL (ref 1.6–2.6)

## 2020-12-07 PROCEDURE — 96365 THER/PROPH/DIAG IV INF INIT: CPT

## 2020-12-07 PROCEDURE — 74011250637 HC RX REV CODE- 250/637: Performed by: INTERNAL MEDICINE

## 2020-12-07 PROCEDURE — 74011636637 HC RX REV CODE- 636/637: Performed by: FAMILY MEDICINE

## 2020-12-07 PROCEDURE — 74176 CT ABD & PELVIS W/O CONTRAST: CPT

## 2020-12-07 PROCEDURE — 96375 TX/PRO/DX INJ NEW DRUG ADDON: CPT

## 2020-12-07 PROCEDURE — 96376 TX/PRO/DX INJ SAME DRUG ADON: CPT

## 2020-12-07 PROCEDURE — 83735 ASSAY OF MAGNESIUM: CPT

## 2020-12-07 PROCEDURE — 74011250636 HC RX REV CODE- 250/636: Performed by: FAMILY MEDICINE

## 2020-12-07 PROCEDURE — 74011000258 HC RX REV CODE- 258: Performed by: FAMILY MEDICINE

## 2020-12-07 PROCEDURE — 74011250636 HC RX REV CODE- 250/636: Performed by: HOSPITALIST

## 2020-12-07 PROCEDURE — 82962 GLUCOSE BLOOD TEST: CPT

## 2020-12-07 PROCEDURE — 74011636637 HC RX REV CODE- 636/637: Performed by: HOSPITALIST

## 2020-12-07 PROCEDURE — 99218 HC RM OBSERVATION: CPT

## 2020-12-07 RX ORDER — INSULIN LISPRO 100 [IU]/ML
INJECTION, SOLUTION INTRAVENOUS; SUBCUTANEOUS
Status: DISCONTINUED | OUTPATIENT
Start: 2020-12-07 | End: 2020-12-07 | Stop reason: HOSPADM

## 2020-12-07 RX ORDER — METRONIDAZOLE 500 MG/1
500 TABLET ORAL 3 TIMES DAILY
Qty: 15 TAB | Refills: 0 | Status: SHIPPED | OUTPATIENT
Start: 2020-12-07 | End: 2020-12-12

## 2020-12-07 RX ORDER — MAGNESIUM SULFATE HEPTAHYDRATE 40 MG/ML
2 INJECTION, SOLUTION INTRAVENOUS
Status: COMPLETED | OUTPATIENT
Start: 2020-12-07 | End: 2020-12-07

## 2020-12-07 RX ORDER — INSULIN GLARGINE 100 [IU]/ML
5 INJECTION, SOLUTION SUBCUTANEOUS DAILY
Status: DISCONTINUED | OUTPATIENT
Start: 2020-12-07 | End: 2020-12-07 | Stop reason: HOSPADM

## 2020-12-07 RX ORDER — AMOXICILLIN AND CLAVULANATE POTASSIUM 875; 125 MG/1; MG/1
1 TABLET, FILM COATED ORAL 2 TIMES DAILY
Qty: 10 TAB | Refills: 0 | Status: SHIPPED | OUTPATIENT
Start: 2020-12-07 | End: 2020-12-12

## 2020-12-07 RX ORDER — PANTOPRAZOLE SODIUM 40 MG/1
40 TABLET, DELAYED RELEASE ORAL EVERY 24 HOURS
Status: DISCONTINUED | OUTPATIENT
Start: 2020-12-07 | End: 2020-12-07 | Stop reason: HOSPADM

## 2020-12-07 RX ADMIN — METOPROLOL SUCCINATE 50 MG: 50 TABLET, EXTENDED RELEASE ORAL at 09:37

## 2020-12-07 RX ADMIN — PIPERACILLIN AND TAZOBACTAM 3.38 G: 3; .375 INJECTION, POWDER, LYOPHILIZED, FOR SOLUTION INTRAVENOUS at 13:42

## 2020-12-07 RX ADMIN — Medication 10 ML: at 05:26

## 2020-12-07 RX ADMIN — Medication 10 ML: at 13:43

## 2020-12-07 RX ADMIN — MAGNESIUM SULFATE HEPTAHYDRATE 2 G: 40 INJECTION, SOLUTION INTRAVENOUS at 09:00

## 2020-12-07 RX ADMIN — INSULIN LISPRO 2 UNITS: 100 INJECTION, SOLUTION INTRAVENOUS; SUBCUTANEOUS at 01:11

## 2020-12-07 RX ADMIN — PIPERACILLIN AND TAZOBACTAM 3.38 G: 3; .375 INJECTION, POWDER, LYOPHILIZED, FOR SOLUTION INTRAVENOUS at 05:26

## 2020-12-07 RX ADMIN — INSULIN LISPRO 6 UNITS: 100 INJECTION, SOLUTION INTRAVENOUS; SUBCUTANEOUS at 13:55

## 2020-12-07 RX ADMIN — ATORVASTATIN CALCIUM 20 MG: 20 TABLET, FILM COATED ORAL at 09:37

## 2020-12-07 RX ADMIN — INSULIN GLARGINE 5 UNITS: 100 INJECTION, SOLUTION SUBCUTANEOUS at 17:00

## 2020-12-07 RX ADMIN — MAGNESIUM SULFATE HEPTAHYDRATE 2 G: 40 INJECTION, SOLUTION INTRAVENOUS at 08:00

## 2020-12-07 NOTE — PROGRESS NOTES
Bedside and verbal report given to FRANCO Bills RN (oncoming nurse) by Darcie Fofana RN  (off going nurse). Report included the following information SBAR, Kardex, OR Summary, Intake/Output, and MAR. Pt had an uneventful shift with no acute changes and NAD. Bedside and verbal report given by (off going nurse) FRANCO Bills RN to (oncoming nurse) Jessica Mackay RN. Report included the following information SBAR, Kardex, OR Summary, Intake/Output, and MAR.

## 2020-12-07 NOTE — PROGRESS NOTES
Pharmacy Dosing Services: IV - PO Conversion    This patient meets P & T approved criteria for conversion from IV to oral therapy for the following medication:  Pantoprazole    Pt has a diet ordered and is taking other oral medications. Dose adjusted to: Pantoprazole 40 mg po q24h    (prior order:  Pantoprazole 40 mg IV q24h)     Pharmacy will continue to monitor the patient's status.   Signed MAURO Reddy Contact information:   [phone]

## 2020-12-07 NOTE — PROGRESS NOTES
Problem: Mobility Impaired (Adult and Pediatric)  Goal: *Acute Goals and Plan of Care (Insert Text)  Description: Physical Therapy Goals   Initiated 12/4/2020 and to be accomplished within 3-4 day(s)  1. Patient will move from supine <> sit with mod I in prep for out of bed activity and change of position. 2.  Patient will perform sit<> stand with mod I with LRAD in prep for transfers/ambulation. 3.  Patient will ambulate 300 feet with mod I/LRAD for improved functional mobility at discharge. 4.  Patient will ascend/descend 3-5 stairs with handrail(s) with CGA for home re-entry as needed. 12/7/2020  PT treatment not completed due to:  [] Off Unit for testing/procedure  [] Pain  [] Eating  [] Patient too lethargic  [] Nausea/vomiting  [] Dialysis treatment in progress   [x] Other: pt declines PT at this time, stating he is waiting for his daughter as he is going home today. Pt singing and in apparently  good mood. Pt called dgtr while Pt present but no answer. No discharge orders in chart. Will f/u at later time as pt schedule allows. Thank you.    Lucius Bey, PT

## 2020-12-07 NOTE — PROGRESS NOTES
Problem: Falls - Risk of  Goal: *Absence of Falls  Description: Document Diana Cloud Fall Risk and appropriate interventions in the flowsheet. Outcome: Progressing Towards Goal  Note: Fall Risk Interventions:  Mobility Interventions: Patient to call before getting OOB         Medication Interventions: Teach patient to arise slowly    Elimination Interventions: Call light in reach, Patient to call for help with toileting needs              Problem: Pressure Injury - Risk of  Goal: *Prevention of pressure injury  Description: Document Thomas Scale and appropriate interventions in the flowsheet.   Outcome: Progressing Towards Goal  Note: Pressure Injury Interventions:  Sensory Interventions: Assess changes in LOC, Check visual cues for pain, Maintain/enhance activity level, Monitor skin under medical devices, Pressure redistribution bed/mattress (bed type)         Activity Interventions: Increase time out of bed    Mobility Interventions: Pressure redistribution bed/mattress (bed type)(npo)    Nutrition Interventions: Document food/fluid/supplement intake    Friction and Shear Interventions: HOB 30 degrees or less

## 2020-12-07 NOTE — ROUTINE PROCESS
Received SBAR from night shift RN. Patient resting quietly in bed. Alert and oriented x 4. Patient put on diabetic diet, patient ate all of food today. No complaint of pain/nausea. Patient resting in bed most of shift. Benjamin John to go home. Patient Vitals for the past 24 hrs:   Temp Pulse Resp BP SpO2   12/06/20 1545 98.4 °F (36.9 °C) 79 16 (!) 155/60 100 %   12/06/20 1122 98.9 °F (37.2 °C) 85 16 (!) 165/61 100 %   12/06/20 0800 98.6 °F (37 °C) 80 15 (!) 145/68 100 %   12/06/20 0409 98.5 °F (36.9 °C) 76 16 139/63 98 %   12/05/20 2357 98.3 °F (36.8 °C) 87 16 (!) 144/56 100 %     Bedside and Verbal shift change report given to Geralyn Gottron, RN (oncoming nurse) by Billie Andino RN (offgoing nurse). Report included the following information SBAR, Kardex, Intake/Output, MAR and Recent Results.

## 2020-12-07 NOTE — WOUND CARE
Wound Care Note:    Wound Care into see patient because of prevalence    Skin Care & Pressure Relief Recommendations:  Minimize layers of linen  Pads under patient to optimize support surface and microclimate  Turn/reposition approximately every 2 hours. Pillow Wedges  Manage incontinence  Promote continence; Skin Protective lotion to buttocks and sacrum daily and as needed with incontinence care    Offload heels with pillows or offloading boots.     Discussed above plan with patient    Transition of Care: Plan to follow weekly while admitted to hospital.

## 2020-12-07 NOTE — DIABETES MGMT
GLYCEMIC CONTROL PROGRESS NOTE:    - discussed in rounds, known h/o DM2, HbA1C not within recommended range for age + comorbids on basal/oral home regimen  - BG out of arget range non-ICU: < 180 mg/dL  - TDD = 4 units - Humalog Normal Insulin Sensitivity Corrective Coverage, recommend continue    Recent Glucose Results:   Lab Results   Component Value Date/Time    GLUCPOC 141 (H) 12/07/2020 05:57 AM    GLUCPOC 187 (H) 12/07/2020 12:55 AM    GLUCPOC 202 (H) 12/06/2020 05:44 PM     Kristina Alvarez MS, RN, CDE  Glycemic Control Team  [phone]  Pager [phone] (M-TH 8:00-4:30P)  *After Hours pager [phone]

## 2020-12-07 NOTE — PROGRESS NOTES
Patient's pharmacy of choice is   [address] [facility], [address] [facility]

## 2020-12-07 NOTE — PROGRESS NOTES
CM spoke with pt's daughter, Manjit Ford ([phone]), to discuss transition of care. CM discussed HH as an option. Family has declined [facility] at this time. CM notified family that if they feel HH is needed after discharge then they should contact pt's PCP to assist.  Pt's family verbalized an understanding. Anticipate pt will transition home with physician follow up with in the next 24-48 hours. Pt's family will transport pt home upon discharge. No other transition of care needs have been identified. CM remains available.       Care Management Interventions  Transition of Care Consult (CM Consult): Discharge Planning  Health Maintenance Reviewed: Yes  Current Support Network: Relative's Home(daughter lives with pt)

## 2020-12-07 NOTE — DISCHARGE SUMMARY
Discharge Summary    Patient: Nolan Sánchez MRN: [id number]  CSN: [id number]    YOB: 1945  Age: 76 y.o. Sex: male    DOA: 12/3/2020 LOS:  LOS: 3 days   Discharge Date: 12/7/2020     Primary Care Provider:  Fercho Salomon MD    Admission Diagnoses: JC (acute kidney injury) ([facility] [N17.9]  JC (acute kidney injury) ([facility] [N17.9]    Discharge Diagnoses:    Hospital Problems  Date Reviewed: 12/3/2020          Codes Class Noted POA    * (Principal) JC (acute kidney injury) ([facility] ICD-10-CM: N17.9  ICD-9-CM: 584.9  12/3/2020 Unknown        Type 2 diabetes mellitus, with long-term current use of insulin ([facility] ICD-10-CM: E11.9, Z79.4  ICD-9-CM: 250.00, V58.67  1/3/2020 Yes        Hypertension ICD-10-CM: I10  ICD-9-CM: 401.9  1/3/2020 Yes        Abdominal pain ICD-10-CM: R10.9  ICD-9-CM: 789.00  1/2/2020 Unknown              Discharge Condition: stable     Discharge Medications:     Discharge Medication List as of 12/7/2020  5:28 PM      START taking these medications    Details   metroNIDAZOLE (FlagyL) 500 mg tablet Take 1 Tab by mouth three (3) times daily for 5 days. , Normal, Disp-15 Tab,R-0      amoxicillin-clavulanate (Augmentin) 875-125 mg per tablet Take 1 Tab by mouth two (2) times a day for 5 days. , Normal, Disp-10 Tab,R-0         CONTINUE these medications which have NOT CHANGED    Details   amLODIPine (NORVASC) 5 mg tablet Take 5 mg by mouth daily. , Historical Med      metFORMIN ER (GLUCOPHAGE XR) 750 mg tablet Take 750 mg by mouth daily. With breakfast., Historical Med      multivitamin, tx-iron-ca-min (THERA-M w/ IRON) 9 mg iron-400 mcg tab tablet Take 1 Tab by mouth daily. , Print, Disp-30 Tab, R-0      metoprolol succinate (TOPROL-XL) 50 mg XL tablet Take 1 Tab by mouth daily. , Normal, Disp-60 Tab, R-0      insulin glargine (LANTUS) 100 unit/mL injection 5 Units by SubCUTAneous route daily. , No Print, Disp-1 Vial, R-0      atorvastatin (LIPITOR) 20 mg tablet Take 20 mg by mouth daily. , Historical Med      LOSARTAN PO Take 100 mg by mouth., Historical Med      QUEtiapine (SEROQUEL) 25 mg tablet Take 1 Tab by mouth nightly. , Print, Disp-10 Tab, R-0      polyethylene glycol (MIRALAX) 17 gram/dose powder Take 17 g by mouth daily. 1 tablespoon with 8 oz of water daily, Print, Disp-507 g, R-0             Procedures : none     Consults: General Surgery      PHYSICAL EXAM   Visit Vitals  BP (!) 157/66 (BP 1 Location: Left arm, BP Patient Position: At rest)   Pulse 78   Temp 98.2 °F (36.8 °C)   Resp 18   Ht 5' 10\" (1.778 m)   Wt 69.7 kg (153 lb 11.2 oz)   SpO2 100%   BMI 22.05 kg/m²     General: Awake, cooperative, no acute distress    HEENT: NC, Atraumatic. PERRLA, EOMI. Anicteric sclerae. Lungs:  CTA Bilaterally. No Wheezing/Rhonchi/Rales. Heart:  Regular  rhythm,  No murmur, No Rubs, No Gallops  Abdomen: Soft, Non distended, Non tender. +Bowel sounds,   Extremities: No c/c/e  Psych:   Not anxious or agitated. Neurologic:  No acute neurological deficits. Admission HPI :  Raji Burris is a 76 y.o.  male who he of diabetes, DVT off of back anticoagulation, hypertension hyperlipidemia presents to the emergency room with 3 to 4-day history of increasing abdominal pain and constipation he went to urgent urgent care and a series of x-rays was done he was given laxative with some improvement. He has had progressive decrease in appetite over the last 2 days and yesterday had a series of loose stool from the laxative. Today he presents to the emergency room with persistent bloating and gas as well as weakness and nausea and 1 or 2 episodes of vomiting. A CT scan done in the emergency room shows pericolic fluid normal gallbladder however there is worsening renal function creatinine increased to 2.44 from a baseline of 1.3 I am asked to admit for hydration and pain control of abdomen.   Hx obtained from daughter who lives with patient [facility] Course : Cari Sampson is a 76 y.o.  male who he of diabetes, DVT off of back anticoagulation, hypertension hyperlipidemia was admitted due to n/v abdomen pain. since he was admitted, iv hydration, npo were administrated for possible cholecystitis. mrcp no gall stone indicated . ct abdomen possible appendicitis. Surgeon was consulted and iv abx was continued. Surgeon does not think it was acute appendicitis. His abdomen pain resolved with treatment. Repeated ct abdomen possible enteritis/ileus. Pt had bowel movement and tolerated diet well before discharge. He was cleared to be d/c per surgery also. His jose c resolved on d/c per iv hydration. Mg was replaced before discharge. Discharge planning discussed with patient, pt agrees  with the plan and no questions and concerns at this point. Activity: Activity as tolerated    Diet: Diabetic Diet    Follow-up: PCP     Disposition: home     Minutes spent on discharge: 45 min       Labs: Results:       Chemistry Recent Labs     12/06/20 0100 12/05/20  0456   * 97    143   K 3.9 4.0   * 114*   CO2 23 22   BUN 20* 34*   CREA 1.28 1.35*   CA 8.2* 8.3*   AGAP 5 7   BUCR 16 25*   AP 76 77   TP 5.5* 5.6*   ALB 2.3* 2.4*   GLOB 3.2 3.2   AGRAT 0.7* 0.8      CBC w/Diff Recent Labs     12/06/20 0100 12/05/20  0456   WBC 4.5* 3.9*   RBC 2.56* 2.64*   HGB 8.0* 8.3*   HCT 22.7* 23.5*    212   GRANS 53 43   LYMPH 25 34   EOS 8* 7*      Cardiac Enzymes No results for input(s): CPK, CKND1, KAREN in the last 72 hours. No lab exists for component: CKRMB, TROIP   Coagulation No results for input(s): PTP, INR, APTT, INREXT, INREXT in the last 72 hours. Lipid Panel No results found for: CHOL, CHOLPOCT, CHOLX, CHLST, CHOLV, 977654, HDL, HDLP, LDL, LDLC, DLDLP, 219137, VLDLC, VLDL, TGLX, TRIGL, TRIGP, TGLPOCT, CHHD, CHHDX   BNP No results for input(s): BNPP in the last 72 hours.    Liver Enzymes Recent Labs     12/06/20  0100   TP 5.5*   ALB 2.3*   AP 76 Thyroid Studies Lab Results   Component Value Date/Time    TSH 1.86 01/19/2020 06:14 AM          @micro    Significant Diagnostic Studies: Ct Abd Pelv Wo Cont    Result Date: 12/7/2020  EXAM: CT of the abdomen and pelvis. HISTORY: -Provided with order: interval change, planning d.c home if ct scan is ok -Additional: None COMPARISON: None. TECHNIQUE: Axial imaging was obtained through the abdomen and pelvis without oral or intravenous contrast.  Please note that lack of oral contrast limits the sensitivity for detection of bowel abnormalities. Lack of IV contrast limits solid organ assessment and enhancement characteristics. Multiplanar reformats were generated. One or more dose reduction techniques were used on this CT: automated exposure control, adjustment of the mAs and/or kVp according to patient size, and iterative reconstruction techniques. The specific techniques used on this CT exam have been documented in the patient's electronic medical record. Digital Imaging and Communications in Medicine (DICOM) format image data are available to nonaffiliated external healthcare facilities or entities on a secure, media free, reciprocally searchable basis with patient authorization for at least a 12-month period after this study. FINDINGS: LOWER CHEST: Stable coarse reticular markings at right 1 left lung base. LIVER: Unremarkable. BILIARY: Gallbladder: No calcified gallstones or surrounding inflammatory changes identified. No biliary ductal dilation. SPLEEN: Unremarkable. PANCREAS: Almost completely fatty replaced. ADRENALS: Unremarkable. KIDNEYS: Unremarkable unenhanced appearance without hydronephrosis or calcified nephrolithiasis. LYMPH NODES: No enlarged lymph nodes. VASCULATURE: limited in assessment without IV contrast.  Atherosclerotic aorta without aneurysm. GASTROINTESTINAL TRACT: Esophagus/stomach/duodenum: unremarkable. Appendix: Persistently fluid distended up to 1.8 cm, stable.  Small/Large bowel: Persistent dilated fluid-filled bowel loops, perhaps slightly progressed from prior without transition point identified. A left upper quadrant loop of small bowel demonstrates slight mural thickening. Diverticulosis without CT evidence diverticulitis. PELVIC ORGANS: Bladder: Unremarkable. Calcifications in the prostate. OTHER: Small amount of intraperitoneal free fluid around liver margin. Decreased subhepatic free fluid. Stable pelvic free fluid. No intraperitoneal free air. Calcified granuloma in the left buttocks. BONES: No acute or aggressive osseous abnormalities identified. Degenerative spondylosis. _______________     IMPRESSION: 1. Persistent and perhaps slightly progressive dilated fluid-filled small bowel loops. Perhaps slight mural thickening in a left upper quadrant small bowel loop. The appearance can reflect enteritis and/or ileus. No definite transition point to confirm obstruction. 2. Stable, persistently dilated fluid-filled appendix, without progressive inflammatory changes. Again, this can be seen in the setting of appendicitis or may be related to the enteritis/ileus. 3. No additional change. Ct Abd Pelv Wo Cont    Result Date: 12/4/2020  EXAM: CT of the abdomen and pelvis INDICATION: 66-year-old patient with abdominal pain, potential small bowel distention. COMPARISON: MR abdomen 12/4/2020; CT scan 12/3/2020; CT angiogram abdomen and pelvis 4/20/2020. TECHNIQUE: Axial CT imaging of the abdomen and pelvis was performed without oral or intravenous contrast. Multiplanar reformats were generated. One or more dose reduction techniques were used on this CT: automated exposure control, adjustment of the mAs and/or kVp according to patient size, and iterative reconstruction techniques. The specific techniques used on this CT exam have been documented in the patient's electronic medical record.   Digital Imaging and Communications in Medicine (DICOM) format image data are available to nonaffiliated external healthcare facilities or entities on a secure, media free, reciprocally searchable basis with patient authorization for at least a 12-month period after this study. _______________ FINDINGS: LOWER CHEST: Detail through the lung bases is limited by respiratory motion artifact. No alveolar consolidation or pleural effusion. Cardiac size remains normal. No evidence of pericardial effusion. LIVER, BILIARY: Liver is unremarkable and unenhanced appearance. No biliary dilation. Gallbladder is unremarkable. PANCREAS: Demonstration of fairly significant fatty infiltration of the pancreas. SPLEEN: Normal. ADRENALS: Normal. KIDNEYS/URETERS/BLADDER: No hydronephrosis involving either kidney. No nephrolithiasis. Urinary bladder normal in CT appearance. PELVIC ORGANS: Small quantity of pelvic ascites appearing decreased from prior study. VASCULATURE: Extensive atherosclerotic vascular calcifications are redemonstrated. No evidence of aneurysmal dilatation. Embolization coils present within the left lower quadrant of the abdomen. Mesenteric vascular congestion/stranding overall similar to prior. LYMPH NODES: There are scattered subcentimeter mesenteric and retroperitoneal lymph nodes present without abdominal or pelvic adenopathy. GASTROINTESTINAL TRACT: Small hiatal hernia is noted. There are multiple dilated loops of small bowel which are air and fluid-filled throughout the abdomen and pelvis without discrete point of transition demonstrated. The appendix is noted to be dilated and fluid-filled, with maximal appendiceal diameter approximately 1.7 cm (series 2, image 84) near the cecal base. Stranding surrounding the appendix is noted. No evidence of radiopaque appendicoliths. Liquid stool noted within the adjacent colon. No gross free intraperitoneal gas. No evidence of an organized or drainable periappendiceal fluid collection. BONES: No acute or aggressive osseous abnormalities identified.  OTHER: Small amount of ascites noted along the paracolic gutters bilaterally, as well as adjacent to the liver. _______________     IMPRESSION: 1. Abnormally dilated and fluid-filled appendix with mild adjacent stranding, concerning for potential appendicitis.   > No accompanying radiopaque appendicolith. No gross free intraperitoneal perforation.   > Stranding surrounding the appendix noted without evidence of an organized or drainable periappendiceal collection. > Small quantity of interloop fluid as well as perihepatic/paracolic gutter ascites is noted. 2. Relatively diffuse fluid-filled loops of small bowel without discrete point of transition to definitively implicate a small bowel obstruction. Given the above findings related to the appendix, these dilated and fluid-filled loops of small bowel may reflect a reactive ileus.   > No evidence of pneumatosis or mesenteric venous gas demonstrated. 3. No CT evidence of acute obstructive uropathy. Findings called to covering hospitalist, Jacob Barlow by  at 955pm on 12/4/2020     Ct Abd Pelv Wo Cont    Result Date: 12/3/2020  EXAM: CT of the abdomen and pelvis CLINICAL INDICATION/HISTORY: abd pain   > Additional: None. COMPARISON: None. > Reference Exam: CTA abdomen and pelvis 04/20/2020 TECHNIQUE: Axial CT imaging of the abdomen and pelvis was performed without intravenous contrast. Oral contrast not administered. Multiplanar reformats were generated. Dose reduction techniques used: automated exposure control, adjustment of the mAs and/or kVp according to patient size, and iterative reconstruction techniques. Digital Imaging and Communications in Medicine (DICOM) format image data are available to nonaffiliated external healthcare facilities or entities on a secure, media free, reciprocally searchable basis with patient authorization for at least a 12-month period after this study. _______________ FINDINGS: General comment: Mildly limited due to respiratory motion.  LOWER CHEST: No focal consolidation. LIVER, BILIARY: Liver is unremarkable. No biliary dilation. Gallbladder is unremarkable. PANCREAS: Diffusely atrophic and fatty replaced. SPLEEN: Unremarkable. ADRENALS: Unremarkable. KIDNEYS/URETERS/BLADDER: No hydronephrosis. No calculi. LYMPH NODES: No enlarged lymph nodes. GASTROINTESTINAL TRACT: No bowel dilation or wall thickening. PELVIC ORGANS: Mild prostatic enlargement. VASCULATURE: Moderate atherosclerosis. Metallic densities in the left upper pelvis consistent with embolization coils. BONES: No acute or aggressive osseous abnormalities identified. OTHER: Small amount of free pelvic fluid, as well as along the left paracolic gutter nonspecific. _______________     IMPRESSION: 1. Small amount of pelvic ascites as well as along the left paracolic gutter, nonspecific. 2. Otherwise, no findings of acute intra-abdominal abnormality. Mri Abd W Mrcp Wo Cont    Result Date: 12/4/2020  EXAM: MRI ABDOMEN CLINICAL INDICATION/HISTORY:  billary colic -Additional: History of hypertension and diabetes. COMPARISON: CT: 12/3/2020 TECHNIQUE: MRI of the abdomen was performed without intravenous contrast. MRCP included. Post processing 3D rendering was done on a separate workstation under concurrent physician supervision. _______________ FINDINGS: LOWER CHEST: Unremarkable. LIVER: Normal contours. No significant loss of signal on opposed phase images. No abnormal parenchymal signal or restricted diffusion to suggest underlying mass lesion. BILIARY: No biliary dilation, irregularity, or filling defects. Gallbladder is unremarkable. PANCREAS: T2 hyperintense 7 mm structure in the proximal pancreatic body (series 7 image 9), likely side branch documented. Evaluation is somewhat limited given lack of intravenous contrast. No pancreatic ductal dilation. SPLEEN: Normal. ADRENALS: Normal. KIDNEYS: T2 hyperintense 6 mm cyst is seen in the left lower kidney (series 3 image 25). No hydronephrosis. LYMPH NODES: No enlarged lymph nodes. GASTROINTESTINAL TRACT: Diffusely dilated loops of small bowel measure up to 3.9 cm (series 3 image 13) this has increased since one day prior CT. There are some decompressed loops of bowel in the left upper abdomen (series 3 image 15) though no definite transition point is seen on this study. VASCULATURE: Atherosclerosis. BONES: No acute or aggressive osseous abnormalities identified. Multilevel degenerative changes most pronounced in the lumbar spine. OTHER: Moderate volume ascites present. _______________     IMPRESSION: 1. Diffusely dilated loops of small bowel have increased caliber since CT: 12/3/2020. Findings concerning for small bowel obstruction. 2.  No cholelithiasis, choledocholithiasis, biliary ductal dilation, or MR evidence of acute cholecystitis. 3.  Cystic lesion in the proximal pancreatic body likely represents a small side branch IPMN. Contrast enhanced MRI with MRCP in one year may be used to monitor. 4.  Osseous degenerative changes and additional findings, as detailed in the body of the report. Findings discussed with Dr. Nichelle Songly by Dr. Austin Tejada MD, PhD at 3:18 PM on 12/4/2020; confirmed receipt. Us Gallbladder    Result Date: 12/4/2020  Right Upper Quadrant Abdominal Ultrasound: CLINICAL HISTORY: Right upper quadrant pain COMPARISON EXAMINATIONS: CT 12/3/2020. FINDINGS: Numerous images were obtained during real-time examination. The study is limited due to patient's body habitus and/or bowel gas. LIVER:  Heterogeneous in echotexture. No focal mass. The portal vein size measures 12mm in diameter with normal hepatopedal flow. BILIARY SYSTEM:  No intrahepatic biliary dilatation. Common bile duct measures 5mm in diameter. GALLBLADDER:  Gallbladder is distended with multiple stones and sludge. No definite pericholecystic fluid. Negative Mccarthy's sign according to the sonographer. RIGHT KIDNEY:9.8cm in length. No hydronephrosis or renal mass. PANCREAS:   Mostly obscured due to bowel gas. IVC: Visualized portions are unremarkable in appearance. OTHER: No significant free intraperitoneal fluid. IMPRESSION: Stones and sludge seen throughout the gallbladder which is distended. There is no significant surrounding wall thickening or pericholecystic fluid. No evidence of biliary distention is seen to suggest biliary obstruction. Heterogeneous liver parenchyma could be due to underlying fatty infiltration or hepatocellular disease. No definite mass identified.              [facility]     CC: Hever Boateng MD

## 2021-03-07 ENCOUNTER — HOSPITAL ENCOUNTER (EMERGENCY)
Age: 76
Discharge: HOME | End: 2021-03-07
Attending: EMERGENCY MEDICINE
Payer: MEDICARE

## 2021-03-07 ENCOUNTER — APPOINTMENT (OUTPATIENT)
Dept: CT IMAGING | Age: 76
End: 2021-03-07
Attending: EMERGENCY MEDICINE
Payer: MEDICARE

## 2021-03-07 VITALS
HEART RATE: 88 BPM | HEIGHT: 66 IN | WEIGHT: 163 LBS | OXYGEN SATURATION: 100 % | BODY MASS INDEX: 26.2 KG/M2 | DIASTOLIC BLOOD PRESSURE: 76 MMHG | TEMPERATURE: 98 F | RESPIRATION RATE: 20 BRPM | SYSTOLIC BLOOD PRESSURE: 158 MMHG

## 2021-03-07 DIAGNOSIS — K59.00 CONSTIPATION, UNSPECIFIED CONSTIPATION TYPE: Primary | ICD-10-CM

## 2021-03-07 DIAGNOSIS — R33.9 URINARY RETENTION: ICD-10-CM

## 2021-03-07 LAB
ALBUMIN SERPL-MCNC: 3.8 G/DL (ref 3.4–5)
ALBUMIN/GLOB SERPL: 0.8 {RATIO} (ref 0.8–1.7)
ALP SERPL-CCNC: 90 U/L (ref 45–117)
ALT SERPL-CCNC: 40 U/L (ref 16–61)
ANION GAP SERPL CALC-SCNC: 6 MMOL/L (ref 3–18)
APPEARANCE UR: CLEAR
AST SERPL-CCNC: 20 U/L (ref 10–38)
BACTERIA URNS QL MICRO: ABNORMAL /HPF
BASOPHILS # BLD: 0 K/UL (ref 0–0.1)
BASOPHILS NFR BLD: 1 % (ref 0–2)
BILIRUB SERPL-MCNC: 0.6 MG/DL (ref 0.2–1)
BILIRUB UR QL: NEGATIVE
BUN SERPL-MCNC: 32 MG/DL (ref 7–18)
BUN/CREAT SERPL: 23 (ref 12–20)
CALCIUM SERPL-MCNC: 8.9 MG/DL (ref 8.5–10.1)
CHLORIDE SERPL-SCNC: 108 MMOL/L (ref 100–111)
CO2 SERPL-SCNC: 25 MMOL/L (ref 21–32)
COLOR UR: YELLOW
CREAT SERPL-MCNC: 1.38 MG/DL (ref 0.6–1.3)
DIFFERENTIAL METHOD BLD: ABNORMAL
EOSINOPHIL # BLD: 0.6 K/UL (ref 0–0.4)
EOSINOPHIL NFR BLD: 7 % (ref 0–5)
EPITH CASTS URNS QL MICRO: ABNORMAL /LPF (ref 0–5)
ERYTHROCYTE [DISTWIDTH] IN BLOOD BY AUTOMATED COUNT: 13.3 % (ref 11.6–14.5)
GLOBULIN SER CALC-MCNC: 4.5 G/DL (ref 2–4)
GLUCOSE SERPL-MCNC: 99 MG/DL (ref 74–99)
GLUCOSE UR STRIP.AUTO-MCNC: 500 MG/DL
HCT VFR BLD AUTO: 30.3 % (ref 36–48)
HEMOCCULT STL QL: NEGATIVE
HGB BLD-MCNC: 10.1 G/DL (ref 13–16)
HGB UR QL STRIP: ABNORMAL
KETONES UR QL STRIP.AUTO: NEGATIVE MG/DL
LEUKOCYTE ESTERASE UR QL STRIP.AUTO: NEGATIVE
LIPASE SERPL-CCNC: 79 U/L (ref 73–393)
LYMPHOCYTES # BLD: 1.3 K/UL (ref 0.9–3.6)
LYMPHOCYTES NFR BLD: 16 % (ref 21–52)
MCH RBC QN AUTO: 30.4 PG (ref 24–34)
MCHC RBC AUTO-ENTMCNC: 33.3 G/DL (ref 31–37)
MCV RBC AUTO: 91.3 FL (ref 74–97)
MONOCYTES # BLD: 0.5 K/UL (ref 0.05–1.2)
MONOCYTES NFR BLD: 6 % (ref 3–10)
NEUTS SEG # BLD: 5.6 K/UL (ref 1.8–8)
NEUTS SEG NFR BLD: 70 % (ref 40–73)
NITRITE UR QL STRIP.AUTO: NEGATIVE
PH UR STRIP: 5.5 [PH] (ref 5–8)
PLATELET # BLD AUTO: 196 K/UL (ref 135–420)
PMV BLD AUTO: 9.5 FL (ref 9.2–11.8)
POTASSIUM SERPL-SCNC: 4.2 MMOL/L (ref 3.5–5.5)
PROT SERPL-MCNC: 8.3 G/DL (ref 6.4–8.2)
PROT UR STRIP-MCNC: 100 MG/DL
RBC # BLD AUTO: 3.32 M/UL (ref 4.7–5.5)
RBC #/AREA URNS HPF: ABNORMAL /HPF (ref 0–5)
SODIUM SERPL-SCNC: 139 MMOL/L (ref 136–145)
SP GR UR REFRACTOMETRY: 1.02 (ref 1–1.03)
UROBILINOGEN UR QL STRIP.AUTO: 0.2 EU/DL (ref 0.2–1)
WBC # BLD AUTO: 8.1 K/UL (ref 4.6–13.2)
WBC URNS QL MICRO: ABNORMAL /HPF (ref 0–5)

## 2021-03-07 PROCEDURE — 80053 COMPREHEN METABOLIC PANEL: CPT

## 2021-03-07 PROCEDURE — 85025 COMPLETE CBC W/AUTO DIFF WBC: CPT

## 2021-03-07 PROCEDURE — 99285 EMERGENCY DEPT VISIT HI MDM: CPT

## 2021-03-07 PROCEDURE — 74177 CT ABD & PELVIS W/CONTRAST: CPT

## 2021-03-07 PROCEDURE — 82272 OCCULT BLD FECES 1-3 TESTS: CPT

## 2021-03-07 PROCEDURE — 74011250636 HC RX REV CODE- 250/636: Performed by: EMERGENCY MEDICINE

## 2021-03-07 PROCEDURE — 51798 US URINE CAPACITY MEASURE: CPT

## 2021-03-07 PROCEDURE — 81001 URINALYSIS AUTO W/SCOPE: CPT

## 2021-03-07 PROCEDURE — 74011000636 HC RX REV CODE- 636: Performed by: EMERGENCY MEDICINE

## 2021-03-07 PROCEDURE — 83690 ASSAY OF LIPASE: CPT

## 2021-03-07 PROCEDURE — 51702 INSERT TEMP BLADDER CATH: CPT

## 2021-03-07 RX ORDER — GUAIFENESIN 100 MG/5ML
81 LIQUID (ML) ORAL DAILY
COMMUNITY

## 2021-03-07 RX ORDER — TAMSULOSIN HYDROCHLORIDE 0.4 MG/1
0.4 CAPSULE ORAL DAILY
Qty: 15 CAP | Refills: 0 | Status: SHIPPED | OUTPATIENT
Start: 2021-03-07 | End: 2021-03-22

## 2021-03-07 RX ADMIN — IOPAMIDOL 100 ML: 612 INJECTION, SOLUTION INTRAVENOUS at 05:23

## 2021-03-07 RX ADMIN — SODIUM CHLORIDE 1000 ML: 900 INJECTION, SOLUTION INTRAVENOUS at 05:12

## 2021-03-07 NOTE — ED NOTES
service #[id number]   used to explain to pt need to urinary retention. Explained to daughter by Dr. Ivelisse Vincent.

## 2021-03-07 NOTE — ED NOTES
Pt advised of the need for urine specimen for testing. States he is unable to urinate at this time. Pt provided with urinal and will advise this RN when he is able to provide specimen.

## 2021-03-07 NOTE — ED PROVIDER NOTES
EMERGENCY DEPARTMENT HISTORY AND PHYSICAL EXAM    Date: 3/7/2021  Patient Name: Anthony Goodman    History of Presenting Illness     Chief Complaint   Patient presents with    Anal Pain    Constipation         History Provided By: Patient and Patient's Daughter    0010  Anthony Goodman is a 76 y.o. male with PMHX of DM, HTN, chronic constipation who presents to the emergency department C/O constipation and rectal pain for the past week. Pt and daughter report that he has had small BMs in the past week and he has been passing gas but less than normal. He is supposed to take miralax daily but has not been doing this per the daughter. No hx of prior abdominal surgeries. No fever, chills, nausea, vomiting, CP, SOB. He has been eating and drinking normally. PCP: Belkis Reyes MD    Current Outpatient Medications   Medication Sig Dispense Refill    aspirin 81 mg chewable tablet Take 81 mg by mouth daily.  tamsulosin (Flomax) 0.4 mg capsule Take 1 Cap by mouth daily for 15 days. 15 Cap 0    amLODIPine (NORVASC) 5 mg tablet Take 5 mg by mouth daily.  metFORMIN ER (GLUCOPHAGE XR) 750 mg tablet Take 750 mg by mouth daily. With breakfast.      multivitamin, tx-iron-ca-min (THERA-M w/ IRON) 9 mg iron-400 mcg tab tablet Take 1 Tab by mouth daily. 30 Tab 0    metoprolol succinate (TOPROL-XL) 50 mg XL tablet Take 1 Tab by mouth daily. 60 Tab 0    insulin glargine (LANTUS) 100 unit/mL injection 5 Units by SubCUTAneous route daily. (Patient taking differently: 25 Units by SubCUTAneous route daily. ) 1 Vial 0    QUEtiapine (SEROQUEL) 25 mg tablet Take 1 Tab by mouth nightly. 10 Tab 0    atorvastatin (LIPITOR) 20 mg tablet Take 20 mg by mouth daily.  polyethylene glycol (MIRALAX) 17 gram/dose powder Take 17 g by mouth daily. 1 tablespoon with 8 oz of water daily 507 g 0    LOSARTAN PO Take 100 mg by mouth.          Past History     Past Medical History:  Past Medical History:   Diagnosis Date    Diabetes ([facility] [address]     DVT of deep femoral vein ([facility] [address]     Foot abscess     Hypertension        Past Surgical History:  Past Surgical History:   Procedure Laterality Date    COLONOSCOPY N/A 4/20/2020    COLONOSCOPY; SPOT INJECTION; performed by Haydee Lake MD at [facility] ENDOSCOPY    HX ORTHOPAEDIC      toe amputation    IR THROMBOLYSIS TRANSCATH NON CORONARY OR INTRACRANIAL  1/23/2020       Family History:  History reviewed. No pertinent family history. Social History:  Social History     Tobacco Use    Smoking status: Never Smoker    Smokeless tobacco: Never Used   Substance Use Topics    Alcohol use: No    Drug use: No       Allergies:  No Known Allergies      Review of Systems   Review of Systems   Constitutional: Negative for chills and fever. Respiratory: Negative for shortness of breath. Cardiovascular: Negative for chest pain. Gastrointestinal: Positive for constipation and rectal pain. Negative for abdominal pain, nausea and vomiting. All other systems reviewed and are negative.       Physical Exam     Vitals:    03/07/21 0815 03/07/21 0830 03/07/21 0845 03/07/21 0900   BP: (!) 173/73 (!) 175/69 (!) 159/77 (!) 158/76   Pulse:       Resp:       Temp:       SpO2: 99% 100% 100% 100%   Weight:       Height:         Physical Exam    Nursing notes and vital signs reviewed    Constitutional: Non toxic appearing, no acute distress  Head: Normocephalic, Atraumatic  Eyes: EOMI  Neck: Supple  Cardiovascular: Regular rate and rhythm, no murmurs, rubs, or gallops  Chest: Normal work of breathing and chest excursion bilaterally  Lungs: Clear to ausculation bilaterally  Abdomen: Soft, non tender, non distended, hypoactive bowel sounds  Back: No evidence of trauma or deformity  Extremities: No evidence of trauma or deformity, no LE edema  Skin: Warm and dry, normal cap refill  Neuro: Alert and appropriate, normal speech, strength and sensation full and symmetric bilaterally, normal gait, normal coordination  Psychiatric: Normal mood and affect   exam chaperoned by LINH Mckeon. Significant stool burden with associated tenderness in the rectum, manually disimpacted by me. No gross blood      Diagnostic Study Results     Labs -   Lab results reviewed. For significant abnormal values and values requiring intervention, see assessment and plan.    Radiologic Studies -   CT ABD PELV W CONT   Final Result      1. Moderate colonic stool. The rectum appears moderately distended, 68 mm in   maximal diameter with trace perirectal stranding, question mild impaction. The   appendix is persistently dilated and thick walled although this appears   diminished since the previous study, appendix now measures 12 mm in diameter   (previously 17-18 mm), no convincing periappendiceal inflammatory stranding on   the current study.      2. The urinary bladder appears overdistended.        CT Results  (Last 48 hours)               03/07/21 0615  CT ABD PELV W CONT Final result    Impression:      1. Moderate colonic stool. The rectum appears moderately distended, 68 mm in   maximal diameter with trace perirectal stranding, question mild impaction. The   appendix is persistently dilated and thick walled although this appears   diminished since the previous study, appendix now measures 12 mm in diameter   (previously 17-18 mm), no convincing periappendiceal inflammatory stranding on   the current study.       2. The urinary bladder appears overdistended.       Narrative:  EXAM: CT of the abdomen and pelvis       INDICATION: Abdominal pain and rectal pain, symptoms of constipation       COMPARISON: December 7, 2020       TECHNIQUE: Axial CT imaging of the abdomen and pelvis was performed with   intravenous contrast. Multiplanar reformats were generated. One or more dose   reduction techniques were used on this CT: automated exposure control,   adjustment of the mAs and/or kVp according to patient size, and iterative   reconstruction  techniques. The specific techniques used on this CT exam have   been documented in the patient's electronic medical record. Digital Imaging and   Communications in the Medicine (DICOM) format image data are available to   nonaffiliated external healthcare facilities or entities on a secure, media   free, reciprocally searchable basis with patient authorization for at least a 12   month period after this study. _______________       FINDINGS:       LOWER CHEST: Trace regions of basilar atelectasis. LIVER, BILIARY: Liver is unremarkable. No biliary dilation. Gallbladder is   unremarkable. PANCREAS: Unremarkable. SPLEEN: Unremarkable. ADRENALS: Unremarkable. KIDNEYS: Subcentimeter hypodensity, possible cyst lower pole left kidney. No   renal obstructive changes or acute findings. The urinary bladder appears   overdistended. LYMPH NODES: No enlarged lymph nodes. GASTROINTESTINAL TRACT: Trace hiatal hernia. Nondilated small bowel. Moderate   colonic stool. The rectum appears moderately distended, 68 mm in maximal   diameter with trace perirectal stranding. The appendix is persistently dilated   and thick walled although this appears diminished since the previous study,   appendix now measures 12 mm in diameter (previously 17-18 mm), no convincing   periappendiceal inflammatory stranding on the current study. PELVIC ORGANS: Unremarkable. VASCULATURE: Atherosclerosis. BONES: Osseous degenerative changes. No acute or aggressive osseous   abnormalities identified.        OTHER: None.       _______________               CXR Results  (Last 48 hours)    None          Medications given in the ED-  Medications   sodium chloride 0.9 % bolus infusion 1,000 mL (0 mL IntraVENous IV Completed 3/7/21 0900)   iopamidoL (ISOVUE 300) 61 % contrast injection  mL (100 mL IntraVENous Given 3/7/21 23)         Medical Decision Making   I am the first provider for this patient. I reviewed the vital signs, available nursing notes, past medical history, past surgical history, family history and social history. Vital Signs-Reviewed the patient's vital signs. Pulse Oximetry Analysis - 100% on room air, not hypoxic     Records Reviewed: Nursing Notes    Provider Notes (Medical Decision Making): Nguyen Sheridan is a 76 y.o. male with hx of DM, HTN, chronic constipation presenting with constipation x 1 week with rectal pain. On exam, abdomen soft and nontender with hypoactive bowel sounds. Rectal with impacted stool, disimpacted by me. No gross blood. Will do labs, CT, IVF, FOBT, reassess. Procedures:  Procedures    ED Course:   Pt had a BM and is feeling significantly better. Moderate stool burden on CT with evidence of mild impaction. Urinary retention on CT. Bladder check 580, pt voided with approx 400 still in bladder. Spoke with patient and daughter regarding need to place Bee for retention, likely contributing to constipation issues. In agreement with catheter placement. Pt will f/u with urology next week, TOV then. Will dc with flomax. Pt to continue miralax at home and high fiber diet with plenty of fluids. Given GI f/u. Given return precautions. Pt and daughter in agreement with dc plan and return precautions. CONSULT NOTE:   7:51 AM  Dr. Davis Gamez with Dr. Ni Ruiz: Urology  Discussed pt's hx, disposition, and available diagnostic and imaging results over the telephone. Reviewed care plans. Recommending Flomax and outpatient follow-up with his office next week with TOV. Diagnosis and Disposition       DISCHARGE NOTE:    Yomi Moreiraivon  results have been reviewed with him. He has been counseled regarding his diagnosis, treatment, and plan. He verbally conveys understanding and agreement of the signs, symptoms, diagnosis, treatment and prognosis and additionally agrees to follow up as discussed.   He also agrees with the care-plan and conveys that all of his questions have been answered. I have also provided discharge instructions for him that include: educational information regarding their diagnosis and treatment, and list of reasons why they would want to return to the ED prior to their follow-up appointment, should his condition change. He has been provided with education for proper emergency department utilization. CLINICAL IMPRESSION:    1. Constipation, unspecified constipation type    2. Urinary retention        PLAN:  1. D/C Home  2. Discharge Medication List as of 3/7/2021  9:05 AM        3. Follow-up Information     Follow up With Specialties Details Why Contact Info    Ziggy Wheeler MD Internal Medicine Schedule an appointment as soon as possible for a visit   [address] [facility] [address].  [phone]      Haylee Blanchard MD Urology Schedule an appointment as soon as possible for a visit   [place] [id number] [address] [facility]      Marisa Nichols MD Gastroenterology Schedule an appointment as soon as possible for a visit   [address] [facility] [phone]          _______________________________      Please note that this dictation was completed with [employer], the computer voice recognition software. Quite often unanticipated grammatical, syntax, homophones, and other interpretive errors are inadvertently transcribed by the computer software. Please disregard these errors. Please excuse any errors that have escaped final proofreading.

## 2021-03-07 NOTE — ED NOTES
Bedside shift change report given to Yeyo Acevedo (oncoming nurse) by Daija Marquez  (offgoing nurse). Report included the following information SBAR, Kardex, ED Summary, [facility] and Recent Results.

## 2021-03-07 NOTE — ED NOTES
Pt ambulatory to ED bed with family member with c/o anal pain that began tonight. He states he has not had a BM x 1 week. Denies any ABD pain, NV. Pt is AAO x 4, Slovak speaking, daughter is able to translate. ABD soft, non distended and non tender. Bowel sounds hypoactive x 4 quadrants.

## 2021-03-07 NOTE — DISCHARGE INSTRUCTIONS
Take Flomax as directed. Continue taking MiraLAX daily. Ensure a high-fiber diet with plenty of fruits, vegetables, and fluids. Follow-up outpatient with urology- Dr. Theresa Fortune give them a call tomorrow. Keep the Bee catheter in place. Follow-up outpatient with GI (Dr. Jami Pascual). Return to the emergency department if there is any urinary pain, constipation, abdominal pain, nausea, vomiting, fever, chills, blood in the stool, or any other concerns.

## 2021-12-16 NOTE — PROGRESS NOTES
Problem: Falls - Risk of  Goal: *Absence of Falls  Description  Document Chyna Ng Fall Risk and appropriate interventions in the flowsheet. Outcome: Progressing Towards Goal  Note:   No falls during shift.   Fall Risk Interventions:                 Elimination Interventions: Urinal in reach, Toileting schedule/hourly rounds, Toilet paper/wipes in reach, Stay With Me (per policy), Patient to call for help with toileting needs, Call light in reach              Problem: Nausea/Vomiting (Adult)  Goal: *Absence of nausea/vomiting  Outcome: Progressing Towards Goal  Note:   No reports of nausea/vomiting No

## 2022-04-13 NOTE — PROGRESS NOTES
1915 Pt received from offgoing nurse without any signs or symptoms of distress. Pt vitals are stable and within normal limits. Pt bed in low position with wheels locked and call bell within reach. 2033 Assessment completed and documented in flow sheet. Pt denies any further needs at this time. Pt in NAD with bed in low position, wheels locked and call bell within reach. Purposeful rounding completed. Pt resting quietly. No further needs voiced at this time. 2204 Scheduled medications administered as ordered. Purposeful rounding completed. Pt resting quietly. No further needs voiced at this time. 2316 Scheduled medications administered as ordered. PRN tylenol administered for fever as ordered. 2320 Bedside and Verbal shift change report given to Susan Lora RN (oncoming nurse) by Omar Coto RN (offgoing nurse). Report included the following information SBAR, Intake/Output, MAR and Recent Results. show

## (undated) DEVICE — INTENDED FOR TISSUE SEPARATION, AND OTHER PROCEDURES THAT REQUIRE A SHARP SURGICAL BLADE TO PUNCTURE OR CUT.: Brand: BARD-PARKER ® CARBON RIB-BACK BLADES

## (undated) DEVICE — BANDAGE COMPR W3INXL5YD BGE POLY COT E RECOVERABLE BRTH W/

## (undated) DEVICE — TOURNIQUET PHLEB W1XL18IN BLU FLAT RL AND BND REUSE FOR IV

## (undated) DEVICE — WRISTBAND ID AD W2.5XL9.5CM RED VYN ADH CLSR UNI-PRINT

## (undated) DEVICE — PACK PROCEDURE SURG EXTREMITY CUST

## (undated) DEVICE — SYR LR LCK 1ML GRAD NSAF 30ML --

## (undated) DEVICE — SOLUTION IV 500ML 0.9% SOD CHL FLX CONT

## (undated) DEVICE — NDL INJ SCLERO 25G 240CM -- INTERJECT M00518360 BX/5

## (undated) DEVICE — MULTIPLE BAND LIGATOR: Brand: SPEEDBAND SUPERVIEW SUPER 7

## (undated) DEVICE — NDL PRT INJ NSAF BLNT 18GX1.5 --

## (undated) DEVICE — ENDO CARRY-ON PROCEDURE KIT INCLUDES ENZYMATIC SPONGE, GAUZE, BIOHAZARD LABEL, TRAY, LUBRICANT, DIRTY SCOPE LABEL, WATER LABEL, TRAY, DRAWSTRING PAD, AND DEFENDO 4-PIECE KIT.: Brand: ENDO CARRY-ON PROCEDURE KIT

## (undated) DEVICE — CATH IV SAFE STR 22GX1IN BLU -- PROTECTIV PLUS

## (undated) DEVICE — SUTURE PROL SZ 3-0 L18IN NONABSORBABLE BLU L19MM PC-5 3/8 8632G

## (undated) DEVICE — MAJ-1414 SINGLE USE ADPATER BIOPSY VALV: Brand: SINGLE USE ADAPTOR BIOPSY VALVE

## (undated) DEVICE — PRECISION THIN (9.0 X 0.38 X 25.0MM)

## (undated) DEVICE — SOLUTION LACTATED RINGERS INJECTION USP

## (undated) DEVICE — Device: Brand: SPOT EX ENDOSCOPIC TATTOO

## (undated) DEVICE — SPONGE GZ W4XL4IN COT 12 PLY TYP VII WVN C FLD DSGN

## (undated) DEVICE — DRAPE TWL SURG 16X26IN BLU ORB04] ALLCARE INC]

## (undated) DEVICE — DRESSING,GAUZE,XEROFORM,CURAD,1"X8",ST: Brand: CURAD

## (undated) DEVICE — SET ADMIN 16ML TBNG L100IN 2 Y INJ SITE IV PIGGY BK DISP

## (undated) DEVICE — BLADE SAW GIGLI RND 300MM --

## (undated) DEVICE — DISPOSABLE TOURNIQUET CUFF SINGLE BLADDER, SINGLE PORT AND QUICK CONNECT CONNECTOR: Brand: COLOR CUFF

## (undated) DEVICE — MEDI-VAC NON-CONDUCTIVE SUCTION TUBING: Brand: CARDINAL HEALTH

## (undated) DEVICE — TRAP SPEC COLL POLYP POLYSTYR --

## (undated) DEVICE — SYR 5ML 1/5 GRAD LL NSAF LF --

## (undated) DEVICE — SYRINGE 50ML E/T

## (undated) DEVICE — BLADE SAW SAG 73X12.5X1.27 MM FOR LG BNE 2108 SER

## (undated) DEVICE — BANDAGE COMPR W3XL186IN SYNTH KNIT DSGN COHESIVE ELAS ECON

## (undated) DEVICE — KENDALL RADIOLUCENT FOAM MONITORING ELECTRODE RECTANGULAR SHAPE: Brand: KENDALL

## (undated) DEVICE — SINGLE PORT MANIFOLD: Brand: NEPTUNE 2

## (undated) DEVICE — HANDPIECE SET WITH FAN SPRAY TIP: Brand: INTERPULSE

## (undated) DEVICE — GOWN,AURORA,NONRNF,XL,30/CS: Brand: MEDLINE

## (undated) DEVICE — SOL IRRIGATION INJ NACL 0.9% 500ML BTL

## (undated) DEVICE — STRAP,POSITIONING,KNEE/BODY,FOAM,4X60": Brand: MEDLINE

## (undated) DEVICE — BANDAGE,GAUZE,BULKEE II,4.5"X4.1YD,STRL: Brand: MEDLINE

## (undated) DEVICE — BANDAGE,GAUZE,CONFORMING,4"X75",STRL,LF: Brand: MEDLINE

## (undated) DEVICE — GARMENT,MEDLINE,DVT,INT,CALF,MED, GEN2: Brand: MEDLINE

## (undated) DEVICE — NDL FLTR TIP 5 MIC 18GX1.5IN --

## (undated) DEVICE — CANNULA CUSH AD W/ 14FT TBG

## (undated) DEVICE — SYR 3ML LL TIP 1/10ML GRAD --

## (undated) DEVICE — GAUZE SPNG AVANT 4X4 4PLY NS --

## (undated) DEVICE — CATH SUC CTRL PRT TRIFLO 14FR --

## (undated) DEVICE — (D)PREP SKN CHLRAPRP APPL 26ML -- CONVERT TO ITEM 371833